# Patient Record
Sex: MALE | Race: BLACK OR AFRICAN AMERICAN | Employment: OTHER
[De-identification: names, ages, dates, MRNs, and addresses within clinical notes are randomized per-mention and may not be internally consistent; named-entity substitution may affect disease eponyms.]

---

## 2017-01-19 ENCOUNTER — OFFICE VISIT (OUTPATIENT)
Dept: ONCOLOGY | Facility: CLINIC | Age: 67
End: 2017-01-19

## 2017-01-19 ENCOUNTER — TELEPHONE (OUTPATIENT)
Dept: ONCOLOGY | Facility: CLINIC | Age: 67
End: 2017-01-19

## 2017-01-19 VITALS
DIASTOLIC BLOOD PRESSURE: 92 MMHG | BODY MASS INDEX: 22.85 KG/M2 | HEART RATE: 86 BPM | TEMPERATURE: 97.9 F | RESPIRATION RATE: 20 BRPM | WEIGHT: 168.5 LBS | SYSTOLIC BLOOD PRESSURE: 147 MMHG

## 2017-01-19 DIAGNOSIS — D61.818 PANCYTOPENIA (HCC): ICD-10-CM

## 2017-01-19 DIAGNOSIS — D47.2 MGUS (MONOCLONAL GAMMOPATHY OF UNKNOWN SIGNIFICANCE): ICD-10-CM

## 2017-01-19 DIAGNOSIS — C61 PROSTATE CANCER (HCC): Primary | ICD-10-CM

## 2017-01-19 PROCEDURE — 99214 OFFICE O/P EST MOD 30 MIN: CPT | Performed by: INTERNAL MEDICINE

## 2017-01-19 RX ORDER — LEVOFLOXACIN 500 MG/1
500 TABLET, FILM COATED ORAL DAILY
Qty: 7 TABLET | Refills: 0 | Status: SHIPPED | OUTPATIENT
Start: 2017-01-19 | End: 2017-01-26

## 2017-01-31 ENCOUNTER — OFFICE VISIT (OUTPATIENT)
Dept: FAMILY MEDICINE CLINIC | Facility: CLINIC | Age: 67
End: 2017-01-31

## 2017-01-31 VITALS
BODY MASS INDEX: 23.94 KG/M2 | OXYGEN SATURATION: 98 % | SYSTOLIC BLOOD PRESSURE: 124 MMHG | WEIGHT: 176.5 LBS | HEART RATE: 87 BPM | DIASTOLIC BLOOD PRESSURE: 90 MMHG

## 2017-01-31 DIAGNOSIS — Z11.59 NEED FOR HEPATITIS C SCREENING TEST: ICD-10-CM

## 2017-01-31 DIAGNOSIS — Z23 NEED FOR PROPHYLACTIC VACCINATION AGAINST DIPHTHERIA-TETANUS-PERTUSSIS (DTP): ICD-10-CM

## 2017-01-31 DIAGNOSIS — D47.2 MGUS (MONOCLONAL GAMMOPATHY OF UNKNOWN SIGNIFICANCE): ICD-10-CM

## 2017-01-31 DIAGNOSIS — D50.0 IRON DEFICIENCY ANEMIA DUE TO CHRONIC BLOOD LOSS: Primary | ICD-10-CM

## 2017-01-31 DIAGNOSIS — E55.9 VITAMIN D DEFICIENCY: ICD-10-CM

## 2017-01-31 PROCEDURE — 90715 TDAP VACCINE 7 YRS/> IM: CPT | Performed by: FAMILY MEDICINE

## 2017-01-31 PROCEDURE — 90471 IMMUNIZATION ADMIN: CPT | Performed by: FAMILY MEDICINE

## 2017-01-31 PROCEDURE — 99213 OFFICE O/P EST LOW 20 MIN: CPT | Performed by: FAMILY MEDICINE

## 2017-01-31 RX ORDER — CYCLOBENZAPRINE HCL 10 MG
TABLET ORAL
Qty: 90 TABLET | Refills: 0 | Status: SHIPPED | OUTPATIENT
Start: 2017-01-31 | End: 2017-12-19 | Stop reason: SDUPTHER

## 2017-01-31 RX ORDER — CALCIUM CARBONATE 500(1250)
500 TABLET ORAL DAILY
COMMUNITY

## 2017-02-06 RX ORDER — POTASSIUM CHLORIDE 20 MEQ/1
TABLET, EXTENDED RELEASE ORAL
Qty: 30 TABLET | Refills: 5 | Status: SHIPPED | OUTPATIENT
Start: 2017-02-06 | End: 2017-12-09 | Stop reason: SDUPTHER

## 2017-02-27 ENCOUNTER — CARE COORDINATION (OUTPATIENT)
Dept: CARE COORDINATION | Facility: CLINIC | Age: 67
End: 2017-02-27

## 2017-03-15 RX ORDER — HYDROCHLOROTHIAZIDE 25 MG/1
TABLET ORAL
Qty: 90 TABLET | Refills: 3 | Status: SHIPPED | OUTPATIENT
Start: 2017-03-15 | End: 2017-10-30 | Stop reason: SDUPTHER

## 2017-03-27 ENCOUNTER — CARE COORDINATION (OUTPATIENT)
Dept: CARE COORDINATION | Age: 67
End: 2017-03-27

## 2017-04-06 ENCOUNTER — EMPLOYEE WELLNESS (OUTPATIENT)
Dept: OTHER | Age: 67
End: 2017-04-06

## 2017-04-06 LAB
CHOLESTEROL/HDL RATIO: 3
CHOLESTEROL: 175 MG/DL
GLUCOSE BLD-MCNC: 91 MG/DL (ref 70–99)
HDLC SERPL-MCNC: 59 MG/DL
LDL CHOLESTEROL: 88 MG/DL (ref 0–130)
PATIENT FASTING?: NORMAL
TRIGL SERPL-MCNC: 142 MG/DL
VLDLC SERPL CALC-MCNC: NORMAL MG/DL (ref 1–30)

## 2017-04-27 ENCOUNTER — CARE COORDINATION (OUTPATIENT)
Dept: CARE COORDINATION | Age: 67
End: 2017-04-27

## 2017-05-10 ENCOUNTER — HOSPITAL ENCOUNTER (OUTPATIENT)
Facility: MEDICAL CENTER | Age: 67
End: 2017-05-10
Payer: COMMERCIAL

## 2017-05-16 ENCOUNTER — HOSPITAL ENCOUNTER (OUTPATIENT)
Facility: MEDICAL CENTER | Age: 67
Discharge: HOME | End: 2017-05-16
Payer: COMMERCIAL

## 2017-05-16 DIAGNOSIS — D47.2 MGUS (MONOCLONAL GAMMOPATHY OF UNKNOWN SIGNIFICANCE): ICD-10-CM

## 2017-05-16 DIAGNOSIS — C61 PROSTATE CANCER (HCC): ICD-10-CM

## 2017-05-16 DIAGNOSIS — D61.818 PANCYTOPENIA (HCC): ICD-10-CM

## 2017-05-16 LAB
ABSOLUTE EOS #: 0.07 K/UL (ref 0–0.4)
ABSOLUTE LYMPH #: 0.4 K/UL (ref 1–4.8)
ABSOLUTE MONO #: 0.33 K/UL (ref 0.2–0.8)
ANION GAP SERPL CALCULATED.3IONS-SCNC: 15 MMOL/L (ref 9–17)
BASOPHILS # BLD: 0 %
BASOPHILS ABSOLUTE: 0 K/UL (ref 0–0.2)
BUN BLDV-MCNC: 11 MG/DL (ref 8–23)
BUN/CREAT BLD: 13 (ref 9–20)
CALCIUM SERPL-MCNC: 8.6 MG/DL (ref 8.6–10.4)
CHLORIDE BLD-SCNC: 102 MMOL/L (ref 98–107)
CO2: 22 MMOL/L (ref 20–31)
CREAT SERPL-MCNC: 0.86 MG/DL (ref 0.7–1.2)
DIFFERENTIAL TYPE: ABNORMAL
EOSINOPHILS RELATIVE PERCENT: 6 %
FERRITIN: 48 UG/L (ref 30–400)
FREE KAPPA/LAMBDA RATIO: 1.49 (ref 0.26–1.65)
GFR AFRICAN AMERICAN: >60 ML/MIN
GFR NON-AFRICAN AMERICAN: >60 ML/MIN
GFR SERPL CREATININE-BSD FRML MDRD: ABNORMAL ML/MIN/{1.73_M2}
GFR SERPL CREATININE-BSD FRML MDRD: ABNORMAL ML/MIN/{1.73_M2}
GLUCOSE BLD-MCNC: 111 MG/DL (ref 70–99)
HCT VFR BLD CALC: 37.9 % (ref 41–53)
HEMOGLOBIN: 12.2 G/DL (ref 13.5–17.5)
IRON SATURATION: 45 % (ref 20–55)
IRON: 153 UG/DL (ref 59–158)
KAPPA FREE LIGHT CHAINS QNT: 3.18 MG/DL (ref 0.37–1.94)
LAMBDA FREE LIGHT CHAINS QNT: 2.14 MG/DL (ref 0.57–2.63)
LYMPHOCYTES # BLD: 37 %
MCH RBC QN AUTO: 24.1 PG (ref 26–34)
MCHC RBC AUTO-ENTMCNC: 32.2 G/DL (ref 31–37)
MCV RBC AUTO: 74.8 FL (ref 80–100)
MONOCYTES # BLD: 30 %
MORPHOLOGY: ABNORMAL
PDW BLD-RTO: 19.8 % (ref 11.5–14.5)
PLATELET # BLD: 87 K/UL (ref 130–400)
PLATELET ESTIMATE: ABNORMAL
PMV BLD AUTO: 9.7 FL (ref 6–12)
POTASSIUM SERPL-SCNC: 4 MMOL/L (ref 3.7–5.3)
RBC # BLD: 5.07 M/UL (ref 4.5–5.9)
RBC # BLD: ABNORMAL 10*6/UL
SEG NEUTROPHILS: 27 %
SEGMENTED NEUTROPHILS ABSOLUTE COUNT: 0.3 K/UL (ref 1.8–7.7)
SODIUM BLD-SCNC: 139 MMOL/L (ref 135–144)
TOTAL IRON BINDING CAPACITY: 343 UG/DL (ref 250–450)
UNSATURATED IRON BINDING CAPACITY: 190 UG/DL (ref 112–347)
WBC # BLD: 1.1 K/UL (ref 3.5–11)
WBC # BLD: ABNORMAL 10*3/UL

## 2017-05-16 PROCEDURE — 36415 COLL VENOUS BLD VENIPUNCTURE: CPT

## 2017-05-16 PROCEDURE — 82784 ASSAY IGA/IGD/IGG/IGM EACH: CPT

## 2017-05-16 PROCEDURE — 85025 COMPLETE CBC W/AUTO DIFF WBC: CPT

## 2017-05-16 PROCEDURE — 83540 ASSAY OF IRON: CPT

## 2017-05-16 PROCEDURE — 83550 IRON BINDING TEST: CPT

## 2017-05-16 PROCEDURE — 82728 ASSAY OF FERRITIN: CPT

## 2017-05-16 PROCEDURE — 80048 BASIC METABOLIC PNL TOTAL CA: CPT

## 2017-05-16 PROCEDURE — 83883 ASSAY NEPHELOMETRY NOT SPEC: CPT

## 2017-05-17 ENCOUNTER — HOSPITAL ENCOUNTER (OUTPATIENT)
Facility: MEDICAL CENTER | Age: 67
End: 2017-05-17
Payer: COMMERCIAL

## 2017-05-17 LAB
IGA: 351 MG/DL (ref 70–400)
IGG: 1912 MG/DL (ref 700–1600)
IGM: 30 MG/DL (ref 40–230)

## 2017-05-18 ENCOUNTER — TELEPHONE (OUTPATIENT)
Dept: ONCOLOGY | Age: 67
End: 2017-05-18

## 2017-05-18 ENCOUNTER — OFFICE VISIT (OUTPATIENT)
Dept: ONCOLOGY | Age: 67
End: 2017-05-18
Payer: COMMERCIAL

## 2017-05-18 VITALS
HEART RATE: 89 BPM | RESPIRATION RATE: 18 BRPM | BODY MASS INDEX: 23.02 KG/M2 | TEMPERATURE: 98.2 F | WEIGHT: 169.7 LBS | SYSTOLIC BLOOD PRESSURE: 132 MMHG | DIASTOLIC BLOOD PRESSURE: 82 MMHG

## 2017-05-18 DIAGNOSIS — D61.818 PANCYTOPENIA (HCC): Primary | ICD-10-CM

## 2017-05-18 DIAGNOSIS — R97.20 PSA ELEVATION: ICD-10-CM

## 2017-05-18 DIAGNOSIS — K90.89 OTHER SPECIFIED INTESTINAL MALABSORPTION: ICD-10-CM

## 2017-05-18 DIAGNOSIS — D47.2 MGUS (MONOCLONAL GAMMOPATHY OF UNKNOWN SIGNIFICANCE): ICD-10-CM

## 2017-05-18 PROCEDURE — 99214 OFFICE O/P EST MOD 30 MIN: CPT | Performed by: INTERNAL MEDICINE

## 2017-05-18 PROCEDURE — 99211 OFF/OP EST MAY X REQ PHY/QHP: CPT

## 2017-07-25 ENCOUNTER — OFFICE VISIT (OUTPATIENT)
Dept: FAMILY MEDICINE CLINIC | Age: 67
End: 2017-07-25
Payer: COMMERCIAL

## 2017-07-25 VITALS
SYSTOLIC BLOOD PRESSURE: 140 MMHG | DIASTOLIC BLOOD PRESSURE: 62 MMHG | HEART RATE: 91 BPM | HEIGHT: 72 IN | OXYGEN SATURATION: 98 % | BODY MASS INDEX: 22.29 KG/M2 | WEIGHT: 164.6 LBS

## 2017-07-25 DIAGNOSIS — M19.90 OSTEOARTHRITIS, UNSPECIFIED OSTEOARTHRITIS TYPE, UNSPECIFIED SITE: ICD-10-CM

## 2017-07-25 DIAGNOSIS — R63.4 WEIGHT LOSS: Primary | ICD-10-CM

## 2017-07-25 PROCEDURE — 99213 OFFICE O/P EST LOW 20 MIN: CPT | Performed by: FAMILY MEDICINE

## 2017-07-25 RX ORDER — CYCLOBENZAPRINE HCL 10 MG
10 TABLET ORAL 3 TIMES DAILY PRN
Qty: 30 TABLET | Refills: 3 | Status: SHIPPED | OUTPATIENT
Start: 2017-07-25 | End: 2017-08-04

## 2017-07-25 ASSESSMENT — PATIENT HEALTH QUESTIONNAIRE - PHQ9
SUM OF ALL RESPONSES TO PHQ9 QUESTIONS 1 & 2: 0
SUM OF ALL RESPONSES TO PHQ QUESTIONS 1-9: 0
1. LITTLE INTEREST OR PLEASURE IN DOING THINGS: 0
2. FEELING DOWN, DEPRESSED OR HOPELESS: 0

## 2017-08-16 ENCOUNTER — HOSPITAL ENCOUNTER (OUTPATIENT)
Facility: MEDICAL CENTER | Age: 67
End: 2017-08-16
Payer: COMMERCIAL

## 2017-08-23 ENCOUNTER — HOSPITAL ENCOUNTER (OUTPATIENT)
Facility: MEDICAL CENTER | Age: 67
Discharge: HOME | End: 2017-08-23
Payer: COMMERCIAL

## 2017-08-23 ENCOUNTER — HOSPITAL ENCOUNTER (OUTPATIENT)
Facility: MEDICAL CENTER | Age: 67
End: 2017-08-23
Payer: COMMERCIAL

## 2017-08-23 DIAGNOSIS — C61 PROSTATE CANCER (HCC): ICD-10-CM

## 2017-08-23 DIAGNOSIS — D61.818 PANCYTOPENIA (HCC): ICD-10-CM

## 2017-08-23 DIAGNOSIS — D47.2 MGUS (MONOCLONAL GAMMOPATHY OF UNKNOWN SIGNIFICANCE): ICD-10-CM

## 2017-08-23 LAB
ABSOLUTE EOS #: 0.05 K/UL (ref 0–0.4)
ABSOLUTE LYMPH #: 0.5 K/UL (ref 1–4.8)
ABSOLUTE MONO #: 0.17 K/UL (ref 0.2–0.8)
ANION GAP SERPL CALCULATED.3IONS-SCNC: 9 MMOL/L (ref 9–17)
BASOPHILS # BLD: 2 %
BASOPHILS ABSOLUTE: 0.02 K/UL (ref 0–0.2)
BUN BLDV-MCNC: 12 MG/DL (ref 8–23)
BUN/CREAT BLD: 12 (ref 9–20)
CALCIUM SERPL-MCNC: 8.8 MG/DL (ref 8.6–10.4)
CHLORIDE BLD-SCNC: 99 MMOL/L (ref 98–107)
CO2: 26 MMOL/L (ref 20–31)
CREAT SERPL-MCNC: 0.99 MG/DL (ref 0.7–1.2)
DIFFERENTIAL TYPE: ABNORMAL
EOSINOPHILS RELATIVE PERCENT: 4 %
FERRITIN: 33 UG/L (ref 30–400)
FREE KAPPA/LAMBDA RATIO: 1.13 (ref 0.26–1.65)
GFR AFRICAN AMERICAN: >60 ML/MIN
GFR NON-AFRICAN AMERICAN: >60 ML/MIN
GFR SERPL CREATININE-BSD FRML MDRD: ABNORMAL ML/MIN/{1.73_M2}
GFR SERPL CREATININE-BSD FRML MDRD: ABNORMAL ML/MIN/{1.73_M2}
GLUCOSE BLD-MCNC: 161 MG/DL (ref 70–99)
HCT VFR BLD CALC: 32.5 % (ref 41–53)
HEMOGLOBIN: 10.1 G/DL (ref 13.5–17.5)
IGA: 374 MG/DL (ref 70–400)
IGG: 1914 MG/DL (ref 700–1600)
IGM: 34 MG/DL (ref 40–230)
IRON SATURATION: 47 % (ref 20–55)
IRON: 158 UG/DL (ref 59–158)
KAPPA FREE LIGHT CHAINS QNT: 2.86 MG/DL (ref 0.37–1.94)
LAMBDA FREE LIGHT CHAINS QNT: 2.52 MG/DL (ref 0.57–2.63)
LYMPHOCYTES # BLD: 42 %
MCH RBC QN AUTO: 22.4 PG (ref 26–34)
MCHC RBC AUTO-ENTMCNC: 31.2 G/DL (ref 31–37)
MCV RBC AUTO: 71.8 FL (ref 80–100)
MONOCYTES # BLD: 14 %
MORPHOLOGY: ABNORMAL
PDW BLD-RTO: 18.9 % (ref 11.5–14.5)
PLATELET # BLD: 142 K/UL (ref 130–400)
PLATELET ESTIMATE: ABNORMAL
PMV BLD AUTO: 9.4 FL (ref 6–12)
POTASSIUM SERPL-SCNC: 4.2 MMOL/L (ref 3.7–5.3)
RBC # BLD: 4.53 M/UL (ref 4.5–5.9)
RBC # BLD: ABNORMAL 10*6/UL
SEG NEUTROPHILS: 38 %
SEGMENTED NEUTROPHILS ABSOLUTE COUNT: 0.46 K/UL (ref 1.8–7.7)
SODIUM BLD-SCNC: 134 MMOL/L (ref 135–144)
TOTAL IRON BINDING CAPACITY: 338 UG/DL (ref 250–450)
UNSATURATED IRON BINDING CAPACITY: 180 UG/DL (ref 112–347)
WBC # BLD: 1.2 K/UL (ref 3.5–11)
WBC # BLD: ABNORMAL 10*3/UL

## 2017-08-23 PROCEDURE — 85025 COMPLETE CBC W/AUTO DIFF WBC: CPT

## 2017-08-23 PROCEDURE — 80048 BASIC METABOLIC PNL TOTAL CA: CPT

## 2017-08-23 PROCEDURE — 36415 COLL VENOUS BLD VENIPUNCTURE: CPT

## 2017-08-23 PROCEDURE — 82728 ASSAY OF FERRITIN: CPT

## 2017-08-23 PROCEDURE — 83540 ASSAY OF IRON: CPT

## 2017-08-23 PROCEDURE — 83550 IRON BINDING TEST: CPT

## 2017-08-23 PROCEDURE — 82784 ASSAY IGA/IGD/IGG/IGM EACH: CPT

## 2017-08-23 PROCEDURE — 83883 ASSAY NEPHELOMETRY NOT SPEC: CPT

## 2017-08-24 ENCOUNTER — TELEPHONE (OUTPATIENT)
Dept: ONCOLOGY | Age: 67
End: 2017-08-24

## 2017-08-24 ENCOUNTER — OFFICE VISIT (OUTPATIENT)
Dept: ONCOLOGY | Age: 67
End: 2017-08-24
Payer: COMMERCIAL

## 2017-08-24 VITALS
DIASTOLIC BLOOD PRESSURE: 85 MMHG | RESPIRATION RATE: 18 BRPM | WEIGHT: 164 LBS | SYSTOLIC BLOOD PRESSURE: 138 MMHG | TEMPERATURE: 98.4 F | HEART RATE: 93 BPM | BODY MASS INDEX: 22.24 KG/M2

## 2017-08-24 DIAGNOSIS — D70.9 NEUTROPENIA, UNSPECIFIED TYPE (HCC): ICD-10-CM

## 2017-08-24 DIAGNOSIS — D47.2 MGUS (MONOCLONAL GAMMOPATHY OF UNKNOWN SIGNIFICANCE): ICD-10-CM

## 2017-08-24 DIAGNOSIS — D61.818 PANCYTOPENIA (HCC): Primary | ICD-10-CM

## 2017-08-24 PROCEDURE — 99211 OFF/OP EST MAY X REQ PHY/QHP: CPT

## 2017-08-24 PROCEDURE — 99214 OFFICE O/P EST MOD 30 MIN: CPT | Performed by: INTERNAL MEDICINE

## 2017-08-24 RX ORDER — LEVOFLOXACIN 500 MG/1
500 TABLET, FILM COATED ORAL DAILY
Qty: 7 TABLET | Refills: 0 | Status: SHIPPED | OUTPATIENT
Start: 2017-08-24 | End: 2017-08-31

## 2017-09-07 ENCOUNTER — OFFICE VISIT (OUTPATIENT)
Dept: GASTROENTEROLOGY | Age: 67
End: 2017-09-07
Payer: COMMERCIAL

## 2017-09-07 VITALS
SYSTOLIC BLOOD PRESSURE: 159 MMHG | WEIGHT: 166.5 LBS | TEMPERATURE: 98.1 F | HEART RATE: 96 BPM | HEIGHT: 72 IN | BODY MASS INDEX: 22.55 KG/M2 | OXYGEN SATURATION: 98 % | DIASTOLIC BLOOD PRESSURE: 90 MMHG | RESPIRATION RATE: 14 BRPM

## 2017-09-07 DIAGNOSIS — K92.2 UPPER GI BLEED: Primary | ICD-10-CM

## 2017-09-07 DIAGNOSIS — D50.0 IRON DEFICIENCY ANEMIA DUE TO CHRONIC BLOOD LOSS: ICD-10-CM

## 2017-09-07 PROCEDURE — 99214 OFFICE O/P EST MOD 30 MIN: CPT | Performed by: INTERNAL MEDICINE

## 2017-09-07 ASSESSMENT — ENCOUNTER SYMPTOMS
ABDOMINAL PAIN: 0
CONSTIPATION: 0
EYES NEGATIVE: 1
ALLERGIC/IMMUNOLOGIC NEGATIVE: 1
VOMITING: 0
BLOOD IN STOOL: 0
RECTAL PAIN: 0
ANAL BLEEDING: 0
GASTROINTESTINAL NEGATIVE: 1
NAUSEA: 0
ABDOMINAL DISTENTION: 0
RESPIRATORY NEGATIVE: 1
DIARRHEA: 0

## 2017-10-23 RX ORDER — PANTOPRAZOLE SODIUM 40 MG/1
40 TABLET, DELAYED RELEASE ORAL
Qty: 90 TABLET | Refills: 0 | Status: SHIPPED | OUTPATIENT
Start: 2017-10-23 | End: 2017-10-30 | Stop reason: SDUPTHER

## 2017-10-23 NOTE — TELEPHONE ENCOUNTER
Next Visit Date:  Future Appointments  Date Time Provider Krystal Deanne   10/30/2017 1:15 PM Ivan Andersen MD [facility]   11/27/2017 10:30 AM SCHEDULE, [facility]   11/30/2017 2:40 PM Don Ruano MD SV [facility]   9/13/2018 1:00 PM Belen Engel MD [facility] [address] Maintenance   Topic Date Due    Hepatitis C screen  1950    Zostavax vaccine  02/13/2010    Flu vaccine (1) 09/01/2017    Colon cancer screen colonoscopy  07/14/2019    Lipid screen  07/20/2021    DTaP/Tdap/Td vaccine (2 - Td) 01/31/2027    Pneumococcal low/med risk  Completed       Hemoglobin A1C (%)   Date Value   01/30/2015 5.0             ( goal A1C is < 7)   No results found for: LABMICR  LDL Cholesterol (mg/dL)   Date Value   04/06/2017 88     LDL Calculated (mg/dL)   Date Value   03/26/2014 167 (A)       (goal LDL is <100)   AST (U/L)   Date Value   10/18/2016 35     ALT (U/L)   Date Value   10/18/2016 28     BUN (mg/dL)   Date Value   08/23/2017 12     BP Readings from Last 3 Encounters:   09/07/17 (!) 159/90   08/24/17 138/85   07/25/17 (!) 140/62          (goal 120/80)    All Future Testing planned in [facility]  Lab Frequency Next Occurrence   Vitamin D 25 Hydroxy Once 01/31/2018   Hepatitis C Antibody Once 01/31/2018   CBC Auto Differential     Basic Metabolic Panel     Iron and TIBC     Ferritin     IgG, IgA, IgM     [place]/Lambda Free Lt Chains, Serum Quant                 Patient Active Problem List:     Leukopenia     Anemia     Weight loss     Back pain     Weakness     Pancytopenia (HCC)     MGUS (monoclonal gammopathy of unknown significance)     PSA elevation     Neutropenia (HCC)     Osteoarthritis     Hyponatremia     S/P hip replacement     Prostate cancer (HCC)     Malabsorption     Hematemesis without nausea     Upper GI bleed

## 2017-10-30 ENCOUNTER — OFFICE VISIT (OUTPATIENT)
Dept: FAMILY MEDICINE CLINIC | Age: 67
End: 2017-10-30
Payer: COMMERCIAL

## 2017-10-30 VITALS
HEART RATE: 93 BPM | OXYGEN SATURATION: 96 % | DIASTOLIC BLOOD PRESSURE: 72 MMHG | HEIGHT: 72 IN | BODY MASS INDEX: 23.73 KG/M2 | WEIGHT: 175.2 LBS | SYSTOLIC BLOOD PRESSURE: 140 MMHG

## 2017-10-30 DIAGNOSIS — D47.2 MGUS (MONOCLONAL GAMMOPATHY OF UNKNOWN SIGNIFICANCE): Primary | ICD-10-CM

## 2017-10-30 DIAGNOSIS — I10 ESSENTIAL HYPERTENSION: ICD-10-CM

## 2017-10-30 DIAGNOSIS — D50.8 IRON DEFICIENCY ANEMIA SECONDARY TO INADEQUATE DIETARY IRON INTAKE: ICD-10-CM

## 2017-10-30 PROCEDURE — 99213 OFFICE O/P EST LOW 20 MIN: CPT | Performed by: FAMILY MEDICINE

## 2017-10-30 PROCEDURE — 90471 IMMUNIZATION ADMIN: CPT | Performed by: FAMILY MEDICINE

## 2017-10-30 PROCEDURE — 90688 IIV4 VACCINE SPLT 0.5 ML IM: CPT | Performed by: FAMILY MEDICINE

## 2017-10-30 RX ORDER — HYDROCHLOROTHIAZIDE 25 MG/1
TABLET ORAL
Qty: 90 TABLET | Refills: 3 | Status: SHIPPED | OUTPATIENT
Start: 2017-10-30 | End: 2018-04-18 | Stop reason: SDUPTHER

## 2017-10-30 RX ORDER — AMOXICILLIN 500 MG/1
CAPSULE ORAL
Qty: 4 CAPSULE | Refills: 3 | Status: SHIPPED | OUTPATIENT
Start: 2017-10-30 | End: 2019-02-26

## 2017-10-30 RX ORDER — PANTOPRAZOLE SODIUM 40 MG/1
40 TABLET, DELAYED RELEASE ORAL
Qty: 90 TABLET | Refills: 3 | Status: SHIPPED | OUTPATIENT
Start: 2017-10-30 | End: 2018-01-20 | Stop reason: SDUPTHER

## 2017-10-30 NOTE — PROGRESS NOTES
Subjective: Guadalupe Gage presents for   Chief Complaint   Patient presents with    Check-Up    Discuss Medications   dong well. bp is fine    Appetite is great and he is gaining weight. Continues to see heme, urology and GI    Is very active physically  Patient Active Problem List   Diagnosis    Leukopenia    Anemia    Weight loss    Back pain    Weakness    Pancytopenia ([address]    MGUS (monoclonal gammopathy of unknown significance)    PSA elevation    Neutropenia (HCC)    Osteoarthritis    Hyponatremia    S/P hip replacement    Pro[facility] ([facility] [address]    Malabsorption    Hematemesis without nausea    Upper GI bleed       Review of Systems:  · General: no significant weight changes. · Respiratory: no cough, pleuritic chest pain, dyspnea, or wheezing  · Cardiovascular: no pain, NICOLE, orthopnea, palpitations, or claudication  · Gastrointestinal: no chronic nausea, vomiting, heartburn, diarrhea, constipation, bloating, or abdominal pain. No bloody or black stools. Objective:  Physical Exam   Vitals: BP (!) 140/72   Pulse 93   Ht 6' (1.829 m)   Wt 175 lb 3.2 oz (79.5 kg)   SpO2 96%   BMI 23.76 kg/m²     Wt Readings from Last 3 Encounters:   09/07/17 166 lb 8 oz (75.5 kg)   08/24/17 164 lb (74.4 kg)   07/25/17 164 lb 9.6 oz (74.7 kg)     Ht Readings from Last 3 Encounters:   09/07/17 6' (1.829 m)   07/25/17 6' (1.829 m)   11/14/16 6' (1.829 m)     There is no height or weight on file to calculate BMI. Constitutional: He is oriented to person, place, and time. He appears well-developed and well-nourished and in no acute distress. Answers all my questions appropriately. Head: Normocephalic and atraumatic. Eyes:conjunctiva appear normal.  Nose: pink, non-edematous mucosa. No polyps. No septal deviation  Throat: no erythema, tonsillar hypertrophy or exudate. No ulcerations noted. Lips/Teeth/Gums all appear normal.  Neck: Normal range of motion. Neck supple. No tracheal deviation present.  No abnormal lymphadenopathy. No JVD noted. Carotids are clear bilaterally. No thyroid masses noted. Heart: RRR without murmur. No S3, S4, or gallop noted. Chest: Clear to auscultation bilaterally. Good breath sounds noted. No rales, wheezes, or rhonchi noted. No respiratory retractions noted. Wall has symmetrical movement with respirations. No pedal edema    Assessment:   Encounter Diagnoses   Name Primary?     MGUS (monoclonal gammopathy of unknown significance) Yes    Iron deficiency anemia secondary to inadequate dietary iron intake     Essential hypertension          Plan:   Orders Placed This Encounter   Procedures    INFLUENZA, QUADV, 3 YRS AND OLDER, IM, MDV, 0.5ML (FLUZONE QUADV)     rv in 6 mopnths    We discussed pre procedure prophylaxis and gave him an rx for amxoils

## 2017-10-30 NOTE — PROGRESS NOTES
Subjective:      Patient ID: Robby Blunt is a 79 y.o. male. Visit Information    Have you changed or started any medications since your last visit including any over-the-counter medicines, vitamins, or herbal medicines? no   Are you having any side effects from any of your medications? -  no  Have you stopped taking any of your medications? Is so, why? -  no    Have you seen any other physician or provider since your last visit? No  Have you had any other diagnostic tests since your last visit? No  Have you been seen in the emergency room and/or had an admission to a hospital since we last saw you? No  Have you had your routine dental cleaning in the past 6 months? yes -     Have you activated your [employer] account? If not, what are your barriers?  Yes     Patient Care Team:  Mary Perla MD as PCP - General (Family Medicine)  Mary Perla MD as PCP - S Attributed Provider  Eva oGff MD as Consulting Physician (Hematology and Oncology)  Bala Nash DO as Consulting Physician (Orthopedic Surgery)  Dima Underwood MD as Consulting Physician (Urology)  Emigdio Belle MD as Consulting Physician (Gastroenterology)    Medical History Review  Past Medical, Family, and Social History reviewed and  contribute to the patient presenting condition    Health Maintenance   Topic Date Due    Hepatitis C screen  1950    Zostavax vaccine  02/13/2010    Flu vaccine (1) 09/01/2017    Colon cancer screen colonoscopy  07/14/2019    Lipid screen  07/20/2021    DTaP/Tdap/Td vaccine (2 - Td) 01/31/2027    Pneumococcal low/med risk  Completed       HPI    Review of Systems    Objective:   Physical Exam    Assessment / Plan:

## 2017-11-22 ENCOUNTER — HOSPITAL ENCOUNTER (OUTPATIENT)
Facility: MEDICAL CENTER | Age: 67
End: 2017-11-22
Payer: COMMERCIAL

## 2017-11-28 ENCOUNTER — HOSPITAL ENCOUNTER (OUTPATIENT)
Facility: MEDICAL CENTER | Age: 67
Discharge: HOME | End: 2017-11-28
Payer: COMMERCIAL

## 2017-11-28 ENCOUNTER — HOSPITAL ENCOUNTER (OUTPATIENT)
Facility: MEDICAL CENTER | Age: 67
End: 2017-11-28
Payer: COMMERCIAL

## 2017-11-28 DIAGNOSIS — D47.2 MGUS (MONOCLONAL GAMMOPATHY OF UNKNOWN SIGNIFICANCE): ICD-10-CM

## 2017-11-28 DIAGNOSIS — C61 PROSTATE CANCER (HCC): ICD-10-CM

## 2017-11-28 DIAGNOSIS — D61.818 PANCYTOPENIA (HCC): ICD-10-CM

## 2017-11-28 LAB
ABSOLUTE EOS #: 0.07 K/UL (ref 0–0.4)
ABSOLUTE IMMATURE GRANULOCYTE: ABNORMAL K/UL (ref 0–0.3)
ABSOLUTE LYMPH #: 0.56 K/UL (ref 1–4.8)
ABSOLUTE MONO #: 0.36 K/UL (ref 0.2–0.8)
ANION GAP SERPL CALCULATED.3IONS-SCNC: 13 MMOL/L (ref 9–17)
BASOPHILS # BLD: 0 %
BASOPHILS ABSOLUTE: 0 K/UL (ref 0–0.2)
BUN BLDV-MCNC: 16 MG/DL (ref 8–23)
BUN/CREAT BLD: 18 (ref 9–20)
CALCIUM SERPL-MCNC: 8.7 MG/DL (ref 8.6–10.4)
CHLORIDE BLD-SCNC: 103 MMOL/L (ref 98–107)
CO2: 23 MMOL/L (ref 20–31)
CREAT SERPL-MCNC: 0.9 MG/DL (ref 0.7–1.2)
DIFFERENTIAL TYPE: ABNORMAL
EOSINOPHILS RELATIVE PERCENT: 4 % (ref 1–4)
FERRITIN: 29 UG/L (ref 30–400)
FREE KAPPA/LAMBDA RATIO: 1.36 (ref 0.26–1.65)
GFR AFRICAN AMERICAN: >60 ML/MIN
GFR NON-AFRICAN AMERICAN: >60 ML/MIN
GFR SERPL CREATININE-BSD FRML MDRD: ABNORMAL ML/MIN/{1.73_M2}
GFR SERPL CREATININE-BSD FRML MDRD: ABNORMAL ML/MIN/{1.73_M2}
GLUCOSE BLD-MCNC: 151 MG/DL (ref 70–99)
HCT VFR BLD CALC: 33.7 % (ref 41–53)
HEMOGLOBIN: 10.1 G/DL (ref 13.5–17.5)
IGA: 349 MG/DL (ref 70–400)
IGG: 1770 MG/DL (ref 700–1600)
IGM: 34 MG/DL (ref 40–230)
IMMATURE GRANULOCYTES: ABNORMAL %
IRON SATURATION: 78 % (ref 20–55)
IRON: 277 UG/DL (ref 59–158)
KAPPA FREE LIGHT CHAINS QNT: 2.69 MG/DL (ref 0.37–1.94)
LAMBDA FREE LIGHT CHAINS QNT: 1.98 MG/DL (ref 0.57–2.63)
LYMPHOCYTES # BLD: 31 % (ref 24–44)
MCH RBC QN AUTO: 22.1 PG (ref 26–34)
MCHC RBC AUTO-ENTMCNC: 30.1 G/DL (ref 31–37)
MCV RBC AUTO: 73.4 FL (ref 80–100)
MONOCYTES # BLD: 20 % (ref 1–7)
MORPHOLOGY: ABNORMAL
PDW BLD-RTO: 20.6 % (ref 11.5–14.5)
PLATELET # BLD: 122 K/UL (ref 130–400)
PLATELET ESTIMATE: ABNORMAL
PMV BLD AUTO: ABNORMAL FL (ref 6–12)
POTASSIUM SERPL-SCNC: 3.9 MMOL/L (ref 3.7–5.3)
RBC # BLD: 4.59 M/UL (ref 4.5–5.9)
RBC # BLD: ABNORMAL 10*6/UL
SEG NEUTROPHILS: 45 % (ref 36–66)
SEGMENTED NEUTROPHILS ABSOLUTE COUNT: 0.81 K/UL (ref 1.8–7.7)
SODIUM BLD-SCNC: 139 MMOL/L (ref 135–144)
TOTAL IRON BINDING CAPACITY: 357 UG/DL (ref 250–450)
UNSATURATED IRON BINDING CAPACITY: 80 UG/DL (ref 112–347)
WBC # BLD: 1.8 K/UL (ref 3.5–11)
WBC # BLD: ABNORMAL 10*3/UL

## 2017-11-28 PROCEDURE — 80048 BASIC METABOLIC PNL TOTAL CA: CPT

## 2017-11-28 PROCEDURE — 82784 ASSAY IGA/IGD/IGG/IGM EACH: CPT

## 2017-11-28 PROCEDURE — 83883 ASSAY NEPHELOMETRY NOT SPEC: CPT

## 2017-11-28 PROCEDURE — 83540 ASSAY OF IRON: CPT

## 2017-11-28 PROCEDURE — 83550 IRON BINDING TEST: CPT

## 2017-11-28 PROCEDURE — 85025 COMPLETE CBC W/AUTO DIFF WBC: CPT

## 2017-11-28 PROCEDURE — 82728 ASSAY OF FERRITIN: CPT

## 2017-11-28 PROCEDURE — 36415 COLL VENOUS BLD VENIPUNCTURE: CPT

## 2017-11-30 ENCOUNTER — TELEPHONE (OUTPATIENT)
Dept: ONCOLOGY | Age: 67
End: 2017-11-30

## 2017-11-30 ENCOUNTER — OFFICE VISIT (OUTPATIENT)
Dept: ONCOLOGY | Age: 67
End: 2017-11-30
Payer: COMMERCIAL

## 2017-11-30 VITALS
HEART RATE: 102 BPM | WEIGHT: 170.1 LBS | TEMPERATURE: 98.4 F | SYSTOLIC BLOOD PRESSURE: 161 MMHG | DIASTOLIC BLOOD PRESSURE: 99 MMHG | BODY MASS INDEX: 23.07 KG/M2 | RESPIRATION RATE: 18 BRPM

## 2017-11-30 DIAGNOSIS — D61.818 PANCYTOPENIA (HCC): Primary | ICD-10-CM

## 2017-11-30 DIAGNOSIS — D47.2 MGUS (MONOCLONAL GAMMOPATHY OF UNKNOWN SIGNIFICANCE): ICD-10-CM

## 2017-11-30 DIAGNOSIS — C61 PROSTATE CANCER (HCC): ICD-10-CM

## 2017-11-30 PROCEDURE — 99211 OFF/OP EST MAY X REQ PHY/QHP: CPT

## 2017-11-30 PROCEDURE — 99214 OFFICE O/P EST MOD 30 MIN: CPT | Performed by: INTERNAL MEDICINE

## 2017-12-01 NOTE — PROGRESS NOTES
_     Chief Complaint   Patient presents with    Follow-up       DIAGNOSIS:    Anemia  Leukopenia  Weight loss. MGUS     Back pain  Questionable liver lesions      CURRENT THERAPY:    S/p hospitalization for further management of above issues. S/p blood transfusion  S/P IV Iron infusion. BRIEF CASE HISTORY:      The patient is a 77 y.o. AA male who is admitted to the hospital for severe anemia and leukopenia. He had no previous labs. He was seen by PCP for routine health exam as a new patient. Labs showed severe anemia and leukopenia. He had chronic back pain for one year. No fever. No night sweats. No lymphadenopathy   He has recent weight loss and anorexia. No GI symptoms. GI work up was negative. He received blood transfusion and IV Iron infusion. He had Rt hip surgery on 24/9/78 with no complications. He had left hip surgery in March 2015. No complications. Prostate surgery December 8919 with no complications. INTERIM HISTORY:   Patient is seen for follow up above problems. He has no fever. He continues to have low back pain and joints problems. He has weakness and fatigue. Overall much better. No active bleeding. No infections. No further weight loss. He gained few pounds. PAST MEDICAL HISTORY: has a past medical history of Anemia; Arthritis; Avascular necrosis ([address] BPH (benign prostatic hyperplasia); History of blood transfusion; Hypertension; Leukopenia; MGUS (monoclonal gammopathy of unknown significance); Osteoarthritis; Patient in clinical research study; and Stomach ulcer. PAST SURGICAL HISTORY: has a past surgical history that includes Endoscopy, colon, diagnostic (07/13/2014); ostate Biopsy (09/12/2014); Hip Arthroplasty (Right, 12/03/14); Inguinal hernia repair (Right, 2009); Hip Arthroplasty (Left, 03/10/15); ostatectomy (12/16/2015);  Colonoscopy (07/14/2014); and Upper gastrointestinal endoscopy (10/19/2016). CURRENT MEDICATIONS:  has a current medication list which includes the following prescription(s): pantoprazole, hydrochlorothiazide, amoxicillin, potassium chloride, cyclobenzaprine, calcium carbonate, iron, docusate sodium, multiple vitamins-minerals, and ergocalciferol. ALLERGIES:  has No Known Allergies. FAMILY HISTORY: Negative for any hematological or oncological conditions. SOCIAL HISTORY:  reports that he has never smoked. He has never used smokeless tobacco. He reports that he does not drink alcohol or use drugs. REVIEW OF SYSTEMS:     General: + weakness + fatigue. + unanticipated weight loss + decreased appetite. No fever or chills. Eyes: No blurred vision, eye pain or double vision. Ears: No hearing problems or drainage. No tinnitus. Throat: No sore throat, problems with swallowing or dysphagia. Respiratory: No cough, sputum or hemoptysis. No shortness of breath. No pleuritic chest pain. Cardiovascular: No chest pain, orthopnea or PND. No lower extremity edema. No palpitation. Gastrointestinal: No problems with swallowing. No abdominal pain or bloating. No nausea or vomiting. No diarrhea or constipation. No GI bleeding. Genitourinary: No dysuria, hematuria, frequency or urgency. Musculoskeletal: No muscle aches or pains. No limitation of movement. + chronic back pain. No gait disturbance, No joint complaints. Dermatologic: No skin rashes or pruritus. No skin lesions or discolorations. Psychiatric: No depression, anxiety, or stress or signs of schizophrenia. No change in mood or affect. Hematologic: No history of bleeding tendency. No bruises or ecchymosis. No history of clotting problems. Infectious disease: No fever, chills or frequent infections. Endocrine: No problems with opacity. No polydipsia or polyuria. No temperature intolerance. Neurologic: No headaches or dizziness. No weakness or numbness of the extremities. 11/28/2017 1410    ALKPHOS 148 (H) 10/18/2016 1802    AST 35 10/18/2016 1802    ALT 28 10/18/2016 1802    BILITOT 0.43 10/18/2016 1802        Bone marrow: Final Diagnosis  PERIPHERAL BLOOD:  - SEVERE MICROCYTIC ANEMIA.  - MODERATE TO SEVERE NEUTROPENIA.  - LYMPHOPENIA (510/ul). BONE MARROW BIOPSY, ASPIRATE SMEAR AND CLOT SECTION:  - MYELOID AND MEGAKARYOCYTIC HYPERPLASIA. - ABSENT IRON STORES. - PLASMA CELLS 5-10%, NEGATIVE FOR PLASMA CELL MONOTYPIA FOR IHC. Diagnosis Comment  THE SEVERE MICROCYTIC ANEMIA IS COMPATIBLE WITH IRON DEFICIENCY  ANEMIA. NEUTROPENIA IS LIKELY DRUG INDUCED / IMMUNE-MEDIATED. THERE  IS NO EVIDENCE OF MYELOID OR OTHER MALIGNANCY. PLASMA CELLS COMPRISE  5-10% OF THE CELLULARITY AND THERE IS NO EVIDENCE OF PLASMA CELL  MONOTYPIA BY IHC (SERUM IgG KAPPA MONOCLONAL PARAPROTEIN OF 0.6 G/dl  IS NOTED, FAVORING MGUS). CT scan: IMPRESSION: 1. Thickening of the sigmoid colon and rectum which maybe infectious or inflammatory etiology but may be neoplastic in etiology. Clinical correlation is recommended. Further evaluation with direct visualization is recommended. 2. Prostatomegaly. 3. Numerous low-attenuation lesions scattered in the liver are technically indeterminate. Further evaluation with nonemergent/outpatient contrast-enhanced MRI of the liver is recommended. 4. Severe degenerative changes of the bilateral hips. 5. Lucency of the proximal for more which may be related to demineralization; however, neoplastic etiologies cannot be excluded. This can be further evaluated with bone scan or MRI as clinically indicated. Note is made that this patient may be at increased risk for pathologic fracture. Bone scan: IMPRESSION: 1. No definitive scintigraphic evidence for metastatic disease to the skeleton. 2. Symmetric increased radiotracer uptake at the bilateral hips, likely secondary to severe degenerative changes of the bilateral hips seen on the CT abdomen pelvis of 7/12/2014. Plan for dental work. I will give him antibiotics to be used around that time. Prostate surgery done in December 2015. Continue to monitor PSA. RV 3 months. Labs today and at RV. We will check immunoglobulins level Every 6 months. Lab tests from last week showed stable numbers. Patient was offered a registry trial in registry with [facility]. Agreed. Patient's questions were answered to the best of his satisfaction and he verbalized full understanding and agreement.                                   [address] [place] [address] [facility]                          Cell: [phone]

## 2017-12-11 NOTE — TELEPHONE ENCOUNTER
Next Visit Date:  Future Appointments  Date Time Provider Krystal Deanne   5/1/2018 1:15 PM Seferino Closs, MD [facility]   5/24/2018 10:00 AM SCHEDULE, [facility] SV CANCER [facility]   5/31/2018 11:00 AM Isidoro Watts MD SV Cancer Ct [facility]   9/13/2018 1:00 PM Rene Gilford, MD [facility] [address] Maintenance   Topic Date Due    Hepatitis C screen  1950    Zostavax vaccine  02/13/2010    Colon cancer screen colonoscopy  07/14/2019    Lipid screen  07/20/2021    DTaP/Tdap/Td vaccine (2 - Td) 01/31/2027    Flu vaccine  Completed    Pneumococcal low/med risk  Completed       Hemoglobin A1C (%)   Date Value   01/30/2015 5.0             ( goal A1C is < 7)   No results found for: LABMICR  LDL Cholesterol (mg/dL)   Date Value   04/06/2017 88     LDL Calculated (mg/dL)   Date Value   03/26/2014 167 (A)       (goal LDL is <100)   AST (U/L)   Date Value   10/18/2016 35     ALT (U/L)   Date Value   10/18/2016 28     BUN (mg/dL)   Date Value   11/28/2017 16     BP Readings from Last 3 Encounters:   11/30/17 (!) 161/99   10/30/17 (!) 140/72   09/07/17 (!) 159/90          (goal 120/80)    All Future Testing planned in [facility]  Lab Frequency Next Occurrence   Vitamin D 25 Hydroxy Once 01/31/2018   Hepatitis C Antibody Once 01/31/2018   CBC Auto Differential     Basic Metabolic Panel     Iron and TIBC     Ferritin     IgG, IgA, IgM     [place]/Lambda Free Lt Chains, Serum Quant                 Patient Active Problem List:     Leukopenia     Anemia     Weight loss     Back pain     Weakness     Pancytopenia (HCC)     MGUS (monoclonal gammopathy of unknown significance)     PSA elevation     Neutropenia (HCC)     Osteoarthritis     Hyponatremia     S/P hip replacement     Prostate cancer (HCC)     Malabsorption     Hematemesis without nausea     Upper GI bleed

## 2017-12-12 RX ORDER — POTASSIUM CHLORIDE 20 MEQ/1
TABLET, EXTENDED RELEASE ORAL
Qty: 30 TABLET | Refills: 5 | Status: SHIPPED | OUTPATIENT
Start: 2017-12-12 | End: 2019-01-28 | Stop reason: SDUPTHER

## 2017-12-19 RX ORDER — CYCLOBENZAPRINE HCL 10 MG
TABLET ORAL
Qty: 30 TABLET | Refills: 1 | Status: SHIPPED | OUTPATIENT
Start: 2017-12-19 | End: 2018-01-19 | Stop reason: SDUPTHER

## 2018-01-19 RX ORDER — CYCLOBENZAPRINE HCL 10 MG
TABLET ORAL
Qty: 30 TABLET | Refills: 1 | Status: SHIPPED | OUTPATIENT
Start: 2018-01-19 | End: 2018-01-23 | Stop reason: SDUPTHER

## 2018-01-19 NOTE — TELEPHONE ENCOUNTER
Next Visit Date:  Future Appointments  Date Time Provider Krystal Alicea   5/1/2018 1:15 PM Shruthi Parker MD [facility]   5/24/2018 10:00 AM SCHEDULE, [facility] SV CANCER [facility]   5/31/2018 11:00 AM Asa Nava MD SV Cancer Ct [facility]   9/13/2018 1:00 PM Red Gabriel MD [facility] [address] Maintenance   Topic Date Due    Hepatitis C screen  1950    Zostavax vaccine  02/13/2010    Potassium monitoring  11/28/2018    Creatinine monitoring  11/28/2018    Colon cancer screen colonoscopy  07/14/2019    Lipid screen  04/06/2022    DTaP/Tdap/Td vaccine (2 - Td) 01/31/2027    Flu vaccine  Completed    Pneumococcal low/med risk  Completed       Hemoglobin A1C (%)   Date Value   01/30/2015 5.0             ( goal A1C is < 7)   No results found for: LABMICR  LDL Cholesterol (mg/dL)   Date Value   04/06/2017 88     LDL Calculated (mg/dL)   Date Value   03/26/2014 167 (A)       (goal LDL is <100)   AST (U/L)   Date Value   10/18/2016 35     ALT (U/L)   Date Value   10/18/2016 28     BUN (mg/dL)   Date Value   11/28/2017 16     BP Readings from Last 3 Encounters:   11/30/17 (!) 161/99   10/30/17 (!) 140/72   09/07/17 (!) 159/90          (goal 120/80)    All Future Testing planned in [facility]  Lab Frequency Next Occurrence   Vitamin D 25 Hydroxy Once 01/31/2018   Hepatitis C Antibody Once 01/31/2018   CBC Auto Differential     Basic Metabolic Panel     Iron and TIBC     Ferritin     IgG, IgA, IgM     [place]/Lambda Free Lt Chains, Serum Quant                 Patient Active Problem List:     Leukopenia     Anemia     Weight loss     Back pain     Weakness     Pancytopenia (HCC)     MGUS (monoclonal gammopathy of unknown significance)     PSA elevation     Neutropenia (HCC)     Osteoarthritis     Hyponatremia     S/P hip replacement     Prostate cancer (HCC)     Malabsorption     Hematemesis without nausea     Upper GI bleed

## 2018-01-21 RX ORDER — PANTOPRAZOLE SODIUM 40 MG/1
40 TABLET, DELAYED RELEASE ORAL
Qty: 90 TABLET | Refills: 0 | Status: SHIPPED | OUTPATIENT
Start: 2018-01-21 | End: 2018-04-18 | Stop reason: SDUPTHER

## 2018-01-23 ENCOUNTER — TELEPHONE (OUTPATIENT)
Dept: FAMILY MEDICINE CLINIC | Age: 68
End: 2018-01-23

## 2018-01-23 RX ORDER — CYCLOBENZAPRINE HCL 10 MG
TABLET ORAL
Qty: 90 TABLET | Refills: 1 | Status: ON HOLD | OUTPATIENT
Start: 2018-01-23 | End: 2018-11-29

## 2018-03-20 VITALS — BODY MASS INDEX: 22.11 KG/M2 | WEIGHT: 163 LBS

## 2018-03-22 ENCOUNTER — EMPLOYEE WELLNESS (OUTPATIENT)
Dept: OTHER | Age: 68
End: 2018-03-22

## 2018-03-22 LAB
CHOLESTEROL/HDL RATIO: 2.3
CHOLESTEROL: 169 MG/DL
GLUCOSE BLD-MCNC: 112 MG/DL (ref 70–99)
HDLC SERPL-MCNC: 72 MG/DL
LDL CHOLESTEROL: 79 MG/DL (ref 0–130)
PATIENT FASTING?: YES
TRIGL SERPL-MCNC: 90 MG/DL
VLDLC SERPL CALC-MCNC: ABNORMAL MG/DL (ref 1–30)

## 2018-04-02 VITALS — WEIGHT: 162 LBS | BODY MASS INDEX: 21.97 KG/M2

## 2018-04-18 RX ORDER — PANTOPRAZOLE SODIUM 40 MG/1
TABLET, DELAYED RELEASE ORAL
Qty: 90 TABLET | Refills: 0 | Status: SHIPPED | OUTPATIENT
Start: 2018-04-18 | End: 2018-07-26 | Stop reason: SDUPTHER

## 2018-04-18 RX ORDER — HYDROCHLOROTHIAZIDE 25 MG/1
TABLET ORAL
Qty: 90 TABLET | Refills: 3 | Status: SHIPPED | OUTPATIENT
Start: 2018-04-18 | End: 2019-04-26 | Stop reason: SDUPTHER

## 2018-07-26 RX ORDER — PANTOPRAZOLE SODIUM 40 MG/1
TABLET, DELAYED RELEASE ORAL
Qty: 90 TABLET | Refills: 0 | Status: SHIPPED | OUTPATIENT
Start: 2018-07-26 | End: 2018-11-03 | Stop reason: SDUPTHER

## 2018-07-26 NOTE — TELEPHONE ENCOUNTER
LV - 10/30/17    Next Visit Date:  Future Appointments  Date Time Provider Krystal Hutsoni   9/13/2018 1:00 PM Wai Gillis MD [facility] [address] Maintenance   Topic Date Due    Hepatitis C screen  1950    Shingles Vaccine (1 of 2 - 2 Dose Series) 02/13/2000    Flu vaccine (1) 09/01/2018    Potassium monitoring  11/28/2018    Creatinine monitoring  11/28/2018    Colon cancer screen colonoscopy  07/14/2019    Lipid screen  03/22/2023    DTaP/Tdap/Td vaccine (2 - Td) 01/31/2027    Pneumococcal low/med risk  Completed       Hemoglobin A1C (%)   Date Value   01/30/2015 5.0             ( goal A1C is < 7)   No results found for: LABMICR  LDL Cholesterol (mg/dL)   Date Value   03/22/2018 79   04/06/2017 88     LDL Calculated (mg/dL)   Date Value   03/26/2014 167 (A)       (goal LDL is <100)   AST (U/L)   Date Value   10/18/2016 35     ALT (U/L)   Date Value   10/18/2016 28     BUN (mg/dL)   Date Value   11/28/2017 16     BP Readings from Last 3 Encounters:   11/30/17 (!) 161/99   10/30/17 (!) 140/72   09/07/17 (!) 159/90          (goal 120/80)    All Future Testing planned in [facility]  Lab Frequency Next Occurrence   CBC Auto Differential     Basic Metabolic Panel     Iron and TIBC     Ferritin     IgG, IgA, IgM     [place]/Lambda Free Lt Chains, Serum Quant                 Patient Active Problem List:     Leukopenia     Anemia     Weight loss     Back pain     Weakness     Pancytopenia (HCC)     MGUS (monoclonal gammopathy of unknown significance)     PSA elevation     Neutropenia (HCC)     Osteoarthritis     Hyponatremia     S/P hip replacement     Prostate cancer (HCC)     Malabsorption     Hematemesis without nausea     Upper GI bleed

## 2018-08-15 ENCOUNTER — TELEPHONE (OUTPATIENT)
Dept: INFUSION THERAPY | Facility: MEDICAL CENTER | Age: 68
End: 2018-08-15

## 2018-08-15 NOTE — TELEPHONE ENCOUNTER
PER JESSICA CORRESPONDENCE, A MESSAGE WAS LEFT FROM MELECIO TO RESCHEDULE HIS MD VISIT WITH DR Hortensia Bence & LABS 1 WEEK PRIOR. I TRIED TO CALL HIM AND HAD TO LEAVE A MESSAGE TO CALL THE OFFICE TO SCHEDULE.

## 2018-08-16 ENCOUNTER — HOSPITAL ENCOUNTER (OUTPATIENT)
Facility: MEDICAL CENTER | Age: 68
Discharge: HOME | End: 2018-08-16
Payer: COMMERCIAL

## 2018-08-16 DIAGNOSIS — D61.818 PANCYTOPENIA (HCC): ICD-10-CM

## 2018-08-16 DIAGNOSIS — D47.2 MGUS (MONOCLONAL GAMMOPATHY OF UNKNOWN SIGNIFICANCE): ICD-10-CM

## 2018-08-16 DIAGNOSIS — C61 PROSTATE CANCER (HCC): ICD-10-CM

## 2018-08-16 LAB
ABSOLUTE EOS #: 0.06 K/UL (ref 0–0.4)
ABSOLUTE IMMATURE GRANULOCYTE: ABNORMAL K/UL (ref 0–0.3)
ABSOLUTE LYMPH #: 0.36 K/UL (ref 1–4.8)
ABSOLUTE MONO #: 0.36 K/UL (ref 0.2–0.8)
ANION GAP SERPL CALCULATED.3IONS-SCNC: 11 MMOL/L (ref 9–17)
BASOPHILS # BLD: 0 %
BASOPHILS ABSOLUTE: 0 K/UL (ref 0–0.2)
BUN BLDV-MCNC: 13 MG/DL (ref 8–23)
BUN/CREAT BLD: 14 (ref 9–20)
CALCIUM SERPL-MCNC: 8.6 MG/DL (ref 8.6–10.4)
CHLORIDE BLD-SCNC: 105 MMOL/L (ref 98–107)
CO2: 24 MMOL/L (ref 20–31)
CREAT SERPL-MCNC: 0.9 MG/DL (ref 0.7–1.2)
DIFFERENTIAL TYPE: ABNORMAL
EOSINOPHILS RELATIVE PERCENT: 5 % (ref 1–4)
FERRITIN: 37 UG/L (ref 30–400)
FREE KAPPA/LAMBDA RATIO: 1.6 (ref 0.26–1.65)
GFR AFRICAN AMERICAN: >60 ML/MIN
GFR NON-AFRICAN AMERICAN: >60 ML/MIN
GFR SERPL CREATININE-BSD FRML MDRD: ABNORMAL ML/MIN/{1.73_M2}
GFR SERPL CREATININE-BSD FRML MDRD: ABNORMAL ML/MIN/{1.73_M2}
GLUCOSE BLD-MCNC: 126 MG/DL (ref 70–99)
HCT VFR BLD CALC: 34.6 % (ref 41–53)
HEMOGLOBIN: 10.7 G/DL (ref 13.5–17.5)
IGA: 385 MG/DL (ref 70–400)
IGG: 1755 MG/DL (ref 700–1600)
IGM: 29 MG/DL (ref 40–230)
IMMATURE GRANULOCYTES: ABNORMAL %
IRON SATURATION: ABNORMAL % (ref 20–55)
IRON: 304 UG/DL (ref 59–158)
KAPPA FREE LIGHT CHAINS QNT: 3.46 MG/DL (ref 0.37–1.94)
LAMBDA FREE LIGHT CHAINS QNT: 2.16 MG/DL (ref 0.57–2.63)
LYMPHOCYTES # BLD: 30 % (ref 24–44)
MCH RBC QN AUTO: 22 PG (ref 26–34)
MCHC RBC AUTO-ENTMCNC: 31 G/DL (ref 31–37)
MCV RBC AUTO: 71 FL (ref 80–100)
MONOCYTES # BLD: 30 % (ref 1–7)
MORPHOLOGY: ABNORMAL
NRBC AUTOMATED: ABNORMAL PER 100 WBC
PDW BLD-RTO: 20.8 % (ref 11.5–14.5)
PLATELET # BLD: 66 K/UL (ref 130–400)
PLATELET ESTIMATE: ABNORMAL
PMV BLD AUTO: 7.6 FL (ref 6–12)
POTASSIUM SERPL-SCNC: 4 MMOL/L (ref 3.7–5.3)
RBC # BLD: 4.87 M/UL (ref 4.5–5.9)
RBC # BLD: ABNORMAL 10*6/UL
SEG NEUTROPHILS: 35 % (ref 36–66)
SEGMENTED NEUTROPHILS ABSOLUTE COUNT: 0.42 K/UL (ref 1.8–7.7)
SODIUM BLD-SCNC: 140 MMOL/L (ref 135–144)
TOTAL IRON BINDING CAPACITY: ABNORMAL UG/DL (ref 250–450)
UNSATURATED IRON BINDING CAPACITY: <17 UG/DL (ref 112–347)
WBC # BLD: 1.2 K/UL (ref 3.5–11)
WBC # BLD: ABNORMAL 10*3/UL

## 2018-08-16 PROCEDURE — 82784 ASSAY IGA/IGD/IGG/IGM EACH: CPT

## 2018-08-16 PROCEDURE — 83550 IRON BINDING TEST: CPT

## 2018-08-16 PROCEDURE — 85025 COMPLETE CBC W/AUTO DIFF WBC: CPT

## 2018-08-16 PROCEDURE — 82728 ASSAY OF FERRITIN: CPT

## 2018-08-16 PROCEDURE — 83540 ASSAY OF IRON: CPT

## 2018-08-16 PROCEDURE — 36415 COLL VENOUS BLD VENIPUNCTURE: CPT

## 2018-08-16 PROCEDURE — 80048 BASIC METABOLIC PNL TOTAL CA: CPT

## 2018-08-16 PROCEDURE — 83883 ASSAY NEPHELOMETRY NOT SPEC: CPT

## 2018-08-22 ENCOUNTER — HOSPITAL ENCOUNTER (OUTPATIENT)
Facility: MEDICAL CENTER | Age: 68
End: 2018-08-22
Payer: COMMERCIAL

## 2018-08-28 ENCOUNTER — OFFICE VISIT (OUTPATIENT)
Dept: ONCOLOGY | Age: 68
End: 2018-08-28
Payer: COMMERCIAL

## 2018-08-28 ENCOUNTER — TELEPHONE (OUTPATIENT)
Dept: ONCOLOGY | Age: 68
End: 2018-08-28

## 2018-08-28 VITALS
BODY MASS INDEX: 22.09 KG/M2 | WEIGHT: 162.9 LBS | SYSTOLIC BLOOD PRESSURE: 138 MMHG | DIASTOLIC BLOOD PRESSURE: 89 MMHG | RESPIRATION RATE: 18 BRPM | HEART RATE: 92 BPM | TEMPERATURE: 98.7 F

## 2018-08-28 DIAGNOSIS — D61.818 PANCYTOPENIA (HCC): Primary | ICD-10-CM

## 2018-08-28 DIAGNOSIS — D47.2 MGUS (MONOCLONAL GAMMOPATHY OF UNKNOWN SIGNIFICANCE): ICD-10-CM

## 2018-08-28 DIAGNOSIS — C61 PROSTATE CANCER (HCC): ICD-10-CM

## 2018-08-28 PROCEDURE — 99211 OFF/OP EST MAY X REQ PHY/QHP: CPT

## 2018-08-28 PROCEDURE — 99214 OFFICE O/P EST MOD 30 MIN: CPT | Performed by: INTERNAL MEDICINE

## 2018-08-28 NOTE — PROGRESS NOTES
_     Chief Complaint   Patient presents with    Follow-up     patient would like to review status of disease       DIAGNOSIS:    Anemia  Leukopenia  Weight loss. MGUS     Back pain  Questionable liver lesions      CURRENT THERAPY:    S/p hospitalization for further management of above issues. S/p blood transfusion  S/P IV Iron infusion. BRIEF CASE HISTORY:      The patient is a 77 y.o. AA male who is admitted to the hospital for severe anemia and leukopenia. He had no previous labs. He was seen by PCP for routine health exam as a new patient. Labs showed severe anemia and leukopenia. He had chronic back pain for one year. No fever. No night sweats. No lymphadenopathy   He has recent weight loss and anorexia. No GI symptoms. GI work up was negative. He received blood transfusion and IV Iron infusion. He had Rt hip surgery on 11/0/35 with no complications. He had left hip surgery in March 2015. No complications. Prostate surgery December 3212 with no complications. INTERIM HISTORY:   Patient is seen for follow up above problems. He has no fever. He continues to have low back pain and joints problems. He has weakness and fatigue. Overall much better. No active bleeding. No infections. No further weight loss. He gained few pounds. PAST MEDICAL HISTORY: has a past medical history of Anemia; Arthritis; Avascular necrosis ([address] BPH (benign prostatic hyperplasia); History of blood transfusion; Hypertension; Leukopenia; MGUS (monoclonal gammopathy of unknown significance); Osteoarthritis; Patient in clinical research study; and Stomach ulcer. PAST SURGICAL HISTORY: has a past surgical history that includes Endoscopy, colon, diagnostic (07/13/2014); Prostate Biopsy (09/12/2014); Hip Arthroplasty (Right, 12/03/14); Inguinal hernia repair (Right, 2009); Hip Arthroplasty (Left, 03/10/15);  Prostatectomy (12/16/2015); Colonoscopy (07/14/2014); and Upper gastrointestinal endoscopy (10/19/2016). CURRENT MEDICATIONS:  has a current medication list which includes the following prescription(s): pantoprazole, hydrochlorothiazide, cyclobenzaprine, potassium chloride, amoxicillin, calcium carbonate, iron, docusate sodium, multiple vitamins-minerals, and ergocalciferol. ALLERGIES:  has No Known Allergies. FAMILY HISTORY: Negative for any hematological or oncological conditions. SOCIAL HISTORY:  reports that he has never smoked. He has never used smokeless tobacco. He reports that he does not drink alcohol or use drugs. REVIEW OF SYSTEMS:     General: + weakness + fatigue. + unanticipated weight loss + decreased appetite. No fever or chills. Eyes: No blurred vision, eye pain or double vision. Ears: No hearing problems or drainage. No tinnitus. Throat: No sore throat, problems with swallowing or dysphagia. Respiratory: No cough, sputum or hemoptysis. No shortness of breath. No pleuritic chest pain. Cardiovascular: No chest pain, orthopnea or PND. No lower extremity edema. No palpitation. Gastrointestinal: No problems with swallowing. No abdominal pain or bloating. No nausea or vomiting. No diarrhea or constipation. No GI bleeding. Genitourinary: No dysuria, hematuria, frequency or urgency. Musculoskeletal: No muscle aches or pains. No limitation of movement. + chronic back pain. No gait disturbance, No joint complaints. Dermatologic: No skin rashes or pruritus. No skin lesions or discolorations. Psychiatric: No depression, anxiety, or stress or signs of schizophrenia. No change in mood or affect. Hematologic: No history of bleeding tendency. No bruises or ecchymosis. No history of clotting problems. Infectious disease: No fever, chills or frequent infections. Endocrine: No problems with opacity. No polydipsia or polyuria. No temperature intolerance.    Neurologic: No headaches or

## 2018-09-10 ENCOUNTER — OFFICE VISIT (OUTPATIENT)
Dept: FAMILY MEDICINE CLINIC | Age: 68
End: 2018-09-10
Payer: COMMERCIAL

## 2018-09-10 VITALS
DIASTOLIC BLOOD PRESSURE: 84 MMHG | WEIGHT: 167 LBS | HEART RATE: 102 BPM | SYSTOLIC BLOOD PRESSURE: 140 MMHG | BODY MASS INDEX: 22.65 KG/M2

## 2018-09-10 DIAGNOSIS — Z00.00 WELL ADULT EXAM: Primary | ICD-10-CM

## 2018-09-10 DIAGNOSIS — I10 ESSENTIAL HYPERTENSION: ICD-10-CM

## 2018-09-10 PROCEDURE — 90471 IMMUNIZATION ADMIN: CPT | Performed by: FAMILY MEDICINE

## 2018-09-10 PROCEDURE — 99397 PER PM REEVAL EST PAT 65+ YR: CPT | Performed by: FAMILY MEDICINE

## 2018-09-10 PROCEDURE — 90688 IIV4 VACCINE SPLT 0.5 ML IM: CPT | Performed by: FAMILY MEDICINE

## 2018-09-10 ASSESSMENT — PATIENT HEALTH QUESTIONNAIRE - PHQ9
SUM OF ALL RESPONSES TO PHQ9 QUESTIONS 1 & 2: 0
1. LITTLE INTEREST OR PLEASURE IN DOING THINGS: 0
SUM OF ALL RESPONSES TO PHQ QUESTIONS 1-9: 0
2. FEELING DOWN, DEPRESSED OR HOPELESS: 0
SUM OF ALL RESPONSES TO PHQ QUESTIONS 1-9: 0

## 2018-09-13 ENCOUNTER — OFFICE VISIT (OUTPATIENT)
Dept: GASTROENTEROLOGY | Age: 68
End: 2018-09-13
Payer: COMMERCIAL

## 2018-09-13 VITALS
SYSTOLIC BLOOD PRESSURE: 130 MMHG | BODY MASS INDEX: 22.28 KG/M2 | DIASTOLIC BLOOD PRESSURE: 72 MMHG | WEIGHT: 164.3 LBS | HEART RATE: 91 BPM

## 2018-09-13 DIAGNOSIS — D50.8 OTHER IRON DEFICIENCY ANEMIA: Primary | ICD-10-CM

## 2018-09-13 PROCEDURE — 99214 OFFICE O/P EST MOD 30 MIN: CPT | Performed by: INTERNAL MEDICINE

## 2018-09-13 ASSESSMENT — ENCOUNTER SYMPTOMS
ANAL BLEEDING: 0
GASTROINTESTINAL NEGATIVE: 1
BLOOD IN STOOL: 0
VOMITING: 0
RESPIRATORY NEGATIVE: 1
EYES NEGATIVE: 1
NAUSEA: 0
DIARRHEA: 0
ALLERGIC/IMMUNOLOGIC NEGATIVE: 1
ABDOMINAL DISTENTION: 0
ABDOMINAL PAIN: 0
RECTAL PAIN: 0
CONSTIPATION: 0

## 2018-09-13 NOTE — PROGRESS NOTES
breath sounds normal.   Abdominal: Soft. Bowel sounds are normal.   NON TENDER, NON DISTENTED  LIVER SPLEEN AND HERNIAS ARE NOT  PALPABLE  BOWEL SOUNDS ARE POSITIVE      Genitourinary: Rectum normal.   Musculoskeletal: Normal range of motion. Neurological: He is alert and oriented to person, place, and time. He has normal reflexes. Skin: Skin is warm. Assessment:      As above      Plan:      Cont to f/u with hematology     GI wise he is stable    Pt was discussed in detail about the possible side effects of proton pump inhibiter therapy. He was explained about the possibility of calcium and magnesium malabsorption and was advised to start taking calcium supplements with Vit D. Some over the counter regimens were explained to patient. Some dietary advices were also given. He has verbalized understanding and agreement to this. Pt was advised in detail about some life style and dietary modifications. He was advised about avoidance of caffeine, nicotine and chocolate. Pt was also told to stay away from any kind of fast foods, soda pops. He was also advised to avoid lots of spices, grease and fried food etc.     Instructions were also given about trying to arrange the timing, quality and quantity of food. Instructions were given about using ample amount of fiber including dietary and supplemental fiber either metamucil, bennafiber or citrucell etc.  Pt was advised about drinking ample amount of water without any colors or chemicals. Stress was given about regular exercise. Pt has verbalized understanding and agreement to these modifications.       F/u HGB

## 2018-11-05 RX ORDER — PANTOPRAZOLE SODIUM 40 MG/1
TABLET, DELAYED RELEASE ORAL
Qty: 90 TABLET | Refills: 0 | Status: SHIPPED | OUTPATIENT
Start: 2018-11-05 | End: 2019-01-28 | Stop reason: SDUPTHER

## 2018-11-29 ENCOUNTER — APPOINTMENT (OUTPATIENT)
Dept: CT IMAGING | Age: 68
DRG: 350 | End: 2018-11-29
Payer: COMMERCIAL

## 2018-11-29 ENCOUNTER — APPOINTMENT (OUTPATIENT)
Dept: GENERAL RADIOLOGY | Age: 68
DRG: 350 | End: 2018-11-29
Payer: COMMERCIAL

## 2018-11-29 ENCOUNTER — ANESTHESIA (OUTPATIENT)
Dept: OPERATING ROOM | Age: 68
DRG: 350 | End: 2018-11-29
Payer: COMMERCIAL

## 2018-11-29 ENCOUNTER — HOSPITAL ENCOUNTER (INPATIENT)
Age: 68
LOS: 6 days | Discharge: HOME | DRG: 350 | End: 2018-12-05
Attending: EMERGENCY MEDICINE | Admitting: SURGERY
Payer: COMMERCIAL

## 2018-11-29 ENCOUNTER — ANESTHESIA EVENT (OUTPATIENT)
Dept: OPERATING ROOM | Age: 68
DRG: 350 | End: 2018-11-29
Payer: COMMERCIAL

## 2018-11-29 VITALS — TEMPERATURE: 97.3 F | OXYGEN SATURATION: 98 % | DIASTOLIC BLOOD PRESSURE: 95 MMHG | SYSTOLIC BLOOD PRESSURE: 130 MMHG

## 2018-11-29 DIAGNOSIS — K40.30 NON-RECURRENT UNILATERAL INGUINAL HERNIA WITH OBSTRUCTION WITHOUT GANGRENE: ICD-10-CM

## 2018-11-29 DIAGNOSIS — R10.84 GENERALIZED ABDOMINAL PAIN: Primary | ICD-10-CM

## 2018-11-29 DIAGNOSIS — K56.699 OTHER SPECIFIED INTESTINAL OBSTRUCTION, UNSPECIFIED WHETHER PARTIAL OR COMPLETE (HCC): ICD-10-CM

## 2018-11-29 DIAGNOSIS — R11.2 NAUSEA AND VOMITING, INTRACTABILITY OF VOMITING NOT SPECIFIED, UNSPECIFIED VOMITING TYPE: ICD-10-CM

## 2018-11-29 PROBLEM — E86.0 DEHYDRATION: Status: ACTIVE | Noted: 2018-11-29

## 2018-11-29 LAB
ABSOLUTE EOS #: 0 K/UL (ref 0–0.4)
ABSOLUTE IMMATURE GRANULOCYTE: ABNORMAL K/UL (ref 0–0.3)
ABSOLUTE LYMPH #: 0.75 K/UL (ref 1–4.8)
ABSOLUTE MONO #: 1.36 K/UL (ref 0.2–0.8)
ALBUMIN SERPL-MCNC: 3.8 G/DL (ref 3.5–5.2)
ALBUMIN/GLOBULIN RATIO: ABNORMAL (ref 1–2.5)
ALP BLD-CCNC: 165 U/L (ref 40–129)
ALT SERPL-CCNC: 25 U/L (ref 5–41)
AMYLASE: 62 U/L (ref 28–100)
ANION GAP SERPL CALCULATED.3IONS-SCNC: 25 MMOL/L (ref 9–17)
AST SERPL-CCNC: 24 U/L
BASOPHILS # BLD: 1 %
BASOPHILS ABSOLUTE: 0.07 K/UL (ref 0–0.2)
BILIRUB SERPL-MCNC: 1.26 MG/DL (ref 0.3–1.2)
BILIRUBIN DIRECT: 0.25 MG/DL
BILIRUBIN, INDIRECT: 1.01 MG/DL (ref 0–1)
BUN BLDV-MCNC: 23 MG/DL (ref 8–23)
BUN/CREAT BLD: 21 (ref 9–20)
CALCIUM SERPL-MCNC: 9.8 MG/DL (ref 8.6–10.4)
CHLORIDE BLD-SCNC: 94 MMOL/L (ref 98–107)
CO2: 18 MMOL/L (ref 20–31)
CREAT SERPL-MCNC: 1.12 MG/DL (ref 0.7–1.2)
DIFFERENTIAL TYPE: ABNORMAL
EKG ATRIAL RATE: 95 BPM
EKG P AXIS: 28 DEGREES
EKG P-R INTERVAL: 136 MS
EKG Q-T INTERVAL: 560 MS
EKG QRS DURATION: 76 MS
EKG QTC CALCULATION (BAZETT): 703 MS
EKG R AXIS: -7 DEGREES
EKG T AXIS: 17 DEGREES
EKG VENTRICULAR RATE: 95 BPM
EOSINOPHILS RELATIVE PERCENT: 0 % (ref 1–4)
GFR AFRICAN AMERICAN: >60 ML/MIN
GFR NON-AFRICAN AMERICAN: >60 ML/MIN
GFR SERPL CREATININE-BSD FRML MDRD: ABNORMAL ML/MIN/{1.73_M2}
GFR SERPL CREATININE-BSD FRML MDRD: ABNORMAL ML/MIN/{1.73_M2}
GLOBULIN: ABNORMAL G/DL (ref 1.5–3.8)
GLUCOSE BLD-MCNC: 223 MG/DL (ref 70–99)
HCT VFR BLD CALC: 31.9 % (ref 41–53)
HEMOGLOBIN: 10 G/DL (ref 13.5–17.5)
IMMATURE GRANULOCYTES: ABNORMAL %
INR BLD: 1.2
LACTIC ACID, SEPSIS WHOLE BLOOD: ABNORMAL MMOL/L (ref 0.5–1.9)
LACTIC ACID, SEPSIS: 5.1 MMOL/L (ref 0.5–1.9)
LACTIC ACID: 8.2 MMOL/L (ref 0.5–2.2)
LIPASE: 15 U/L (ref 13–60)
LYMPHOCYTES # BLD: 11 % (ref 24–44)
MAGNESIUM: 2.4 MG/DL (ref 1.6–2.6)
MCH RBC QN AUTO: 20.1 PG (ref 26–34)
MCHC RBC AUTO-ENTMCNC: 31.3 G/DL (ref 31–37)
MCV RBC AUTO: 64.1 FL (ref 80–100)
MONOCYTES # BLD: 20 % (ref 1–7)
MORPHOLOGY: ABNORMAL
NRBC AUTOMATED: ABNORMAL PER 100 WBC
PDW BLD-RTO: 19.7 % (ref 11.5–14.5)
PLATELET # BLD: 479 K/UL (ref 130–400)
PLATELET ESTIMATE: ABNORMAL
PMV BLD AUTO: ABNORMAL FL (ref 6–12)
POTASSIUM SERPL-SCNC: 3.7 MMOL/L (ref 3.7–5.3)
PROTHROMBIN TIME: 12 SEC (ref 9.7–11.6)
RBC # BLD: 4.97 M/UL (ref 4.5–5.9)
RBC # BLD: ABNORMAL 10*6/UL
SEG NEUTROPHILS: 68 % (ref 36–66)
SEGMENTED NEUTROPHILS ABSOLUTE COUNT: 4.62 K/UL (ref 1.8–7.7)
SODIUM BLD-SCNC: 137 MMOL/L (ref 135–144)
TOTAL PROTEIN: 8.3 G/DL (ref 6.4–8.3)
WBC # BLD: 6.8 K/UL (ref 3.5–11)
WBC # BLD: ABNORMAL 10*3/UL

## 2018-11-29 PROCEDURE — 2500000003 HC RX 250 WO HCPCS: Performed by: SURGERY

## 2018-11-29 PROCEDURE — 1200000000 HC SEMI PRIVATE

## 2018-11-29 PROCEDURE — 3700000000 HC ANESTHESIA ATTENDED CARE: Performed by: SURGERY

## 2018-11-29 PROCEDURE — 85025 COMPLETE CBC W/AUTO DIFF WBC: CPT

## 2018-11-29 PROCEDURE — 3700000001 HC ADD 15 MINUTES (ANESTHESIA): Performed by: SURGERY

## 2018-11-29 PROCEDURE — 71045 X-RAY EXAM CHEST 1 VIEW: CPT

## 2018-11-29 PROCEDURE — 6360000002 HC RX W HCPCS: Performed by: NURSE ANESTHETIST, CERTIFIED REGISTERED

## 2018-11-29 PROCEDURE — 80048 BASIC METABOLIC PNL TOTAL CA: CPT

## 2018-11-29 PROCEDURE — 88300 SURGICAL PATH GROSS: CPT

## 2018-11-29 PROCEDURE — 3600000012 HC SURGERY LEVEL 2 ADDTL 15MIN: Performed by: SURGERY

## 2018-11-29 PROCEDURE — 2580000003 HC RX 258: Performed by: EMERGENCY MEDICINE

## 2018-11-29 PROCEDURE — 87086 URINE CULTURE/COLONY COUNT: CPT

## 2018-11-29 PROCEDURE — 2580000003 HC RX 258: Performed by: SURGERY

## 2018-11-29 PROCEDURE — 85610 PROTHROMBIN TIME: CPT

## 2018-11-29 PROCEDURE — 6360000002 HC RX W HCPCS: Performed by: SURGERY

## 2018-11-29 PROCEDURE — 6360000002 HC RX W HCPCS: Performed by: EMERGENCY MEDICINE

## 2018-11-29 PROCEDURE — C9113 INJ PANTOPRAZOLE SODIUM, VIA: HCPCS | Performed by: SURGERY

## 2018-11-29 PROCEDURE — 81001 URINALYSIS AUTO W/SCOPE: CPT

## 2018-11-29 PROCEDURE — 36415 COLL VENOUS BLD VENIPUNCTURE: CPT

## 2018-11-29 PROCEDURE — 93005 ELECTROCARDIOGRAM TRACING: CPT

## 2018-11-29 PROCEDURE — 96365 THER/PROPH/DIAG IV INF INIT: CPT

## 2018-11-29 PROCEDURE — 74177 CT ABD & PELVIS W/CONTRAST: CPT

## 2018-11-29 PROCEDURE — 2500000003 HC RX 250 WO HCPCS: Performed by: NURSE ANESTHETIST, CERTIFIED REGISTERED

## 2018-11-29 PROCEDURE — 99285 EMERGENCY DEPT VISIT HI MDM: CPT

## 2018-11-29 PROCEDURE — 82150 ASSAY OF AMYLASE: CPT

## 2018-11-29 PROCEDURE — 6360000004 HC RX CONTRAST MEDICATION: Performed by: EMERGENCY MEDICINE

## 2018-11-29 PROCEDURE — 3600000002 HC SURGERY LEVEL 2 BASE: Performed by: SURGERY

## 2018-11-29 PROCEDURE — 86920 COMPATIBILITY TEST SPIN: CPT

## 2018-11-29 PROCEDURE — 83735 ASSAY OF MAGNESIUM: CPT

## 2018-11-29 PROCEDURE — 7100000000 HC PACU RECOVERY - FIRST 15 MIN: Performed by: SURGERY

## 2018-11-29 PROCEDURE — 86901 BLOOD TYPING SEROLOGIC RH(D): CPT

## 2018-11-29 PROCEDURE — 83605 ASSAY OF LACTIC ACID: CPT

## 2018-11-29 PROCEDURE — 2709999900 HC NON-CHARGEABLE SUPPLY: Performed by: SURGERY

## 2018-11-29 PROCEDURE — 83690 ASSAY OF LIPASE: CPT

## 2018-11-29 PROCEDURE — 0YQ60ZZ REPAIR LEFT INGUINAL REGION, OPEN APPROACH: ICD-10-PCS | Performed by: SURGERY

## 2018-11-29 PROCEDURE — 2580000003 HC RX 258: Performed by: NURSE ANESTHETIST, CERTIFIED REGISTERED

## 2018-11-29 PROCEDURE — 86900 BLOOD TYPING SEROLOGIC ABO: CPT

## 2018-11-29 PROCEDURE — C9113 INJ PANTOPRAZOLE SODIUM, VIA: HCPCS | Performed by: EMERGENCY MEDICINE

## 2018-11-29 PROCEDURE — 96375 TX/PRO/DX INJ NEW DRUG ADDON: CPT

## 2018-11-29 PROCEDURE — 86850 RBC ANTIBODY SCREEN: CPT

## 2018-11-29 PROCEDURE — 7100000001 HC PACU RECOVERY - ADDTL 15 MIN: Performed by: SURGERY

## 2018-11-29 PROCEDURE — 80076 HEPATIC FUNCTION PANEL: CPT

## 2018-11-29 PROCEDURE — 96366 THER/PROPH/DIAG IV INF ADDON: CPT

## 2018-11-29 RX ORDER — FENTANYL CITRATE 50 UG/ML
INJECTION, SOLUTION INTRAMUSCULAR; INTRAVENOUS PRN
Status: DISCONTINUED | OUTPATIENT
Start: 2018-11-29 | End: 2018-11-29 | Stop reason: SDUPTHER

## 2018-11-29 RX ORDER — MIDAZOLAM HYDROCHLORIDE 1 MG/ML
INJECTION INTRAMUSCULAR; INTRAVENOUS PRN
Status: DISCONTINUED | OUTPATIENT
Start: 2018-11-29 | End: 2018-11-29 | Stop reason: SDUPTHER

## 2018-11-29 RX ORDER — SODIUM CHLORIDE 0.9 % (FLUSH) 0.9 %
10 SYRINGE (ML) INJECTION EVERY 12 HOURS SCHEDULED
Status: DISCONTINUED | OUTPATIENT
Start: 2018-11-29 | End: 2018-12-05 | Stop reason: HOSPADM

## 2018-11-29 RX ORDER — ONDANSETRON 2 MG/ML
8 INJECTION INTRAMUSCULAR; INTRAVENOUS ONCE
Status: COMPLETED | OUTPATIENT
Start: 2018-11-29 | End: 2018-11-29

## 2018-11-29 RX ORDER — PANTOPRAZOLE SODIUM 40 MG/10ML
40 INJECTION, POWDER, LYOPHILIZED, FOR SOLUTION INTRAVENOUS DAILY
Status: DISCONTINUED | OUTPATIENT
Start: 2018-11-29 | End: 2018-11-30

## 2018-11-29 RX ORDER — HYDROMORPHONE HCL 110MG/55ML
0.5 PATIENT CONTROLLED ANALGESIA SYRINGE INTRAVENOUS EVERY 5 MIN PRN
Status: DISCONTINUED | OUTPATIENT
Start: 2018-11-29 | End: 2018-11-29 | Stop reason: HOSPADM

## 2018-11-29 RX ORDER — 0.9 % SODIUM CHLORIDE 0.9 %
10 VIAL (ML) INJECTION DAILY
Status: DISCONTINUED | OUTPATIENT
Start: 2018-11-29 | End: 2018-11-30

## 2018-11-29 RX ORDER — FENTANYL CITRATE 50 UG/ML
50 INJECTION, SOLUTION INTRAMUSCULAR; INTRAVENOUS EVERY 5 MIN PRN
Status: DISCONTINUED | OUTPATIENT
Start: 2018-11-29 | End: 2018-11-29 | Stop reason: HOSPADM

## 2018-11-29 RX ORDER — PROPOFOL 10 MG/ML
INJECTION, EMULSION INTRAVENOUS PRN
Status: DISCONTINUED | OUTPATIENT
Start: 2018-11-29 | End: 2018-11-29 | Stop reason: SDUPTHER

## 2018-11-29 RX ORDER — 0.9 % SODIUM CHLORIDE 0.9 %
1000 INTRAVENOUS SOLUTION INTRAVENOUS ONCE
Status: COMPLETED | OUTPATIENT
Start: 2018-11-29 | End: 2018-11-29

## 2018-11-29 RX ORDER — ONDANSETRON 4 MG/1
4 TABLET, ORALLY DISINTEGRATING ORAL EVERY 6 HOURS PRN
Status: DISCONTINUED | OUTPATIENT
Start: 2018-11-29 | End: 2018-12-05 | Stop reason: HOSPADM

## 2018-11-29 RX ORDER — SODIUM CHLORIDE, SODIUM LACTATE, POTASSIUM CHLORIDE, CALCIUM CHLORIDE 600; 310; 30; 20 MG/100ML; MG/100ML; MG/100ML; MG/100ML
INJECTION, SOLUTION INTRAVENOUS CONTINUOUS PRN
Status: DISCONTINUED | OUTPATIENT
Start: 2018-11-29 | End: 2018-11-29 | Stop reason: SDUPTHER

## 2018-11-29 RX ORDER — SUCCINYLCHOLINE CHLORIDE 20 MG/ML
INJECTION INTRAMUSCULAR; INTRAVENOUS PRN
Status: DISCONTINUED | OUTPATIENT
Start: 2018-11-29 | End: 2018-11-29 | Stop reason: SDUPTHER

## 2018-11-29 RX ORDER — CEFAZOLIN SODIUM 1 G/3ML
INJECTION, POWDER, FOR SOLUTION INTRAMUSCULAR; INTRAVENOUS PRN
Status: DISCONTINUED | OUTPATIENT
Start: 2018-11-29 | End: 2018-11-29 | Stop reason: SDUPTHER

## 2018-11-29 RX ORDER — PANTOPRAZOLE SODIUM 40 MG/10ML
40 INJECTION, POWDER, LYOPHILIZED, FOR SOLUTION INTRAVENOUS ONCE
Status: COMPLETED | OUTPATIENT
Start: 2018-11-29 | End: 2018-11-29

## 2018-11-29 RX ORDER — HYDROMORPHONE HCL 110MG/55ML
0.25 PATIENT CONTROLLED ANALGESIA SYRINGE INTRAVENOUS EVERY 5 MIN PRN
Status: DISCONTINUED | OUTPATIENT
Start: 2018-11-29 | End: 2018-11-29 | Stop reason: HOSPADM

## 2018-11-29 RX ORDER — SODIUM CHLORIDE 0.9 % (FLUSH) 0.9 %
10 SYRINGE (ML) INJECTION PRN
Status: DISCONTINUED | OUTPATIENT
Start: 2018-11-29 | End: 2018-11-29 | Stop reason: SDUPTHER

## 2018-11-29 RX ORDER — 0.9 % SODIUM CHLORIDE 0.9 %
80 INTRAVENOUS SOLUTION INTRAVENOUS ONCE
Status: COMPLETED | OUTPATIENT
Start: 2018-11-29 | End: 2018-11-29

## 2018-11-29 RX ORDER — ONDANSETRON 2 MG/ML
4 INJECTION INTRAMUSCULAR; INTRAVENOUS EVERY 6 HOURS PRN
Status: DISCONTINUED | OUTPATIENT
Start: 2018-11-29 | End: 2018-11-29

## 2018-11-29 RX ORDER — ONDANSETRON 2 MG/ML
INJECTION INTRAMUSCULAR; INTRAVENOUS PRN
Status: DISCONTINUED | OUTPATIENT
Start: 2018-11-29 | End: 2018-11-29 | Stop reason: SDUPTHER

## 2018-11-29 RX ORDER — ONDANSETRON 2 MG/ML
4 INJECTION INTRAMUSCULAR; INTRAVENOUS
Status: DISCONTINUED | OUTPATIENT
Start: 2018-11-29 | End: 2018-11-29 | Stop reason: HOSPADM

## 2018-11-29 RX ORDER — BUPIVACAINE HYDROCHLORIDE AND EPINEPHRINE 5; 5 MG/ML; UG/ML
INJECTION, SOLUTION EPIDURAL; INTRACAUDAL; PERINEURAL PRN
Status: DISCONTINUED | OUTPATIENT
Start: 2018-11-29 | End: 2018-11-29 | Stop reason: HOSPADM

## 2018-11-29 RX ORDER — ROCURONIUM BROMIDE 10 MG/ML
INJECTION, SOLUTION INTRAVENOUS PRN
Status: DISCONTINUED | OUTPATIENT
Start: 2018-11-29 | End: 2018-11-29 | Stop reason: SDUPTHER

## 2018-11-29 RX ORDER — BUPIVACAINE HYDROCHLORIDE 5 MG/ML
INJECTION, SOLUTION EPIDURAL; INTRACAUDAL PRN
Status: DISCONTINUED | OUTPATIENT
Start: 2018-11-29 | End: 2018-11-29 | Stop reason: HOSPADM

## 2018-11-29 RX ORDER — LIDOCAINE HYDROCHLORIDE 20 MG/ML
INJECTION, SOLUTION INFILTRATION; PERINEURAL PRN
Status: DISCONTINUED | OUTPATIENT
Start: 2018-11-29 | End: 2018-11-29 | Stop reason: SDUPTHER

## 2018-11-29 RX ORDER — MIDAZOLAM HYDROCHLORIDE 1 MG/ML
0.5 INJECTION INTRAMUSCULAR; INTRAVENOUS ONCE
Status: DISCONTINUED | OUTPATIENT
Start: 2018-11-29 | End: 2018-11-29

## 2018-11-29 RX ORDER — SODIUM CHLORIDE, SODIUM LACTATE, POTASSIUM CHLORIDE, AND CALCIUM CHLORIDE .6; .31; .03; .02 G/100ML; G/100ML; G/100ML; G/100ML
1000 INJECTION, SOLUTION INTRAVENOUS ONCE
Status: COMPLETED | OUTPATIENT
Start: 2018-11-29 | End: 2018-11-29

## 2018-11-29 RX ORDER — 0.9 % SODIUM CHLORIDE 0.9 %
10 VIAL (ML) INJECTION ONCE
Status: COMPLETED | OUTPATIENT
Start: 2018-11-29 | End: 2018-11-29

## 2018-11-29 RX ORDER — SODIUM CHLORIDE 0.9 % (FLUSH) 0.9 %
10 SYRINGE (ML) INJECTION PRN
Status: DISCONTINUED | OUTPATIENT
Start: 2018-11-29 | End: 2018-12-05 | Stop reason: HOSPADM

## 2018-11-29 RX ORDER — ONDANSETRON 2 MG/ML
4 INJECTION INTRAMUSCULAR; INTRAVENOUS EVERY 6 HOURS PRN
Status: DISCONTINUED | OUTPATIENT
Start: 2018-11-29 | End: 2018-12-05 | Stop reason: HOSPADM

## 2018-11-29 RX ORDER — ONDANSETRON 2 MG/ML
4 INJECTION INTRAMUSCULAR; INTRAVENOUS ONCE
Status: COMPLETED | OUTPATIENT
Start: 2018-11-29 | End: 2018-11-29

## 2018-11-29 RX ORDER — DEXAMETHASONE SODIUM PHOSPHATE 10 MG/ML
INJECTION INTRAMUSCULAR; INTRAVENOUS PRN
Status: DISCONTINUED | OUTPATIENT
Start: 2018-11-29 | End: 2018-11-29 | Stop reason: SDUPTHER

## 2018-11-29 RX ORDER — FENTANYL CITRATE 50 UG/ML
25 INJECTION, SOLUTION INTRAMUSCULAR; INTRAVENOUS EVERY 5 MIN PRN
Status: DISCONTINUED | OUTPATIENT
Start: 2018-11-29 | End: 2018-11-29 | Stop reason: HOSPADM

## 2018-11-29 RX ORDER — DEXTROSE, SODIUM CHLORIDE, AND POTASSIUM CHLORIDE 5; .45; .15 G/100ML; G/100ML; G/100ML
INJECTION INTRAVENOUS CONTINUOUS
Status: DISCONTINUED | OUTPATIENT
Start: 2018-11-29 | End: 2018-12-03

## 2018-11-29 RX ADMIN — PROPOFOL 50 MG: 10 INJECTION, EMULSION INTRAVENOUS at 13:11

## 2018-11-29 RX ADMIN — ONDANSETRON 4 MG: 2 INJECTION INTRAMUSCULAR; INTRAVENOUS at 09:08

## 2018-11-29 RX ADMIN — HYDROMORPHONE HYDROCHLORIDE 1 MG: 1 INJECTION, SOLUTION INTRAMUSCULAR; INTRAVENOUS; SUBCUTANEOUS at 23:59

## 2018-11-29 RX ADMIN — SODIUM CHLORIDE, POTASSIUM CHLORIDE, SODIUM LACTATE AND CALCIUM CHLORIDE: 600; 310; 30; 20 INJECTION, SOLUTION INTRAVENOUS at 12:29

## 2018-11-29 RX ADMIN — LIDOCAINE HYDROCHLORIDE 100 MG: 20 INJECTION, SOLUTION INFILTRATION; PERINEURAL at 11:39

## 2018-11-29 RX ADMIN — PROPOFOL 60 MG: 10 INJECTION, EMULSION INTRAVENOUS at 12:56

## 2018-11-29 RX ADMIN — MIDAZOLAM 2 MG: 1 INJECTION INTRAMUSCULAR; INTRAVENOUS at 11:35

## 2018-11-29 RX ADMIN — SODIUM CHLORIDE 80 MG: 9 INJECTION, SOLUTION INTRAVENOUS at 12:39

## 2018-11-29 RX ADMIN — SODIUM CHLORIDE 1000 ML: 9 INJECTION, SOLUTION INTRAVENOUS at 06:42

## 2018-11-29 RX ADMIN — SODIUM CHLORIDE, POTASSIUM CHLORIDE, SODIUM LACTATE AND CALCIUM CHLORIDE 1000 ML: 600; 310; 30; 20 INJECTION, SOLUTION INTRAVENOUS at 08:28

## 2018-11-29 RX ADMIN — ONDANSETRON 8 MG: 2 INJECTION INTRAMUSCULAR; INTRAVENOUS at 06:42

## 2018-11-29 RX ADMIN — SUCCINYLCHOLINE CHLORIDE 160 MG: 20 INJECTION, SOLUTION INTRAMUSCULAR; INTRAVENOUS at 11:39

## 2018-11-29 RX ADMIN — SODIUM CHLORIDE, POTASSIUM CHLORIDE, SODIUM LACTATE AND CALCIUM CHLORIDE: 600; 310; 30; 20 INJECTION, SOLUTION INTRAVENOUS at 11:35

## 2018-11-29 RX ADMIN — Medication 10 ML: at 07:22

## 2018-11-29 RX ADMIN — ROCURONIUM BROMIDE 50 MG: 10 INJECTION, SOLUTION INTRAVENOUS at 11:55

## 2018-11-29 RX ADMIN — IOPAMIDOL 75 ML: 755 INJECTION, SOLUTION INTRAVENOUS at 07:22

## 2018-11-29 RX ADMIN — POTASSIUM CHLORIDE, DEXTROSE MONOHYDRATE AND SODIUM CHLORIDE: 150; 5; 450 INJECTION, SOLUTION INTRAVENOUS at 16:20

## 2018-11-29 RX ADMIN — CEFAZOLIN 2000 MG: 1 INJECTION, POWDER, FOR SOLUTION INTRAMUSCULAR; INTRAVENOUS at 11:51

## 2018-11-29 RX ADMIN — FENTANYL CITRATE 100 MCG: 50 INJECTION, SOLUTION INTRAMUSCULAR; INTRAVENOUS at 12:00

## 2018-11-29 RX ADMIN — Medication 10 ML: at 06:44

## 2018-11-29 RX ADMIN — PANTOPRAZOLE SODIUM 40 MG: 40 INJECTION, POWDER, FOR SOLUTION INTRAVENOUS at 16:21

## 2018-11-29 RX ADMIN — CEFAZOLIN SODIUM 2 G: 10 INJECTION, POWDER, FOR SOLUTION INTRAVENOUS at 16:21

## 2018-11-29 RX ADMIN — PHENYLEPHRINE HYDROCHLORIDE 100 MCG: 10 INJECTION INTRAVENOUS at 12:13

## 2018-11-29 RX ADMIN — SODIUM CHLORIDE 80 ML: 9 INJECTION, SOLUTION INTRAVENOUS at 07:22

## 2018-11-29 RX ADMIN — FENTANYL CITRATE 150 MCG: 50 INJECTION, SOLUTION INTRAMUSCULAR; INTRAVENOUS at 11:39

## 2018-11-29 RX ADMIN — PHENYLEPHRINE HYDROCHLORIDE 100 MCG: 10 INJECTION INTRAVENOUS at 12:33

## 2018-11-29 RX ADMIN — PROPOFOL 140 MG: 10 INJECTION, EMULSION INTRAVENOUS at 11:39

## 2018-11-29 RX ADMIN — HYDROMORPHONE HYDROCHLORIDE 1 MG: 1 INJECTION, SOLUTION INTRAMUSCULAR; INTRAVENOUS; SUBCUTANEOUS at 21:13

## 2018-11-29 RX ADMIN — PANTOPRAZOLE SODIUM 40 MG: 40 INJECTION, POWDER, FOR SOLUTION INTRAVENOUS at 06:44

## 2018-11-29 RX ADMIN — DEXAMETHASONE SODIUM PHOSPHATE 10 MG: 10 INJECTION INTRAMUSCULAR; INTRAVENOUS at 12:30

## 2018-11-29 RX ADMIN — SUGAMMADEX 200 MG: 100 INJECTION, SOLUTION INTRAVENOUS at 13:15

## 2018-11-29 RX ADMIN — ONDANSETRON 4 MG: 2 INJECTION, SOLUTION INTRAMUSCULAR; INTRAVENOUS at 13:02

## 2018-11-29 RX ADMIN — AMPICILLIN SODIUM AND SULBACTAM SODIUM 3 G: 2; 1 INJECTION, POWDER, FOR SOLUTION INTRAMUSCULAR; INTRAVENOUS at 08:29

## 2018-11-29 RX ADMIN — HYDROMORPHONE HYDROCHLORIDE 0.5 MG: 1 INJECTION, SOLUTION INTRAMUSCULAR; INTRAVENOUS; SUBCUTANEOUS at 06:46

## 2018-11-29 ASSESSMENT — ENCOUNTER SYMPTOMS
VOMITING: 1
SORE THROAT: 0
STRIDOR: 0
SHORTNESS OF BREATH: 0
NAUSEA: 1
WHEEZING: 0
ABDOMINAL DISTENTION: 1

## 2018-11-29 ASSESSMENT — PULMONARY FUNCTION TESTS
PIF_VALUE: 18
PIF_VALUE: 18
PIF_VALUE: 19
PIF_VALUE: 18
PIF_VALUE: 18
PIF_VALUE: 17
PIF_VALUE: 16
PIF_VALUE: 18
PIF_VALUE: 17
PIF_VALUE: 20
PIF_VALUE: 18
PIF_VALUE: 18
PIF_VALUE: 21
PIF_VALUE: 15
PIF_VALUE: 2
PIF_VALUE: 18
PIF_VALUE: 25
PIF_VALUE: 18
PIF_VALUE: 20
PIF_VALUE: 16
PIF_VALUE: 2
PIF_VALUE: 16
PIF_VALUE: 19
PIF_VALUE: 15
PIF_VALUE: 16
PIF_VALUE: 15
PIF_VALUE: 16
PIF_VALUE: 18
PIF_VALUE: 16
PIF_VALUE: 18
PIF_VALUE: 18
PIF_VALUE: 35
PIF_VALUE: 18
PIF_VALUE: 0
PIF_VALUE: 20
PIF_VALUE: 18
PIF_VALUE: 21
PIF_VALUE: 18
PIF_VALUE: 20
PIF_VALUE: 18
PIF_VALUE: 16
PIF_VALUE: 19
PIF_VALUE: 16
PIF_VALUE: 0
PIF_VALUE: 18
PIF_VALUE: 16
PIF_VALUE: 20
PIF_VALUE: 18
PIF_VALUE: 15
PIF_VALUE: 16
PIF_VALUE: 18
PIF_VALUE: 26
PIF_VALUE: 19
PIF_VALUE: 26
PIF_VALUE: 18
PIF_VALUE: 22
PIF_VALUE: 18
PIF_VALUE: 2
PIF_VALUE: 18
PIF_VALUE: 18
PIF_VALUE: 19
PIF_VALUE: 18
PIF_VALUE: 1
PIF_VALUE: 16
PIF_VALUE: 20
PIF_VALUE: 21
PIF_VALUE: 15
PIF_VALUE: 15
PIF_VALUE: 19
PIF_VALUE: 18
PIF_VALUE: 2
PIF_VALUE: 17
PIF_VALUE: 18
PIF_VALUE: 17
PIF_VALUE: 18
PIF_VALUE: 25
PIF_VALUE: 18
PIF_VALUE: 19
PIF_VALUE: 0
PIF_VALUE: 1
PIF_VALUE: 18
PIF_VALUE: 19
PIF_VALUE: 19
PIF_VALUE: 15
PIF_VALUE: 18
PIF_VALUE: 14
PIF_VALUE: 1
PIF_VALUE: 1
PIF_VALUE: 18
PIF_VALUE: 22
PIF_VALUE: 18
PIF_VALUE: 17

## 2018-11-29 ASSESSMENT — PAIN SCALES - GENERAL
PAINLEVEL_OUTOF10: 0
PAINLEVEL_OUTOF10: 9
PAINLEVEL_OUTOF10: 0
PAINLEVEL_OUTOF10: 7
PAINLEVEL_OUTOF10: 2
PAINLEVEL_OUTOF10: 0
PAINLEVEL_OUTOF10: 6
PAINLEVEL_OUTOF10: 6
PAINLEVEL_OUTOF10: 0

## 2018-11-29 ASSESSMENT — PAIN DESCRIPTION - DESCRIPTORS
DESCRIPTORS: ACHING;DISCOMFORT
DESCRIPTORS: ACHING;DISCOMFORT

## 2018-11-29 ASSESSMENT — PAIN DESCRIPTION - PAIN TYPE
TYPE: SURGICAL PAIN
TYPE: ACUTE PAIN
TYPE: SURGICAL PAIN

## 2018-11-29 ASSESSMENT — PAIN DESCRIPTION - ONSET
ONSET: ON-GOING
ONSET: ON-GOING

## 2018-11-29 ASSESSMENT — PAIN DESCRIPTION - FREQUENCY
FREQUENCY: CONTINUOUS
FREQUENCY: CONTINUOUS

## 2018-11-29 ASSESSMENT — PAIN DESCRIPTION - LOCATION
LOCATION: ABDOMEN;GROIN
LOCATION: ABDOMEN
LOCATION: ABDOMEN;GROIN

## 2018-11-29 NOTE — ED PROVIDER NOTES
Progress Note  7:39 AM  I received signout on this patient with Dr. Zuleyma Urbina. Patient is 76years old, has been vomiting for several days, diffuse abdominal pain. Review of the labs, lactate is 8, patient's persistent tachycardic. On my exam his abdomen is soft and has mild tenderness. He appears in no acute distress, conversing with his wife and physician in the room. CT is pending at this time. Plan for fluid resuscitation, repeat lactate, surfactant treatment. We'll likely admit this patient. Shirin Mcnulty MD  11/29/18 0740    8:19 AM  Spoke with Dr. Hector Cerna  Lactate 8, CT with SBO and inguinal hernia  On exam, incarcerated hernia that is not reducible to considerable force, mildly tender and abd still nontender but is firm. Dr. Hector Cerna reccs not attemting to reduce further, starting unasyn  Pt NPO, preop labs ordered, LR ordered, will await surgery arrival, pt will likely go directly to the Sotero Brambila MD  11/29/18 0224    9:54 AM  Dr. Hector Cerna at bedside, he has evaluated the patient. Versed ordered for NGT placement. Pt prepped for OR.        Shirin Mcnulty MD  11/29/18 [address] MD  11/29/18 9221
HISTORY         Diagnosis Date    Anemia     Arthritis     Avascular necrosis ([facility] [address]     sees Dr Jessica Rodney BPH (benign prostatic hyperplasia)     History of blood transfusion 2014    after total hip replacement    Hypertension     Leukopenia     MGUS (monoclonal gammopathy of unknown significance)     Osteoarthritis     Patient in clinical research study 2017    Stomach ulcer     x 2        SURGICAL HISTORY           Procedure Laterality Date    COLONOSCOPY  2014    ENDOSCOPY, COLON, DIAGNOSTIC  2014    stomach ulcers    HIP ARTHROPLASTY Right 14    HIP ARTHROPLASTY Left 03/10/15    INGUINAL HERNIA REPAIR Right     PROSTATE BIOPSY  2014    PROSTATECTOMY  2015    robotic. lap    UPPER GASTROINTESTINAL ENDOSCOPY  10/19/2016    no lesions seen         FAMILY HISTORY       Family History   Problem Relation Age of Onset    High Blood Pressure Mother     High Blood Pressure Father      Family Status   Relation Status    Mother         @80    Father  at age 80        SOCIAL HISTORY      reports that he has never smoked. He has never used smokeless tobacco. He reports that he does not drink alcohol or use drugs. REVIEW OF SYSTEMS    (2-9 systems for level 4, 10 or more for level 5)     Review of Systems   All other systems reviewed and are negative. Except as noted above the remainder of the review of systems was reviewed and negative. PHYSICAL EXAM    (up to 7 for level 4, 8 or more for level 5)   There were no vitals filed for this visit. Physical exam reflects an uncomfortable male who is actively vomiting. He is afebrile. Vital signs stable to include pulse ox of 98% on room air. He is not hypoxic. He is alert. Integument warm and dry. I cannot perform oropharyngeal exam due to vomiting. Heart regular rate and rhythm. Lungs are clear. Abdomen has diffuse discomfort. Extremities show no cyanosis clubbing or edema.

## 2018-11-29 NOTE — H&P
History and Physical        Name: Yolanda Yanes  MRN: [id number]     [place]: [de-identified]  Room:[place] [address]  Admit Date: 11/29/2018    PCP: Vitaliy Lorenzana MD    CHIEF COMPLAINT:  Nausea and vomiting, groin pain. History Obtained From:  patient, electronic medical record    HISTORY OF PRESENT ILLNESS:    Yolanda Yanes is a 76 y.o.  male who presents with a 3 day history of nausea and vomiting and left groin pain. He states that this started when he was doing some heavy work at home. He felt a \"pop\" in the left groin and what he describes is just mild discomfort. However he then developed progressive abdominal bloating and then started to have nausea and vomiting after about a 12 hour period of time and he has had nausea and vomiting ever since. He came to the emergency department where he was found to have a very distended abdomen, an incarcerated left inguinal hernia and a lactic acid level of 8.2 which came down to 5.1 with either of IV fluid. He has an incarcerated left inguinal hernia by physical exam and by CT scan. He is being admitted for treatment of his incarcerated left inguinal hernia with bowel obstruction that has been present for 3 days. Appears to be associated with severe dehydration. Please see the physical exam below. Clinically he does not have erythema or edema in the left groin but there is also concern of possible compromised bowel given the long-standing history and the severity of the lactic acidosis.     Past Medical History:    Past Medical History:   Diagnosis Date    Anemia     Arthritis     Avascular necrosis ([address]     sees Dr Lurdes [place] BPH (benign prostatic hyperplasia)     History of blood transfusion 12/2014    after total hip replacement    Hypertension     Leukopenia     MGUS (monoclonal gammopathy of unknown significance)     Osteoarthritis     Patient in clinical research study 01/19/2017    Stomach ulcer     x 2         Past Surgical History:    Past Surgical History:   Procedure Laterality Date    COLONOSCOPY  07/14/2014    ENDOSCOPY, COLON, DIAGNOSTIC  07/13/2014    stomach ulcers    HIP ARTHROPLASTY Right 12/03/14    HIP ARTHROPLASTY Left 03/10/15    INGUINAL HERNIA REPAIR Right 2009    PROSTATE BIOPSY  09/12/2014    PROSTATECTOMY  12/16/2015    robotic. lap    UPPER GASTROINTESTINAL ENDOSCOPY  10/19/2016    no lesions seen        Medications Prior to Admission:     Prior to Admission medications    Medication Sig Start Date End Date Taking? Authorizing Provider   pantoprazole (PROTONIX) 40 MG tablet TAKE 1 TABLET BY MOUTH ONE TIME A DAY IN THE MORNING BEFORE BREAKFAST 11/5/18   Hien Harris MD   hydrochlorothiazide (HYDRODIURIL) 25 MG tablet TAKE 1 TABLET BY MOUTH DAILY. 4/18/18   Hien Harris MD   cyclobenzaprine (FLEXERIL) 10 MG tablet TAKE 1 TABLET BY MOUTH 3 TIMES DAILY AS NEEDED FOR MUSCLE SPASMS 1/23/18   Hien Harris MD   potassium chloride (KLOR-CON M) 20 MEQ extended release tablet TAKE 1 TABLET BY MOUTH DAILY. 12/12/17   Hien Harris MD   amoxicillin (AMOXIL) 500 MG capsule 2000 mg one hour prior to procedure 10/30/17   Hien Harris MD   calcium carbonate (OSCAL) 500 MG TABS tablet Take 500 mg by mouth daily    Historical Provider, MD   docusate sodium (COLACE) 100 MG capsule Take 1 capsule by mouth daily as needed for Constipation 12/17/15   Roxi Barclay MD   Multiple Vitamins-Minerals (MULTI COMPLETE PO) Take 1 tablet by mouth daily. Historical Provider, MD   vitamin D (ERGOCALCIFEROL) 97632 UNITS capsule Take 1 capsule by mouth once a week. Patient taking differently: Take 2,000 Units by mouth once a week. 7/16/14   Khang Allen MD        Allergies:  Patient has no known allergies. Social History:   Tobacco:    reports that he has never smoked. He has never used smokeless tobacco.  Alcohol:      reports that he does not drink alcohol. Drug Use:  reports that he does not use drugs.     Family History: point.  The streak artifacts from the hip prosthesis limits evaluation.  No   acute bony abnormality.           Impression   1. Findings consistent with moderate to high-grade mechanical small bowel   obstruction.  This is most probably secondary to an obstructed left inguinal   hernia. 2. Within the collapsed terminal ileum there is focal low-density 1.7 cm   filling defect about 10 cm proximal to the ileocecal junction which could be   stool or other lesion.  Review on follow-up studies. 3. Interval development of a nodular cirrhotic appearing liver particularly   in the right lobe which is also smaller.  Trace amount of intraperitoneal   free fluid attributed to the cirrhosis. 4. Nonoccluding portal vein thrombosis in right portal vein. 5. Scattered hepatic cysts unchanged. Imaging:      ASSESSMENT:  Incarcerated, nonreducible, left inguinal hernia with entrapped small bowel causing small bowel obstruction. There is related to dehydration. He is tachycardic but not hypotensive. Primary Problem  Incarcerated inguinal hernia    Active Hospital Problems    Diagnosis Date Noted    Incarcerated inguinal hernia [K40.30] 11/29/2018    Dehydration [E86.0] 11/29/2018    Essential hypertension [I10] 09/10/2018    Anemia [D64.9] 07/10/2014       PLAN:  1. NG tube was placed in the emergency room by myself with the assistance of the nursing staff. 500 mL of dark brown fluid was obtained immediately. 2. I have recommended proceeding directly to surgery. 3. The patient has been given IV fluid bolus and IV antibiotics. 4. Patient and his family were informed that the goal of the surgery will be repair of the hernia, release of the bowel obstruction. There is a possibility he could require bowel resection. He has a good understanding of indications and risks and agrees. We'll proceed as soon as an operating room is available.   His case was discussed with the anesthesia staff as

## 2018-11-29 NOTE — OP NOTE
table the abdomen was prepped and draped in the usual sterile fashion. The patient received Ancef 2 g IV IV as IV antibiotic wound prophylaxis. EPC cuffs were placed just prior to the induction of anesthesia and utilized throughout for DVT prophylaxis. A transverse skin incision was made over the area of the inguinal canal following the skin tension lines and going over the large bulge of the hernia incarceration. This was carried down through the subcutaneous tissue and Rohit's fascia until the external oblique fascia and the external ring could be visualized. There was a large hernia sac entrapped at the level of the external ring. The external oblique was opened sharply in the line of its fibers down to the external ring. The canal was entered. The cord was densely adherent to the canal but was possible to sharply dissect this free. The ilioinguinal nerve could not be identified. The iliohypogastric nerve was identified medially however and was preserved in situ. Once the distal cord could be elevated at the level of the pubic tubercle care was taken to keep good control on the cord with its dilated incarcerated hernia sac so that there would not be inadvertently reduction of bowel during manipulation. The cremasterics fascia was opened sharply and the anterior wall the hernia sac was opened before reduction to inspect the contents. It was seen entering distended and somewhat dusky small bowel. A small amount of proximal small bowel was able to be brought up into the sac and a Juany lamp was placed on obviously healthy-appearing bowel to keep control of this area. The hernia sac was then dissected free from the vas and vessels and a Penrose drain was passed around them. The hernia sac was then opened laterally slightly more to free the internal ring and some of the transversalis fascia fibers laterally were also opened.   This then gave very good mobility Ernie small bowel up for good inspection. A transition point was seen. The bowel was dusky but rapidly pinked up. All of the bowel was reduced into the abdomen keeping the Juany clamp in place so that the area could be retrieved and inspected after several minutes. With the bowel in a reduced location so that it would be warmed and without tension attention was then turned to further dissection and repair of the hernia. There was quite a bit of ascitic fluid in the abdominal cavity which was present within the wound once the sac was opened and it was felt that conditions were not ideal placement of mesh. A standard Shouldice repair was performed. The cremasteric fascia was  from the cord structures and divided with cautery. The fibers inthe floor the canal were opened sharply and with the cautery. It was found that this was densely adherent by scar to the preperitoneal space due to his prior pelvic surgery for robotic prostatectomy. Therefore the amount of space that could be freed in the preperitoneal plane was rather limited. During this dissection a small hole was made in the peritoneum at this point. That peritoneal hole was closed with a figure-of-eight suture of 2-0 PDS. It was possible however to free the transversalis from the preperitoneal space enough to  proceed with a Shouldice repair in standard fashion. The transversalis and internal oblique and the floor the canal was divided from the pubic tubercle up to the level of the epigastric vessels. At this point then the final inspection of our bowel that had been caught in the hernia was made. This showed a good appearance and looked viable. All the small bowel was then placed back into the abdominal cavity and the hernia sac was partially excised and then closed with running sutures of 2-0 PDS. The fragments of sac that had been removed were sent to pathology as a specimen.     A 2-0 Prolene suture was placed near the pubic tubercle and a running imbricating layer of transversalis fascia medially to the iliopubic tract laterally was performed running suture up to the internal ring until a very tight closure could be performed. The suture was then reversed at this point and run back down to the pubic tubercle suturing the free edge that had been thus created to the shelving portion of the inguinal ligament. A second suture of 2-0 Prolene was started up near the internal ring suturing the internal oblique to the shelving portion of the inguinal ligament, running down to the pubic tubercle and then reversing the suture and coming back up to the internal ring and tying. This gave a good tight closure of the floor the canal.    Prior to closure the hernia defect would just barely admit 2 fingers tightly and was estimated at between 1-1/2 and 2 cm in size. There was no sign of a direct defect and her to closure of the sac palpation in the area of the femoral canal showed no evidence of a femoral hernia defect. This hernia is classified [id number]. Following completion of the repair of the cord structures were placed back into the canal.  The wound was irrigated with saline solution containing gentamicin. The external oblique was closed over the cord with running suture of 2-0 PDS. Rohit's fascia was closed with interrupted sutures of 3-0 Monocryl. Skin was closed with a combination of interrupted subcuticular sutures of 4-0 Monocryl and dermal adhesive. The patient tolerated the procedure well and was transferred to the postoperative care area stable and in good condition with plans to be admitted to an inpatient bed postoperatively.     Complications: None    Specimen: Hernia sac    Estimated Blood Loss: 20 ml    Total IV Fluids:   1,200 ml of LR                        Condition: good

## 2018-11-29 NOTE — PLAN OF CARE
Problem: Skin Integrity:  Goal: Skin integrity will stabilize  Skin integrity will stabilize  Outcome: Ongoing  Checked for incontinence every 2 hours and prn. Pericare as needed. Assisted to reposition off back frequently. On waffle mattress. Heels off bed with pillows.

## 2018-11-30 PROBLEM — Z87.19 HISTORY OF HEMATEMESIS: Chronic | Status: ACTIVE | Noted: 2018-11-30

## 2018-11-30 LAB
-: ABNORMAL
ABSOLUTE EOS #: 0.04 K/UL (ref 0–0.4)
ABSOLUTE IMMATURE GRANULOCYTE: ABNORMAL K/UL (ref 0–0.3)
ABSOLUTE LYMPH #: 0.4 K/UL (ref 1–4.8)
ABSOLUTE MONO #: 1.16 K/UL (ref 0.2–0.8)
AMORPHOUS: ABNORMAL
ANION GAP SERPL CALCULATED.3IONS-SCNC: 12 MMOL/L (ref 9–17)
BACTERIA: ABNORMAL
BASOPHILS # BLD: 0 %
BASOPHILS ABSOLUTE: 0 K/UL (ref 0–0.2)
BILIRUBIN URINE: NEGATIVE
BUN BLDV-MCNC: 23 MG/DL (ref 8–23)
BUN/CREAT BLD: 21 (ref 9–20)
CALCIUM SERPL-MCNC: 8 MG/DL (ref 8.6–10.4)
CASTS UA: ABNORMAL /LPF
CHLORIDE BLD-SCNC: 103 MMOL/L (ref 98–107)
CO2: 24 MMOL/L (ref 20–31)
COLOR: YELLOW
COMMENT UA: ABNORMAL
CREAT SERPL-MCNC: 1.11 MG/DL (ref 0.7–1.2)
CRYSTALS, UA: ABNORMAL /HPF
DIFFERENTIAL TYPE: ABNORMAL
EOSINOPHILS RELATIVE PERCENT: 1 % (ref 1–4)
EPITHELIAL CELLS UA: ABNORMAL /HPF (ref 0–5)
FERRITIN: 16 UG/L (ref 30–400)
FREE KAPPA/LAMBDA RATIO: 1.27 (ref 0.26–1.65)
GFR AFRICAN AMERICAN: >60 ML/MIN
GFR NON-AFRICAN AMERICAN: >60 ML/MIN
GFR SERPL CREATININE-BSD FRML MDRD: ABNORMAL ML/MIN/{1.73_M2}
GFR SERPL CREATININE-BSD FRML MDRD: ABNORMAL ML/MIN/{1.73_M2}
GLUCOSE BLD-MCNC: 178 MG/DL (ref 70–99)
GLUCOSE URINE: NEGATIVE
HCT VFR BLD CALC: 23.8 % (ref 41–53)
HEMOGLOBIN: 7.2 G/DL (ref 13.5–17.5)
IGA: 403 MG/DL (ref 70–400)
IGG: 1832 MG/DL (ref 700–1600)
IGM: 30 MG/DL (ref 40–230)
IMMATURE GRANULOCYTES: ABNORMAL %
IRON SATURATION: 4 % (ref 20–55)
IRON: 13 UG/DL (ref 59–158)
KAPPA FREE LIGHT CHAINS QNT: 2.69 MG/DL (ref 0.37–1.94)
KETONES, URINE: NEGATIVE
LACTIC ACID: 2.1 MMOL/L (ref 0.5–2.2)
LACTIC ACID: 2.9 MMOL/L (ref 0.5–2.2)
LAMBDA FREE LIGHT CHAINS QNT: 2.11 MG/DL (ref 0.57–2.63)
LEUKOCYTE ESTERASE, URINE: ABNORMAL
LYMPHOCYTES # BLD: 10 % (ref 24–44)
MAGNESIUM: 2.5 MG/DL (ref 1.6–2.6)
MCH RBC QN AUTO: 19.1 PG (ref 26–34)
MCHC RBC AUTO-ENTMCNC: 30.1 G/DL (ref 31–37)
MCV RBC AUTO: 63.5 FL (ref 80–100)
MONOCYTES # BLD: 29 % (ref 1–7)
MORPHOLOGY: ABNORMAL
MUCUS: ABNORMAL
NITRITE, URINE: NEGATIVE
NRBC AUTOMATED: ABNORMAL PER 100 WBC
OTHER OBSERVATIONS UA: ABNORMAL
PDW BLD-RTO: 21.2 % (ref 11.5–14.5)
PH UA: 6 (ref 5–8)
PLATELET # BLD: 209 K/UL (ref 130–400)
PLATELET ESTIMATE: ABNORMAL
PMV BLD AUTO: 9.2 FL (ref 6–12)
POTASSIUM SERPL-SCNC: 4.3 MMOL/L (ref 3.7–5.3)
PROTEIN UA: NEGATIVE
RBC # BLD: 3.76 M/UL (ref 4.5–5.9)
RBC # BLD: ABNORMAL 10*6/UL
RBC UA: ABNORMAL /HPF (ref 0–2)
RENAL EPITHELIAL, UA: ABNORMAL /HPF
SEG NEUTROPHILS: 60 % (ref 36–66)
SEGMENTED NEUTROPHILS ABSOLUTE COUNT: 2.4 K/UL (ref 1.8–7.7)
SODIUM BLD-SCNC: 139 MMOL/L (ref 135–144)
SPECIFIC GRAVITY UA: 1.02 (ref 1–1.03)
SURGICAL PATHOLOGY REPORT: NORMAL
TOTAL IRON BINDING CAPACITY: 299 UG/DL (ref 250–450)
TRICHOMONAS: ABNORMAL
TURBIDITY: CLEAR
UNSATURATED IRON BINDING CAPACITY: 286 UG/DL (ref 112–347)
URINE HGB: NEGATIVE
UROBILINOGEN, URINE: NORMAL
WBC # BLD: 4 K/UL (ref 3.5–11)
WBC # BLD: ABNORMAL 10*3/UL
WBC UA: ABNORMAL /HPF (ref 0–5)
YEAST: ABNORMAL

## 2018-11-30 PROCEDURE — 83605 ASSAY OF LACTIC ACID: CPT

## 2018-11-30 PROCEDURE — C9113 INJ PANTOPRAZOLE SODIUM, VIA: HCPCS | Performed by: SURGERY

## 2018-11-30 PROCEDURE — 82784 ASSAY IGA/IGD/IGG/IGM EACH: CPT

## 2018-11-30 PROCEDURE — 36415 COLL VENOUS BLD VENIPUNCTURE: CPT

## 2018-11-30 PROCEDURE — 2500000003 HC RX 250 WO HCPCS: Performed by: SURGERY

## 2018-11-30 PROCEDURE — 99232 SBSQ HOSP IP/OBS MODERATE 35: CPT | Performed by: INTERNAL MEDICINE

## 2018-11-30 PROCEDURE — 2580000003 HC RX 258: Performed by: SURGERY

## 2018-11-30 PROCEDURE — 82728 ASSAY OF FERRITIN: CPT

## 2018-11-30 PROCEDURE — 1200000000 HC SEMI PRIVATE

## 2018-11-30 PROCEDURE — 83735 ASSAY OF MAGNESIUM: CPT

## 2018-11-30 PROCEDURE — 80048 BASIC METABOLIC PNL TOTAL CA: CPT

## 2018-11-30 PROCEDURE — 83540 ASSAY OF IRON: CPT

## 2018-11-30 PROCEDURE — 83883 ASSAY NEPHELOMETRY NOT SPEC: CPT

## 2018-11-30 PROCEDURE — 83550 IRON BINDING TEST: CPT

## 2018-11-30 PROCEDURE — 85025 COMPLETE CBC W/AUTO DIFF WBC: CPT

## 2018-11-30 PROCEDURE — 6360000002 HC RX W HCPCS: Performed by: SURGERY

## 2018-11-30 RX ORDER — PANTOPRAZOLE SODIUM 40 MG/10ML
40 INJECTION, POWDER, LYOPHILIZED, FOR SOLUTION INTRAVENOUS 2 TIMES DAILY
Status: DISCONTINUED | OUTPATIENT
Start: 2018-11-30 | End: 2018-12-05 | Stop reason: HOSPADM

## 2018-11-30 RX ORDER — 0.9 % SODIUM CHLORIDE 0.9 %
10 VIAL (ML) INJECTION 2 TIMES DAILY
Status: DISCONTINUED | OUTPATIENT
Start: 2018-11-30 | End: 2018-12-05 | Stop reason: HOSPADM

## 2018-11-30 RX ADMIN — PANTOPRAZOLE SODIUM 40 MG: 40 INJECTION, POWDER, FOR SOLUTION INTRAVENOUS at 22:13

## 2018-11-30 RX ADMIN — Medication 10 ML: at 22:31

## 2018-11-30 RX ADMIN — POTASSIUM CHLORIDE, DEXTROSE MONOHYDRATE AND SODIUM CHLORIDE: 150; 5; 450 INJECTION, SOLUTION INTRAVENOUS at 02:01

## 2018-11-30 RX ADMIN — CEFAZOLIN SODIUM 2 G: 10 INJECTION, POWDER, FOR SOLUTION INTRAVENOUS at 02:01

## 2018-11-30 RX ADMIN — HYDROMORPHONE HYDROCHLORIDE 1 MG: 1 INJECTION, SOLUTION INTRAMUSCULAR; INTRAVENOUS; SUBCUTANEOUS at 02:01

## 2018-11-30 RX ADMIN — HYDROMORPHONE HYDROCHLORIDE 1 MG: 1 INJECTION, SOLUTION INTRAMUSCULAR; INTRAVENOUS; SUBCUTANEOUS at 16:13

## 2018-11-30 RX ADMIN — HYDROMORPHONE HYDROCHLORIDE 1 MG: 1 INJECTION, SOLUTION INTRAMUSCULAR; INTRAVENOUS; SUBCUTANEOUS at 12:22

## 2018-11-30 RX ADMIN — HYDROMORPHONE HYDROCHLORIDE 1 MG: 1 INJECTION, SOLUTION INTRAMUSCULAR; INTRAVENOUS; SUBCUTANEOUS at 07:57

## 2018-11-30 RX ADMIN — POTASSIUM CHLORIDE, DEXTROSE MONOHYDRATE AND SODIUM CHLORIDE: 150; 5; 450 INJECTION, SOLUTION INTRAVENOUS at 22:30

## 2018-11-30 RX ADMIN — Medication 10 ML: at 12:22

## 2018-11-30 RX ADMIN — POTASSIUM CHLORIDE, DEXTROSE MONOHYDRATE AND SODIUM CHLORIDE: 150; 5; 450 INJECTION, SOLUTION INTRAVENOUS at 12:22

## 2018-11-30 RX ADMIN — HYDROMORPHONE HYDROCHLORIDE 1 MG: 1 INJECTION, SOLUTION INTRAMUSCULAR; INTRAVENOUS; SUBCUTANEOUS at 22:13

## 2018-11-30 ASSESSMENT — PAIN DESCRIPTION - PAIN TYPE
TYPE: SURGICAL PAIN
TYPE: ACUTE PAIN
TYPE: ACUTE PAIN
TYPE: SURGICAL PAIN
TYPE: SURGICAL PAIN

## 2018-11-30 ASSESSMENT — PAIN DESCRIPTION - FREQUENCY
FREQUENCY: CONTINUOUS

## 2018-11-30 ASSESSMENT — PAIN SCALES - GENERAL
PAINLEVEL_OUTOF10: 9
PAINLEVEL_OUTOF10: 4
PAINLEVEL_OUTOF10: 9
PAINLEVEL_OUTOF10: 4
PAINLEVEL_OUTOF10: 9
PAINLEVEL_OUTOF10: 4
PAINLEVEL_OUTOF10: 7

## 2018-11-30 ASSESSMENT — PAIN DESCRIPTION - LOCATION
LOCATION: ABDOMEN
LOCATION: ABDOMEN;GROIN

## 2018-11-30 ASSESSMENT — PAIN DESCRIPTION - DESCRIPTORS
DESCRIPTORS: ACHING;DISCOMFORT
DESCRIPTORS: TENDER
DESCRIPTORS: SHARP;ACHING

## 2018-11-30 ASSESSMENT — PAIN DESCRIPTION - ORIENTATION
ORIENTATION: LEFT

## 2018-11-30 ASSESSMENT — PAIN DESCRIPTION - ONSET
ONSET: ON-GOING
ONSET: ON-GOING

## 2018-11-30 ASSESSMENT — PAIN DESCRIPTION - PROGRESSION: CLINICAL_PROGRESSION: NOT CHANGED

## 2018-11-30 NOTE — CARE COORDINATION
[address] Associate with [address] Alin Montgomery MD     2018    Patient: Magdaleno Shultz Patient : 1950   MRN: [id number]    Reason for Admission: incarcerated left inguinal hernia with surgical repair   RARS: Readmission Risk Score: 13, CMRS 1       Spoke with: pt wife Martha     Met with pt wife. Writer reviewed role of CTC following discharge- v/u. Contact information provided. Agreeable to transition calls   Plan will be to discharge home. Pt wife RN with [employer]    Readmission Risk  Patient Active Problem List   Diagnosis    Leukopenia    Anemia    Back pain    Pancytopenia (HCC)    MGUS (monoclonal gammopathy of unknown significance)    PSA elevation    Neutropenia (HCC)    Osteoarthritis    Hyponatremia    S/P hip replacement    Prostate cancer (HCC)    Malabsorption    Hematemesis without nausea    Upper GI bleed    Essential hypertension    Incarcerated inguinal hernia    Dehydration    Incarcerated left inguinal hernia       Inpatient Assessment  Care Transitions Summary    Care Transitions Inpatient Review  Medication Review  Do you have all of your prescriptions and are they filled?:  Yes   How often do you have difficulty taking your medications?:  I always take them as prescribed. Housing Review  Who do you live with?:  Partner/Spouse/SO, Grandchild  Are you an active caregiver in your home?:  No  Social Support  Durable Medical Equipment  Functional Review  Ability to seek help/take action for Emergent/Urgent situations i.e. fire, crime, inclement weather or health crisis. :  Independent  Ability handle personal hygiene needs (bathing/dressing/grooming): Independent  Ability to manage medications: Independent  Ability to prepare food:  Independent  Ability to maintain home (clean home, laundry):   Independent  Ability to drive and/or has transportation:  Independent  Ability to do shopping:  Independent  Ability to manage finances: Independent  Is patient able to live independently?:  Yes  Hearing and Vision  Care Transitions Interventions         Follow Up  Future Appointments  Date Time Provider Krystal Deanne   2/19/2019 10:45 AM SCHEDULE, [facility]   2/26/2019 3:00 PM Love Cook MD  Cancer Ct [facility]   3/29/2019 11:00 AM Ha Mcgowan MD [address] [place] [address]  There are no preventive care reminders to display for this patient.     Elda Sinclair RN

## 2018-11-30 NOTE — PROGRESS NOTES
11/30/2018  2:52 PM  Surgery Addendum    Doing well with NG out. Has passed some flatus. Denies nausea. Abdomen progressively less distended. Wound satisfactory. Has been up in welch ambulating. Will allow popsicles po but will wait until am to start diet po. Hematology evaluation appreciated.      Yassine Hollis

## 2018-11-30 NOTE — PLAN OF CARE
Problem: Falls - Risk of:  Goal: Will remain free from falls  Will remain free from falls   Outcome: Ongoing  Bed locked in lowest position with siderails up x2. Non-skid footwear on and call light within reach. Bed alarm on. Patient instructed to call out when needing assistance ambulating. Environment is free of hazards. Patient remains free of falls and injury. Problem: Pain:  Goal: Pain level will decrease  Pain level will decrease   Outcome: Ongoing  Pain level assessment complete.    Patient educated on pain scale and control interventions  PRN pain medication given per patient request  Patient instructed to call out with new onset of pain or unrelieved pain

## 2018-12-01 PROBLEM — K40.30 INCARCERATED LEFT INGUINAL HERNIA: Status: RESOLVED | Noted: 2018-11-29 | Resolved: 2018-12-01

## 2018-12-01 PROBLEM — E86.0 DEHYDRATION: Status: RESOLVED | Noted: 2018-11-29 | Resolved: 2018-12-01

## 2018-12-01 PROBLEM — K40.30 INCARCERATED INGUINAL HERNIA: Status: RESOLVED | Noted: 2018-11-29 | Resolved: 2018-12-01

## 2018-12-01 LAB
ABSOLUTE EOS #: 0.06 K/UL (ref 0–0.4)
ABSOLUTE IMMATURE GRANULOCYTE: ABNORMAL K/UL (ref 0–0.3)
ABSOLUTE LYMPH #: 0.49 K/UL (ref 1–4.8)
ABSOLUTE MONO #: 0.5 K/UL (ref 0.2–0.8)
ALBUMIN SERPL-MCNC: 2.5 G/DL (ref 3.5–5.2)
ALBUMIN/GLOBULIN RATIO: ABNORMAL (ref 1–2.5)
ALP BLD-CCNC: 93 U/L (ref 40–129)
ALT SERPL-CCNC: 15 U/L (ref 5–41)
ANION GAP SERPL CALCULATED.3IONS-SCNC: 8 MMOL/L (ref 9–17)
AST SERPL-CCNC: 19 U/L
BASOPHILS # BLD: 1 %
BASOPHILS ABSOLUTE: 0.01 K/UL (ref 0–0.2)
BILIRUB SERPL-MCNC: 0.58 MG/DL (ref 0.3–1.2)
BILIRUBIN DIRECT: 0.16 MG/DL
BILIRUBIN, INDIRECT: 0.42 MG/DL (ref 0–1)
BUN BLDV-MCNC: 18 MG/DL (ref 8–23)
CALCIUM SERPL-MCNC: 7.6 MG/DL (ref 8.6–10.4)
CHLORIDE BLD-SCNC: 103 MMOL/L (ref 98–107)
CO2: 24 MMOL/L (ref 20–31)
CREAT SERPL-MCNC: 0.97 MG/DL (ref 0.7–1.2)
CULTURE: NO GROWTH
DIFFERENTIAL TYPE: ABNORMAL
EOSINOPHILS RELATIVE PERCENT: 4 % (ref 1–4)
GFR AFRICAN AMERICAN: >60 ML/MIN
GFR NON-AFRICAN AMERICAN: >60 ML/MIN
GFR SERPL CREATININE-BSD FRML MDRD: ABNORMAL ML/MIN/{1.73_M2}
GFR SERPL CREATININE-BSD FRML MDRD: ABNORMAL ML/MIN/{1.73_M2}
GLUCOSE BLD-MCNC: 141 MG/DL (ref 70–99)
HCT VFR BLD CALC: 20.1 % (ref 41–53)
HEMOGLOBIN: 6.1 G/DL (ref 13.5–17.5)
IMMATURE GRANULOCYTES: ABNORMAL %
LACTIC ACID: 1.5 MMOL/L (ref 0.5–2.2)
LACTIC ACID: 2.6 MMOL/L (ref 0.5–2.2)
LYMPHOCYTES # BLD: 35 % (ref 24–44)
Lab: NORMAL
MCH RBC QN AUTO: 19.3 PG (ref 26–34)
MCHC RBC AUTO-ENTMCNC: 30.3 G/DL (ref 31–37)
MCV RBC AUTO: 63.6 FL (ref 80–100)
MONOCYTES # BLD: 36 % (ref 1–7)
MORPHOLOGY: ABNORMAL
NRBC AUTOMATED: ABNORMAL PER 100 WBC
PDW BLD-RTO: 19.9 % (ref 11.5–14.5)
PLATELET # BLD: 94 K/UL (ref 130–400)
PLATELET ESTIMATE: ABNORMAL
PMV BLD AUTO: 9 FL (ref 6–12)
POTASSIUM SERPL-SCNC: 4 MMOL/L (ref 3.7–5.3)
PROSTATE SPECIFIC ANTIGEN: 0.03 UG/L
RBC # BLD: 3.16 M/UL (ref 4.5–5.9)
RBC # BLD: ABNORMAL 10*6/UL
SEG NEUTROPHILS: 24 % (ref 36–66)
SEGMENTED NEUTROPHILS ABSOLUTE COUNT: 0.34 K/UL (ref 1.8–7.7)
SODIUM BLD-SCNC: 135 MMOL/L (ref 135–144)
SPECIMEN DESCRIPTION: NORMAL
STATUS: NORMAL
TOTAL PROTEIN: 5.5 G/DL (ref 6.4–8.3)
WBC # BLD: 1.4 K/UL (ref 3.5–11)
WBC # BLD: ABNORMAL 10*3/UL

## 2018-12-01 PROCEDURE — 1200000000 HC SEMI PRIVATE

## 2018-12-01 PROCEDURE — 84155 ASSAY OF PROTEIN SERUM: CPT

## 2018-12-01 PROCEDURE — P9016 RBC LEUKOCYTES REDUCED: HCPCS

## 2018-12-01 PROCEDURE — 99232 SBSQ HOSP IP/OBS MODERATE 35: CPT | Performed by: INTERNAL MEDICINE

## 2018-12-01 PROCEDURE — 36430 TRANSFUSION BLD/BLD COMPNT: CPT

## 2018-12-01 PROCEDURE — 86900 BLOOD TYPING SEROLOGIC ABO: CPT

## 2018-12-01 PROCEDURE — 36415 COLL VENOUS BLD VENIPUNCTURE: CPT

## 2018-12-01 PROCEDURE — 6370000000 HC RX 637 (ALT 250 FOR IP): Performed by: SURGERY

## 2018-12-01 PROCEDURE — G0103 PSA SCREENING: HCPCS

## 2018-12-01 PROCEDURE — 85025 COMPLETE CBC W/AUTO DIFF WBC: CPT

## 2018-12-01 PROCEDURE — 2580000003 HC RX 258: Performed by: SURGERY

## 2018-12-01 PROCEDURE — 2500000003 HC RX 250 WO HCPCS: Performed by: SURGERY

## 2018-12-01 PROCEDURE — C9113 INJ PANTOPRAZOLE SODIUM, VIA: HCPCS | Performed by: SURGERY

## 2018-12-01 PROCEDURE — 83605 ASSAY OF LACTIC ACID: CPT

## 2018-12-01 PROCEDURE — 6360000002 HC RX W HCPCS: Performed by: INTERNAL MEDICINE

## 2018-12-01 PROCEDURE — 84165 PROTEIN E-PHORESIS SERUM: CPT

## 2018-12-01 PROCEDURE — 82248 BILIRUBIN DIRECT: CPT

## 2018-12-01 PROCEDURE — 6360000002 HC RX W HCPCS: Performed by: SURGERY

## 2018-12-01 PROCEDURE — 80053 COMPREHEN METABOLIC PANEL: CPT

## 2018-12-01 PROCEDURE — 86334 IMMUNOFIX E-PHORESIS SERUM: CPT

## 2018-12-01 PROCEDURE — 2580000003 HC RX 258: Performed by: INTERNAL MEDICINE

## 2018-12-01 RX ORDER — OXYCODONE HYDROCHLORIDE AND ACETAMINOPHEN 5; 325 MG/1; MG/1
2 TABLET ORAL EVERY 6 HOURS PRN
Status: DISCONTINUED | OUTPATIENT
Start: 2018-12-01 | End: 2018-12-05 | Stop reason: HOSPADM

## 2018-12-01 RX ORDER — OXYCODONE HYDROCHLORIDE AND ACETAMINOPHEN 5; 325 MG/1; MG/1
1 TABLET ORAL EVERY 6 HOURS PRN
Status: DISCONTINUED | OUTPATIENT
Start: 2018-12-01 | End: 2018-12-05 | Stop reason: HOSPADM

## 2018-12-01 RX ORDER — 0.9 % SODIUM CHLORIDE 0.9 %
250 INTRAVENOUS SOLUTION INTRAVENOUS ONCE
Status: DISCONTINUED | OUTPATIENT
Start: 2018-12-01 | End: 2018-12-01

## 2018-12-01 RX ADMIN — Medication 10 ML: at 20:35

## 2018-12-01 RX ADMIN — OXYCODONE AND ACETAMINOPHEN 2 TABLET: 5; 325 TABLET ORAL at 20:46

## 2018-12-01 RX ADMIN — PANTOPRAZOLE SODIUM 40 MG: 40 INJECTION, POWDER, FOR SOLUTION INTRAVENOUS at 20:35

## 2018-12-01 RX ADMIN — PANTOPRAZOLE SODIUM 40 MG: 40 INJECTION, POWDER, FOR SOLUTION INTRAVENOUS at 08:17

## 2018-12-01 RX ADMIN — POTASSIUM CHLORIDE, DEXTROSE MONOHYDRATE AND SODIUM CHLORIDE: 150; 5; 450 INJECTION, SOLUTION INTRAVENOUS at 08:18

## 2018-12-01 RX ADMIN — IRON SUCROSE 400 MG: 20 INJECTION, SOLUTION INTRAVENOUS at 09:12

## 2018-12-01 RX ADMIN — Medication 10 ML: at 08:18

## 2018-12-01 RX ADMIN — HYDROMORPHONE HYDROCHLORIDE 0.5 MG: 1 INJECTION, SOLUTION INTRAMUSCULAR; INTRAVENOUS; SUBCUTANEOUS at 08:27

## 2018-12-01 RX ADMIN — SODIUM CHLORIDE 250 ML: 0.9 INJECTION, SOLUTION INTRAVENOUS at 13:35

## 2018-12-01 ASSESSMENT — PAIN DESCRIPTION - ORIENTATION
ORIENTATION: LEFT
ORIENTATION: LEFT

## 2018-12-01 ASSESSMENT — PAIN SCALES - GENERAL
PAINLEVEL_OUTOF10: 3
PAINLEVEL_OUTOF10: 8
PAINLEVEL_OUTOF10: 7

## 2018-12-01 ASSESSMENT — PAIN DESCRIPTION - FREQUENCY: FREQUENCY: CONTINUOUS

## 2018-12-01 ASSESSMENT — PAIN DESCRIPTION - LOCATION
LOCATION: ABDOMEN
LOCATION: GROIN

## 2018-12-01 ASSESSMENT — PAIN DESCRIPTION - PAIN TYPE
TYPE: SURGICAL PAIN
TYPE: SURGICAL PAIN

## 2018-12-01 ASSESSMENT — PAIN DESCRIPTION - ONSET: ONSET: ON-GOING

## 2018-12-01 ASSESSMENT — PAIN DESCRIPTION - PROGRESSION: CLINICAL_PROGRESSION: GRADUALLY IMPROVING

## 2018-12-01 ASSESSMENT — PAIN DESCRIPTION - DESCRIPTORS
DESCRIPTORS: ACHING
DESCRIPTORS: ACHING

## 2018-12-01 NOTE — PROGRESS NOTES
[facility] Oncology Progress Note  12/1/2018 7:29 AM  Subjective:   Admit Date: 11/29/2018  PCP: Ramos Duval MD    Chief complaint: Pancytopenia and MGUS    History of presenting illness: Patient followed in the office with monoclonal gammopathy of undetermined significance. He also has anemia thought to be secondary to iron deficiency which is required iron replacement therapy in the past.  He also has autoimmune neutropenia. Patient tolerating liquids. He passed gas. Denies fever chills. Cell counts dropped compared to yesterday. No fever noted. Diet: Diet NPO Effective Now Exceptions are: Ice Chips, Popsicles  Medications:   Scheduled Meds:   pantoprazole  40 mg Intravenous BID    And    sodium chloride (PF)  10 mL Intravenous BID    sodium chloride flush  10 mL Intravenous 2 times per day     Continuous Infusions:   dextrose 5% and 0.45% NaCl with KCl 20 mEq 100 mL/hr at 11/30/18 2230     CBC:   Recent Labs      11/29/18   0640  11/30/18   0608  12/01/18   0540   WBC  6.8  4.0  1.4*   HGB  10.0*  7.2*  6.1*   PLT  479*  209  94*     BMP:    Recent Labs      11/29/18   0640  11/30/18   0608  12/01/18   0540   NA  137  139  135   K  3.7  4.3  4.0   CL  94*  103  103   CO2  18*  24  24   BUN  23  23  18   CREATININE  1.12  1.11  0.97   GLUCOSE  223*  178*  141*     Hepatic:   Recent Labs      11/29/18   0640  12/01/18   0540   AST  24  19   ALT  25  15   BILITOT  1.26*  0.58   ALKPHOS  165*  93     INR:   Recent Labs      11/29/18   0640   INR  1.2     Final Diagnosis   PERIPHERAL BLOOD:   - SEVERE MICROCYTIC ANEMIA.   - MODERATE TO SEVERE NEUTROPENIA.   - LYMPHOPENIA (510/ul). BONE MARROW BIOPSY, ASPIRATE SMEAR AND CLOT SECTION:   - MYELOID AND MEGAKARYOCYTIC HYPERPLASIA. - ABSENT IRON STORES. - PLASMA CELLS 5-10%, NEGATIVE FOR PLASMA CELL MONOTYPIA FOR IHC. Diagnosis Comment   THE SEVERE MICROCYTIC ANEMIA IS COMPATIBLE WITH IRON DEFICIENCY   ANEMIA.   NEUTROPENIA IS LIKELY DRUG 25 (H) 9 - 17 mmol/L    GFR Non-African American >60 >60 mL/min    GFR African American >60 >60 mL/min    GFR Comment          GFR Staging NOT REPORTED    Hepatic Function Panel   Result Value Ref Range    Alb 3.8 3.5 - 5.2 g/dL    Alkaline Phosphatase 165 (H) 40 - 129 U/L    ALT 25 5 - 41 U/L    AST 24 <40 U/L    Total Bilirubin 1.26 (H) 0.3 - 1.2 mg/dL    Bilirubin, Direct 0.25 <0.31 mg/dL    Bilirubin, Indirect 1.01 (H) 0.00 - 1.00 mg/dL    Total Protein 8.3 6.4 - 8.3 g/dL    Globulin NOT REPORTED 1.5 - 3.8 g/dL    Albumin/Globulin Ratio NOT REPORTED 1.0 - 2.5   Lipase   Result Value Ref Range    Lipase 15 13 - 60 U/L   Protime-INR   Result Value Ref Range    Protime 12.0 (H) 9.7 - 11.6 sec    INR 1.2    Urinalysis   Result Value Ref Range    Color, UA YELLOW YEL    Turbidity UA CLEAR CLEAR    Glucose, Ur NEGATIVE NEG    Bilirubin Urine NEGATIVE NEG    Ketones, Urine NEGATIVE NEG    Specific Gravity, UA 1.020 1.005 - 1.030    Urine Hgb NEGATIVE NEG    pH, UA 6.0 5.0 - 8.0    Protein, UA NEGATIVE NEG    Urobilinogen, Urine Normal NORM    Nitrite, Urine NEGATIVE NEG    Leukocyte Esterase, Urine TRACE (A) NEG    Urinalysis Comments NOT REPORTED    Lactic Acid   Result Value Ref Range    Lactic Acid 8.2 (H) 0.5 - 2.2 mmol/L   Magnesium   Result Value Ref Range    Magnesium 2.4 1.6 - 2.6 mg/dL   Lactate, Sepsis   Result Value Ref Range    Lactic Acid, Sepsis 5.1 (H) 0.5 - 1.9 mmol/L    Lactic Acid, Sepsis, Whole Blood NOT REPORTED 0.5 - 1.9 mmol/L   Surgical Pathology   Result Value Ref Range    Surgical Pathology Report       (NOTE)  [id number]  [facility]  [facility]  ANATOMIC [facility]OLOGY  [address] [place] [address],  [place] [address].   [place], [address]  [phone]  Fax: [phone] [facility]    Patient Name: Norma Wyatt: [id number]  Path Number: [id number]  Collected: 11/29/2018  Received: 11/29/2018  Reported: 11/30/2018 15:27    -- Diagnosis --  HERNIA SAC,

## 2018-12-01 NOTE — PROGRESS NOTES
12/1/2018  8:07 AM  Surgery POD #2    T 97.6  P 82  /71  He is cheerful, voices no complaints. Ambulating well. Using ice more for discomfort than pain meds, but did have IV Dilaudid x 2 over night. Abdomen soft, only mildly distended. Normal, active bowel sounds. He is passing flatus abundantly. Wound clean and dry. No hematoma. Urine output  1,025 ml last 24 hours. He is more anemic and very leukopenic this am: WBC 1,400   Hgb 6.1  BUN 18  Cr 0.97  K+ 4.0   Glucose  141  Lactic acid 2.1 last pm.     IMPRESSION: He is not hypovolemic (dehydration resolved)  by clinical criteria (decreasing pulse, BUN, Cr, abundant urine output, Normalization of lactic acid level.)  Certainly decrease in Hgb is multifactorial including some amount of operative and GI blood loss, but likely also with a prominent component from hemodilution and bone marrow dysfunction. He runs a baseline WBC historically < 2,000. GI tract is regaining function, OK to start po intake and meds. PLAN:  Hematology is following and has ordered transfusion and IV iron. Will decrease IV, advance diet. Transition to po pain meds.      Fifi Bowers

## 2018-12-02 PROBLEM — D61.818 PANCYTOPENIA (HCC): Status: ACTIVE | Noted: 2018-12-02

## 2018-12-02 LAB
ABSOLUTE EOS #: 0.08 K/UL (ref 0–0.4)
ABSOLUTE IMMATURE GRANULOCYTE: ABNORMAL K/UL (ref 0–0.3)
ABSOLUTE LYMPH #: 0.36 K/UL (ref 1–4.8)
ABSOLUTE MONO #: 0.37 K/UL (ref 0.2–0.8)
ABSOLUTE RETIC #: 0.12 M/UL (ref 0.02–0.1)
ANION GAP SERPL CALCULATED.3IONS-SCNC: 8 MMOL/L (ref 9–17)
BASOPHILS # BLD: 0 %
BASOPHILS ABSOLUTE: 0 K/UL (ref 0–0.2)
BUN BLDV-MCNC: 9 MG/DL (ref 8–23)
BUN/CREAT BLD: 10 (ref 9–20)
CALCIUM SERPL-MCNC: 7.4 MG/DL (ref 8.6–10.4)
CHLORIDE BLD-SCNC: 103 MMOL/L (ref 98–107)
CO2: 22 MMOL/L (ref 20–31)
CREAT SERPL-MCNC: 0.89 MG/DL (ref 0.7–1.2)
DIFFERENTIAL TYPE: ABNORMAL
EOSINOPHILS RELATIVE PERCENT: 7 % (ref 1–4)
GFR AFRICAN AMERICAN: >60 ML/MIN
GFR NON-AFRICAN AMERICAN: >60 ML/MIN
GFR SERPL CREATININE-BSD FRML MDRD: ABNORMAL ML/MIN/{1.73_M2}
GFR SERPL CREATININE-BSD FRML MDRD: ABNORMAL ML/MIN/{1.73_M2}
GLUCOSE BLD-MCNC: 120 MG/DL (ref 70–99)
HCT VFR BLD CALC: 20.8 % (ref 41–53)
HCT VFR BLD CALC: 28.8 % (ref 41–53)
HEMOGLOBIN: 6.5 G/DL (ref 13.5–17.5)
HEMOGLOBIN: 9 G/DL (ref 13.5–17.5)
IMMATURE GRANULOCYTES: ABNORMAL %
IMMATURE RETIC FRACT: ABNORMAL %
LACTATE DEHYDROGENASE: 155 U/L (ref 135–225)
LACTIC ACID: 2 MMOL/L (ref 0.5–2.2)
LYMPHOCYTES # BLD: 33 % (ref 24–44)
MCH RBC QN AUTO: 20.2 PG (ref 26–34)
MCH RBC QN AUTO: 21.9 PG (ref 26–34)
MCHC RBC AUTO-ENTMCNC: 31.1 G/DL (ref 31–37)
MCHC RBC AUTO-ENTMCNC: 31.2 G/DL (ref 31–37)
MCV RBC AUTO: 65 FL (ref 80–100)
MCV RBC AUTO: 70.3 FL (ref 80–100)
MONOCYTES # BLD: 34 % (ref 1–7)
MORPHOLOGY: ABNORMAL
NRBC AUTOMATED: ABNORMAL PER 100 WBC
NRBC AUTOMATED: ABNORMAL PER 100 WBC
NUCLEATED RED BLOOD CELLS: 4 PER 100 WBC
PDW BLD-RTO: 21.3 % (ref 11.5–14.5)
PDW BLD-RTO: 24.8 % (ref 11.5–14.5)
PLATELET # BLD: 128 K/UL (ref 130–400)
PLATELET # BLD: 80 K/UL (ref 130–400)
PLATELET ESTIMATE: ABNORMAL
PMV BLD AUTO: 9.1 FL (ref 6–12)
PMV BLD AUTO: 9.4 FL (ref 6–12)
POTASSIUM SERPL-SCNC: 3.8 MMOL/L (ref 3.7–5.3)
RBC # BLD: 3.2 M/UL (ref 4.5–5.9)
RBC # BLD: 4.1 M/UL (ref 4.5–5.9)
RBC # BLD: ABNORMAL 10*6/UL
RETIC %: 2.8 % (ref 0.5–2)
RETIC HEMOGLOBIN: ABNORMAL PG (ref 28.2–35.7)
SEG NEUTROPHILS: 26 % (ref 36–66)
SEGMENTED NEUTROPHILS ABSOLUTE COUNT: 0.29 K/UL (ref 1.8–7.7)
SODIUM BLD-SCNC: 133 MMOL/L (ref 135–144)
TSH SERPL DL<=0.05 MIU/L-ACNC: 1.11 MIU/L (ref 0.3–5)
WBC # BLD: 1.1 K/UL (ref 3.5–11)
WBC # BLD: 1.5 K/UL (ref 3.5–11)
WBC # BLD: ABNORMAL 10*3/UL

## 2018-12-02 PROCEDURE — 86901 BLOOD TYPING SEROLOGIC RH(D): CPT

## 2018-12-02 PROCEDURE — 82746 ASSAY OF FOLIC ACID SERUM: CPT

## 2018-12-02 PROCEDURE — 83615 LACTATE (LD) (LDH) ENZYME: CPT

## 2018-12-02 PROCEDURE — 1200000000 HC SEMI PRIVATE

## 2018-12-02 PROCEDURE — 6360000002 HC RX W HCPCS: Performed by: INTERNAL MEDICINE

## 2018-12-02 PROCEDURE — 83605 ASSAY OF LACTIC ACID: CPT

## 2018-12-02 PROCEDURE — 85045 AUTOMATED RETICULOCYTE COUNT: CPT

## 2018-12-02 PROCEDURE — 6360000002 HC RX W HCPCS: Performed by: SURGERY

## 2018-12-02 PROCEDURE — 99232 SBSQ HOSP IP/OBS MODERATE 35: CPT | Performed by: INTERNAL MEDICINE

## 2018-12-02 PROCEDURE — 36415 COLL VENOUS BLD VENIPUNCTURE: CPT

## 2018-12-02 PROCEDURE — C9113 INJ PANTOPRAZOLE SODIUM, VIA: HCPCS | Performed by: SURGERY

## 2018-12-02 PROCEDURE — 82607 VITAMIN B-12: CPT

## 2018-12-02 PROCEDURE — 84443 ASSAY THYROID STIM HORMONE: CPT

## 2018-12-02 PROCEDURE — 86900 BLOOD TYPING SEROLOGIC ABO: CPT

## 2018-12-02 PROCEDURE — P9016 RBC LEUKOCYTES REDUCED: HCPCS

## 2018-12-02 PROCEDURE — 80048 BASIC METABOLIC PNL TOTAL CA: CPT

## 2018-12-02 PROCEDURE — 2580000003 HC RX 258: Performed by: SURGERY

## 2018-12-02 PROCEDURE — 85025 COMPLETE CBC W/AUTO DIFF WBC: CPT

## 2018-12-02 PROCEDURE — 85027 COMPLETE CBC AUTOMATED: CPT

## 2018-12-02 PROCEDURE — 83010 ASSAY OF HAPTOGLOBIN QUANT: CPT

## 2018-12-02 PROCEDURE — 2580000003 HC RX 258: Performed by: INTERNAL MEDICINE

## 2018-12-02 PROCEDURE — 86850 RBC ANTIBODY SCREEN: CPT

## 2018-12-02 PROCEDURE — 36430 TRANSFUSION BLD/BLD COMPNT: CPT

## 2018-12-02 RX ORDER — FUROSEMIDE 10 MG/ML
20 INJECTION INTRAMUSCULAR; INTRAVENOUS
Status: COMPLETED | OUTPATIENT
Start: 2018-12-02 | End: 2018-12-02

## 2018-12-02 RX ORDER — 0.9 % SODIUM CHLORIDE 0.9 %
250 INTRAVENOUS SOLUTION INTRAVENOUS ONCE
Status: COMPLETED | OUTPATIENT
Start: 2018-12-02 | End: 2018-12-02

## 2018-12-02 RX ADMIN — Medication 10 ML: at 21:20

## 2018-12-02 RX ADMIN — SODIUM CHLORIDE 250 ML: 9 INJECTION, SOLUTION INTRAVENOUS at 14:34

## 2018-12-02 RX ADMIN — Medication 10 ML: at 10:50

## 2018-12-02 RX ADMIN — PANTOPRAZOLE SODIUM 40 MG: 40 INJECTION, POWDER, FOR SOLUTION INTRAVENOUS at 21:20

## 2018-12-02 RX ADMIN — PANTOPRAZOLE SODIUM 40 MG: 40 INJECTION, POWDER, FOR SOLUTION INTRAVENOUS at 10:50

## 2018-12-02 RX ADMIN — FUROSEMIDE 20 MG: 10 INJECTION, SOLUTION INTRAMUSCULAR; INTRAVENOUS at 17:59

## 2018-12-02 RX ADMIN — IRON SUCROSE 400 MG: 20 INJECTION, SOLUTION INTRAVENOUS at 10:51

## 2018-12-02 ASSESSMENT — PAIN DESCRIPTION - PAIN TYPE: TYPE: SURGICAL PAIN

## 2018-12-02 ASSESSMENT — PAIN DESCRIPTION - FREQUENCY: FREQUENCY: INTERMITTENT

## 2018-12-02 ASSESSMENT — PAIN DESCRIPTION - ORIENTATION: ORIENTATION: LEFT

## 2018-12-02 ASSESSMENT — PAIN SCALES - GENERAL: PAINLEVEL_OUTOF10: 0

## 2018-12-02 ASSESSMENT — PAIN DESCRIPTION - ONSET: ONSET: ON-GOING

## 2018-12-02 ASSESSMENT — PAIN DESCRIPTION - LOCATION: LOCATION: GROIN

## 2018-12-02 ASSESSMENT — PAIN DESCRIPTION - PROGRESSION: CLINICAL_PROGRESSION: GRADUALLY IMPROVING

## 2018-12-02 ASSESSMENT — PAIN DESCRIPTION - DESCRIPTORS: DESCRIPTORS: DULL;ACHING

## 2018-12-02 NOTE — PROGRESS NOTES
[facility] Oncology Progress Note  12/2/2018 5:43 PM  Subjective:   Admit Date: 11/29/2018  PCP: Karsten Maddox MD    Chief complaint: Pancytopenia and MGUS    History of presenting illness:     Patient seen and examined. Labs in vitals reviewed. Hemoglobin dropped. White cell count continues to be low. Lactic acid elevated. Abdominal pain is controlled. According to nurse no blood noted in stool. Patient tolerating liquid diet. Diet: DIET FULL LIQUID;  Medications:   Scheduled Meds:   sodium chloride  250 mL Intravenous Once    pantoprazole  40 mg Intravenous BID    And    sodium chloride (PF)  10 mL Intravenous BID    sodium chloride flush  10 mL Intravenous 2 times per day     Continuous Infusions:   dextrose 5% and 0.45% NaCl with KCl 20 mEq 50 mL/hr at 12/01/18 0912     CBC:   Recent Labs      11/30/18   0608  12/01/18   0540  12/02/18   0614   WBC  4.0  1.4*  1.1*   HGB  7.2*  6.1*  6.5*   PLT  209  94*  80*     BMP:    Recent Labs      11/30/18   0608  12/01/18   0540  12/02/18   0614   NA  139  135  133*   K  4.3  4.0  3.8   CL  103  103  103   CO2  24  24  22   BUN  23  18  9   CREATININE  1.11  0.97  0.89   GLUCOSE  178*  141*  120*     Hepatic:   Recent Labs      12/01/18   0540   AST  19   ALT  15   BILITOT  0.58   ALKPHOS  93     INR:   No results for input(s): INR in the last 72 hours. Final Diagnosis   PERIPHERAL BLOOD:   - SEVERE MICROCYTIC ANEMIA.   - MODERATE TO SEVERE NEUTROPENIA.   - LYMPHOPENIA (510/ul). BONE MARROW BIOPSY, ASPIRATE SMEAR AND CLOT SECTION:   - MYELOID AND MEGAKARYOCYTIC HYPERPLASIA. - ABSENT IRON STORES. - PLASMA CELLS 5-10%, NEGATIVE FOR PLASMA CELL MONOTYPIA FOR IHC. Diagnosis Comment   THE SEVERE MICROCYTIC ANEMIA IS COMPATIBLE WITH IRON DEFICIENCY   ANEMIA. NEUTROPENIA IS LIKELY DRUG INDUCED / IMMUNE-MEDIATED. THERE   IS NO EVIDENCE OF MYELOID OR OTHER MALIGNANCY.  PLASMA CELLS COMPRISE   5-10% OF THE CELLULARITY AND THERE IS NO EVIDENCE of higher density than the fluid in the proximal dilated small bowel loops. While this could represent more solid stool, lesion is not excluded. Review on follow-up studies. There is sigmoid diverticulosis. Pelvis: Limited evaluation due to large amount of streak artifact from bilateral total hip prosthesis. Peritoneum/Retroperitoneum: Mild atherosclerotic disease. No free intraperitoneal air. Trace amount of free intraperitoneal fluid noted mostly around the liver and also in the pelvis. Bones/Soft Tissues: There is left inguinal hernia probably the cause of the small bowel obstruction. A dilated small bowel loop extends into the inguinal ring with focal narrowing in the ring which may be the transition point. The streak artifacts from the hip prosthesis limits evaluation. No acute bony abnormality. 1. Findings consistent with moderate to high-grade mechanical small bowel obstruction. This is most probably secondary to an obstructed left inguinal hernia. 2. Within the collapsed terminal ileum there is focal low-density 1.7 cm filling defect about 10 cm proximal to the ileocecal junction which could be stool or other lesion. Review on follow-up studies. 3. Interval development of a nodular cirrhotic appearing liver particularly in the right lobe which is also smaller. Trace amount of intraperitoneal free fluid attributed to the cirrhosis. 4. Nonoccluding portal vein thrombosis in right portal vein. 5. Scattered hepatic cysts unchanged. Xr Chest Portable    Result Date: 11/29/2018  EXAMINATION: SINGLE XRAY VIEW OF THE CHEST 11/29/2018 2:12 pm COMPARISON: None.  HISTORY: ORDERING SYSTEM PROVIDED HISTORY: ngt placement TECHNOLOGIST PROVIDED HISTORY: ngt placement Ordering Physician Provided Reason for Exam: chk NG placement--post op hernia repair Acuity: Unknown Type of Exam: Unknown FINDINGS: Lungs: Clear Mediastinum: Unremarkable Pleura: No pleural effusion or pneumothorax Other: Side-hole of the

## 2018-12-02 NOTE — PLAN OF CARE
Problem: Falls - Risk of:  Goal: Will remain free from falls  Will remain free from falls   Outcome: Met This Shift    Goal: Absence of physical injury  Absence of physical injury   Outcome: Met This Shift      Problem: Infection:  Goal: Will remain free from infection  Will remain free from infection   Outcome: Ongoing      Problem: Pain:  Goal: Pain level will decrease  Pain level will decrease   Outcome: Met This Shift    Goal: Control of acute pain  Control of acute pain   Outcome: Met This Shift    Goal: Control of chronic pain  Control of chronic pain   Outcome: Met This Shift      Problem: Skin Integrity:  Goal: Skin integrity will stabilize  Skin integrity will stabilize   Outcome: Ongoing

## 2018-12-03 LAB
ABO/RH: NORMAL
ABO/RH: NORMAL
ABSOLUTE EOS #: 0.09 K/UL (ref 0–0.4)
ABSOLUTE EOS #: 0.14 K/UL (ref 0–0.44)
ABSOLUTE IMMATURE GRANULOCYTE: 0.02 K/UL (ref 0–0.3)
ABSOLUTE IMMATURE GRANULOCYTE: ABNORMAL K/UL (ref 0–0.3)
ABSOLUTE LYMPH #: 0.26 K/UL (ref 1.1–3.7)
ABSOLUTE LYMPH #: 0.35 K/UL (ref 1–4.8)
ABSOLUTE MONO #: 0.48 K/UL (ref 0.2–0.8)
ABSOLUTE MONO #: 0.55 K/UL (ref 0.1–1.2)
ALBUMIN (CALCULATED): 3 G/DL (ref 3.2–5.2)
ALBUMIN PERCENT: 51 % (ref 45–65)
ALPHA 1 PERCENT: 3 % (ref 3–6)
ALPHA 2 PERCENT: 10 % (ref 6–13)
ALPHA-1-GLOBULIN: 0.2 G/DL (ref 0.1–0.4)
ALPHA-2-GLOBULIN: 0.6 G/DL (ref 0.5–0.9)
ANION GAP SERPL CALCULATED.3IONS-SCNC: 11 MMOL/L (ref 9–17)
ANTIBODY SCREEN: NEGATIVE
ANTIBODY SCREEN: NEGATIVE
ARM BAND NUMBER: NORMAL
ARM BAND NUMBER: NORMAL
BASOPHILS # BLD: 0 %
BASOPHILS # BLD: 1 % (ref 0–2)
BASOPHILS ABSOLUTE: 0 K/UL (ref 0–0.2)
BASOPHILS ABSOLUTE: 0.02 K/UL (ref 0–0.2)
BETA GLOBULIN: 0.7 G/DL (ref 0.5–1.1)
BETA PERCENT: 12 % (ref 11–19)
BLD PROD TYP BPU: NORMAL
BUN BLDV-MCNC: 6 MG/DL (ref 8–23)
BUN/CREAT BLD: 7 (ref 9–20)
CALCIUM SERPL-MCNC: 7.7 MG/DL (ref 8.6–10.4)
CHLORIDE BLD-SCNC: 101 MMOL/L (ref 98–107)
CO2: 22 MMOL/L (ref 20–31)
CREAT SERPL-MCNC: 0.87 MG/DL (ref 0.7–1.2)
CROSSMATCH RESULT: NORMAL
DATE, STOOL #1: ABNORMAL
DATE, STOOL #2: ABNORMAL
DATE, STOOL #3: ABNORMAL
DIFFERENTIAL TYPE: ABNORMAL
DIFFERENTIAL TYPE: ABNORMAL
DISPENSE STATUS BLOOD BANK: NORMAL
EOSINOPHILS RELATIVE PERCENT: 6 % (ref 1–4)
EOSINOPHILS RELATIVE PERCENT: 9 % (ref 1–4)
EXPIRATION DATE: NORMAL
EXPIRATION DATE: NORMAL
FOLATE: 12.7 NG/ML
GAMMA GLOBULIN %: 24 % (ref 9–20)
GAMMA GLOBULIN: 1.4 G/DL (ref 0.5–1.5)
GFR AFRICAN AMERICAN: >60 ML/MIN
GFR NON-AFRICAN AMERICAN: >60 ML/MIN
GFR SERPL CREATININE-BSD FRML MDRD: ABNORMAL ML/MIN/{1.73_M2}
GFR SERPL CREATININE-BSD FRML MDRD: ABNORMAL ML/MIN/{1.73_M2}
GLUCOSE BLD-MCNC: 172 MG/DL (ref 70–99)
HAPTOGLOBIN: 108 MG/DL (ref 30–200)
HCT VFR BLD CALC: 26.7 % (ref 41–53)
HEMOCCULT SP1 STL QL: POSITIVE
HEMOCCULT SP2 STL QL: ABNORMAL
HEMOCCULT SP3 STL QL: ABNORMAL
HEMOGLOBIN: 8.5 G/DL (ref 13.5–17.5)
IMMATURE GRANULOCYTES: 1 %
IMMATURE GRANULOCYTES: ABNORMAL %
LACTIC ACID: 2.2 MMOL/L (ref 0.5–2.2)
LACTIC ACID: 2.2 MMOL/L (ref 0.5–2.2)
LYMPHOCYTES # BLD: 17 % (ref 24–43)
LYMPHOCYTES # BLD: 23 % (ref 24–44)
MCH RBC QN AUTO: 21.8 PG (ref 26–34)
MCHC RBC AUTO-ENTMCNC: 31.6 G/DL (ref 31–37)
MCV RBC AUTO: 68.9 FL (ref 80–100)
MONOCYTES # BLD: 32 % (ref 1–7)
MONOCYTES # BLD: 38 % (ref 3–12)
MORPHOLOGY: ABNORMAL
MORPHOLOGY: ABNORMAL
NRBC AUTOMATED: ABNORMAL PER 100 WBC
PATHOLOGIST: ABNORMAL
PATHOLOGIST: NORMAL
PDW BLD-RTO: 25.1 % (ref 11.5–14.5)
PLATELET # BLD: 90 K/UL (ref 130–400)
PLATELET ESTIMATE: ABNORMAL
PLATELET ESTIMATE: ABNORMAL
PMV BLD AUTO: 9.4 FL (ref 6–12)
POTASSIUM SERPL-SCNC: 3.8 MMOL/L (ref 3.7–5.3)
PROTEIN ELECTROPHORESIS, SERUM: ABNORMAL
RBC # BLD: 3.88 M/UL (ref 4.5–5.9)
RBC # BLD: ABNORMAL 10*6/UL
RBC # BLD: ABNORMAL 10*6/UL
SEG NEUTROPHILS: 34 % (ref 36–65)
SEG NEUTROPHILS: 39 % (ref 36–66)
SEGMENTED NEUTROPHILS ABSOLUTE COUNT: 0.51 K/UL (ref 1.5–8.1)
SEGMENTED NEUTROPHILS ABSOLUTE COUNT: 0.58 K/UL (ref 1.8–7.7)
SERUM IFX INTERP: NORMAL
SODIUM BLD-SCNC: 134 MMOL/L (ref 135–144)
SURGICAL PATHOLOGY REPORT: NORMAL
TIME, STOOL #1: 1230
TIME, STOOL #2: ABNORMAL
TIME, STOOL #3: ABNORMAL
TOTAL PROT. SUM,%: 100 % (ref 98–102)
TOTAL PROT. SUM: 5.9 G/DL (ref 6.3–8.2)
TOTAL PROTEIN: 5.9 G/DL (ref 6.4–8.3)
TRANSFUSION STATUS: NORMAL
UNIT DIVISION: 0
UNIT NUMBER: NORMAL
VITAMIN B-12: 1865 PG/ML (ref 232–1245)
WBC # BLD: 1.5 K/UL (ref 3.5–11)
WBC # BLD: ABNORMAL 10*3/UL
WBC # BLD: ABNORMAL 10*3/UL

## 2018-12-03 PROCEDURE — 36415 COLL VENOUS BLD VENIPUNCTURE: CPT

## 2018-12-03 PROCEDURE — 1200000000 HC SEMI PRIVATE

## 2018-12-03 PROCEDURE — C9113 INJ PANTOPRAZOLE SODIUM, VIA: HCPCS | Performed by: SURGERY

## 2018-12-03 PROCEDURE — 2500000003 HC RX 250 WO HCPCS: Performed by: SURGERY

## 2018-12-03 PROCEDURE — 82272 OCCULT BLD FECES 1-3 TESTS: CPT

## 2018-12-03 PROCEDURE — 6360000002 HC RX W HCPCS: Performed by: INTERNAL MEDICINE

## 2018-12-03 PROCEDURE — 6360000002 HC RX W HCPCS: Performed by: SURGERY

## 2018-12-03 PROCEDURE — 99232 SBSQ HOSP IP/OBS MODERATE 35: CPT | Performed by: INTERNAL MEDICINE

## 2018-12-03 PROCEDURE — 2580000003 HC RX 258: Performed by: SURGERY

## 2018-12-03 PROCEDURE — 80048 BASIC METABOLIC PNL TOTAL CA: CPT

## 2018-12-03 PROCEDURE — 83605 ASSAY OF LACTIC ACID: CPT

## 2018-12-03 PROCEDURE — 85025 COMPLETE CBC W/AUTO DIFF WBC: CPT

## 2018-12-03 RX ADMIN — POTASSIUM CHLORIDE, DEXTROSE MONOHYDRATE AND SODIUM CHLORIDE: 150; 5; 450 INJECTION, SOLUTION INTRAVENOUS at 13:42

## 2018-12-03 RX ADMIN — PANTOPRAZOLE SODIUM 40 MG: 40 INJECTION, POWDER, FOR SOLUTION INTRAVENOUS at 20:37

## 2018-12-03 RX ADMIN — PANTOPRAZOLE SODIUM 40 MG: 40 INJECTION, POWDER, FOR SOLUTION INTRAVENOUS at 09:30

## 2018-12-03 RX ADMIN — Medication 10 ML: at 20:37

## 2018-12-03 RX ADMIN — TBO-FILGRASTIM 300 MCG: 300 INJECTION, SOLUTION SUBCUTANEOUS at 13:30

## 2018-12-03 RX ADMIN — Medication 10 ML: at 09:31

## 2018-12-03 ASSESSMENT — PAIN SCALES - GENERAL
PAINLEVEL_OUTOF10: 0
PAINLEVEL_OUTOF10: 0

## 2018-12-03 NOTE — PROGRESS NOTES
Jo Ann here to see the pt  And aware he is refusing to take the Granix, reviewed labs and is ok with this, feels pt is doing ok without it , to pass this on to the surgeon

## 2018-12-03 NOTE — PROGRESS NOTES
American >60 >60 mL/min    GFR Comment          GFR Staging NOT REPORTED    Microscopic Urinalysis   Result Value Ref Range    -          WBC, UA 0 TO 2 0 - 5 /HPF    RBC, UA None 0 - 2 /HPF    Casts UA NOT REPORTED /LPF    Crystals UA NOT REPORTED NONE /HPF    Epithelial Cells UA 0 TO 2 0 - 5 /HPF    Renal Epithelial, Urine NOT REPORTED 0 /HPF    Bacteria, UA RARE (A) NONE    Mucus, UA NOT REPORTED NONE    Trichomonas, UA NOT REPORTED NONE    Amorphous, UA NOT REPORTED NONE    Other Observations UA NOT REPORTED NREQ    Yeast, UA NOT REPORTED NONE   Lactic Acid   Result Value Ref Range    Lactic Acid 2.9 (H) 0.5 - 2.2 mmol/L   Lactic Acid   Result Value Ref Range    Lactic Acid 2.1 0.5 - 2.2 mmol/L   FERRITIN   Result Value Ref Range    Ferritin 16 (L) 30 - 400 ug/L   IRON AND TIBC   Result Value Ref Range    Iron 13 (L) 59 - 158 ug/dL    TIBC 299 250 - 450 ug/dL    Iron Saturation 4 (L) 20 - 55 %    UIBC 286 112 - 347 ug/dL   IMMUNOGLOBULINS PANEL   Result Value Ref Range    IgG 1832 (H) 700 - 1600 mg/dL    IgA 403 (H) 70 - 400 mg/dL    IgM 30 (L) 40 - 230 mg/dL   KAPPA/LAMBDA QUANT FREE LIGHT CHAINS SERUM   Result Value Ref Range    Kappa Free Light Chains QNT 2.69 (H) 0.37 - 1.94 mg/dL    Lambda Free Light Chains QNT 2.11 0.57 - 2.63 mg/dL    Free Kappa/Lambda Ratio 1.27 0.26 - 5.71   Comp Metabolic w Bili Profile   Result Value Ref Range    Alb 2.5 (L) 3.5 - 5.2 g/dL    Albumin/Globulin Ratio NOT REPORTED 1.0 - 2.5    Alkaline Phosphatase 93 40 - 129 U/L    ALT 15 5 - 41 U/L    AST 19 <40 U/L    Total Bilirubin 0.58 0.3 - 1.2 mg/dL    Bilirubin, Direct 0.16 <0.31 mg/dL    Bilirubin, Indirect 0.42 0.00 - 1.00 mg/dL    BUN 18 8 - 23 mg/dL    Calcium 7.6 (L) 8.6 - 10.4 mg/dL    CREATININE 0.97 0.70 - 1.20 mg/dL    Glucose 141 (H) 70 - 99 mg/dL    Total Protein 5.5 (L) 6.4 - 8.3 g/dL    Sodium 135 135 - 144 mmol/L    Potassium 4.0 3.7 - 5.3 mmol/L    Chloride 103 98 - 107 mmol/L    CO2 24 20 - 31 mmol/L    Anion Gap (L) 4.5 - 5.9 m/uL    Hemoglobin 9.0 (L) 13.5 - 17.5 g/dL    Hematocrit 28.8 (L) 41 - 53 %    MCV 70.3 (L) 80 - 100 fL    MCH 21.9 (L) 26 - 34 pg    MCHC 31.2 31 - 37 g/dL    RDW 24.8 (H) 11.5 - 14.5 %    Platelets 996 (L) 888 - 400 k/uL    MPV 9.4 6.0 - 12.0 fL    NRBC Automated NOT REPORTED per 100 WBC   Differential   Result Value Ref Range    Differential Type Pending     WBC Morphology Pending     RBC Morphology Pending     Platelet Estimate Pending     Immature Granulocytes 1 (H) 0 %    Seg Neutrophils 34 (L) 36 - 65 %    Lymphocytes 17 (L) 24 - 43 %    Monocytes 38 (H) 3 - 12 %    Eosinophils % 9 (H) 1 - 4 %    Basophils 1 0 - 2 %    Absolute Immature Granulocyte 0.02 0.00 - 0.30 k/uL    Segs Absolute 0.51 (L) 1.50 - 8.10 k/uL    Absolute Lymph # 0.26 (L) 1.10 - 3.70 k/uL    Absolute Mono # 0.55 0.10 - 1.20 k/uL    Absolute Eos # 0.14 0.00 - 0.44 k/uL    Basophils # 0.02 0.00 - 0.20 k/uL    Morphology ANISOCYTOSIS PRESENT    EKG 12 Lead   Result Value Ref Range    Ventricular Rate 95 BPM    Atrial Rate 95 BPM    P-R Interval 136 ms    QRS Duration 76 ms    Q-T Interval 560 ms    QTc Calculation (Bazett) 703 ms    P Axis 28 degrees    R Axis -7 degrees    T Axis 17 degrees   TYPE AND SCREEN   Result Value Ref Range    Expiration Date 12/02/2018     Arm Band Number [id number]     ABO/Rh O POSITIVE     Antibody Screen NEGATIVE     Unit Number [id number]     Product Code Leukocyte Reduced Red Cell     Unit Divison 0     Dispense Status TRANSFUSED     Transfusion Status OK TO TRANSFUSE     Crossmatch Result COMPATIBLE     Unit Number [id number]     Product Code Leukocyte Reduced Red Cell     Unit Divison 0     Dispense Status TRANSFUSED     Transfusion Status OK TO TRANSFUSE     Crossmatch Result COMPATIBLE     Unit Number [id number]     Product Code Leukocyte Reduced Red Cell     Unit Divison 0     Dispense Status TRANSFUSED     Transfusion Status OK TO TRANSFUSE     Crossmatch Result COMPATIBLE    TYPE

## 2018-12-04 LAB
ABSOLUTE EOS #: 0.56 K/UL (ref 0–0.4)
ABSOLUTE IMMATURE GRANULOCYTE: ABNORMAL K/UL (ref 0–0.3)
ABSOLUTE LYMPH #: 0.42 K/UL (ref 1–4.8)
ABSOLUTE MONO #: 1.26 K/UL (ref 0.2–0.8)
ANION GAP SERPL CALCULATED.3IONS-SCNC: 12 MMOL/L (ref 9–17)
BASOPHILS # BLD: 0 %
BASOPHILS ABSOLUTE: 0 K/UL (ref 0–0.2)
BUN BLDV-MCNC: 7 MG/DL (ref 8–23)
BUN/CREAT BLD: 8 (ref 9–20)
CALCIUM SERPL-MCNC: 8.2 MG/DL (ref 8.6–10.4)
CHLORIDE BLD-SCNC: 102 MMOL/L (ref 98–107)
CO2: 21 MMOL/L (ref 20–31)
CREAT SERPL-MCNC: 0.88 MG/DL (ref 0.7–1.2)
DIFFERENTIAL TYPE: ABNORMAL
EOSINOPHILS RELATIVE PERCENT: 4 % (ref 1–4)
GFR AFRICAN AMERICAN: >60 ML/MIN
GFR NON-AFRICAN AMERICAN: >60 ML/MIN
GFR SERPL CREATININE-BSD FRML MDRD: ABNORMAL ML/MIN/{1.73_M2}
GFR SERPL CREATININE-BSD FRML MDRD: ABNORMAL ML/MIN/{1.73_M2}
GLUCOSE BLD-MCNC: 141 MG/DL (ref 70–99)
HCT VFR BLD CALC: 28.6 % (ref 41–53)
HEMOGLOBIN: 8.8 G/DL (ref 13.5–17.5)
IMMATURE GRANULOCYTES: ABNORMAL %
LACTIC ACID: 1.9 MMOL/L (ref 0.5–2.2)
LYMPHOCYTES # BLD: 3 % (ref 24–44)
MAGNESIUM: 1.7 MG/DL (ref 1.6–2.6)
MCH RBC QN AUTO: 22.1 PG (ref 26–34)
MCHC RBC AUTO-ENTMCNC: 30.9 G/DL (ref 31–37)
MCV RBC AUTO: 71.6 FL (ref 80–100)
MONOCYTES # BLD: 9 % (ref 1–7)
MORPHOLOGY: ABNORMAL
NRBC AUTOMATED: ABNORMAL PER 100 WBC
PATHOLOGIST REVIEW: NORMAL
PDW BLD-RTO: 26.4 % (ref 11.5–14.5)
PLATELET # BLD: 139 K/UL (ref 130–400)
PLATELET ESTIMATE: ABNORMAL
PMV BLD AUTO: 9.5 FL (ref 6–12)
POTASSIUM SERPL-SCNC: 4.3 MMOL/L (ref 3.7–5.3)
RBC # BLD: 3.99 M/UL (ref 4.5–5.9)
RBC # BLD: ABNORMAL 10*6/UL
SEG NEUTROPHILS: 84 % (ref 36–66)
SEGMENTED NEUTROPHILS ABSOLUTE COUNT: 11.76 K/UL (ref 1.8–7.7)
SODIUM BLD-SCNC: 135 MMOL/L (ref 135–144)
WBC # BLD: 14 K/UL (ref 3.5–11)
WBC # BLD: ABNORMAL 10*3/UL

## 2018-12-04 PROCEDURE — 6360000002 HC RX W HCPCS: Performed by: SURGERY

## 2018-12-04 PROCEDURE — 2580000003 HC RX 258: Performed by: SURGERY

## 2018-12-04 PROCEDURE — 36415 COLL VENOUS BLD VENIPUNCTURE: CPT

## 2018-12-04 PROCEDURE — 83735 ASSAY OF MAGNESIUM: CPT

## 2018-12-04 PROCEDURE — 85025 COMPLETE CBC W/AUTO DIFF WBC: CPT

## 2018-12-04 PROCEDURE — 1200000000 HC SEMI PRIVATE

## 2018-12-04 PROCEDURE — C9113 INJ PANTOPRAZOLE SODIUM, VIA: HCPCS | Performed by: SURGERY

## 2018-12-04 PROCEDURE — 99232 SBSQ HOSP IP/OBS MODERATE 35: CPT | Performed by: INTERNAL MEDICINE

## 2018-12-04 PROCEDURE — 80048 BASIC METABOLIC PNL TOTAL CA: CPT

## 2018-12-04 PROCEDURE — 83605 ASSAY OF LACTIC ACID: CPT

## 2018-12-04 RX ORDER — MAGNESIUM SULFATE 1 G/100ML
1 INJECTION INTRAVENOUS
Status: COMPLETED | OUTPATIENT
Start: 2018-12-04 | End: 2018-12-04

## 2018-12-04 RX ADMIN — Medication 10 ML: at 10:17

## 2018-12-04 RX ADMIN — Medication 10 ML: at 20:22

## 2018-12-04 RX ADMIN — PANTOPRAZOLE SODIUM 40 MG: 40 INJECTION, POWDER, FOR SOLUTION INTRAVENOUS at 10:16

## 2018-12-04 RX ADMIN — MAGNESIUM SULFATE HEPTAHYDRATE 1 G: 1 INJECTION, SOLUTION INTRAVENOUS at 11:23

## 2018-12-04 RX ADMIN — MAGNESIUM SULFATE HEPTAHYDRATE 1 G: 1 INJECTION, SOLUTION INTRAVENOUS at 10:17

## 2018-12-04 RX ADMIN — PANTOPRAZOLE SODIUM 40 MG: 40 INJECTION, POWDER, FOR SOLUTION INTRAVENOUS at 20:22

## 2018-12-04 ASSESSMENT — PAIN SCALES - GENERAL: PAINLEVEL_OUTOF10: 0

## 2018-12-04 NOTE — PLAN OF CARE
Problem: Falls - Risk of:  Goal: Will remain free from falls  Will remain free from falls   Outcome: Met This Shift  Siderails up x 2  Hourly rounding  Call light in reach  Instructed to call for assist before attempting out of bed. Remains free from falls and accidental injury at this time   Floor free from obstacles  Bed is locked and in lowest position  Adequate lighting provided  Bed alarm on, Red Falling star and Stay with Me signs posted      Goal: Absence of physical injury  Absence of physical injury   Outcome: Met This Shift  Siderails up x 2  Hourly rounding  Call light in reach  Instructed to call for assist before attempting out of bed. Remains free from falls and accidental injury at this time   Floor free from obstacles  Bed is locked and in lowest position  Adequate lighting provided  Bed alarm on, Red Falling star and Stay with Me signs posted        Problem: Infection:  Goal: Will remain free from infection  Will remain free from infection   Outcome: Ongoing      Problem: Pain:  Goal: Pain level will decrease  Pain level will decrease   Outcome: Met This Shift  Patient denies any pain. Goal: Control of acute pain  Control of acute pain   Outcome: Met This Shift  Patient denies any pain. Goal: Control of chronic pain  Control of chronic pain   Outcome: Met This Shift  patient denies any pain.      Problem: Skin Integrity:  Goal: Skin integrity will stabilize  Skin integrity will stabilize   Outcome: Ongoing

## 2018-12-04 NOTE — PROGRESS NOTES
[facility] (SERUM IgG KAPPA MONOCLONAL PARAPROTEIN OF 0.6 G/dl   IS NOTED, FAVORING MGUS). Results for orders placed or performed during the hospital encounter of 11/29/18   Urine Culture   Result Value Ref Range    Specimen Description . CLEAN CATCH URINE     Special Requests NOT REPORTED     Culture NO GROWTH     Status FINAL 12/01/2018    Amylase   Result Value Ref Range    Amylase 62 28 - 100 U/L   CBC Auto Differential   Result Value Ref Range    WBC 6.8 3.5 - 11.0 k/uL    RBC 4.97 4.5 - 5.9 m/uL    Hemoglobin 10.0 (L) 13.5 - 17.5 g/dL    Hematocrit 31.9 (L) 41 - 53 %    MCV 64.1 (L) 80 - 100 fL    MCH 20.1 (L) 26 - 34 pg    MCHC 31.3 31 - 37 g/dL    RDW 19.7 (H) 11.5 - 14.5 %    Platelets 852 (H) 108 - 400 k/uL    MPV NOT REPORTED 6.0 - 12.0 fL    NRBC Automated NOT REPORTED per 100 WBC    Differential Type NOT REPORTED     Immature Granulocytes NOT REPORTED 0 %    Absolute Immature Granulocyte NOT REPORTED 0.00 - 0.30 k/uL    WBC Morphology NOT REPORTED     RBC Morphology NOT REPORTED     Platelet Estimate NOT REPORTED     Seg Neutrophils 68 (H) 36 - 66 %    Lymphocytes 11 (L) 24 - 44 %    Monocytes 20 (H) 1 - 7 %    Eosinophils % 0 (L) 1 - 4 %    Basophils 1 %    Segs Absolute 4.62 1.8 - 7.7 k/uL    Absolute Lymph # 0.75 (L) 1.0 - 4.8 k/uL    Absolute Mono # 1.36 (H) 0.2 - 0.8 k/uL    Absolute Eos # 0.00 0.0 - 0.4 k/uL    Basophils # 0.07 0.0 - 0.2 k/uL    Morphology ANISOCYTOSIS PRESENT    Basic Metabolic Panel   Result Value Ref Range    Glucose 223 (H) 70 - 99 mg/dL    BUN 23 8 - 23 mg/dL    CREATININE 1.12 0.70 - 1.20 mg/dL    Bun/Cre Ratio 21 (H) 9 - 20    Calcium 9.8 8.6 - 10.4 mg/dL    Sodium 137 135 - 144 mmol/L    Potassium 3.7 3.7 - 5.3 mmol/L    Chloride 94 (L) 98 - 107 mmol/L    CO2 18 (L) 20 - 31 mmol/L    Anion Gap 25 (H) 9 - 17 mmol/L    GFR Non-African American >60 >60 mL/min    GFR African American >60 >60 mL/min    GFR Comment          GFR Staging NOT REPORTED    Hepatic Function Panel   Result 1. 11 0.30 - 5.00 mIU/L   Vitamin B12 & Folate   Result Value Ref Range    Vitamin B-12 1865 (H) 232 - 1245 pg/mL    Folate 12.7 >4.8 ng/mL   Path Review, Smear   Result Value Ref Range    Pathologist Review SEE REPORT    Haptoglobin   Result Value Ref Range    Haptoglobin 108 30 - 200 mg/dL   Lactate Dehydrogenase   Result Value Ref Range     135 - 225 U/L   RETICULOCYTES   Result Value Ref Range    Retic % 2.8 (H) 0.5 - 2.0 %    Absolute Retic # 0.115 (H) 0.0245 - 0.098 M/uL    Immature Retic Fract NOT REPORTED %    Retic Hemoglobin NOT REPORTED 28.2 - 35.7 pg   BLOOD OCCULT STOOL #1   Result Value Ref Range    Occult Blood, Stool #1 POSITIVE (A) NEG    Date, Stool #1 [phone]     Time, Stool #1 1,230     Occult Blood, Stool #2 NOT REPORTED NEG    Date, Stool #2 NOT REPORTED     Time, Stool #2 NOT REPORTED     Occult Blood, Stool #3 NOT REPORTED NEG    Date, Stool #3 NOT REPORTED     Time, Stool #3 NOT REPORTED    CBC Auto Differential   Result Value Ref Range    WBC 1.5 (L) 3.5 - 11.0 k/uL    RBC 3.88 (L) 4.5 - 5.9 m/uL    Hemoglobin 8.5 (L) 13.5 - 17.5 g/dL    Hematocrit 26.7 (L) 41 - 53 %    MCV 68.9 (L) 80 - 100 fL    MCH 21.8 (L) 26 - 34 pg    MCHC 31.6 31 - 37 g/dL    RDW 25.1 (H) 11.5 - 14.5 %    Platelets 90 (L) 884 - 400 k/uL    MPV 9.4 6.0 - 12.0 fL    NRBC Automated NOT REPORTED per 100 WBC    Differential Type NOT REPORTED     Immature Granulocytes NOT REPORTED 0 %    Absolute Immature Granulocyte NOT REPORTED 0.00 - 0.30 k/uL    WBC Morphology NOT REPORTED     RBC Morphology NOT REPORTED     Platelet Estimate NOT REPORTED     Seg Neutrophils 39 36 - 66 %    Lymphocytes 23 (L) 24 - 44 %    Monocytes 32 (H) 1 - 7 %    Eosinophils % 6 (H) 1 - 4 %    Basophils 0 %    Segs Absolute 0.58 (L) 1.8 - 7.7 k/uL    Absolute Lymph # 0.35 (L) 1.0 - 4.8 k/uL    Absolute Mono # 0.48 0.2 - 0.8 k/uL    Absolute Eos # 0.09 0.0 - 0.4 k/uL    Basophils # 0.00 0.0 - 0.2 k/uL    Morphology ANISOCYTOSIS PRESENT intraperitoneal free fluid attributed to the cirrhosis. 4. Nonoccluding portal vein thrombosis in right portal vein. 5. Scattered hepatic cysts unchanged. Xr Chest Portable    Result Date: 11/29/2018  EXAMINATION: SINGLE XRAY VIEW OF THE CHEST 11/29/2018 2:12 pm COMPARISON: None. HISTORY: ORDERING SYSTEM PROVIDED HISTORY: ngt placement TECHNOLOGIST PROVIDED HISTORY: ngt placement Ordering Physician Provided Reason for Exam: chk NG placement--post op hernia repair Acuity: Unknown Type of Exam: Unknown FINDINGS: Lungs: Clear Mediastinum: Unremarkable Pleura: No pleural effusion or pneumothorax Other: Side-hole of the nasogastric tube is identified just past the gastroesophageal junction. Tip is well positioned in the body of the fundus. Interval advancement of the nasogastric tube. Remaining findings unchanged. Xr Chest Portable    Result Date: 11/29/2018  EXAMINATION: SINGLE XRAY VIEW OF THE CHEST 11/29/2018 2:18 pm COMPARISON: Same date at 822 hours HISTORY: ORDERING SYSTEM PROVIDED HISTORY: check NG placement TECHNOLOGIST PROVIDED HISTORY: check NG placement Ordering Physician Provided Reason for Exam: check NG tube placement Acuity: Acute Type of Exam: Initial FINDINGS: The nasogastric tube terminates above the diaphragm is. This should be advanced at least 10 cm for optimal positioning. Low lung volumes are present with bibasilar subsegmental atelectasis. No convincing lung consolidation or infiltrate. No pleural effusion or pneumothorax. Stable cardiomediastinal silhouette. No pulmonary edema. No intraperitoneal free air. Multiple loops of dilated small bowel unchanged. High-riding nasogastric tube needs to be advanced at least 10 cm for optimal positioning.      Xr Chest Portable    Result Date: 11/29/2018  EXAMINATION: SINGLE XRAY VIEW OF THE CHEST 11/29/2018 8:22 am COMPARISON: Same date CT abdomen and pelvis exam HISTORY: ORDERING SYSTEM PROVIDED HISTORY: preop TECHNOLOGIST PROVIDED

## 2018-12-04 NOTE — PLAN OF CARE
Problem: Pain:  Goal: Control of acute pain  Control of acute pain   Outcome: Ongoing  The pt denies the need for pain meds at this time

## 2018-12-05 VITALS
HEIGHT: 72 IN | SYSTOLIC BLOOD PRESSURE: 130 MMHG | TEMPERATURE: 98.2 F | BODY MASS INDEX: 21.67 KG/M2 | DIASTOLIC BLOOD PRESSURE: 74 MMHG | WEIGHT: 160 LBS | RESPIRATION RATE: 16 BRPM | OXYGEN SATURATION: 99 % | HEART RATE: 74 BPM

## 2018-12-05 LAB
ABSOLUTE EOS #: 0.12 K/UL (ref 0–0.4)
ABSOLUTE IMMATURE GRANULOCYTE: ABNORMAL K/UL (ref 0–0.3)
ABSOLUTE LYMPH #: 0.12 K/UL (ref 1–4.8)
ABSOLUTE MONO #: 0.2 K/UL (ref 0.2–0.8)
BASOPHILS # BLD: 1 %
BASOPHILS ABSOLUTE: 0.04 K/UL (ref 0–0.2)
DIFFERENTIAL TYPE: ABNORMAL
EOSINOPHILS RELATIVE PERCENT: 3 % (ref 1–4)
HCT VFR BLD CALC: 29.1 % (ref 41–53)
HEMOGLOBIN: 8.9 G/DL (ref 13.5–17.5)
IMMATURE GRANULOCYTES: ABNORMAL %
LYMPHOCYTES # BLD: 3 % (ref 24–44)
MAGNESIUM: 2 MG/DL (ref 1.6–2.6)
MCH RBC QN AUTO: 22.1 PG (ref 26–34)
MCHC RBC AUTO-ENTMCNC: 30.7 G/DL (ref 31–37)
MCV RBC AUTO: 71.9 FL (ref 80–100)
MONOCYTES # BLD: 5 % (ref 1–7)
MORPHOLOGY: ABNORMAL
NRBC AUTOMATED: ABNORMAL PER 100 WBC
PDW BLD-RTO: 26.4 % (ref 11.5–14.5)
PLATELET # BLD: 146 K/UL (ref 130–400)
PLATELET ESTIMATE: ABNORMAL
PMV BLD AUTO: 9.6 FL (ref 6–12)
RBC # BLD: 4.05 M/UL (ref 4.5–5.9)
RBC # BLD: ABNORMAL 10*6/UL
SEG NEUTROPHILS: 88 % (ref 36–66)
SEGMENTED NEUTROPHILS ABSOLUTE COUNT: 3.52 K/UL (ref 1.8–7.7)
WBC # BLD: 4 K/UL (ref 3.5–11)
WBC # BLD: ABNORMAL 10*3/UL

## 2018-12-05 PROCEDURE — 99232 SBSQ HOSP IP/OBS MODERATE 35: CPT | Performed by: INTERNAL MEDICINE

## 2018-12-05 PROCEDURE — 83735 ASSAY OF MAGNESIUM: CPT

## 2018-12-05 PROCEDURE — 85025 COMPLETE CBC W/AUTO DIFF WBC: CPT

## 2018-12-05 PROCEDURE — 36415 COLL VENOUS BLD VENIPUNCTURE: CPT

## 2018-12-05 NOTE — DISCHARGE SUMMARY
outpatient follow-up in the hematology clinic within a week to 10 days of discharge. As concerned that the patient may be developing a a more generalized myelodysplasia. His last bone marrow biopsy was in 2014. The patient was discharged home in good condition. He was given no prescriptions for prescription strength pain medication as he was not using any medication for incisional pain at the time of discharge. He was instructed in wound and activity care and in follow-up as described above. Consults:  Hematology    Significant Diagnostic Studies: as above, and as follows: See operative report and hematology consultation. Treatments: as above    Disposition: home    Discharged Condition: good    Follow Up:  Hien Harris MD in two weeks,   Follow up with Dr. Bob Bui one week  Follow up with Hematology (Dr. Ale Carrillo)  1-2 weeks. Discharge Medications:    [facility] Medication Instructions SHERRY:[id number]    Printed on:12/05/18 9125   Medication Information                      amoxicillin (AMOXIL) 500 MG capsule  2000 mg one hour prior to procedure             calcium carbonate (OSCAL) 500 MG TABS tablet  Take 500 mg by mouth daily             docusate sodium (COLACE) 100 MG capsule  Take 1 capsule by mouth daily as needed for Constipation             hydrochlorothiazide (HYDRODIURIL) 25 MG tablet  TAKE 1 TABLET BY MOUTH DAILY. Multiple Vitamins-Minerals (MULTI COMPLETE PO)  Take 1 tablet by mouth daily. pantoprazole (PROTONIX) 40 MG tablet  TAKE 1 TABLET BY MOUTH ONE TIME A DAY IN THE MORNING BEFORE BREAKFAST             potassium chloride (KLOR-CON M) 20 MEQ extended release tablet  TAKE 1 TABLET BY MOUTH DAILY. vitamin D (ERGOCALCIFEROL) 47176 UNITS capsule  Take 1 capsule by mouth once a week.                   Activity: no heavy lifting for 4 weeks    Diet: regular diet    Time Spent on discharge is more than 20 minutes in the examination,

## 2018-12-05 NOTE — PROGRESS NOTES
Surgery Progress Note            PATIENT NAME: Charleen Wyatt     TODAY'S DATE: 12/5/2018, 7:01 AM    SUBJECTIVE:    Pt seen and examined. No acute events overnight. Patient feeling well this am. He denies any abdominal pain, nausea, or vomiting. He denies any bulges at his groin. Patient had a softer stool yesterday followed by diarrhea. He is ambulatory. OBJECTIVE:   VITALS:  BP (!) 143/71   Pulse 82   Temp 97.3 °F (36.3 °C) (Oral)   Resp 16   Ht 6' (1.829 m)   Wt 160 lb (72.6 kg)   SpO2 100%   BMI 21.70 kg/m²      INTAKE/OUTPUT:    No intake or output data in the 24 hours ending 12/05/18 0701    PHYSICAL EXAM  General Appearance: A&O x3. NAD  Skin:  Warm/dry  Head/face:  NCAT   Lungs:  resp even and unlabored   Abdomen:  Soft, very mild distension, non-tender, groin without bulge. Incision is c/d/i. No erythema or drainage. Extremities: no edema or cyanosis. SCD's in place    Data:  CBC with Differential:    Lab Results   Component Value Date    WBC 14.0 12/04/2018    RBC 3.99 12/04/2018    HGB 8.8 12/04/2018    HCT 28.6 12/04/2018     12/04/2018    MCV 71.6 12/04/2018    MCH 22.1 12/04/2018    MCHC 30.9 12/04/2018    RDW 26.4 12/04/2018    NRBC 4 12/02/2018    LYMPHOPCT 3 12/04/2018    MONOPCT 9 12/04/2018    BASOPCT 0 12/04/2018    MONOSABS 1.26 12/04/2018    LYMPHSABS 0.42 12/04/2018    EOSABS 0.56 12/04/2018    BASOSABS 0.00 12/04/2018    DIFFTYPE NOT REPORTED 12/04/2018     BMP:    Lab Results   Component Value Date     12/04/2018    K 4.3 12/04/2018     12/04/2018    CO2 21 12/04/2018    BUN 7 12/04/2018    LABALBU 2.5 12/01/2018    CREATININE 0.88 12/04/2018    CALCIUM 8.2 12/04/2018    GFRAA >60 12/04/2018    LABGLOM >60 12/04/2018    GLUCOSE 141 12/04/2018       Radiology Review:    No new images    ASSESSMENT   77 y/o male with incarcerated L inguinal hernia  - s/p open L inguinal hernia repair, POD #6. Recovering well      Plan  1. Diet as tolerated   2.  Encourage

## 2018-12-05 NOTE — PROGRESS NOTES
[facility] Oncology Progress Note  12/5/2018 8:05 AM  Subjective:   Admit Date: 11/29/2018  PCP: Michael Saavedra MD    Chief complaint: Pancytopenia and MGUS    History of presenting illness:     Patient seen and examined. Labs in vitals reviewed. WBC count improving. Patient denies nausea vomiting. Abdominal pain control. Hemoglobin and platelet count stable. Doing well overall. Diet: Dietary Nutrition Supplements: Standard High Calorie Oral Supplement  DIET GENERAL;  Medications:   Scheduled Meds:   tbo-filgrastim  300 mcg Subcutaneous Daily    pantoprazole  40 mg Intravenous BID    And    sodium chloride (PF)  10 mL Intravenous BID    sodium chloride flush  10 mL Intravenous 2 times per day     Continuous Infusions:    CBC:   Recent Labs      12/02/18   2204  12/03/18   0753  12/04/18   0723   WBC  1.5*  1.5*  14.0*   HGB  9.0*  8.5*  8.8*   PLT  128*  90*  139     BMP:    Recent Labs      12/03/18   0753  12/04/18   0723   NA  134*  135   K  3.8  4.3   CL  101  102   CO2  22  21   BUN  6*  7*   CREATININE  0.87  0.88   GLUCOSE  172*  141*     Hepatic:   No results for input(s): AST, ALT, ALB, BILITOT, ALKPHOS in the last 72 hours. INR:   No results for input(s): INR in the last 72 hours. Final Diagnosis   PERIPHERAL BLOOD:   - SEVERE MICROCYTIC ANEMIA.   - MODERATE TO SEVERE NEUTROPENIA.   - LYMPHOPENIA (510/ul). BONE MARROW BIOPSY, ASPIRATE SMEAR AND CLOT SECTION:   - MYELOID AND MEGAKARYOCYTIC HYPERPLASIA. - ABSENT IRON STORES. - PLASMA CELLS 5-10%, NEGATIVE FOR PLASMA CELL MONOTYPIA FOR IHC. Diagnosis Comment   THE SEVERE MICROCYTIC ANEMIA IS COMPATIBLE WITH IRON DEFICIENCY   ANEMIA. NEUTROPENIA IS LIKELY DRUG INDUCED / IMMUNE-MEDIATED. THERE   IS NO EVIDENCE OF MYELOID OR OTHER MALIGNANCY.  PLASMA CELLS COMPRISE   5-10% OF THE CELLULARITY AND THERE IS NO EVIDENCE OF PLASMA CELL   MONOTYPIA BY IHC (SERUM IgG KAPPA MONOCLONAL PARAPROTEIN OF 0.6 G/dl   IS NOTED, FAVORING RESECTION WITH INCARCERATED INGUINAL  HERNIA        Source of Specimen  1: FRAGMENTS OF HERNIA [address]    Gross Description  \"MELECIO HERNNADEZ, FRAGMENTS OF HERNIA SAC\" Fragments of pink-tan  fibromembranous tissue, 6.0 x 3.2 x 2.3 cm in aggregate. There are no  masses. Gross only.      Magnesium   Result Value Ref Range    Magnesium 2.5 1.6 - 2.6 mg/dL   CBC auto differential   Result Value Ref Range    WBC 4.0 3.5 - 11.0 k/uL    RBC 3.76 (L) 4.5 - 5.9 m/uL    Hemoglobin 7.2 (L) 13.5 - 17.5 g/dL    Hematocrit 23.8 (L) 41 - 53 %    MCV 63.5 (L) 80 - 100 fL    MCH 19.1 (L) 26 - 34 pg    MCHC 30.1 (L) 31 - 37 g/dL    RDW 21.2 (H) 11.5 - 14.5 %    Platelets 381 594 - 182 k/uL    MPV 9.2 6.0 - 12.0 fL    NRBC Automated NOT REPORTED per 100 WBC    Differential Type NOT REPORTED     Immature Granulocytes NOT REPORTED 0 %    Absolute Immature Granulocyte NOT REPORTED 0.00 - 0.30 k/uL    WBC Morphology NOT REPORTED     RBC Morphology NOT REPORTED     Platelet Estimate NOT REPORTED     Seg Neutrophils 60 36 - 66 %    Lymphocytes 10 (L) 24 - 44 %    Monocytes 29 (H) 1 - 7 %    Eosinophils % 1 1 - 4 %    Basophils 0 %    Segs Absolute 2.40 1.8 - 7.7 k/uL    Absolute Lymph # 0.40 (L) 1.0 - 4.8 k/uL    Absolute Mono # 1.16 (H) 0.2 - 0.8 k/uL    Absolute Eos # 0.04 0.0 - 0.4 k/uL    Basophils # 0.00 0.0 - 0.2 k/uL    Morphology ANISOCYTOSIS PRESENT    Basic Metabolic Panel   Result Value Ref Range    Glucose 178 (H) 70 - 99 mg/dL    BUN 23 8 - 23 mg/dL    CREATININE 1.11 0.70 - 1.20 mg/dL    Bun/Cre Ratio 21 (H) 9 - 20    Calcium 8.0 (L) 8.6 - 10.4 mg/dL    Sodium 139 135 - 144 mmol/L    Potassium 4.3 3.7 - 5.3 mmol/L    Chloride 103 98 - 107 mmol/L    CO2 24 20 - 31 mmol/L    Anion Gap 12 9 - 17 mmol/L    GFR Non-African American >60 >60 mL/min    GFR African American >60 >60 mL/min    GFR Comment          GFR Staging NOT REPORTED    Microscopic Urinalysis   Result Value Ref Range    -          WBC, UA 0 TO 2 0 - 5 /HPF    RBC, UA None 0 Result Value Ref Range    WBC 1.4 (LL) 3.5 - 11.0 k/uL    RBC 3.16 (L) 4.5 - 5.9 m/uL    Hemoglobin 6.1 (LL) 13.5 - 17.5 g/dL    Hematocrit 20.1 (L) 41 - 53 %    MCV 63.6 (L) 80 - 100 fL    MCH 19.3 (L) 26 - 34 pg    MCHC 30.3 (L) 31 - 37 g/dL    RDW 19.9 (H) 11.5 - 14.5 %    Platelets 94 (L) 637 - 400 k/uL    MPV 9.0 6.0 - 12.0 fL    NRBC Automated NOT REPORTED per 100 WBC    Differential Type NOT REPORTED     Immature Granulocytes NOT REPORTED 0 %    Absolute Immature Granulocyte NOT REPORTED 0.00 - 0.30 k/uL    WBC Morphology NOT REPORTED     RBC Morphology NOT REPORTED     Platelet Estimate NOT REPORTED     Seg Neutrophils 24 (L) 36 - 66 %    Lymphocytes 35 24 - 44 %    Monocytes 36 (H) 1 - 7 %    Eosinophils % 4 1 - 4 %    Basophils 1 %    Segs Absolute 0.34 (L) 1.8 - 7.7 k/uL    Absolute Lymph # 0.49 (L) 1.0 - 4.8 k/uL    Absolute Mono # 0.50 0.2 - 0.8 k/uL    Absolute Eos # 0.06 0.0 - 0.4 k/uL    Basophils # 0.01 0.0 - 0.2 k/uL    Morphology ANISOCYTOSIS PRESENT    Lactic Acid   Result Value Ref Range    Lactic Acid 1.5 0.5 - 2.2 mmol/L   PSA SCREENING   Result Value Ref Range    PSA 0.03 <4.1 ug/L   IMMUNOFIXATION SERUM PROFILE   Result Value Ref Range    Serum IFX Interp       A ZONE OF RESTRICTION IS PRESENT IN THE GAMMAGLOBULIN REGION. CONFIRMED BY    Pathologist ELECTRONICALLY SIGNED. Matthias Morin M.D.     Lactic Acid   Result Value Ref Range    Lactic Acid 2.6 (H) 0.5 - 2.2 mmol/L   Basic Metabolic Panel   Result Value Ref Range    Glucose 120 (H) 70 - 99 mg/dL    BUN 9 8 - 23 mg/dL    CREATININE 0.89 0.70 - 1.20 mg/dL    Bun/Cre Ratio 10 9 - 20    Calcium 7.4 (L) 8.6 - 10.4 mg/dL    Sodium 133 (L) 135 - 144 mmol/L    Potassium 3.8 3.7 - 5.3 mmol/L    Chloride 103 98 - 107 mmol/L    CO2 22 20 - 31 mmol/L    Anion Gap 8 (L) 9 - 17 mmol/L    GFR Non-African American >60 >60 mL/min    GFR African American >60 >60 mL/min    GFR Comment          GFR Staging NOT REPORTED    CBC Auto Differential #[id number]    Procedures/Addenda  PERIPHERAL BLOOD REPORT     Date Ordered:     12/3/2018     Status:  Signed Out      Date Complete:     12/3/2018     By: James Banda M.D. Date Reported:     12/3/2018       INTERPRETATION  PERIPHERAL BLOOD:  MICROCYTIC ANEMIA WITH IRON STUDIES SUGGESTING IRON DEFICIENCY ANEMIA. MODERATE TO SEVERE NEUTROPENIA (510/UL). MILD THROMBOCYTOPENIA. LYMPHOPENIA (260/UL). RESULTS-COMMENTS                          PERIPHERAL BLOOD STUDY    HEMOGRAM                              DIFFERENTIAL %     ABSOLUTE  (K/UL)    WBC (K/uL)     1.5               IMM GRANS          1          0.02       RBC (K/uL)     4.10               SEGS               34          0.51  HGB (G/dL)     9.0                LYMPHS          17          0.26  HCT (%)     28.8                                     MCV (FL.)     70.3               MONOS          38          0.55  MCH (PG.)     21.9               EOS               9          0.14  MCHC (g/dL)     31.2               BASO               1          0.02  RDW (%)     24.8                                          PLT (k/uL)     128                                          RETIC (%)     2.8                                     Absolute RetCt     0.115                                                                                                                       PERIPHERAL EXAMINATION BY PATHOLOGIST    PLATELETS: Normal           LEUKOCYTES: Minimal left shift      ERYTHROCYTES: Anisocytosis, hypochromia, microcytosis, elliptocytes,  rare schistocytes    James Banda M.D.                  Source:  1: PERIPHERAL BLOOD FOR REVIEW BY PATHOLOGIST     CBC Auto Differential   Result Value Ref Range    WBC 14.0 (H) 3.5 - 11.0 k/uL    RBC 3.99 (L) 4.5 - 5.9 m/uL    Hemoglobin 8.8 (L) 13.5 - 17.5 g/dL    Hematocrit 28.6 (L) 41 - 53 %    MCV 71.6 (L) 80 - 100 fL    MCH 22.1 (L) 26 - 34 pg    MCHC 30.9 (L) 31 - 37 g/dL    RDW 26.4 (H) 11.5 - 14.5 % Exam: Unknown FINDINGS: The heart is not enlarged. There is mildly tortuous thoracic aorta which is probably ectatic. Bibasilar subsegmental atelectasis. Otherwise, the lungs are clear. No sizable pleural effusion. No pneumothorax. Dilated small bowel in the right upper quadrant redemonstrated. No free air under the diaphragm. Bibasilar subsegmental atelectasis. Otherwise, no significant intrathoracic abnormality. Objective:   Vitals: /74   Pulse 74   Temp 98.2 °F (36.8 °C) (Oral)   Resp 16   Ht 6' (1.829 m)   Wt 160 lb (72.6 kg)   SpO2 99%   BMI 21.70 kg/m²   General appearance: alert and cooperative with exam    Neck: no adenopathy  Lungs: clear to auscultation bilaterally  Heart: regular rate and rhythm, S1, S2 normal, no murmur, click, rub or gallop  Abdomen: Fresh abdominal incision healing appropriately. Abdomen minimally distended or megaly. Extremities: extremities normal, atraumatic, no cyanosis or edema  Neurologic: Mental status: Alert, oriented, thought content appropriate  No peripheral palpable lymphadenopathy. Assessment and Plan: Active Problems:    Anemia    MGUS (monoclonal gammopathy of unknown significance)    Essential hypertension    History of hematemesis    Pancytopenia (HCC)  Resolved Problems:    Incarcerated inguinal hernia    Dehydration    Incarcerated left inguinal hernia  Impression:  1. Incarcerated inguinal hernia post surgical correction  2. Hematemesis was likely secondary to nausea vomiting to the above. 3.  Autoimmune neutropenia  4. Low hemoglobin 7.2 with microcytic indices consistent with iron deficiency. 5.  CA prostate    Recommendations:  Neupogen discontinued. We will continue to follow W BC count  H&H stable.   M protein stable  Okay to DC from medical oncology standpoint with outpatient follow-up with Dr. Patricia Abdul MD    This note is created with the assistance of a speech recognition program.  While intending to

## 2018-12-06 ENCOUNTER — CARE COORDINATION (OUTPATIENT)
Dept: CASE MANAGEMENT | Age: 68
End: 2018-12-06

## 2018-12-06 DIAGNOSIS — D61.818 PANCYTOPENIA (HCC): ICD-10-CM

## 2018-12-06 DIAGNOSIS — Z87.19 HISTORY OF HEMATEMESIS: Primary | Chronic | ICD-10-CM

## 2018-12-06 PROCEDURE — 1111F DSCHRG MED/CURRENT MED MERGE: CPT | Performed by: FAMILY MEDICINE

## 2018-12-07 NOTE — CARE COORDINATION
[facility] Transitions Initial Follow Up Call- [address] Associate     Call within 2 business days of discharge: Yes    Patient: Bryant Bhagat Patient : 1950   MRN: [id number]    Reason for Admission: incarcerated inguinal hernia, dehydration  Discharge Date: 18   RARS: Readmission Risk Score: 16, CMRS 1     Spoke with: Le Reason     Call to pt who states he is doing great. Writer reviewed role of CTC following discharge- v/u. Agreeable to transition call  Denies pain. States his appetite is back and bowels are moving. States incision looks good, sutures intact. Confirms appt with Dr Sofia Razo- 18 at 18 and now Dr Vance Points 18 at 1500  States dc med, Flexeril is something he only took a few times/ year. States meds in AVS correct. Medications reviewed and reconciled. 1111F complete   Denies needs  Encouraged call providers if questions or concerns- v/u     Facility: [address]     Non-face-to-face services provided:  Scheduled appointment with PCP-pt wife messaging with pcp about need for appt via My Chart  Scheduled appointment with [facility] 18 at 18;  Rony Lennon 18 at 1500  Obtained and reviewed discharge summary and/or continuity of care documents  Assessment and support for treatment adherence and medication management-1111F    Care Transitions 24 Hour Call    Do you have any ongoing symptoms?:  No  Do you have a copy of your discharge instructions?:  Yes  Do you have all of your prescriptions and are they filled?:  Yes  Have you been contacted by a [address]?:  No  Have you scheduled your follow up appointment?:  Yes  How are you going to get to your appointment?:  Car - family or friend to transport  Were you discharged with any Home Care or Post Acute Services:  No  Do you have support at home?:  Partner/Spouse/SO, Grandchild  Do you feel like you have everything you need to keep you well at home?:  Yes  Are you an active caregiver in your home?:  No  Care Transitions

## 2018-12-10 ENCOUNTER — HOSPITAL ENCOUNTER (OUTPATIENT)
Facility: MEDICAL CENTER | Age: 68
End: 2018-12-10
Payer: COMMERCIAL

## 2018-12-13 ENCOUNTER — TELEPHONE (OUTPATIENT)
Dept: ONCOLOGY | Age: 68
End: 2018-12-13

## 2018-12-13 ENCOUNTER — OFFICE VISIT (OUTPATIENT)
Dept: ONCOLOGY | Age: 68
End: 2018-12-13
Payer: COMMERCIAL

## 2018-12-13 VITALS
BODY MASS INDEX: 21.69 KG/M2 | HEART RATE: 86 BPM | RESPIRATION RATE: 18 BRPM | WEIGHT: 159.9 LBS | DIASTOLIC BLOOD PRESSURE: 75 MMHG | SYSTOLIC BLOOD PRESSURE: 146 MMHG | TEMPERATURE: 97.8 F

## 2018-12-13 DIAGNOSIS — C61 PROSTATE CANCER (HCC): Primary | ICD-10-CM

## 2018-12-13 DIAGNOSIS — D61.818 PANCYTOPENIA (HCC): ICD-10-CM

## 2018-12-13 PROCEDURE — 99214 OFFICE O/P EST MOD 30 MIN: CPT | Performed by: INTERNAL MEDICINE

## 2018-12-13 PROCEDURE — 99211 OFF/OP EST MAY X REQ PHY/QHP: CPT

## 2018-12-15 NOTE — PROGRESS NOTES
Endocrine: No problems with opacity. No polydipsia or polyuria. No temperature intolerance. Neurologic: No headaches or dizziness. No weakness or numbness of the extremities. No changes in balance, coordination, memory, mentation, behavior. Allergic/Immunologic: No nasal congestion or hives. No repeated infections. PHYSICAL EXAM:  The patient is not in acute distress. Vital signs: Blood pressure (!) 146/75, pulse 86, temperature 97.8 °F (36.6 °C), temperature source Oral, resp. rate 18, weight 159 lb 14.4 oz (72.5 kg).      General appearance - well appearing, not in pain or distress   Mental status - good mood, alert and oriented   Eyes - pupils equal and reactive, extraocular eye movements intact   Ears - bilateral TM's and external ear canals normal   Nose - normal and patent, no erythema, discharge or polyps   Mouth - mucous membranes moist, pharynx normal without lesions   Neck - supple, no significant adenopathy   Lymphatics - no palpable lymphadenopathy, no hepatosplenomegaly   Chest - clear to auscultation, no wheezes, rales or rhonchi, symmetric air entry   Heart - normal rate, regular rhythm, normal S1, S2, no murmurs, rubs, clicks or gallops   Abdomen - soft, nontender, nondistended, no masses or organomegaly   Neurological - alert, oriented, normal speech, no focal findings or movement disorder noted   Musculoskeletal -mild bilateral LE weakness   Extremities - peripheral pulses normal, no pedal edema, no clubbing or cyanosis   Skin - normal coloration and turgor, no rashes, no suspicious skin lesions noted       Lab Results   Component Value Date    WBC 4.0 12/05/2018    HGB 8.9 (L) 12/05/2018    HCT 29.1 (L) 12/05/2018    MCV 71.9 (L) 12/05/2018     12/05/2018     Lab Results   Component Value Date    IRON 13 (L) 11/30/2018    TIBC 299 11/30/2018    FERRITIN 16 (L) 11/30/2018                 Chemistry        Component Value Date/Time     12/04/2018 0723    K 4.3 12/04/2018 0723     12/04/2018 0723    CO2 21 12/04/2018 0723    BUN 7 (L) 12/04/2018 0723    CREATININE 0.88 12/04/2018 0723        Component Value Date/Time    CALCIUM 8.2 (L) 12/04/2018 0723    ALKPHOS 93 12/01/2018 0540    AST 19 12/01/2018 0540    ALT 15 12/01/2018 0540    BILITOT 0.58 12/01/2018 0540        Bone marrow: Final Diagnosis  PERIPHERAL BLOOD:  - SEVERE MICROCYTIC ANEMIA.  - MODERATE TO SEVERE NEUTROPENIA.  - LYMPHOPENIA (510/ul). BONE MARROW BIOPSY, ASPIRATE SMEAR AND CLOT SECTION:  - MYELOID AND MEGAKARYOCYTIC HYPERPLASIA. - ABSENT IRON STORES. - PLASMA CELLS 5-10%, NEGATIVE FOR PLASMA CELL MONOTYPIA FOR IHC. Diagnosis Comment  THE SEVERE MICROCYTIC ANEMIA IS COMPATIBLE WITH IRON DEFICIENCY  ANEMIA. NEUTROPENIA IS LIKELY DRUG INDUCED / IMMUNE-MEDIATED. THERE  IS NO EVIDENCE OF MYELOID OR OTHER MALIGNANCY. PLASMA CELLS COMPRISE  5-10% OF THE CELLULARITY AND THERE IS NO EVIDENCE OF PLASMA CELL  MONOTYPIA BY IHC (SERUM IgG KAPPA MONOCLONAL PARAPROTEIN OF 0.6 G/dl  IS NOTED, FAVORING MGUS). CT scan: IMPRESSION: 1. Thickening of the sigmoid colon and rectum which maybe infectious or inflammatory etiology but may be neoplastic in etiology. Clinical correlation is recommended. Further evaluation with direct visualization is recommended. 2. Prostatomegaly. 3. Numerous low-attenuation lesions scattered in the liver are technically indeterminate. Further evaluation with nonemergent/outpatient contrast-enhanced MRI of the liver is recommended. 4. Severe degenerative changes of the bilateral hips. 5. Lucency of the proximal for more which may be related to demineralization; however, neoplastic etiologies cannot be excluded. This can be further evaluated with bone scan or MRI as clinically indicated. Note is made that this patient may be at increased risk for pathologic fracture. Bone scan: IMPRESSION: 1. No definitive scintigraphic evidence for metastatic disease to the skeleton.  2. discussed with the patient. Clinically, he did better after IV Iron infusion. Hb is stable. No active bleeding. He has elevated serum iron. We will discontinue oral iron. Wbcs are still very low. He received Granix before surgery. WBCs are likely immune mediated. No infections. Prostate surgery done in December 2015. Continue to monitor PSA. RV 6 months. Labs today and at RV. We will check immunoglobulins level Every 6 months. Lab tests from last week showed stable numbers. Patient's questions were answered to the best of his satisfaction and he verbalized full understanding and agreement.                                   [address] [place] [address] [facility]                          Cell: [phone]

## 2019-01-28 RX ORDER — PANTOPRAZOLE SODIUM 40 MG/1
TABLET, DELAYED RELEASE ORAL
Qty: 90 TABLET | Refills: 0 | Status: SHIPPED | OUTPATIENT
Start: 2019-01-28 | End: 2019-04-26 | Stop reason: SDUPTHER

## 2019-01-28 RX ORDER — POTASSIUM CHLORIDE 20 MEQ/1
TABLET, EXTENDED RELEASE ORAL
Qty: 90 TABLET | Refills: 3 | Status: SHIPPED | OUTPATIENT
Start: 2019-01-28 | End: 2020-06-01

## 2019-02-13 ENCOUNTER — HOSPITAL ENCOUNTER (OUTPATIENT)
Facility: MEDICAL CENTER | Age: 69
End: 2019-02-13
Payer: COMMERCIAL

## 2019-02-15 DIAGNOSIS — D47.2 MGUS (MONOCLONAL GAMMOPATHY OF UNKNOWN SIGNIFICANCE): ICD-10-CM

## 2019-02-15 DIAGNOSIS — R97.20 PSA ELEVATION: ICD-10-CM

## 2019-02-15 DIAGNOSIS — C61 PROSTATE CANCER (HCC): Primary | ICD-10-CM

## 2019-02-19 ENCOUNTER — TELEPHONE (OUTPATIENT)
Dept: ONCOLOGY | Age: 69
End: 2019-02-19

## 2019-02-20 ENCOUNTER — HOSPITAL ENCOUNTER (OUTPATIENT)
Facility: MEDICAL CENTER | Age: 69
End: 2019-02-20
Payer: COMMERCIAL

## 2019-02-22 ENCOUNTER — HOSPITAL ENCOUNTER (OUTPATIENT)
Facility: MEDICAL CENTER | Age: 69
Discharge: HOME | End: 2019-02-22
Payer: COMMERCIAL

## 2019-02-22 DIAGNOSIS — D47.2 MGUS (MONOCLONAL GAMMOPATHY OF UNKNOWN SIGNIFICANCE): ICD-10-CM

## 2019-02-22 DIAGNOSIS — C61 PROSTATE CANCER (HCC): ICD-10-CM

## 2019-02-22 DIAGNOSIS — R97.20 PSA ELEVATION: ICD-10-CM

## 2019-02-22 LAB
ABSOLUTE EOS #: 0.03 K/UL (ref 0–0.4)
ABSOLUTE IMMATURE GRANULOCYTE: ABNORMAL K/UL (ref 0–0.3)
ABSOLUTE LYMPH #: 0.32 K/UL (ref 1–4.8)
ABSOLUTE MONO #: 0.34 K/UL (ref 0.2–0.8)
ANION GAP SERPL CALCULATED.3IONS-SCNC: 10 MMOL/L (ref 9–17)
BASOPHILS # BLD: 0 %
BASOPHILS ABSOLUTE: 0 K/UL (ref 0–0.2)
BUN BLDV-MCNC: 12 MG/DL (ref 8–23)
BUN/CREAT BLD: 15 (ref 9–20)
CALCIUM SERPL-MCNC: 8.6 MG/DL (ref 8.6–10.4)
CHLORIDE BLD-SCNC: 103 MMOL/L (ref 98–107)
CO2: 25 MMOL/L (ref 20–31)
CREAT SERPL-MCNC: 0.8 MG/DL (ref 0.7–1.2)
DIFFERENTIAL TYPE: ABNORMAL
EOSINOPHILS RELATIVE PERCENT: 3 % (ref 1–4)
FERRITIN: 38 UG/L (ref 30–400)
FREE KAPPA/LAMBDA RATIO: 1.32 (ref 0.26–1.65)
GFR AFRICAN AMERICAN: >60 ML/MIN
GFR NON-AFRICAN AMERICAN: >60 ML/MIN
GFR SERPL CREATININE-BSD FRML MDRD: ABNORMAL ML/MIN/{1.73_M2}
GFR SERPL CREATININE-BSD FRML MDRD: ABNORMAL ML/MIN/{1.73_M2}
GLUCOSE BLD-MCNC: 186 MG/DL (ref 70–99)
HCT VFR BLD CALC: 36.1 % (ref 41–53)
HEMOGLOBIN: 11.4 G/DL (ref 13.5–17.5)
IGA: 444 MG/DL (ref 70–400)
IGG: 1906 MG/DL (ref 700–1600)
IGM: 30 MG/DL (ref 40–230)
IMMATURE GRANULOCYTES: ABNORMAL %
IRON SATURATION: 86 % (ref 20–55)
IRON: 251 UG/DL (ref 59–158)
KAPPA FREE LIGHT CHAINS QNT: 3.43 MG/DL (ref 0.37–1.94)
LAMBDA FREE LIGHT CHAINS QNT: 2.59 MG/DL (ref 0.57–2.63)
LYMPHOCYTES # BLD: 36 % (ref 24–44)
MCH RBC QN AUTO: 24 PG (ref 26–34)
MCHC RBC AUTO-ENTMCNC: 31.7 G/DL (ref 31–37)
MCV RBC AUTO: 75.7 FL (ref 80–100)
MONOCYTES # BLD: 38 % (ref 1–7)
MORPHOLOGY: ABNORMAL
MORPHOLOGY: ABNORMAL
NRBC AUTOMATED: ABNORMAL PER 100 WBC
PDW BLD-RTO: 16.2 % (ref 11.5–14.5)
PLATELET # BLD: 101 K/UL (ref 130–400)
PLATELET ESTIMATE: ABNORMAL
PMV BLD AUTO: 9.3 FL (ref 6–12)
POTASSIUM SERPL-SCNC: 3.7 MMOL/L (ref 3.7–5.3)
RBC # BLD: 4.77 M/UL (ref 4.5–5.9)
RBC # BLD: ABNORMAL 10*6/UL
SEG NEUTROPHILS: 23 % (ref 36–66)
SEGMENTED NEUTROPHILS ABSOLUTE COUNT: 0.21 K/UL (ref 1.8–7.7)
SODIUM BLD-SCNC: 138 MMOL/L (ref 135–144)
TOTAL IRON BINDING CAPACITY: 291 UG/DL (ref 250–450)
UNSATURATED IRON BINDING CAPACITY: 40 UG/DL (ref 112–347)
WBC # BLD: 0.9 K/UL (ref 3.5–11)
WBC # BLD: ABNORMAL 10*3/UL

## 2019-02-22 PROCEDURE — 80048 BASIC METABOLIC PNL TOTAL CA: CPT

## 2019-02-22 PROCEDURE — 36415 COLL VENOUS BLD VENIPUNCTURE: CPT

## 2019-02-22 PROCEDURE — 85025 COMPLETE CBC W/AUTO DIFF WBC: CPT

## 2019-02-22 PROCEDURE — 83540 ASSAY OF IRON: CPT

## 2019-02-22 PROCEDURE — 83550 IRON BINDING TEST: CPT

## 2019-02-22 PROCEDURE — 82784 ASSAY IGA/IGD/IGG/IGM EACH: CPT

## 2019-02-22 PROCEDURE — 82728 ASSAY OF FERRITIN: CPT

## 2019-02-22 PROCEDURE — 83883 ASSAY NEPHELOMETRY NOT SPEC: CPT

## 2019-02-26 ENCOUNTER — TELEPHONE (OUTPATIENT)
Dept: ONCOLOGY | Age: 69
End: 2019-02-26

## 2019-02-26 ENCOUNTER — OFFICE VISIT (OUTPATIENT)
Dept: ONCOLOGY | Age: 69
End: 2019-02-26
Payer: COMMERCIAL

## 2019-02-26 VITALS
HEART RATE: 84 BPM | BODY MASS INDEX: 22.15 KG/M2 | TEMPERATURE: 98 F | RESPIRATION RATE: 18 BRPM | DIASTOLIC BLOOD PRESSURE: 84 MMHG | WEIGHT: 163.3 LBS | SYSTOLIC BLOOD PRESSURE: 156 MMHG

## 2019-02-26 DIAGNOSIS — D61.818 PANCYTOPENIA (HCC): Primary | ICD-10-CM

## 2019-02-26 DIAGNOSIS — C61 PROSTATE CANCER (HCC): ICD-10-CM

## 2019-02-26 DIAGNOSIS — D47.2 MGUS (MONOCLONAL GAMMOPATHY OF UNKNOWN SIGNIFICANCE): ICD-10-CM

## 2019-02-26 PROCEDURE — 99214 OFFICE O/P EST MOD 30 MIN: CPT | Performed by: INTERNAL MEDICINE

## 2019-02-26 PROCEDURE — 99211 OFF/OP EST MAY X REQ PHY/QHP: CPT

## 2019-03-29 ENCOUNTER — OFFICE VISIT (OUTPATIENT)
Dept: FAMILY MEDICINE CLINIC | Age: 69
End: 2019-03-29
Payer: COMMERCIAL

## 2019-03-29 VITALS
DIASTOLIC BLOOD PRESSURE: 80 MMHG | HEART RATE: 100 BPM | OXYGEN SATURATION: 98 % | WEIGHT: 161 LBS | SYSTOLIC BLOOD PRESSURE: 144 MMHG | BODY MASS INDEX: 21.84 KG/M2

## 2019-03-29 DIAGNOSIS — I10 ESSENTIAL HYPERTENSION: Primary | ICD-10-CM

## 2019-03-29 DIAGNOSIS — Z72.89 OTHER PROBLEMS RELATED TO LIFESTYLE: ICD-10-CM

## 2019-03-29 PROCEDURE — 99213 OFFICE O/P EST LOW 20 MIN: CPT | Performed by: FAMILY MEDICINE

## 2019-03-29 RX ORDER — CYCLOBENZAPRINE HCL 10 MG
TABLET ORAL
Qty: 90 TABLET | Refills: 1 | Status: SHIPPED | OUTPATIENT
Start: 2019-03-29 | End: 2019-10-02 | Stop reason: SDUPTHER

## 2019-03-29 ASSESSMENT — PATIENT HEALTH QUESTIONNAIRE - PHQ9
SUM OF ALL RESPONSES TO PHQ9 QUESTIONS 1 & 2: 0
1. LITTLE INTEREST OR PLEASURE IN DOING THINGS: 0
2. FEELING DOWN, DEPRESSED OR HOPELESS: 0
SUM OF ALL RESPONSES TO PHQ QUESTIONS 1-9: 0
SUM OF ALL RESPONSES TO PHQ QUESTIONS 1-9: 0

## 2019-04-11 ENCOUNTER — EMPLOYEE WELLNESS (OUTPATIENT)
Dept: OTHER | Age: 69
End: 2019-04-11

## 2019-04-11 LAB
CHOLESTEROL/HDL RATIO: 2.5
CHOLESTEROL: 156 MG/DL
ESTIMATED AVERAGE GLUCOSE: 111 MG/DL
GLUCOSE BLD-MCNC: 128 MG/DL (ref 70–99)
HBA1C MFR BLD: 5.5 % (ref 4–6)
HDLC SERPL-MCNC: 62 MG/DL
LDL CHOLESTEROL: 78 MG/DL (ref 0–130)
PATIENT FASTING?: YES
TRIGL SERPL-MCNC: 80 MG/DL
VLDLC SERPL CALC-MCNC: ABNORMAL MG/DL (ref 1–30)

## 2019-05-06 VITALS — WEIGHT: 155 LBS | BODY MASS INDEX: 21.02 KG/M2

## 2019-06-03 ENCOUNTER — OFFICE VISIT (OUTPATIENT)
Dept: FAMILY MEDICINE CLINIC | Age: 69
End: 2019-06-03
Payer: COMMERCIAL

## 2019-06-03 VITALS
OXYGEN SATURATION: 100 % | DIASTOLIC BLOOD PRESSURE: 84 MMHG | SYSTOLIC BLOOD PRESSURE: 132 MMHG | WEIGHT: 162.4 LBS | BODY MASS INDEX: 22.03 KG/M2 | HEART RATE: 90 BPM

## 2019-06-03 DIAGNOSIS — D50.8 OTHER IRON DEFICIENCY ANEMIA: Primary | ICD-10-CM

## 2019-06-03 DIAGNOSIS — E83.31: ICD-10-CM

## 2019-06-03 DIAGNOSIS — R79.89 LOW VITAMIN D LEVEL: ICD-10-CM

## 2019-06-03 DIAGNOSIS — M90.80: ICD-10-CM

## 2019-06-03 DIAGNOSIS — I10 ESSENTIAL HYPERTENSION: Chronic | ICD-10-CM

## 2019-06-03 DIAGNOSIS — R60.0 PEDAL EDEMA: ICD-10-CM

## 2019-06-03 DIAGNOSIS — R14.0 ABDOMINAL BLOATING: ICD-10-CM

## 2019-06-03 PROCEDURE — 99213 OFFICE O/P EST LOW 20 MIN: CPT | Performed by: FAMILY MEDICINE

## 2019-06-03 RX ORDER — FERROUS SULFATE 325(65) MG
325 TABLET ORAL
COMMUNITY
End: 2019-06-11

## 2019-06-03 NOTE — PROGRESS NOTES
Subjective: Minal Cardona presents for   Chief Complaint   Patient presents with    Edema     Bilateral leg, on and off     Went to [place] 2-3  week. Was using hctz intermittent. The heat affected him  Bell y gots distened, had some edema. After eating 3 bowls of ice cream and a hamburger. Is fine now. Still has a abd wall hernia , but no pain. Patient Active Problem List   Diagnosis    Leukopenia    Anemia    Back pain    Pancytopenia (HCC)    MGUS (monoclonal gammopathy of unknown significance)    PSA elevation    Neutropenia (HCC)    Osteoarthritis    Hyponatremia    S/P hip replacement    Prostate cancer (HCC)    Malabsorption    Hematemesis without nausea    Upper GI bleed    Essential hypertension    History of hematemesis    Pancytopenia (HCC)     ·     Objective:  Physical Exam   Vitals:   Vitals:    06/03/19 1327   BP: 132/84   Site: Left Upper Arm   Position: Sitting   Cuff Size: Medium Adult   Pulse: 90   SpO2: 100%   Weight: 162 lb 6.4 oz (73.7 kg)     Wt Readings from Last 3 Encounters:   06/03/19 162 lb 6.4 oz (73.7 kg)   04/11/19 155 lb (70.3 kg)   03/29/19 161 lb (73 kg)     Ht Readings from Last 3 Encounters:   11/29/18 6' (1.829 m)   10/30/17 6' (1.829 m)   09/07/17 6' (1.829 m)     Body mass index is 22.03 kg/m². Constitutional: He is oriented to person, place, and time. He appears well-developed and well-nourished and in no acute distress. Answers all my questions appropriately. Head: Normocephalic and atraumatic. Eyes:conjunctiva appear normal.  Nose: pink, non-edematous mucosa. No polyps. No septal deviation  Throat: no erythema, tonsillar hypertrophy or exudate. No ulcerations noted. Lips/Teeth/Gums all appear normal.  Neck: Normal range of motion. Neck supple. No tracheal deviation present. No abnormal lymphadenopathy. No JVD noted. Carotids are clear bilaterally. No thyroid masses noted. Heart: RRR without murmur. No S3, S4, or gallop noted.   Chest: Clear to auscultation bilaterally. Good breath sounds noted. No rales, wheezes, or rhonchi noted. No respiratory retractions noted. Wall has symmetrical movement with respirations. Abdomen: No distension noted.  + bowel sounds in all quadrants which are normoactive. No bruits noted. No masses could be palpated. No unusual pulsatile masses noted. To deep palpation, patient denied any significant pain. No rebound, guarding or rigidity noted to my exam.      Small reducible inscicional hernia is present, non tneder. Has trac e-1+ edema    Assessment:   Encounter Diagnoses   Name Primary? 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 THE ABOVE NOTED DISCONTINUED MEDS MAY ONLY BE FROM 'CLEANING UP' THE MED LIST AND WERE NOT ACTUALLY CANCELED;  SEE CHART FOR DETAILS! No orders of the defined types were placed in this encounter. Orders Placed This Encounter   Procedures    XR ABDOMEN (2 VIEWS)     Flat plate and upright series     Standing Status:   Future     Standing Expiration Date:   6/2/2020     Order Specific Question:   Reason for exam:     Answer:   See ICDM-10 code attached    Comprehensive Metabolic Panel     Standing Status:   Future     Standing Expiration Date:   6/3/2020    Magnesium     Standing Status:   Future     Standing Expiration Date:   6/3/2020    CBC Auto Differential     Standing Status:   Future     Standing Expiration Date:   6/3/2020    Vitamin D 25 Hydroxy     Standing Status:   Future     Standing Expiration Date:   6/3/2020    Iron and TIBC     Standing Status:   Future     Standing Expiration Date:   6/3/2020     Order Specific Question:   Is Patient Fasting? Answer:   0     Comments:   0     Order Specific Question:   No of Hours?      Answer:   0     No follow-ups on file. There are no Patient Instructions on file for this visit. Data Unavailable      Eat lighter. Elevate legs.     FU with kailyn

## 2019-06-03 NOTE — PROGRESS NOTES
HYPERTENSION visit     BP Readings from Last 3 Encounters:   03/29/19 (!) 144/80   02/26/19 (!) 156/84   12/13/18 (!) 146/75       LDL Calculated (mg/dL)   Date Value   03/26/2014 167 (A)     LDL Cholesterol (mg/dL)   Date Value   04/11/2019 78     HDL (mg/dL)   Date Value   04/11/2019 62     BUN (mg/dL)   Date Value   02/22/2019 12     CREATININE (mg/dL)   Date Value   02/22/2019 0.80     Glucose (mg/dL)   Date Value   04/11/2019 128 (H)              Have you changed or started any medications since your last visit including any over-the-counter medicines, vitamins, or herbal medicines? no   Have you stopped taking any of your medications? Is so, why? -  no  Are you having any side effects from any of your medications? - no  How often do you miss doses of your medication? rare      Have you seen any other physician or provider since your last visit?  no   Have you had any other diagnostic tests since your last visit?  no   Have you been seen in the emergency room and/or had an admission in a hospital since we last saw you?  no   Have you had your routine dental cleaning in the past 6 months? Do you have an active MyChart account? If no, what is the barrier?   Yes    Patient Care Team:  Jarred aHssan MD as PCP - General (Family Medicine)  Jarred Hassan MD as PCP - [facility]  Steffi Shore MD as Consulting Physician (Hematology and Oncology)  Diane Roland DO as Consulting Physician (Orthopedic Surgery)  Guilherme Woo MD as Consulting Physician (Urology)  Freddy Rosario MD as Consulting Physician (Gastroenterology)    Medical History Review  Past Medical, Family, and Social History reviewed and  contribute to the patient presenting condition    Health Maintenance   Topic Date Due    Hepatitis C screen  1950    Shingles Vaccine (1 of 2) 02/13/2000    Colon cancer screen colonoscopy  07/14/2019    Potassium monitoring  02/22/2020    Creatinine monitoring  02/22/2020    Lipid screen  04/11/2024    DTaP/Tdap/Td vaccine (2 - Td) 01/31/2027    Flu vaccine  Completed    Pneumococcal 65+ years Vaccine  Completed

## 2019-06-04 ENCOUNTER — TELEPHONE (OUTPATIENT)
Dept: FAMILY MEDICINE CLINIC | Age: 69
End: 2019-06-04

## 2019-06-04 ENCOUNTER — APPOINTMENT (OUTPATIENT)
Dept: GENERAL RADIOLOGY | Age: 69
DRG: 812 | End: 2019-06-04
Payer: COMMERCIAL

## 2019-06-04 ENCOUNTER — HOSPITAL ENCOUNTER (INPATIENT)
Age: 69
LOS: 1 days | Discharge: HOME | DRG: 812 | End: 2019-06-05
Attending: EMERGENCY MEDICINE | Admitting: INTERNAL MEDICINE
Payer: COMMERCIAL

## 2019-06-04 DIAGNOSIS — D50.9 MICROCYTIC HYPOCHROMIC ANEMIA: Primary | ICD-10-CM

## 2019-06-04 DIAGNOSIS — D50.9 IRON DEFICIENCY ANEMIA, UNSPECIFIED IRON DEFICIENCY ANEMIA TYPE: ICD-10-CM

## 2019-06-04 DIAGNOSIS — D70.9 NEUTROPENIA, UNSPECIFIED TYPE (HCC): ICD-10-CM

## 2019-06-04 DIAGNOSIS — D47.2 MGUS (MONOCLONAL GAMMOPATHY OF UNKNOWN SIGNIFICANCE): ICD-10-CM

## 2019-06-04 DIAGNOSIS — D64.9 ANEMIA, UNSPECIFIED TYPE: ICD-10-CM

## 2019-06-04 LAB
ABSOLUTE EOS #: 0.03 K/UL (ref 0–0.4)
ABSOLUTE IMMATURE GRANULOCYTE: 0 K/UL (ref 0–0.3)
ABSOLUTE LYMPH #: 0.39 K/UL (ref 1–4.8)
ABSOLUTE MONO #: 0.23 K/UL (ref 0.2–0.8)
ABSOLUTE RETIC #: 0.13 M/UL (ref 0.03–0.08)
ALBUMIN SERPL-MCNC: 2.7 G/DL
ALP BLD-CCNC: 190 U/L
ALT SERPL-CCNC: 37 U/L
ANION GAP SERPL CALCULATED.3IONS-SCNC: 9 MMOL/L (ref 9–17)
ANION GAP SERPL CALCULATED.3IONS-SCNC: NORMAL MMOL/L
AST SERPL-CCNC: 42 U/L
BASOPHILS # BLD: 1 %
BASOPHILS ABSOLUTE: 0 /ΜL
BASOPHILS ABSOLUTE: 0.01 K/UL (ref 0–0.2)
BASOPHILS RELATIVE PERCENT: 0 %
BILIRUB SERPL-MCNC: 0.6 MG/DL (ref 0.1–1.4)
BUN BLDV-MCNC: 11 MG/DL (ref 8–23)
BUN BLDV-MCNC: 12 MG/DL
BUN/CREAT BLD: 12 (ref 9–20)
CALCIUM SERPL-MCNC: 8.1 MG/DL (ref 8.6–10.4)
CALCIUM SERPL-MCNC: 8.2 MG/DL
CHLORIDE BLD-SCNC: 104 MMOL/L
CHLORIDE BLD-SCNC: 105 MMOL/L (ref 98–107)
CO2: 22 MMOL/L
CO2: 23 MMOL/L (ref 20–31)
CREAT SERPL-MCNC: 0.87 MG/DL
CREAT SERPL-MCNC: 0.91 MG/DL (ref 0.7–1.2)
DIFFERENTIAL TYPE: ABNORMAL
EOSINOPHILS ABSOLUTE: 0.04 /ΜL
EOSINOPHILS RELATIVE PERCENT: 4 %
EOSINOPHILS RELATIVE PERCENT: 4 % (ref 1–4)
FERRITIN: 19 UG/L (ref 30–400)
GFR AFRICAN AMERICAN: >60 ML/MIN
GFR CALCULATED: 105.3
GFR NON-AFRICAN AMERICAN: >60 ML/MIN
GFR SERPL CREATININE-BSD FRML MDRD: ABNORMAL ML/MIN/{1.73_M2}
GFR SERPL CREATININE-BSD FRML MDRD: ABNORMAL ML/MIN/{1.73_M2}
GLUCOSE BLD-MCNC: 129 MG/DL (ref 70–99)
GLUCOSE BLD-MCNC: 216 MG/DL
HCT VFR BLD CALC: 24.8 % (ref 41–53)
HCT VFR BLD CALC: 26.4 % (ref 40.7–50.3)
HEMOGLOBIN: 6.6 G/DL (ref 13.5–17.5)
HEMOGLOBIN: 7.1 G/DL (ref 13–17)
IMMATURE GRANULOCYTES: 0 %
IMMATURE RETIC FRACT: 17 % (ref 2.7–18.3)
IRON SATURATION: 3 % (ref 20–55)
IRON: 10 UG/DL (ref 59–158)
IRON: 70
LYMPHOCYTES # BLD: 49 % (ref 24–44)
LYMPHOCYTES ABSOLUTE: 0.65 /ΜL
LYMPHOCYTES RELATIVE PERCENT: 61 %
MAGNESIUM: 1.8 MG/DL (ref 1.6–2.6)
MCH RBC QN AUTO: 21.2 PG (ref 25.2–33.5)
MCH RBC QN AUTO: 27.1 PG
MCHC RBC AUTO-ENTMCNC: 21.1 G/DL
MCHC RBC AUTO-ENTMCNC: 26.9 G/DL (ref 28.4–34.8)
MCV RBC AUTO: 78.8 FL (ref 82.6–102.9)
MCV RBC AUTO: 79.2 FL
MONOCYTES # BLD: 29 % (ref 1–7)
MONOCYTES ABSOLUTE: 0.11 /ΜL
MONOCYTES RELATIVE PERCENT: 10 %
MORPHOLOGY: ABNORMAL
NEUTROPHILS ABSOLUTE: 0.27 /ΜL
NEUTROPHILS RELATIVE PERCENT: ABNORMAL %
NRBC AUTOMATED: 0 PER 100 WBC
PDW BLD-RTO: 17.1 % (ref 11.8–14.4)
PDW BLD-RTO: 46.9 %
PLATELET # BLD: 174 K/UL (ref 138–453)
PLATELET # BLD: 94 K/ΜL
PLATELET ESTIMATE: ABNORMAL
PMV BLD AUTO: ABNORMAL FL
PMV BLD AUTO: ABNORMAL FL (ref 8.1–13.5)
POTASSIUM SERPL-SCNC: 3.7 MMOL/L
POTASSIUM SERPL-SCNC: 3.8 MMOL/L (ref 3.7–5.3)
RBC # BLD: 3.13 10^6/ΜL
RBC # BLD: 3.35 M/UL (ref 4.21–5.77)
RBC # BLD: ABNORMAL 10*6/UL
RETIC %: 3.9 % (ref 0.5–1.9)
RETIC HEMOGLOBIN: 18.6 PG (ref 28.2–35.7)
SEG NEUTROPHILS: 17 % (ref 36–66)
SEGMENTED NEUTROPHILS ABSOLUTE COUNT: 0.14 K/UL (ref 1.8–7.7)
SODIUM BLD-SCNC: 134 MMOL/L
SODIUM BLD-SCNC: 137 MMOL/L (ref 135–144)
TOTAL IRON BINDING CAPACITY: 301 UG/DL (ref 250–450)
TOTAL IRON BINDING CAPACITY: 358
TOTAL PROTEIN: 6.3
UNSATURATED IRON BINDING CAPACITY: 291 UG/DL (ref 112–347)
VITAMIN D 25-HYDROXY: 53.8
VITAMIN D2, 25 HYDROXY: NORMAL
VITAMIN D3,25 HYDROXY: NORMAL
WBC # BLD: 0.8 K/UL (ref 3.5–11.3)
WBC # BLD: 1.06 10^3/ML
WBC # BLD: ABNORMAL 10*3/UL

## 2019-06-04 PROCEDURE — 83550 IRON BINDING TEST: CPT

## 2019-06-04 PROCEDURE — P9016 RBC LEUKOCYTES REDUCED: HCPCS

## 2019-06-04 PROCEDURE — 86900 BLOOD TYPING SEROLOGIC ABO: CPT

## 2019-06-04 PROCEDURE — 87040 BLOOD CULTURE FOR BACTERIA: CPT

## 2019-06-04 PROCEDURE — 2580000003 HC RX 258: Performed by: EMERGENCY MEDICINE

## 2019-06-04 PROCEDURE — 83540 ASSAY OF IRON: CPT

## 2019-06-04 PROCEDURE — 82728 ASSAY OF FERRITIN: CPT

## 2019-06-04 PROCEDURE — 99284 EMERGENCY DEPT VISIT MOD MDM: CPT

## 2019-06-04 PROCEDURE — 1200000000 HC SEMI PRIVATE

## 2019-06-04 PROCEDURE — 99223 1ST HOSP IP/OBS HIGH 75: CPT | Performed by: NURSE PRACTITIONER

## 2019-06-04 PROCEDURE — 87086 URINE CULTURE/COLONY COUNT: CPT

## 2019-06-04 PROCEDURE — 85045 AUTOMATED RETICULOCYTE COUNT: CPT

## 2019-06-04 PROCEDURE — 36415 COLL VENOUS BLD VENIPUNCTURE: CPT

## 2019-06-04 PROCEDURE — C9113 INJ PANTOPRAZOLE SODIUM, VIA: HCPCS | Performed by: EMERGENCY MEDICINE

## 2019-06-04 PROCEDURE — 83735 ASSAY OF MAGNESIUM: CPT

## 2019-06-04 PROCEDURE — 86920 COMPATIBILITY TEST SPIN: CPT

## 2019-06-04 PROCEDURE — 86850 RBC ANTIBODY SCREEN: CPT

## 2019-06-04 PROCEDURE — 6360000002 HC RX W HCPCS: Performed by: EMERGENCY MEDICINE

## 2019-06-04 PROCEDURE — 36430 TRANSFUSION BLD/BLD COMPNT: CPT

## 2019-06-04 PROCEDURE — G0378 HOSPITAL OBSERVATION PER HR: HCPCS

## 2019-06-04 PROCEDURE — 96361 HYDRATE IV INFUSION ADD-ON: CPT

## 2019-06-04 PROCEDURE — 86901 BLOOD TYPING SEROLOGIC RH(D): CPT

## 2019-06-04 PROCEDURE — 71045 X-RAY EXAM CHEST 1 VIEW: CPT

## 2019-06-04 PROCEDURE — 85025 COMPLETE CBC W/AUTO DIFF WBC: CPT

## 2019-06-04 PROCEDURE — 96375 TX/PRO/DX INJ NEW DRUG ADDON: CPT

## 2019-06-04 PROCEDURE — 80048 BASIC METABOLIC PNL TOTAL CA: CPT

## 2019-06-04 PROCEDURE — 96374 THER/PROPH/DIAG INJ IV PUSH: CPT

## 2019-06-04 RX ORDER — SODIUM CHLORIDE 9 MG/ML
INJECTION, SOLUTION INTRAVENOUS CONTINUOUS
Status: DISCONTINUED | OUTPATIENT
Start: 2019-06-04 | End: 2019-06-04 | Stop reason: SDUPTHER

## 2019-06-04 RX ORDER — SODIUM CHLORIDE 9 MG/ML
INJECTION, SOLUTION INTRAVENOUS CONTINUOUS
Status: DISCONTINUED | OUTPATIENT
Start: 2019-06-04 | End: 2019-06-05

## 2019-06-04 RX ORDER — SODIUM CHLORIDE 0.9 % (FLUSH) 0.9 %
10 SYRINGE (ML) INJECTION PRN
Status: DISCONTINUED | OUTPATIENT
Start: 2019-06-04 | End: 2019-06-04 | Stop reason: SDUPTHER

## 2019-06-04 RX ORDER — CALCIUM CARBONATE 500(1250)
500 TABLET ORAL DAILY
Status: DISCONTINUED | OUTPATIENT
Start: 2019-06-05 | End: 2019-06-05 | Stop reason: CLARIF

## 2019-06-04 RX ORDER — SODIUM CHLORIDE 0.9 % (FLUSH) 0.9 %
10 SYRINGE (ML) INJECTION EVERY 12 HOURS
Status: DISCONTINUED | OUTPATIENT
Start: 2019-06-04 | End: 2019-06-04 | Stop reason: SDUPTHER

## 2019-06-04 RX ORDER — PANTOPRAZOLE SODIUM 40 MG/10ML
40 INJECTION, POWDER, LYOPHILIZED, FOR SOLUTION INTRAVENOUS ONCE
Status: COMPLETED | OUTPATIENT
Start: 2019-06-04 | End: 2019-06-04

## 2019-06-04 RX ORDER — ACETAMINOPHEN 325 MG/1
650 TABLET ORAL EVERY 4 HOURS PRN
Status: DISCONTINUED | OUTPATIENT
Start: 2019-06-04 | End: 2019-06-05 | Stop reason: HOSPADM

## 2019-06-04 RX ORDER — ONDANSETRON 2 MG/ML
4 INJECTION INTRAMUSCULAR; INTRAVENOUS EVERY 6 HOURS PRN
Status: DISCONTINUED | OUTPATIENT
Start: 2019-06-04 | End: 2019-06-05 | Stop reason: SDUPTHER

## 2019-06-04 RX ORDER — LANOLIN ALCOHOL/MO/W.PET/CERES
325 CREAM (GRAM) TOPICAL
Status: DISCONTINUED | OUTPATIENT
Start: 2019-06-05 | End: 2019-06-05 | Stop reason: HOSPADM

## 2019-06-04 RX ORDER — PANTOPRAZOLE SODIUM 40 MG/1
40 TABLET, DELAYED RELEASE ORAL
Status: DISCONTINUED | OUTPATIENT
Start: 2019-06-05 | End: 2019-06-05 | Stop reason: HOSPADM

## 2019-06-04 RX ORDER — POTASSIUM CHLORIDE 7.45 MG/ML
10 INJECTION INTRAVENOUS PRN
Status: DISCONTINUED | OUTPATIENT
Start: 2019-06-04 | End: 2019-06-05 | Stop reason: HOSPADM

## 2019-06-04 RX ORDER — HYDROCHLOROTHIAZIDE 25 MG/1
25 TABLET ORAL DAILY
Status: DISCONTINUED | OUTPATIENT
Start: 2019-06-05 | End: 2019-06-05 | Stop reason: HOSPADM

## 2019-06-04 RX ORDER — 0.9 % SODIUM CHLORIDE 0.9 %
250 INTRAVENOUS SOLUTION INTRAVENOUS ONCE
Status: COMPLETED | OUTPATIENT
Start: 2019-06-04 | End: 2019-06-05

## 2019-06-04 RX ORDER — POTASSIUM CHLORIDE 20 MEQ/1
40 TABLET, EXTENDED RELEASE ORAL PRN
Status: DISCONTINUED | OUTPATIENT
Start: 2019-06-04 | End: 2019-06-05 | Stop reason: HOSPADM

## 2019-06-04 RX ORDER — ONDANSETRON 2 MG/ML
4 INJECTION INTRAMUSCULAR; INTRAVENOUS ONCE
Status: COMPLETED | OUTPATIENT
Start: 2019-06-04 | End: 2019-06-04

## 2019-06-04 RX ORDER — MAGNESIUM SULFATE 1 G/100ML
1 INJECTION INTRAVENOUS PRN
Status: DISCONTINUED | OUTPATIENT
Start: 2019-06-04 | End: 2019-06-05 | Stop reason: HOSPADM

## 2019-06-04 RX ORDER — NICOTINE 21 MG/24HR
1 PATCH, TRANSDERMAL 24 HOURS TRANSDERMAL DAILY PRN
Status: DISCONTINUED | OUTPATIENT
Start: 2019-06-04 | End: 2019-06-05 | Stop reason: HOSPADM

## 2019-06-04 RX ORDER — SODIUM CHLORIDE 0.9 % (FLUSH) 0.9 %
10 SYRINGE (ML) INJECTION PRN
Status: DISCONTINUED | OUTPATIENT
Start: 2019-06-04 | End: 2019-06-05 | Stop reason: HOSPADM

## 2019-06-04 RX ORDER — POTASSIUM CHLORIDE 20 MEQ/1
20 TABLET, EXTENDED RELEASE ORAL
Status: DISCONTINUED | OUTPATIENT
Start: 2019-06-05 | End: 2019-06-05 | Stop reason: HOSPADM

## 2019-06-04 RX ORDER — ERGOCALCIFEROL (VITAMIN D2) 1250 MCG
50000 CAPSULE ORAL WEEKLY
Status: DISCONTINUED | OUTPATIENT
Start: 2019-06-05 | End: 2019-06-05 | Stop reason: HOSPADM

## 2019-06-04 RX ORDER — 0.9 % SODIUM CHLORIDE 0.9 %
10 VIAL (ML) INJECTION ONCE
Status: COMPLETED | OUTPATIENT
Start: 2019-06-04 | End: 2019-06-04

## 2019-06-04 RX ORDER — SODIUM CHLORIDE 0.9 % (FLUSH) 0.9 %
10 SYRINGE (ML) INJECTION EVERY 12 HOURS SCHEDULED
Status: DISCONTINUED | OUTPATIENT
Start: 2019-06-04 | End: 2019-06-05 | Stop reason: HOSPADM

## 2019-06-04 RX ADMIN — PANTOPRAZOLE SODIUM 40 MG: 40 INJECTION, POWDER, FOR SOLUTION INTRAVENOUS at 19:47

## 2019-06-04 RX ADMIN — ONDANSETRON 4 MG: 2 INJECTION INTRAMUSCULAR; INTRAVENOUS at 19:48

## 2019-06-04 RX ADMIN — Medication 10 ML: at 19:48

## 2019-06-04 RX ADMIN — SODIUM CHLORIDE 250 ML: 9 INJECTION, SOLUTION INTRAVENOUS at 19:48

## 2019-06-04 ASSESSMENT — PAIN SCALES - GENERAL: PAINLEVEL_OUTOF10: 0

## 2019-06-04 NOTE — TELEPHONE ENCOUNTER
He has known bone marrow disease. Plus he may be losing blood somerwhere. His hgb is critically low.   He needs to go to ER NOW

## 2019-06-04 NOTE — ED PROVIDER NOTES
[address] [place] [address] ED  eMERGENCY dEPARTMENT eNCOUnter      Pt Name: Coby Davis  MRN: [id number]  [place] 1950  Date of evaluation: 6/4/2019  Provider: Evie Murcia MD    CHIEF COMPLAINT       Chief Complaint   Patient presents with    Other     low hemoglobin         HISTORY OF PRESENT ILLNESS  (Location/Symptom, Timing/Onset, Context/Setting, Quality, Duration, Modifying Factors, Severity.)   Coby Davis is a 71 y.o. male who presents to the emergency department at the request of his physician due to his recurrent anemia. Patient has a myelodysplasia. He suffers from recurrent anemia and pancytopenia. He has had multiple transfusions in the past.  He states he has not been feeling well lately had several episodes of persistent nausea. He is planning on traveling to the [place] this summer so he went to his doctor \"just for a checkup\". Routine labs were drawn. He was found to have a hemoglobin in the range of 6. This lab was at an outside facility. He was subsequently contacted and advised to come to the hospital with regard to transfusion issues. Nursing Notes were reviewed. ALLERGIES     Patient has no known allergies. CURRENT MEDICATIONS       Previous Medications    CALCIUM CARBONATE (OSCAL) 500 MG TABS TABLET    Take 500 mg by mouth daily    CYCLOBENZAPRINE (FLEXERIL) 10 MG TABLET    TAKE 1 TABLET BY MOUTH 3 TIMES DAILY AS NEEDED FOR MUSCLE SPASMS    DOCUSATE SODIUM (COLACE) 100 MG CAPSULE    Take 1 capsule by mouth daily as needed for Constipation    FERROUS SULFATE 325 (65 FE) MG TABLET    Take 325 mg by mouth daily (with breakfast)    HYDROCHLOROTHIAZIDE (HYDRODIURIL) 25 MG TABLET    TAKE ONE TABLET BY MOUTH ONE TIME A DAY    MAGNESIUM 400 MG CAPS    Take 400 mg by mouth every other day    MULTIPLE VITAMINS-MINERALS (MULTI COMPLETE PO)    Take 1 tablet by mouth daily.     PANTOPRAZOLE (PROTONIX) 40 MG TABLET    TAKE ONE TABLET BY MOUTH EVERY MORNING BEFORE BREAKFAST    POTASSIUM Latest Ref Range: 9 - 20  12    Anion Gap Latest Ref Range: 9 - 17 mmol/L 9    GFR Non- Latest Ref Range: >60 mL/min >60    GFR African American Latest Ref Range: >60 mL/min >60    Magnesium Latest Ref Range: 1.6 - 2.6 mg/dL 1.8    Glucose Latest Ref Range: 70 - 99 mg/dL 129 (H)    Calcium Latest Ref Range: 8.6 - 10.4 mg/dL 8.1 (L)    GFR Comment Unknown Pend    GFR Staging Unknown NOT REPORTED    Results for Jesús Ramos (MRN [id number]) as of 6/4/2019 20:30   Ref. Range 6/4/2019 19:45   WBC Latest Ref Range: 3.5 - 11.3 k/uL 0.8 (LL)   RBC Latest Ref Range: 4.21 - 5.77 m/uL 3.35 (L)   Hemoglobin Quant Latest Ref Range: 13.0 - 17.0 g/dL 7.1 (L)   Hematocrit Latest Ref Range: 40.7 - 50.3 % 26.4 (L)   MCV Latest Ref Range: 82.6 - 102.9 fL 78.8 (L)   MCH Latest Ref Range: 25.2 - 33.5 pg 21.2 (L)   MCHC Latest Ref Range: 28.4 - 34.8 g/dL 26.9 (L)   MPV Latest Ref Range: 8.1 - 13.5 fL Abnormal   RDW Latest Ref Range: 11.8 - 14.4 % 17.1 (H)   Platelet Count Latest Ref Range: 138 - 453 k/uL 174   Results for Jesús Ramos (MRN [id number]) as of 6/4/2019 20:30   Ref. Range 6/4/2019 19:45   Iron Latest Ref Range: 59 - 158 ug/dL 10 (L)   Iron Saturation Latest Ref Range: 20 - 55 % 3 (L)   UIBC Latest Ref Range: 112 - 347 ug/dL 291   TIBC Latest Ref Range: 250 - 450 ug/dL 301   Immature Retic Fract Latest Ref Range: 2.7 - 18.3 % 17.000   Retic % Latest Ref Range: 0.5 - 1.9 % 3.9 (H)   Absolute Retic # Latest Ref Range: 0.030 - 0.080 M/uL 0.129 (H)   Retic Hemoglobin Latest Ref Range: 28.2 - 35.7 pg 18.6 (L)       All other labs were within normal range or not returned as of this dictation. EMERGENCY DEPARTMENT COURSE and DIFFERENTIAL DIAGNOSIS/MDM:   Vitals:    Vitals:    06/04/19 1839 06/04/19 1842   BP: (!) 150/72    Pulse: 94    Resp: 16    Temp: 99.1 °F (37.3 °C)    SpO2: 99%    Weight:  161 lb (73 kg)   Height:  6' (1.829 m)     Patient is evaluated.   He's been sent to the ER with regard to admission for transfusion with regard to his recurrent anemia. Patient is also found to have significant neutropenia. He had a low-grade temp noted on arrival.  Blood cultures are also obtained. Urine will be sent and chest x-ray reviewed. Care is discussed with his oncologist.  Oncology requests admit to internist.  Patient is typed and crossed. Baseline labs were drawn to include anemia indices. Patient will then be admitted for further care    CONSULTS:  [address] [address]:  None    FINAL IMPRESSION      1. MGUS (monoclonal gammopathy of unknown significance)    2. Anemia, unspecified type    3. Neutropenia, unspecified type [facility])          DISPOSITION/PLAN   DISPOSITION Decision To Admit 06/04/2019 07:39:16 PM      PATIENT REFERRED TO:   No follow-up provider specified.     DISCHARGE MEDICATIONS:     New Prescriptions    No medications on file         (Please note that portions of this note were completed with a voice recognition program.  Efforts were made to edit the dictations but occasionally words are mis-transcribed.)    Bev Lucero MD  Attending Emergency Physician          Bev Lucero MD  06/04/19 [id number]

## 2019-06-05 VITALS
HEART RATE: 76 BPM | RESPIRATION RATE: 17 BRPM | SYSTOLIC BLOOD PRESSURE: 117 MMHG | OXYGEN SATURATION: 100 % | BODY MASS INDEX: 21.88 KG/M2 | WEIGHT: 161.5 LBS | DIASTOLIC BLOOD PRESSURE: 69 MMHG | HEIGHT: 72 IN | TEMPERATURE: 98.6 F

## 2019-06-05 LAB
ANION GAP SERPL CALCULATED.3IONS-SCNC: 9 MMOL/L (ref 9–17)
BILIRUBIN URINE: NEGATIVE
BUN BLDV-MCNC: 10 MG/DL (ref 8–23)
BUN/CREAT BLD: 11 (ref 9–20)
CALCIUM SERPL-MCNC: 8 MG/DL (ref 8.6–10.4)
CHLORIDE BLD-SCNC: 104 MMOL/L (ref 98–107)
CO2: 22 MMOL/L (ref 20–31)
COLOR: YELLOW
COMMENT UA: NORMAL
CREAT SERPL-MCNC: 0.95 MG/DL (ref 0.7–1.2)
GFR AFRICAN AMERICAN: >60 ML/MIN
GFR NON-AFRICAN AMERICAN: >60 ML/MIN
GFR SERPL CREATININE-BSD FRML MDRD: ABNORMAL ML/MIN/{1.73_M2}
GFR SERPL CREATININE-BSD FRML MDRD: ABNORMAL ML/MIN/{1.73_M2}
GLUCOSE BLD-MCNC: 203 MG/DL (ref 70–99)
GLUCOSE URINE: NEGATIVE
HCT VFR BLD CALC: 24.7 % (ref 40.7–50.3)
HCT VFR BLD CALC: 26 % (ref 40.7–50.3)
HEMOGLOBIN: 7 G/DL (ref 13–17)
HEMOGLOBIN: 7.1 G/DL (ref 13–17)
INR BLD: 1.1
KETONES, URINE: NEGATIVE
LEUKOCYTE ESTERASE, URINE: NEGATIVE
MAGNESIUM: 1.8 MG/DL (ref 1.6–2.6)
MCH RBC QN AUTO: 22.4 PG (ref 25.2–33.5)
MCHC RBC AUTO-ENTMCNC: 28.3 G/DL (ref 28–38)
MCV RBC AUTO: 79.2 FL (ref 82.6–102.9)
NITRITE, URINE: NEGATIVE
NRBC AUTOMATED: ABNORMAL PER 100 WBC
PDW BLD-RTO: 16.2 % (ref 11.8–14.4)
PH UA: 7 (ref 5–8)
PLATELET # BLD: 126 K/UL (ref 138–453)
PMV BLD AUTO: ABNORMAL FL (ref 8.1–13.5)
POTASSIUM SERPL-SCNC: 3.5 MMOL/L (ref 3.7–5.3)
PROTEIN UA: NEGATIVE
PROTHROMBIN TIME: 11.5 SEC (ref 9.7–11.6)
RBC # BLD: 3.12 M/UL (ref 4.21–5.77)
SODIUM BLD-SCNC: 135 MMOL/L (ref 135–144)
SPECIFIC GRAVITY UA: 1.01 (ref 1–1.03)
TURBIDITY: CLEAR
URINE HGB: NEGATIVE
UROBILINOGEN, URINE: NORMAL
WBC # BLD: 0.7 K/UL (ref 3.5–11.3)

## 2019-06-05 PROCEDURE — 36415 COLL VENOUS BLD VENIPUNCTURE: CPT

## 2019-06-05 PROCEDURE — 99255 IP/OBS CONSLTJ NEW/EST HI 80: CPT | Performed by: INTERNAL MEDICINE

## 2019-06-05 PROCEDURE — 83735 ASSAY OF MAGNESIUM: CPT

## 2019-06-05 PROCEDURE — 85610 PROTHROMBIN TIME: CPT

## 2019-06-05 PROCEDURE — G0378 HOSPITAL OBSERVATION PER HR: HCPCS

## 2019-06-05 PROCEDURE — 2580000003 HC RX 258: Performed by: NURSE PRACTITIONER

## 2019-06-05 PROCEDURE — 97116 GAIT TRAINING THERAPY: CPT

## 2019-06-05 PROCEDURE — 97161 PT EVAL LOW COMPLEX 20 MIN: CPT

## 2019-06-05 PROCEDURE — 2580000003 HC RX 258: Performed by: INTERNAL MEDICINE

## 2019-06-05 PROCEDURE — 6360000002 HC RX W HCPCS: Performed by: INTERNAL MEDICINE

## 2019-06-05 PROCEDURE — 85018 HEMOGLOBIN: CPT

## 2019-06-05 PROCEDURE — 97535 SELF CARE MNGMENT TRAINING: CPT

## 2019-06-05 PROCEDURE — 99232 SBSQ HOSP IP/OBS MODERATE 35: CPT | Performed by: INTERNAL MEDICINE

## 2019-06-05 PROCEDURE — 80048 BASIC METABOLIC PNL TOTAL CA: CPT

## 2019-06-05 PROCEDURE — 85014 HEMATOCRIT: CPT

## 2019-06-05 PROCEDURE — 81003 URINALYSIS AUTO W/O SCOPE: CPT

## 2019-06-05 PROCEDURE — 96361 HYDRATE IV INFUSION ADD-ON: CPT

## 2019-06-05 PROCEDURE — 6370000000 HC RX 637 (ALT 250 FOR IP): Performed by: INTERNAL MEDICINE

## 2019-06-05 PROCEDURE — 97166 OT EVAL MOD COMPLEX 45 MIN: CPT

## 2019-06-05 PROCEDURE — 85027 COMPLETE CBC AUTOMATED: CPT

## 2019-06-05 PROCEDURE — 6370000000 HC RX 637 (ALT 250 FOR IP): Performed by: NURSE PRACTITIONER

## 2019-06-05 RX ORDER — ONDANSETRON 4 MG/1
4 TABLET, ORALLY DISINTEGRATING ORAL EVERY 6 HOURS PRN
Status: DISCONTINUED | OUTPATIENT
Start: 2019-06-05 | End: 2019-06-05 | Stop reason: HOSPADM

## 2019-06-05 RX ORDER — M-VIT,TX,IRON,MINS/CALC/FOLIC 27MG-0.4MG
1 TABLET ORAL DAILY
Status: DISCONTINUED | OUTPATIENT
Start: 2019-06-05 | End: 2019-06-05 | Stop reason: HOSPADM

## 2019-06-05 RX ORDER — ONDANSETRON 2 MG/ML
4 INJECTION INTRAMUSCULAR; INTRAVENOUS EVERY 6 HOURS PRN
Status: DISCONTINUED | OUTPATIENT
Start: 2019-06-05 | End: 2019-06-05 | Stop reason: HOSPADM

## 2019-06-05 RX ORDER — CALCIUM CARBONATE 200(500)MG
500 TABLET,CHEWABLE ORAL DAILY
Status: DISCONTINUED | OUTPATIENT
Start: 2019-06-05 | End: 2019-06-05 | Stop reason: HOSPADM

## 2019-06-05 RX ADMIN — HYDROCHLOROTHIAZIDE 25 MG: 25 TABLET ORAL at 08:29

## 2019-06-05 RX ADMIN — SODIUM CHLORIDE: 9 INJECTION, SOLUTION INTRAVENOUS at 01:43

## 2019-06-05 RX ADMIN — POTASSIUM CHLORIDE 20 MEQ: 20 TABLET, EXTENDED RELEASE ORAL at 08:29

## 2019-06-05 RX ADMIN — PANTOPRAZOLE SODIUM 40 MG: 40 TABLET, DELAYED RELEASE ORAL at 06:43

## 2019-06-05 RX ADMIN — Medication 10 ML: at 12:27

## 2019-06-05 RX ADMIN — SODIUM CHLORIDE: 9 INJECTION, SOLUTION INTRAVENOUS at 08:30

## 2019-06-05 RX ADMIN — FERROUS SULFATE TAB EC 325 MG (65 MG FE EQUIVALENT) 325 MG: 325 (65 FE) TABLET DELAYED RESPONSE at 08:29

## 2019-06-05 RX ADMIN — IRON SUCROSE 400 MG: 20 INJECTION, SOLUTION INTRAVENOUS at 12:27

## 2019-06-05 RX ADMIN — MAGNESIUM OXIDE TAB 400 MG (241.3 MG ELEMENTAL MG) 400 MG: 400 (241.3 MG) TAB at 12:27

## 2019-06-05 ASSESSMENT — ENCOUNTER SYMPTOMS
COUGH: 0
BLOOD IN STOOL: 0
VOMITING: 0
CHEST TIGHTNESS: 0
COLOR CHANGE: 0
SHORTNESS OF BREATH: 0
NAUSEA: 0
DIARRHEA: 0
ABDOMINAL PAIN: 0
WHEEZING: 0
CONSTIPATION: 0

## 2019-06-05 NOTE — ED NOTES
Pt presents to the ed via private auto with family c/o low HBG. Pt was scene at the [facility] today for blood work and was sent here to the ed. Pt HBG count 7.1 pt denies chest pain or SOB skin is warm to touch pt denies any pain vital signs are stable at this time.      Kathryn Curry RN  06/04/19 [id number]

## 2019-06-05 NOTE — PROGRESS NOTES
[facility]    Progress Note    6/5/2019    1:40 PM    Name:   Erlinda Renteria  MRN:     [id number]     Acct:      [de-identified]   Room:   2008/2008-02  IP Day:  1  Admit Date:  6/4/2019  7:23 PM    PCP:   Herbie Escalera MD  Code Status:  Full Code    Subjective:     C/C:   Chief Complaint   Patient presents with    Other     low hemoglobin     Interval History Status:   Patient got 1 unit of PRBC transfused  Is completely asymptomatic   Repeat hemoglobin is still 7.1  No obvious source of bleeding  Stool occult blood is not done yet  Leukopenia is chronic since 2014  Brief History:   The patient is a 71 y.o.  [place] male who presents to the hospital by recommendation of his primary care provider for evaluation of low hemoglobin level. The patient was visiting his PCP, who ordered lab work. He was found to be anemic with a hbg of ~6. The patient has chronic iron deficiency anemia as well as monoclonal gammopathy of unknown origin. He has chronic pancytopenia. The patient has had previous blood transfusions. The patient denies melena, hematochezia, malaise, lightheadedness, shortness of breath or chest pain. He will be admitted for further evaluation and treatment.      WBC 0.8, hbg 7.1, hct 26.4. Subsequently 1 unit PRBC was ordered for transfusion in the ER.      He sees Dr. Tremaine Dunaway outpatient with hematology/oncology.              Review of Systems:     Constitutional:  negative for chills, fevers, sweats  Respiratory:  negative for cough, dyspnea on exertion, shortness of breath, wheezing  Cardiovascular:  negative for chest pain, chest pressure/discomfort, lower extremity edema, palpitations  Gastrointestinal:  negative for abdominal pain, constipation, diarrhea, nausea, vomiting  Neurological:  negative for dizziness, headache    Medications:      Allergies:  No Known Allergies    Current Meds:   Scheduled Meds:    calcium carbonate  500 mg Oral --    INR  --  1.1  --      Chemistry:  Recent Labs     06/04/19 1945 06/05/19  0246    135   K 3.8 3.5*    104   CO2 23 22   GLUCOSE 129* 203*   BUN 11 10   CREATININE 0.91 0.95   MG 1.8 1.8   ANIONGAP 9 9   LABGLOM >60 >60   GFRAA >60 >60   CALCIUM 8.1* 8.0*     No results for input(s): PROT, LABALBU, LABA1C, C8BKSCB, R5XJJUY, FT4, TSH, AST, ALT, LDH, GGT, ALKPHOS, BILITOT, BILIDIR, AMMONIA, AMYLASE, LIPASE, LACTATE, CHOL, HDL, LDLCHOLESTEROL, CHOLHDLRATIO, TRIG, VLDL, PHENYTOIN, PHENYF in the last 72 hours. Lab Results   Component Value Date/Time    SPECIAL R BICEPT 12ML 06/04/2019 08:57 PM     Lab Results   Component Value Date/Time    CULTURE NO GROWTH 5 HOURS 06/04/2019 08:57 PM       No results found for: POCPH, PHART, PH, POCPCO2, QXV5LJS, PCO2, POCPO2, PO2ART, PO2, POCHCO3, XBP7RDG, HCO3, NBEA, PBEA, BEART, BE, THGBART, THB, RSX7FKM, TZVG2ROP, Y9AIPUNP, O2SAT, FIO2    Radiology:    Xr Chest Portable    Result Date: 6/4/2019  No acute findings in the chest.       Physical Examination:        General appearance:  alert, cooperative and no distress  Mental Status:  oriented to person, place and time and normal affect  Lungs:  clear to auscultation bilaterally, normal effort  Heart:  regular rate and rhythm, no murmur  Abdomen:  soft, nontender, nondistended, normal bowel sounds, no masses, hepatomegaly, splenomegaly,+ small ventral hernia  Extremities:  no edema, redness, tenderness in the calves  Skin:  no gross lesions, rashes, induration    Assessment:        Primary Problem  Microcytic hypochromic anemia-status post 1 unit PRBC transfusion  History of gastric ulcer  Active Hospital Problems    Diagnosis Date Noted    Essential hypertension [I10]     MGUS (monoclonal gammopathy of unknown significance) [D47.2]     Microcytic hypochromic anemia [D50.9]     Leukopenia [D72.819]        Plan:        1. Check stool for occult blood  2. Continue oral PPIs  3.  Discharge home if cleared by oncology, who called blood can be done as outpatient if patient does not have bowel movement in hospital    Rayna Burkett MD  6/5/2019  1:40 PM

## 2019-06-05 NOTE — PROGRESS NOTES
Pt discharged to home per wheelchair in stable condition with belongings  Discharge instructions given  Discharge checklist reviewed and completed with pt and family. Pt denies having any further questions at this time  Patient/family state they have everything they were admitted with.

## 2019-06-05 NOTE — H&P
[address]     HISTORY AND PHYSICAL EXAMINATION            Date:   6/4/2019  Patient name:  Avery Lopez  Date of admission:  6/4/2019  7:23 PM  MRN:   [id number]  Account:  [de-identified]  YOB: 1950  PCP:    Rufina Abad MD  Room:   2008/2008-02  Code Status:    Full Code    Chief Complaint:     Chief Complaint   Patient presents with    Other     low hemoglobin     History Obtained From:     Patient and electronic medical record. History of Present Illness: The patient is a 71 y.o.  [place] male who presents to the hospital by recommendation of his primary care provider for evaluation of low hemoglobin level. The patient was visiting his PCP, who ordered lab work. He was found to be anemic with a hbg of ~6. The patient has chronic iron deficiency anemia as well as monoclonal gammopathy of unknown origin. He has chronic pancytopenia. The patient has had previous blood transfusions. The patient denies melena, hematochezia, malaise, lightheadedness, shortness of breath or chest pain. He will be admitted for further evaluation and treatment. WBC 0.8, hbg 7.1, hct 26.4. Subsequently 1 unit PRBC was ordered for transfusion in the ER. He sees Dr. Sophia Vinson outpatient with hematology/oncology.      Past Medical History:     Past Medical History:   Diagnosis Date    Anemia     Arthritis     Avascular necrosis ([address]     sees Dr Elizabeth Martin BPH (benign prostatic hyperplasia)     History of blood transfusion 12/2014    after total hip replacement    Hypertension     Leukopenia     MGUS (monoclonal gammopathy of unknown significance)     Osteoarthritis     Patient in clinical research study 01/19/2017    Stomach ulcer     x 2         Past Surgical History:     Past Surgical History:   Procedure Laterality Date    COLONOSCOPY  07/14/2014    ENDOSCOPY, COLON, DIAGNOSTIC  07/13/2014    stomach ulcers    HIP ARTHROPLASTY Right 12/03/14    HIP ARTHROPLASTY Left 03/10/15    INGUINAL HERNIA REPAIR Right 2009    INGUINAL HERNIA REPAIR Left 11/29/2018    incarcerated. By Dr. Bozena Trujillo OFFICE/OUTPT VISIT,PROCEDURE ONLY Left 11/29/2018    INCARCERATED INGUINAL HERNIA performed by Ze Green MD at [address]  09/12/2014    PROSTATECTOMY  12/16/2015    robotic. lap    UPPER GASTROINTESTINAL ENDOSCOPY  10/19/2016    no lesions seen        Medications Prior to Admission:     Prior to Admission medications    Medication Sig Start Date End Date Taking? Authorizing Provider   ferrous sulfate 325 (65 Fe) MG tablet Take 325 mg by mouth daily (with breakfast)    Historical Provider, MD   Magnesium 400 MG CAPS Take 400 mg by mouth every other day    Historical Provider, MD   pantoprazole (PROTONIX) 40 MG tablet TAKE ONE TABLET BY MOUTH EVERY MORNING BEFORE BREAKFAST 4/26/19   SCARLET Isaacs CNP   hydrochlorothiazide (HYDRODIURIL) 25 MG tablet TAKE ONE TABLET BY MOUTH ONE TIME A DAY 4/26/19   SCARLET Isaacs CNP   cyclobenzaprine (FLEXERIL) 10 MG tablet TAKE 1 TABLET BY MOUTH 3 TIMES DAILY AS NEEDED FOR MUSCLE SPASMS 3/29/19   Nury Syed MD   potassium chloride (KLOR-CON M) 20 MEQ extended release tablet TAKE 1 TABLET DAILY 1/28/19   Nury Syed MD   calcium carbonate (OSCAL) 500 MG TABS tablet Take 500 mg by mouth daily    Historical Provider, MD   docusate sodium (COLACE) 100 MG capsule Take 1 capsule by mouth daily as needed for Constipation 12/17/15   Ansley Cheung MD   Multiple Vitamins-Minerals (MULTI COMPLETE PO) Take 1 tablet by mouth daily. Historical Provider, MD   vitamin D (ERGOCALCIFEROL) 98422 UNITS capsule Take 1 capsule by mouth once a week. Patient taking differently: Take 2,000 Units by mouth once a week. 7/16/14   Eleonora Amador MD        Allergies:     Patient has no known allergies. Social History:     Tobacco:    reports that he has never smoked.  He has never used smokeless tobacco.  Alcohol:      reports that he does not drink alcohol. Drug Use:  reports that he does not use drugs. Family History:     Family History   Problem Relation Age of Onset    High Blood Pressure Mother     High Blood Pressure Father        Review of Systems:     Positive and Negative as described in HPI. Review of Systems   Constitutional: Negative for chills, diaphoresis and fever. HENT: Negative for congestion. Eyes: Negative for visual disturbance. Respiratory: Negative for cough, chest tightness, shortness of breath and wheezing. Cardiovascular: Negative for chest pain, palpitations and leg swelling. Gastrointestinal: Negative for abdominal pain, blood in stool, constipation, diarrhea, nausea and vomiting. Endocrine: Negative for cold intolerance and heat intolerance. Genitourinary: Negative for difficulty urinating, dysuria and urgency. Musculoskeletal: Negative for arthralgias and myalgias. Skin: Negative for color change and rash. Neurological: Negative for dizziness, weakness, light-headedness, numbness and headaches. Hematological: Does not bruise/bleed easily. Psychiatric/Behavioral: The patient is not nervous/anxious. All other systems reviewed and are negative. Physical Exam:   BP (!) 150/72   Pulse 94   Temp 99.1 °F (37.3 °C)   Resp 16   Ht 6' (1.829 m)   Wt 161 lb (73 kg)   SpO2 99%   BMI 21.84 kg/m²   Temp (24hrs), Av.1 °F (37.3 °C), Min:99.1 °F (37.3 °C), Max:99.1 °F (37.3 °C)    No results for input(s): POCGLU in the last 72 hours. No intake or output data in the 24 hours ending 19 [id number]    Physical Exam   Constitutional: He is oriented to person, place, and time. Vital signs are normal. He appears well-developed. He is cooperative. He does not have a sickly appearance. No distress. HENT:   Head: Normocephalic and atraumatic.    Right Ear: Hearing normal.   Left Ear: Hearing normal.   Nose: Nose normal. No rhinorrhea. Mouth/Throat: Oropharynx is clear and moist and mucous membranes are normal.   Eyes: Pupils are equal, round, and reactive to light. Conjunctivae, EOM and lids are normal. Right conjunctiva is not injected. Left conjunctiva is not injected. Right eye exhibits normal extraocular motion. Left eye exhibits normal extraocular motion. Right pupil is reactive. Left pupil is reactive. Pupils are equal.   Neck: Trachea normal and phonation normal. Neck supple. Carotid bruit is not present. No tracheal deviation present. No thyromegaly present. Cardiovascular: Normal rate, regular rhythm, intact distal pulses and normal pulses. Murmur heard. +1 pitting edema bilateral lower extremities. Pulmonary/Chest: Effort normal and breath sounds normal. No stridor. No respiratory distress. He has no decreased breath sounds. Abdominal: Soft. Bowel sounds are normal. He exhibits no distension and no mass. There is no hepatosplenomegaly. There is no tenderness. There is no guarding. A hernia is present. Reducible hernia. Musculoskeletal: He exhibits no edema or tenderness. Neurological: He is alert and oriented to person, place, and time. He is not disoriented. No cranial nerve deficit. Skin: Skin is warm, dry and intact. Capillary refill takes less than 2 seconds. No lesion and no rash noted. He is not diaphoretic. No erythema. Psychiatric: He has a normal mood and affect. His speech is normal and behavior is normal. He is not actively hallucinating. Cognition and memory are normal.   Nursing note and vitals reviewed.       Investigations:      Laboratory Testing:  Recent Results (from the past 24 hour(s))   TYPE AND SCREEN    Collection Time: 06/04/19  7:45 PM   Result Value Ref Range    Expiration Date 06/07/2019     Arm Band Number [id number]     ABO/Rh O POSITIVE     Antibody Screen NEGATIVE     Unit Number [id number]     Product Code Leukocyte Reduced Red Cell     Unit Divison 00     Dispense 105 98 - 107 mmol/L    CO2 23 20 - 31 mmol/L    Anion Gap 9 9 - 17 mmol/L    GFR Non-African American >60 >60 mL/min    GFR African American >60 >60 mL/min    GFR Comment          GFR Staging NOT REPORTED    Magnesium    Collection Time: 06/04/19  7:45 PM   Result Value Ref Range    Magnesium 1.8 1.6 - 2.6 mg/dL   FERRITIN    Collection Time: 06/04/19  7:45 PM   Result Value Ref Range    Ferritin 19 (L) 30 - 400 ug/L   Iron Binding Cap. Collection Time: 06/04/19  7:45 PM   Result Value Ref Range    Iron 10 (L) 59 - 158 ug/dL    TIBC 301 250 - 450 ug/dL    Iron Saturation 3 (L) 20 - 55 %    UIBC 291 112 - 347 ug/dL   Retic Count    Collection Time: 06/04/19  7:45 PM   Result Value Ref Range    Retic % 3.9 (H) 0.5 - 1.9 %    Absolute Retic # 0.129 (H) 0.030 - 0.080 M/uL    Immature Retic Fract 17.000 2.7 - 18.3 %    Retic Hemoglobin 18.6 (L) 28.2 - 35.7 pg       Imaging/Diagnostics:    Xr Chest Portable    Result Date: 6/4/2019  No acute findings in the chest.       Assessment :      Primary Problem  Microcytic hypochromic anemia    Active Hospital Problems    Diagnosis Date Noted    Essential hypertension [I10]     MGUS (monoclonal gammopathy of unknown significance) [D47.2]     Microcytic hypochromic anemia [D50.9]     Leukopenia [D72.819]        Plan:     Patient status Admit as inpatient to the medical surgical unit. 1. Consult hematology/oncology- Dr. Silvio Meza  2. Monitor H&H, evaluate for additional PRBC transfusion need. 3. Resume selected home medications. 4. Supplemental oxygen as needed. 5. Neutropenic precautions. 6. IV hydration. 7. Electrolyte replacement per sliding scale. 8. Telemetry. 9. EPC cuffs. 10. General diet. 11. Activity as tolerated with assist.     Plan of care discussed with patient and primary RN.      Consultations:   IP CONSULT TO ONCOLOGY  IP CONSULT TO INTERNAL MEDICINE  IP CONSULT TO ONCOLOGY    Patient is admitted as inpatient status because of co-morbidities listed above, severity of signs and symptoms as outlined, requirement for current medical therapies and most importantly because of direct risk to patient if care not provided in a hospital setting.     SCARLET Da Silva CNP  6/4/2019  10:35 PM    Copy sent to Dr. Oneil Reyes MD

## 2019-06-05 NOTE — DISCHARGE SUMMARY
[facility]    Discharge Summary     Patient ID: Viral Rod  :  1950   MRN: [id number]     ACCOUNT:  [de-identified]   Patient's PCP: Amanda Elias MD  Admit Date: 2019   Discharge Date: 2019    Length of Stay: 1  Code Status:  Full Code  Admitting Physician: Fatemeh Johnson MD  Discharge Physician: Fatemeh Johnson MD     Active Discharge Diagnoses:     Hospital Problem Lists:  Principal Problem:    Microcytic hypochromic anemia  Active Problems:    Leukopenia    MGUS (monoclonal gammopathy of unknown significance)    Essential hypertension    Anemia  Resolved Problems:    * No resolved hospital problems. *      Admission Condition:  fair     Discharged Condition: good    Hospital Stay:   Admitting history:  The patient is a 79 y.o.  male who presents to the hospital by recommendation of his primary care provider for evaluation of low hemoglobin level. The patient was visiting his PCP, who ordered lab work. He was found to be anemic with a hbg of ~6. The patient has chronic iron deficiency anemia as well as monoclonal gammopathy of unknown origin. He has chronic pancytopenia. The patient has had previous blood transfusions. The patient denies melena, hematochezia, malaise, lightheadedness, shortness of breath or chest pain. He will be admitted for further evaluation and treatment.      WBC 0.8, hbg 7.1, hct 26.4.  Subsequently 1 unit PRBC was ordered for transfusion in the ER.      He sees Dr. Delaney Hodgson outpatient with hematology/oncology.       Hospital Course:    Patient got 1 unit of PRBC transfused  Is completely asymptomatic   Repeat hemoglobin is still 7.1  No obvious source of bleeding  Stool occult blood will be done as outpatient since he could not give a stool sample here  Leukopenia is chronic since   Hematology/oncology is okay with his discharge and he will follow-up with them    Primary Problem  Microcytic hypochromic anemia-status post 1 unit PRBC transfusion  History of gastric ulcer       Active Hospital Problems     Diagnosis Date Noted    Essential hypertension [I10]      MGUS (monoclonal gammopathy of unknown significance) [D47.2]      Microcytic hypochromic anemia [D50.9]      Leukopenia [D72.819]           Plan:         1. Check stool for occult blood as outpatient since he could not give his stool sample in hospital  2. Continue oral PPIs  3.  Discharge home , cleared by oncology,             Significant therapeutic interventions: PRBC transfusion    Significant Diagnostic Studies:   Labs / Micro:  CBC:   Lab Results   Component Value Date    WBC 0.7 06/05/2019    RBC 3.12 06/05/2019    HGB 7.1 06/05/2019    HCT 26.0 06/05/2019    MCV 79.2 06/05/2019    MCH 22.4 06/05/2019    MCHC 28.3 06/05/2019    RDW 16.2 06/05/2019     06/05/2019     BMP:    Lab Results   Component Value Date    GLUCOSE 203 06/05/2019     06/05/2019    K 3.5 06/05/2019     06/05/2019    CO2 22 06/05/2019    ANIONGAP 9 06/05/2019    BUN 10 06/05/2019    CREATININE 0.95 06/05/2019    BUNCRER 11 06/05/2019    CALCIUM 8.0 06/05/2019    LABGLOM >60 06/05/2019    GFRAA >60 06/05/2019    GFR      06/05/2019    GFR NOT REPORTED 06/05/2019     HFP:    Lab Results   Component Value Date    PROT 5.9 12/01/2018     CMP:    Lab Results   Component Value Date    GLUCOSE 203 06/05/2019     06/05/2019    K 3.5 06/05/2019     06/05/2019    CO2 22 06/05/2019    BUN 10 06/05/2019    CREATININE 0.95 06/05/2019    ANIONGAP 9 06/05/2019    ALKPHOS 93 12/01/2018    ALT 15 12/01/2018    AST 19 12/01/2018    BILITOT 0.58 12/01/2018    LABALBU 2.5 12/01/2018    ALBUMIN NOT REPORTED 12/01/2018    LABGLOM >60 06/05/2019    GFRAA >60 06/05/2019    GFR      06/05/2019    GFR NOT REPORTED 06/05/2019    PROT 5.9 12/01/2018    CALCIUM 8.0 06/05/2019     PT/INR:  No results found for: PTINR  PTT:   Lab Results   Component Value Date APTT 24.7 02/24/2015     FLP:    Lab Results   Component Value Date    CHOL 156 04/11/2019    CHOL 235 03/26/2014    TRIG 80 04/11/2019    HDL 62 04/11/2019     U/A:    Lab Results   Component Value Date    COLORU YELLOW 06/05/2019    TURBIDITY CLEAR 06/05/2019    SPECGRAV 1.010 06/05/2019    HGBUR NEGATIVE 06/05/2019    PHUR 7.0 06/05/2019    PROTEINU NEGATIVE 06/05/2019    GLUCOSEU NEGATIVE 06/05/2019    KETUA NEGATIVE 06/05/2019    BILIRUBINUR NEGATIVE 06/05/2019    UROBILINOGEN Normal 06/05/2019    NITRU NEGATIVE 06/05/2019    LEUKOCYTESUR NEGATIVE 06/05/2019     TSH:    Lab Results   Component Value Date    TSH 1.11 12/02/2018        Radiology:  Xr Chest Portable    Result Date: 6/4/2019  No acute findings in the chest.       Consultations:    Consults:     Final Specialist Recommendations/Findings:   IP CONSULT TO ONCOLOGY  IP CONSULT TO INTERNAL MEDICINE  IP CONSULT TO ONCOLOGY      The patient was seen and examined on day of discharge and this discharge summary is in conjunction with any daily progress note from day of discharge. Discharge plan:     Disposition: Home    Physician Follow Up:     Steve Cota MD  [address] [place] [address]    Schedule an appointment as soon as possible for a visit in 1 week         Requiring Further Evaluation/Follow Up POST HOSPITALIZATION/Incidental Findings: Get stool occult blood rechecked and follow with oncology    Diet: cardiac diet    Activity: As tolerated    Instructions to Patient: Avoid NSAIDs and continue oral PPI    Discharge Medications:      Medication List      CHANGE how you take these medications    ergocalciferol 10194 units capsule  Commonly known as:  ERGOCALCIFEROL  Take 1 capsule by mouth once a week.   What changed:  how much to take        CONTINUE taking these medications    calcium carbonate 500 MG Tabs tablet  Commonly known as:  OSCAL     cyclobenzaprine 10 MG tablet  Commonly known as:  FLEXERIL  TAKE 1 TABLET BY MOUTH 3 TIMES DAILY AS NEEDED FOR MUSCLE SPASMS     docusate sodium 100 MG capsule  Commonly known as:  COLACE  Take 1 capsule by mouth daily as needed for Constipation     ferrous sulfate 325 (65 Fe) MG tablet     hydrochlorothiazide 25 MG tablet  Commonly known as:  HYDRODIURIL  TAKE ONE TABLET BY MOUTH ONE TIME A DAY     Magnesium 400 MG Caps     MULTI COMPLETE PO     pantoprazole 40 MG tablet  Commonly known as:  PROTONIX  TAKE ONE TABLET BY MOUTH EVERY MORNING BEFORE BREAKFAST     potassium chloride 20 MEQ extended release tablet  Commonly known as:  KLOR-CON M  TAKE 1 TABLET DAILY            Time Spent on discharge is  20 mins in patient examination, evaluation, counseling as well as medication reconciliation, prescriptions for required medications, discharge plan and follow up. Electronically signed by   Rere Darling MD  6/5/2019  3:54 PM      Thank you Dr. Keely Pineda MD for the opportunity to be involved in this patient's care.

## 2019-06-05 NOTE — CONSULTS
_                         Today's Date: 6/5/2019  Patient Name: Veronika Souza  Date of admission: 6/4/2019  7:23 PM  Patient's age: 71 y.o., 1950  Admission Dx: Anemia [D64.9]      Requesting Physician: Tiffanie Aguillon MD    CHIEF COMPLAINT:  Severe anemia. History Obtained From:  patient, electronic medical record    HISTORY OF PRESENT ILLNESS:      The patient is a 71 y.o.  male who is admitted to the hospital for further imaged with severe anemia. Patient had multiple comorbidities as listed. He [place] planning [place] outside the country. He was evasive by his PCP and had blood work which shows severe anemia. Patient has minimal symptoms related to that problem. Mild weakness and fatigue. No dizziness. No palpitation. He denies any active bleeding. No hematemesis or melena. No hematochezia. No fever or infections. No recent weight loss or decreased appetite. Patient denies smoking or alcohol drinking. Past Medical History:   has a past medical history of Anemia, Arthritis, Avascular necrosis ([address] BPH (benign prostatic hyperplasia), History of blood transfusion, Hypertension, Leukopenia, MGUS (monoclonal gammopathy of unknown significance), Osteoarthritis, Patient in clinical research study, and Stomach ulcer. Past Surgical History:   has a past surgical history that includes Endoscopy, colon, diagnostic (07/13/2014); Prostate Biopsy (09/12/2014); Hip Arthroplasty (Right, 12/03/14); Hip Arthroplasty (Left, 03/10/15); Prostatectomy (12/16/2015); Colonoscopy (07/14/2014); Upper gastrointestinal endoscopy (10/19/2016); pr office/outpt visit,procedure only (Left, 11/29/2018); Inguinal hernia repair (Right, 2009); and Inguinal hernia repair (Left, 11/29/2018). Family History: family history includes High Blood Pressure in his father and mother. Social History:   reports that he has never smoked.  He has never used smokeless tobacco. He reports that he does not drink alcohol or use drugs. Medications:    Prior to Admission medications    Medication Sig Start Date End Date Taking? Authorizing Provider   ferrous sulfate 325 (65 Fe) MG tablet Take 325 mg by mouth daily (with breakfast)   Yes Historical Provider, MD   Magnesium 400 MG CAPS Take 400 mg by mouth every other day   Yes Historical Provider, MD   pantoprazole (PROTONIX) 40 MG tablet TAKE ONE TABLET BY MOUTH EVERY MORNING BEFORE BREAKFAST 4/26/19  Yes Laneta Handler, APRN - CNP   hydrochlorothiazide (HYDRODIURIL) 25 MG tablet TAKE ONE TABLET BY MOUTH ONE TIME A DAY 4/26/19  Yes Ernestina Quince Esters, APRN - CNP   cyclobenzaprine (FLEXERIL) 10 MG tablet TAKE 1 TABLET BY MOUTH 3 TIMES DAILY AS NEEDED FOR MUSCLE SPASMS 3/29/19  Yes Desean Rollins MD   potassium chloride (KLOR-CON M) 20 MEQ extended release tablet TAKE 1 TABLET DAILY 1/28/19  Yes Desean Rollins MD   calcium carbonate (OSCAL) 500 MG TABS tablet Take 500 mg by mouth daily   Yes Historical Provider, MD   docusate sodium (COLACE) 100 MG capsule Take 1 capsule by mouth daily as needed for Constipation 12/17/15  Yes Randy Watts MD   Multiple Vitamins-Minerals (MULTI COMPLETE PO) Take 1 tablet by mouth daily. Yes Historical Provider, MD   vitamin D (ERGOCALCIFEROL) 87458 UNITS capsule Take 1 capsule by mouth once a week. Patient taking differently: Take 2,000 Units by mouth once a week.  7/16/14  Yes Salomon Contreras MD     Current Facility-Administered Medications   Medication Dose Route Frequency Provider Last Rate Last Dose    calcium carbonate (TUMS) chewable tablet 500 mg  500 mg Oral Daily Gilbert Lagunas MD        therapeutic multivitamin-minerals 1 tablet  1 tablet Oral Daily Gilbert Lagunas MD        magnesium oxide (MAG-OX) tablet 400 mg  400 mg Oral Every Other Day Gilbert Lagunas MD   400 mg at 06/05/19 1227    ondansetron (ZOFRAN-ODT) disintegrating tablet 4 mg  4 mg Oral Q6H PRN Gilbert Lagunas MD        Or   Meghana Larose ondansetron (ZOFRAN) injection 4 mg  4 mg Intravenous Q6H PRN Sofiya Leiva MD        iron sucrose (VENOFER) 400 mg in sodium chloride 0.9 % 250 mL IVPB  400 mg Intravenous Once Dhaval MD Zamzam 108 mL/hr at 06/05/19 1227 400 mg at 06/05/19 1227    ferrous sulfate EC tablet 325 mg  325 mg Oral Daily with breakfast SCARLET Parosns CNP   325 mg at 06/05/19 0829    hydrochlorothiazide (HYDRODIURIL) tablet 25 mg  25 mg Oral Daily SCARLET Parsons CNP   25 mg at 06/05/19 0829    pantoprazole (PROTONIX) tablet 40 mg  40 mg Oral QAM AC SCARLET Parsons CNP   40 mg at 06/05/19 6607    potassium chloride (KLOR-CON M) extended release tablet 20 mEq  20 mEq Oral Daily with breakfast SCARLET Parsons CNP   20 mEq at 06/05/19 0808    ergocalciferol capsule 50,000 Units  50,000 Units Oral Weekly SCARLET Parsons CNP        sodium chloride flush 0.9 % injection 10 mL  10 mL Intravenous 2 times per day SCARLET Parsons CNP        sodium chloride flush 0.9 % injection 10 mL  10 mL Intravenous PRN SCARLET Parsons CNP   10 mL at 06/05/19 1227    potassium chloride (KLOR-CON M) extended release tablet 40 mEq  40 mEq Oral PRN SCARLET Parsons CNP        Or    potassium bicarb-citric acid (EFFER-K) effervescent tablet 40 mEq  40 mEq Oral PRN SCARLET Parsons CNP        Or    potassium chloride 10 mEq/100 mL IVPB (Peripheral Line)  10 mEq Intravenous PRN SCARLET Parsons CNP        magnesium sulfate 1 g in dextrose 5% 100 mL IVPB  1 g Intravenous PRN SCARLET Parsons CNP        magnesium hydroxide (MILK OF MAGNESIA) 400 MG/5ML suspension 30 mL  30 mL Oral Daily PRN SCARLET Parsons CNP        nicotine (NICODERM CQ) 21 MG/24HR 1 patch  1 patch Transdermal Daily PRN SCARLET Parsons CNP        acetaminophen (TYLENOL) tablet 650 mg  650 mg Oral Q4H PRN SCARLET Parsons - CNP           Allergies:  Patient has no known allergies. REVIEW OF SYSTEMS:      · General: Positive for weakness and fatigue. . No unanticipated weight loss or decreased appetite. No fever or chills. · Eyes: No blurred vision, eye pain or double vision. · Ears: No hearing problems or drainage. No tinnitus. · Throat: No sore throat, problems with swallowing or dysphagia. · Respiratory: No cough, sputum or hemoptysis. No shortness of breath. No pleuritic chest pain. · Cardiovascular: No chest pain, orthopnea or PND. No lower extremity edema. No palpitation. · Gastrointestinal: No problems with swallowing. No abdominal pain or bloating. No nausea or vomiting. No diarrhea or constipation. No GI bleeding. · Genitourinary: No dysuria, hematuria, frequency or urgency. · Musculoskeletal: No muscle aches or pains. No limitation of movement. No back pain. No gait disturbance, No joint complaints. · Dermatologic: No skin rashes or pruritus. No skin lesions or discolorations. · Psychiatric: No depression, anxiety, or stress or signs of schizophrenia. No change in mood or affect. · Hematologic: No history of bleeding tendency. No bruises or ecchymosis. No history of clotting problems. · Infectious disease: No fever, chills or frequent infections. · Endocrine: No problems with obesity. No polydipsia or polyuria. No temperature intolerance. · Neurologic: No headaches or dizziness. No weakness or numbness of the extremities. No changes in balance, coordination,  memory, mentation, behavior. · Allergic/Immunologic: No nasal congestion or hives. No repeated infections.        PHYSICAL EXAM:      /69   Pulse 76   Temp 98.6 °F (37 °C) (Oral)   Resp 17   Ht 6' (1.829 m)   Wt 161 lb 8 oz (73.3 kg)   SpO2 100%   BMI 21.90 kg/m²    Temp (24hrs), Av.9 °F (37.2 °C), Min:98.5 °F (36.9 °C), Max:99.6 °F (37.6 °C)      General appearance - well appearing, not in pain or distress  Mental status - good mood, alert and oriented  Eyes - pupils equal and reactive, extraocular eye movements intact  Ears - bilateral TM's and external ear canals normal  Nose - normal and patent, no erythema, discharge or polyps  Mouth - mucous membranes moist, pharynx normal without lesions  Neck - supple, no significant adenopathy  Lymphatics - no palpable lymphadenopathy, no hepatosplenomegaly  Chest - clear to auscultation, no wheezes, rales or rhonchi, symmetric air entry  Heart - normal rate, regular rhythm, normal S1, S2, no murmurs, rubs, clicks or gallops  Abdomen - soft, nontender, nondistended, no masses or organomegaly  Neurological - alert, oriented, normal speech, no focal findings or movement disorder noted  Musculoskeletal - no joint tenderness, deformity or swelling  Extremities - peripheral pulses normal, no pedal edema, no clubbing or cyanosis  Skin - normal coloration and turgor, no rashes, no suspicious skin lesions noted           DATA:      Labs:       CBC:   Recent Labs     06/04/19 1945 06/05/19  0246 06/05/19  1056   WBC 0.8* 0.7*  --    HGB 7.1* 7.0* 7.1*   HCT 26.4* 24.7* 26.0*    126*  --      BMP:   Recent Labs     06/04/19 1945 06/05/19  0246    135   K 3.8 3.5*   CO2 23 22   BUN 11 10   CREATININE 0.91 0.95   LABGLOM >60 >60   GLUCOSE 129* 203*     PT/INR:   Recent Labs     06/05/19  0246   PROTIME 11.5   INR 1.1     APTT:No results for input(s): APTT in the last 72 hours. LIVER PROFILE:No results for input(s): AST, ALT, LABALBU in the last 72 hours. IMPRESSION:    Primary Problem  Microcytic hypochromic anemia    Active Hospital Problems    Diagnosis Date Noted    Essential hypertension [I10]     MGUS (monoclonal gammopathy of unknown significance) [D47.2]     Microcytic hypochromic anemia [D50.9]     Leukopenia [D72.819]        RECOMMENDATIONS:  1. Patient is having severe iron deficiency anemia. Status post blood transfusion. Abdomen is now stable.   He has no active bleeding. He had previous GI workup which was negative. He had previous bone marrow test which showed MGUS. He had persistent leukopenia. No repeated infections. 2. I will give the patient IV iron infusion. 3. Patient can be discharged after the iron infusion and we will evaluate him in 2-3 weeks with repeated labs. 4. No need for blood transfusion for hemoglobin above 7.  5. Patient's questions were answered to the best of his satisfaction and he verbalized full understanding and agreement. 6. Thank you for allowing us to participate in the care of this pleasant patient. Discussed with patient and Nurse. MD Corine Zambrano MD  Cell: [phone]    This note is created with the assistance of a speech recognition program.  While intending to generate a document that actually reflects the content of the visit, the document can still have some errors including those of syntax and sound a like substitutions which may escape proof reading. It such instances, actual meaning can be extrapolated by contextual diversion.

## 2019-06-05 NOTE — CARE COORDINATION
Case Management Initial Discharge Plan  Uriel Fajardo,             Met with:patient to discuss discharge plans. Information verified: address, contacts, phone number, , insurance Yes  PCP: Anam Junior MD  Date of last visit: 19    Insurance Provider: Medicare, [facility] [place]    Discharge Planning    Living Arrangements:  Spouse/Significant Other, grandson   Support Systems:  Spouse/Significant Other, Family Members    Home has 1 stories  1 stairs to climb to get into front door, 0 stairs to climb to reach second floor  Location of bedroom/bathroom in home  - main floor    Patient able to perform ADL's:Independent    Current Services (outpatient & in home) none  DME equipment: none  DME provider: N/A    Pharmacy: [facility]   Potential Assistance Purchasing Medications:  No  Does patient want to participate in local refill/ meds to beds program?  No    Potential Assistance Needed:  N/A    Patient agreeable to home care: No  [place] of choice provided:  n/a    Prior SNF/Rehab Placement and Facility: No  Agreeable to SNF/Rehab: No  [place] of choice provided: n/a   Evaluation: n/a    Expected Discharge date:  19  Patient expects to be discharged to:  home  Follow Up Appointment: Best Day/ Time: Tuesday AM    Transportation provider: wife  Transportation arrangements needed for discharge: No    Readmission Risk              Risk of Unplanned Readmission:        17             Does patient have a readmission risk score greater than 14?: Yes  If yes, follow-up appointment must be made within 7 days of discharge. Discharge Plan: Met with patient at bedside. Lives with wife and grandson, independent and drives. Wife is RN at [employer]. V's. Had labs drawn yesterday and contaced by Dr. Jamaica Burciaga office  to be admitted for low hemoglobin. Had 1 unit of blood last night and possible discharge today. Follows with Dr. Willie Musa every 6 months for anemia and pancytopenia.   Patients wife will make hospital F/U appointment with Dr. Shannan Valverde.           Electronically signed by Mattie Salgado RN on 6/5/19 at 10:39 AM

## 2019-06-05 NOTE — CARE COORDINATION
[address] Transitions Interview     2019    Patient: Steffi Kaufman Patient : 1950   MRN: [id number]    Reason for Admission: MGUS, anemia, neutropenia  RARS: Readmission Risk Score: 17  CMRS 1       Spoke with: Candelario    Met with pt in his room. Writer reviewed role of CTC following discharge- v/u. Contact information provided. Agreeable to transition calls   Writer familiar with pt from previous transition. Wife ICU/ Life [employer] nurse for [employer]   Discharge pending Dr Sharla Fall      Readmission Risk  Patient Active Problem List   Diagnosis    Leukopenia    Microcytic hypochromic anemia    Pancytopenia (HCC)    MGUS (monoclonal gammopathy of unknown significance)    PSA elevation    Neutropenia (HCC)    Osteoarthritis    Hyponatremia    S/P hip replacement    Prostate cancer (HCC)    Malabsorption    Hematemesis without nausea    Upper GI bleed    Essential hypertension    History of hematemesis    Pancytopenia [facility])       Inpatient Assessment  Care Transitions Summary    Care Transitions Inpatient Review  Medication Review  Do you have all of your prescriptions and are they filled?:  Yes   How often do you have difficulty taking your medications?:  I always take them as prescribed. Housing Review  Who do you live with?:  Partner/Spouse/SO, Grandchild  Are you an active caregiver in your home?:  No  Social Support  Durable Medical Equipment  Functional Review  Ability to seek help/take action for Emergent/Urgent situations i.e. fire, crime, inclement weather or health crisis. :  Independent  Ability handle personal hygiene needs (bathing/dressing/grooming): Independent  Ability to manage medications: Independent  Ability to prepare food:  Independent  Ability to maintain home (clean home, laundry): Independent  Ability to drive and/or has transportation:  Independent  Ability to do shopping:  Independent  Ability to manage finances:   Independent  Is patient able to live independently?:  Yes  Hearing and Vision  Care Transitions Interventions         Follow Up  Future Appointments   Date Time Provider Krystal Deanne   8/15/2019 11:30 AM SCHEDULE, [facility]  [facility]   8/22/2019  2:00 PM Adilia Ortega MD [facility]   10/2/2019  1:00 PM Laurie Conway MD [address] [place] [address]  There are no preventive care reminders to display for this patient.     Sahil Sanchez, RN

## 2019-06-05 NOTE — PLAN OF CARE
Problem: HEMODYNAMIC STATUS  Goal: Patient has stable vital signs and fluid balance  6/5/2019 1421 by Delilah Stein RN  Outcome: Ongoing  6/5/2019 0512 by Simeon Blankenship RN  Outcome: Ongoing  Note:   Patient came in due to low hemoglobin.  Received 1 unit of packed RBC, doctor will reevaluate in morning, will continue to monitor     Problem: IP BALANCE  Goal: LTG - Patient will maintain balance to allow for safe/functional mobility  Outcome: Ongoing

## 2019-06-05 NOTE — PROGRESS NOTES
Patient admitted to room 2008-02 from ED. Orders and database reviewed with patient. Call light and bedside table in reach. Bed mechanics in place.  Patient has no further questions at this time

## 2019-06-05 NOTE — PROGRESS NOTES
and Inguinal hernia repair (Left, 11/29/2018). Restrictions  Restrictions/Precautions  Restrictions/Precautions: General Precautions  Vision/Hearing  Vision: Within Functional Limits  Hearing: Within functional limits     Subjective  General  Chart Reviewed: Yes  Patient assessed for rehabilitation services?: Yes  Family / Caregiver Present: No  Follows Commands: Within Functional Limits  Pain Screening  Patient Currently in Pain: Denies          Orientation  Orientation  Overall Orientation Status: Within Normal Limits  Social/Functional History  Social/Functional History  Lives With: Spouse  Type of Home: House  ADL Assistance: Independent  Homemaking Assistance: Independent  Ambulation Assistance: Independent  Transfer Assistance: Independent  Active : Yes  Mode of Transportation: Car  Leisure & Hobbies: very active  Cognition        Objective     Observation/Palpation  Posture: Good  Observation: pt cooperative, reports he feels fine. AROM RLE (degrees)  RLE AROM: WNL  AROM LLE (degrees)  LLE AROM : WNL  AROM RUE (degrees)  RUE AROM : WNL  AROM LUE (degrees)  LUE AROM : WNL  Strength RLE  Strength RLE: WNL  Strength LLE  Strength LLE: WNL  Strength RUE  Strength RUE: WNL  Strength LUE  Strength LUE: WNL  Tone RLE  RLE Tone: Normotonic  Tone LLE  LLE Tone: Normotonic  Sensation  Overall Sensation Status: WNL  Bed mobility  Bridging: Independent  Rolling to Left: Independent  Rolling to Right: Independent  Supine to Sit: Independent  Sit to Supine: Independent  Scooting: Independent  Transfers  Sit to Stand: Independent  Stand to sit: Independent  Bed to Chair: Independent  Ambulation  Ambulation?: Yes  Ambulation 1  Surface: level tile  Device: No Device  Assistance: Independent  Gait Deviations: None  Distance: 50ft. Comments: Able to pick object up off floor indep.       Balance  Posture: Good  Sitting - Static: Good  Sitting - Dynamic: Good  Standing - Static: Good  Standing - Dynamic: Good Plan   Plan  Times per week: d/c  Safety Devices  Type of devices: Call light within reach, Left in bed, Nurse notified    Goals  Patient Goals   Patient goals : D/C PT as pt. is indep. Therapy Time   Individual Concurrent Group Co-treatment   Time In 0817         Time Out 0855         Minutes 38             treatment min.  [facility], Victoriano Albright, PT

## 2019-06-05 NOTE — PROGRESS NOTES
Occupational Therapy   Occupational Therapy Initial Assessment  Date: 2019   Patient Name: Yvette Bagley  MRN: [id number]     : 1950    Date of Service: 2019    Discharge Recommendations:  Home independently     RN reports patient is medically stable for therapy treatment this date. Chart reviewed prior to treatment and patient is agreeable for therapy. All lines intact and patient positioned comfortably at end of treatment. All patient needs addressed prior to ending therapy session. Assessment   Prognosis: Good  Decision Making: Medium Complexity  Patient Education: OT eval day only- no further OT tx warranted at this time. Education on pacing, EC/WS techs, and general safety for home (pt verb and demo's understanding)  No Skilled OT: Safe to return home  REQUIRES OT FOLLOW UP: No  Activity Tolerance  Activity Tolerance: Patient Tolerated treatment well  Safety Devices  Safety Devices in place: Yes  Type of devices: Call light within reach;Nurse notified;Gait belt;Left in bed           Patient Diagnosis(es): The primary encounter diagnosis was MGUS (monoclonal gammopathy of unknown significance). Diagnoses of Anemia, unspecified type and Neutropenia, unspecified type [facility]) were also pertinent to this visit. has a past medical history of Anemia, Arthritis, Avascular necrosis ([address] BPH (benign prostatic hyperplasia), History of blood transfusion, Hypertension, Leukopenia, MGUS (monoclonal gammopathy of unknown significance), Osteoarthritis, Patient in clinical research study, and Stomach ulcer. has a past surgical history that includes Endoscopy, colon, diagnostic (2014); Prostate Biopsy (2014); Hip Arthroplasty (Right, 14); Hip Arthroplasty (Left, 03/10/15); Prostatectomy (2015); Colonoscopy (2014); Upper gastrointestinal endoscopy (10/19/2016); pr office/outpt visit,procedure only (Left, 2018);  Inguinal hernia repair (Right, ); and Inguinal hernia repair (Left, 11/29/2018). Restrictions  Restrictions/Precautions  Restrictions/Precautions: General Precautions    Subjective   General  Chart Reviewed: Yes  Patient assessed for rehabilitation services?: Yes  Family / Caregiver Present: No     Social/Functional History  Social/Functional History  Lives With: Spouse  Type of Home: House  ADL Assistance: Independent  Homemaking Assistance: Independent  Ambulation Assistance: Independent  Transfer Assistance: Independent  Active : Yes  Mode of Transportation: [employer] [address]: very active       Objective   Vision: Within Functional Limits  Hearing: Within functional limits    Orientation  Overall Orientation Status: Within Normal Limits  Observation/Palpation  Posture: Good  Observation: pt cooperative, reports he feels fine. Balance  Sitting Balance: Independent  Standing Balance: Independent  Functional Mobility  Functional - Mobility Device: No device  Assist Level: Independent  Functional Mobility Comments: pt completed functional mob in [facility] and good safety. Toilet Transfers  Equipment Used: Standard toilet  Toilet Transfer: Independent  ADL  Feeding: Independent  Grooming: Independent  UE Bathing: Independent  LE Bathing: Independent  UE Dressing: Independent  LE Dressing: Independent  Toileting: Independent  Tone RUE  RUE Tone: Normotonic  Tone LUE  LUE Tone: Normotonic  Coordination  Movements Are Fluid And Coordinated: Yes     Bed mobility  Bridging: Independent  Rolling to Left: Independent  Rolling to Right: Independent  Supine to Sit: Independent  Sit to Supine: Independent  Scooting: Independent  Transfers  Sit to stand: Independent  Stand to sit:  Independent     Cognition  Overall Cognitive Status: WNL  Perception  Overall Perceptual Status: WFL     Sensation  Overall Sensation Status: WNL        LUE AROM (degrees)  LUE AROM : WNL  RUE AROM (degrees)  RUE AROM : WNL  LUE Strength  Gross LUE Strength: WFL  LUE Strength Comment: B UE's 5/5  RUE Strength  Gross RUE Strength: WFL                   Plan   Plan  Times per week: 1 visit with joel  Current Treatment Recommendations: Patient/Caregiver Education & Training    G-Code     OutComes Score                                                  AM-PAC Score             Goals  Short term goals  Time Frame for Short term goals: 1 visit with joel  Short term goal 1: pt will demo and verb good understanding of general safety and pacing/EC/WS techs for home and in community. (GOAL MET)  Patient Goals   Patient goals : to go home ASAP       Therapy Time   Individual Concurrent Group Co-treatment   Time In 8831         Time Out 0905         Minutes 28             .     Darci Gu OT

## 2019-06-05 NOTE — PLAN OF CARE
Problem: HEMODYNAMIC STATUS  Goal: Patient has stable vital signs and fluid balance  Outcome: Ongoing  Note:   Patient came in due to low hemoglobin.  Received 1 unit of packed RBC, doctor will reevaluate in morning, will continue to monitor

## 2019-06-06 ENCOUNTER — HOSPITAL ENCOUNTER (OUTPATIENT)
Age: 69
Setting detail: SPECIMEN
Discharge: HOME | End: 2019-06-06
Payer: COMMERCIAL

## 2019-06-06 ENCOUNTER — CARE COORDINATION (OUTPATIENT)
Dept: CASE MANAGEMENT | Age: 69
End: 2019-06-06

## 2019-06-06 DIAGNOSIS — D50.9 IRON DEFICIENCY ANEMIA, UNSPECIFIED IRON DEFICIENCY ANEMIA TYPE: ICD-10-CM

## 2019-06-06 DIAGNOSIS — D50.9 IRON DEFICIENCY ANEMIA, UNSPECIFIED IRON DEFICIENCY ANEMIA TYPE: Primary | ICD-10-CM

## 2019-06-06 DIAGNOSIS — D50.9 MICROCYTIC HYPOCHROMIC ANEMIA: ICD-10-CM

## 2019-06-06 LAB
CULTURE: NO GROWTH
Lab: NORMAL
SPECIMEN DESCRIPTION: NORMAL

## 2019-06-06 PROCEDURE — 82272 OCCULT BLD FECES 1-3 TESTS: CPT

## 2019-06-07 LAB
DATE, STOOL #1: ABNORMAL
DATE, STOOL #2: ABNORMAL
DATE, STOOL #3: ABNORMAL
HEMOCCULT SP1 STL QL: POSITIVE
HEMOCCULT SP2 STL QL: ABNORMAL
HEMOCCULT SP3 STL QL: ABNORMAL
TIME, STOOL #1: 1500
TIME, STOOL #2: ABNORMAL
TIME, STOOL #3: ABNORMAL

## 2019-06-08 LAB
ABO/RH: NORMAL
ANTIBODY SCREEN: NEGATIVE
ARM BAND NUMBER: NORMAL
BLD PROD TYP BPU: NORMAL
CROSSMATCH RESULT: NORMAL
DISPENSE STATUS BLOOD BANK: NORMAL
EXPIRATION DATE: NORMAL
TRANSFUSION STATUS: NORMAL
UNIT DIVISION: 0
UNIT NUMBER: NORMAL

## 2019-06-11 ENCOUNTER — TELEPHONE (OUTPATIENT)
Dept: FAMILY MEDICINE CLINIC | Age: 69
End: 2019-06-11

## 2019-06-11 ENCOUNTER — HOSPITAL ENCOUNTER (OUTPATIENT)
Age: 69
Setting detail: SPECIMEN
Discharge: HOME | End: 2019-06-11
Payer: MEDICARE

## 2019-06-11 ENCOUNTER — OFFICE VISIT (OUTPATIENT)
Dept: FAMILY MEDICINE CLINIC | Age: 69
End: 2019-06-11
Payer: COMMERCIAL

## 2019-06-11 VITALS
OXYGEN SATURATION: 99 % | HEART RATE: 81 BPM | WEIGHT: 161.5 LBS | BODY MASS INDEX: 21.9 KG/M2 | DIASTOLIC BLOOD PRESSURE: 70 MMHG | SYSTOLIC BLOOD PRESSURE: 138 MMHG

## 2019-06-11 DIAGNOSIS — D50.9 IRON DEFICIENCY ANEMIA, UNSPECIFIED IRON DEFICIENCY ANEMIA TYPE: ICD-10-CM

## 2019-06-11 DIAGNOSIS — D47.2 MGUS (MONOCLONAL GAMMOPATHY OF UNKNOWN SIGNIFICANCE): ICD-10-CM

## 2019-06-11 DIAGNOSIS — R19.5 POSITIVE FIT (FECAL IMMUNOCHEMICAL TEST): ICD-10-CM

## 2019-06-11 DIAGNOSIS — D50.9 IRON DEFICIENCY ANEMIA, UNSPECIFIED IRON DEFICIENCY ANEMIA TYPE: Primary | ICD-10-CM

## 2019-06-11 LAB
ABSOLUTE EOS #: 0.02 K/UL (ref 0–0.4)
ABSOLUTE IMMATURE GRANULOCYTE: 0 K/UL (ref 0–0.3)
ABSOLUTE LYMPH #: 0.46 K/UL (ref 1–4.8)
ABSOLUTE MONO #: 0.2 K/UL (ref 0.1–0.8)
BASOPHILS # BLD: 0 % (ref 0–2)
BASOPHILS ABSOLUTE: 0 K/UL (ref 0–0.2)
CULTURE: NORMAL
CULTURE: NORMAL
DIFFERENTIAL TYPE: ABNORMAL
EOSINOPHILS RELATIVE PERCENT: 2 % (ref 1–4)
HCT VFR BLD CALC: 36 % (ref 40.7–50.3)
HEMOGLOBIN: 9.5 G/DL (ref 13–17)
IMMATURE GRANULOCYTES: 0 %
LYMPHOCYTES # BLD: 42 % (ref 24–44)
Lab: NORMAL
Lab: NORMAL
MCH RBC QN AUTO: 22.2 PG (ref 25.2–33.5)
MCHC RBC AUTO-ENTMCNC: 26.4 G/DL (ref 28.4–34.8)
MCV RBC AUTO: 84.3 FL (ref 82.6–102.9)
MONOCYTES # BLD: 18 % (ref 1–7)
MORPHOLOGY: ABNORMAL
NRBC AUTOMATED: 0 PER 100 WBC
PDW BLD-RTO: 21.4 % (ref 11.8–14.4)
PLATELET # BLD: ABNORMAL K/UL (ref 138–453)
PLATELET ESTIMATE: ABNORMAL
PLATELET, FLUORESCENCE: 80 K/UL (ref 138–453)
PLATELET, IMMATURE FRACTION: 6 % (ref 1.1–10.3)
PMV BLD AUTO: ABNORMAL FL (ref 8.1–13.5)
RBC # BLD: 4.27 M/UL (ref 4.21–5.77)
RBC # BLD: ABNORMAL 10*6/UL
SEG NEUTROPHILS: 38 % (ref 36–66)
SEGMENTED NEUTROPHILS ABSOLUTE COUNT: 0.42 K/UL (ref 1.8–7.7)
SPECIMEN DESCRIPTION: NORMAL
SPECIMEN DESCRIPTION: NORMAL
VITAMIN B-12: 1522 PG/ML (ref 232–1245)
WBC # BLD: 1.1 K/UL (ref 3.5–11.3)
WBC # BLD: ABNORMAL 10*3/UL

## 2019-06-11 PROCEDURE — 99214 OFFICE O/P EST MOD 30 MIN: CPT | Performed by: FAMILY MEDICINE

## 2019-06-11 RX ORDER — DOCUSATE SODIUM 100 MG/1
100 CAPSULE, LIQUID FILLED ORAL 2 TIMES DAILY
Qty: 28 CAPSULE | Refills: 0 | Status: SHIPPED | OUTPATIENT
Start: 2019-06-11 | End: 2021-09-28

## 2019-06-11 RX ORDER — FERROUS SULFATE 325(65) MG
325 TABLET ORAL
Qty: 60 TABLET | Refills: 2 | Status: SHIPPED | OUTPATIENT
Start: 2019-06-11 | End: 2019-10-02

## 2019-06-11 NOTE — PROGRESS NOTES
normal.  Neck: Normal range of motion. Neck supple. No tracheal deviation present. No abnormal lymphadenopathy. No JVD noted. Carotids are clear bilaterally. No thyroid masses noted. Heart: RRR   No S3, S4, or gallop noted. Chest: Clear to auscultation bilaterally. Good breath sounds noted. No rales, wheezes, or rhonchi noted. No respiratory retractions noted. Wall has symmetrical movement with respirations. Assessment:   Encounter Diagnoses   Name Primary?  Iron deficiency anemia, unspecified iron deficiency anemia type Yes    Positive FIT (fecal immunochemical test)          Plan:   Medications Discontinued During This Encounter   Medication Reason    docusate sodium (COLACE) 100 MG capsule     ferrous sulfate 325 (65 Fe) MG tablet      THE ABOVE NOTED DISCONTINUED MEDS MAY ONLY BE FROM 'CLEANING UP' THE MED LIST AND WERE NOT ACTUALLY CANCELED;  SEE CHART FOR DETAILS! Orders Placed This Encounter   Medications    docusate sodium (COLACE) 100 MG capsule     Sig: Take 1 capsule by mouth 2 times daily     Dispense:  28 capsule     Refill:  0    ferrous sulfate 325 (65 Fe) MG tablet     Sig: Take 1 tablet by mouth daily (with breakfast)     Dispense:  60 tablet     Refill:  2     Orders Placed This Encounter   Procedures    CBC Auto Differential     Standing Status:   Future     Standing Expiration Date:   6/11/2020    Vitamin B12     Standing Status:   Future     Standing Expiration Date:   6/11/2020   Daniel Mejia MD, Gastroenterology, [address]     Referral Priority:   Urgent     Referral Type:   Eval and Treat     Referral Reason:   Specialty Services Required     Referred to Provider:   Chang Clark MD     Requested Specialty:   Gastroenterology     Number of Visits Requested:   1     No follow-ups on file. There are no Patient Instructions on file for this visit.   Data Unavailable      Get the labs done today    Should not exert himslef and stay out of the heat

## 2019-06-11 NOTE — PROGRESS NOTES
Visit Information    Have you changed or started any medications since your last visit including any over-the-counter medicines, vitamins, or herbal medicines? no   Have you stopped taking any of your medications? Is so, why? -  no  Are you having any side effects from any of your medications? - no    Have you seen any other physician or provider since your last visit? yes -    Have you had any other diagnostic tests since your last visit?  no   Have you been seen in the emergency room and/or had an admission in a hospital since we last saw you?  yes -    Have you had your routine dental cleaning in the past 6 months? Do you have an active MyChart account? If no, what is the barrier?   Yes    Patient Care Team:  Oneil Reyes MD as PCP - General (Family Medicine)  Oneil Reyes MD as PCP - [facility]  Amara Oropeza MD as Consulting Physician (Hematology and Oncology)  Tobias Arriaza DO as Consulting Physician (Orthopedic Surgery)  Alea Calzada MD as Consulting Physician (Urology)  Hammad Barbosa MD as Consulting Physician (Gastroenterology)  Giselle Landaverde RN as Care Transition    Medical History Review  Past Medical, Family, and Social History reviewed and  contribute to the patient presenting condition    Health Maintenance   Topic Date Due    Hepatitis C screen  1950    Shingles Vaccine (1 of 2) 02/13/2000    Colon cancer screen colonoscopy  07/14/2019    Potassium monitoring  06/05/2020    Creatinine monitoring  06/05/2020    Lipid screen  04/11/2024    DTaP/Tdap/Td vaccine (2 - Td) 01/31/2027    Flu vaccine  Completed    Pneumococcal 65+ years Vaccine  Completed

## 2019-06-12 ENCOUNTER — CARE COORDINATION (OUTPATIENT)
Dept: CASE MANAGEMENT | Age: 69
End: 2019-06-12

## 2019-06-12 NOTE — TELEPHONE ENCOUNTER
Lab called with critical test result, low WBC count but based on review of his labs for the past several months this appears to be a known finding since the beginning of the year and it appears that he is set up to see hematology already and was not symptomatic at follow up in the office today.

## 2019-06-14 DIAGNOSIS — R79.89 LOW VITAMIN D LEVEL: ICD-10-CM

## 2019-06-14 DIAGNOSIS — D50.8 OTHER IRON DEFICIENCY ANEMIA: ICD-10-CM

## 2019-06-14 DIAGNOSIS — I10 ESSENTIAL HYPERTENSION: Chronic | ICD-10-CM

## 2019-06-14 DIAGNOSIS — R14.0 ABDOMINAL BLOATING: ICD-10-CM

## 2019-06-20 ENCOUNTER — CARE COORDINATION (OUTPATIENT)
Dept: CASE MANAGEMENT | Age: 69
End: 2019-06-20

## 2019-06-20 NOTE — CARE COORDINATION
Trisha [facility] Transitions Follow Up Call- final call     2019    Patient: Gamaliel Rowley  Patient : 1950   MRN: [id number]    Reason for Admission: MGUS, anemia, neutropenia  Discharge Date: 19   RARS: Readmission Risk Score: 17  CMRS 5       Spoke with: Pt wife Pritesh Garg    Call to pt wife who states pt doing well. States recent check of hgb was good at 9.5 but stool occult blood was positive so pt will now go see Dr Alpa Webber (GI) but appt not available until 19  States he has appt next week with Dr Vaibhav Shearer 19 at 1640 and will follow on heme chronic conditions  States he will get repeat blood work prior to their vacation coming up  Denies needs  Writer informs this is final ([facility]) phone call- v/u. Encouraged call writer/ CTC or providers if questions or concerns- v/u. Episode closed      Care Transitions Subsequent and Final Call    Subsequent and Final Calls  Do you have any ongoing symptoms?:  No  Have your medications changed?:  No  Do you have any questions related to your medications?:  No  Do you currently have any active services?:  No  Do you have any needs or concerns that I can assist you with?:  No  Identified Barriers:  Lack of Education  Care Transitions Interventions  Other Interventions:             Follow Up  Future Appointments   Date Time Provider Department Center   2019  4:40 PM Mckenzie Crowell MD [facility]   2019  1:00 PM Stephen Zepeda MD [facility]   8/15/2019 11:30 AM SCHEDULE, [facility]   2019  2:00 PM Mckenzie Crowell MD [facility]   10/2/2019  1:00 PM MD Kiera Bourne, MARGIE

## 2019-06-25 ENCOUNTER — HOSPITAL ENCOUNTER (OUTPATIENT)
Facility: MEDICAL CENTER | Age: 69
End: 2019-06-25
Payer: COMMERCIAL

## 2019-06-26 ENCOUNTER — HOSPITAL ENCOUNTER (OUTPATIENT)
Facility: MEDICAL CENTER | Age: 69
Discharge: HOME | End: 2019-06-26
Payer: COMMERCIAL

## 2019-06-26 DIAGNOSIS — C61 PROSTATE CANCER (HCC): ICD-10-CM

## 2019-06-26 DIAGNOSIS — R97.20 PSA ELEVATION: ICD-10-CM

## 2019-06-26 DIAGNOSIS — D47.2 MGUS (MONOCLONAL GAMMOPATHY OF UNKNOWN SIGNIFICANCE): ICD-10-CM

## 2019-06-26 LAB
ABSOLUTE EOS #: 0.03 K/UL (ref 0–0.44)
ABSOLUTE IMMATURE GRANULOCYTE: 0 K/UL (ref 0–0.3)
ABSOLUTE LYMPH #: 0.25 K/UL (ref 1.1–3.7)
ABSOLUTE MONO #: 0.22 K/UL (ref 0.1–1.2)
ANION GAP SERPL CALCULATED.3IONS-SCNC: 10 MMOL/L (ref 9–17)
BASOPHILS # BLD: 3 % (ref 0–2)
BASOPHILS ABSOLUTE: 0.02 K/UL (ref 0–0.2)
BUN BLDV-MCNC: 13 MG/DL (ref 8–23)
BUN/CREAT BLD: 15 (ref 9–20)
CALCIUM SERPL-MCNC: 8.5 MG/DL (ref 8.6–10.4)
CHLORIDE BLD-SCNC: 106 MMOL/L (ref 98–107)
CO2: 22 MMOL/L (ref 20–31)
CREAT SERPL-MCNC: 0.84 MG/DL (ref 0.7–1.2)
DIFFERENTIAL TYPE: ABNORMAL
EOSINOPHILS RELATIVE PERCENT: 4 % (ref 1–4)
FERRITIN: 61 UG/L (ref 30–400)
FREE KAPPA/LAMBDA RATIO: 1.12 (ref 0.26–1.65)
GFR AFRICAN AMERICAN: >60 ML/MIN
GFR NON-AFRICAN AMERICAN: >60 ML/MIN
GFR SERPL CREATININE-BSD FRML MDRD: ABNORMAL ML/MIN/{1.73_M2}
GFR SERPL CREATININE-BSD FRML MDRD: ABNORMAL ML/MIN/{1.73_M2}
GLUCOSE BLD-MCNC: 130 MG/DL (ref 70–99)
HCT VFR BLD CALC: 38.7 % (ref 40.7–50.3)
HEMOGLOBIN: 11.1 G/DL (ref 13–17)
IGA: 467 MG/DL (ref 70–400)
IGG: 1925 MG/DL (ref 700–1600)
IGM: 32 MG/DL (ref 40–230)
IMMATURE GRANULOCYTES: 0 %
IRON SATURATION: 19 % (ref 20–55)
IRON: 57 UG/DL (ref 59–158)
KAPPA FREE LIGHT CHAINS QNT: 2.85 MG/DL (ref 0.37–1.94)
LAMBDA FREE LIGHT CHAINS QNT: 2.54 MG/DL (ref 0.57–2.63)
LYMPHOCYTES # BLD: 35 % (ref 24–43)
MCH RBC QN AUTO: 23.4 PG (ref 25.2–33.5)
MCHC RBC AUTO-ENTMCNC: 28.7 G/DL (ref 28.4–34.8)
MCV RBC AUTO: 81.6 FL (ref 82.6–102.9)
MONOCYTES # BLD: 32 % (ref 3–12)
MORPHOLOGY: ABNORMAL
NRBC AUTOMATED: 0 PER 100 WBC
PDW BLD-RTO: 21.2 % (ref 11.8–14.4)
PLATELET # BLD: 57 K/UL (ref 138–453)
PLATELET ESTIMATE: ABNORMAL
PMV BLD AUTO: ABNORMAL FL (ref 8.1–13.5)
POTASSIUM SERPL-SCNC: 3.9 MMOL/L (ref 3.7–5.3)
RBC # BLD: 4.74 M/UL (ref 4.21–5.77)
RBC # BLD: ABNORMAL 10*6/UL
SEG NEUTROPHILS: 26 % (ref 36–65)
SEGMENTED NEUTROPHILS ABSOLUTE COUNT: 0.18 K/UL (ref 1.5–8.1)
SODIUM BLD-SCNC: 138 MMOL/L (ref 135–144)
TOTAL IRON BINDING CAPACITY: 293 UG/DL (ref 250–450)
UNSATURATED IRON BINDING CAPACITY: 236 UG/DL (ref 112–347)
WBC # BLD: 0.7 K/UL (ref 3.5–11.3)
WBC # BLD: ABNORMAL 10*3/UL

## 2019-06-26 PROCEDURE — 82784 ASSAY IGA/IGD/IGG/IGM EACH: CPT

## 2019-06-26 PROCEDURE — 83540 ASSAY OF IRON: CPT

## 2019-06-26 PROCEDURE — 85025 COMPLETE CBC W/AUTO DIFF WBC: CPT

## 2019-06-26 PROCEDURE — 36415 COLL VENOUS BLD VENIPUNCTURE: CPT

## 2019-06-26 PROCEDURE — 83883 ASSAY NEPHELOMETRY NOT SPEC: CPT

## 2019-06-26 PROCEDURE — 82728 ASSAY OF FERRITIN: CPT

## 2019-06-26 PROCEDURE — 80048 BASIC METABOLIC PNL TOTAL CA: CPT

## 2019-06-26 PROCEDURE — 83550 IRON BINDING TEST: CPT

## 2019-06-27 ENCOUNTER — OFFICE VISIT (OUTPATIENT)
Dept: ONCOLOGY | Age: 69
End: 2019-06-27
Payer: COMMERCIAL

## 2019-06-27 ENCOUNTER — TELEPHONE (OUTPATIENT)
Dept: ONCOLOGY | Age: 69
End: 2019-06-27

## 2019-06-27 VITALS
HEART RATE: 80 BPM | TEMPERATURE: 98 F | BODY MASS INDEX: 21.84 KG/M2 | RESPIRATION RATE: 18 BRPM | DIASTOLIC BLOOD PRESSURE: 89 MMHG | WEIGHT: 161 LBS | SYSTOLIC BLOOD PRESSURE: 144 MMHG

## 2019-06-27 DIAGNOSIS — C61 PROSTATE CANCER (HCC): ICD-10-CM

## 2019-06-27 DIAGNOSIS — D47.2 MGUS (MONOCLONAL GAMMOPATHY OF UNKNOWN SIGNIFICANCE): ICD-10-CM

## 2019-06-27 DIAGNOSIS — D50.9 IRON DEFICIENCY ANEMIA, UNSPECIFIED IRON DEFICIENCY ANEMIA TYPE: ICD-10-CM

## 2019-06-27 DIAGNOSIS — D61.818 PANCYTOPENIA (HCC): Primary | ICD-10-CM

## 2019-06-27 PROCEDURE — G8427 DOCREV CUR MEDS BY ELIG CLIN: HCPCS | Performed by: INTERNAL MEDICINE

## 2019-06-27 PROCEDURE — 1036F TOBACCO NON-USER: CPT | Performed by: INTERNAL MEDICINE

## 2019-06-27 PROCEDURE — 1111F DSCHRG MED/CURRENT MED MERGE: CPT | Performed by: INTERNAL MEDICINE

## 2019-06-27 PROCEDURE — 99214 OFFICE O/P EST MOD 30 MIN: CPT | Performed by: INTERNAL MEDICINE

## 2019-06-27 PROCEDURE — G8420 CALC BMI NORM PARAMETERS: HCPCS | Performed by: INTERNAL MEDICINE

## 2019-06-27 PROCEDURE — 4040F PNEUMOC VAC/ADMIN/RCVD: CPT | Performed by: INTERNAL MEDICINE

## 2019-06-27 PROCEDURE — 3017F COLORECTAL CA SCREEN DOC REV: CPT | Performed by: INTERNAL MEDICINE

## 2019-06-27 PROCEDURE — 99211 OFF/OP EST MAY X REQ PHY/QHP: CPT | Performed by: INTERNAL MEDICINE

## 2019-06-27 PROCEDURE — 1123F ACP DISCUSS/DSCN MKR DOCD: CPT | Performed by: INTERNAL MEDICINE

## 2019-06-27 RX ORDER — AZITHROMYCIN 500 MG/1
500 TABLET, FILM COATED ORAL DAILY
Qty: 1 PACKET | Refills: 0 | Status: SHIPPED | OUTPATIENT
Start: 2019-06-27 | End: 2019-06-30

## 2019-06-27 NOTE — TELEPHONE ENCOUNTER
MELECIO ARRIVES AMBULATORY FOR MD VISIT  DR Cristhian Vasquez IN TO SEE PATIENT  ORDERS RECEIVED  SCRIPT SENT TO PATIENT'S PHARMACY  RV 3 MONTHS W/ LABS  LABS CDP BMP FERRITIN FE TIBC KAPPA/LAMBDA FREE LIGHT CHAINS IGG IGA IGM 9/20/19  MD VISIT 9/27/19 @ 12:20PM  AVS PRINTED AND GIVEN TO PATIENT W/ INSTRUCTIONS  PATIENT DISCHARGED AMBULATORY

## 2019-06-27 NOTE — PROGRESS NOTES
_     Chief Complaint   Patient presents with    Follow-up     review status of disease       DIAGNOSIS:    Anemia  Leukopenia  Weight loss. MGUS     Back pain  Questionable liver lesions      CURRENT THERAPY:    S/p hospitalization for further management of above issues. S/p blood transfusion  S/P IV Iron infusion. BRIEF CASE HISTORY:      The patient is a 77 y.o. AA male who is admitted to the hospital for severe anemia and leukopenia. He had no previous labs. He was seen by PCP for routine health exam as a new patient. Labs showed severe anemia and leukopenia. He had chronic back pain for one year. No fever. No night sweats. No lymphadenopathy   He has recent weight loss and anorexia. No GI symptoms. GI work up was negative. He received blood transfusion and IV Iron infusion. He had Rt hip surgery on 65/0/96 with no complications. He had left hip surgery in March 2015. No complications. Prostate surgery December 2357 with no complications. INTERIM HISTORY:   Patient is seen for follow up above problems. He was recently hospitalized because of severe anemia. He had blood transfusion and iron infusion. No active bleeding. He has no fever. He continues to have low back pain and joints problems. Overall he feels much better. He is not in pain at the present time. He has weakness and fatigue. Overall much better. No further weight loss. He gained few pounds. PAST MEDICAL HISTORY: has a past medical history of Anemia, Arthritis, Avascular necrosis ([address] BPH (benign prostatic hyperplasia), History of blood transfusion, Hypertension, Leukopenia, MGUS (monoclonal gammopathy of unknown significance), Osteoarthritis, Patient in clinical research study, and Stomach ulcer. PAST SURGICAL HISTORY: has a past surgical history that includes Endoscopy, colon, diagnostic (07/13/2014);  Prostate Biopsy tendency. No bruises or ecchymosis. No history of clotting problems. Infectious disease: No fever, chills or frequent infections. Endocrine: No problems with opacity. No polydipsia or polyuria. No temperature intolerance. Neurologic: No headaches or dizziness. No weakness or numbness of the extremities. No changes in balance, coordination, memory, mentation, behavior. Allergic/Immunologic: No nasal congestion or hives. No repeated infections. PHYSICAL EXAM:  The patient is not in acute distress. Vital signs: Blood pressure (!) 144/89, pulse 80, temperature 98 °F (36.7 °C), temperature source Oral, resp. rate 18, weight 161 lb (73 kg).      General appearance - well appearing, not in pain or distress   Mental status - good mood, alert and oriented   Eyes - pupils equal and reactive, extraocular eye movements intact   Ears - bilateral TM's and external ear canals normal   Nose - normal and patent, no erythema, discharge or polyps   Mouth - mucous membranes moist, pharynx normal without lesions   Neck - supple, no significant adenopathy   Lymphatics - no palpable lymphadenopathy, no hepatosplenomegaly   Chest - clear to auscultation, no wheezes, rales or rhonchi, symmetric air entry   Heart - normal rate, regular rhythm, normal S1, S2, no murmurs, rubs, clicks or gallops   Abdomen - soft, nontender, nondistended, no masses or organomegaly   Neurological - alert, oriented, normal speech, no focal findings or movement disorder noted   Musculoskeletal -mild bilateral LE weakness   Extremities - peripheral pulses normal, no pedal edema, no clubbing or cyanosis   Skin - normal coloration and turgor, no rashes, no suspicious skin lesions noted       Lab Results   Component Value Date    WBC 0.7 (LL) 06/26/2019    HGB 11.1 (L) 06/26/2019    HCT 38.7 (L) 06/26/2019    MCV 81.6 (L) 06/26/2019    PLT 57 (L) 06/26/2019     Lab Results   Component Value Date    IRON 57 (L) 06/26/2019    TIBC 293 06/26/2019 IMPRESSION: 1. No definitive scintigraphic evidence for metastatic disease to the skeleton. 2. Symmetric increased radiotracer uptake at the bilateral hips, likely secondary to severe degenerative changes of the bilateral hips seen on the CT abdomen pelvis of 7/12/2014. Underlying avascular necrosis is not excluded. Further evaluation with MR of the bilateral hips should be considered. 3. Symmetric increased radiotracer uptake involving the bilateral shoulders, elbows, wrists, hands, knees as well as the right foot, most likely degenerative. Abdomen MRI:   IMPRESSION:    1. Multiple T2 hyperintense lesion is noted within the liver largest    measuring approximately 1.6 CM x1.9 CM without significant associated    enhancement likely represent hepatic cyst however the noncontrasted    images are somewhat limited due to patient motion. Followup assessment is    advised in 4 months. 2. Splenomegaly   3. Redemonstration of sclerotic lesions within the left sacrum may    represent a bone island. Lab Results   Component Value Date    WBC 0.7 (LL) 06/26/2019    HGB 11.1 (L) 06/26/2019    HCT 38.7 (L) 06/26/2019    MCV 81.6 (L) 06/26/2019    PLT 57 (L) 06/26/2019    LYMPHOPCT 35 06/26/2019    RBC 4.74 06/26/2019    MCH 23.4 (L) 06/26/2019    MCHC 28.7 06/26/2019    RDW 21.2 (H) 06/26/2019    MONOPCT 32 (H) 06/26/2019    EOSPCT 4 06/04/2019    BASOPCT 3 (H) 06/26/2019    NEUTROABS 0.18 (LL) 06/26/2019    LYMPHSABS 0.25 (L) 06/26/2019    MONOSABS 0.22 06/26/2019    EOSABS 0.03 06/26/2019    BASOSABS 0.02 06/26/2019                 Lab Results   Component Value Date    IRON 57 (L) 06/26/2019    TIBC 293 06/26/2019    FERRITIN 61 06/26/2019                 IMPRESSION:   Pancytopenia  Anemia, multifactorial.   Leukopenia/ neutropenia. Likely immune mediated. Weight loss. Recovering. MGUS     Back pain  Questionable liver lesions. Negative MRI. Hips osteonecrosis. S/p surgery. Prostate cancer.   ECOG performance score 0  Recent left inguinal hernia surgery. PLAN:    I explained to the patient all of the above. Labs reviewed and discussed with the patient. Clinically, he did better after IV Iron infusion. Hb is stable. No active bleeding. We will monitor iron level and will infuse iron as needed. Wbcs are still very low. He received Granix before surgery. WBCs are likely immune mediated. No infections. Continue to monitor PSA. RV 6 months. Labs at RV. We will check immunoglobulins level Every 6 months. Lab tests from last week showed stable numbers. Patient's questions were answered to the best of his satisfaction and he verbalized full understanding and agreement.                                   [address] [place] [address] [facility]                          Cell: [phone]

## 2019-07-29 ENCOUNTER — TELEPHONE (OUTPATIENT)
Dept: GASTROENTEROLOGY | Age: 69
End: 2019-07-29

## 2019-07-30 ENCOUNTER — OFFICE VISIT (OUTPATIENT)
Dept: GASTROENTEROLOGY | Age: 69
End: 2019-07-30
Payer: COMMERCIAL

## 2019-07-30 VITALS
BODY MASS INDEX: 21.59 KG/M2 | DIASTOLIC BLOOD PRESSURE: 95 MMHG | HEART RATE: 90 BPM | WEIGHT: 159.2 LBS | SYSTOLIC BLOOD PRESSURE: 151 MMHG

## 2019-07-30 DIAGNOSIS — R19.5 POSITIVE FIT (FECAL IMMUNOCHEMICAL TEST): Primary | ICD-10-CM

## 2019-07-30 DIAGNOSIS — D50.9 IRON DEFICIENCY ANEMIA, UNSPECIFIED IRON DEFICIENCY ANEMIA TYPE: ICD-10-CM

## 2019-07-30 PROCEDURE — 99214 OFFICE O/P EST MOD 30 MIN: CPT | Performed by: INTERNAL MEDICINE

## 2019-07-30 RX ORDER — SODIUM, POTASSIUM,MAG SULFATES 17.5-3.13G
SOLUTION, RECONSTITUTED, ORAL ORAL
Qty: 1 BOTTLE | Refills: 0 | Status: SHIPPED | OUTPATIENT
Start: 2019-07-30 | End: 2019-09-27 | Stop reason: ALTCHOICE

## 2019-07-30 ASSESSMENT — ENCOUNTER SYMPTOMS
RECTAL PAIN: 0
EYES NEGATIVE: 1
ALLERGIC/IMMUNOLOGIC NEGATIVE: 1
ABDOMINAL DISTENTION: 0
ABDOMINAL PAIN: 0
BLOOD IN STOOL: 0
CONSTIPATION: 0
DIARRHEA: 0
GASTROINTESTINAL NEGATIVE: 1
RESPIRATORY NEGATIVE: 1
VOMITING: 0
NAUSEA: 0
ANAL BLEEDING: 0

## 2019-08-12 RX ORDER — HYDROCHLOROTHIAZIDE 25 MG/1
TABLET ORAL
Qty: 90 TABLET | Refills: 0 | Status: SHIPPED | OUTPATIENT
Start: 2019-08-12 | End: 2019-11-01 | Stop reason: SDUPTHER

## 2019-08-12 RX ORDER — PANTOPRAZOLE SODIUM 40 MG/1
TABLET, DELAYED RELEASE ORAL
Qty: 90 TABLET | Refills: 0 | Status: SHIPPED | OUTPATIENT
Start: 2019-08-12 | End: 2019-11-01 | Stop reason: SDUPTHER

## 2019-08-13 DIAGNOSIS — K92.2 UPPER GI BLEED: Primary | ICD-10-CM

## 2019-08-13 DIAGNOSIS — I10 ESSENTIAL HYPERTENSION: ICD-10-CM

## 2019-09-17 ENCOUNTER — HOSPITAL ENCOUNTER (OUTPATIENT)
Facility: MEDICAL CENTER | Age: 69
End: 2019-09-17
Payer: COMMERCIAL

## 2019-09-18 ENCOUNTER — TELEPHONE (OUTPATIENT)
Dept: GASTROENTEROLOGY | Age: 69
End: 2019-09-18

## 2019-09-20 ENCOUNTER — TELEPHONE (OUTPATIENT)
Dept: INFUSION THERAPY | Facility: MEDICAL CENTER | Age: 69
End: 2019-09-20

## 2019-09-23 ENCOUNTER — HOSPITAL ENCOUNTER (OUTPATIENT)
Facility: MEDICAL CENTER | Age: 69
Discharge: HOME | End: 2019-09-23
Payer: COMMERCIAL

## 2019-09-23 ENCOUNTER — HOSPITAL ENCOUNTER (OUTPATIENT)
Facility: MEDICAL CENTER | Age: 69
End: 2019-09-23
Payer: COMMERCIAL

## 2019-09-23 DIAGNOSIS — R97.20 PSA ELEVATION: ICD-10-CM

## 2019-09-23 DIAGNOSIS — D47.2 MGUS (MONOCLONAL GAMMOPATHY OF UNKNOWN SIGNIFICANCE): ICD-10-CM

## 2019-09-23 DIAGNOSIS — C61 PROSTATE CANCER (HCC): ICD-10-CM

## 2019-09-23 LAB
ABSOLUTE EOS #: 0.08 K/UL (ref 0–0.4)
ABSOLUTE IMMATURE GRANULOCYTE: 0 K/UL (ref 0–0.3)
ABSOLUTE LYMPH #: 0.31 K/UL (ref 1–4.8)
ABSOLUTE MONO #: 0.45 K/UL (ref 0.2–0.8)
ANION GAP SERPL CALCULATED.3IONS-SCNC: 10 MMOL/L (ref 9–17)
BASOPHILS # BLD: 0 %
BASOPHILS ABSOLUTE: 0 K/UL (ref 0–0.2)
BUN BLDV-MCNC: 14 MG/DL (ref 8–23)
BUN/CREAT BLD: ABNORMAL (ref 9–20)
CALCIUM SERPL-MCNC: 8.5 MG/DL (ref 8.6–10.4)
CHLORIDE BLD-SCNC: 105 MMOL/L (ref 98–107)
CO2: 25 MMOL/L (ref 20–31)
CREAT SERPL-MCNC: 0.7 MG/DL (ref 0.7–1.2)
DIFFERENTIAL TYPE: ABNORMAL
EOSINOPHILS RELATIVE PERCENT: 7 % (ref 1–4)
FERRITIN: 46 UG/L (ref 30–400)
FREE KAPPA/LAMBDA RATIO: 1.11 (ref 0.26–1.65)
GFR AFRICAN AMERICAN: >60 ML/MIN
GFR NON-AFRICAN AMERICAN: >60 ML/MIN
GFR SERPL CREATININE-BSD FRML MDRD: ABNORMAL ML/MIN/{1.73_M2}
GFR SERPL CREATININE-BSD FRML MDRD: ABNORMAL ML/MIN/{1.73_M2}
GLUCOSE BLD-MCNC: 121 MG/DL (ref 70–99)
HCT VFR BLD CALC: 39.1 % (ref 40.7–50.3)
HEMOGLOBIN: 11.9 G/DL (ref 13–17)
IGA: 499 MG/DL (ref 70–400)
IGG: 1954 MG/DL (ref 700–1600)
IGM: 39 MG/DL (ref 40–230)
IMMATURE GRANULOCYTES: 0 %
IRON SATURATION: 66 % (ref 20–55)
IRON: 180 UG/DL (ref 59–158)
KAPPA FREE LIGHT CHAINS QNT: 3.39 MG/DL (ref 0.37–1.94)
LAMBDA FREE LIGHT CHAINS QNT: 3.06 MG/DL (ref 0.57–2.63)
LYMPHOCYTES # BLD: 26 % (ref 24–44)
MCH RBC QN AUTO: 26.2 PG (ref 25.2–33.5)
MCHC RBC AUTO-ENTMCNC: 30.4 G/DL (ref 28.4–34.8)
MCV RBC AUTO: 85.9 FL (ref 82.6–102.9)
MONOCYTES # BLD: 37 % (ref 1–7)
NRBC AUTOMATED: 0 PER 100 WBC
PDW BLD-RTO: 17.5 % (ref 11.8–14.4)
PLATELET # BLD: 74 K/UL (ref 138–453)
PLATELET ESTIMATE: ABNORMAL
PMV BLD AUTO: ABNORMAL FL (ref 8.1–13.5)
POTASSIUM SERPL-SCNC: 4.1 MMOL/L (ref 3.7–5.3)
RBC # BLD: 4.55 M/UL (ref 4.21–5.77)
RBC # BLD: ABNORMAL 10*6/UL
SEG NEUTROPHILS: 30 % (ref 36–66)
SEGMENTED NEUTROPHILS ABSOLUTE COUNT: 0.36 K/UL (ref 1.8–7.7)
SODIUM BLD-SCNC: 140 MMOL/L (ref 135–144)
TOTAL IRON BINDING CAPACITY: 273 UG/DL (ref 250–450)
UNSATURATED IRON BINDING CAPACITY: 93 UG/DL (ref 112–347)
WBC # BLD: 1.2 K/UL (ref 3.5–11.3)
WBC # BLD: ABNORMAL 10*3/UL

## 2019-09-23 PROCEDURE — 83883 ASSAY NEPHELOMETRY NOT SPEC: CPT

## 2019-09-23 PROCEDURE — 83550 IRON BINDING TEST: CPT

## 2019-09-23 PROCEDURE — 82728 ASSAY OF FERRITIN: CPT

## 2019-09-23 PROCEDURE — 82784 ASSAY IGA/IGD/IGG/IGM EACH: CPT

## 2019-09-23 PROCEDURE — 83540 ASSAY OF IRON: CPT

## 2019-09-23 PROCEDURE — 36415 COLL VENOUS BLD VENIPUNCTURE: CPT

## 2019-09-23 PROCEDURE — 80048 BASIC METABOLIC PNL TOTAL CA: CPT

## 2019-09-23 PROCEDURE — 85025 COMPLETE CBC W/AUTO DIFF WBC: CPT

## 2019-09-24 ENCOUNTER — ANESTHESIA EVENT (OUTPATIENT)
Dept: OPERATING ROOM | Age: 69
End: 2019-09-24
Payer: COMMERCIAL

## 2019-09-25 ENCOUNTER — HOSPITAL ENCOUNTER (OUTPATIENT)
Age: 69
Setting detail: OUTPATIENT SURGERY
Discharge: HOME | End: 2019-09-25
Attending: INTERNAL MEDICINE | Admitting: INTERNAL MEDICINE
Payer: COMMERCIAL

## 2019-09-25 ENCOUNTER — ANESTHESIA (OUTPATIENT)
Dept: OPERATING ROOM | Age: 69
End: 2019-09-25
Payer: COMMERCIAL

## 2019-09-25 ENCOUNTER — HOSPITAL ENCOUNTER (OUTPATIENT)
Age: 69
Discharge: HOME | End: 2019-09-25
Payer: COMMERCIAL

## 2019-09-25 VITALS
HEIGHT: 72 IN | BODY MASS INDEX: 22.28 KG/M2 | OXYGEN SATURATION: 100 % | TEMPERATURE: 98.1 F | RESPIRATION RATE: 13 BRPM | DIASTOLIC BLOOD PRESSURE: 78 MMHG | HEART RATE: 70 BPM | WEIGHT: 164.5 LBS | SYSTOLIC BLOOD PRESSURE: 132 MMHG

## 2019-09-25 VITALS
RESPIRATION RATE: 13 BRPM | DIASTOLIC BLOOD PRESSURE: 70 MMHG | OXYGEN SATURATION: 99 % | SYSTOLIC BLOOD PRESSURE: 103 MMHG

## 2019-09-25 DIAGNOSIS — K74.69 OTHER CIRRHOSIS OF LIVER (HCC): Primary | ICD-10-CM

## 2019-09-25 LAB
AFP: 8.3 UG/L
ALBUMIN SERPL-MCNC: 3 G/DL (ref 3.5–5.2)
ALBUMIN/GLOBULIN RATIO: 0.8 (ref 1–2.5)
ALP BLD-CCNC: 236 U/L (ref 40–129)
ALT SERPL-CCNC: 50 U/L (ref 5–41)
AMMONIA: 36 UMOL/L (ref 16–60)
AST SERPL-CCNC: 60 U/L
BILIRUB SERPL-MCNC: 0.76 MG/DL (ref 0.3–1.2)
BILIRUBIN DIRECT: 0.24 MG/DL
BILIRUBIN, INDIRECT: 0.52 MG/DL (ref 0–1)
CERULOPLASMIN: 30 MG/DL (ref 15–30)
FERRITIN: 52 UG/L (ref 30–400)
GLOBULIN: ABNORMAL G/DL (ref 1.5–3.8)
HAV IGM SER IA-ACNC: NONREACTIVE
HEPATITIS B CORE IGM ANTIBODY: NONREACTIVE
HEPATITIS B SURFACE ANTIGEN: NONREACTIVE
HEPATITIS C ANTIBODY: NONREACTIVE
TOTAL PROTEIN: 6.8 G/DL (ref 6.4–8.3)
TRANSFERRIN: 257 MG/DL (ref 200–360)

## 2019-09-25 PROCEDURE — 7100000010 HC PHASE II RECOVERY - FIRST 15 MIN: Performed by: INTERNAL MEDICINE

## 2019-09-25 PROCEDURE — 80074 ACUTE HEPATITIS PANEL: CPT

## 2019-09-25 PROCEDURE — 43239 EGD BIOPSY SINGLE/MULTIPLE: CPT | Performed by: INTERNAL MEDICINE

## 2019-09-25 PROCEDURE — 45385 COLONOSCOPY W/LESION REMOVAL: CPT | Performed by: INTERNAL MEDICINE

## 2019-09-25 PROCEDURE — 83516 IMMUNOASSAY NONANTIBODY: CPT

## 2019-09-25 PROCEDURE — 3700000000 HC ANESTHESIA ATTENDED CARE: Performed by: INTERNAL MEDICINE

## 2019-09-25 PROCEDURE — 3700000001 HC ADD 15 MINUTES (ANESTHESIA): Performed by: INTERNAL MEDICINE

## 2019-09-25 PROCEDURE — 2580000003 HC RX 258: Performed by: ANESTHESIOLOGY

## 2019-09-25 PROCEDURE — 6360000002 HC RX W HCPCS: Performed by: NURSE ANESTHETIST, CERTIFIED REGISTERED

## 2019-09-25 PROCEDURE — 88342 IMHCHEM/IMCYTCHM 1ST ANTB: CPT

## 2019-09-25 PROCEDURE — 2709999900 HC NON-CHARGEABLE SUPPLY: Performed by: INTERNAL MEDICINE

## 2019-09-25 PROCEDURE — 7100000011 HC PHASE II RECOVERY - ADDTL 15 MIN: Performed by: INTERNAL MEDICINE

## 2019-09-25 PROCEDURE — 82728 ASSAY OF FERRITIN: CPT

## 2019-09-25 PROCEDURE — 82105 ALPHA-FETOPROTEIN SERUM: CPT

## 2019-09-25 PROCEDURE — 36415 COLL VENOUS BLD VENIPUNCTURE: CPT

## 2019-09-25 PROCEDURE — 88305 TISSUE EXAM BY PATHOLOGIST: CPT

## 2019-09-25 PROCEDURE — 82103 ALPHA-1-ANTITRYPSIN TOTAL: CPT

## 2019-09-25 PROCEDURE — 82140 ASSAY OF AMMONIA: CPT

## 2019-09-25 PROCEDURE — 82390 ASSAY OF CERULOPLASMIN: CPT

## 2019-09-25 PROCEDURE — 86256 FLUORESCENT ANTIBODY TITER: CPT

## 2019-09-25 PROCEDURE — 3609010600 HC COLONOSCOPY POLYPECTOMY SNARE/COLD BIOPSY: Performed by: INTERNAL MEDICINE

## 2019-09-25 PROCEDURE — 86255 FLUORESCENT ANTIBODY SCREEN: CPT

## 2019-09-25 PROCEDURE — 3609012400 HC EGD TRANSORAL BIOPSY SINGLE/MULTIPLE: Performed by: INTERNAL MEDICINE

## 2019-09-25 PROCEDURE — 84466 ASSAY OF TRANSFERRIN: CPT

## 2019-09-25 PROCEDURE — 86038 ANTINUCLEAR ANTIBODIES: CPT

## 2019-09-25 PROCEDURE — 82104 ALPHA-1-ANTITRYPSIN PHENO: CPT

## 2019-09-25 PROCEDURE — 80076 HEPATIC FUNCTION PANEL: CPT

## 2019-09-25 RX ORDER — SODIUM CHLORIDE, SODIUM LACTATE, POTASSIUM CHLORIDE, CALCIUM CHLORIDE 600; 310; 30; 20 MG/100ML; MG/100ML; MG/100ML; MG/100ML
INJECTION, SOLUTION INTRAVENOUS CONTINUOUS
Status: DISCONTINUED | OUTPATIENT
Start: 2019-09-25 | End: 2019-09-25 | Stop reason: HOSPADM

## 2019-09-25 RX ORDER — PROPOFOL 10 MG/ML
INJECTION, EMULSION INTRAVENOUS PRN
Status: DISCONTINUED | OUTPATIENT
Start: 2019-09-25 | End: 2019-09-25 | Stop reason: SDUPTHER

## 2019-09-25 RX ORDER — LIDOCAINE HYDROCHLORIDE 10 MG/ML
1 INJECTION, SOLUTION EPIDURAL; INFILTRATION; INTRACAUDAL; PERINEURAL
Status: DISCONTINUED | OUTPATIENT
Start: 2019-09-25 | End: 2019-09-25 | Stop reason: HOSPADM

## 2019-09-25 RX ORDER — SODIUM CHLORIDE 0.9 % (FLUSH) 0.9 %
10 SYRINGE (ML) INJECTION PRN
Status: DISCONTINUED | OUTPATIENT
Start: 2019-09-25 | End: 2019-09-25 | Stop reason: HOSPADM

## 2019-09-25 RX ORDER — SODIUM CHLORIDE 9 MG/ML
INJECTION, SOLUTION INTRAVENOUS CONTINUOUS
Status: DISCONTINUED | OUTPATIENT
Start: 2019-09-26 | End: 2019-09-25

## 2019-09-25 RX ORDER — SODIUM CHLORIDE 0.9 % (FLUSH) 0.9 %
10 SYRINGE (ML) INJECTION EVERY 12 HOURS SCHEDULED
Status: DISCONTINUED | OUTPATIENT
Start: 2019-09-25 | End: 2019-09-25 | Stop reason: HOSPADM

## 2019-09-25 RX ADMIN — PROPOFOL 20 MG: 10 INJECTION, EMULSION INTRAVENOUS at 11:24

## 2019-09-25 RX ADMIN — PROPOFOL 20 MG: 10 INJECTION, EMULSION INTRAVENOUS at 11:26

## 2019-09-25 RX ADMIN — PROPOFOL 50 MG: 10 INJECTION, EMULSION INTRAVENOUS at 11:05

## 2019-09-25 RX ADMIN — PROPOFOL 20 MG: 10 INJECTION, EMULSION INTRAVENOUS at 11:16

## 2019-09-25 RX ADMIN — PROPOFOL 20 MG: 10 INJECTION, EMULSION INTRAVENOUS at 11:06

## 2019-09-25 RX ADMIN — SODIUM CHLORIDE, POTASSIUM CHLORIDE, SODIUM LACTATE AND CALCIUM CHLORIDE: 600; 310; 30; 20 INJECTION, SOLUTION INTRAVENOUS at 11:01

## 2019-09-25 RX ADMIN — PROPOFOL 20 MG: 10 INJECTION, EMULSION INTRAVENOUS at 11:20

## 2019-09-25 RX ADMIN — PROPOFOL 20 MG: 10 INJECTION, EMULSION INTRAVENOUS at 11:11

## 2019-09-25 RX ADMIN — SODIUM CHLORIDE, POTASSIUM CHLORIDE, SODIUM LACTATE AND CALCIUM CHLORIDE: 600; 310; 30; 20 INJECTION, SOLUTION INTRAVENOUS at 10:31

## 2019-09-25 RX ADMIN — PROPOFOL 20 MG: 10 INJECTION, EMULSION INTRAVENOUS at 11:09

## 2019-09-25 RX ADMIN — PROPOFOL 20 MG: 10 INJECTION, EMULSION INTRAVENOUS at 11:12

## 2019-09-25 RX ADMIN — PROPOFOL 20 MG: 10 INJECTION, EMULSION INTRAVENOUS at 11:28

## 2019-09-25 RX ADMIN — PROPOFOL 20 MG: 10 INJECTION, EMULSION INTRAVENOUS at 11:31

## 2019-09-25 RX ADMIN — PROPOFOL 20 MG: 10 INJECTION, EMULSION INTRAVENOUS at 11:14

## 2019-09-25 RX ADMIN — PROPOFOL 20 MG: 10 INJECTION, EMULSION INTRAVENOUS at 11:34

## 2019-09-25 RX ADMIN — PROPOFOL 20 MG: 10 INJECTION, EMULSION INTRAVENOUS at 11:22

## 2019-09-25 RX ADMIN — PROPOFOL 20 MG: 10 INJECTION, EMULSION INTRAVENOUS at 11:08

## 2019-09-25 ASSESSMENT — PULMONARY FUNCTION TESTS
PIF_VALUE: 1

## 2019-09-25 ASSESSMENT — PAIN SCALES - GENERAL: PAINLEVEL_OUTOF10: 0

## 2019-09-25 ASSESSMENT — PAIN - FUNCTIONAL ASSESSMENT: PAIN_FUNCTIONAL_ASSESSMENT: 0-10

## 2019-09-25 NOTE — ANESTHESIA PRE PROCEDURE
11/29/2018    incarcerated. By Dr. Bahena Earthly OFFICE/OUTPT VISIT,PROCEDURE ONLY Left 11/29/2018    INCARCERATED INGUINAL HERNIA performed by Katty Castro MD at [address]  09/12/2014    PROSTATECTOMY  12/16/2015    robotic. lap    UPPER GASTROINTESTINAL ENDOSCOPY  10/19/2016    no lesions seen       Social History:    Social History     Tobacco Use    Smoking status: Never Smoker    Smokeless tobacco: Never Used   Substance Use Topics    Alcohol use: No                                Counseling given: Not Answered      Vital Signs (Current):   Vitals:    09/25/19 1010 09/25/19 1012   BP:  (!) 148/95   Pulse:  87   Resp:  16   Temp:  98.2 °F (36.8 °C)   SpO2:  98%   Weight: 164 lb 8 oz (74.6 kg)    Height: 6' (1.829 m)                                               BP Readings from Last 3 Encounters:   09/25/19 (!) 148/95   07/30/19 (!) 151/95   06/27/19 (!) 144/89       NPO Status: Time of last liquid consumption: 2330                        Time of last solid consumption: 2355                        Date of last liquid consumption: 09/24/19                        Date of last solid food consumption: 09/23/19    BMI:   Wt Readings from Last 3 Encounters:   09/25/19 164 lb 8 oz (74.6 kg)   07/30/19 159 lb 3.2 oz (72.2 kg)   06/27/19 161 lb (73 kg)     Body mass index is 22.31 kg/m².     CBC:   Lab Results   Component Value Date    WBC 1.2 09/23/2019    RBC 4.55 09/23/2019    HGB 11.9 09/23/2019    HCT 39.1 09/23/2019    MCV 85.9 09/23/2019    RDW 17.5 09/23/2019    PLT 74 09/23/2019       CMP:   Lab Results   Component Value Date     09/23/2019    K 4.1 09/23/2019     09/23/2019    CO2 25 09/23/2019    BUN 14 09/23/2019    CREATININE 0.70 09/23/2019    GFRAA >60 09/23/2019    LABGLOM >60 09/23/2019    GLUCOSE 121 09/23/2019    PROT 5.9 12/01/2018    CALCIUM 8.5 09/23/2019    BILITOT 0.6 06/04/2019    ALKPHOS 190 06/04/2019    AST 42 06/04/2019    ALT 37 06/04/2019       POC

## 2019-09-26 LAB — ANTI-NUCLEAR ANTIBODY (ANA): NEGATIVE

## 2019-09-27 ENCOUNTER — OFFICE VISIT (OUTPATIENT)
Dept: ONCOLOGY | Age: 69
End: 2019-09-27
Payer: COMMERCIAL

## 2019-09-27 ENCOUNTER — HOSPITAL ENCOUNTER (OUTPATIENT)
Dept: ULTRASOUND IMAGING | Age: 69
Discharge: HOME | End: 2019-09-29
Payer: COMMERCIAL

## 2019-09-27 ENCOUNTER — TELEPHONE (OUTPATIENT)
Dept: ONCOLOGY | Age: 69
End: 2019-09-27

## 2019-09-27 VITALS
WEIGHT: 166.9 LBS | SYSTOLIC BLOOD PRESSURE: 131 MMHG | TEMPERATURE: 97.5 F | DIASTOLIC BLOOD PRESSURE: 91 MMHG | BODY MASS INDEX: 22.64 KG/M2 | HEART RATE: 85 BPM

## 2019-09-27 DIAGNOSIS — D47.2 MGUS (MONOCLONAL GAMMOPATHY OF UNKNOWN SIGNIFICANCE): ICD-10-CM

## 2019-09-27 DIAGNOSIS — D50.9 IRON DEFICIENCY ANEMIA, UNSPECIFIED IRON DEFICIENCY ANEMIA TYPE: Primary | ICD-10-CM

## 2019-09-27 DIAGNOSIS — D61.818 PANCYTOPENIA (HCC): ICD-10-CM

## 2019-09-27 DIAGNOSIS — K74.69 OTHER CIRRHOSIS OF LIVER (HCC): ICD-10-CM

## 2019-09-27 DIAGNOSIS — C61 PROSTATE CANCER (HCC): ICD-10-CM

## 2019-09-27 LAB
MITOCHONDRIAL ANTIBODY: 11.4 UNITS (ref 0–20)
SMOOTH MUSCLE ANTIBODY: NORMAL
SURGICAL PATHOLOGY REPORT: NORMAL

## 2019-09-27 PROCEDURE — 76705 ECHO EXAM OF ABDOMEN: CPT

## 2019-09-27 PROCEDURE — 99214 OFFICE O/P EST MOD 30 MIN: CPT | Performed by: INTERNAL MEDICINE

## 2019-09-27 PROCEDURE — 99211 OFF/OP EST MAY X REQ PHY/QHP: CPT | Performed by: INTERNAL MEDICINE

## 2019-09-27 RX ORDER — ACETAMINOPHEN 160 MG
TABLET,DISINTEGRATING ORAL
COMMUNITY
End: 2019-09-27 | Stop reason: ALTCHOICE

## 2019-09-27 RX ORDER — MULTIVIT-MIN/IRON/FOLIC ACID/K 18-600-40
CAPSULE ORAL
COMMUNITY

## 2019-09-27 NOTE — PROGRESS NOTES
_     Chief Complaint   Patient presents with    Follow-up     Review status of disease    Discuss Labs       DIAGNOSIS:    Anemia  Leukopenia  Liver cirrhosis  MGUS     Back pain        CURRENT THERAPY:    S/p hospitalization for further management of above issues. S/p blood transfusion  S/P IV Iron infusion. BRIEF CASE HISTORY:      The patient is a 77 y.o. AA male who is admitted to the hospital for severe anemia and leukopenia. He had no previous labs. He was seen by PCP for routine health exam as a new patient. Labs showed severe anemia and leukopenia. He had chronic back pain for one year. No fever. No night sweats. No lymphadenopathy   He has recent weight loss and anorexia. No GI symptoms. GI work up was negative. He received blood transfusion and IV Iron infusion. He had Rt hip surgery on 32/0/09 with no complications. He had left hip surgery in March 2015. No complications. Prostate surgery December 1298 with no complications. INTERIM HISTORY:   Patient is seen for follow up above problems. Patient had stool fit test which was positive. He had evaluation by gastroenterology. He had EGD and colonoscopy. Small polyps were resected. He was noted to have varices. He had liver ultrasound. Ultrasound shows possible cirrhosis. He had multiple other test done by gastroenterology. He will have follow-up with Dr. Vipul Archibald in few weeks. Clinically patient is doing fine. No active bleeding. No weakness or fatigue. No fever or chills. No chest pain. No nausea or vomiting. No other complaints.         PAST MEDICAL HISTORY: has a past medical history of Anemia, Arthritis, Avascular necrosis ([address] BPH (benign prostatic hyperplasia), History of blood transfusion, Hypertension, Iron deficiency anemia, Leukopenia, MGUS (monoclonal gammopathy of unknown significance), Osteoarthritis, Patient in

## 2019-09-27 NOTE — TELEPHONE ENCOUNTER
MELECIO ARRIVES AMBULATORY FOR MD VISIT  DR Lin Hence IN TO SEE PATIENT  ORDERS RECEIVED  HOLD ORAL IRON  RV 3 MONTHS W/ LABS PRIOR  LAB: CDP BMP FE TIBC FERRITIN KAPPA/LAMBDA FREE LIGHT CHAINS IGG IGA IGM 12/13/19  MD FOLLOW UP: 9/20/19 @ 11:40AM  AVS PRINTED AND GIVEN TO PATIENT W/ INSTRUCTIONS  PATIENT DISCHARGED AMBULATORY

## 2019-09-29 LAB
ALPHA-1 ANTITRYPSIN PHENOTYPE: NORMAL
ALPHA-1 ANTITRYPSIN: 142 MG/DL (ref 90–200)

## 2019-10-02 ENCOUNTER — OFFICE VISIT (OUTPATIENT)
Dept: FAMILY MEDICINE CLINIC | Age: 69
End: 2019-10-02
Payer: COMMERCIAL

## 2019-10-02 VITALS
WEIGHT: 164 LBS | SYSTOLIC BLOOD PRESSURE: 162 MMHG | DIASTOLIC BLOOD PRESSURE: 100 MMHG | BODY MASS INDEX: 22.24 KG/M2 | OXYGEN SATURATION: 98 % | HEART RATE: 92 BPM

## 2019-10-02 DIAGNOSIS — D47.2 MGUS (MONOCLONAL GAMMOPATHY OF UNKNOWN SIGNIFICANCE): ICD-10-CM

## 2019-10-02 DIAGNOSIS — I10 ESSENTIAL HYPERTENSION: Primary | ICD-10-CM

## 2019-10-02 PROCEDURE — 90471 IMMUNIZATION ADMIN: CPT | Performed by: FAMILY MEDICINE

## 2019-10-02 PROCEDURE — 90653 IIV ADJUVANT VACCINE IM: CPT | Performed by: FAMILY MEDICINE

## 2019-10-02 PROCEDURE — 99214 OFFICE O/P EST MOD 30 MIN: CPT | Performed by: FAMILY MEDICINE

## 2019-10-02 RX ORDER — CYCLOBENZAPRINE HCL 10 MG
TABLET ORAL
Qty: 90 TABLET | Refills: 1 | Status: SHIPPED | OUTPATIENT
Start: 2019-10-02 | End: 2022-03-02

## 2019-10-08 ENCOUNTER — PATIENT MESSAGE (OUTPATIENT)
Dept: FAMILY MEDICINE CLINIC | Age: 69
End: 2019-10-08

## 2019-10-23 ENCOUNTER — OFFICE VISIT (OUTPATIENT)
Dept: GASTROENTEROLOGY | Age: 69
End: 2019-10-23
Payer: COMMERCIAL

## 2019-10-23 ENCOUNTER — TELEPHONE (OUTPATIENT)
Dept: FAMILY MEDICINE CLINIC | Age: 69
End: 2019-10-23

## 2019-10-23 VITALS
BODY MASS INDEX: 21.44 KG/M2 | WEIGHT: 158.1 LBS | SYSTOLIC BLOOD PRESSURE: 149 MMHG | HEART RATE: 91 BPM | DIASTOLIC BLOOD PRESSURE: 86 MMHG

## 2019-10-23 DIAGNOSIS — K74.69 OTHER CIRRHOSIS OF LIVER (HCC): ICD-10-CM

## 2019-10-23 DIAGNOSIS — K92.0 HEMATEMESIS WITHOUT NAUSEA: Primary | ICD-10-CM

## 2019-10-23 DIAGNOSIS — K76.6 PORTAL HYPERTENSION (HCC): ICD-10-CM

## 2019-10-23 DIAGNOSIS — I85.00 IDIOPATHIC ESOPHAGEAL VARICES WITHOUT BLEEDING (HCC): ICD-10-CM

## 2019-10-23 DIAGNOSIS — R16.1 SPLENOMEGALY: ICD-10-CM

## 2019-10-23 PROCEDURE — 99214 OFFICE O/P EST MOD 30 MIN: CPT | Performed by: INTERNAL MEDICINE

## 2019-10-23 RX ORDER — PROPRANOLOL HCL 60 MG
10 CAPSULE, EXTENDED RELEASE 24HR ORAL 2 TIMES DAILY
Qty: 90 CAPSULE | Refills: 3 | Status: SHIPPED | OUTPATIENT
Start: 2019-10-23 | End: 2019-12-02 | Stop reason: SDUPTHER

## 2019-10-23 ASSESSMENT — ENCOUNTER SYMPTOMS
VOMITING: 0
CONSTIPATION: 0
GASTROINTESTINAL NEGATIVE: 1
ANAL BLEEDING: 0
NAUSEA: 0
ALLERGIC/IMMUNOLOGIC NEGATIVE: 1
EYES NEGATIVE: 1
BLOOD IN STOOL: 0
ABDOMINAL PAIN: 0
RESPIRATORY NEGATIVE: 1
RECTAL PAIN: 0
ABDOMINAL DISTENTION: 0
DIARRHEA: 0

## 2019-11-02 ENCOUNTER — HOSPITAL ENCOUNTER (OUTPATIENT)
Dept: MRI IMAGING | Age: 69
Discharge: HOME | End: 2019-11-04
Payer: COMMERCIAL

## 2019-11-02 DIAGNOSIS — K92.0 HEMATEMESIS WITHOUT NAUSEA: ICD-10-CM

## 2019-11-02 PROCEDURE — A9579 GAD-BASE MR CONTRAST NOS,1ML: HCPCS | Performed by: INTERNAL MEDICINE

## 2019-11-02 PROCEDURE — 74183 MRI ABD W/O CNTR FLWD CNTR: CPT

## 2019-11-02 PROCEDURE — 6360000004 HC RX CONTRAST MEDICATION: Performed by: INTERNAL MEDICINE

## 2019-11-02 RX ORDER — SODIUM CHLORIDE 0.9 % (FLUSH) 0.9 %
20 SYRINGE (ML) INJECTION
Status: DISCONTINUED | OUTPATIENT
Start: 2019-11-02 | End: 2019-11-05 | Stop reason: HOSPADM

## 2019-11-02 RX ORDER — SODIUM CHLORIDE 0.9 % (FLUSH) 0.9 %
10 SYRINGE (ML) INJECTION
Status: DISCONTINUED | OUTPATIENT
Start: 2019-11-02 | End: 2019-11-05 | Stop reason: HOSPADM

## 2019-11-02 RX ADMIN — GADOTERIDOL 15 ML: 279.3 INJECTION, SOLUTION INTRAVENOUS at 08:52

## 2019-11-15 ENCOUNTER — HOSPITAL ENCOUNTER (OUTPATIENT)
Age: 69
Discharge: HOME | End: 2019-11-17
Payer: COMMERCIAL

## 2019-11-15 ENCOUNTER — HOSPITAL ENCOUNTER (OUTPATIENT)
Dept: GENERAL RADIOLOGY | Age: 69
Discharge: HOME | End: 2019-11-17
Payer: COMMERCIAL

## 2019-11-15 ENCOUNTER — OFFICE VISIT (OUTPATIENT)
Dept: FAMILY MEDICINE CLINIC | Age: 69
End: 2019-11-15
Payer: COMMERCIAL

## 2019-11-15 VITALS
OXYGEN SATURATION: 99 % | WEIGHT: 158.38 LBS | HEART RATE: 63 BPM | BODY MASS INDEX: 21.48 KG/M2 | SYSTOLIC BLOOD PRESSURE: 140 MMHG | DIASTOLIC BLOOD PRESSURE: 84 MMHG

## 2019-11-15 DIAGNOSIS — J90 PLEURAL EFFUSION: Primary | ICD-10-CM

## 2019-11-15 DIAGNOSIS — R16.0 LIVER MASS: ICD-10-CM

## 2019-11-15 DIAGNOSIS — J90 PLEURAL EFFUSION: ICD-10-CM

## 2019-11-15 PROCEDURE — 71046 X-RAY EXAM CHEST 2 VIEWS: CPT

## 2019-11-15 PROCEDURE — 99213 OFFICE O/P EST LOW 20 MIN: CPT | Performed by: FAMILY MEDICINE

## 2019-11-19 ENCOUNTER — TELEPHONE (OUTPATIENT)
Dept: GASTROENTEROLOGY | Age: 69
End: 2019-11-19

## 2019-11-20 ENCOUNTER — OFFICE VISIT (OUTPATIENT)
Dept: GASTROENTEROLOGY | Age: 69
End: 2019-11-20
Payer: COMMERCIAL

## 2019-11-20 VITALS
WEIGHT: 162.8 LBS | BODY MASS INDEX: 22.08 KG/M2 | SYSTOLIC BLOOD PRESSURE: 129 MMHG | HEART RATE: 67 BPM | DIASTOLIC BLOOD PRESSURE: 72 MMHG

## 2019-11-20 DIAGNOSIS — D50.8 OTHER IRON DEFICIENCY ANEMIA: ICD-10-CM

## 2019-11-20 DIAGNOSIS — K76.6 PORTAL HYPERTENSION (HCC): ICD-10-CM

## 2019-11-20 DIAGNOSIS — I85.00 IDIOPATHIC ESOPHAGEAL VARICES WITHOUT BLEEDING (HCC): ICD-10-CM

## 2019-11-20 DIAGNOSIS — K74.69 OTHER CIRRHOSIS OF LIVER (HCC): ICD-10-CM

## 2019-11-20 DIAGNOSIS — K76.9 LIVER LESION: Primary | ICD-10-CM

## 2019-11-20 PROCEDURE — 99214 OFFICE O/P EST MOD 30 MIN: CPT | Performed by: INTERNAL MEDICINE

## 2019-11-20 ASSESSMENT — ENCOUNTER SYMPTOMS
BLOOD IN STOOL: 0
CONSTIPATION: 0
RESPIRATORY NEGATIVE: 1
ANAL BLEEDING: 0
RECTAL PAIN: 0
ABDOMINAL DISTENTION: 0
DIARRHEA: 0
ALLERGIC/IMMUNOLOGIC NEGATIVE: 1
GASTROINTESTINAL NEGATIVE: 1
ABDOMINAL PAIN: 0
VOMITING: 0
EYES NEGATIVE: 1
NAUSEA: 0

## 2019-11-21 ENCOUNTER — HOSPITAL ENCOUNTER (OUTPATIENT)
Facility: MEDICAL CENTER | Age: 69
End: 2019-11-21
Payer: COMMERCIAL

## 2019-11-21 ENCOUNTER — TELEPHONE (OUTPATIENT)
Dept: GASTROENTEROLOGY | Age: 69
End: 2019-11-21

## 2019-11-25 ENCOUNTER — TELEPHONE (OUTPATIENT)
Dept: INTERVENTIONAL RADIOLOGY/VASCULAR | Age: 69
End: 2019-11-25

## 2019-11-25 DIAGNOSIS — I85.00 IDIOPATHIC ESOPHAGEAL VARICES WITHOUT BLEEDING (HCC): ICD-10-CM

## 2019-11-25 DIAGNOSIS — K74.69 OTHER CIRRHOSIS OF LIVER (HCC): ICD-10-CM

## 2019-11-25 DIAGNOSIS — K76.9 LIVER LESION: Primary | ICD-10-CM

## 2019-11-26 ENCOUNTER — OFFICE VISIT (OUTPATIENT)
Dept: ONCOLOGY | Age: 69
End: 2019-11-26
Payer: COMMERCIAL

## 2019-11-26 ENCOUNTER — TELEPHONE (OUTPATIENT)
Dept: ONCOLOGY | Age: 69
End: 2019-11-26

## 2019-11-26 VITALS
TEMPERATURE: 98 F | DIASTOLIC BLOOD PRESSURE: 77 MMHG | WEIGHT: 157.4 LBS | RESPIRATION RATE: 14 BRPM | SYSTOLIC BLOOD PRESSURE: 132 MMHG | BODY MASS INDEX: 21.35 KG/M2 | HEART RATE: 56 BPM

## 2019-11-26 DIAGNOSIS — D61.818 PANCYTOPENIA (HCC): ICD-10-CM

## 2019-11-26 DIAGNOSIS — D50.9 IRON DEFICIENCY ANEMIA, UNSPECIFIED IRON DEFICIENCY ANEMIA TYPE: ICD-10-CM

## 2019-11-26 DIAGNOSIS — D47.2 MGUS (MONOCLONAL GAMMOPATHY OF UNKNOWN SIGNIFICANCE): ICD-10-CM

## 2019-11-26 DIAGNOSIS — R19.5 POSITIVE FIT (FECAL IMMUNOCHEMICAL TEST): ICD-10-CM

## 2019-11-26 DIAGNOSIS — C61 PROSTATE CANCER (HCC): Primary | ICD-10-CM

## 2019-11-26 PROCEDURE — 99214 OFFICE O/P EST MOD 30 MIN: CPT | Performed by: INTERNAL MEDICINE

## 2019-11-26 PROCEDURE — 99211 OFF/OP EST MAY X REQ PHY/QHP: CPT

## 2019-12-02 RX ORDER — PROPRANOLOL HCL 60 MG
60 CAPSULE, EXTENDED RELEASE 24HR ORAL 2 TIMES DAILY
Qty: 180 CAPSULE | Refills: 0 | Status: SHIPPED | OUTPATIENT
Start: 2019-12-02 | End: 2020-02-21

## 2019-12-12 RX ORDER — SODIUM CHLORIDE 0.9 % (FLUSH) 0.9 %
10 SYRINGE (ML) INJECTION PRN
Status: CANCELLED | OUTPATIENT
Start: 2019-12-13

## 2019-12-13 ENCOUNTER — HOSPITAL ENCOUNTER (OUTPATIENT)
Age: 69
Discharge: HOME | End: 2019-12-13
Payer: COMMERCIAL

## 2019-12-13 ENCOUNTER — HOSPITAL ENCOUNTER (OUTPATIENT)
Facility: MEDICAL CENTER | Age: 69
Discharge: HOME | End: 2019-12-13
Payer: COMMERCIAL

## 2019-12-13 ENCOUNTER — HOSPITAL ENCOUNTER (OUTPATIENT)
Dept: CT IMAGING | Age: 69
Discharge: HOME | End: 2019-12-15
Payer: COMMERCIAL

## 2019-12-13 ENCOUNTER — HOSPITAL ENCOUNTER (OUTPATIENT)
Dept: ULTRASOUND IMAGING | Age: 69
Discharge: HOME | End: 2019-12-15
Payer: COMMERCIAL

## 2019-12-13 VITALS
WEIGHT: 156.13 LBS | HEART RATE: 56 BPM | DIASTOLIC BLOOD PRESSURE: 72 MMHG | SYSTOLIC BLOOD PRESSURE: 111 MMHG | OXYGEN SATURATION: 100 % | BODY MASS INDEX: 21.15 KG/M2 | TEMPERATURE: 97.7 F | RESPIRATION RATE: 14 BRPM | HEIGHT: 72 IN

## 2019-12-13 VITALS
RESPIRATION RATE: 18 BRPM | SYSTOLIC BLOOD PRESSURE: 132 MMHG | HEART RATE: 61 BPM | OXYGEN SATURATION: 100 % | DIASTOLIC BLOOD PRESSURE: 80 MMHG | TEMPERATURE: 97.5 F

## 2019-12-13 DIAGNOSIS — D61.818 PANCYTOPENIA (HCC): ICD-10-CM

## 2019-12-13 DIAGNOSIS — K74.69 OTHER CIRRHOSIS OF LIVER (HCC): ICD-10-CM

## 2019-12-13 DIAGNOSIS — I85.00 IDIOPATHIC ESOPHAGEAL VARICES WITHOUT BLEEDING (HCC): ICD-10-CM

## 2019-12-13 DIAGNOSIS — K76.9 LIVER LESION: ICD-10-CM

## 2019-12-13 DIAGNOSIS — C61 PROSTATE CANCER (HCC): ICD-10-CM

## 2019-12-13 DIAGNOSIS — R97.20 PSA ELEVATION: ICD-10-CM

## 2019-12-13 DIAGNOSIS — D47.2 MGUS (MONOCLONAL GAMMOPATHY OF UNKNOWN SIGNIFICANCE): ICD-10-CM

## 2019-12-13 LAB
ABSOLUTE EOS #: 0.06 K/UL (ref 0–0.44)
ABSOLUTE IMMATURE GRANULOCYTE: 0 K/UL (ref 0–0.3)
ABSOLUTE LYMPH #: 0.29 K/UL (ref 1.1–3.7)
ABSOLUTE MONO #: 0.31 K/UL (ref 0.1–1.2)
BASOPHILS # BLD: 1 % (ref 0–2)
BASOPHILS ABSOLUTE: 0.01 K/UL (ref 0–0.2)
CA 19-9: 7 U/ML (ref 0–35)
DIFFERENTIAL TYPE: ABNORMAL
EOSINOPHILS RELATIVE PERCENT: 6 % (ref 1–4)
FERRITIN: 17 UG/L (ref 30–400)
FREE KAPPA/LAMBDA RATIO: 1.02 (ref 0.26–1.65)
HCT VFR BLD CALC: 35.4 % (ref 40.7–50.3)
HEMOGLOBIN: 10 G/DL (ref 13–17)
IMMATURE GRANULOCYTES: 0 %
INR BLD: 1.1
IRON SATURATION: 7 % (ref 20–55)
IRON: 23 UG/DL (ref 59–158)
KAPPA FREE LIGHT CHAINS QNT: 3.14 MG/DL (ref 0.37–1.94)
LAMBDA FREE LIGHT CHAINS QNT: 3.09 MG/DL (ref 0.57–2.63)
LYMPHOCYTES # BLD: 29 % (ref 24–43)
MCH RBC QN AUTO: 21.7 PG (ref 25.2–33.5)
MCHC RBC AUTO-ENTMCNC: 28.2 G/DL (ref 28.4–34.8)
MCV RBC AUTO: 77 FL (ref 82.6–102.9)
MONOCYTES # BLD: 31 % (ref 3–12)
MORPHOLOGY: ABNORMAL
NRBC AUTOMATED: 0 PER 100 WBC
PARTIAL THROMBOPLASTIN TIME: 27.1 SEC (ref 23–31)
PDW BLD-RTO: 16 % (ref 11.8–14.4)
PLATELET # BLD: 53 K/UL (ref 138–453)
PLATELET # BLD: ABNORMAL K/UL (ref 138–453)
PLATELET ESTIMATE: ABNORMAL
PMV BLD AUTO: ABNORMAL FL (ref 8.1–13.5)
PROTHROMBIN TIME: 11.3 SEC (ref 9.7–11.6)
RBC # BLD: 4.6 M/UL (ref 4.21–5.77)
RBC # BLD: ABNORMAL 10*6/UL
SEG NEUTROPHILS: 33 % (ref 36–65)
SEGMENTED NEUTROPHILS ABSOLUTE COUNT: 0.33 K/UL (ref 1.5–8.1)
TOTAL IRON BINDING CAPACITY: 329 UG/DL (ref 250–450)
UNSATURATED IRON BINDING CAPACITY: 306 UG/DL (ref 112–347)
WBC # BLD: 1 K/UL (ref 3.5–11.3)
WBC # BLD: ABNORMAL 10*3/UL

## 2019-12-13 PROCEDURE — 85055 RETICULATED PLATELET ASSAY: CPT

## 2019-12-13 PROCEDURE — 83540 ASSAY OF IRON: CPT

## 2019-12-13 PROCEDURE — 2580000003 HC RX 258: Performed by: RADIOLOGY

## 2019-12-13 PROCEDURE — 7100000010 HC PHASE II RECOVERY - FIRST 15 MIN

## 2019-12-13 PROCEDURE — 88307 TISSUE EXAM BY PATHOLOGIST: CPT

## 2019-12-13 PROCEDURE — 6360000004 HC RX CONTRAST MEDICATION: Performed by: RADIOLOGY

## 2019-12-13 PROCEDURE — 88313 SPECIAL STAINS GROUP 2: CPT

## 2019-12-13 PROCEDURE — 85025 COMPLETE CBC W/AUTO DIFF WBC: CPT

## 2019-12-13 PROCEDURE — 7100000011 HC PHASE II RECOVERY - ADDTL 15 MIN

## 2019-12-13 PROCEDURE — 85610 PROTHROMBIN TIME: CPT

## 2019-12-13 PROCEDURE — 83883 ASSAY NEPHELOMETRY NOT SPEC: CPT

## 2019-12-13 PROCEDURE — 2709999900 CT NEEDLE BIOPSY LIVER PERCUTANEOUS

## 2019-12-13 PROCEDURE — 6360000002 HC RX W HCPCS: Performed by: RADIOLOGY

## 2019-12-13 PROCEDURE — 76705 ECHO EXAM OF ABDOMEN: CPT

## 2019-12-13 PROCEDURE — 86301 IMMUNOASSAY TUMOR CA 19-9: CPT

## 2019-12-13 PROCEDURE — 77012 CT SCAN FOR NEEDLE BIOPSY: CPT

## 2019-12-13 PROCEDURE — 82728 ASSAY OF FERRITIN: CPT

## 2019-12-13 PROCEDURE — 83550 IRON BINDING TEST: CPT

## 2019-12-13 PROCEDURE — 85730 THROMBOPLASTIN TIME PARTIAL: CPT

## 2019-12-13 RX ORDER — SODIUM CHLORIDE 0.9 % (FLUSH) 0.9 %
10 SYRINGE (ML) INJECTION PRN
Status: DISCONTINUED | OUTPATIENT
Start: 2019-12-13 | End: 2019-12-16 | Stop reason: HOSPADM

## 2019-12-13 RX ORDER — MIDAZOLAM HYDROCHLORIDE 1 MG/ML
INJECTION INTRAMUSCULAR; INTRAVENOUS
Status: COMPLETED | OUTPATIENT
Start: 2019-12-13 | End: 2019-12-13

## 2019-12-13 RX ORDER — FENTANYL CITRATE 50 UG/ML
INJECTION, SOLUTION INTRAMUSCULAR; INTRAVENOUS
Status: COMPLETED | OUTPATIENT
Start: 2019-12-13 | End: 2019-12-13

## 2019-12-13 RX ORDER — SODIUM CHLORIDE 9 MG/ML
INJECTION, SOLUTION INTRAVENOUS CONTINUOUS
Status: DISCONTINUED | OUTPATIENT
Start: 2019-12-13 | End: 2019-12-16 | Stop reason: HOSPADM

## 2019-12-13 RX ORDER — ACETAMINOPHEN 325 MG/1
650 TABLET ORAL EVERY 4 HOURS PRN
Status: DISCONTINUED | OUTPATIENT
Start: 2019-12-13 | End: 2019-12-16 | Stop reason: HOSPADM

## 2019-12-13 RX ADMIN — FENTANYL CITRATE 50 MCG: 50 INJECTION INTRAMUSCULAR; INTRAVENOUS at 14:11

## 2019-12-13 RX ADMIN — SODIUM CHLORIDE: 9 INJECTION, SOLUTION INTRAVENOUS at 12:57

## 2019-12-13 RX ADMIN — IOPAMIDOL 75 ML: 755 INJECTION, SOLUTION INTRAVENOUS at 14:30

## 2019-12-13 RX ADMIN — MIDAZOLAM 1 MG: 1 INJECTION INTRAMUSCULAR; INTRAVENOUS at 14:11

## 2019-12-13 ASSESSMENT — PAIN SCALES - GENERAL: PAINLEVEL_OUTOF10: 0

## 2019-12-13 ASSESSMENT — PAIN - FUNCTIONAL ASSESSMENT: PAIN_FUNCTIONAL_ASSESSMENT: 0-10

## 2019-12-14 LAB
PLATELET, FLUORESCENCE: 53 K/UL (ref 138–453)
PLATELET, IMMATURE FRACTION: 12 % (ref 1.1–10.3)

## 2019-12-18 LAB — SURGICAL PATHOLOGY REPORT: NORMAL

## 2019-12-23 ENCOUNTER — OFFICE VISIT (OUTPATIENT)
Dept: GASTROENTEROLOGY | Age: 69
End: 2019-12-23
Payer: COMMERCIAL

## 2019-12-23 VITALS
WEIGHT: 160 LBS | DIASTOLIC BLOOD PRESSURE: 79 MMHG | BODY MASS INDEX: 21.7 KG/M2 | SYSTOLIC BLOOD PRESSURE: 134 MMHG | HEART RATE: 73 BPM

## 2019-12-23 DIAGNOSIS — K74.69 OTHER CIRRHOSIS OF LIVER (HCC): ICD-10-CM

## 2019-12-23 DIAGNOSIS — K21.9 GASTROESOPHAGEAL REFLUX DISEASE, ESOPHAGITIS PRESENCE NOT SPECIFIED: Primary | ICD-10-CM

## 2019-12-23 DIAGNOSIS — D18.03 LIVER HEMANGIOMA: ICD-10-CM

## 2019-12-23 PROCEDURE — 99214 OFFICE O/P EST MOD 30 MIN: CPT | Performed by: INTERNAL MEDICINE

## 2019-12-23 ASSESSMENT — ENCOUNTER SYMPTOMS
GASTROINTESTINAL NEGATIVE: 1
NAUSEA: 0
RESPIRATORY NEGATIVE: 1
BLOOD IN STOOL: 0
ABDOMINAL DISTENTION: 0
EYES NEGATIVE: 1
ANAL BLEEDING: 0
CONSTIPATION: 0
ABDOMINAL PAIN: 0
RECTAL PAIN: 0
VOMITING: 0
ALLERGIC/IMMUNOLOGIC NEGATIVE: 1
DIARRHEA: 0

## 2020-02-17 ENCOUNTER — HOSPITAL ENCOUNTER (OUTPATIENT)
Facility: MEDICAL CENTER | Age: 70
End: 2020-02-17
Payer: COMMERCIAL

## 2020-02-20 ENCOUNTER — TELEPHONE (OUTPATIENT)
Dept: ONCOLOGY | Age: 70
End: 2020-02-20

## 2020-02-20 ENCOUNTER — HOSPITAL ENCOUNTER (OUTPATIENT)
Facility: MEDICAL CENTER | Age: 70
Discharge: HOME | End: 2020-02-20
Payer: COMMERCIAL

## 2020-02-20 LAB
ABSOLUTE EOS #: 0.07 K/UL (ref 0–0.4)
ABSOLUTE IMMATURE GRANULOCYTE: 0 K/UL (ref 0–0.3)
ABSOLUTE LYMPH #: 0.35 K/UL (ref 1–4.8)
ABSOLUTE MONO #: 0.26 K/UL (ref 0.2–0.8)
ANION GAP SERPL CALCULATED.3IONS-SCNC: 9 MMOL/L (ref 9–17)
BASOPHILS # BLD: 1 %
BASOPHILS ABSOLUTE: 0.01 K/UL (ref 0–0.2)
BUN BLDV-MCNC: 15 MG/DL (ref 8–23)
BUN/CREAT BLD: 17 (ref 9–20)
CALCIUM SERPL-MCNC: 8.8 MG/DL (ref 8.6–10.4)
CHLORIDE BLD-SCNC: 104 MMOL/L (ref 98–107)
CO2: 26 MMOL/L (ref 20–31)
CREAT SERPL-MCNC: 0.88 MG/DL (ref 0.7–1.2)
DIFFERENTIAL TYPE: ABNORMAL
EOSINOPHILS RELATIVE PERCENT: 7 % (ref 1–4)
FERRITIN: 16 UG/L (ref 30–400)
FREE KAPPA/LAMBDA RATIO: 1.17 (ref 0.26–1.65)
GFR AFRICAN AMERICAN: >60 ML/MIN
GFR NON-AFRICAN AMERICAN: >60 ML/MIN
GFR SERPL CREATININE-BSD FRML MDRD: ABNORMAL ML/MIN/{1.73_M2}
GFR SERPL CREATININE-BSD FRML MDRD: ABNORMAL ML/MIN/{1.73_M2}
GLUCOSE BLD-MCNC: 140 MG/DL (ref 70–99)
HCT VFR BLD CALC: 38.8 % (ref 40.7–50.3)
HEMOGLOBIN: 10.6 G/DL (ref 13–17)
IGA: 570 MG/DL (ref 70–400)
IGG: 2149 MG/DL (ref 700–1600)
IGM: 33 MG/DL (ref 40–230)
IMMATURE GRANULOCYTES: 0 %
IRON SATURATION: 86 % (ref 20–55)
IRON: 284 UG/DL (ref 59–158)
KAPPA FREE LIGHT CHAINS QNT: 3.53 MG/DL (ref 0.37–1.94)
LAMBDA FREE LIGHT CHAINS QNT: 3.02 MG/DL (ref 0.57–2.63)
LYMPHOCYTES # BLD: 35 % (ref 24–44)
MCH RBC QN AUTO: 20.4 PG (ref 25.2–33.5)
MCHC RBC AUTO-ENTMCNC: 27.3 G/DL (ref 28.4–34.8)
MCV RBC AUTO: 74.8 FL (ref 82.6–102.9)
MONOCYTES # BLD: 26 % (ref 1–7)
MORPHOLOGY: ABNORMAL
MORPHOLOGY: ABNORMAL
NRBC AUTOMATED: 0 PER 100 WBC
PDW BLD-RTO: 18.6 % (ref 11.8–14.4)
PLATELET # BLD: 77 K/UL (ref 138–453)
PLATELET ESTIMATE: ABNORMAL
PMV BLD AUTO: ABNORMAL FL (ref 8.1–13.5)
POTASSIUM SERPL-SCNC: 4.6 MMOL/L (ref 3.7–5.3)
RBC # BLD: 5.19 M/UL (ref 4.21–5.77)
RBC # BLD: ABNORMAL 10*6/UL
SEG NEUTROPHILS: 31 % (ref 36–66)
SEGMENTED NEUTROPHILS ABSOLUTE COUNT: 0.31 K/UL (ref 1.8–7.7)
SODIUM BLD-SCNC: 139 MMOL/L (ref 135–144)
TOTAL IRON BINDING CAPACITY: 332 UG/DL (ref 250–450)
UNSATURATED IRON BINDING CAPACITY: 48 UG/DL (ref 112–347)
WBC # BLD: 1 K/UL (ref 3.5–11.3)
WBC # BLD: ABNORMAL 10*3/UL

## 2020-02-20 PROCEDURE — 83540 ASSAY OF IRON: CPT

## 2020-02-20 PROCEDURE — 83883 ASSAY NEPHELOMETRY NOT SPEC: CPT

## 2020-02-20 PROCEDURE — 83550 IRON BINDING TEST: CPT

## 2020-02-20 PROCEDURE — 82784 ASSAY IGA/IGD/IGG/IGM EACH: CPT

## 2020-02-20 PROCEDURE — 36415 COLL VENOUS BLD VENIPUNCTURE: CPT

## 2020-02-20 PROCEDURE — 82728 ASSAY OF FERRITIN: CPT

## 2020-02-20 PROCEDURE — 80048 BASIC METABOLIC PNL TOTAL CA: CPT

## 2020-02-20 PROCEDURE — 85025 COMPLETE CBC W/AUTO DIFF WBC: CPT

## 2020-02-21 RX ORDER — PROPRANOLOL HCL 60 MG
CAPSULE, EXTENDED RELEASE 24HR ORAL
Qty: 180 CAPSULE | Refills: 0 | Status: SHIPPED | OUTPATIENT
Start: 2020-02-21 | End: 2020-04-27

## 2020-02-24 ENCOUNTER — HOSPITAL ENCOUNTER (OUTPATIENT)
Facility: MEDICAL CENTER | Age: 70
End: 2020-02-24
Payer: COMMERCIAL

## 2020-02-25 ENCOUNTER — TELEPHONE (OUTPATIENT)
Dept: ONCOLOGY | Age: 70
End: 2020-02-25

## 2020-02-25 ENCOUNTER — OFFICE VISIT (OUTPATIENT)
Dept: ONCOLOGY | Age: 70
End: 2020-02-25
Payer: COMMERCIAL

## 2020-02-25 VITALS
WEIGHT: 163.9 LBS | TEMPERATURE: 97.3 F | BODY MASS INDEX: 22.23 KG/M2 | OXYGEN SATURATION: 98 % | RESPIRATION RATE: 18 BRPM | HEART RATE: 62 BPM | SYSTOLIC BLOOD PRESSURE: 131 MMHG | DIASTOLIC BLOOD PRESSURE: 84 MMHG

## 2020-02-25 PROCEDURE — 99211 OFF/OP EST MAY X REQ PHY/QHP: CPT | Performed by: INTERNAL MEDICINE

## 2020-02-25 PROCEDURE — 99214 OFFICE O/P EST MOD 30 MIN: CPT | Performed by: INTERNAL MEDICINE

## 2020-02-25 NOTE — TELEPHONE ENCOUNTER
MELEC[facility] ARRIVES AMBULATORY FOR MD VISIT  DR Mary Grace Garcia IN TO SEE PATIENT  ORDERS RECEIVED  RV 6 MONTHS W/ LABS PRIOR  LAB: CDP BMP FE TIBC FERRITIN KAPPA/LAMBDA FREE LIGHT CHAINS IGG IGA IGM 8/11/20  MD VISIT 8/18/20 @ 2:15 PM  AVS PRINTED AND GIVEN TO PATIENT W/ INSTRUCTIONS  PATIENT DISCHARGED AMBULTORY

## 2020-02-26 NOTE — PROGRESS NOTES
_     Chief Complaint   Patient presents with    Follow-up     review status of disease       DIAGNOSIS:    Anemia  Leukopenia  Liver cirrhosis  MGUS     Back pain        CURRENT THERAPY:    S/p hospitalization for further management of above issues. S/p blood transfusion  S/P IV Iron infusion. BRIEF CASE HISTORY:      The patient is a 77 y.o. AA male who is admitted to the hospital for severe anemia and leukopenia. He had no previous labs. He was seen by PCP for routine health exam as a new patient. Labs showed severe anemia and leukopenia. He had chronic back pain for one year. No fever. No night sweats. No lymphadenopathy   He has recent weight loss and anorexia. No GI symptoms. GI work up was negative. He received blood transfusion and IV Iron infusion. He had Rt hip surgery on 28/8/42 with no complications. He had left hip surgery in March 2015. No complications. Prostate surgery December 6876 with no complications. INTERIM HISTORY:   Patient is seen for follow up above problems. Patient had stool fit test which was positive. He had evaluation by gastroenterology. He had EGD and colonoscopy. Small polyps were resected. He was noted to have varices. He had liver ultrasound. Ultrasound shows possible cirrhosis. He had multiple other test done by gastroenterology. MRI of the liver showed questionable liver lesions concerning for malignancy. MRI also showed questionable problem with hemochromatosis. Her biopsy was negative. Patient's ferritin level has been normal for so many years. He had previous problem with iron deficiency. Clinically patient is doing fine. No active bleeding. No weakness or fatigue. No fever or chills. No chest pain. No nausea or vomiting. No other complaints.         PAST MEDICAL HISTORY: has a past medical history of Anemia, Arthritis, [facility] BPH (benign prostatic hyperplasia), History of blood transfusion, Hypertension, Iron deficiency anemia, Leukopenia, MGUS (monoclonal gammopathy of unknown significance), Osteoarthritis, Patient in clinical research study, Positive FIT (fecal immunochemical test), and Stomach ulcer. PAST SURGICAL HISTORY: has a past surgical history that includes Endoscopy, colon, diagnostic (07/13/2014); Prostate Biopsy (09/12/2014); Hip Arthroplasty (Right, 12/03/14); Hip Arthroplasty (Left, 03/10/15); Prostatectomy (12/16/2015); Colonoscopy (07/14/2014); Upper gastrointestinal endoscopy (10/19/2016); pr office/outpt visit,procedure only (Left, 11/29/2018); Inguinal hernia repair (Right, 2009); Inguinal hernia repair (Left, 11/29/2018); Upper gastrointestinal endoscopy (09/25/2019); Colonoscopy (09/25/2019); Colonoscopy (N/A, 9/25/2019); and Upper gastrointestinal endoscopy (N/A, 9/25/2019). CURRENT MEDICATIONS:  has a current medication list which includes the following prescription(s): propranolol, pantoprazole, hydrochlorothiazide, flaxseed oil, cyclobenzaprine, vitamin d, docusate sodium, magnesium, potassium chloride, calcium carbonate, and multiple vitamins-minerals. ALLERGIES:  has No Known Allergies. FAMILY HISTORY: Negative for any hematological or oncological conditions. SOCIAL HISTORY:  reports that he has never smoked. He has never used smokeless tobacco. He reports that he does not drink alcohol or use drugs. REVIEW OF SYSTEMS:     General: + weakness + fatigue. + unanticipated weight loss + decreased appetite. No fever or chills. Eyes: No blurred vision, eye pain or double vision. Ears: No hearing problems or drainage. No tinnitus. Throat: No sore throat, problems with swallowing or dysphagia. Respiratory: No cough, sputum or hemoptysis. No shortness of breath. No pleuritic chest pain. Cardiovascular: No chest pain, orthopnea or PND. No lower extremity edema. No palpitation.

## 2020-03-17 ENCOUNTER — TELEPHONE (OUTPATIENT)
Dept: GASTROENTEROLOGY | Age: 70
End: 2020-03-17

## 2020-04-20 ENCOUNTER — TELEPHONE (OUTPATIENT)
Dept: GASTROENTEROLOGY | Age: 70
End: 2020-04-20

## 2020-04-27 RX ORDER — PROPRANOLOL HCL 60 MG
CAPSULE, EXTENDED RELEASE 24HR ORAL
Qty: 180 CAPSULE | Refills: 0 | Status: SHIPPED | OUTPATIENT
Start: 2020-04-27 | End: 2020-07-22

## 2020-06-01 RX ORDER — POTASSIUM CHLORIDE 20 MEQ/1
TABLET, EXTENDED RELEASE ORAL
Qty: 90 TABLET | Refills: 1 | Status: SHIPPED | OUTPATIENT
Start: 2020-06-01 | End: 2020-06-06

## 2020-06-06 RX ORDER — POTASSIUM CHLORIDE 20 MEQ/1
TABLET, EXTENDED RELEASE ORAL
Qty: 90 TABLET | Refills: 1 | Status: SHIPPED | OUTPATIENT
Start: 2020-06-06 | End: 2021-03-26

## 2020-07-22 RX ORDER — PROPRANOLOL HCL 60 MG
CAPSULE, EXTENDED RELEASE 24HR ORAL
Qty: 180 CAPSULE | Refills: 0 | Status: SHIPPED | OUTPATIENT
Start: 2020-07-22 | End: 2020-11-16

## 2020-08-11 ENCOUNTER — HOSPITAL ENCOUNTER (OUTPATIENT)
Facility: MEDICAL CENTER | Age: 70
Discharge: HOME | End: 2020-08-11
Payer: COMMERCIAL

## 2020-08-11 ENCOUNTER — HOSPITAL ENCOUNTER (OUTPATIENT)
Age: 70
Setting detail: SPECIMEN
Discharge: HOME | End: 2020-08-11
Payer: COMMERCIAL

## 2020-08-11 DIAGNOSIS — D50.9 IRON DEFICIENCY ANEMIA, UNSPECIFIED IRON DEFICIENCY ANEMIA TYPE: ICD-10-CM

## 2020-08-11 LAB
ABSOLUTE EOS #: 0.09 K/UL (ref 0–0.44)
ABSOLUTE EOS #: 0.09 K/UL (ref 0–0.44)
ABSOLUTE IMMATURE GRANULOCYTE: 0 K/UL (ref 0–0.3)
ABSOLUTE IMMATURE GRANULOCYTE: 0 K/UL (ref 0–0.3)
ABSOLUTE LYMPH #: 0.35 K/UL (ref 1.1–3.7)
ABSOLUTE LYMPH #: 0.36 K/UL (ref 1.1–3.7)
ABSOLUTE MONO #: 0.29 K/UL (ref 0.1–1.2)
ABSOLUTE MONO #: 0.3 K/UL (ref 0.1–1.2)
AFP: 8.5 UG/L
ALBUMIN SERPL-MCNC: 3.1 G/DL (ref 3.5–5.2)
ALBUMIN/GLOBULIN RATIO: ABNORMAL (ref 1–2.5)
ALP BLD-CCNC: 183 U/L (ref 40–129)
ALT SERPL-CCNC: 40 U/L (ref 5–41)
ANION GAP SERPL CALCULATED.3IONS-SCNC: 9 MMOL/L (ref 9–17)
ANION GAP SERPL CALCULATED.3IONS-SCNC: 9 MMOL/L (ref 9–17)
AST SERPL-CCNC: 45 U/L
BASOPHILS # BLD: 1 % (ref 0–2)
BASOPHILS # BLD: 1 % (ref 0–2)
BASOPHILS ABSOLUTE: 0.01 K/UL (ref 0–0.2)
BASOPHILS ABSOLUTE: <0.03 K/UL (ref 0–0.2)
BILIRUB SERPL-MCNC: 0.65 MG/DL (ref 0.3–1.2)
BILIRUBIN DIRECT: 0.2 MG/DL
BILIRUBIN, INDIRECT: 0.45 MG/DL (ref 0–1)
BUN BLDV-MCNC: 15 MG/DL (ref 8–23)
BUN BLDV-MCNC: 15 MG/DL (ref 8–23)
BUN/CREAT BLD: 15 (ref 9–20)
CALCIUM SERPL-MCNC: 8.5 MG/DL (ref 8.6–10.4)
CHLORIDE BLD-SCNC: 104 MMOL/L (ref 98–107)
CHLORIDE BLD-SCNC: 106 MMOL/L (ref 98–107)
CO2: 24 MMOL/L (ref 20–31)
CO2: 24 MMOL/L (ref 20–31)
CREAT SERPL-MCNC: 0.98 MG/DL (ref 0.7–1.2)
CREAT SERPL-MCNC: 0.98 MG/DL (ref 0.7–1.2)
DIFFERENTIAL TYPE: ABNORMAL
DIFFERENTIAL TYPE: ABNORMAL
EOSINOPHILS RELATIVE PERCENT: 9 % (ref 1–4)
EOSINOPHILS RELATIVE PERCENT: 9 % (ref 1–4)
FERRITIN: 19 UG/L (ref 30–400)
FREE KAPPA/LAMBDA RATIO: 1.07 (ref 0.26–1.65)
GFR AFRICAN AMERICAN: >60 ML/MIN
GFR AFRICAN AMERICAN: >60 ML/MIN
GFR NON-AFRICAN AMERICAN: >60 ML/MIN
GFR NON-AFRICAN AMERICAN: >60 ML/MIN
GFR SERPL CREATININE-BSD FRML MDRD: ABNORMAL ML/MIN/{1.73_M2}
GFR SERPL CREATININE-BSD FRML MDRD: ABNORMAL ML/MIN/{1.73_M2}
GFR SERPL CREATININE-BSD FRML MDRD: NORMAL ML/MIN/{1.73_M2}
GFR SERPL CREATININE-BSD FRML MDRD: NORMAL ML/MIN/{1.73_M2}
GLOBULIN: ABNORMAL G/DL (ref 1.5–3.8)
GLUCOSE BLD-MCNC: 143 MG/DL (ref 70–99)
HCT VFR BLD CALC: 37.6 % (ref 40.7–50.3)
HCT VFR BLD CALC: 37.6 % (ref 40.7–50.3)
HEMOGLOBIN: 10.6 G/DL (ref 13–17)
HEMOGLOBIN: 10.6 G/DL (ref 13–17)
IGA: 537 MG/DL (ref 70–400)
IGG: 2021 MG/DL (ref 700–1600)
IGM: 30 MG/DL (ref 40–230)
IMMATURE GRANULOCYTES: 0 %
IMMATURE GRANULOCYTES: 0 %
IRON SATURATION: 8 % (ref 20–55)
IRON: 24 UG/DL (ref 59–158)
KAPPA FREE LIGHT CHAINS QNT: 3.25 MG/DL (ref 0.37–1.94)
LAMBDA FREE LIGHT CHAINS QNT: 3.05 MG/DL (ref 0.57–2.63)
LYMPHOCYTES # BLD: 35 % (ref 24–43)
LYMPHOCYTES # BLD: 35 % (ref 24–43)
MCH RBC QN AUTO: 21.4 PG (ref 25.2–33.5)
MCH RBC QN AUTO: 21.4 PG (ref 25.2–33.5)
MCHC RBC AUTO-ENTMCNC: 28.2 G/DL (ref 28.4–34.8)
MCHC RBC AUTO-ENTMCNC: 28.2 G/DL (ref 28.4–34.8)
MCV RBC AUTO: 76 FL (ref 82.6–102.9)
MCV RBC AUTO: 76 FL (ref 82.6–102.9)
MONOCYTES # BLD: 29 % (ref 3–12)
MONOCYTES # BLD: 29 % (ref 3–12)
MORPHOLOGY: ABNORMAL
NRBC AUTOMATED: 0 PER 100 WBC
NRBC AUTOMATED: 0 PER 100 WBC
PDW BLD-RTO: 18.7 % (ref 11.8–14.4)
PDW BLD-RTO: 18.7 % (ref 11.8–14.4)
PLATELET # BLD: 58 K/UL (ref 138–453)
PLATELET # BLD: 58 K/UL (ref 138–453)
PLATELET ESTIMATE: ABNORMAL
PLATELET ESTIMATE: ABNORMAL
PMV BLD AUTO: ABNORMAL FL (ref 8.1–13.5)
PMV BLD AUTO: ABNORMAL FL (ref 8.1–13.5)
POTASSIUM SERPL-SCNC: 3.7 MMOL/L (ref 3.7–5.3)
POTASSIUM SERPL-SCNC: 3.8 MMOL/L (ref 3.7–5.3)
RBC # BLD: 4.95 M/UL (ref 4.21–5.77)
RBC # BLD: 4.95 M/UL (ref 4.21–5.77)
RBC # BLD: ABNORMAL 10*6/UL
RBC # BLD: ABNORMAL 10*6/UL
SEG NEUTROPHILS: 26 % (ref 36–65)
SEG NEUTROPHILS: 26 % (ref 36–65)
SEGMENTED NEUTROPHILS ABSOLUTE COUNT: 0.26 K/UL (ref 1.5–8.1)
SEGMENTED NEUTROPHILS ABSOLUTE COUNT: 0.26 K/UL (ref 1.5–8.1)
SODIUM BLD-SCNC: 137 MMOL/L (ref 135–144)
SODIUM BLD-SCNC: 139 MMOL/L (ref 135–144)
TOTAL IRON BINDING CAPACITY: 303 UG/DL (ref 250–450)
TOTAL PROTEIN: 6.8 G/DL (ref 6.4–8.3)
UNSATURATED IRON BINDING CAPACITY: 279 UG/DL (ref 112–347)
WBC # BLD: 1 K/UL (ref 3.5–11.3)
WBC # BLD: 1 K/UL (ref 3.5–11.3)
WBC # BLD: ABNORMAL 10*3/UL
WBC # BLD: ABNORMAL 10*3/UL

## 2020-08-11 PROCEDURE — 36415 COLL VENOUS BLD VENIPUNCTURE: CPT

## 2020-08-11 PROCEDURE — 82105 ALPHA-FETOPROTEIN SERUM: CPT

## 2020-08-11 PROCEDURE — 82784 ASSAY IGA/IGD/IGG/IGM EACH: CPT

## 2020-08-11 PROCEDURE — 83550 IRON BINDING TEST: CPT

## 2020-08-11 PROCEDURE — 80048 BASIC METABOLIC PNL TOTAL CA: CPT

## 2020-08-11 PROCEDURE — 85025 COMPLETE CBC W/AUTO DIFF WBC: CPT

## 2020-08-11 PROCEDURE — 84520 ASSAY OF UREA NITROGEN: CPT

## 2020-08-11 PROCEDURE — 82728 ASSAY OF FERRITIN: CPT

## 2020-08-11 PROCEDURE — 82565 ASSAY OF CREATININE: CPT

## 2020-08-11 PROCEDURE — 83540 ASSAY OF IRON: CPT

## 2020-08-11 PROCEDURE — 83883 ASSAY NEPHELOMETRY NOT SPEC: CPT

## 2020-08-11 PROCEDURE — 80076 HEPATIC FUNCTION PANEL: CPT

## 2020-08-11 PROCEDURE — 80051 ELECTROLYTE PANEL: CPT

## 2020-08-13 ENCOUNTER — HOSPITAL ENCOUNTER (OUTPATIENT)
Facility: MEDICAL CENTER | Age: 70
End: 2020-08-13
Payer: COMMERCIAL

## 2020-08-18 ENCOUNTER — TELEPHONE (OUTPATIENT)
Dept: ONCOLOGY | Age: 70
End: 2020-08-18

## 2020-08-18 ENCOUNTER — OFFICE VISIT (OUTPATIENT)
Dept: ONCOLOGY | Age: 70
End: 2020-08-18
Payer: COMMERCIAL

## 2020-08-18 VITALS
WEIGHT: 157 LBS | HEART RATE: 56 BPM | TEMPERATURE: 97.7 F | BODY MASS INDEX: 21.29 KG/M2 | DIASTOLIC BLOOD PRESSURE: 88 MMHG | SYSTOLIC BLOOD PRESSURE: 145 MMHG | RESPIRATION RATE: 18 BRPM

## 2020-08-18 PROCEDURE — 99211 OFF/OP EST MAY X REQ PHY/QHP: CPT | Performed by: INTERNAL MEDICINE

## 2020-08-18 PROCEDURE — 99214 OFFICE O/P EST MOD 30 MIN: CPT | Performed by: INTERNAL MEDICINE

## 2020-08-18 NOTE — TELEPHONE ENCOUNTER
[facility] ARRIVES AMBULATORY FOR MD VISIT  DR Irvin Crews IN TO SEE PATIENT  ORDERS RECEIVED   RV 6 MONTHS W/ LABS PRIOR  LAB: CDP BMP FE TIBC FERRITIN KAPPA/LAMBDA FREE LIGHT CHAINS IGG IGA IGM 2/18/21  MD VISIT 2/25/21 @ 3PM  AVS PRINTED AND GIVEN TO PATIENT W/ INSTRUCTIONS  PATIENT DISCHARGED AMBULATORY

## 2020-08-23 NOTE — PROGRESS NOTES
_     Chief Complaint   Patient presents with    Follow-up     review status of disease       DIAGNOSIS:    Anemia  Leukopenia  Liver cirrhosis  MGUS     Back pain        CURRENT THERAPY:    S/p hospitalization for further management of above issues. S/p blood transfusion  S/P IV Iron infusion. BRIEF CASE HISTORY:      The patient is a 77 y.o. AA male who is admitted to the hospital for severe anemia and leukopenia. He had no previous labs. He was seen by PCP for routine health exam as a new patient. Labs showed severe anemia and leukopenia. He had chronic back pain for one year. No fever. No night sweats. No lymphadenopathy   He has recent weight loss and anorexia. No GI symptoms. GI work up was negative. He received blood transfusion and IV Iron infusion. He had Rt hip surgery on 15/7/45 with no complications. He had left hip surgery in March 2015. No complications. Prostate surgery December 7444 with no complications. INTERIM HISTORY:   Patient is seen for follow up above problems. Patient had stool fit test which was positive. He had evaluation by gastroenterology. He had EGD and colonoscopy. Small polyps were resected. He was noted to have varices. He had liver ultrasound. Ultrasound shows possible cirrhosis. He had multiple other test done by gastroenterology. MRI of the liver showed questionable liver lesions concerning for malignancy. MRI also showed questionable problem with hemochromatosis. Her biopsy was negative. Patient's ferritin level has been normal for so many years. He had previous problem with iron deficiency. Clinically patient is doing fine. No active bleeding. No weakness or fatigue. No fever or chills. No chest pain. No nausea or vomiting. No other complaints.         PAST MEDICAL HISTORY: has a past medical history of Anemia, Arthritis, [facility] BPH (benign prostatic hyperplasia), History of blood transfusion, Hypertension, Iron deficiency anemia, Leukopenia, MGUS (monoclonal gammopathy of unknown significance), Osteoarthritis, Patient in clinical research study, Positive FIT (fecal immunochemical test), and Stomach ulcer. PAST SURGICAL HISTORY: has a past surgical history that includes Endoscopy, colon, diagnostic (07/13/2014); Prostate Biopsy (09/12/2014); Hip Arthroplasty (Right, 12/03/14); Hip Arthroplasty (Left, 03/10/15); Prostatectomy (12/16/2015); Colonoscopy (07/14/2014); Upper gastrointestinal endoscopy (10/19/2016); pr office/outpt visit,procedure only (Left, 11/29/2018); Inguinal hernia repair (Right, 2009); Inguinal hernia repair (Left, 11/29/2018); Upper gastrointestinal endoscopy (09/25/2019); Colonoscopy (09/25/2019); Colonoscopy (N/A, 9/25/2019); and Upper gastrointestinal endoscopy (N/A, 9/25/2019). CURRENT MEDICATIONS:  has a current medication list which includes the following prescription(s): propranolol, potassium chloride, pantoprazole, hydrochlorothiazide, flaxseed oil, cyclobenzaprine, vitamin d, docusate sodium, magnesium, calcium carbonate, and multiple vitamins-minerals. ALLERGIES:  has No Known Allergies. FAMILY HISTORY: Negative for any hematological or oncological conditions. SOCIAL HISTORY:  reports that he has never smoked. He has never used smokeless tobacco. He reports that he does not drink alcohol or use drugs. REVIEW OF SYSTEMS:     General: + weakness + fatigue. + unanticipated weight loss + decreased appetite. No fever or chills. Eyes: No blurred vision, eye pain or double vision. Ears: No hearing problems or drainage. No tinnitus. Throat: No sore throat, problems with swallowing or dysphagia. Respiratory: No cough, sputum or hemoptysis. No shortness of breath. No pleuritic chest pain. Cardiovascular: No chest pain, orthopnea or PND. No lower extremity edema. No palpitation. Gastrointestinal: No problems with swallowing. No abdominal pain or bloating. No nausea or vomiting. No diarrhea or constipation. No GI bleeding. Genitourinary: No dysuria, hematuria, frequency or urgency. Musculoskeletal: No muscle aches or pains. No limitation of movement. + chronic back pain. No gait disturbance, No joint complaints. Dermatologic: No skin rashes or pruritus. No skin lesions or discolorations. Psychiatric: No depression, anxiety, or stress or signs of schizophrenia. No change in mood or affect. Hematologic: No history of bleeding tendency. No bruises or ecchymosis. No history of clotting problems. Infectious disease: No fever, chills or frequent infections. Endocrine: No problems with opacity. No polydipsia or polyuria. No temperature intolerance. Neurologic: No headaches or dizziness. No weakness or numbness of the extremities. No changes in balance, coordination, memory, mentation, behavior. Allergic/Immunologic: No nasal congestion or hives. No repeated infections. PHYSICAL EXAM:  The patient is not in acute distress. Vital signs: Blood pressure (!) 145/88, pulse 56, temperature 97.7 °F (36.5 °C), temperature source Oral, resp. rate 18, weight 157 lb (71.2 kg).      General appearance - well appearing, not in pain or distress   Mental status - good mood, alert and oriented   Eyes - pupils equal and reactive, extraocular eye movements intact   Ears - bilateral TM's and external ear canals normal   Nose - normal and patent, no erythema, discharge or polyps   Mouth - mucous membranes moist, pharynx normal without lesions   Neck - supple, no significant adenopathy   Lymphatics - no palpable lymphadenopathy, no hepatosplenomegaly   Chest - clear to auscultation, no wheezes, rales or rhonchi, symmetric air entry   Heart - normal rate, regular rhythm, normal S1, S2, no murmurs, rubs, clicks or gallops   Abdomen - soft, nontender, nondistended, no masses or organomegaly   Neurological - alert, oriented, normal speech, no focal findings or movement disorder noted   Musculoskeletal -mild bilateral LE weakness   Extremities - peripheral pulses normal, no pedal edema, no clubbing or cyanosis   Skin - normal coloration and turgor, no rashes, no suspicious skin lesions noted       Lab Results   Component Value Date    WBC 1.0 (LL) 08/11/2020    HGB 10.6 (L) 08/11/2020    HCT 37.6 (L) 08/11/2020    MCV 76.0 (L) 08/11/2020    PLT 58 (L) 08/11/2020     Lab Results   Component Value Date    IRON 24 (L) 08/11/2020    TIBC 303 08/11/2020    FERRITIN 19 (L) 08/11/2020                 Chemistry        Component Value Date/Time     08/11/2020 1135    K 3.7 08/11/2020 1135     08/11/2020 1135    CO2 24 08/11/2020 1135    BUN 15 08/11/2020 1135    CREATININE 0.98 08/11/2020 1135        Component Value Date/Time    CALCIUM 8.5 (L) 08/11/2020 1135    ALKPHOS 183 (H) 08/11/2020 1102    AST 45 (H) 08/11/2020 1102    ALT 40 08/11/2020 1102    BILITOT 0.65 08/11/2020 1102        Bone marrow: Final Diagnosis  PERIPHERAL BLOOD:  - SEVERE MICROCYTIC ANEMIA.  - MODERATE TO SEVERE NEUTROPENIA.  - LYMPHOPENIA (510/ul). BONE MARROW BIOPSY, ASPIRATE SMEAR AND CLOT SECTION:  - MYELOID AND MEGAKARYOCYTIC HYPERPLASIA. - ABSENT IRON STORES. - PLASMA CELLS 5-10%, NEGATIVE FOR PLASMA CELL MONOTYPIA FOR IHC. Diagnosis Comment  THE SEVERE MICROCYTIC ANEMIA IS COMPATIBLE WITH IRON DEFICIENCY  ANEMIA. NEUTROPENIA IS LIKELY DRUG INDUCED / IMMUNE-MEDIATED. THERE  IS NO EVIDENCE OF MYELOID OR OTHER MALIGNANCY. PLASMA CELLS COMPRISE  5-10% OF THE CELLULARITY AND THERE IS NO EVIDENCE OF PLASMA CELL  MONOTYPIA BY IHC (SERUM IgG KAPPA MONOCLONAL PARAPROTEIN OF 0.6 G/dl  IS NOTED, FAVORING MGUS). CT scan: IMPRESSION: 1. Thickening of the sigmoid colon and rectum which maybe infectious or inflammatory etiology but may be neoplastic in etiology. Clinical correlation is recommended.  Further evaluation with direct visualization is recommended. 2. Prostatomegaly. 3. Numerous low-attenuation lesions scattered in the liver are technically indeterminate. Further evaluation with nonemergent/outpatient contrast-enhanced MRI of the liver is recommended. 4. Severe degenerative changes of the bilateral hips. 5. Lucency of the proximal for more which may be related to demineralization; however, neoplastic etiologies cannot be excluded. This can be further evaluated with bone scan or MRI as clinically indicated. Note is made that this patient may be at increased risk for pathologic fracture. Bone scan: IMPRESSION: 1. No definitive scintigraphic evidence for metastatic disease to the skeleton. 2. Symmetric increased radiotracer uptake at the bilateral hips, likely secondary to severe degenerative changes of the bilateral hips seen on the CT abdomen pelvis of 7/12/2014. Underlying avascular necrosis is not excluded. Further evaluation with MR of the bilateral hips should be considered. 3. Symmetric increased radiotracer uptake involving the bilateral shoulders, elbows, wrists, hands, knees as well as the right foot, most likely degenerative. Abdomen MRI:   IMPRESSION:    1. Multiple T2 hyperintense lesion is noted within the liver largest    measuring approximately 1.6 CM x1.9 CM without significant associated    enhancement likely represent hepatic cyst however the noncontrasted    images are somewhat limited due to patient motion. Followup assessment is    advised in 4 months. 2. Splenomegaly   3. Redemonstration of sclerotic lesions within the left sacrum may    represent a bone island.      Lab Results   Component Value Date    WBC 1.0 (LL) 08/11/2020    HGB 10.6 (L) 08/11/2020    HCT 37.6 (L) 08/11/2020    MCV 76.0 (L) 08/11/2020    PLT 58 (L) 08/11/2020    LYMPHOPCT 35 08/11/2020    RBC 4.95 08/11/2020    MCH 21.4 (L) 08/11/2020    MCHC 28.2 (L) 08/11/2020    RDW 18.7 (H) 08/11/2020    MONOPCT 29 (H) 08/11/2020    EOSPCT 4 06/04/2019    BASOPCT 1 08/11/2020    NEUTROABS 0.26 (LL) 08/11/2020    LYMPHSABS 0.35 (L) 08/11/2020    MONOSABS 0.29 08/11/2020    EOSABS 0.09 08/11/2020    BASOSABS 0.01 08/11/2020                 Lab Results   Component Value Date    IRON 24 (L) 08/11/2020    TIBC 303 08/11/2020    FERRITIN 19 (L) 08/11/2020           IMPRESSION:   Pancytopenia. Likely related to liver cirrhosis and possibly immune mediated. Anemia, multifactorial.   Liver cirrhosis  Weight loss. Recovering. MGUS     Back pain  Questionable liver lesions. Previous MRI was consistent with benign lesions. Repeated MRI showed no changes. Hips osteonecrosis. S/p surgery. Prostate cancer. ECOG performance score 0  Recent left inguinal hernia surgery. PLAN:    I explained to the patient all of the above. I reviewed the results of the EGD and colonoscopy and explained to the patient and his wife. Labs reviewed and discussed with the patient. Clinically, he did better after IV Iron infusion. Hb is stable. No active bleeding. Will discontinue oral iron. Iron studies are normal.  Liver MRI is suggestive of hemochromatosis. I believe these findings are related to recent iron infusion as the patient never had any evidence of high ferritin or elevated iron. We stopped iron supplements. Results improving. MRI also showed suspicious liver lesion with recommendation for biopsy. However the liver lesion is exact same size as it was seen on the previous MRI from 2014. Likely benign. However with these questionable readings on the MRI I liver biopsy was negative. Wbcs are still very low. He received Granix before surgery. He has no repeated infections. No need for daily treatment. These abnormalities are at least in part related to liver cirrhosis as well. WBCs are likely immune mediated in addition to liver cirrhosis. Thrombocytopenia secondary to liver cirrhosis. Clinically stable.   Continue to monitor

## 2020-09-08 ENCOUNTER — TELEPHONE (OUTPATIENT)
Dept: GASTROENTEROLOGY | Age: 70
End: 2020-09-08

## 2020-09-08 NOTE — TELEPHONE ENCOUNTER
Patient missed follow up appointment for his liver issues with Dr. Fatoumata Doran on 7/1/20. He sent a my chart message today rescheduling that appointment to 11/4. He said that he isn't having any active problems and is doing okay, but he asked in the message if Dr. Fatoumata Doran would want any labs before he came, he believes he missed those too. Please advise.

## 2020-09-16 ENCOUNTER — OFFICE VISIT (OUTPATIENT)
Dept: FAMILY MEDICINE CLINIC | Age: 70
End: 2020-09-16
Payer: COMMERCIAL

## 2020-09-16 VITALS
SYSTOLIC BLOOD PRESSURE: 118 MMHG | BODY MASS INDEX: 21.84 KG/M2 | TEMPERATURE: 97.2 F | OXYGEN SATURATION: 98 % | WEIGHT: 161 LBS | DIASTOLIC BLOOD PRESSURE: 80 MMHG | HEART RATE: 61 BPM

## 2020-09-16 PROCEDURE — 90694 VACC AIIV4 NO PRSRV 0.5ML IM: CPT | Performed by: FAMILY MEDICINE

## 2020-09-16 PROCEDURE — 90471 IMMUNIZATION ADMIN: CPT | Performed by: FAMILY MEDICINE

## 2020-09-16 PROCEDURE — 99214 OFFICE O/P EST MOD 30 MIN: CPT | Performed by: FAMILY MEDICINE

## 2020-09-16 RX ORDER — HYDROCHLOROTHIAZIDE 25 MG/1
TABLET ORAL
Qty: 90 TABLET | Refills: 3 | Status: SHIPPED | OUTPATIENT
Start: 2020-09-16 | End: 2021-09-13

## 2020-09-16 RX ORDER — PANTOPRAZOLE SODIUM 40 MG/1
40 TABLET, DELAYED RELEASE ORAL DAILY
Qty: 90 TABLET | Refills: 3 | Status: SHIPPED | OUTPATIENT
Start: 2020-09-16 | End: 2021-09-13

## 2020-09-16 ASSESSMENT — PATIENT HEALTH QUESTIONNAIRE - PHQ9
1. LITTLE INTEREST OR PLEASURE IN DOING THINGS: 0
SUM OF ALL RESPONSES TO PHQ QUESTIONS 1-9: 0
SUM OF ALL RESPONSES TO PHQ QUESTIONS 1-9: 0
2. FEELING DOWN, DEPRESSED OR HOPELESS: 0
SUM OF ALL RESPONSES TO PHQ9 QUESTIONS 1 & 2: 0

## 2020-09-16 NOTE — PROGRESS NOTES
Subjective: Faith Canales presents for   Chief Complaint   Patient presents with    Hypertension   BP AT [address] [place] [address] the meds        bp is fien at home    Is seeing GI and heme/onc routienly  - he is utd with labs. Appetite is fine. Activity level is good  Patient Active Problem List   Diagnosis    Leukopenia    Microcytic hypochromic anemia    Pancytopenia (HCC)    MGUS (monoclonal gammopathy of unknown significance)    PSA elevation    Neutropenia (HCC)    Osteoarthritis    Hyponatremia    S/P hip replacement    Prostate cancer (HCC)    Malabsorption    Hematemesis without nausea    Upper GI bleed    Essential hypertension    History of hematemesis    Pancytopenia (HCC)    Anemia    Iron deficiency anemia    Positive FIT (fecal immunochemical test)         Review of Systems:  · General: no significant weight changes. · Respiratory: no cough, pleuritic chest pain, dyspnea, or wheezing  · Cardiovascular: no pain, NICOLE, orthopnea, palpitations or claudication  · Gastrointestinal: no chronic nausea, vomiting, heartburn, diarrhea, constipation, bloating, or abdominal pain. No bloody or black stools. Objective:  Physical Exam   Vitals:   Vitals:    09/16/20 1305   BP: 118/80   Pulse: 61   Temp: 97.2 °F (36.2 °C)   TempSrc: Temporal   SpO2: 98%   Weight: 161 lb (73 kg)     Wt Readings from Last 3 Encounters:   09/16/20 161 lb (73 kg)   08/18/20 157 lb (71.2 kg)   02/25/20 163 lb 14.4 oz (74.3 kg)     Ht Readings from Last 3 Encounters:   12/13/19 6' (1.829 m)   09/25/19 6' (1.829 m)   06/04/19 6' (1.829 m)     Body mass index is 21.84 kg/m². Constitutional: He is oriented to person, place, and time. He appears well-developed and well-nourished and in no acute distress. Answers all my questions appropriately. Head: Normocephalic and atraumatic. Eyes:conjunctiva appear normal.  Nose: pink, non-edematous mucosa. No polyps.   No septal deviation  Throat: no erythema, tonsillar hypertrophy or exudate. No ulcerations noted. Lips/Teeth/Gums all appear normal.  Neck: Normal range of motion. Neck supple. No tracheal deviation present. No abnormal lymphadenopathy. No JVD noted. Carotids are clear bilaterally. No thyroid masses noted. Heart: RRR without murmur. No S3, S4, or gallop noted. Chest: Clear to auscultation bilaterally. Good breath sounds noted. No rales, wheezes, or rhonchi noted. No respiratory retractions noted. Wall has symmetrical movement with respirations. No pedal edema    Assessment:   Encounter Diagnoses   Name Primary?  Essential hypertension Yes    Iron deficiency anemia, unspecified iron deficiency anemia type     [facility]          Plan:   Medications Discontinued During This Encounter   Medication Reason    hydrochlorothiazide (HYDRODIURIL) 25 MG tablet REORDER    pantoprazole (PROTONIX) 40 MG tablet REORDER     THE ABOVE NOTED DISCONTINUED MEDS MAY ONLY BE FROM 'CLEANING UP' THE MED LIST AND WERE NOT ACTUALLY CANCELED;  SEE CHART FOR DETAILS! Orders Placed This Encounter   Medications    hydroCHLOROthiazide (HYDRODIURIL) 25 MG tablet     Sig: TAKE 1 TABLET BY MOUTH ONE TIME A DAY     Dispense:  90 tablet     Refill:  3    pantoprazole (PROTONIX) 40 MG tablet     Sig: Take 1 tablet by mouth daily     Dispense:  90 tablet     Refill:  3     Orders Placed This Encounter   Procedures    INFLUENZA, QUADV, ADJUVANTED, 65 YRS =, IM, PF, PREFILL SYR, 0.5ML (FLUAD)     Return in about 6 months (around 3/16/2021). There are no Patient Instructions on file for this visit. Data Unavailable      Discussed that he protect shimself due to his immunocompromised state.

## 2020-09-16 NOTE — LETTER
[facility] [address] [facility] [address]  Phone: [phone]  Fax: [phone]    Sameera Fuller MD        September 16, 2020     Patient: Steffi Avila   YOB: 1950   Date of Visit: 9/16/2020       To Whom It May Concern: It is my medical opinion that Andrew Quiroz requires a disability parking placard for the following reasons:  He cannot walk 200 feet without stopping to rest.  Duration of need: permanent    If you have any questions or concerns, please don't hesitate to call.     Sincerely,        Sameera Fuller MD

## 2020-09-30 ENCOUNTER — TELEPHONE (OUTPATIENT)
Dept: GASTROENTEROLOGY | Age: 70
End: 2020-09-30

## 2020-11-03 ENCOUNTER — TELEPHONE (OUTPATIENT)
Dept: GASTROENTEROLOGY | Age: 70
End: 2020-11-03

## 2020-11-04 ENCOUNTER — OFFICE VISIT (OUTPATIENT)
Dept: GASTROENTEROLOGY | Age: 70
End: 2020-11-04
Payer: COMMERCIAL

## 2020-11-04 VITALS — WEIGHT: 158.4 LBS | TEMPERATURE: 97 F | BODY MASS INDEX: 21.48 KG/M2

## 2020-11-04 PROBLEM — I85.00 IDIOPATHIC ESOPHAGEAL VARICES WITHOUT BLEEDING (HCC): Status: ACTIVE | Noted: 2020-11-04

## 2020-11-04 PROCEDURE — 99214 OFFICE O/P EST MOD 30 MIN: CPT | Performed by: INTERNAL MEDICINE

## 2020-11-04 ASSESSMENT — ENCOUNTER SYMPTOMS
CONSTIPATION: 0
EYES NEGATIVE: 1
BLOOD IN STOOL: 0
ANAL BLEEDING: 0
RECTAL PAIN: 0
NAUSEA: 0
DIARRHEA: 0
ABDOMINAL DISTENTION: 0
ABDOMINAL PAIN: 0
VOMITING: 0
ALLERGIC/IMMUNOLOGIC NEGATIVE: 1
RESPIRATORY NEGATIVE: 1
GASTROINTESTINAL NEGATIVE: 1

## 2020-11-04 NOTE — PROGRESS NOTES
GI OFFICE FOLLOW UP    Luis Almanzar is a 79 y.o. male evaluated via on 11/4/2020. Consent:  He and/or health care decision maker is aware that that he may receive a bill for this telephone service, depending on his insurance coverage, and has provided verbal consent to proceed: YES      INTERVAL HISTORY:   No referring provider defined for this encounter. Chief Complaint   Patient presents with    GI Problem     f/u, had labs, is due for repeat MRCP       1. Pancytopenia ([address]    2. Microcytic hypochromic anemia    3. Other iron deficiency anemia    4. Idiopathic esophageal varices without bleeding (HCC)    5. MGUS (monoclonal gammopathy of unknown significance)      Seen my office as a follow-up  Did not had the MRCP done and wants to wait at this time  He says that he is feeling well does not have any significant abdominal pain bloating cramping diarrhea constipation rectal bleeding melanotic stools    Previous EGD has revealed some esophageal varices and signs of portal hypertension    Has been followed by hematology oncology for monoclonal gammopathy as well as pancytopenia    Overall clinically stable and stable GI wise    Taking PPI for GERD    Off-and-on constipation taken Flexeril mild as needed  Has mild elevation of the LFTs  Alpha-fetoprotein is only mildly elevated has been stable      HISTORY OF PRESENT ILLNESS: Mr.Andy Laura Barahona is a 79 y.o. male with a past history remarkable for , referred for evaluation of   Chief Complaint   Patient presents with    GI Problem     f/u, had labs, is due for repeat MRCP   . Past Medical,Family, and Social History reviewed and does contribute to the patient presenting condition. Patient's PMH/PSH,SH,PSYCH Hx, MEDs, ALLERGIES, and ROS were all reviewed and updated in the appropriate sections.     PAST MEDICAL HISTORY:  Past Medical History:   Diagnosis Date Relationship status: Not on file    Intimate partner violence     Fear of current or ex partner: Not on file     Emotionally abused: Not on file     Physically abused: Not on file     Forced sexual activity: Not on file   Other Topics Concern    Not on file   Social History Narrative    Diet - relatively healthy    Caffeine intake per day - no    Exercise pattern - wlks    House with wife               REVIEW OF SYSTEMS:         Review of Systems   Constitutional: Negative. Negative for fatigue. HENT: Negative. Eyes: Negative. Respiratory: Negative. Cardiovascular: Negative. Gastrointestinal: Negative. Negative for abdominal distention, abdominal pain, anal bleeding, blood in stool, constipation, diarrhea, nausea, rectal pain and vomiting. Denies   Endocrine: Negative. Genitourinary: Negative. Musculoskeletal: Negative. Skin: Negative. Allergic/Immunologic: Negative. Neurological: Negative. Negative for dizziness, weakness, light-headedness, numbness and headaches. Hematological: Bruises/bleeds easily. Psychiatric/Behavioral: Negative. Negative for sleep disturbance. The patient is not nervous/anxious. PHYSICAL EXAMINATION: Vital signs reviewed per the nursing documentation. Temp 97 °F (36.1 °C)   Wt 158 lb 6.4 oz (71.8 kg)   BMI 21.48 kg/m²   Body mass index is 21.48 kg/m². Physical Exam  Nursing note reviewed. Constitutional:       Appearance: He is well-developed. Comments: Anxious    HENT:      Head: Normocephalic and atraumatic. Eyes:      Conjunctiva/sclera: Conjunctivae normal.      Pupils: Pupils are equal, round, and reactive to light. Neck:      Musculoskeletal: Normal range of motion and neck supple. Cardiovascular:      Rate and Rhythm: Normal rate and regular rhythm. Pulmonary:      Effort: Pulmonary effort is normal.      Breath sounds: Normal breath sounds.    Abdominal:      General: Bowel sounds are normal.      Palpations: Abdomen is soft. Comments: NON TENDER, NON DISTENTED  LIVER SPLEEN AND HERNIAS ARE NOT  PALPABLE  BOWEL SOUNDS ARE POSITIVE      Genitourinary:     Rectum: Normal.   Musculoskeletal: Normal range of motion. Skin:     General: Skin is warm. Neurological:      Mental Status: He is alert and oriented to person, place, and time. Deep Tendon Reflexes: Reflexes are normal and symmetric. LABORATORY DATA: Reviewed  Lab Results   Component Value Date    WBC 1.0 (LL) 08/11/2020    HGB 10.6 (L) 08/11/2020    HCT 37.6 (L) 08/11/2020    MCV 76.0 (L) 08/11/2020    PLT 58 (L) 08/11/2020     08/11/2020    K 3.7 08/11/2020     08/11/2020    CO2 24 08/11/2020    BUN 15 08/11/2020    CREATININE 0.98 08/11/2020    LABALBU 3.1 (L) 08/11/2020    BILITOT 0.65 08/11/2020    ALKPHOS 183 (H) 08/11/2020    AST 45 (H) 08/11/2020    ALT 40 08/11/2020    INR 1.1 12/13/2019         Lab Results   Component Value Date    RBC 4.95 08/11/2020    HGB 10.6 (L) 08/11/2020    MCV 76.0 (L) 08/11/2020    MCH 21.4 (L) 08/11/2020    MCHC 28.2 (L) 08/11/2020    RDW 18.7 (H) 08/11/2020    MPV Abnormal 08/11/2020    BASOPCT 1 08/11/2020    LYMPHSABS 0.35 (L) 08/11/2020    MONOSABS 0.29 08/11/2020    NEUTROABS 0.26 (LL) 08/11/2020    EOSABS 0.09 08/11/2020    BASOSABS 0.01 08/11/2020         DIAGNOSTIC TESTING:     No results found. Assessment  1. Pancytopenia ([address]    2. Microcytic hypochromic anemia    3. Other iron deficiency anemia    4. Idiopathic esophageal varices without bleeding (HCC)    5. MGUS (monoclonal gammopathy of unknown significance)        Plan  At this point we will continue to watch    Continue follow-up with hematology oncology    He does not want to get an MRCP at this time    However he is agreeable to get an ultrasound which I will order for 6 months    Blood test including alpha-fetoprotein have been reordered    Pt was advised in detail about some life style and dietary modifications.  He was lifestyle and dietary modifications  Patient's  questions were answered in this regard as well  The patient has verbalized understanding and agreement     He was asked to call me if there is any problem or issues    I communicated with the patient and/or health care decision maker about   Details of this discussion including any medical advice provided:YES      I affirm this is a Patient Initiated Episode with an Established Patient who has not had a related appointment within my department in the past 7 days or scheduled within the next 24 hours. Total Time: minutes: 21-30 minutes    Note: not billable if this call serves to triage the patient into an appointment for the relevant concern      Thank you for allowing me to participate in the care of Mr. Uriel Soliman. For any further questions please do not hesitate to contact me. I have reviewed and agree with the ROS entered by the MA/PUNEETN.          Brady Weinstein MD, [facility]  Board Certified in Gastroenterology and [address] [place] [address] Gastroenterology  Office #: [phone]

## 2020-11-16 RX ORDER — PROPRANOLOL HCL 60 MG
CAPSULE, EXTENDED RELEASE 24HR ORAL
Qty: 180 CAPSULE | Refills: 0 | Status: SHIPPED | OUTPATIENT
Start: 2020-11-16 | End: 2021-02-25

## 2021-02-12 ENCOUNTER — HOSPITAL ENCOUNTER (OUTPATIENT)
Facility: MEDICAL CENTER | Age: 71
End: 2021-02-12
Payer: COMMERCIAL

## 2021-02-22 ENCOUNTER — HOSPITAL ENCOUNTER (OUTPATIENT)
Facility: MEDICAL CENTER | Age: 71
End: 2021-02-22
Payer: COMMERCIAL

## 2021-02-23 ENCOUNTER — TELEPHONE (OUTPATIENT)
Dept: ONCOLOGY | Age: 71
End: 2021-02-23

## 2021-02-23 DIAGNOSIS — D47.2 MGUS (MONOCLONAL GAMMOPATHY OF UNKNOWN SIGNIFICANCE): Primary | ICD-10-CM

## 2021-02-23 NOTE — TELEPHONE ENCOUNTER
LVM reminding patient of appt. On 2/25/21 with Dr. Pineda Robles to go over lab work,  patient has not completed lab work, asked patient to please call office / have lab work completed / reschedule.

## 2021-03-16 ENCOUNTER — HOSPITAL ENCOUNTER (OUTPATIENT)
Dept: ULTRASOUND IMAGING | Age: 71
Discharge: HOME | End: 2021-03-18
Payer: COMMERCIAL

## 2021-03-16 ENCOUNTER — HOSPITAL ENCOUNTER (OUTPATIENT)
Facility: MEDICAL CENTER | Age: 71
Discharge: HOME | End: 2021-03-16
Payer: COMMERCIAL

## 2021-03-16 DIAGNOSIS — D50.8 OTHER IRON DEFICIENCY ANEMIA: ICD-10-CM

## 2021-03-16 DIAGNOSIS — D50.9 MICROCYTIC HYPOCHROMIC ANEMIA: ICD-10-CM

## 2021-03-16 DIAGNOSIS — D61.818 PANCYTOPENIA (HCC): ICD-10-CM

## 2021-03-16 DIAGNOSIS — D47.2 MGUS (MONOCLONAL GAMMOPATHY OF UNKNOWN SIGNIFICANCE): ICD-10-CM

## 2021-03-16 LAB
ABSOLUTE EOS #: 0.09 K/UL (ref 0–0.4)
ABSOLUTE IMMATURE GRANULOCYTE: 0 K/UL (ref 0–0.3)
ABSOLUTE LYMPH #: 0.32 K/UL (ref 1–4.8)
ABSOLUTE MONO #: 0.26 K/UL (ref 0.2–0.8)
ANION GAP SERPL CALCULATED.3IONS-SCNC: 10 MMOL/L (ref 9–17)
BASOPHILS # BLD: 1 %
BASOPHILS ABSOLUTE: 0.01 K/UL (ref 0–0.2)
BUN BLDV-MCNC: 14 MG/DL (ref 8–23)
BUN/CREAT BLD: 16 (ref 9–20)
CALCIUM SERPL-MCNC: 8.6 MG/DL (ref 8.6–10.4)
CHLORIDE BLD-SCNC: 103 MMOL/L (ref 98–107)
CO2: 24 MMOL/L (ref 20–31)
CREAT SERPL-MCNC: 0.85 MG/DL (ref 0.7–1.2)
DIFFERENTIAL TYPE: ABNORMAL
EOSINOPHILS RELATIVE PERCENT: 11 % (ref 1–4)
FERRITIN: 13 UG/L (ref 30–400)
FREE KAPPA/LAMBDA RATIO: 1.17 (ref 0.26–1.65)
GFR AFRICAN AMERICAN: >60 ML/MIN
GFR NON-AFRICAN AMERICAN: >60 ML/MIN
GFR SERPL CREATININE-BSD FRML MDRD: ABNORMAL ML/MIN/{1.73_M2}
GFR SERPL CREATININE-BSD FRML MDRD: ABNORMAL ML/MIN/{1.73_M2}
GLUCOSE BLD-MCNC: 185 MG/DL (ref 70–99)
HCT VFR BLD CALC: 34.8 % (ref 40.7–50.3)
HEMOGLOBIN: 9.2 G/DL (ref 13–17)
IGA: 588 MG/DL (ref 70–400)
IGG: 2033 MG/DL (ref 700–1600)
IGM: 26 MG/DL (ref 40–230)
IMMATURE GRANULOCYTES: 0 %
IRON SATURATION: 10 % (ref 20–55)
IRON: 33 UG/DL (ref 59–158)
KAPPA FREE LIGHT CHAINS QNT: 4.72 MG/DL (ref 0.37–1.94)
LAMBDA FREE LIGHT CHAINS QNT: 4.03 MG/DL (ref 0.57–2.63)
LYMPHOCYTES # BLD: 41 % (ref 24–44)
MCH RBC QN AUTO: 19.4 PG (ref 25.2–33.5)
MCHC RBC AUTO-ENTMCNC: 26.4 G/DL (ref 28.4–34.8)
MCV RBC AUTO: 73.4 FL (ref 82.6–102.9)
MONOCYTES # BLD: 32 % (ref 1–7)
MORPHOLOGY: ABNORMAL
NRBC AUTOMATED: 0 PER 100 WBC
PDW BLD-RTO: 18.8 % (ref 11.8–14.4)
PLATELET # BLD: 68 K/UL (ref 138–453)
PLATELET ESTIMATE: ABNORMAL
PMV BLD AUTO: ABNORMAL FL (ref 8.1–13.5)
POTASSIUM SERPL-SCNC: 3.6 MMOL/L (ref 3.7–5.3)
RBC # BLD: 4.74 M/UL (ref 4.21–5.77)
RBC # BLD: ABNORMAL 10*6/UL
SEG NEUTROPHILS: 15 % (ref 36–66)
SEGMENTED NEUTROPHILS ABSOLUTE COUNT: 0.12 K/UL (ref 1.8–7.7)
SODIUM BLD-SCNC: 137 MMOL/L (ref 135–144)
TOTAL IRON BINDING CAPACITY: 343 UG/DL (ref 250–450)
UNSATURATED IRON BINDING CAPACITY: 310 UG/DL (ref 112–347)
WBC # BLD: 0.8 K/UL (ref 3.5–11.3)
WBC # BLD: ABNORMAL 10*3/UL

## 2021-03-16 PROCEDURE — 83883 ASSAY NEPHELOMETRY NOT SPEC: CPT

## 2021-03-16 PROCEDURE — 36415 COLL VENOUS BLD VENIPUNCTURE: CPT

## 2021-03-16 PROCEDURE — 83540 ASSAY OF IRON: CPT

## 2021-03-16 PROCEDURE — 82728 ASSAY OF FERRITIN: CPT

## 2021-03-16 PROCEDURE — 85025 COMPLETE CBC W/AUTO DIFF WBC: CPT

## 2021-03-16 PROCEDURE — 80048 BASIC METABOLIC PNL TOTAL CA: CPT

## 2021-03-16 PROCEDURE — 83550 IRON BINDING TEST: CPT

## 2021-03-16 PROCEDURE — 76705 ECHO EXAM OF ABDOMEN: CPT

## 2021-03-16 PROCEDURE — 82784 ASSAY IGA/IGD/IGG/IGM EACH: CPT

## 2021-03-17 ENCOUNTER — HOSPITAL ENCOUNTER (OUTPATIENT)
Facility: MEDICAL CENTER | Age: 71
End: 2021-03-17
Payer: COMMERCIAL

## 2021-03-23 ENCOUNTER — TELEPHONE (OUTPATIENT)
Dept: ONCOLOGY | Age: 71
End: 2021-03-23

## 2021-03-23 ENCOUNTER — OFFICE VISIT (OUTPATIENT)
Dept: ONCOLOGY | Age: 71
End: 2021-03-23
Payer: COMMERCIAL

## 2021-03-23 VITALS
HEART RATE: 60 BPM | BODY MASS INDEX: 21.5 KG/M2 | WEIGHT: 158.5 LBS | TEMPERATURE: 98.8 F | RESPIRATION RATE: 16 BRPM | DIASTOLIC BLOOD PRESSURE: 80 MMHG | SYSTOLIC BLOOD PRESSURE: 133 MMHG

## 2021-03-23 DIAGNOSIS — D61.818 PANCYTOPENIA (HCC): ICD-10-CM

## 2021-03-23 DIAGNOSIS — D89.2 PARAPROTEINEMIA: Primary | ICD-10-CM

## 2021-03-23 PROCEDURE — 99211 OFF/OP EST MAY X REQ PHY/QHP: CPT | Performed by: INTERNAL MEDICINE

## 2021-03-23 PROCEDURE — 99214 OFFICE O/P EST MOD 30 MIN: CPT | Performed by: INTERNAL MEDICINE

## 2021-03-23 NOTE — PROGRESS NOTES
_     Chief Complaint   Patient presents with    Follow-up    Discuss Labs       DIAGNOSIS:    Anemia  Leukopenia  Liver cirrhosis  MGUS     Back pain        CURRENT THERAPY:    S/p hospitalization for further management of above issues. S/p blood transfusion  S/P IV Iron infusion. BRIEF CASE HISTORY:      The patient is a 77 y.o. AA male who is admitted to the hospital for severe anemia and leukopenia. He had no previous labs. He was seen by PCP for routine health exam as a new patient. Labs showed severe anemia and leukopenia. He had chronic back pain for one year. No fever. No night sweats. No lymphadenopathy   He has recent weight loss and anorexia. No GI symptoms. GI work up was negative. He received blood transfusion and IV Iron infusion. He had Rt hip surgery on 86/9/72 with no complications. He had left hip surgery in March 2015. No complications. Prostate surgery December 6754 with no complications. INTERIM HISTORY:   Patient is seen for follow up above problems. Patient had stool fit test which was positive. He had evaluation by gastroenterology. He had EGD and colonoscopy. Small polyps were resected. He was noted to have varices. He had liver ultrasound. Ultrasound shows possible cirrhosis. He had multiple other test done by gastroenterology. MRI of the liver showed questionable liver lesions concerning for malignancy. MRI also showed questionable problem with hemochromatosis. Liver biopsy was negative. Patient's ferritin level has been normal for so many years. He had previous problem with iron deficiency. Clinically patient is doing fine. No active bleeding. No weakness or fatigue. No fever or chills. No chest pain. No nausea or vomiting. No other complaints.         PAST MEDICAL HISTORY: has a past medical history of Anemia, Arthritis, Avascular necrosis ([address] BPH Gastrointestinal: No problems with swallowing. No abdominal pain or bloating. No nausea or vomiting. No diarrhea or constipation. No GI bleeding. Genitourinary: No dysuria, hematuria, frequency or urgency. Musculoskeletal: No muscle aches or pains. No limitation of movement. + chronic back pain. No gait disturbance, No joint complaints. Dermatologic: No skin rashes or pruritus. No skin lesions or discolorations. Psychiatric: No depression, anxiety, or stress or signs of schizophrenia. No change in mood or affect. Hematologic: No history of bleeding tendency. No bruises or ecchymosis. No history of clotting problems. Infectious disease: No fever, chills or frequent infections. Endocrine: No problems with opacity. No polydipsia or polyuria. No temperature intolerance. Neurologic: No headaches or dizziness. No weakness or numbness of the extremities. No changes in balance, coordination, memory, mentation, behavior. Allergic/Immunologic: No nasal congestion or hives. No repeated infections. PHYSICAL EXAM:  The patient is not in acute distress. Vital signs: Blood pressure 133/80, pulse 60, temperature 98.8 °F (37.1 °C), temperature source Temporal, resp. rate 16, weight 158 lb 8 oz (71.9 kg).      General appearance - well appearing, not in pain or distress   Mental status - good mood, alert and oriented   Eyes - pupils equal and reactive, extraocular eye movements intact   Ears - bilateral TM's and external ear canals normal   Nose - normal and patent, no erythema, discharge or polyps   Mouth - mucous membranes moist, pharynx normal without lesions   Neck - supple, no significant adenopathy   Lymphatics - no palpable lymphadenopathy, no hepatosplenomegaly   Chest - clear to auscultation, no wheezes, rales or rhonchi, symmetric air entry   Heart - normal rate, regular rhythm, normal S1, S2, no murmurs, rubs, clicks or gallops   Abdomen - soft, nontender, nondistended, no masses or organomegaly   Neurological - alert, oriented, normal speech, no focal findings or movement disorder noted   Musculoskeletal -mild bilateral LE weakness   Extremities - peripheral pulses normal, no pedal edema, no clubbing or cyanosis   Skin - normal coloration and turgor, no rashes, no suspicious skin lesions noted       Lab Results   Component Value Date    WBC 0.8 (LL) 03/16/2021    HGB 9.2 (L) 03/16/2021    HCT 34.8 (L) 03/16/2021    MCV 73.4 (L) 03/16/2021    PLT 68 (L) 03/16/2021     Lab Results   Component Value Date    IRON 33 (L) 03/16/2021    TIBC 343 03/16/2021    FERRITIN 13 (L) 03/16/2021                 Chemistry        Component Value Date/Time     03/16/2021 0703    K 3.6 (L) 03/16/2021 0703     03/16/2021 0703    CO2 24 03/16/2021 0703    BUN 14 03/16/2021 0703    CREATININE 0.85 03/16/2021 0703        Component Value Date/Time    CALCIUM 8.6 03/16/2021 0703    ALKPHOS 183 (H) 08/11/2020 1102    AST 45 (H) 08/11/2020 1102    ALT 40 08/11/2020 1102    BILITOT 0.65 08/11/2020 1102        Bone marrow: Final Diagnosis  PERIPHERAL BLOOD:  - SEVERE MICROCYTIC ANEMIA.  - MODERATE TO SEVERE NEUTROPENIA.  - LYMPHOPENIA (510/ul). BONE MARROW BIOPSY, ASPIRATE SMEAR AND CLOT SECTION:  - MYELOID AND MEGAKARYOCYTIC HYPERPLASIA. - ABSENT IRON STORES. - PLASMA CELLS 5-10%, NEGATIVE FOR PLASMA CELL MONOTYPIA FOR IHC. Diagnosis Comment  THE SEVERE MICROCYTIC ANEMIA IS COMPATIBLE WITH IRON DEFICIENCY  ANEMIA. NEUTROPENIA IS LIKELY DRUG INDUCED / IMMUNE-MEDIATED. THERE  IS NO EVIDENCE OF MYELOID OR OTHER MALIGNANCY. PLASMA CELLS COMPRISE  5-10% OF THE CELLULARITY AND THERE IS NO EVIDENCE OF PLASMA CELL  MONOTYPIA BY IHC (SERUM IgG KAPPA MONOCLONAL PARAPROTEIN OF 0.6 G/dl  IS NOTED, FAVORING MGUS). CT scan: IMPRESSION: 1. Thickening of the sigmoid colon and rectum which maybe infectious or inflammatory etiology but may be neoplastic in etiology. Clinical correlation is recommended.  Further evaluation with direct visualization is recommended. 2. Prostatomegaly. 3. Numerous low-attenuation lesions scattered in the liver are technically indeterminate. Further evaluation with nonemergent/outpatient contrast-enhanced MRI of the liver is recommended. 4. Severe degenerative changes of the bilateral hips. 5. Lucency of the proximal for more which may be related to demineralization; however, neoplastic etiologies cannot be excluded. This can be further evaluated with bone scan or MRI as clinically indicated. Note is made that this patient may be at increased risk for pathologic fracture. Bone scan: IMPRESSION: 1. No definitive scintigraphic evidence for metastatic disease to the skeleton. 2. Symmetric increased radiotracer uptake at the bilateral hips, likely secondary to severe degenerative changes of the bilateral hips seen on the CT abdomen pelvis of 7/12/2014. Underlying avascular necrosis is not excluded. Further evaluation with MR of the bilateral hips should be considered. 3. Symmetric increased radiotracer uptake involving the bilateral shoulders, elbows, wrists, hands, knees as well as the right foot, most likely degenerative. Abdomen MRI:   IMPRESSION:    1. Multiple T2 hyperintense lesion is noted within the liver largest    measuring approximately 1.6 CM x1.9 CM without significant associated    enhancement likely represent hepatic cyst however the noncontrasted    images are somewhat limited due to patient motion. Followup assessment is    advised in 4 months. 2. Splenomegaly   3. Redemonstration of sclerotic lesions within the left sacrum may    represent a bone island.      Lab Results   Component Value Date    WBC 0.8 (LL) 03/16/2021    HGB 9.2 (L) 03/16/2021    HCT 34.8 (L) 03/16/2021    MCV 73.4 (L) 03/16/2021    PLT 68 (L) 03/16/2021    LYMPHOPCT 41 03/16/2021    RBC 4.74 03/16/2021    MCH 19.4 (L) 03/16/2021    MCHC 26.4 (L) 03/16/2021    RDW 18.8 (H) 03/16/2021    MONOPCT 32 (H) 03/16/2021    EOSPCT 4 06/04/2019    BASOPCT 1 03/16/2021    NEUTROABS 0.12 (L) 03/16/2021    LYMPHSABS 0.32 (L) 03/16/2021    MONOSABS 0.26 03/16/2021    EOSABS 0.09 03/16/2021    BASOSABS 0.01 03/16/2021                 Lab Results   Component Value Date    IRON 33 (L) 03/16/2021    TIBC 343 03/16/2021    FERRITIN 13 (L) 03/16/2021           IMPRESSION:   Pancytopenia. Likely related to liver cirrhosis and possibly immune mediated. Anemia, multifactorial.   Liver cirrhosis  Weight loss. Recovering. MGUS     Back pain  Questionable liver lesions. Previous MRI was consistent with benign lesions. Repeated MRI showed no changes. Hips osteonecrosis. S/p surgery. Prostate cancer. ECOG performance score 0  Recent left inguinal hernia surgery. PLAN:    I explained to the patient all of the above. I reviewed the results of the EGD and colonoscopy and explained to the patient and his wife. Labs reviewed and discussed with the patient. Clinically, he did better after IV Iron infusion. Hb is stable. No active bleeding. Will discontinue oral iron. Iron studies are normal.  Liver MRI is suggestive of hemochromatosis. I believe these findings are related to recent iron infusion as the patient never had any evidence of high ferritin or elevated iron. We stopped iron supplements. Results improving. MRI also showed suspicious liver lesion with recommendation for biopsy. However the liver lesion is exact same size as it was seen on the previous MRI from 2014. Likely benign. However with these questionable readings on the MRI I liver biopsy was negative. Wbcs are still very low. He received Granix before surgery. He has no repeated infections. No need for daily treatment. These abnormalities are at least in part related to liver cirrhosis as well. WBCs are likely immune mediated in addition to liver cirrhosis. Thrombocytopenia secondary to liver cirrhosis. Clinically stable.   Continue to monitor PSA.   RV 6 months. Labs at RV. Will be sooner if biopsy is positive. We will check immunoglobulins level Every 6 months. Patient's questions were answered to the best of his satisfaction and he verbalized full understanding and agreement.                                   [facility]                          Cell: [phone]

## 2021-03-23 NOTE — TELEPHONE ENCOUNTER
MELECIO ARRIVES AMBULATORY FOR MD VISIT  DR Anne Monzon IN TO SEE PATIENT  ORDERS RECEIVED  RV 6 MONTHS W/LABS BEFORE RV  LABS CDP CMP FE TIBC FERRITIN VITAMIN B12 & FOLATE IGG IGA IGM KAPPA LAMBDA FREE LIGHT CHAINS AFP TUMOR MARKER TO BE DONE AROUND 9/21/21, ORDERS GIVEN TO PATIENT  MD VISIT 9/28/21 @3PM  SCRIPTS SENT TO PATIENTS PHARMACY  AVS PRINTED AND GIVEN TO PATIENT WITH INSTRUCTIONS  PATIENT DISCHARGED AMBULATORY

## 2021-04-12 ENCOUNTER — OFFICE VISIT (OUTPATIENT)
Dept: FAMILY MEDICINE CLINIC | Age: 71
End: 2021-04-12
Payer: COMMERCIAL

## 2021-04-12 VITALS
DIASTOLIC BLOOD PRESSURE: 80 MMHG | HEART RATE: 67 BPM | OXYGEN SATURATION: 98 % | WEIGHT: 161 LBS | SYSTOLIC BLOOD PRESSURE: 130 MMHG | BODY MASS INDEX: 21.84 KG/M2

## 2021-04-12 DIAGNOSIS — D61.818 PANCYTOPENIA (HCC): ICD-10-CM

## 2021-04-12 DIAGNOSIS — I10 ESSENTIAL HYPERTENSION: Primary | ICD-10-CM

## 2021-04-12 DIAGNOSIS — N52.03 COMBINED ARTERIAL INSUFFICIENCY AND CORPORO-VENOUS OCCLUSIVE ERECTILE DYSFUNCTION: ICD-10-CM

## 2021-04-12 DIAGNOSIS — I85.00 IDIOPATHIC ESOPHAGEAL VARICES WITHOUT BLEEDING (HCC): ICD-10-CM

## 2021-04-12 DIAGNOSIS — D70.9 NEUTROPENIA, UNSPECIFIED TYPE (HCC): ICD-10-CM

## 2021-04-12 DIAGNOSIS — C61 PROSTATE CANCER (HCC): ICD-10-CM

## 2021-04-12 DIAGNOSIS — K42.9 UMBILICAL HERNIA WITHOUT OBSTRUCTION AND WITHOUT GANGRENE: ICD-10-CM

## 2021-04-12 PROCEDURE — 99214 OFFICE O/P EST MOD 30 MIN: CPT | Performed by: FAMILY MEDICINE

## 2021-04-12 RX ORDER — SILDENAFIL 100 MG/1
100 TABLET, FILM COATED ORAL DAILY PRN
Qty: 10 TABLET | Refills: 5 | Status: SHIPPED | OUTPATIENT
Start: 2021-04-12 | End: 2021-09-28

## 2021-04-12 ASSESSMENT — PATIENT HEALTH QUESTIONNAIRE - PHQ9
2. FEELING DOWN, DEPRESSED OR HOPELESS: 0
1. LITTLE INTEREST OR PLEASURE IN DOING THINGS: 0
SUM OF ALL RESPONSES TO PHQ QUESTIONS 1-9: 0

## 2021-04-12 NOTE — PROGRESS NOTES
Subjective: Stacey Hansen presents for   Chief Complaint   Patient presents with    Hypertension     Is very busy     Feels good    appetite is good    Still seeing urology and hem/onc    He is wearing a compression belt around his abd to help minimize the umbiclal hernia. Has no pain with it  Patient Active Problem List   Diagnosis    Leukopenia    Microcytic hypochromic anemia    Pancytopenia (HCC)    MGUS (monoclonal gammopathy of unknown significance)    PSA elevation    Neutropenia (HCC)    Osteoarthritis    Hyponatremia    S/P hip replacement    Prostate cancer (HCC)    Malabsorption    Hematemesis without nausea    Upper GI bleed    Essential hypertension    History of hematemesis    Pancytopenia (HCC)    Anemia    Iron deficiency anemia    Positive FIT (fecal immunochemical test)    Idiopathic esophageal varices without bleeding ([facility] [address]         Review of Systems:  · General: no significant weight changes. · Respiratory: no cough, pleuritic chest pain, dyspnea, or wheezing  · Cardiovascular: no pain, NICOLE, orthopnea, palpitations or claudication  · Gastrointestinal: no chronic nausea, vomiting, heartburn, diarrhea, constipation, bloating, or abdominal pain. No bloody or black stools. Objective:  Physical Exam   Vitals:   Vitals:    04/12/21 1426   BP: 130/80   Pulse: 67   SpO2: 98%   Weight: 161 lb (73 kg)     Wt Readings from Last 3 Encounters:   04/12/21 161 lb (73 kg)   03/23/21 158 lb 8 oz (71.9 kg)   11/04/20 158 lb 6.4 oz (71.8 kg)     Ht Readings from Last 3 Encounters:   12/13/19 6' (1.829 m)   09/25/19 6' (1.829 m)   06/04/19 6' (1.829 m)     Body mass index is 21.84 kg/m². Constitutional: He is oriented to person, place, and time. He appears well-developed and well-nourished and in no acute distress. Answers all my questions appropriately. Head: Normocephalic and atraumatic. Eyes:conjunctiva appear normal.  Nose: pink, non-edematous mucosa. No polyps.   No septal deviation  Throat: no erythema, tonsillar hypertrophy or exudate. No ulcerations noted. Lips/Teeth/Gums all appear normal.  Neck: Normal range of motion. Neck supple. No tracheal deviation present. No abnormal lymphadenopathy. No JVD noted. Carotids are clear bilaterally. No thyroid masses noted. Heart: RRR without murmur. No S3, S4, or gallop noted. Chest: Clear to auscultation bilaterally. Good breath sounds noted. No rales, wheezes, or rhonchi noted. No respiratory retractions noted. Wall has symmetrical movement with respirations. Abdomen: No distension noted.  + bowel sounds in all quadrants which are normoactive. No bruits noted. No masses could be palpated. No unusual pulsatile masses noted. To deep palpation, patient denied any significant pain. No rebound, guarding or rigidity noted to my exam.      Patient has a minimal umbilcial hernia - VERY small <1cm  Assessment:   Encounter Diagnoses   Name Primary?  Essential hypertension Yes    Idiopathic esophageal varices without bleeding (HCC)     Prostate cancer (HCC)     Pancytopenia (HCC)     Neutropenia, unspecified type (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 Advised that I don't think he even needs to wear that belt. There are no discontinued medications. THE ABOVE NOTED DISCONTINUED MEDS MAY ONLY BE FROM 'CLEANING UP' THE MED LIST AND WERE NOT ACTUALLY CANCELED;  SEE CHART FOR DETAILS! Orders Placed This Encounter   Medications    sildenafil (VIAGRA) 100 MG tablet     Sig: Take 1 tablet by mouth daily as needed for Erectile Dysfunction     Dispense:  10 tablet     Refill:  5     No orders of the defined types were placed in this encounter. Return in about 6 months (around 10/12/2021). There are no Patient Instructions on file for this visit.   Data Unavailable

## 2021-07-27 LAB
CHOLESTEROL/HDL RATIO: 3.3
CHOLESTEROL: 163 MG/DL
GLUCOSE BLD-MCNC: 166 MG/DL (ref 70–99)
HDLC SERPL-MCNC: 50 MG/DL
LDL CHOLESTEROL: 95 MG/DL (ref 0–130)
PATIENT FASTING?: YES
TRIGL SERPL-MCNC: 90 MG/DL
VLDLC SERPL CALC-MCNC: ABNORMAL MG/DL (ref 1–30)

## 2021-07-28 LAB
ESTIMATED AVERAGE GLUCOSE: 174 MG/DL
HBA1C MFR BLD: 7.7 % (ref 4–6)

## 2021-09-13 RX ORDER — HYDROCHLOROTHIAZIDE 25 MG/1
TABLET ORAL
Qty: 90 TABLET | Refills: 0 | Status: SHIPPED | OUTPATIENT
Start: 2021-09-13 | End: 2021-11-29

## 2021-09-13 RX ORDER — PANTOPRAZOLE SODIUM 40 MG/1
TABLET, DELAYED RELEASE ORAL
Qty: 90 TABLET | Refills: 0 | Status: SHIPPED | OUTPATIENT
Start: 2021-09-13 | End: 2021-11-29

## 2021-09-13 NOTE — TELEPHONE ENCOUNTER
Karen Morelos is calling to request a refill on the following medication(s):    Medication Request:  Requested Prescriptions     Pending Prescriptions Disp Refills    pantoprazole (PROTONIX) 40 MG tablet [Pharmacy Med Name: PANTOPRAZOLE SODIUM 40MG TBEC] 90 tablet 3     Sig: TAKE 1 TABLET BY MOUTH ONE TIME A DAY    hydroCHLOROthiazide (HYDRODIURIL) 25 MG tablet [Pharmacy Med Name: HYDROCHLOROTHIAZIDE 25MG TABS] 90 tablet 3     Sig: TAKE 1 TABLET BY MOUTH ONE TIME A DAY       Last Visit Date (If Applicable):  7/87/8873    Next Visit Date:    10/20/2021

## 2021-09-14 ENCOUNTER — TELEPHONE (OUTPATIENT)
Dept: ONCOLOGY | Age: 71
End: 2021-09-14

## 2021-09-14 ENCOUNTER — HOSPITAL ENCOUNTER (OUTPATIENT)
Age: 71
Setting detail: SPECIMEN
Discharge: HOME | End: 2021-09-14
Payer: COMMERCIAL

## 2021-09-14 DIAGNOSIS — D61.818 PANCYTOPENIA (HCC): ICD-10-CM

## 2021-09-14 DIAGNOSIS — D89.2 PARAPROTEINEMIA: ICD-10-CM

## 2021-09-14 LAB
ABSOLUTE EOS #: 0.06 K/UL (ref 0–0.4)
ABSOLUTE IMMATURE GRANULOCYTE: 0 K/UL (ref 0–0.3)
ABSOLUTE LYMPH #: 0.26 K/UL (ref 1–4.8)
ABSOLUTE MONO #: 0.17 K/UL (ref 0.1–0.8)
AFP: 8.6 UG/L
ALBUMIN SERPL-MCNC: 2.9 G/DL (ref 3.5–5.2)
ALBUMIN/GLOBULIN RATIO: 0.7 (ref 1–2.5)
ALP BLD-CCNC: 201 U/L (ref 40–129)
ALT SERPL-CCNC: 32 U/L (ref 5–41)
ANION GAP SERPL CALCULATED.3IONS-SCNC: 10 MMOL/L (ref 9–17)
AST SERPL-CCNC: 42 U/L
BASOPHILS # BLD: 2 % (ref 0–2)
BASOPHILS ABSOLUTE: 0.01 K/UL (ref 0–0.2)
BILIRUB SERPL-MCNC: 0.77 MG/DL (ref 0.3–1.2)
BUN BLDV-MCNC: 17 MG/DL (ref 8–23)
BUN/CREAT BLD: ABNORMAL (ref 9–20)
CALCIUM SERPL-MCNC: 8.5 MG/DL (ref 8.6–10.4)
CELLS COUNTED: 50
CHLORIDE BLD-SCNC: 105 MMOL/L (ref 98–107)
CO2: 21 MMOL/L (ref 20–31)
CREAT SERPL-MCNC: 0.9 MG/DL (ref 0.7–1.2)
DIFFERENTIAL TYPE: ABNORMAL
EOSINOPHILS RELATIVE PERCENT: 8 % (ref 1–4)
FERRITIN: 21 UG/L (ref 30–400)
FOLATE: >20 NG/ML
FREE KAPPA/LAMBDA RATIO: 1 (ref 0.26–1.65)
GFR AFRICAN AMERICAN: >60 ML/MIN
GFR NON-AFRICAN AMERICAN: >60 ML/MIN
GFR SERPL CREATININE-BSD FRML MDRD: ABNORMAL ML/MIN/{1.73_M2}
GFR SERPL CREATININE-BSD FRML MDRD: ABNORMAL ML/MIN/{1.73_M2}
GLUCOSE BLD-MCNC: 116 MG/DL (ref 70–99)
HCT VFR BLD CALC: 37.4 % (ref 40.7–50.3)
HEMOGLOBIN: 10.3 G/DL (ref 13–17)
IGA: 628 MG/DL (ref 70–400)
IGG: 2266 MG/DL (ref 700–1600)
IGM: 31 MG/DL (ref 40–230)
IMMATURE GRANULOCYTES: 0 %
IRON SATURATION: 9 % (ref 20–55)
IRON: 25 UG/DL (ref 59–158)
KAPPA FREE LIGHT CHAINS QNT: 3.75 MG/DL (ref 0.37–1.94)
LAMBDA FREE LIGHT CHAINS QNT: 3.76 MG/DL (ref 0.57–2.63)
LYMPHOCYTES # BLD: 38 % (ref 24–44)
MCH RBC QN AUTO: 20.2 PG (ref 25.2–33.5)
MCHC RBC AUTO-ENTMCNC: 27.5 G/DL (ref 28.4–34.8)
MCV RBC AUTO: 73.5 FL (ref 82.6–102.9)
MONOCYTES # BLD: 24 % (ref 1–7)
MORPHOLOGY: ABNORMAL
NRBC AUTOMATED: 0 PER 100 WBC
PDW BLD-RTO: 19.9 % (ref 11.8–14.4)
PLATELET # BLD: ABNORMAL K/UL (ref 138–453)
PLATELET ESTIMATE: ABNORMAL
PLATELET, FLUORESCENCE: 53 K/UL (ref 138–453)
PLATELET, IMMATURE FRACTION: 7.3 % (ref 1.1–10.3)
PMV BLD AUTO: ABNORMAL FL (ref 8.1–13.5)
POTASSIUM SERPL-SCNC: 3.7 MMOL/L (ref 3.7–5.3)
RBC # BLD: 5.09 M/UL (ref 4.21–5.77)
RBC # BLD: ABNORMAL 10*6/UL
SEG NEUTROPHILS: 28 % (ref 36–66)
SEGMENTED NEUTROPHILS ABSOLUTE COUNT: 0.2 K/UL (ref 1.8–7.7)
SODIUM BLD-SCNC: 136 MMOL/L (ref 135–144)
TOTAL IRON BINDING CAPACITY: 282 UG/DL (ref 250–450)
TOTAL PROTEIN: 6.9 G/DL (ref 6.4–8.3)
UNSATURATED IRON BINDING CAPACITY: 257 UG/DL (ref 112–347)
VITAMIN B-12: 1377 PG/ML (ref 232–1245)
WBC # BLD: 0.7 K/UL (ref 3.5–11.3)
WBC # BLD: ABNORMAL 10*3/UL

## 2021-09-14 PROCEDURE — 83540 ASSAY OF IRON: CPT

## 2021-09-14 PROCEDURE — 82784 ASSAY IGA/IGD/IGG/IGM EACH: CPT

## 2021-09-14 PROCEDURE — 83550 IRON BINDING TEST: CPT

## 2021-09-14 PROCEDURE — 82746 ASSAY OF FOLIC ACID SERUM: CPT

## 2021-09-14 PROCEDURE — 83883 ASSAY NEPHELOMETRY NOT SPEC: CPT

## 2021-09-14 PROCEDURE — 80053 COMPREHEN METABOLIC PANEL: CPT

## 2021-09-14 PROCEDURE — 36415 COLL VENOUS BLD VENIPUNCTURE: CPT

## 2021-09-14 PROCEDURE — 85055 RETICULATED PLATELET ASSAY: CPT

## 2021-09-14 PROCEDURE — 82728 ASSAY OF FERRITIN: CPT

## 2021-09-14 PROCEDURE — 82607 VITAMIN B-12: CPT

## 2021-09-14 PROCEDURE — 85025 COMPLETE CBC W/AUTO DIFF WBC: CPT

## 2021-09-14 PROCEDURE — 82105 ALPHA-FETOPROTEIN SERUM: CPT

## 2021-09-14 NOTE — TELEPHONE ENCOUNTER
Notified by lab of Candelario's WBC of 0.7 and ANC of 0.2. Dr. Suzi Joyce shown labs. Instructed me to call patient and notify him and to have him call the office if temperature 101 degrees F or above , chills or other symptoms of infection. Patient provided with lab results and instructions. Voices understanding.

## 2021-09-22 ENCOUNTER — HOSPITAL ENCOUNTER (OUTPATIENT)
Facility: MEDICAL CENTER | Age: 71
End: 2021-09-22
Payer: COMMERCIAL

## 2021-09-28 ENCOUNTER — TELEPHONE (OUTPATIENT)
Dept: ONCOLOGY | Age: 71
End: 2021-09-28

## 2021-09-28 ENCOUNTER — OFFICE VISIT (OUTPATIENT)
Dept: ONCOLOGY | Age: 71
End: 2021-09-28
Payer: COMMERCIAL

## 2021-09-28 VITALS
HEART RATE: 59 BPM | BODY MASS INDEX: 20.28 KG/M2 | SYSTOLIC BLOOD PRESSURE: 138 MMHG | TEMPERATURE: 99.4 F | DIASTOLIC BLOOD PRESSURE: 77 MMHG | RESPIRATION RATE: 16 BRPM | WEIGHT: 149.5 LBS

## 2021-09-28 DIAGNOSIS — D61.818 PANCYTOPENIA (HCC): ICD-10-CM

## 2021-09-28 DIAGNOSIS — D50.8 OTHER IRON DEFICIENCY ANEMIA: ICD-10-CM

## 2021-09-28 DIAGNOSIS — D89.2 PARAPROTEINEMIA: Primary | ICD-10-CM

## 2021-09-28 DIAGNOSIS — D47.2 MGUS (MONOCLONAL GAMMOPATHY OF UNKNOWN SIGNIFICANCE): ICD-10-CM

## 2021-09-28 PROCEDURE — 99211 OFF/OP EST MAY X REQ PHY/QHP: CPT | Performed by: INTERNAL MEDICINE

## 2021-09-28 PROCEDURE — 99214 OFFICE O/P EST MOD 30 MIN: CPT | Performed by: INTERNAL MEDICINE

## 2021-09-28 NOTE — TELEPHONE ENCOUNTER
MELECIO ARRIVES AMBULATORY FOR MD VISIT  DR Blu Castellano IN TO SEE PATIENT  ORDERS RECEIVED  IRON PO  RV 6 MONTHS WITH LABS BEFORE  LAB: CDP CMP  FE TIBC FERRITIN VITAMIN B12 & FOLATE KAPPA/LAMBDA FREE LIGHT CHAINS IGG IGA IGM AFP TUMOR MARKER 3/24/22  MD VISIT 3/31/22 @1PM  AVS PRINTED AND GIVEN TO PATIENT WITH INSTRUCTIONS  PATIENT DISCHARGED AMBULATORY

## 2021-09-28 NOTE — PROGRESS NOTES
_     Chief Complaint   Patient presents with    Follow-up    Discuss Labs       DIAGNOSIS:    Anemia  Leukopenia  Liver cirrhosis  MGUS     Back pain        CURRENT THERAPY:    S/p hospitalization for further management of above issues. S/p blood transfusion  S/P IV Iron infusion. BRIEF CASE HISTORY:      The patient is a 77 y.o. AA male who is admitted to the hospital for severe anemia and leukopenia. He had no previous labs. He was seen by PCP for routine health exam as a new patient. Labs showed severe anemia and leukopenia. He had chronic back pain for one year. No fever. No night sweats. No lymphadenopathy   He has recent weight loss and anorexia. No GI symptoms. GI work up was negative. He received blood transfusion and IV Iron infusion. He had Rt hip surgery on 00/7/27 with no complications. He had left hip surgery in March 2015. No complications. Prostate surgery December 1936 with no complications. INTERIM HISTORY:   Patient is seen for follow up above problems. Patient is clinically stable. No active bleeding. No melena or hematochezia. No hematemesis. Patient had stool fit test which was positive. He had evaluation by gastroenterology. He had EGD and colonoscopy. Small polyps were resected. He was noted to have varices. He had liver ultrasound. Ultrasound shows possible cirrhosis. He had multiple other test done by gastroenterology. MRI of the liver showed questionable liver lesions concerning for malignancy. MRI also showed questionable problem with hemochromatosis. Liver biopsy was negative. Patient's ferritin level has been normal for so many years. He had previous problem with iron deficiency. Repeated labs continue to have iron deficiency. Oral iron will be resumed. Patient has cytopenia. No repeated infections. Clinically patient is doing fine. No weakness or fatigue. No fever or chills. No chest pain. No nausea or vomiting. No other complaints. PAST MEDICAL HISTORY: has a past medical history of Anemia, Arthritis, Avascular necrosis ([address] BPH (benign prostatic hyperplasia), History of blood transfusion, Hypertension, Iron deficiency anemia, Leukopenia, MGUS (monoclonal gammopathy of unknown significance), Osteoarthritis, Patient in clinical research study, Positive FIT (fecal immunochemical test), and Stomach ulcer. PAST SURGICAL HISTORY: has a past surgical history that includes Endoscopy, colon, diagnostic (07/13/2014); Prostate Biopsy (09/12/2014); Hip Arthroplasty (Right, 12/03/14); Hip Arthroplasty (Left, 03/10/15); Prostatectomy (12/16/2015); Colonoscopy (07/14/2014); Upper gastrointestinal endoscopy (10/19/2016); pr office/outpt visit,procedure only (Left, 11/29/2018); Inguinal hernia repair (Right, 2009); Inguinal hernia repair (Left, 11/29/2018); Upper gastrointestinal endoscopy (09/25/2019); Colonoscopy (09/25/2019); Colonoscopy (N/A, 9/25/2019); and Upper gastrointestinal endoscopy (N/A, 9/25/2019). CURRENT MEDICATIONS:  has a current medication list which includes the following prescription(s): pantoprazole, hydrochlorothiazide, potassium chloride, propranolol, flaxseed oil, cyclobenzaprine, vitamin d, magnesium, calcium carbonate, and multiple vitamins-minerals. ALLERGIES:  has No Known Allergies. FAMILY HISTORY: Negative for any hematological or oncological conditions. SOCIAL HISTORY:  reports that he has never smoked. He has never used smokeless tobacco. He reports that he does not drink alcohol and does not use drugs. REVIEW OF SYSTEMS:     General: + weakness + fatigue. + unanticipated weight loss + decreased appetite. No fever or chills. Eyes: No blurred vision, eye pain or double vision. Ears: No hearing problems or drainage. No tinnitus. Throat: No sore throat, problems with swallowing or dysphagia. rales or rhonchi, symmetric air entry   Heart - normal rate, regular rhythm, normal S1, S2, no murmurs, rubs, clicks or gallops   Abdomen - soft, nontender, nondistended, no masses or organomegaly   Neurological - alert, oriented, normal speech, no focal findings or movement disorder noted   Musculoskeletal -mild bilateral LE weakness   Extremities - peripheral pulses normal, no pedal edema, no clubbing or cyanosis   Skin - normal coloration and turgor, no rashes, no suspicious skin lesions noted       Lab Results   Component Value Date    WBC 0.7 (LL) 09/14/2021    HGB 10.3 (L) 09/14/2021    HCT 37.4 (L) 09/14/2021    MCV 73.5 (L) 09/14/2021    PLT See Reflexed IPF Result 09/14/2021     Lab Results   Component Value Date    IRON 25 (L) 09/14/2021    TIBC 282 09/14/2021    FERRITIN 21 (L) 09/14/2021                 Chemistry        Component Value Date/Time     09/14/2021 1020    K 3.7 09/14/2021 1020     09/14/2021 1020    CO2 21 09/14/2021 1020    BUN 17 09/14/2021 1020    CREATININE 0.90 09/14/2021 1020        Component Value Date/Time    CALCIUM 8.5 (L) 09/14/2021 1020    ALKPHOS 201 (H) 09/14/2021 1020    AST 42 (H) 09/14/2021 1020    ALT 32 09/14/2021 1020    BILITOT 0.77 09/14/2021 1020        Bone marrow: Final Diagnosis  PERIPHERAL BLOOD:  - SEVERE MICROCYTIC ANEMIA.  - MODERATE TO SEVERE NEUTROPENIA.  - LYMPHOPENIA (510/ul). BONE MARROW BIOPSY, ASPIRATE SMEAR AND CLOT SECTION:  - MYELOID AND MEGAKARYOCYTIC HYPERPLASIA. - ABSENT IRON STORES. - PLASMA CELLS 5-10%, NEGATIVE FOR PLASMA CELL MONOTYPIA FOR IHC. Diagnosis Comment  THE SEVERE MICROCYTIC ANEMIA IS COMPATIBLE WITH IRON DEFICIENCY  ANEMIA. NEUTROPENIA IS LIKELY DRUG INDUCED / IMMUNE-MEDIATED. THERE  IS NO EVIDENCE OF MYELOID OR OTHER MALIGNANCY. PLASMA CELLS COMPRISE  5-10% OF THE CELLULARITY AND THERE IS NO EVIDENCE OF PLASMA CELL  MONOTYPIA BY IHC (SERUM IgG KAPPA MONOCLONAL PARAPROTEIN OF 0.6 G/dl  IS NOTED, FAVORING MGUS). CT scan: IMPRESSION: 1. Thickening of the sigmoid colon and rectum which maybe infectious or inflammatory etiology but may be neoplastic in etiology. Clinical correlation is recommended. Further evaluation with direct visualization is recommended. 2. Prostatomegaly. 3. Numerous low-attenuation lesions scattered in the liver are technically indeterminate. Further evaluation with nonemergent/outpatient contrast-enhanced MRI of the liver is recommended. 4. Severe degenerative changes of the bilateral hips. 5. Lucency of the proximal for more which may be related to demineralization; however, neoplastic etiologies cannot be excluded. This can be further evaluated with bone scan or MRI as clinically indicated. Note is made that this patient may be at increased risk for pathologic fracture. Bone scan: IMPRESSION: 1. No definitive scintigraphic evidence for metastatic disease to the skeleton. 2. Symmetric increased radiotracer uptake at the bilateral hips, likely secondary to severe degenerative changes of the bilateral hips seen on the CT abdomen pelvis of 7/12/2014. Underlying avascular necrosis is not excluded. Further evaluation with MR of the bilateral hips should be considered. 3. Symmetric increased radiotracer uptake involving the bilateral shoulders, elbows, wrists, hands, knees as well as the right foot, most likely degenerative. Abdomen MRI:   IMPRESSION:    1. Multiple T2 hyperintense lesion is noted within the liver largest    measuring approximately 1.6 CM x1.9 CM without significant associated    enhancement likely represent hepatic cyst however the noncontrasted    images are somewhat limited due to patient motion. Followup assessment is    advised in 4 months. 2. Splenomegaly   3. Redemonstration of sclerotic lesions within the left sacrum may    represent a bone island.      Lab Results   Component Value Date    WBC 0.7 (LL) 09/14/2021    HGB 10.3 (L) 09/14/2021    HCT 37.4 (L) 09/14/2021 MCV 73.5 (L) 09/14/2021    PLT See Reflexed IPF Result 09/14/2021    LYMPHOPCT 38 09/14/2021    RBC 5.09 09/14/2021    MCH 20.2 (L) 09/14/2021    MCHC 27.5 (L) 09/14/2021    RDW 19.9 (H) 09/14/2021    MONOPCT 24 (H) 09/14/2021    EOSPCT 4 06/04/2019    BASOPCT 2 09/14/2021    NEUTROABS 0.20 (LL) 09/14/2021    LYMPHSABS 0.26 (L) 09/14/2021    MONOSABS 0.17 09/14/2021    EOSABS 0.06 09/14/2021    BASOSABS 0.01 09/14/2021                 Lab Results   Component Value Date    IRON 25 (L) 09/14/2021    TIBC 282 09/14/2021    FERRITIN 21 (L) 09/14/2021           IMPRESSION:   Pancytopenia. Likely related to liver cirrhosis and possibly immune mediated. Anemia, multifactorial.   Liver cirrhosis  Weight loss. Recovering. MGUS     Back pain  Questionable liver lesions. Previous MRI was consistent with benign lesions. Repeated MRI showed no changes. Hips osteonecrosis. S/p surgery. Prostate cancer. ECOG performance score 0  Recent left inguinal hernia surgery. PLAN:    I explained to the patient all of the above. I reviewed the results of the EGD and colonoscopy and explained to the patient and his wife. Labs reviewed and discussed with the patient. Clinically, he did better after IV Iron infusion. Hb is stable. No active bleeding. We will resume oral iron. Liver MRI is suggestive of hemochromatosis. MRI also showed suspicious liver lesion with recommendation for biopsy. However the liver lesion is exact same size as it was seen on the previous MRI from 2014. Likely benign. However with these questionable readings on the MRI I liver biopsy was negative. Wbcs are still very low. He received Granix before surgery. He has no repeated infections. No need for daily treatment. These abnormalities are at least in part related to liver cirrhosis as well. WBCs are likely immune mediated in addition to liver cirrhosis. Repeated infections. We will continue to monitor.   We will consider treatment if he starts having repeated infections. Thrombocytopenia secondary to liver cirrhosis. Clinically stable. Continue to monitor PSA. RV 6 months. Labs at RV. Will be sooner if biopsy is positive. We will check immunoglobulins level Every 6 months. Patient's questions were answered to the best of his satisfaction and he verbalized full understanding and agreement. 6 [facility]                            This note is created with the assistance of a speech recognition program.  While intending to generate a document that actually reflects the content of the visit, the document can still have some errors including those of syntax and sound a like substitutions which may escape proof reading. It such instances, actual meaning can be extrapolated by contextual diversion.

## 2021-10-06 ENCOUNTER — TELEPHONE (OUTPATIENT)
Dept: GASTROENTEROLOGY | Age: 71
End: 2021-10-06

## 2021-10-06 ENCOUNTER — OFFICE VISIT (OUTPATIENT)
Dept: GASTROENTEROLOGY | Age: 71
End: 2021-10-06
Payer: COMMERCIAL

## 2021-10-06 ENCOUNTER — IMMUNIZATION (OUTPATIENT)
Dept: FAMILY MEDICINE CLINIC | Age: 71
End: 2021-10-06
Payer: COMMERCIAL

## 2021-10-06 VITALS
DIASTOLIC BLOOD PRESSURE: 85 MMHG | SYSTOLIC BLOOD PRESSURE: 147 MMHG | TEMPERATURE: 97.3 F | OXYGEN SATURATION: 99 % | HEIGHT: 72 IN | WEIGHT: 155.4 LBS | HEART RATE: 62 BPM | BODY MASS INDEX: 21.05 KG/M2

## 2021-10-06 DIAGNOSIS — Z87.19 HISTORY OF HEMATEMESIS: Primary | Chronic | ICD-10-CM

## 2021-10-06 DIAGNOSIS — D61.818 PANCYTOPENIA (HCC): ICD-10-CM

## 2021-10-06 DIAGNOSIS — D50.9 MICROCYTIC HYPOCHROMIC ANEMIA: ICD-10-CM

## 2021-10-06 DIAGNOSIS — D72.819 LEUKOPENIA, UNSPECIFIED TYPE: ICD-10-CM

## 2021-10-06 DIAGNOSIS — D47.2 MGUS (MONOCLONAL GAMMOPATHY OF UNKNOWN SIGNIFICANCE): ICD-10-CM

## 2021-10-06 DIAGNOSIS — C61 PROSTATE CANCER (HCC): ICD-10-CM

## 2021-10-06 PROCEDURE — 91300 COVID-19, PFIZER VACCINE 30MCG/0.3ML DOSE: CPT | Performed by: INTERNAL MEDICINE

## 2021-10-06 PROCEDURE — 99214 OFFICE O/P EST MOD 30 MIN: CPT | Performed by: INTERNAL MEDICINE

## 2021-10-06 PROCEDURE — 0003A COVID-19, PFIZER VACCINE 30MCG/0.3ML DOSE: CPT | Performed by: INTERNAL MEDICINE

## 2021-10-06 ASSESSMENT — ENCOUNTER SYMPTOMS
COUGH: 0
SORE THROAT: 0
ANAL BLEEDING: 0
NAUSEA: 0
GASTROINTESTINAL NEGATIVE: 1
RESPIRATORY NEGATIVE: 1
TROUBLE SWALLOWING: 0
BLOOD IN STOOL: 0
ABDOMINAL PAIN: 0
RECTAL PAIN: 0
CHOKING: 0
BACK PAIN: 0
SHORTNESS OF BREATH: 0
CONSTIPATION: 0
EYES NEGATIVE: 1
ABDOMINAL DISTENTION: 0
VOICE CHANGE: 0
DIARRHEA: 0
VOMITING: 0
ALLERGIC/IMMUNOLOGIC NEGATIVE: 1

## 2021-10-06 NOTE — PROGRESS NOTES
GI OFFICE FOLLOW UP    Ronnell Iverson is a 70 y.o. male evaluated via on 10/6/2021. Consent:  He and/or health care decision maker is aware that that he may receive a bill for this telephone service, depending on his insurance coverage, and has provided verbal consent to proceed: YES      INTERVAL HISTORY:   No referring provider defined for this encounter. Chief Complaint   Patient presents with    Follow-up     Patient is here today to f/u on pancytopenia and GERD       1. History of hematemesis    2. Leukopenia, unspecified type    3. Microcytic hypochromic anemia    4. MGUS (monoclonal gammopathy of unknown significance)    5.  Prostate cancer ([facility]    6. Pancytopenia ([facility] [address] [facility]      Yina Moran is seen my office as a follow-up  He has history significant for above issues  Previously has been diagnosed with cirrhosis of the liver after finding of the esophageal varices upper endoscopy performed 2 years ago  Further work-up revealed evidence of cirrhosis on MRI a small lesion was noted previously repeat MRI did not reveal any worsening of that lesion probably suspected to be benign    He also has history significant for monoclonal gammopathy of unknown significance has been followed by hematology oncology he has history for pancytopenia neutropenia    Has history for anemia iron transfusion as needed have been given by hematology his current hemoglobin level is 10    Patient however denies any significant GI symptoms at this time denies significant GERD dysphagia nausea vomiting hematemesis he is taking pantoprazole    He denies any significant rectal bleeding melanotic stools a colonoscopy in the past revealed a small polyp also noted was fragility of the mucosa    He denies any significant weight loss loss of appetite     He denies any current smoking alcohol abuse illicit drug usage    HISTORY OF PRESENT ILLNESS: Mr.Andy David Valenzuela is a 70 y.o. male with a past history remarkable for , referred for evaluation of   Chief Complaint   Patient presents with    Follow-up     Patient is here today to f/u on pancytopenia and GERD   . Past Medical,Family, and Social History reviewed and does contribute to the patient presenting condition. Patient's PMH/PSH,SH,PSYCH Hx, MEDs, ALLERGIES, and ROS were all reviewed and updated in the appropriate sections. PAST MEDICAL HISTORY:  Past Medical History:   Diagnosis Date    Anemia     Arthritis     Avascular necrosis ([address]     sees Dr Chantelle Holly BPH (benign prostatic hyperplasia)     History of blood transfusion 12/2014    after total hip replacement    Hypertension     Iron deficiency anemia     Leukopenia     MGUS (monoclonal gammopathy of unknown significance)     Osteoarthritis     Patient in clinical research study 01/19/2017    Positive FIT (fecal immunochemical test)     Stomach ulcer     x 2        Past Surgical History:   Procedure Laterality Date    COLONOSCOPY  07/14/2014    COLONOSCOPY  09/25/2019    COLONOSCOPY N/A 9/25/2019    COLONOSCOPY POLYPECTOMY SNARE/HOT BIOPSY performed by Daniel Hernandez MD at [address], COLON, DIAGNOSTIC  07/13/2014    stomach ulcers    HIP ARTHROPLASTY Right 12/03/14    HIP ARTHROPLASTY Left 03/10/15    INGUINAL HERNIA REPAIR Right 2009    INGUINAL HERNIA REPAIR Left 11/29/2018    incarcerated. By Dr. Odilon Medrano OFFICE/OUTPT VISIT,PROCEDURE ONLY Left 11/29/2018    INCARCERATED INGUINAL HERNIA performed by Keeley Bentley MD at [address]  09/12/2014    PROSTATECTOMY  12/16/2015    robotic.  lap    UPPER GASTROINTESTINAL ENDOSCOPY  10/19/2016    no lesions seen    UPPER GASTROINTESTINAL ENDOSCOPY  09/25/2019    UPPER GASTROINTESTINAL ENDOSCOPY N/A 9/25/2019    EGD BIOPSY performed by Daniel Hernandez MD at [address]:    Current Outpatient Medications:    pantoprazole (PROTONIX) 40 MG tablet, TAKE 1 TABLET BY MOUTH ONE TIME A DAY, Disp: 90 tablet, Rfl: 0    hydroCHLOROthiazide (HYDRODIURIL) 25 MG tablet, TAKE 1 TABLET BY MOUTH ONE TIME A DAY, Disp: 90 tablet, Rfl: 0    potassium chloride (KLOR-CON M) 20 MEQ extended release tablet, TAKE 1 TABLET BY MOUTH ONE TIME A DAY, Disp: 90 tablet, Rfl: 3    propranolol (INDERAL LA) 60 MG extended release capsule, TAKE 1 CAPSULE BY MOUTH 2 TIMES A DAY, Disp: 180 capsule, Rfl: 6    Flaxseed Oil OIL, by Does not apply route every other day, Disp: , Rfl:     cyclobenzaprine (FLEXERIL) 10 MG tablet, TAKE 1 TABLET BY MOUTH 3 TIMES DAILY AS NEEDED FOR MUSCLE SPASMS, Disp: 90 tablet, Rfl: 1    Cholecalciferol (VITAMIN D) 2000 units CAPS capsule, Take by mouth Indications: three times weekly, Disp: , Rfl:     Magnesium 400 MG CAPS, Take 400 mg by mouth every other day, Disp: , Rfl:     calcium carbonate (OSCAL) 500 MG TABS tablet, Take 500 mg by mouth daily, Disp: , Rfl:     Multiple Vitamins-Minerals (MULTI COMPLETE PO), Take 1 tablet by mouth daily. , Disp: , Rfl:     ALLERGIES:   No Known Allergies    FAMILY HISTORY:       Problem Relation Age of Onset    High Blood Pressure Mother     High Blood Pressure Father          SOCIAL HISTORY:   Social History     Socioeconomic History    Marital status:      Spouse name: Not on file    Number of children: 3    Years of education: 13    Highest education level: Not on file   Occupational History    Occupation: retired     Employer: [employer]   Tobacco Use    Smoking status: Never Smoker    Smokeless tobacco: Never Used   Vaping Use    Vaping Use: Never used   Substance and Sexual Activity    Alcohol use: No    Drug use: No    Sexual activity: Never   Other Topics Concern    Not on file   Social History Narrative    Diet - relatively healthy    Caffeine intake per day - no    Exercise pattern - wlks    House with wife           Social Determinants of Health     Financial Resource Strain:     Difficulty of Paying Living Expenses:    Food Insecurity:     Worried About Running Out of Food in the Last Year:     [address] in the Last Year:    Transportation Needs:     Lack of Transportation (Medical):  Lack of Transportation (Non-Medical):    Physical Activity:     Days of Exercise per Week:     Minutes of Exercise per Session:    Stress:     Feeling of Stress :    Social Connections:     Frequency of Communication with Friends and Family:     Frequency of Social Gatherings with Friends and Family:     Attends [employer] Services:     Active Member of Clubs or Organizations:     Attends Club or Organization Meetings:     Marital Status:    Intimate Partner Violence:     Fear of Current or Ex-Partner:     Emotionally Abused:     Physically Abused:     Sexually Abused:          REVIEW OF SYSTEMS:         Review of Systems   Constitutional: Negative. Negative for appetite change, fatigue and unexpected weight change. HENT: Negative. Negative for sore throat, trouble swallowing and voice change. Eyes: Negative. Negative for visual disturbance (glasses). Respiratory: Negative. Negative for cough, choking and shortness of breath. Cardiovascular: Negative. Negative for chest pain and leg swelling. Gastrointestinal: Negative. Negative for abdominal distention, abdominal pain, anal bleeding, blood in stool, constipation, diarrhea, nausea, rectal pain and vomiting. Denies   Endocrine: Negative. Genitourinary: Negative. Negative for difficulty urinating. Musculoskeletal: Negative. Negative for back pain, joint swelling and myalgias. Skin: Negative. Allergic/Immunologic: Negative. Neurological: Negative. Negative for dizziness, tremors, weakness, light-headedness, numbness and headaches. Hematological: Does not bruise/bleed easily. Psychiatric/Behavioral: Negative. Negative for sleep disturbance.  The patient is not nervous/anxious. PHYSICAL EXAMINATION: Vital signs reviewed per the nursing documentation. BP (!) 147/85 (Site: Left Upper Arm, Position: Sitting, Cuff Size: Medium Adult)   Pulse 62   Temp 97.3 °F (36.3 °C)   Ht 6' (1.829 m)   Wt 155 lb 6.4 oz (70.5 kg)   SpO2 99%   BMI 21.08 kg/m²   Body mass index is 21.08 kg/m². Physical Exam  Nursing note reviewed. Constitutional:       Appearance: He is well-developed. Comments: Anxious    HENT:      Head: Normocephalic and atraumatic. Eyes:      Conjunctiva/sclera: Conjunctivae normal.      Pupils: Pupils are equal, round, and reactive to light. Cardiovascular:      Rate and Rhythm: Normal rate and regular rhythm. Pulmonary:      Effort: Pulmonary effort is normal.      Breath sounds: Normal breath sounds. Abdominal:      General: Bowel sounds are normal.      Palpations: Abdomen is soft. Comments: Mild tender  Boated  BS +   Genitourinary:     Rectum: Normal.   Musculoskeletal:         General: Normal range of motion. Cervical back: Normal range of motion and neck supple. Skin:     General: Skin is warm. Neurological:      Mental Status: He is alert and oriented to person, place, and time. Deep Tendon Reflexes: Reflexes are normal and symmetric.            LABORATORY DATA: Reviewed  Lab Results   Component Value Date    WBC 0.7 (LL) 09/14/2021    HGB 10.3 (L) 09/14/2021    HCT 37.4 (L) 09/14/2021    MCV 73.5 (L) 09/14/2021    PLT See Reflexed IPF Result 09/14/2021     09/14/2021    K 3.7 09/14/2021     09/14/2021    CO2 21 09/14/2021    BUN 17 09/14/2021    CREATININE 0.90 09/14/2021    LABALBU 2.9 (L) 09/14/2021    BILITOT 0.77 09/14/2021    ALKPHOS 201 (H) 09/14/2021    AST 42 (H) 09/14/2021    ALT 32 09/14/2021    INR 1.1 12/13/2019         Lab Results   Component Value Date    RBC 5.09 09/14/2021    HGB 10.3 (L) 09/14/2021    MCV 73.5 (L) 09/14/2021    MCH 20.2 (L) 09/14/2021    MCHC 27.5 (L) 09/14/2021    RDW 19.9 (H) 09/14/2021    MPV NOT REPORTED 09/14/2021    BASOPCT 2 09/14/2021    LYMPHSABS 0.26 (L) 09/14/2021    MONOSABS 0.17 09/14/2021    NEUTROABS 0.20 (LL) 09/14/2021    EOSABS 0.06 09/14/2021    BASOSABS 0.01 09/14/2021         DIAGNOSTIC TESTING:     No results found. Assessment  1. History of hematemesis    2. Leukopenia, unspecified type    3. Microcytic hypochromic anemia    4. MGUS (monoclonal gammopathy of unknown significance)    5. Prostate cancer ([facility] [address]    6. Pancytopenia ([facility] [address]        Plan    I would like to repeat upper endoscopy he likes to wait at this time he says that he is going out to [facility] for 6 months    He will follow me after that      Check LFT    Check Alpha fetoprotein    Cont Iron Rx per Hematology     Cont Hematology f/u     Low Na diet    Avoid red meat    Pt was advised in detail about some life style and dietary modifications. He was advised about avoidance of caffeine, nicotine and chocolate. Pt was also told to stay away from any kind of fast foods, soda pops. He was also advised to avoid lots of spices, grease and fried food etc.     Instructions were also given about trying to arrange the timing, quality and quantity of food. Instructions were given about using ample amount of fiber including dietary and supplemental fiber either metamucil, bennafiber or citrucell etc.  Pt was advised about drinking ample amount of water without any colors or chemicals. Stress was given about regular exercise. Pt has verbalized understanding and agreement to these modifications. Pt was discussed in detail about the possible side effects of proton pump inhibiter therapy. He was explained about the possibility of calcium and magnesium malabsorption and was advised to start taking calcium supplements with Vit D. Some over the counter regimens were explained to patient. Some dietary advices were also given.     He has verbalized understanding and agreement to

## 2021-10-06 NOTE — TELEPHONE ENCOUNTER
Called pt. And left a message to see if pt would like to move his appt up to an earlier time for 10/06/2021. On message I asked for pt to call us back, and let us know.

## 2021-10-20 ENCOUNTER — OFFICE VISIT (OUTPATIENT)
Dept: FAMILY MEDICINE CLINIC | Age: 71
End: 2021-10-20
Payer: COMMERCIAL

## 2021-10-20 VITALS
WEIGHT: 152 LBS | SYSTOLIC BLOOD PRESSURE: 138 MMHG | BODY MASS INDEX: 20.61 KG/M2 | DIASTOLIC BLOOD PRESSURE: 84 MMHG | OXYGEN SATURATION: 100 % | TEMPERATURE: 97 F | HEART RATE: 58 BPM

## 2021-10-20 DIAGNOSIS — I10 ESSENTIAL HYPERTENSION: Primary | ICD-10-CM

## 2021-10-20 DIAGNOSIS — Z23 NEED FOR IMMUNIZATION AGAINST INFLUENZA: ICD-10-CM

## 2021-10-20 PROCEDURE — 90694 VACC AIIV4 NO PRSRV 0.5ML IM: CPT | Performed by: FAMILY MEDICINE

## 2021-10-20 PROCEDURE — 99214 OFFICE O/P EST MOD 30 MIN: CPT | Performed by: FAMILY MEDICINE

## 2021-10-20 PROCEDURE — 90471 IMMUNIZATION ADMIN: CPT | Performed by: FAMILY MEDICINE

## 2021-10-20 RX ORDER — SILDENAFIL 100 MG/1
100 TABLET, FILM COATED ORAL DAILY PRN
Qty: 10 TABLET | Refills: 5 | Status: SHIPPED | OUTPATIENT
Start: 2021-10-20 | End: 2022-05-03 | Stop reason: SDUPTHER

## 2021-10-20 RX ORDER — SILDENAFIL 100 MG/1
100 TABLET, FILM COATED ORAL DAILY PRN
Qty: 10 TABLET | Refills: 5 | Status: SHIPPED | OUTPATIENT
Start: 2021-10-20 | End: 2021-10-20 | Stop reason: SDUPTHER

## 2021-10-20 SDOH — ECONOMIC STABILITY: FOOD INSECURITY: WITHIN THE PAST 12 MONTHS, THE FOOD YOU BOUGHT JUST DIDN'T LAST AND YOU DIDN'T HAVE MONEY TO GET MORE.: NEVER TRUE

## 2021-10-20 SDOH — ECONOMIC STABILITY: FOOD INSECURITY: WITHIN THE PAST 12 MONTHS, YOU WORRIED THAT YOUR FOOD WOULD RUN OUT BEFORE YOU GOT MONEY TO BUY MORE.: NEVER TRUE

## 2021-10-20 ASSESSMENT — SOCIAL DETERMINANTS OF HEALTH (SDOH): HOW HARD IS IT FOR YOU TO PAY FOR THE VERY BASICS LIKE FOOD, HOUSING, MEDICAL CARE, AND HEATING?: NOT HARD AT ALL

## 2021-10-20 ASSESSMENT — PATIENT HEALTH QUESTIONNAIRE - PHQ9
SUM OF ALL RESPONSES TO PHQ QUESTIONS 1-9: 0
2. FEELING DOWN, DEPRESSED OR HOPELESS: 0
SUM OF ALL RESPONSES TO PHQ9 QUESTIONS 1 & 2: 0
SUM OF ALL RESPONSES TO PHQ QUESTIONS 1-9: 0
1. LITTLE INTEREST OR PLEASURE IN DOING THINGS: 0
SUM OF ALL RESPONSES TO PHQ QUESTIONS 1-9: 0

## 2021-10-20 NOTE — PROGRESS NOTES
Subjective: De Gess presents for   Chief Complaint   Patient presents with    Hypertension     tolerating the meds. Has seen GI and oncology recently - they have done labs    No bleeding    Feels great    Patient Active Problem List   Diagnosis    Leukopenia    Microcytic hypochromic anemia    Pancytopenia (HCC)    MGUS (monoclonal gammopathy of unknown significance)    PSA elevation    Neutropenia (HCC)    Osteoarthritis    Hyponatremia    S/P hip replacement    Prostate cancer (HCC)    Malabsorption    Hematemesis without nausea    Upper GI bleed    Essential hypertension    History of hematemesis    Pancytopenia (HCC)    Anemia    Iron deficiency anemia    Positive FIT (fecal immunochemical test)    Idiopathic esophageal varices without bleeding ([facility] [address]         Review of Systems:  · General: no significant weight changes. · Respiratory: no cough, pleuritic chest pain, dyspnea, or wheezing  · Cardiovascular: no pain, NICOLE, orthopnea, palpitations or claudication  · Gastrointestinal: no chronic nausea, vomiting, heartburn, diarrhea, constipation, bloating, or abdominal pain. No bloody or black stools. Objective:  Physical Exam   Vitals:   Vitals:    10/20/21 1306   BP: 138/84   Pulse: 58   Temp: 97 °F (36.1 °C)   TempSrc: Temporal   SpO2: 100%   Weight: 152 lb (68.9 kg)     Wt Readings from Last 3 Encounters:   10/20/21 152 lb (68.9 kg)   10/06/21 155 lb 6.4 oz (70.5 kg)   09/28/21 149 lb 8 oz (67.8 kg)     Ht Readings from Last 3 Encounters:   10/06/21 6' (1.829 m)   12/13/19 6' (1.829 m)   09/25/19 6' (1.829 m)     Body mass index is 20.61 kg/m². Constitutional: He is oriented to person, place, and time. He appears well-developed and well-nourished and in no acute distress. Answers all my questions appropriately. Head: Normocephalic and atraumatic. Eyes:conjunctiva appear normal.        Nose: pink, non-edematous mucosa. No polyps.   No septal deviation    Throat: no erythema, tonsillar hypertrophy or exudate. No ulcerations noted. Lips/Teeth/Gums all appear normal.    Neck: Normal range of motion. Neck supple. No tracheal deviation present. No abnormal lymphadenopathy. No JVD noted. Carotids are clear bilaterally. No thyroid masses noted. Heart: RRR without murmur. No S3, S4, or gallop noted. Chest:   Good breath sounds noted. Clear to auscultation bilaterally. No rales, wheezes, or rhonchi noted. No respiratory retractions noted. Wall has symmetrical movement with respirations. Assessment:   Encounter Diagnoses   Name Primary?  Need for immunization against influenza     Essential hypertension Yes         Plan:   There are no discontinued medications. THE ABOVE NOTED DISCONTINUED MEDS MAY ONLY BE FROM 'CLEANING UP' THE MED LIST AND WERE NOT ACTUALLY CANCELED;  SEE CHART FOR DETAILS! Orders Placed This Encounter   Medications    sildenafil (VIAGRA) 100 MG tablet     Sig: Take 1 tablet by mouth daily as needed for Erectile Dysfunction     Dispense:  10 tablet     Refill:  5     Orders Placed This Encounter   Procedures    INFLUENZA, QUADV, ADJUVANTED, 65 YRS =, IM, PF, PREFILL SYR, 0.5ML (FLUAD)     Return in about 6 months (around 4/20/2022). There are no Patient Instructions on file for this visit.   Data Unavailable

## 2022-02-11 ENCOUNTER — PATIENT MESSAGE (OUTPATIENT)
Dept: FAMILY MEDICINE CLINIC | Age: 72
End: 2022-02-11

## 2022-02-11 NOTE — TELEPHONE ENCOUNTER
Spoke to patient advised him that Dr Vitaliy Huffman retired and he needed to see a provoder before that medication could be filled.  He has a appt on 5/3/22 and said that he will have enough to last until then

## 2022-02-11 NOTE — TELEPHONE ENCOUNTER
From: Priscilla Nino  To: Dr. Arley Khan: 2/11/2022 12:09 PM EST  Subject: Lizzeth Greenfield    My prescription for Flexeril is almost gone. I just use it periodically for my hip and leg cramps after alot of walking. My last prescription lasted me 2 years and has no refills. Can I get a new prescription sent into my mail order pharmacy that my other meds go to. [facility] [phone]  Thank you

## 2022-03-02 DIAGNOSIS — K21.9 GASTROESOPHAGEAL REFLUX DISEASE: ICD-10-CM

## 2022-03-02 DIAGNOSIS — K74.69 OTHER CIRRHOSIS OF LIVER (HCC): ICD-10-CM

## 2022-03-02 RX ORDER — CYCLOBENZAPRINE HCL 10 MG
TABLET ORAL
Qty: 90 TABLET | Refills: 0 | Status: SHIPPED | OUTPATIENT
Start: 2022-03-02 | End: 2022-09-09 | Stop reason: SDUPTHER

## 2022-03-02 NOTE — TELEPHONE ENCOUNTER
Syed Lara is calling to request a refill on the following medication(s):    Medication Request:  Requested Prescriptions     Pending Prescriptions Disp Refills    cyclobenzaprine (FLEXERIL) 10 MG tablet [Pharmacy Med Name: CYCLOBENZAPRINE HCL 10MG TABS] 90 tablet 0     Sig: TAKE 1 TABLET BY MOUTH 3 TIMES A DAY AS NEEDED FOR MUSCLE SPASMS       Last Visit Date (If Applicable):  27/83/1004    Next Visit Date:    05/03/22

## 2022-03-04 RX ORDER — PROPRANOLOL HCL 60 MG
CAPSULE, EXTENDED RELEASE 24HR ORAL
Qty: 180 CAPSULE | Refills: 2 | Status: SHIPPED | OUTPATIENT
Start: 2022-03-04

## 2022-03-23 ENCOUNTER — HOSPITAL ENCOUNTER (OUTPATIENT)
Facility: MEDICAL CENTER | Age: 72
Discharge: HOME | End: 2022-03-23
Payer: COMMERCIAL

## 2022-03-24 ENCOUNTER — HOSPITAL ENCOUNTER (OUTPATIENT)
Age: 72
Discharge: HOME | End: 2022-03-24
Payer: COMMERCIAL

## 2022-03-24 DIAGNOSIS — D89.2 PARAPROTEINEMIA: ICD-10-CM

## 2022-03-24 DIAGNOSIS — D61.818 PANCYTOPENIA (HCC): ICD-10-CM

## 2022-03-24 LAB
ABSOLUTE EOS #: 0.06 K/UL (ref 0–0.4)
ABSOLUTE IMMATURE GRANULOCYTE: 0 K/UL (ref 0–0.3)
ABSOLUTE LYMPH #: 0.36 K/UL (ref 1–4.8)
ABSOLUTE MONO #: 0.18 K/UL (ref 0.1–0.8)
AFP: 6.9 UG/L
ALBUMIN SERPL-MCNC: 2.8 G/DL (ref 3.5–5.2)
ALBUMIN/GLOBULIN RATIO: 0.7 (ref 1–2.5)
ALP BLD-CCNC: 278 U/L (ref 40–129)
ALT SERPL-CCNC: 41 U/L (ref 5–41)
ANION GAP SERPL CALCULATED.3IONS-SCNC: 13 MMOL/L (ref 9–17)
AST SERPL-CCNC: 52 U/L
BASOPHILS # BLD: 0 % (ref 0–2)
BASOPHILS ABSOLUTE: 0 K/UL (ref 0–0.2)
BILIRUB SERPL-MCNC: 0.86 MG/DL (ref 0.3–1.2)
BUN BLDV-MCNC: 13 MG/DL (ref 8–23)
CALCIUM SERPL-MCNC: 8.6 MG/DL (ref 8.6–10.4)
CELLS COUNTED: 50
CHLORIDE BLD-SCNC: 99 MMOL/L (ref 98–107)
CO2: 22 MMOL/L (ref 20–31)
CREAT SERPL-MCNC: 0.81 MG/DL (ref 0.7–1.2)
EOSINOPHILS RELATIVE PERCENT: 4 % (ref 1–4)
FERRITIN: 44 UG/L (ref 30–400)
FOLATE: 15.6 NG/ML
FREE KAPPA/LAMBDA RATIO: 1.05 (ref 0.26–1.65)
GFR AFRICAN AMERICAN: >60 ML/MIN
GFR NON-AFRICAN AMERICAN: >60 ML/MIN
GFR SERPL CREATININE-BSD FRML MDRD: ABNORMAL ML/MIN/{1.73_M2}
GLUCOSE BLD-MCNC: 143 MG/DL (ref 70–99)
HCT VFR BLD CALC: 39.2 % (ref 40.7–50.3)
HEMOGLOBIN: 11.6 G/DL (ref 13–17)
IGA: 766 MG/DL (ref 70–400)
IGG: 2499 MG/DL (ref 700–1600)
IGM: 30 MG/DL (ref 40–230)
IMMATURE GRANULOCYTES: 0 %
IRON SATURATION: ABNORMAL % (ref 20–55)
IRON: 279 UG/DL (ref 59–158)
KAPPA FREE LIGHT CHAINS QNT: 4.43 MG/DL (ref 0.37–1.94)
LAMBDA FREE LIGHT CHAINS QNT: 4.21 MG/DL (ref 0.57–2.63)
LYMPHOCYTES # BLD: 24 % (ref 24–44)
MCH RBC QN AUTO: 23.8 PG (ref 25.2–33.5)
MCHC RBC AUTO-ENTMCNC: 29.6 G/DL (ref 28.4–34.8)
MCV RBC AUTO: 80.3 FL (ref 82.6–102.9)
MONOCYTES # BLD: 12 % (ref 1–7)
MORPHOLOGY: ABNORMAL
MORPHOLOGY: ABNORMAL
NRBC AUTOMATED: 0 PER 100 WBC
PDW BLD-RTO: 17.7 % (ref 11.8–14.4)
PLATELET # BLD: ABNORMAL K/UL (ref 138–453)
PLATELET, FLUORESCENCE: 61 K/UL (ref 138–453)
PLATELET, IMMATURE FRACTION: 7.6 % (ref 1.1–10.3)
POTASSIUM SERPL-SCNC: 3.8 MMOL/L (ref 3.7–5.3)
RBC # BLD: 4.88 M/UL (ref 4.21–5.77)
SEG NEUTROPHILS: 60 % (ref 36–66)
SEGMENTED NEUTROPHILS ABSOLUTE COUNT: 0.9 K/UL (ref 1.8–7.7)
SODIUM BLD-SCNC: 134 MMOL/L (ref 135–144)
TOTAL IRON BINDING CAPACITY: ABNORMAL UG/DL (ref 250–450)
TOTAL PROTEIN: 7.1 G/DL (ref 6.4–8.3)
UNSATURATED IRON BINDING CAPACITY: <17 UG/DL (ref 112–347)
VITAMIN B-12: 1885 PG/ML (ref 232–1245)
WBC # BLD: 1.5 K/UL (ref 3.5–11.3)

## 2022-03-24 PROCEDURE — 83540 ASSAY OF IRON: CPT

## 2022-03-24 PROCEDURE — 82746 ASSAY OF FOLIC ACID SERUM: CPT

## 2022-03-24 PROCEDURE — 85055 RETICULATED PLATELET ASSAY: CPT

## 2022-03-24 PROCEDURE — 82607 VITAMIN B-12: CPT

## 2022-03-24 PROCEDURE — 80053 COMPREHEN METABOLIC PANEL: CPT

## 2022-03-24 PROCEDURE — 85025 COMPLETE CBC W/AUTO DIFF WBC: CPT

## 2022-03-24 PROCEDURE — 83550 IRON BINDING TEST: CPT

## 2022-03-24 PROCEDURE — 82728 ASSAY OF FERRITIN: CPT

## 2022-03-24 PROCEDURE — 83883 ASSAY NEPHELOMETRY NOT SPEC: CPT

## 2022-03-24 PROCEDURE — 82105 ALPHA-FETOPROTEIN SERUM: CPT

## 2022-03-24 PROCEDURE — 36415 COLL VENOUS BLD VENIPUNCTURE: CPT

## 2022-03-24 PROCEDURE — 82784 ASSAY IGA/IGD/IGG/IGM EACH: CPT

## 2022-03-31 ENCOUNTER — OFFICE VISIT (OUTPATIENT)
Dept: ONCOLOGY | Age: 72
End: 2022-03-31
Payer: COMMERCIAL

## 2022-03-31 ENCOUNTER — TELEPHONE (OUTPATIENT)
Dept: ONCOLOGY | Age: 72
End: 2022-03-31

## 2022-03-31 VITALS
BODY MASS INDEX: 21.51 KG/M2 | HEART RATE: 67 BPM | DIASTOLIC BLOOD PRESSURE: 85 MMHG | SYSTOLIC BLOOD PRESSURE: 147 MMHG | WEIGHT: 158.6 LBS | TEMPERATURE: 97.9 F | RESPIRATION RATE: 16 BRPM

## 2022-03-31 DIAGNOSIS — D89.2 PARAPROTEINEMIA: ICD-10-CM

## 2022-03-31 DIAGNOSIS — D47.2 MGUS (MONOCLONAL GAMMOPATHY OF UNKNOWN SIGNIFICANCE): Primary | ICD-10-CM

## 2022-03-31 DIAGNOSIS — D50.8 OTHER IRON DEFICIENCY ANEMIA: ICD-10-CM

## 2022-03-31 PROCEDURE — 99214 OFFICE O/P EST MOD 30 MIN: CPT | Performed by: INTERNAL MEDICINE

## 2022-03-31 PROCEDURE — 99211 OFF/OP EST MAY X REQ PHY/QHP: CPT

## 2022-03-31 NOTE — PROGRESS NOTES
_     Chief Complaint   Patient presents with    Follow-up    Discuss Labs       DIAGNOSIS:    Anemia  Leukopenia  Liver cirrhosis  MGUS     Back pain        CURRENT THERAPY:    S/p hospitalization for further management of above issues. S/p blood transfusion  S/P IV Iron infusion. BRIEF CASE HISTORY:      The patient is a 77 y.o. AA male who is admitted to the hospital for severe anemia and leukopenia. He had no previous labs. He was seen by PCP for routine health exam as a new patient. Labs showed severe anemia and leukopenia. He had chronic back pain for one year. No fever. No night sweats. No lymphadenopathy   He has recent weight loss and anorexia. No GI symptoms. GI work up was negative. He received blood transfusion and IV Iron infusion. He had Rt hip surgery on 89/0/21 with no complications. He had left hip surgery in March 2015. No complications. Prostate surgery December 0267 with no complications. INTERIM HISTORY:   Patient is seen for follow up above problems. Patient is clinically stable. No active bleeding. No melena or hematochezia. No hematemesis. Patient had stool fit test which was positive. He had evaluation by gastroenterology. He had EGD and colonoscopy. Small polyps were resected. He was noted to have varices. He had liver ultrasound. Ultrasound shows possible cirrhosis. He had multiple other test done by gastroenterology. MRI of the liver showed questionable liver lesions concerning for malignancy. MRI also showed questionable problem with hemochromatosis. Liver biopsy was negative. Patient's ferritin level has been normal for so many years. He had previous problem with iron deficiency. Repeated labs continue to have iron deficiency. Oral iron will be resumed. Patient has cytopenia. No repeated infections. Clinically patient is doing fine. No weakness or fatigue. No fever or chills. No chest pain. No nausea or vomiting. No other complaints. PAST MEDICAL HISTORY: has a past medical history of Anemia, Arthritis, Avascular necrosis ([address] BPH (benign prostatic hyperplasia), History of blood transfusion, Hypertension, Iron deficiency anemia, Leukopenia, MGUS (monoclonal gammopathy of unknown significance), Osteoarthritis, Patient in clinical research study, Positive FIT (fecal immunochemical test), and Stomach ulcer. PAST SURGICAL HISTORY: has a past surgical history that includes Endoscopy, colon, diagnostic (07/13/2014); Prostate Biopsy (09/12/2014); Hip Arthroplasty (Right, 12/03/14); Hip Arthroplasty (Left, 03/10/15); Prostatectomy (12/16/2015); Colonoscopy (07/14/2014); Upper gastrointestinal endoscopy (10/19/2016); pr office/outpt visit,procedure only (Left, 11/29/2018); Inguinal hernia repair (Right, 2009); Inguinal hernia repair (Left, 11/29/2018); Upper gastrointestinal endoscopy (09/25/2019); Colonoscopy (09/25/2019); Colonoscopy (N/A, 9/25/2019); and Upper gastrointestinal endoscopy (N/A, 9/25/2019). CURRENT MEDICATIONS:  has a current medication list which includes the following prescription(s): propranolol, cyclobenzaprine, hydrochlorothiazide, pantoprazole, sildenafil, potassium chloride, flaxseed oil, vitamin d, magnesium, calcium carbonate, and multiple vitamins-minerals. ALLERGIES:  has No Known Allergies. FAMILY HISTORY: Negative for any hematological or oncological conditions. SOCIAL HISTORY:  reports that he has never smoked. He has never used smokeless tobacco. He reports that he does not drink alcohol and does not use drugs. REVIEW OF SYSTEMS:     General: + weakness + fatigue. + unanticipated weight loss + decreased appetite. No fever or chills. Eyes: No blurred vision, eye pain or double vision. Ears: No hearing problems or drainage. No tinnitus. Throat: No sore throat, problems with swallowing or dysphagia. Respiratory: No cough, sputum or hemoptysis. No shortness of breath. No pleuritic chest pain. Cardiovascular: No chest pain, orthopnea or PND. No lower extremity edema. No palpitation. Gastrointestinal: No problems with swallowing. No abdominal pain or bloating. No nausea or vomiting. No diarrhea or constipation. No GI bleeding. Genitourinary: No dysuria, hematuria, frequency or urgency. Musculoskeletal: No muscle aches or pains. No limitation of movement. + chronic back pain. No gait disturbance, No joint complaints. Dermatologic: No skin rashes or pruritus. No skin lesions or discolorations. Psychiatric: No depression, anxiety, or stress or signs of schizophrenia. No change in mood or affect. Hematologic: No history of bleeding tendency. No bruises or ecchymosis. No history of clotting problems. Infectious disease: No fever, chills or frequent infections. Endocrine: No problems with opacity. No polydipsia or polyuria. No temperature intolerance. Neurologic: No headaches or dizziness. No weakness or numbness of the extremities. No changes in balance, coordination, memory, mentation, behavior. Allergic/Immunologic: No nasal congestion or hives. No repeated infections. PHYSICAL EXAM:  The patient is not in acute distress. Vital signs: Blood pressure (!) 147/85, pulse 67, temperature 97.9 °F (36.6 °C), resp. rate 16, weight 158 lb 9.6 oz (71.9 kg).      General appearance - well appearing, not in pain or distress   Mental status - good mood, alert and oriented   Eyes - pupils equal and reactive, extraocular eye movements intact   Ears - bilateral TM's and external ear canals normal   Nose - normal and patent, no erythema, discharge or polyps   Mouth - mucous membranes moist, pharynx normal without lesions   Neck - supple, no significant adenopathy   Lymphatics - no palpable lymphadenopathy, no hepatosplenomegaly   Chest - clear to auscultation, no wheezes, rales or rhonchi, symmetric air entry   Heart - normal rate, regular rhythm, normal S1, S2, no murmurs, rubs, clicks or gallops   Abdomen - soft, nontender, nondistended, no masses or organomegaly   Neurological - alert, oriented, normal speech, no focal findings or movement disorder noted   Musculoskeletal -mild bilateral LE weakness   Extremities - peripheral pulses normal, no pedal edema, no clubbing or cyanosis   Skin - normal coloration and turgor, no rashes, no suspicious skin lesions noted       Lab Results   Component Value Date    WBC 1.5 (L) 03/24/2022    HGB 11.6 (L) 03/24/2022    HCT 39.2 (L) 03/24/2022    MCV 80.3 (L) 03/24/2022    PLT See Reflexed IPF Result 03/24/2022     Lab Results   Component Value Date    IRON 279 (H) 03/24/2022    TIBC  03/24/2022     Unable to calculate due to low iron binding result. FERRITIN 44 03/24/2022                 Chemistry        Component Value Date/Time     (L) 03/24/2022 1110    K 3.8 03/24/2022 1110    CL 99 03/24/2022 1110    CO2 22 03/24/2022 1110    BUN 13 03/24/2022 1110    CREATININE 0.81 03/24/2022 1110        Component Value Date/Time    CALCIUM 8.6 03/24/2022 1110    ALKPHOS 278 (H) 03/24/2022 1110    AST 52 (H) 03/24/2022 1110    ALT 41 03/24/2022 1110    BILITOT 0.86 03/24/2022 1110        Bone marrow: Final Diagnosis  PERIPHERAL BLOOD:  - SEVERE MICROCYTIC ANEMIA.  - MODERATE TO SEVERE NEUTROPENIA.  - LYMPHOPENIA (510/ul). BONE MARROW BIOPSY, ASPIRATE SMEAR AND CLOT SECTION:  - MYELOID AND MEGAKARYOCYTIC HYPERPLASIA. - ABSENT IRON STORES. - PLASMA CELLS 5-10%, NEGATIVE FOR PLASMA CELL MONOTYPIA FOR IHC. Diagnosis Comment  THE SEVERE MICROCYTIC ANEMIA IS COMPATIBLE WITH IRON DEFICIENCY  ANEMIA. NEUTROPENIA IS LIKELY DRUG INDUCED / IMMUNE-MEDIATED. THERE  IS NO EVIDENCE OF MYELOID OR OTHER MALIGNANCY.  PLASMA CELLS COMPRISE  5-10% OF THE CELLULARITY AND THERE IS NO EVIDENCE OF PLASMA CELL  MONOTYPIA BY IHC (SERUM IgG KAPPA MONOCLONAL PARAPROTEIN OF 0.6 G/dl  IS NOTED, FAVORING MGUS). CT scan: IMPRESSION: 1. Thickening of the sigmoid colon and rectum which maybe infectious or inflammatory etiology but may be neoplastic in etiology. Clinical correlation is recommended. Further evaluation with direct visualization is recommended. 2. Prostatomegaly. 3. Numerous low-attenuation lesions scattered in the liver are technically indeterminate. Further evaluation with nonemergent/outpatient contrast-enhanced MRI of the liver is recommended. 4. Severe degenerative changes of the bilateral hips. 5. Lucency of the proximal for more which may be related to demineralization; however, neoplastic etiologies cannot be excluded. This can be further evaluated with bone scan or MRI as clinically indicated. Note is made that this patient may be at increased risk for pathologic fracture. Bone scan: IMPRESSION: 1. No definitive scintigraphic evidence for metastatic disease to the skeleton. 2. Symmetric increased radiotracer uptake at the bilateral hips, likely secondary to severe degenerative changes of the bilateral hips seen on the CT abdomen pelvis of 7/12/2014. Underlying avascular necrosis is not excluded. Further evaluation with MR of the bilateral hips should be considered. 3. Symmetric increased radiotracer uptake involving the bilateral shoulders, elbows, wrists, hands, knees as well as the right foot, most likely degenerative. Abdomen MRI:   IMPRESSION:    1. Multiple T2 hyperintense lesion is noted within the liver largest    measuring approximately 1.6 CM x1.9 CM without significant associated    enhancement likely represent hepatic cyst however the noncontrasted    images are somewhat limited due to patient motion. Followup assessment is    advised in 4 months. 2. Splenomegaly   3. Redemonstration of sclerotic lesions within the left sacrum may    represent a bone island.      Lab Results   Component Value Date    WBC 1.5 (L) 03/24/2022    HGB 11.6 (L) 03/24/2022 HCT 39.2 (L) 03/24/2022    MCV 80.3 (L) 03/24/2022    PLT See Reflexed IPF Result 03/24/2022    LYMPHOPCT 24 03/24/2022    RBC 4.88 03/24/2022    MCH 23.8 (L) 03/24/2022    MCHC 29.6 03/24/2022    RDW 17.7 (H) 03/24/2022    MONOPCT 12 (H) 03/24/2022    EOSPCT 4 06/04/2019    BASOPCT 0 03/24/2022    NEUTROABS 0.90 (L) 03/24/2022    LYMPHSABS 0.36 (L) 03/24/2022    MONOSABS 0.18 03/24/2022    EOSABS 0.06 03/24/2022    BASOSABS 0.00 03/24/2022                 Lab Results   Component Value Date    IRON 279 (H) 03/24/2022    TIBC  03/24/2022     Unable to calculate due to low iron binding result. FERRITIN 44 03/24/2022           IMPRESSION:   Pancytopenia. Likely related to liver cirrhosis and possibly immune mediated. Anemia, multifactorial.   Liver cirrhosis  Weight loss. Recovering. MGUS     Back pain  Questionable liver lesions. Previous MRI was consistent with benign lesions. Repeated MRI showed no changes. Hips osteonecrosis. S/p surgery. Prostate cancer. ECOG performance score 0  Recent left inguinal hernia surgery. PLAN:    I explained to the patient all of the above. I reviewed the results of the EGD and colonoscopy and explained to the patient and his wife. Labs reviewed and discussed with the patient. Clinically, he did better after IV Iron infusion. Hb is stable. No active bleeding. We will resume oral iron. Liver MRI is suggestive of hemochromatosis. MRI also showed suspicious liver lesion with recommendation for biopsy. However the liver lesion is exact same size as it was seen on the previous MRI from 2014. Likely benign. However with these questionable readings on the MRI I liver biopsy was negative. Wbcs are still very low. He received Granix before surgery. He has no repeated infections. No need for daily treatment. These abnormalities are at least in part related to liver cirrhosis as well. WBCs are likely immune mediated in addition to liver cirrhosis.   We will continue to monitor. White blood cells are improving. We will consider treatment if he starts having repeated infections. Thrombocytopenia secondary to liver cirrhosis. Clinically stable. Continue to monitor PSA. RV 6 months. Labs at RV. We will check immunoglobulins level Every 6 months. Patient's questions were answered to the best of his satisfaction and he verbalized full understanding and agreement. [id number] [facility]                            This note is created with the assistance of a speech recognition program.  While intending to generate a document that actually reflects the content of the visit, the document can still have some errors including those of syntax and sound a like substitutions which may escape proof reading. It such instances, actual meaning can be extrapolated by contextual diversion.

## 2022-03-31 NOTE — TELEPHONE ENCOUNTER
MELECIO ARRIVES AMBULATORY FOR MD VISIT  DR Mishel Spears IN TO SEE PATIENT  ORDERS RECEIVED  RV 6 MONTHS WITH LABS BEFORE  LAB: CDP CMP  FE TIBC FERRITIN VITAMIN B12 & FOLATE KAPPA/LAMBDA FREE LIGHT CHAINS IGG IGA IGM AFP TUMOR MARKER 09/22/22, ORDERS GIVEN TO PT  MD VISIT 09/29/22 @2PM  AVS PRINTED AND GIVEN TO PATIENT WITH INSTRUCTIONS  PATIENT DISCHARGED AMBULATORY

## 2022-04-19 RX ORDER — HYDROCHLOROTHIAZIDE 25 MG/1
TABLET ORAL
Qty: 90 TABLET | Refills: 0 | Status: SHIPPED | OUTPATIENT
Start: 2022-04-19 | End: 2022-09-21 | Stop reason: SDUPTHER

## 2022-04-19 NOTE — TELEPHONE ENCOUNTER
J Luis Acosta is calling to request a refill on the following medication(s):    Medication Request:  Requested Prescriptions     Pending Prescriptions Disp Refills    hydroCHLOROthiazide (HYDRODIURIL) 25 MG tablet [Pharmacy Med Name: HYDROCHLOROTHIAZIDE 25MG TABS] 90 tablet 1     Sig: TAKE 1 TABLET BY MOUTH ONE TIME A DAY       Last Visit Date (If Applicable):  96/04/5149    Next Visit Date:    Visit date not found

## 2022-05-03 ENCOUNTER — OFFICE VISIT (OUTPATIENT)
Dept: FAMILY MEDICINE CLINIC | Age: 72
End: 2022-05-03
Payer: COMMERCIAL

## 2022-05-03 VITALS
HEIGHT: 72 IN | TEMPERATURE: 96.8 F | RESPIRATION RATE: 15 BRPM | SYSTOLIC BLOOD PRESSURE: 130 MMHG | BODY MASS INDEX: 21.94 KG/M2 | OXYGEN SATURATION: 98 % | HEART RATE: 67 BPM | DIASTOLIC BLOOD PRESSURE: 86 MMHG | WEIGHT: 162 LBS

## 2022-05-03 DIAGNOSIS — I10 ESSENTIAL HYPERTENSION: ICD-10-CM

## 2022-05-03 DIAGNOSIS — N52.03 COMBINED ARTERIAL INSUFFICIENCY AND CORPORO-VENOUS OCCLUSIVE ERECTILE DYSFUNCTION: Primary | ICD-10-CM

## 2022-05-03 DIAGNOSIS — R22.40 LOCALIZED SWELLING OF LOWER LEG: ICD-10-CM

## 2022-05-03 PROCEDURE — 99214 OFFICE O/P EST MOD 30 MIN: CPT | Performed by: STUDENT IN AN ORGANIZED HEALTH CARE EDUCATION/TRAINING PROGRAM

## 2022-05-03 RX ORDER — SILDENAFIL 100 MG/1
100 TABLET, FILM COATED ORAL DAILY PRN
Qty: 30 TABLET | Refills: 2 | Status: SHIPPED | OUTPATIENT
Start: 2022-05-03 | End: 2022-09-29

## 2022-05-03 ASSESSMENT — PATIENT HEALTH QUESTIONNAIRE - PHQ9
SUM OF ALL RESPONSES TO PHQ QUESTIONS 1-9: 0
SUM OF ALL RESPONSES TO PHQ QUESTIONS 1-9: 0
1. LITTLE INTEREST OR PLEASURE IN DOING THINGS: 0
2. FEELING DOWN, DEPRESSED OR HOPELESS: 0
SUM OF ALL RESPONSES TO PHQ QUESTIONS 1-9: 0
SUM OF ALL RESPONSES TO PHQ QUESTIONS 1-9: 0
SUM OF ALL RESPONSES TO PHQ9 QUESTIONS 1 & 2: 0

## 2022-05-03 NOTE — PROGRESS NOTES
Subjective: Elmo Ramirez presents for   Chief Complaint   Patient presents with   [facility] Established New Doctor    Hypertension       HPI   Here to establish care. Has history of hypertension, microcytic hypochromic anemia and pancytopenia. Follows with hematology and oncology. States he has some erectile dysfunction and needs medication for this. Patient Active Problem List   Diagnosis    Leukopenia    Microcytic hypochromic anemia    Pancytopenia (HCC)    MGUS (monoclonal gammopathy of unknown significance)    PSA elevation    Neutropenia (HCC)    Osteoarthritis    Hyponatremia    S/P hip replacement    Prostate cancer (HCC)    Malabsorption    Hematemesis without nausea    Upper GI bleed    Essential hypertension    History of hematemesis    Pancytopenia (HCC)    Anemia    Iron deficiency anemia    Positive FIT (fecal immunochemical test)    Idiopathic esophageal varices without bleeding ([facility] [address]         Review of Systems   All other systems reviewed and are negative. Objective:  Physical Exam   Vitals:   Vitals:    05/03/22 1306   BP: 130/86   Site: Left Upper Arm   Position: Sitting   Cuff Size: Medium Adult   Pulse: 67   Resp: 15   Temp: 96.8 °F (36 °C)   TempSrc: Temporal   SpO2: 98%   Weight: 162 lb (73.5 kg)   Height: 6' (1.829 m)     Wt Readings from Last 3 Encounters:   05/03/22 162 lb (73.5 kg)   03/31/22 158 lb 9.6 oz (71.9 kg)   10/20/21 152 lb (68.9 kg)     Ht Readings from Last 3 Encounters:   05/03/22 6' (1.829 m)   10/06/21 6' (1.829 m)   12/13/19 6' (1.829 m)     Body mass index is 21.97 kg/m². Physical Exam  Vitals reviewed. Constitutional:       General: He is not in acute distress. Appearance: Normal appearance. HENT:      Mouth/Throat:      Mouth: Mucous membranes are moist.   Eyes:      Conjunctiva/sclera: Conjunctivae normal.   Musculoskeletal:         General: Swelling present. No tenderness. Right lower leg: Edema present. Left lower leg: Edema present. Skin:     General: Skin is warm and dry. Neurological:      Mental Status: He is alert and oriented to person, place, and time. Psychiatric:         Mood and Affect: Mood normal.            Assessment/Plan:    Diagnosis Orders   1. Combined arterial insufficiency and corporo-venous occlusive erectile dysfunction  sildenafil (VIAGRA) 100 MG tablet   2. Essential hypertension     3. Localized swelling of lower leg          Return in about 6 months (around 11/3/2022). Hypertension-currently controlled with hydrochlorothiazide 25 mg. Swelling of lower legs most likely due to venous insufficiency. Advised to use over-the-counter compression stockings. We will send in sildenafil for his erectile dysfunction.     Time spent in previsit planning, interview, exam, counseling/education and coordination of care: >30 mins

## 2022-05-25 ENCOUNTER — OFFICE VISIT (OUTPATIENT)
Dept: GASTROENTEROLOGY | Age: 72
End: 2022-05-25
Payer: COMMERCIAL

## 2022-05-25 VITALS
SYSTOLIC BLOOD PRESSURE: 158 MMHG | DIASTOLIC BLOOD PRESSURE: 81 MMHG | WEIGHT: 164 LBS | TEMPERATURE: 97 F | BODY MASS INDEX: 22.24 KG/M2

## 2022-05-25 DIAGNOSIS — I85.00 IDIOPATHIC ESOPHAGEAL VARICES WITHOUT BLEEDING (HCC): Primary | ICD-10-CM

## 2022-05-25 DIAGNOSIS — K21.9 GASTROESOPHAGEAL REFLUX DISEASE, UNSPECIFIED WHETHER ESOPHAGITIS PRESENT: ICD-10-CM

## 2022-05-25 DIAGNOSIS — D61.818 PANCYTOPENIA (HCC): ICD-10-CM

## 2022-05-25 DIAGNOSIS — D50.8 OTHER IRON DEFICIENCY ANEMIA: ICD-10-CM

## 2022-05-25 DIAGNOSIS — K76.6 PORTAL HYPERTENSION (HCC): ICD-10-CM

## 2022-05-25 PROCEDURE — 1123F ACP DISCUSS/DSCN MKR DOCD: CPT | Performed by: INTERNAL MEDICINE

## 2022-05-25 PROCEDURE — 99214 OFFICE O/P EST MOD 30 MIN: CPT | Performed by: INTERNAL MEDICINE

## 2022-05-25 ASSESSMENT — ENCOUNTER SYMPTOMS
ABDOMINAL PAIN: 0
COUGH: 0
TROUBLE SWALLOWING: 0
SHORTNESS OF BREATH: 0
RECTAL PAIN: 0
CHOKING: 0
VOMITING: 0
COLOR CHANGE: 0
NAUSEA: 0
WHEEZING: 0
SORE THROAT: 0
DIARRHEA: 0
BLOOD IN STOOL: 0
ABDOMINAL DISTENTION: 0
ANAL BLEEDING: 0
CONSTIPATION: 0

## 2022-05-25 NOTE — PROGRESS NOTES
GI CLINIC FOLLOW UP    NTERVAL HISTORY:   No referring provider defined for this encounter. Chief Complaint   Patient presents with    Results     Pt is here today for a f/u on labs, pt last seen on 10/06/21 for Hx of hematomesis & MGUS. 1. Idiopathic esophageal varices without bleeding (HCC)    2. Other iron deficiency anemia    3. Pancytopenia ([address]    4. Gastroesophageal reflux disease, unspecified whether esophagitis present    5. Portal hypertension (HCC)      Patient is seen in my office as a follow-up  Has history for above issues  Previously has been diagnosed with cirrhosis of the liver underwent an upper endoscopy done a while ago was found to have grade 1 through 2 esophageal varices signs of portal hypertension    At 1 time his liver imaging has revealed a questionable lesion however an ultrasound was performed and a liver biopsy was ordered at that time no lesion was noted? He has been seen and followed by hematology oncology for his monoclonal gammopathy     Patient has some anemia    Denies any overt GI bleeding melanotic stools    He says that he feels better and does not want to have any testings done in terms of invasive EGD or colonoscopy at this time    Previously had a colonoscopy done with removal of the small polyp    Denies any current smoking alcohol abuse illicit drug usage    Previous records reviewed with him    HISTORY OF PRESENT ILLNESS: Mr.Andy Florian Dahl is a 67 y.o. male with a past history remarkable for , referred for evaluation of   Chief Complaint   Patient presents with    Results     Pt is here today for a f/u on labs, pt last seen on 10/06/21 for Hx of hematomesis & MGUS. [place] Past Medical,Family, and Social History reviewed and does contribute to the patient presenting condition. Patient's PMH/PSH,SH,PSYCH Hx, MEDs, ALLERGIES, and ROS were all reviewed and updated in the appropriate sections.     PAST MEDICAL HISTORY:  Past Medical History: Diagnosis Date    Anemia     Arthritis     Avascular necrosis [facility])     sees Dr Christopher Frausto BPH (benign prostatic hyperplasia)     Gastroesophageal reflux disease 5/25/2022    History of blood transfusion 12/2014    after total hip replacement    Hypertension     Iron deficiency anemia     Leukopenia     MGUS (monoclonal gammopathy of unknown significance)     Osteoarthritis     Patient in clinical research study 01/19/2017    Positive FIT (fecal immunochemical test)     Stomach ulcer     x 2        Past Surgical History:   Procedure Laterality Date    COLONOSCOPY  07/14/2014    COLONOSCOPY  09/25/2019    COLONOSCOPY N/A 9/25/2019    COLONOSCOPY POLYPECTOMY SNARE/HOT BIOPSY performed by Kasandra Moreland MD at [address], COLON, DIAGNOSTIC  07/13/2014    stomach ulcers    HIP ARTHROPLASTY Right 12/03/14    HIP ARTHROPLASTY Left 03/10/15    INGUINAL HERNIA REPAIR Right 2009    INGUINAL HERNIA REPAIR Left 11/29/2018    incarcerated. By Dr. Alberto Avitia OFFICE/OUTPT VISIT,PROCEDURE ONLY Left 11/29/2018    INCARCERATED INGUINAL HERNIA performed by David Perez MD at [facility]  09/12/2014    PROSTATECTOMY  12/16/2015    robotic.  lap    UPPER GASTROINTESTINAL ENDOSCOPY  10/19/2016    no lesions seen    UPPER GASTROINTESTINAL ENDOSCOPY  09/25/2019    UPPER GASTROINTESTINAL ENDOSCOPY N/A 9/25/2019    EGD BIOPSY performed by Kasandra Moreland MD at [place] [id number]:    Current Outpatient Medications:     sildenafil (VIAGRA) 100 MG tablet, Take 1 tablet by mouth daily as needed for Erectile Dysfunction, Disp: 30 tablet, Rfl: 2    hydroCHLOROthiazide (HYDRODIURIL) 25 MG tablet, TAKE 1 TABLET BY MOUTH ONE TIME A DAY, Disp: 90 tablet, Rfl: 0    propranolol (INDERAL LA) 60 MG extended release capsule, TAKE 1 CAPSULE BY MOUTH 2 TIMES A DAY, Disp: 180 capsule, Rfl: 2    cyclobenzaprine (FLEXERIL) 10 MG tablet, TAKE 1 TABLET BY MOUTH 3 TIMES A DAY AS NEEDED FOR MUSCLE SPASMS, Disp: 90 tablet, Rfl: 0    pantoprazole (PROTONIX) 40 MG tablet, TAKE 1 TABLET BY MOUTH ONE TIME A DAY, Disp: 90 tablet, Rfl: 1    potassium chloride (KLOR-CON M) 20 MEQ extended release tablet, TAKE 1 TABLET BY MOUTH ONE TIME A DAY, Disp: 90 tablet, Rfl: 3    Flaxseed Oil OIL, by Does not apply route every other day, Disp: , Rfl:     Cholecalciferol (VITAMIN D) 2000 units CAPS capsule, Take by mouth Indications: three times weekly, Disp: , Rfl:     Magnesium 400 MG CAPS, Take 400 mg by mouth every other day, Disp: , Rfl:     calcium carbonate (OSCAL) 500 MG TABS tablet, Take 500 mg by mouth daily, Disp: , Rfl:     Multiple Vitamins-Minerals (MULTI COMPLETE PO), Take 1 tablet by mouth daily. , Disp: , Rfl:     ALLERGIES:   No Known Allergies    FAMILY HISTORY:       Problem Relation Age of Onset    High Blood Pressure Mother     High Blood Pressure Father          SOCIAL HISTORY:   Social History     Socioeconomic History    Marital status:      Spouse name: Not on file    Number of children: 3    Years of education: 13    Highest education level: Not on file   Occupational History    Occupation: retired     Employer: [employer]   Tobacco Use    Smoking status: Never Smoker    Smokeless tobacco: Never Used   Vaping Use    Vaping Use: Never used   Substance and Sexual Activity    Alcohol use: No    Drug use: No    Sexual activity: Never   Other Topics Concern    Not on file   Social History Narrative    Diet - relatively healthy    Caffeine intake per day - no    Exercise pattern - wlks    House with wife           Social Determinants of Health     Financial Resource Strain: Low Risk     Difficulty of Paying Living Expenses: Not hard at all   Food Insecurity: No Food Insecurity    Worried About [address] in the Last Year: Never true    [facility] in the Last Year: Never true   Transportation Needs:     Lack of Transportation (Medical): Not on file    Lack of Transportation (Non-Medical): Not on file   Physical Activity:     Days of Exercise per Week: Not on file    Minutes of Exercise per Session: Not on file   Stress:     Feeling of Stress : Not on file   Social Connections:     Frequency of Communication with Friends and Family: Not on file    Frequency of Social Gatherings with Friends and Family: Not on file    Attends [employer] Services: Not on file    Active Member of [address] [place] [address] or Organizations: Not on file    Attends Club or Organization Meetings: Not on file    Marital Status: Not on file   Intimate Partner Violence:     Fear of Current or Ex-Partner: Not on file    Emotionally Abused: Not on file    Physically Abused: Not on file    Sexually Abused: Not on file   Housing Stability:     Unable to Pay for Housing in the Last Year: Not on file    Number of [place] in the Last Year: Not on file    Unstable Housing in the Last Year: Not on file         REVIEW OF SYSTEMS:         Review of Systems   Constitutional: Negative for appetite change, fatigue and unexpected weight change. HENT: Negative for sore throat and trouble swallowing. Eyes: Negative for visual disturbance. Respiratory: Negative for cough, choking, shortness of breath and wheezing. Cardiovascular: Negative for chest pain, palpitations and leg swelling. Gastrointestinal: Negative for abdominal distention, abdominal pain, anal bleeding, blood in stool, constipation, diarrhea, nausea, rectal pain and vomiting. Skin: Negative for color change. Allergic/Immunologic: Negative for environmental allergies and food allergies. Neurological: Negative for dizziness, weakness, light-headedness, numbness and headaches. Hematological: Does not bruise/bleed easily. Psychiatric/Behavioral: Negative for confusion and sleep disturbance. The patient is not nervous/anxious. PHYSICAL EXAMINATION: Vital signs reviewed per the nursing documentation. BP (!) 158/81   Temp 97 °F (36.1 °C)   Wt 164 lb (74.4 kg)   BMI 22.24 kg/m²   Body mass index is 22.24 kg/m². Physical Exam  Nursing note reviewed. Constitutional:       Appearance: He is well-developed. Comments: Anxious    HENT:      Head: Normocephalic and atraumatic. Eyes:      Conjunctiva/sclera: Conjunctivae normal.      Pupils: Pupils are equal, round, and reactive to light. Cardiovascular:      Rate and Rhythm: Normal rate and regular rhythm. Pulmonary:      Effort: Pulmonary effort is normal.      Breath sounds: Normal breath sounds. Abdominal:      General: Bowel sounds are normal.      Palpations: Abdomen is soft. Comments: Has abdominal binder  Has hernia        Genitourinary:     Rectum: Normal.   Musculoskeletal:         General: Normal range of motion. Cervical back: Normal range of motion and neck supple. Skin:     General: Skin is warm. Neurological:      Mental Status: He is alert and oriented to person, place, and time. Deep Tendon Reflexes: Reflexes are normal and symmetric.            LABORATORY DATA: Reviewed  Lab Results   Component Value Date    WBC 1.5 (L) 03/24/2022    HGB 11.6 (L) 03/24/2022    HCT 39.2 (L) 03/24/2022    MCV 80.3 (L) 03/24/2022    PLT See Reflexed IPF Result 03/24/2022     (L) 03/24/2022    K 3.8 03/24/2022    CL 99 03/24/2022    CO2 22 03/24/2022    BUN 13 03/24/2022    CREATININE 0.81 03/24/2022    LABALBU 2.8 (L) 03/24/2022    BILITOT 0.86 03/24/2022    ALKPHOS 278 (H) 03/24/2022    AST 52 (H) 03/24/2022    ALT 41 03/24/2022    INR 1.1 12/13/2019         Lab Results   Component Value Date    RBC 4.88 03/24/2022    HGB 11.6 (L) 03/24/2022    MCV 80.3 (L) 03/24/2022    MCH 23.8 (L) 03/24/2022    MCHC 29.6 03/24/2022    RDW 17.7 (H) 03/24/2022    MPV NOT REPORTED 09/14/2021    BASOPCT 0 03/24/2022    LYMPHSABS 0.36 (L) 03/24/2022    MONOSABS 0.18 03/24/2022    NEUTROABS 0.90 (L) 03/24/2022    EOSABS 0.06 03/24/2022 Not applicable FERNANDO 0.00 03/24/2022         DIAGNOSTIC TESTING:     No results found. Assessment  1. Idiopathic esophageal varices without bleeding (HCC)    2. Other iron deficiency anemia    3. Pancytopenia ([facility]    4. Gastroesophageal reflux disease, unspecified whether esophagitis present    5. Portal hypertension ([facility]        Plan    Cont to F/U with hematology oncology for his monoclonal gammopathy    Will f/u LFTs and HGB    Will get Liver US      He is advised to have an EGD done to re evaluate for his varices and portal hypertension he wants to wait    Low-sodium diet  Avoid red meat    Continue rest of the medications  I will order liver ultrasound liver enzymes electrolyte panel CBC and kidney functions and see him back in few months again    He was asked to call me if there is any problem issues        Thank you for allowing me to participate in the care of Mr. Ariella Robb. For any further questions please do not hesitate to contact me. I have reviewed and agree with the ROS entered by the MA/Nurse.          De Stovall MD, [facility]  Board Certified in Gastroenterology and [address] [place] [address] Gastroenterology  Office #: [phone]

## 2022-06-07 RX ORDER — PANTOPRAZOLE SODIUM 40 MG/1
TABLET, DELAYED RELEASE ORAL
Qty: 90 TABLET | Refills: 1 | Status: SHIPPED | OUTPATIENT
Start: 2022-06-07

## 2022-06-07 RX ORDER — POTASSIUM CHLORIDE 20 MEQ/1
TABLET, EXTENDED RELEASE ORAL
Qty: 90 TABLET | Refills: 3 | Status: SHIPPED | OUTPATIENT
Start: 2022-06-07

## 2022-08-12 LAB
CHOLESTEROL/HDL RATIO: 2.5
CHOLESTEROL: 119 MG/DL
GLUCOSE BLD-MCNC: 141 MG/DL (ref 70–99)
HDLC SERPL-MCNC: 47 MG/DL
LDL CHOLESTEROL: 64 MG/DL (ref 0–130)
PATIENT FASTING?: YES
TRIGL SERPL-MCNC: 39 MG/DL

## 2022-09-09 RX ORDER — CYCLOBENZAPRINE HCL 10 MG
TABLET ORAL
Qty: 90 TABLET | Refills: 0 | Status: SHIPPED | OUTPATIENT
Start: 2022-09-09

## 2022-09-09 NOTE — TELEPHONE ENCOUNTER
Last visit: 5/3/22  Last Med refill: 3/2022  Does patient have enough medication for 72 hours:   Next Visit Date:  Future Appointments   Date Time Provider Krystal Alicea   9/29/2022  2:00 PM Seymour Flaherty MD [facility]   11/21/2022  1:00 PM DO GENTRY Ralph [facility]   12/7/2022  1:30 PM Taniya Pérez MD [address] Maintenance   Topic Date Due    Hepatitis A vaccine (1 of 2 - Risk 2-dose series) Never done    Hepatitis B vaccine (1 of 3 - Risk 3-dose series) Never done    Shingles vaccine (1 of 2) Never done    Prostate Specific Antigen (PSA) Screening or Monitoring  12/01/2019    A1C test (Diabetic or Prediabetic)  10/27/2021    COVID-19 Vaccine (4 - Booster for Pfizer series) 02/06/2022    Flu vaccine (1) 09/01/2022    Depression Screen  05/03/2023    Colorectal Cancer Screen  09/25/2024    DTaP/Tdap/Td vaccine (2 - Td or Tdap) 01/31/2027    Lipids  08/12/2027    Pneumococcal 65+ years Vaccine  Completed    Hepatitis C screen  Completed    Hib vaccine  Aged Out    Meningococcal (ACWY) vaccine  Aged Out       Hemoglobin A1C (%)   Date Value   07/27/2021 7.7 (H)   04/11/2019 5.5   01/30/2015 5.0             ( goal A1C is < 7)   No results found for: LABMICR  LDL Cholesterol (mg/dL)   Date Value   08/12/2022 64   07/27/2021 95     LDL Calculated (mg/dL)   Date Value   03/26/2014 167 (A)       (goal LDL is <100)   AST (U/L)   Date Value   03/24/2022 52 (H)     ALT (U/L)   Date Value   03/24/2022 41     BUN (mg/dL)   Date Value   03/24/2022 13     BP Readings from Last 3 Encounters:   05/25/22 (!) 158/81   05/03/22 130/86   03/31/22 (!) 147/85          (goal 120/80)    All Future Testing planned in [facility]  Lab Frequency Next Occurrence   Hepatic Function Panel Once 01/06/2022   AFP Tumor Marker Once 01/06/2022   BUN & Creatinine Once 08/25/2022   Electrolyte Panel Once 08/25/2022   Hepatic Function Panel Once 08/25/2022   US LIVER Once 08/26/2022   CBC with Auto Differential Once 08/25/2022   CBC Auto Differential q 6 months    Ferritin q 6 months    Iron and TIBC q 6 months    Vitamin B12 & Folate q 6 months    Immunoglobulin Panel (IgG, IgA, IgM) q 6 months    [place]/Lambda Free Lt Chains, Serum Quant q 6 months    Comprehensive Metabolic Panel q 6 months    AFP Tumor Marker q 6 months                Patient Active Problem List:     Leukopenia     Microcytic hypochromic anemia     Pancytopenia (HCC)     MGUS (monoclonal gammopathy of unknown significance)     PSA elevation     Neutropenia (HCC)     Osteoarthritis     Hyponatremia     S/P hip replacement     Prostate cancer (HCC)     Malabsorption     Hematemesis without nausea     Upper GI bleed     Essential hypertension     History of hematemesis     Pancytopenia (HCC)     Anemia     Iron deficiency anemia     Positive FIT (fecal immunochemical test)     Idiopathic esophageal varices without bleeding ([address]     Gastroesophageal reflux disease     Portal hypertension ([address]

## 2022-09-21 RX ORDER — HYDROCHLOROTHIAZIDE 25 MG/1
25 TABLET ORAL DAILY
Qty: 90 TABLET | Refills: 0 | Status: SHIPPED | OUTPATIENT
Start: 2022-09-21

## 2022-09-21 NOTE — TELEPHONE ENCOUNTER
Last visit: 5/3/22 (Dr. Vinh Sadler)  Last Med refill: 4/19/22  Does patient have enough medication for 72 hours:    Next Visit Date:11/21/22 (Dr. Staci Robledo)  Future Appointments   Date Time Provider Krystal Alicea   9/29/2022  2:00 PM Franklin Ding MD [facility]   11/21/2022  1:00 PM Christianna Babinski, DO [facility]   12/7/2022  1:30 PM Gage Cummings MD [address] Maintenance   Topic Date Due    Hepatitis A vaccine (1 of 2 - Risk 2-dose series) Never done    Hepatitis B vaccine (1 of 3 - Risk 3-dose series) Never done    Shingles vaccine (1 of 2) Never done    Prostate Specific Antigen (PSA) Screening or Monitoring  12/01/2019    A1C test (Diabetic or Prediabetic)  10/27/2021    COVID-19 Vaccine (4 - Booster for Pfizer series) 02/06/2022    Flu vaccine (1) 09/01/2022    Depression Screen  05/03/2023    Colorectal Cancer Screen  09/25/2024    DTaP/Tdap/Td vaccine (2 - Td or Tdap) 01/31/2027    Lipids  08/12/2027    Pneumococcal 65+ years Vaccine  Completed    Hepatitis C screen  Completed    Hib vaccine  Aged Out    Meningococcal (ACWY) vaccine  Aged Out       Hemoglobin A1C (%)   Date Value   07/27/2021 7.7 (H)   04/11/2019 5.5   01/30/2015 5.0             ( goal A1C is < 7)   No results found for: LABMICR  LDL Cholesterol (mg/dL)   Date Value   08/12/2022 64   07/27/2021 95     LDL Calculated (mg/dL)   Date Value   03/26/2014 167 (A)       (goal LDL is <100)   AST (U/L)   Date Value   03/24/2022 52 (H)     ALT (U/L)   Date Value   03/24/2022 41     BUN (mg/dL)   Date Value   03/24/2022 13     BP Readings from Last 3 Encounters:   05/25/22 (!) 158/81   05/03/22 130/86   03/31/22 (!) 147/85          (goal 120/80)    All Future Testing planned in [facility]  Lab Frequency Next Occurrence   Hepatic Function Panel Once 01/06/2022   AFP Tumor Marker Once 01/06/2022   BUN & Creatinine Once 08/25/2022   Electrolyte Panel Once 08/25/2022   Hepatic Function Panel Once 08/25/2022   US LIVER Once 08/26/2022   CBC with Auto Differential Once 08/25/2022   CBC Auto Differential q 6 months    Ferritin q 6 months    Iron and TIBC q 6 months    Vitamin B12 & Folate q 6 months    Immunoglobulin Panel (IgG, IgA, IgM) q 6 months    [place]/Lambda Free Lt Chains, Serum Quant q 6 months    Comprehensive Metabolic Panel q 6 months    AFP Tumor Marker q 6 months                Patient Active Problem List:     Leukopenia     Microcytic hypochromic anemia     Pancytopenia (HCC)     MGUS (monoclonal gammopathy of unknown significance)     PSA elevation     Neutropenia (HCC)     Osteoarthritis     Hyponatremia     S/P hip replacement     Prostate cancer (HCC)     Malabsorption     Hematemesis without nausea     Upper GI bleed     Essential hypertension     History of hematemesis     Pancytopenia (HCC)     Anemia     Iron deficiency anemia     Positive FIT (fecal immunochemical test)     Idiopathic esophageal varices without bleeding ([address]     Gastroesophageal reflux disease     Portal hypertension ([address]

## 2022-09-28 ENCOUNTER — HOSPITAL ENCOUNTER (OUTPATIENT)
Age: 72
Discharge: HOME | End: 2022-09-28
Payer: COMMERCIAL

## 2022-09-28 DIAGNOSIS — D47.2 MGUS (MONOCLONAL GAMMOPATHY OF UNKNOWN SIGNIFICANCE): ICD-10-CM

## 2022-09-28 DIAGNOSIS — D47.2 MGUS (MONOCLONAL GAMMOPATHY OF UNKNOWN SIGNIFICANCE): Primary | ICD-10-CM

## 2022-09-28 LAB
ABSOLUTE EOS #: 0.04 K/UL (ref 0–0.4)
ABSOLUTE IMMATURE GRANULOCYTE: 0.02 K/UL (ref 0–0.3)
ABSOLUTE LYMPH #: 0.2 K/UL (ref 1–4.8)
ABSOLUTE MONO #: 0.35 K/UL (ref 0.1–0.8)
AFP: 7.3 UG/L
ALBUMIN SERPL-MCNC: 2.2 G/DL (ref 3.5–5.2)
ALBUMIN/GLOBULIN RATIO: 0.4 (ref 1–2.5)
ALP BLD-CCNC: 272 U/L (ref 40–129)
ALT SERPL-CCNC: 42 U/L (ref 5–41)
ANION GAP SERPL CALCULATED.3IONS-SCNC: 9 MMOL/L (ref 9–17)
AST SERPL-CCNC: 55 U/L
BASOPHILS # BLD: 0 % (ref 0–2)
BASOPHILS ABSOLUTE: 0 K/UL (ref 0–0.2)
BILIRUB SERPL-MCNC: 0.8 MG/DL (ref 0.3–1.2)
BUN BLDV-MCNC: 15 MG/DL (ref 8–23)
CALCIUM SERPL-MCNC: 8.1 MG/DL (ref 8.6–10.4)
CHLORIDE BLD-SCNC: 106 MMOL/L (ref 98–107)
CO2: 22 MMOL/L (ref 20–31)
CREAT SERPL-MCNC: 0.72 MG/DL (ref 0.7–1.2)
EOSINOPHILS RELATIVE PERCENT: 4 % (ref 1–4)
FERRITIN: 36 NG/ML (ref 30–400)
FOLATE: 18.6 NG/ML
FREE KAPPA/LAMBDA RATIO: 1.77 (ref 0.26–1.65)
GFR AFRICAN AMERICAN: >60 ML/MIN
GFR NON-AFRICAN AMERICAN: >60 ML/MIN
GFR SERPL CREATININE-BSD FRML MDRD: ABNORMAL ML/MIN/{1.73_M2}
GLUCOSE BLD-MCNC: 294 MG/DL (ref 70–99)
HCT VFR BLD CALC: 36 % (ref 40.7–50.3)
HEMOGLOBIN: 10.9 G/DL (ref 13–17)
IGA: 1064 MG/DL (ref 70–400)
IGG: 2955 MG/DL (ref 700–1600)
IGM: 38 MG/DL (ref 40–230)
IMMATURE GRANULOCYTES: 2 %
IRON SATURATION: 16 % (ref 20–55)
IRON: 41 UG/DL (ref 59–158)
KAPPA FREE LIGHT CHAINS QNT: 10.94 MG/DL (ref 0.37–1.94)
LAMBDA FREE LIGHT CHAINS QNT: 6.18 MG/DL (ref 0.57–2.63)
LYMPHOCYTES # BLD: 20 % (ref 24–44)
MCH RBC QN AUTO: 24.5 PG (ref 25.2–33.5)
MCHC RBC AUTO-ENTMCNC: 30.3 G/DL (ref 28.4–34.8)
MCV RBC AUTO: 80.9 FL (ref 82.6–102.9)
MONOCYTES # BLD: 35 % (ref 1–7)
MORPHOLOGY: ABNORMAL
MORPHOLOGY: ABNORMAL
NRBC AUTOMATED: 0 PER 100 WBC
PDW BLD-RTO: 19.2 % (ref 11.8–14.4)
PLATELET # BLD: ABNORMAL K/UL (ref 138–453)
PLATELET, FLUORESCENCE: 52 K/UL (ref 138–453)
PLATELET, IMMATURE FRACTION: 6.7 % (ref 1.1–10.3)
POTASSIUM SERPL-SCNC: 4.2 MMOL/L (ref 3.7–5.3)
RBC # BLD: 4.45 M/UL (ref 4.21–5.77)
SEG NEUTROPHILS: 39 % (ref 36–66)
SEGMENTED NEUTROPHILS ABSOLUTE COUNT: 0.39 K/UL (ref 1.8–7.7)
SODIUM BLD-SCNC: 137 MMOL/L (ref 135–144)
TOTAL IRON BINDING CAPACITY: 258 UG/DL (ref 250–450)
TOTAL PROTEIN: 7.4 G/DL (ref 6.4–8.3)
UNSATURATED IRON BINDING CAPACITY: 217 UG/DL (ref 112–347)
VITAMIN B-12: >2000 PG/ML (ref 232–1245)
WBC # BLD: 1 K/UL (ref 3.5–11.3)

## 2022-09-28 PROCEDURE — 36415 COLL VENOUS BLD VENIPUNCTURE: CPT

## 2022-09-28 PROCEDURE — 85055 RETICULATED PLATELET ASSAY: CPT

## 2022-09-28 PROCEDURE — 82784 ASSAY IGA/IGD/IGG/IGM EACH: CPT

## 2022-09-28 PROCEDURE — 83540 ASSAY OF IRON: CPT

## 2022-09-28 PROCEDURE — 82105 ALPHA-FETOPROTEIN SERUM: CPT

## 2022-09-28 PROCEDURE — 83550 IRON BINDING TEST: CPT

## 2022-09-28 PROCEDURE — 80053 COMPREHEN METABOLIC PANEL: CPT

## 2022-09-28 PROCEDURE — 82728 ASSAY OF FERRITIN: CPT

## 2022-09-28 PROCEDURE — 82607 VITAMIN B-12: CPT

## 2022-09-28 PROCEDURE — 82746 ASSAY OF FOLIC ACID SERUM: CPT

## 2022-09-28 PROCEDURE — 85025 COMPLETE CBC W/AUTO DIFF WBC: CPT

## 2022-09-28 PROCEDURE — 83521 IG LIGHT CHAINS FREE EACH: CPT

## 2022-09-29 ENCOUNTER — TELEPHONE (OUTPATIENT)
Dept: ONCOLOGY | Age: 72
End: 2022-09-29

## 2022-09-29 ENCOUNTER — OFFICE VISIT (OUTPATIENT)
Dept: ONCOLOGY | Age: 72
End: 2022-09-29
Payer: COMMERCIAL

## 2022-09-29 VITALS
WEIGHT: 161.1 LBS | RESPIRATION RATE: 16 BRPM | DIASTOLIC BLOOD PRESSURE: 94 MMHG | TEMPERATURE: 97.7 F | HEART RATE: 69 BPM | BODY MASS INDEX: 21.85 KG/M2 | SYSTOLIC BLOOD PRESSURE: 139 MMHG

## 2022-09-29 DIAGNOSIS — D47.2 MGUS (MONOCLONAL GAMMOPATHY OF UNKNOWN SIGNIFICANCE): Primary | ICD-10-CM

## 2022-09-29 DIAGNOSIS — D61.818 PANCYTOPENIA (HCC): ICD-10-CM

## 2022-09-29 DIAGNOSIS — D50.9 MICROCYTIC HYPOCHROMIC ANEMIA: ICD-10-CM

## 2022-09-29 PROCEDURE — 99211 OFF/OP EST MAY X REQ PHY/QHP: CPT | Performed by: INTERNAL MEDICINE

## 2022-09-29 PROCEDURE — 99214 OFFICE O/P EST MOD 30 MIN: CPT | Performed by: INTERNAL MEDICINE

## 2022-09-29 PROCEDURE — 1123F ACP DISCUSS/DSCN MKR DOCD: CPT | Performed by: INTERNAL MEDICINE

## 2022-09-29 NOTE — TELEPHONE ENCOUNTER
Ladi Gunn MD VISIT  RV 6 months with labs before RV  LAB: CDP CMP  FE TIBC FERRITIN VITAMIN B12 & FOLATE KAPPA/LAMBDA FREE LIGHT CHAINS IGG IGA IGM AFP TUMOR MARKER  3/16/23 ORDERS GIVEN TO PT ON EXIT  MD VISIT 3/23/23 @ 1:45PM  AVS PRINTED W/ INSTRUCTIONS AND GIVEN TO PT ON EXIT

## 2022-09-30 NOTE — PROGRESS NOTES
_     Chief Complaint   Patient presents with    Follow-up    Discuss Labs       DIAGNOSIS:    Anemia  Leukopenia  Liver cirrhosis  MGUS     Back pain        CURRENT THERAPY:    S/p hospitalization for further management of above issues. S/p blood transfusion  S/P IV Iron infusion. BRIEF CASE HISTORY:      The patient is a 77 y.o. AA male who is admitted to the hospital for severe anemia and leukopenia. He had no previous labs. He was seen by PCP for routine health exam as a new patient. Labs showed severe anemia and leukopenia. He had chronic back pain for one year. No fever. No night sweats. No lymphadenopathy   He has recent weight loss and anorexia. No GI symptoms. GI work up was negative. He received blood transfusion and IV Iron infusion. He had Rt hip surgery on 16/7/19 with no complications. He had left hip surgery in March 2015. No complications. Prostate surgery December 9250 with no complications. INTERIM HISTORY:   Patient is seen for follow up above problems. Patient is clinically stable. No active bleeding. No melena or hematochezia. No hematemesis. Patient had stool fit test which was positive. He had evaluation by gastroenterology. He had EGD and colonoscopy. Small polyps were resected. He was noted to have varices. He had liver ultrasound. Ultrasound shows possible cirrhosis. He had multiple other test done by gastroenterology. MRI of the liver showed questionable liver lesions concerning for malignancy. MRI also showed questionable problem with hemochromatosis. Liver biopsy was negative. Patient's ferritin level has been normal for so many years. He had previous problem with iron deficiency. Repeated labs continue to have iron deficiency. Oral iron will be resumed. Patient has cytopenia. No repeated infections. Clinically patient is doing fine. No weakness or fatigue. No fever or chills. No chest pain. No nausea or vomiting. No other complaints. PAST MEDICAL HISTORY: has a past medical history of Anemia, Arthritis, Avascular necrosis ([address] BPH (benign prostatic hyperplasia), Gastroesophageal reflux disease, History of blood transfusion, Hypertension, Iron deficiency anemia, Leukopenia, MGUS (monoclonal gammopathy of unknown significance), Osteoarthritis, Patient in clinical research study, Positive FIT (fecal immunochemical test), and Stomach ulcer. PAST SURGICAL HISTORY: has a past surgical history that includes Endoscopy, colon, diagnostic (07/13/2014); Prostate Biopsy (09/12/2014); Hip Arthroplasty (Right, 12/03/14); Hip Arthroplasty (Left, 03/10/15); Prostatectomy (12/16/2015); Colonoscopy (07/14/2014); Upper gastrointestinal endoscopy (10/19/2016); pr office/outpt visit,procedure only (Left, 11/29/2018); Inguinal hernia repair (Right, 2009); Inguinal hernia repair (Left, 11/29/2018); Upper gastrointestinal endoscopy (09/25/2019); Colonoscopy (09/25/2019); Colonoscopy (N/A, 9/25/2019); and Upper gastrointestinal endoscopy (N/A, 9/25/2019). CURRENT MEDICATIONS:  has a current medication list which includes the following prescription(s): hydrochlorothiazide, cyclobenzaprine, potassium chloride, pantoprazole, sildenafil, propranolol, flaxseed oil, vitamin d, magnesium, calcium carbonate, and multiple vitamins-minerals. ALLERGIES:  has No Known Allergies. FAMILY HISTORY: Negative for any hematological or oncological conditions. SOCIAL HISTORY:  reports that he has never smoked. He has never used smokeless tobacco. He reports that he does not drink alcohol and does not use drugs. REVIEW OF SYSTEMS:     General: + weakness + fatigue. + unanticipated weight loss + decreased appetite. No fever or chills. Eyes: No blurred vision, eye pain or double vision. Ears: No hearing problems or drainage. No tinnitus.    Throat: No sore throat, problems with swallowing or dysphagia. Respiratory: No cough, sputum or hemoptysis. No shortness of breath. No pleuritic chest pain. Cardiovascular: No chest pain, orthopnea or PND. No lower extremity edema. No palpitation. Gastrointestinal: No problems with swallowing. No abdominal pain or bloating. No nausea or vomiting. No diarrhea or constipation. No GI bleeding. Genitourinary: No dysuria, hematuria, frequency or urgency. Musculoskeletal: No muscle aches or pains. No limitation of movement. + chronic back pain. No gait disturbance, No joint complaints. Dermatologic: No skin rashes or pruritus. No skin lesions or discolorations. Psychiatric: No depression, anxiety, or stress or signs of schizophrenia. No change in mood or affect. Hematologic: No history of bleeding tendency. No bruises or ecchymosis. No history of clotting problems. Infectious disease: No fever, chills or frequent infections. Endocrine: No problems with opacity. No polydipsia or polyuria. No temperature intolerance. Neurologic: No headaches or dizziness. No weakness or numbness of the extremities. No changes in balance, coordination, memory, mentation, behavior. Allergic/Immunologic: No nasal congestion or hives. No repeated infections. PHYSICAL EXAM:  The patient is not in acute distress. Vital signs: Blood pressure (!) 139/94, pulse 69, temperature 97.7 °F (36.5 °C), temperature source Temporal, resp. rate 16, weight 161 lb 1.6 oz (73.1 kg).      General appearance - well appearing, not in pain or distress   Mental status - good mood, alert and oriented   Eyes - pupils equal and reactive, extraocular eye movements intact   Ears - bilateral TM's and external ear canals normal   Nose - normal and patent, no erythema, discharge or polyps   Mouth - mucous membranes moist, pharynx normal without lesions   Neck - supple, no significant adenopathy   Lymphatics - no palpable lymphadenopathy, no hepatosplenomegaly Chest - clear to auscultation, no wheezes, rales or rhonchi, symmetric air entry   Heart - normal rate, regular rhythm, normal S1, S2, no murmurs, rubs, clicks or gallops   Abdomen - soft, nontender, nondistended, no masses or organomegaly   Neurological - alert, oriented, normal speech, no focal findings or movement disorder noted   Musculoskeletal -mild bilateral LE weakness   Extremities - peripheral pulses normal, no pedal edema, no clubbing or cyanosis   Skin - normal coloration and turgor, no rashes, no suspicious skin lesions noted       Lab Results   Component Value Date    WBC 1.0 (LL) 09/28/2022    HGB 10.9 (L) 09/28/2022    HCT 36.0 (L) 09/28/2022    MCV 80.9 (L) 09/28/2022    PLT See Reflexed IPF Result 09/28/2022     Lab Results   Component Value Date    IRON 41 (L) 09/28/2022    TIBC 258 09/28/2022    FERRITIN 36 09/28/2022                 Chemistry        Component Value Date/Time     09/28/2022 1346    K 4.2 09/28/2022 1346     09/28/2022 1346    CO2 22 09/28/2022 1346    BUN 15 09/28/2022 1346    CREATININE 0.72 09/28/2022 1346        Component Value Date/Time    CALCIUM 8.1 (L) 09/28/2022 1346    ALKPHOS 272 (H) 09/28/2022 1346    AST 55 (H) 09/28/2022 1346    ALT 42 (H) 09/28/2022 1346    BILITOT 0.8 09/28/2022 1346        Bone marrow: Final Diagnosis  PERIPHERAL BLOOD:  - SEVERE MICROCYTIC ANEMIA.  - MODERATE TO SEVERE NEUTROPENIA.  - LYMPHOPENIA (510/ul). BONE MARROW BIOPSY, ASPIRATE SMEAR AND CLOT SECTION:  - MYELOID AND MEGAKARYOCYTIC HYPERPLASIA. - ABSENT IRON STORES. - PLASMA CELLS 5-10%, NEGATIVE FOR PLASMA CELL MONOTYPIA FOR IHC. Diagnosis Comment  THE SEVERE MICROCYTIC ANEMIA IS COMPATIBLE WITH IRON DEFICIENCY  ANEMIA. NEUTROPENIA IS LIKELY DRUG INDUCED / IMMUNE-MEDIATED. THERE  IS NO EVIDENCE OF MYELOID OR OTHER MALIGNANCY.  PLASMA CELLS COMPRISE  5-10% OF THE CELLULARITY AND THERE IS NO EVIDENCE OF PLASMA CELL  MONOTYPIA BY IHC (SERUM IgG KAPPA MONOCLONAL PARAPROTEIN OF 0.6 G/dl  IS NOTED, FAVORING MGUS). CT scan: IMPRESSION: 1. Thickening of the sigmoid colon and rectum which maybe infectious or inflammatory etiology but may be neoplastic in etiology. Clinical correlation is recommended. Further evaluation with direct visualization is recommended. 2. Prostatomegaly. 3. Numerous low-attenuation lesions scattered in the liver are technically indeterminate. Further evaluation with nonemergent/outpatient contrast-enhanced MRI of the liver is recommended. 4. Severe degenerative changes of the bilateral hips. 5. Lucency of the proximal for more which may be related to demineralization; however, neoplastic etiologies cannot be excluded. This can be further evaluated with bone scan or MRI as clinically indicated. Note is made that this patient may be at increased risk for pathologic fracture. Bone scan: IMPRESSION: 1. No definitive scintigraphic evidence for metastatic disease to the skeleton. 2. Symmetric increased radiotracer uptake at the bilateral hips, likely secondary to severe degenerative changes of the bilateral hips seen on the CT abdomen pelvis of 7/12/2014. Underlying avascular necrosis is not excluded. Further evaluation with MR of the bilateral hips should be considered. 3. Symmetric increased radiotracer uptake involving the bilateral shoulders, elbows, wrists, hands, knees as well as the right foot, most likely degenerative. Abdomen MRI:   IMPRESSION:    1. Multiple T2 hyperintense lesion is noted within the liver largest    measuring approximately 1.6 CM x1.9 CM without significant associated    enhancement likely represent hepatic cyst however the noncontrasted    images are somewhat limited due to patient motion. Followup assessment is    advised in 4 months. 2. Splenomegaly   3. Redemonstration of sclerotic lesions within the left sacrum may    represent a bone island.      Lab Results   Component Value Date    WBC 1.0 (LL) 09/28/2022    HGB 10.9 (L) 09/28/2022    HCT 36.0 (L) 09/28/2022    MCV 80.9 (L) 09/28/2022    PLT See Reflexed IPF Result 09/28/2022    LYMPHOPCT 20 (L) 09/28/2022    RBC 4.45 09/28/2022    MCH 24.5 (L) 09/28/2022    MCHC 30.3 09/28/2022    RDW 19.2 (H) 09/28/2022    MONOPCT 35 (H) 09/28/2022    EOSPCT 4 06/04/2019    BASOPCT 0 09/28/2022    NEUTROABS 0.39 (LL) 09/28/2022    LYMPHSABS 0.20 (L) 09/28/2022    MONOSABS 0.35 09/28/2022    EOSABS 0.04 09/28/2022    BASOSABS 0.00 09/28/2022                 Lab Results   Component Value Date    IRON 41 (L) 09/28/2022    TIBC 258 09/28/2022    FERRITIN 36 09/28/2022           IMPRESSION:   Pancytopenia. Likely related to liver cirrhosis and possibly immune mediated. Anemia, multifactorial.   Liver cirrhosis  Weight loss. Recovering. MGUS     Back pain  Questionable liver lesions. Previous MRI was consistent with benign lesions. Repeated MRI showed no changes. Hips osteonecrosis. S/p surgery. Prostate cancer. ECOG performance score 0  Recent left inguinal hernia surgery. PLAN:    I explained to the patient all of the above. I reviewed the results of the EGD and colonoscopy and explained to the patient and his wife. Labs reviewed and discussed with the patient. Clinically, he did better after IV Iron infusion. Hb is stable. No active bleeding. We will resume oral iron. Liver MRI is suggestive of hemochromatosis. MRI also showed suspicious liver lesion with recommendation for biopsy. However the liver lesion is exact same size as it was seen on the previous MRI from 2014. Likely benign. However with these questionable readings on the MRI I liver biopsy was negative. Wbcs are still very low. He received Granix before surgery. He has no repeated infections. No need for daily treatment. These abnormalities are at least in part related to liver cirrhosis as well. WBCs are likely immune mediated in addition to liver cirrhosis. We will continue to monitor.   White blood cells are improving. We will consider treatment if he starts having repeated infections. Thrombocytopenia secondary to liver cirrhosis. Clinically stable. Continue to monitor PSA. RV 6 months. Labs at RV. We will check immunoglobulins level Every 6 months. Patient's questions were answered to the best of his satisfaction and he verbalized full understanding and agreement. [facility]                            This note is created with the assistance of a speech recognition program.  While intending to generate a document that actually reflects the content of the visit, the document can still have some errors including those of syntax and sound a like substitutions which may escape proof reading. It such instances, actual meaning can be extrapolated by contextual diversion.

## 2022-10-11 ENCOUNTER — PATIENT MESSAGE (OUTPATIENT)
Dept: FAMILY MEDICINE CLINIC | Age: 72
End: 2022-10-11

## 2022-10-13 ENCOUNTER — HOSPITAL ENCOUNTER (OUTPATIENT)
Dept: ULTRASOUND IMAGING | Age: 72
Discharge: HOME | End: 2022-10-15
Payer: COMMERCIAL

## 2022-10-13 DIAGNOSIS — K21.9 GASTROESOPHAGEAL REFLUX DISEASE, UNSPECIFIED WHETHER ESOPHAGITIS PRESENT: ICD-10-CM

## 2022-10-13 DIAGNOSIS — K76.6 PORTAL HYPERTENSION (HCC): ICD-10-CM

## 2022-10-13 DIAGNOSIS — I85.00 IDIOPATHIC ESOPHAGEAL VARICES WITHOUT BLEEDING (HCC): ICD-10-CM

## 2022-10-13 DIAGNOSIS — D61.818 PANCYTOPENIA (HCC): ICD-10-CM

## 2022-10-13 DIAGNOSIS — D50.8 OTHER IRON DEFICIENCY ANEMIA: ICD-10-CM

## 2022-10-13 PROCEDURE — 76705 ECHO EXAM OF ABDOMEN: CPT

## 2022-10-17 NOTE — TELEPHONE ENCOUNTER
From: Miri Trejo  To: Dr. Berta Damon: 10/11/2022 5:10 AM EDT  Subject: Covid and Flu vaccine    Please record that Nasreen Huynh received a Covid 23 [employer] updated booster 12+ and a [employer] PF 2022-23 vaccine on 10/9 @ [facility].   Thank you  Larisa (wife)

## 2022-12-07 ENCOUNTER — OFFICE VISIT (OUTPATIENT)
Dept: GASTROENTEROLOGY | Age: 72
End: 2022-12-07
Payer: COMMERCIAL

## 2022-12-07 VITALS
BODY MASS INDEX: 19.8 KG/M2 | DIASTOLIC BLOOD PRESSURE: 84 MMHG | SYSTOLIC BLOOD PRESSURE: 141 MMHG | WEIGHT: 146 LBS | TEMPERATURE: 97 F

## 2022-12-07 DIAGNOSIS — K21.9 GASTROESOPHAGEAL REFLUX DISEASE, UNSPECIFIED WHETHER ESOPHAGITIS PRESENT: ICD-10-CM

## 2022-12-07 DIAGNOSIS — K76.6 PORTAL HYPERTENSION (HCC): ICD-10-CM

## 2022-12-07 DIAGNOSIS — D47.2 MGUS (MONOCLONAL GAMMOPATHY OF UNKNOWN SIGNIFICANCE): ICD-10-CM

## 2022-12-07 DIAGNOSIS — D61.818 PANCYTOPENIA (HCC): ICD-10-CM

## 2022-12-07 DIAGNOSIS — Z87.19 HISTORY OF HEMATEMESIS: Chronic | ICD-10-CM

## 2022-12-07 DIAGNOSIS — R18.8 OTHER ASCITES: ICD-10-CM

## 2022-12-07 DIAGNOSIS — D50.8 OTHER IRON DEFICIENCY ANEMIA: ICD-10-CM

## 2022-12-07 DIAGNOSIS — I85.00 IDIOPATHIC ESOPHAGEAL VARICES WITHOUT BLEEDING (HCC): Primary | ICD-10-CM

## 2022-12-07 PROCEDURE — 3074F SYST BP LT 130 MM HG: CPT | Performed by: INTERNAL MEDICINE

## 2022-12-07 PROCEDURE — 99214 OFFICE O/P EST MOD 30 MIN: CPT | Performed by: INTERNAL MEDICINE

## 2022-12-07 PROCEDURE — 1123F ACP DISCUSS/DSCN MKR DOCD: CPT | Performed by: INTERNAL MEDICINE

## 2022-12-07 PROCEDURE — 3078F DIAST BP <80 MM HG: CPT | Performed by: INTERNAL MEDICINE

## 2022-12-07 RX ORDER — FUROSEMIDE 40 MG/1
40 TABLET ORAL DAILY
Qty: 60 TABLET | Refills: 3 | Status: SHIPPED | OUTPATIENT
Start: 2022-12-07

## 2022-12-07 RX ORDER — SPIRONOLACTONE 100 MG/1
100 TABLET, FILM COATED ORAL DAILY
Qty: 30 TABLET | Refills: 3 | Status: SHIPPED | OUTPATIENT
Start: 2022-12-07

## 2022-12-07 ASSESSMENT — ENCOUNTER SYMPTOMS
SORE THROAT: 0
TROUBLE SWALLOWING: 0
VOMITING: 0
CONSTIPATION: 0
BLOOD IN STOOL: 0
DIARRHEA: 0
COUGH: 0
ABDOMINAL PAIN: 0
SHORTNESS OF BREATH: 0
CHOKING: 1
ABDOMINAL DISTENTION: 0
NAUSEA: 0
COLOR CHANGE: 0
ANAL BLEEDING: 0
WHEEZING: 0
RECTAL PAIN: 0

## 2022-12-07 NOTE — PROGRESS NOTES
GI CLINIC FOLLOW UP    [facility] HISTORY:   Yancey Osler, MD  [address], [address]  [place],  [address] [place] [address]    Chief Complaint   Patient presents with    Results     Pt is here today for a f/u on US/lab results, pt last seen for iron def anemia, idiopathic esophageal varices & GERD. 1. Idiopathic esophageal varices without bleeding ([address]    2. History of hematemesis    3. Gastroesophageal reflux disease, unspecified whether esophagitis present    4. Portal hypertension ([address]    5. Pancytopenia ([address]    6. MGUS (monoclonal gammopathy of unknown significance)    7. Other iron deficiency anemia    8. Other ascites      This patient evaluated my office as a follow-up  He was accompanied by his wife during the interview with an RN  He has history significant for multiple chronic medical issues including medical uncooperative uncertain etiology patient has been seen and followed by hematology oncology also history for anemia cirrhosis of the liver esophageal varices weakness fatigue    He had a recent ultrasound which has done large volume of ascites in addition to cirrhosis of the liver without any liver lesions    Has some acid reflux indigestion symptoms    In the past was found to have grade 1-2 esophageal varices    In the recent past he has refused any invasive GI work-up but with his wife today he is agreeable to have an endoscopy done      Blood work-up labs were all reviewed with him            HISTORY OF PRESENT ILLNESS: Mr.Andy Stephanie Christiansen is a 67 y.o. male with a past history remarkable for , referred for evaluation of   Chief Complaint   Patient presents with    Results     Pt is here today for a f/u on US/lab results, pt last seen for iron def anemia, idiopathic esophageal varices & GERD. Denia Case Past Medical,Family, and Social History reviewed and does contribute to the patient presenting condition.     Patient's PMH/PSH,SH,PSYCH Hx, MEDs, ALLERGIES, and ROS were all reviewed and updated in the appropriate sections. PAST MEDICAL HISTORY:  Past Medical History:   Diagnosis Date    Anemia     Arthritis     Avascular necrosis [facility])     sees Dr Carlos Medina    BPH (benign prostatic hyperplasia)     Gastroesophageal reflux disease 5/25/2022    History of blood transfusion 12/2014    after total hip replacement    Hypertension     Iron deficiency anemia     Leukopenia     MGUS (monoclonal gammopathy of unknown significance)     Osteoarthritis     Patient in clinical research study 01/19/2017    Positive FIT (fecal immunochemical test)     Stomach ulcer     x 2        Past Surgical History:   Procedure Laterality Date    COLONOSCOPY  07/14/2014    COLONOSCOPY  09/25/2019    COLONOSCOPY N/A 9/25/2019    COLONOSCOPY POLYPECTOMY SNARE/HOT BIOPSY performed by Bindu Trimble MD at [facility], COLON, DIAGNOSTIC  07/13/2014    stomach ulcers    HIP ARTHROPLASTY Right 12/03/14    HIP ARTHROPLASTY Left 03/10/15    INGUINAL HERNIA REPAIR Right 2009    INGUINAL HERNIA REPAIR Left 11/29/2018    incarcerated. By Dr. Pretty Hoffman OFFICE/OUTPT VISIT,PROCEDURE ONLY Left 11/29/2018    INCARCERATED INGUINAL HERNIA performed by Dennis Kay MD at [address]  09/12/2014    PROSTATECTOMY  12/16/2015    robotic.  lap    UPPER GASTROINTESTINAL ENDOSCOPY  10/19/2016    no lesions seen    UPPER GASTROINTESTINAL ENDOSCOPY  09/25/2019    UPPER GASTROINTESTINAL ENDOSCOPY N/A 9/25/2019    EGD BIOPSY performed by Bindu Trimble MD at [address]:    Current Outpatient Medications:     furosemide (LASIX) 40 MG tablet, Take 1 tablet by mouth daily, Disp: 60 tablet, Rfl: 3    spironolactone (ALDACTONE) 100 MG tablet, Take 1 tablet by mouth daily, Disp: 30 tablet, Rfl: 3    hydroCHLOROthiazide (HYDRODIURIL) 25 MG tablet, Take 1 tablet by mouth daily, Disp: 90 tablet, Rfl: 0    cyclobenzaprine (FLEXERIL) 10 MG tablet, TAKE 1 TABLET BY MOUTH 3 TIMES A DAY AS NEEDED FOR MUSCLE SPASMS, Disp: 90 tablet, Rfl: 0    potassium chloride (KLOR-CON M) 20 MEQ extended release tablet, TAKE 1 TABLET BY MOUTH ONE TIME A DAY, Disp: 90 tablet, Rfl: 3    pantoprazole (PROTONIX) 40 MG tablet, TAKE 1 TABLET BY MOUTH ONE TIME A DAY, Disp: 90 tablet, Rfl: 1    sildenafil (VIAGRA) 100 MG tablet, Take 1 tablet by mouth daily as needed for Erectile Dysfunction, Disp: 30 tablet, Rfl: 2    propranolol (INDERAL LA) 60 MG extended release capsule, TAKE 1 CAPSULE BY MOUTH 2 TIMES A DAY, Disp: 180 capsule, Rfl: 2    Flaxseed Oil OIL, by Does not apply route every other day, Disp: , Rfl:     Cholecalciferol (VITAMIN D) 2000 units CAPS capsule, Take by mouth Indications: three times weekly, Disp: , Rfl:     Magnesium 400 MG CAPS, Take 400 mg by mouth every other day, Disp: , Rfl:     calcium carbonate (OSCAL) 500 MG TABS tablet, Take 500 mg by mouth daily, Disp: , Rfl:     Multiple Vitamins-Minerals (MULTI COMPLETE PO), Take 1 tablet by mouth daily. , Disp: , Rfl:     ALLERGIES:   No Known Allergies    FAMILY HISTORY:       Problem Relation Age of Onset    High Blood Pressure Mother     High Blood Pressure Father          SOCIAL HISTORY:   Social History     Socioeconomic History    Marital status:      Spouse name: Not on file    Number of children: 3    Years of education: 15    Highest education level: Not on file   Occupational History    Occupation: retired     Employer: [employer]   Tobacco Use    Smoking status: Never    Smokeless tobacco: Never   Vaping Use    Vaping Use: Never used   Substance and Sexual Activity    Alcohol use: No    Drug use: No    Sexual activity: Never   Other Topics Concern    Not on file   Social History Narrative    Diet - relatively healthy    Caffeine intake per day - no    Exercise pattern - wlks    House with wife           Social Determinants of Health     Financial Resource Strain: Not on file   Food Insecurity: Not on file   Transportation Needs: Not on file   Physical Activity: Not on file   Stress: Not on file   Social Connections: Not on file   Intimate Partner Violence: Not on file   Housing Stability: Not on file         REVIEW OF SYSTEMS:         Review of Systems   Constitutional:  Negative for appetite change and fatigue. HENT:  Negative for sore throat and trouble swallowing. Eyes:  Negative for visual disturbance. Respiratory:  Positive for choking. Negative for cough, shortness of breath and wheezing. Cardiovascular:  Negative for chest pain, palpitations and leg swelling. Gastrointestinal:  Negative for abdominal distention, abdominal pain, anal bleeding, blood in stool, constipation, diarrhea, nausea, rectal pain and vomiting. Skin:  Negative for color change. Allergic/Immunologic: Negative for environmental allergies and food allergies. Neurological:  Negative for dizziness, weakness, light-headedness, numbness and headaches. Hematological:  Does not bruise/bleed easily. Psychiatric/Behavioral:  Negative for confusion and sleep disturbance. The patient is not nervous/anxious. PHYSICAL EXAMINATION: Vital signs reviewed per the nursing documentation. BP (!) 141/84   Temp 97 °F (36.1 °C)   Wt 146 lb (66.2 kg)   BMI 19.80 kg/m²   Body mass index is 19.8 kg/m². Physical Exam  Nursing note reviewed. Constitutional:       Appearance: He is well-developed. Comments: Anxious  Moderate malnutrition   HENT:      Head: Normocephalic and atraumatic. Eyes:      Conjunctiva/sclera: Conjunctivae normal.      Pupils: Pupils are equal, round, and reactive to light. Cardiovascular:      Rate and Rhythm: Normal rate and regular rhythm. Pulmonary:      Effort: Pulmonary effort is normal.      Breath sounds: Rales present. Abdominal:      General: Bowel sounds are normal. There is distension. Palpations: Abdomen is soft.       Comments: Mild tenderness bowel sounds positive distended   Genitourinary:     Rectum: Normal.   Musculoskeletal: General: Normal range of motion. Cervical back: Normal range of motion and neck supple. Comments: Mild edema   Skin:     General: Skin is warm. Neurological:      Mental Status: He is alert and oriented to person, place, and time. Deep Tendon Reflexes: Reflexes are normal and symmetric. LABORATORY DATA: Reviewed  Lab Results   Component Value Date    WBC 1.0 (LL) 09/28/2022    HGB 10.9 (L) 09/28/2022    HCT 36.0 (L) 09/28/2022    MCV 80.9 (L) 09/28/2022    PLT See Reflexed IPF Result 09/28/2022     09/28/2022    K 4.2 09/28/2022     09/28/2022    CO2 22 09/28/2022    BUN 15 09/28/2022    CREATININE 0.72 09/28/2022    LABALBU 2.2 (L) 09/28/2022    BILITOT 0.8 09/28/2022    ALKPHOS 272 (H) 09/28/2022    AST 55 (H) 09/28/2022    ALT 42 (H) 09/28/2022    INR 1.1 12/13/2019         Lab Results   Component Value Date    RBC 4.45 09/28/2022    HGB 10.9 (L) 09/28/2022    MCV 80.9 (L) 09/28/2022    MCH 24.5 (L) 09/28/2022    MCHC 30.3 09/28/2022    RDW 19.2 (H) 09/28/2022    MPV NOT REPORTED 09/14/2021    BASOPCT 0 09/28/2022    LYMPHSABS 0.20 (L) 09/28/2022    MONOSABS 0.35 09/28/2022    NEUTROABS 0.39 (LL) 09/28/2022    EOSABS 0.04 09/28/2022    BASOSABS 0.00 09/28/2022         DIAGNOSTIC TESTING:     No results found. Assessment  1. Idiopathic esophageal varices without bleeding (HCC)    2. History of hematemesis    3. Gastroesophageal reflux disease, unspecified whether esophagitis present    4. Portal hypertension ([address]    5. Pancytopenia ([address]    6. MGUS (monoclonal gammopathy of unknown significance)    7. Other iron deficiency anemia    8.  Other ascites        Plan    I will start him on diuretic therapy spironolactone 100 mg and Lasix 20 mg every morning    Possible risk-benefit and alternatives of the treatment were explained to the patient and his wife  Prescription will be sent to the pharmacy instructions given how to use it    We will check electrolyte panel kidney functions in 3 weeks see him back after that    Low-sodium diet no red meat      Paracentesis and analysis was ordered    He will require upper endoscopy in near future    Possible colonoscopy as    Their questions were answered    See him back in few weeks    Thank you for allowing me to participate in the care of Mr. Lina Duenas. For any further questions please do not hesitate to contact me. I have reviewed and agree with the ROS entered by the MA/Nurse.          Jade Brown MD, Damaris Dallas  Board Certified in Gastroenterology and [address] [place] [address] Gastroenterology  Office #: [phone]

## 2022-12-08 ENCOUNTER — OFFICE VISIT (OUTPATIENT)
Dept: FAMILY MEDICINE CLINIC | Age: 72
End: 2022-12-08
Payer: COMMERCIAL

## 2022-12-08 VITALS
HEIGHT: 72 IN | BODY MASS INDEX: 19.5 KG/M2 | SYSTOLIC BLOOD PRESSURE: 124 MMHG | HEART RATE: 61 BPM | WEIGHT: 144 LBS | DIASTOLIC BLOOD PRESSURE: 88 MMHG | OXYGEN SATURATION: 93 %

## 2022-12-08 DIAGNOSIS — R06.09 DYSPNEA ON EXERTION: ICD-10-CM

## 2022-12-08 DIAGNOSIS — M62.838 MUSCLE SPASMS OF BOTH LOWER EXTREMITIES: ICD-10-CM

## 2022-12-08 DIAGNOSIS — Z76.89 ENCOUNTER TO ESTABLISH CARE: Primary | ICD-10-CM

## 2022-12-08 DIAGNOSIS — R73.01 IMPAIRED FASTING GLUCOSE: ICD-10-CM

## 2022-12-08 DIAGNOSIS — D61.818 PANCYTOPENIA (HCC): ICD-10-CM

## 2022-12-08 DIAGNOSIS — D70.9 NEUTROPENIA, UNSPECIFIED TYPE (HCC): ICD-10-CM

## 2022-12-08 DIAGNOSIS — I10 ESSENTIAL HYPERTENSION: ICD-10-CM

## 2022-12-08 DIAGNOSIS — R18.8 OTHER ASCITES: ICD-10-CM

## 2022-12-08 PROCEDURE — 3078F DIAST BP <80 MM HG: CPT

## 2022-12-08 PROCEDURE — 99203 OFFICE O/P NEW LOW 30 MIN: CPT

## 2022-12-08 PROCEDURE — 3074F SYST BP LT 130 MM HG: CPT

## 2022-12-08 PROCEDURE — 1123F ACP DISCUSS/DSCN MKR DOCD: CPT

## 2022-12-08 RX ORDER — CYCLOBENZAPRINE HCL 10 MG
TABLET ORAL
Qty: 90 TABLET | Refills: 0 | Status: SHIPPED | OUTPATIENT
Start: 2022-12-08

## 2022-12-08 SDOH — ECONOMIC STABILITY: FOOD INSECURITY: WITHIN THE PAST 12 MONTHS, YOU WORRIED THAT YOUR FOOD WOULD RUN OUT BEFORE YOU GOT MONEY TO BUY MORE.: NEVER TRUE

## 2022-12-08 SDOH — ECONOMIC STABILITY: FOOD INSECURITY: WITHIN THE PAST 12 MONTHS, THE FOOD YOU BOUGHT JUST DIDN'T LAST AND YOU DIDN'T HAVE MONEY TO GET MORE.: NEVER TRUE

## 2022-12-08 ASSESSMENT — ENCOUNTER SYMPTOMS
CHEST TIGHTNESS: 0
CONSTIPATION: 1
ANAL BLEEDING: 0
EYE DISCHARGE: 0
EYE PAIN: 0
COLOR CHANGE: 0
WHEEZING: 0
VOMITING: 0
ABDOMINAL PAIN: 0
SHORTNESS OF BREATH: 1
COUGH: 0
NAUSEA: 0
BACK PAIN: 0
SORE THROAT: 0
DIARRHEA: 0

## 2022-12-08 ASSESSMENT — SOCIAL DETERMINANTS OF HEALTH (SDOH): HOW HARD IS IT FOR YOU TO PAY FOR THE VERY BASICS LIKE FOOD, HOUSING, MEDICAL CARE, AND HEATING?: NOT HARD AT ALL

## 2022-12-08 NOTE — PROGRESS NOTES
Mariana Manzano (:  1950) is a 67 y.o. male,Established patient, here for evaluation of the following chief complaint(s):  Establish Care and Discuss Medications (hydro)          Subjective   SUBJECTIVE/OBJECTIVE:  Pt is here today to establish care, accompanied by his wife  VS and weight stable, pt has continued to lose since September but pt and wife state he is now supplementing w/ twice daily protein shakes    Pt has a complex medical hx of idiopathic hepatic cirrhosis w/ ascites, pancytopenia, neutropenia. Pt follows closely w/ GI and heme/onc for management of these conditions. Pt is set to have his first paracentesis tomorrow for management of the ascites. He was started on daily lasix and spironolactone for continued management of fluid accumulation, GI questioned if HCTZ was necessary. I do not find an acute reason to continue HCTZ in addition to the new diuretics from GI, we will d/c and monitor BP. Pancytopenia and neutropenia are both suspected to be d/t liver dysfunction. Pt does not suffer from repeated infections and is being monitored by heme/onc at this point along w/ daily iron supplement. D/t ongoing chronic conditions pt and his wife are diligent and monitoring his vitals and health status. He has been increasingly SOB w/ exertion, has had past hx of GI bleed. Wife feels he is pale lately as well. Pt had a few episodes of straining w/ BM where he had dripping bright red blood per rectum after passing a hard stool. He states this occurred weeks ago and has not persisted. Will place order for CBC to be checked along w/ coag studies tomorrow for IR procedure. This could also be related to the ascites generally pushing up on his diaphragm and suspected recurrence of right sided pleural effusion. No recent assessment of A1C on screening labs, persistently elevated fasting glucose. Will also assess updated A1C.        Review of Systems   Constitutional:  Positive for unexpected weight change. Negative for activity change, appetite change, chills, fatigue and fever. HENT:  Negative for congestion, ear pain and sore throat. Eyes:  Negative for pain, discharge and visual disturbance. Respiratory:  Positive for shortness of breath (w/ exertion). Negative for cough, chest tightness and wheezing. Cardiovascular:  Negative for chest pain, palpitations and leg swelling. Gastrointestinal:  Positive for constipation (intermittent d/t PO iron). Negative for abdominal pain, anal bleeding, diarrhea, nausea and vomiting. Genitourinary:  Negative for difficulty urinating and dysuria. Musculoskeletal:  Positive for myalgias (intermittent leg spasms). Negative for back pain, gait problem and neck pain. Skin:  Positive for pallor. Negative for color change, rash and wound. Neurological:  Negative for dizziness, seizures, syncope, weakness, light-headedness, numbness and headaches. Psychiatric/Behavioral:  Negative for confusion and sleep disturbance. Objective   Physical Exam  Vitals and nursing note reviewed. Constitutional:       General: He is not in acute distress. Appearance: Normal appearance. He is well-developed. He is not ill-appearing, toxic-appearing or diaphoretic. HENT:      Head: Normocephalic and atraumatic. Right Ear: External ear normal.      Left Ear: External ear normal.      Nose: Nose normal.   Eyes:      General: No scleral icterus. Right eye: No discharge. Left eye: No discharge. Conjunctiva/sclera: Conjunctivae normal.      Pupils: Pupils are equal, round, and reactive to light. Neck:      Vascular: No carotid bruit. Trachea: No tracheal deviation. Cardiovascular:      Rate and Rhythm: Normal rate and regular rhythm. Heart sounds: Normal heart sounds. No murmur heard. No friction rub. No gallop. Pulmonary:      Effort: Pulmonary effort is normal. No tachypnea, accessory muscle usage or respiratory distress. Breath sounds: No stridor. Examination of the right-lower field reveals decreased breath sounds. Decreased breath sounds present. No wheezing, rhonchi or rales. Abdominal:      General: Bowel sounds are normal. There is distension. There is no abdominal bruit. Palpations: There is no fluid wave. Tenderness: There is no abdominal tenderness. Hernia: A hernia is present. Hernia is present in the umbilical area. Musculoskeletal:         General: No tenderness or deformity. Normal range of motion. Cervical back: Normal range of motion and neck supple. Right lower leg: No edema. Left lower leg: No edema. Skin:     General: Skin is warm and dry. Coloration: Skin is pale. Findings: No erythema or rash. Neurological:      Mental Status: He is alert and oriented to person, place, and time. GCS: GCS eye subscore is 4. GCS verbal subscore is 5. GCS motor subscore is 6. Gait: Gait normal.   Psychiatric:         Speech: Speech normal.         Behavior: Behavior normal.         Thought Content: Thought content normal.         Judgment: Judgment normal.               ASSESSMENT/PLAN:  1. Encounter to establish care    2. Dyspnea on exertion  -waxes/wanes, likely related to ascites/right pleural effusion  -will eval CBC to r/o acute anemia d/t reports of bright red blood intermittently per rectum    -     CBC with Auto Differential; Future    3. Pancytopenia ([address]  Assessment & Plan:   Monitored by specialist- no acute findings meriting change in the plan    Orders:  -     CBC with Auto Differential; Future    4. Neutropenia, unspecified type ([address]  Assessment & Plan:   Monitored by specialist- no acute findings meriting change in the plan    Orders:  -     CBC with Auto Differential; Future    5. Essential hypertension  Assessment & Plan:   Well-controlled, changes made today: stop HCTZ, continue lasix/spironolactone per GI    6.  Other ascites  Assessment & Plan: Monitored by specialist- no acute findings meriting change in the plan  -scheduled for IR paracentesis tomorrow    7. Impaired fasting glucose  -     Hemoglobin A1C; Future    8. Muscle spasms of both lower extremities  -chronic, well controlled w/ PRN flexeril    -     cyclobenzaprine (FLEXERIL) 10 MG tablet; TAKE 1 TABLET BY MOUTH 3 TIMES A DAY AS NEEDED FOR MUSCLE SPASMS, Disp-90 tablet, R-0Normal    Return in about 3 months (around 3/8/2023), or if symptoms worsen or fail to improve, for f/u. An electronic signature was used to authenticate this note.     --[place], SCARLET - CNP

## 2022-12-09 ENCOUNTER — HOSPITAL ENCOUNTER (OUTPATIENT)
Age: 72
Discharge: HOME | End: 2022-12-09

## 2022-12-09 ENCOUNTER — HOSPITAL ENCOUNTER (OUTPATIENT)
Dept: INTERVENTIONAL RADIOLOGY/VASCULAR | Age: 72
End: 2022-12-09
Payer: COMMERCIAL

## 2022-12-09 VITALS
DIASTOLIC BLOOD PRESSURE: 79 MMHG | OXYGEN SATURATION: 98 % | TEMPERATURE: 97.5 F | RESPIRATION RATE: 18 BRPM | HEART RATE: 66 BPM | HEIGHT: 72 IN | WEIGHT: 148 LBS | SYSTOLIC BLOOD PRESSURE: 146 MMHG | BODY MASS INDEX: 20.05 KG/M2

## 2022-12-09 DIAGNOSIS — D61.818 PANCYTOPENIA (HCC): ICD-10-CM

## 2022-12-09 DIAGNOSIS — R73.01 IMPAIRED FASTING GLUCOSE: ICD-10-CM

## 2022-12-09 DIAGNOSIS — D70.9 NEUTROPENIA, UNSPECIFIED TYPE (HCC): ICD-10-CM

## 2022-12-09 LAB
ABSOLUTE EOS #: 0.07 K/UL (ref 0–0.4)
ABSOLUTE IMMATURE GRANULOCYTE: 0.01 K/UL (ref 0–0.3)
ABSOLUTE LYMPH #: 0.29 K/UL (ref 1–4.8)
ABSOLUTE MONO #: 0.34 K/UL (ref 0.1–0.8)
ALBUMIN FLUID: <0.2 G/DL
BASOPHILS # BLD: 1 % (ref 0–2)
BASOPHILS ABSOLUTE: 0.01 K/UL (ref 0–0.2)
CASE NUMBER:: NORMAL
EOSINOPHILS RELATIVE PERCENT: 5 % (ref 1–4)
ESTIMATED AVERAGE GLUCOSE: 163 MG/DL
GLUCOSE, FLUID: 269 MG/DL
HBA1C MFR BLD: 7.3 % (ref 4–6)
HCT VFR BLD CALC: 39.8 % (ref 40.7–50.3)
HEMOGLOBIN: 12 G/DL (ref 13–17)
IMMATURE GRANULOCYTES: 1 %
INR BLD: 1.4
LACTATE DEHYDROGENASE, FLUID: 18 U/L
LYMPHOCYTES # BLD: 22 % (ref 24–44)
LYMPHOCYTES, BODY FLUID: 26 %
MCH RBC QN AUTO: 24.6 PG (ref 25.2–33.5)
MCHC RBC AUTO-ENTMCNC: 30.2 G/DL (ref 28.4–34.8)
MCV RBC AUTO: 81.6 FL (ref 82.6–102.9)
MONOCYTES # BLD: 26 % (ref 1–7)
MORPHOLOGY: ABNORMAL
NEUTROPHIL, FLUID: 2 %
NRBC AUTOMATED: 0 PER 100 WBC
OTHER CELLS FLUID: NORMAL %
PDW BLD-RTO: 20.5 % (ref 11.8–14.4)
PLATELET # BLD: 51 K/UL (ref 138–453)
PLATELET # BLD: ABNORMAL K/UL (ref 138–453)
PLATELET, FLUORESCENCE: 47 K/UL (ref 138–453)
PLATELET, IMMATURE FRACTION: 8.6 % (ref 1.1–10.3)
PROTHROMBIN TIME: 16.7 SEC (ref 11.5–14.2)
RBC # BLD: 4.88 M/UL (ref 4.21–5.77)
RBC FLUID: <3000 /MM3
SEG NEUTROPHILS: 45 % (ref 36–66)
SEGMENTED NEUTROPHILS ABSOLUTE COUNT: 0.58 K/UL (ref 1.8–7.7)
SPECIMEN DESCRIPTION: NORMAL
SPECIMEN TYPE: NORMAL
TOTAL PROTEIN, BODY FLUID: <1 G/DL
WBC # BLD: 1.3 K/UL (ref 3.5–11.3)
WBC FLUID: 100 /MM3

## 2022-12-09 PROCEDURE — 49083 ABD PARACENTESIS W/IMAGING: CPT

## 2022-12-09 PROCEDURE — 88112 CYTOPATH CELL ENHANCE TECH: CPT

## 2022-12-09 PROCEDURE — 83615 LACTATE (LD) (LDH) ENZYME: CPT

## 2022-12-09 PROCEDURE — 84157 ASSAY OF PROTEIN OTHER: CPT

## 2022-12-09 PROCEDURE — 85025 COMPLETE CBC W/AUTO DIFF WBC: CPT

## 2022-12-09 PROCEDURE — 82945 GLUCOSE OTHER FLUID: CPT

## 2022-12-09 PROCEDURE — 85610 PROTHROMBIN TIME: CPT

## 2022-12-09 PROCEDURE — 88305 TISSUE EXAM BY PATHOLOGIST: CPT

## 2022-12-09 PROCEDURE — 83036 HEMOGLOBIN GLYCOSYLATED A1C: CPT

## 2022-12-09 PROCEDURE — 2709999900 IR US GUIDED PARACENTESIS

## 2022-12-09 PROCEDURE — 2580000003 HC RX 258: Performed by: RADIOLOGY

## 2022-12-09 PROCEDURE — 82042 OTHER SOURCE ALBUMIN QUAN EA: CPT

## 2022-12-09 PROCEDURE — 85049 AUTOMATED PLATELET COUNT: CPT

## 2022-12-09 PROCEDURE — 85055 RETICULATED PLATELET ASSAY: CPT

## 2022-12-09 PROCEDURE — 89051 BODY FLUID CELL COUNT: CPT

## 2022-12-09 RX ORDER — SODIUM CHLORIDE 0.9 % (FLUSH) 0.9 %
5-40 SYRINGE (ML) INJECTION EVERY 12 HOURS SCHEDULED
Status: DISCONTINUED | OUTPATIENT
Start: 2022-12-09 | End: 2022-12-12 | Stop reason: HOSPADM

## 2022-12-09 RX ORDER — SODIUM CHLORIDE 9 MG/ML
INJECTION, SOLUTION INTRAVENOUS PRN
Status: DISCONTINUED | OUTPATIENT
Start: 2022-12-09 | End: 2022-12-12 | Stop reason: HOSPADM

## 2022-12-09 RX ORDER — SODIUM CHLORIDE 0.9 % (FLUSH) 0.9 %
5-40 SYRINGE (ML) INJECTION PRN
Status: DISCONTINUED | OUTPATIENT
Start: 2022-12-09 | End: 2022-12-12 | Stop reason: HOSPADM

## 2022-12-09 RX ORDER — SODIUM CHLORIDE 9 MG/ML
INJECTION, SOLUTION INTRAVENOUS CONTINUOUS
Status: DISCONTINUED | OUTPATIENT
Start: 2022-12-09 | End: 2022-12-12 | Stop reason: HOSPADM

## 2022-12-09 RX ADMIN — SODIUM CHLORIDE: 9 INJECTION, SOLUTION INTRAVENOUS at 10:40

## 2022-12-09 ASSESSMENT — PAIN - FUNCTIONAL ASSESSMENT: PAIN_FUNCTIONAL_ASSESSMENT: 0-10

## 2022-12-09 NOTE — FLOWSHEET NOTE
Dr. Donna Cabello performs paracentesis; 600 ml  cloudy yellow ascitic fluid drained & sent to lab per MD ordered testing. Patient tolerated well. Procedure site left side of mid abdomen clean dry & intact; dressed with 2x2 gauze & transparent bandage. Discharge instructions reviewed, copy to patient & discharged stable with wife.

## 2022-12-09 NOTE — BRIEF OP NOTE
Brief Postoperative Note    Chriselenchas Plaster  YOB: 1950  [id number]    Pre-operative Diagnosis: Recurrent ascites; abdominal discomfort    Post-operative Diagnosis: Same    Procedure: US guided paracentesis     Anesthesia: Local    Surgeons/Assistants: Adonay    Estimated Blood Loss: less than 50     Complications: None    Specimens: Was Obtained: non turbid straw colored fluid     Electronically signed by Gilda Cedillo MD on 12/9/2022 at 12:09 PM

## 2022-12-09 NOTE — H&P
Interval H&P Note    Pt Name: Denae Chirinos  MRN: [id number]  YOB: 1950  Date of evaluation: 12/9/2022      [x] I have reviewed in epic the gastroenterology progress note by Dr. Letty Perla dated 12/7/2022 attached below for an Interval History and Physical note. [x] I have examined  Denae Chirinos  There are no changes to the patient who is scheduled for IR paracentesis by Dr. Gerard Jimenez for ascites, GERD,  portal hypertension, pancytopenia, monoclonal gammopathy, iron deficiency anemia, esophageal varices. The patient denies new health changes, fever, chills, wheezing, cough, increased SOB, chest pain, open sores or wounds. Denies hx diabetes. Last Multivitamin 12/9/2022. Vital signs: /77   Pulse 66   Temp 97.5 °F (36.4 °C) (Temporal)   Ht 6' (1.829 m)   Wt 148 lb (67.1 kg)   SpO2 98%   BMI 20.07 kg/m²     Allergies:  Patient has no known allergies. Medications:    Prior to Admission medications    Medication Sig Start Date End Date Taking?  Authorizing Provider   cyclobenzaprine (FLEXERIL) 10 MG tablet TAKE 1 TABLET BY MOUTH 3 TIMES A DAY AS NEEDED FOR MUSCLE SPASMS 12/8/22   SCARLET Scott - CNP   furosemide (LASIX) 40 MG tablet Take 1 tablet by mouth daily 12/7/22   Elvis Mehta MD   spironolactone (ALDACTONE) 100 MG tablet Take 1 tablet by mouth daily 12/7/22   Elvis Mehta MD   potassium chloride (KLOR-CON M) 20 MEQ extended release tablet TAKE 1 TABLET BY MOUTH ONE TIME A DAY 6/7/22   Armin Hernandez MD   pantoprazole (PROTONIX) 40 MG tablet TAKE 1 TABLET BY MOUTH ONE TIME A DAY 6/7/22   Marbella Aggarwal MD   sildenafil (VIAGRA) 100 MG tablet Take 1 tablet by mouth daily as needed for Erectile Dysfunction  Patient not taking: Reported on 12/8/2022 5/3/22 9/29/22  Marbella Aggarwal MD   propranolol (INDERAL LA) 60 MG extended release capsule TAKE 1 CAPSULE BY MOUTH 2 TIMES A DAY 3/4/22   Elvis Mehta MD   Cholecalciferol (VITAMIN D) 2000 units CAPS capsule Take by mouth Indications: three times weekly    Historical Provider, MD   Magnesium 400 MG CAPS Take 400 mg by mouth every other day    Historical Provider, MD   calcium carbonate (OSCAL) 500 MG TABS tablet Take 500 mg by mouth daily    Historical Provider, MD   Multiple Vitamins-Minerals (MULTI COMPLETE PO) Take 1 tablet by mouth daily. Historical Provider, MD         This is a 67 y.o. male who is pleasant, cooperative, thin appearing, alert and oriented x3, in no acute distress. Heart: Heart sounds are normal.  HR 66 regular rate and rhythm without murmur, gallop or rub. Lungs: Normal respiratory effort with equal expansion, good air exchange, unlabored and clear to auscultation without wheezes or rales bilaterally   Abdomen: soft, nontender, distended with + fluid with bowel sounds active. Umbilical hernia. Labs:  No results for input(s): HGB, HCT, WBC, MCV, PLT, NA, K, CL, CO2, BUN, CREATININE, GLUCOSE, INR, PROTIME, APTT, AST, ALT, LABALBU, HCG in the last 720 hours. No results for input(s): COVID19 in the last 720 hours. Lady [place], SCARLET - CNP  Electronically signed 12/9/2022 at 10:30 AM       Koko Jaimes MD   Physician   Specialty:  Gastroenterology   Progress Notes      Signed   Encounter Date:  12/7/2022          Related encounter: Office Visit from 12/7/2022 in [address] [facility]             GI CLINIC FOLLOW UP     [address]:   MD [address], [address]  [place],  [address] [place] [address]          Chief Complaint   Patient presents with    Results       Pt is here today for a f/u on US/lab results, pt last seen for iron def anemia, idiopathic esophageal varices & GERD. 1. Idiopathic esophageal varices without bleeding ([address]    2. History of hematemesis    3. Gastroesophageal reflux disease, unspecified whether esophagitis present    4. Portal hypertension ([address]    5. Pancytopenia ([address]    6.  MGUS (monoclonal gammopathy of unknown significance)    7. Other iron deficiency anemia    8. Other ascites       This patient evaluated my office as a follow-up  He was accompanied by his wife during the interview with an RN  He has history significant for multiple chronic medical issues including medical uncooperative uncertain etiology patient has been seen and followed by hematology oncology also history for anemia cirrhosis of the liver esophageal varices weakness fatigue     He had a recent ultrasound which has done large volume of ascites in addition to cirrhosis of the liver without any liver lesions     Has some acid reflux indigestion symptoms     In the past was found to have grade 1-2 esophageal varices     In the recent past he has refused any invasive GI work-up but with his wife today he is agreeable to have an endoscopy done        Blood work-up labs were all reviewed with him                 HISTORY OF PRESENT ILLNESS: Mr.Andy Sherrie Alford is a 67 y.o. male with a past history remarkable for , referred for evaluation of        Chief Complaint   Patient presents with    Results       Pt is here today for a f/u on US/lab results, pt last seen for iron def anemia, idiopathic esophageal varices & GERD. Sterling Delgado Past Medical,Family, and Social History reviewed and does contribute to the patient presenting condition. Patient's PMH/PSH,SH,PSYCH Hx, MEDs, ALLERGIES, and ROS were all reviewed and updated in the appropriate sections.      PAST MEDICAL HISTORY:  Past Medical History        Past Medical History:   Diagnosis Date    Anemia      Arthritis      Avascular necrosis [facility])       sees Dr Chani Lemus    BPH (benign prostatic hyperplasia)      Gastroesophageal reflux disease 5/25/2022    History of blood transfusion 12/2014     after total hip replacement    Hypertension      Iron deficiency anemia      Leukopenia      MGUS (monoclonal gammopathy of unknown significance)      Osteoarthritis      Patient in clinical research study 01/19/2017 Positive FIT (fecal immunochemical test)      Stomach ulcer       x 2             Past Surgical History         Past Surgical History:   Procedure Laterality Date    COLONOSCOPY   07/14/2014    COLONOSCOPY   09/25/2019    COLONOSCOPY N/A 9/25/2019     COLONOSCOPY POLYPECTOMY SNARE/HOT BIOPSY performed by Scarlett Finch MD at [address], COLON, DIAGNOSTIC   07/13/2014     stomach ulcers    HIP ARTHROPLASTY Right 12/03/14    HIP ARTHROPLASTY Left 03/10/15    INGUINAL HERNIA REPAIR Right 2009    INGUINAL HERNIA REPAIR Left 11/29/2018     incarcerated. By Dr. Martine Loja OFFICE/OUTPT VISIT,PROCEDURE ONLY Left 11/29/2018     INCARCERATED INGUINAL HERNIA performed by Phoebe Limon MD [facility] [address] [facility] [address]   09/12/2014    PROSTATECTOMY   12/16/2015     robotic.  lap    UPPER GASTROINTESTINAL ENDOSCOPY   10/19/2016     no lesions seen    UPPER GASTROINTESTINAL ENDOSCOPY   09/25/2019    UPPER GASTROINTESTINAL ENDOSCOPY N/A 9/25/2019     EGD BIOPSY performed by Scarlett Finch MD at [address] [facility]:    Current Medication      Current Outpatient Medications:     furosemide (LASIX) 40 MG tablet, Take 1 tablet by mouth daily, Disp: 60 tablet, Rfl: 3    spironolactone (ALDACTONE) 100 MG tablet, Take 1 tablet by mouth daily, Disp: 30 tablet, Rfl: 3    hydroCHLOROthiazide (HYDRODIURIL) 25 MG tablet, Take 1 tablet by mouth daily, Disp: 90 tablet, Rfl: 0    cyclobenzaprine (FLEXERIL) 10 MG tablet, TAKE 1 TABLET BY MOUTH 3 TIMES A DAY AS NEEDED FOR MUSCLE SPASMS, Disp: 90 tablet, Rfl: 0    potassium chloride (KLOR-CON M) 20 MEQ extended release tablet, TAKE 1 TABLET BY MOUTH ONE TIME A DAY, Disp: 90 tablet, Rfl: 3    pantoprazole (PROTONIX) 40 MG tablet, TAKE 1 TABLET BY MOUTH ONE TIME A DAY, Disp: 90 tablet, Rfl: 1    sildenafil (VIAGRA) 100 MG tablet, Take 1 tablet by mouth daily as needed for Erectile Dysfunction, Disp: 30 tablet, Rfl: 2    propranolol (INDERAL LA) 60 MG extended release capsule, TAKE 1 CAPSULE BY MOUTH 2 TIMES A DAY, Disp: 180 capsule, Rfl: 2    Flaxseed Oil OIL, by Does not apply route every other day, Disp: , Rfl:     Cholecalciferol (VITAMIN D) 2000 units CAPS capsule, Take by mouth Indications: three times weekly, Disp: , Rfl:     Magnesium 400 MG CAPS, Take 400 mg by mouth every other day, Disp: , Rfl:     calcium carbonate (OSCAL) 500 MG TABS tablet, Take 500 mg by mouth daily, Disp: , Rfl:     Multiple Vitamins-Minerals (MULTI COMPLETE PO), Take 1 tablet by mouth daily. , Disp: , Rfl:         ALLERGIES:   No Known Allergies     FAMILY HISTORY:   Family History             Problem Relation Age of Onset    High Blood Pressure Mother      High Blood Pressure Father                 SOCIAL HISTORY:   Social History               Socioeconomic History    Marital status:        Spouse name: Not on file    Number of children: 3    Years of education: 15    Highest education level: Not on file   Occupational History    Occupation: retired       Employer: [employer]   Tobacco Use    Smoking status: Never    Smokeless tobacco: Never   Vaping Use    Vaping Use: Never used   Substance and Sexual Activity    Alcohol use: No    Drug use: No    Sexual activity: Never   Other Topics Concern    Not on file   Social History Narrative     Diet - relatively healthy     Caffeine intake per day - no     Exercise pattern - wlks     House with wife              Social Determinants of Health      Financial Resource Strain: Not on file   Food Insecurity: Not on file   Transportation Needs: Not on file   Physical Activity: Not on file   Stress: Not on file   Social Connections: Not on file   Intimate Partner Violence: Not on file   Housing Stability: Not on file               REVIEW OF SYSTEMS:           Review of Systems   Constitutional:  Negative for appetite change and fatigue. HENT:  Negative for sore throat and trouble swallowing.     Eyes:  Negative for visual disturbance. Respiratory:  Positive for choking. Negative for cough, shortness of breath and wheezing. Cardiovascular:  Negative for chest pain, palpitations and leg swelling. Gastrointestinal:  Negative for abdominal distention, abdominal pain, anal bleeding, blood in stool, constipation, diarrhea, nausea, rectal pain and vomiting. Skin:  Negative for color change. Allergic/Immunologic: Negative for environmental allergies and food allergies. Neurological:  Negative for dizziness, weakness, light-headedness, numbness and headaches. Hematological:  Does not bruise/bleed easily. Psychiatric/Behavioral:  Negative for confusion and sleep disturbance. The patient is not nervous/anxious. PHYSICAL EXAMINATION: Vital signs reviewed per the nursing documentation. BP (!) 141/84   Temp 97 °F (36.1 °C)   Wt 146 lb (66.2 kg)   BMI 19.80 kg/m²   Body mass index is 19.8 kg/m². Physical Exam  Nursing note reviewed. Constitutional:       Appearance: He is well-developed. Comments: Anxious  Moderate malnutrition   HENT:      Head: Normocephalic and atraumatic. Eyes:      Conjunctiva/sclera: Conjunctivae normal.      Pupils: Pupils are equal, round, and reactive to light. Cardiovascular:      Rate and Rhythm: Normal rate and regular rhythm. Pulmonary:      Effort: Pulmonary effort is normal.      Breath sounds: Rales present. Abdominal:      General: Bowel sounds are normal. There is distension. Palpations: Abdomen is soft. Comments: Mild tenderness bowel sounds positive distended   Genitourinary:     Rectum: Normal.   Musculoskeletal:         General: Normal range of motion. Cervical back: Normal range of motion and neck supple. Comments: Mild edema   Skin:     General: Skin is warm. Neurological:      Mental Status: He is alert and oriented to person, place, and time. Deep Tendon Reflexes: Reflexes are normal and symmetric.             LABORATORY DATA: Reviewed        Lab Results   Component Value Date     WBC 1.0 (LL) 09/28/2022     HGB 10.9 (L) 09/28/2022     HCT 36.0 (L) 09/28/2022     MCV 80.9 (L) 09/28/2022     PLT See Reflexed IPF Result 09/28/2022      09/28/2022     K 4.2 09/28/2022      09/28/2022     CO2 22 09/28/2022     BUN 15 09/28/2022     CREATININE 0.72 09/28/2022     LABALBU 2.2 (L) 09/28/2022     BILITOT 0.8 09/28/2022     ALKPHOS 272 (H) 09/28/2022     AST 55 (H) 09/28/2022     ALT 42 (H) 09/28/2022     INR 1.1 12/13/2019                  Lab Results   Component Value Date     RBC 4.45 09/28/2022     HGB 10.9 (L) 09/28/2022     MCV 80.9 (L) 09/28/2022     MCH 24.5 (L) 09/28/2022     MCHC 30.3 09/28/2022     RDW 19.2 (H) 09/28/2022     MPV NOT REPORTED 09/14/2021     BASOPCT 0 09/28/2022     LYMPHSABS 0.20 (L) 09/28/2022     MONOSABS 0.35 09/28/2022     NEUTROABS 0.39 (LL) 09/28/2022     EOSABS 0.04 09/28/2022     BASOSABS 0.00 09/28/2022            DIAGNOSTIC TESTING:      No results found. Assessment  1. Idiopathic esophageal varices without bleeding (HCC)    2. History of hematemesis    3. Gastroesophageal reflux disease, unspecified whether esophagitis present    4. Portal hypertension ([address]    5. Pancytopenia ([address]    6. MGUS (monoclonal gammopathy of unknown significance)    7. Other iron deficiency anemia    8.  Other ascites          Plan     I will start him on diuretic therapy spironolactone 100 mg and Lasix 20 mg every morning     Possible risk-benefit and alternatives of the treatment were explained to the patient and his wife  Prescription will be sent to the pharmacy instructions given how to use it     We will check electrolyte panel kidney functions in 3 weeks see him back after that     Low-sodium diet no red meat        Paracentesis and analysis was ordered     He will require upper endoscopy in near future     Possible colonoscopy as     Their questions were answered     See him back in few weeks     Thank you for allowing me to participate in the care of Mr. Lina Duenas. For any further questions please do not hesitate to contact me. I have reviewed and agree with the ROS entered by the MA/Nurse.             Jade Brown MD, [facility]  Board Certified in Gastroenterology and [address] [place] [address] Gastroenterology  Office #: [phone]                 Revision History

## 2022-12-12 LAB
LYMPHOCYTES, BODY FLUID: 26 %
NEUTROPHIL, FLUID: 2 %
OTHER CELLS FLUID: NORMAL %
RBC FLUID: <3000 /MM3
SPECIMEN TYPE: NORMAL
SURGICAL PATHOLOGY REPORT: NORMAL
WBC FLUID: 100 /MM3

## 2022-12-13 DIAGNOSIS — K74.69 OTHER CIRRHOSIS OF LIVER (HCC): ICD-10-CM

## 2022-12-13 DIAGNOSIS — K21.9 GASTROESOPHAGEAL REFLUX DISEASE: ICD-10-CM

## 2022-12-16 RX ORDER — PROPRANOLOL HCL 60 MG
CAPSULE, EXTENDED RELEASE 24HR ORAL
Qty: 180 CAPSULE | Refills: 2 | Status: SHIPPED | OUTPATIENT
Start: 2022-12-16

## 2022-12-19 RX ORDER — PANTOPRAZOLE SODIUM 40 MG/1
TABLET, DELAYED RELEASE ORAL
Qty: 90 TABLET | Refills: 1 | Status: SHIPPED | OUTPATIENT
Start: 2022-12-19

## 2022-12-27 ENCOUNTER — PATIENT MESSAGE (OUTPATIENT)
Dept: GASTROENTEROLOGY | Age: 72
End: 2022-12-27

## 2022-12-27 NOTE — TELEPHONE ENCOUNTER
From: Lily Stokes  To: Dr. Diane Juares: 12/27/2022 11:00 AM EST  Subject: Lasix and spironolactone    Marni Bernard is doing much better on these 2 new meds but doesn't see Dr Terry Raymundo again until March. Unfortunately he only ordered 30 tabs and 60 tabs of them. I know Dr Terry Raymundo us out of town but could you see if another provider would approve a 90 day supply of each for us at [facility]? Thank you very much!

## 2022-12-29 ENCOUNTER — HOSPITAL ENCOUNTER (OUTPATIENT)
Age: 72
Setting detail: SPECIMEN
Discharge: HOME | End: 2022-12-29

## 2022-12-29 DIAGNOSIS — K76.6 PORTAL HYPERTENSION (HCC): ICD-10-CM

## 2022-12-29 DIAGNOSIS — D47.2 MGUS (MONOCLONAL GAMMOPATHY OF UNKNOWN SIGNIFICANCE): ICD-10-CM

## 2022-12-29 DIAGNOSIS — R18.8 OTHER ASCITES: ICD-10-CM

## 2022-12-29 DIAGNOSIS — D50.8 OTHER IRON DEFICIENCY ANEMIA: ICD-10-CM

## 2022-12-29 DIAGNOSIS — K21.9 GASTROESOPHAGEAL REFLUX DISEASE, UNSPECIFIED WHETHER ESOPHAGITIS PRESENT: ICD-10-CM

## 2022-12-29 DIAGNOSIS — Z87.19 HISTORY OF HEMATEMESIS: Chronic | ICD-10-CM

## 2022-12-29 DIAGNOSIS — D61.818 PANCYTOPENIA (HCC): ICD-10-CM

## 2022-12-29 DIAGNOSIS — I85.00 IDIOPATHIC ESOPHAGEAL VARICES WITHOUT BLEEDING (HCC): ICD-10-CM

## 2022-12-29 LAB
ANION GAP SERPL CALCULATED.3IONS-SCNC: 5 MMOL/L (ref 9–17)
BUN BLDV-MCNC: 22 MG/DL (ref 8–23)
CHLORIDE BLD-SCNC: 100 MMOL/L (ref 98–107)
CO2: 27 MMOL/L (ref 20–31)
CREAT SERPL-MCNC: 0.91 MG/DL (ref 0.7–1.2)
GFR SERPL CREATININE-BSD FRML MDRD: >60 ML/MIN/1.73M2
POTASSIUM SERPL-SCNC: 4.5 MMOL/L (ref 3.7–5.3)
SODIUM BLD-SCNC: 132 MMOL/L (ref 135–144)

## 2022-12-30 RX ORDER — SPIRONOLACTONE 100 MG/1
100 TABLET, FILM COATED ORAL DAILY
Qty: 90 TABLET | Refills: 0 | Status: SHIPPED | OUTPATIENT
Start: 2022-12-30

## 2022-12-30 RX ORDER — FUROSEMIDE 40 MG/1
40 TABLET ORAL DAILY
Qty: 90 TABLET | Refills: 0 | Status: SHIPPED | OUTPATIENT
Start: 2022-12-30

## 2023-02-28 ENCOUNTER — TELEPHONE (OUTPATIENT)
Dept: INFUSION THERAPY | Facility: MEDICAL CENTER | Age: 73
End: 2023-02-28

## 2023-02-28 ENCOUNTER — HOSPITAL ENCOUNTER (OUTPATIENT)
Age: 73
Setting detail: SPECIMEN
Discharge: HOME | End: 2023-02-28
Payer: COMMERCIAL

## 2023-02-28 DIAGNOSIS — D47.2 MGUS (MONOCLONAL GAMMOPATHY OF UNKNOWN SIGNIFICANCE): ICD-10-CM

## 2023-02-28 DIAGNOSIS — D47.2 MGUS (MONOCLONAL GAMMOPATHY OF UNKNOWN SIGNIFICANCE): Primary | ICD-10-CM

## 2023-02-28 LAB
ABSOLUTE EOS #: 0.04 K/UL (ref 0–0.44)
ABSOLUTE IMMATURE GRANULOCYTE: 0 K/UL (ref 0–0.3)
ABSOLUTE LYMPH #: 0.29 K/UL (ref 1.1–3.7)
ABSOLUTE MONO #: 0.41 K/UL (ref 0.1–1.2)
ALBUMIN SERPL-MCNC: 2.5 G/DL (ref 3.5–5.2)
ALP SERPL-CCNC: 228 U/L (ref 40–129)
ALT SERPL-CCNC: 34 U/L (ref 5–41)
ALT SERPL-CCNC: 7.6 UG/L
ANION GAP SERPL CALCULATED.3IONS-SCNC: 5 MMOL/L (ref 9–17)
AST SERPL-CCNC: 37 U/L
BASOPHILS # BLD: 1 % (ref 0–2)
BASOPHILS ABSOLUTE: 0.01 K/UL (ref 0–0.2)
BILIRUB SERPL-MCNC: 0.9 MG/DL (ref 0.3–1.2)
BUN SERPL-MCNC: 13 MG/DL (ref 8–23)
BUN/CREAT BLD: 16 (ref 9–20)
CALCIUM SERPL-MCNC: 8.6 MG/DL (ref 8.6–10.4)
CHLORIDE SERPL-SCNC: 98 MMOL/L (ref 98–107)
CO2 SERPL-SCNC: 27 MMOL/L (ref 20–31)
CREAT SERPL-MCNC: 0.8 MG/DL (ref 0.7–1.2)
EOSINOPHILS RELATIVE PERCENT: 4 % (ref 1–4)
FOLATE SERPL-MCNC: >20 NG/ML
FREE KAPPA/LAMBDA RATIO: 1.33 (ref 0.26–1.65)
GFR SERPL CREATININE-BSD FRML MDRD: >60 ML/MIN/1.73M2
GLUCOSE SERPL-MCNC: 415 MG/DL (ref 70–99)
HCT VFR BLD AUTO: 39.4 % (ref 40.7–50.3)
HGB BLD-MCNC: 11.9 G/DL (ref 13–17)
IGA SERPL-MCNC: 1015 MG/DL (ref 70–400)
IGG: 2572 MG/DL (ref 700–1600)
IGM: 43 MG/DL (ref 40–230)
IMMATURE GRANULOCYTES: 0 %
IRON SATURATION: 21 % (ref 20–55)
IRON SERPL-MCNC: 55 UG/DL (ref 59–158)
KAPPA LC FREE SER-MCNC: 7.8 MG/DL (ref 0.37–1.94)
LAMBDA LC FREE SERPL-MCNC: 5.85 MG/DL (ref 0.57–2.63)
LYMPHOCYTES # BLD: 26 % (ref 24–43)
MCH RBC QN AUTO: 25.3 PG (ref 25.2–33.5)
MCHC RBC AUTO-ENTMCNC: 30.2 G/DL (ref 28.4–34.8)
MCV RBC AUTO: 83.8 FL (ref 82.6–102.9)
MONOCYTES # BLD: 37 % (ref 3–12)
MORPHOLOGY: ABNORMAL
NRBC AUTOMATED: 0 PER 100 WBC
PDW BLD-RTO: 18.6 % (ref 11.8–14.4)
PLATELET # BLD AUTO: 65 K/UL (ref 138–453)
PMV BLD AUTO: ABNORMAL FL (ref 8.1–13.5)
POTASSIUM SERPL-SCNC: 4.9 MMOL/L (ref 3.7–5.3)
PROT SERPL-MCNC: 7.1 G/DL (ref 6.4–8.3)
RBC # BLD: 4.7 M/UL (ref 4.21–5.77)
SEG NEUTROPHILS: 32 % (ref 36–65)
SEGMENTED NEUTROPHILS ABSOLUTE COUNT: 0.35 K/UL (ref 1.5–8.1)
SODIUM SERPL-SCNC: 130 MMOL/L (ref 135–144)
TIBC SERPL-MCNC: 258 UG/DL (ref 250–450)
UNSATURATED IRON BINDING CAPACITY: 203 UG/DL (ref 112–347)
VIT B12 SERPL-MCNC: >2000 PG/ML (ref 232–1245)
WBC # BLD AUTO: 1.1 K/UL (ref 3.5–11.3)

## 2023-02-28 PROCEDURE — 83540 ASSAY OF IRON: CPT

## 2023-02-28 PROCEDURE — 82784 ASSAY IGA/IGD/IGG/IGM EACH: CPT

## 2023-02-28 PROCEDURE — 82607 VITAMIN B-12: CPT

## 2023-02-28 PROCEDURE — 85025 COMPLETE CBC W/AUTO DIFF WBC: CPT

## 2023-02-28 PROCEDURE — 82746 ASSAY OF FOLIC ACID SERUM: CPT

## 2023-02-28 PROCEDURE — 80053 COMPREHEN METABOLIC PANEL: CPT

## 2023-02-28 PROCEDURE — 82105 ALPHA-FETOPROTEIN SERUM: CPT

## 2023-02-28 PROCEDURE — 36415 COLL VENOUS BLD VENIPUNCTURE: CPT

## 2023-02-28 PROCEDURE — 83521 IG LIGHT CHAINS FREE EACH: CPT

## 2023-02-28 PROCEDURE — 83550 IRON BINDING TEST: CPT

## 2023-02-28 NOTE — TELEPHONE ENCOUNTER
Received call from lab with critical results. Informed Dr. Lorena Last; he is not concerned with the chronically low wbc and anc. He does state call patient and let him know the blood sugar and him follow up with his PCP. Spoke with Antonio Issa, he states had eaten cereal just before blood draw. I expressed it is \"critically\" high and recommend he call his PCP. He questions if he should have repeat testing. I explained this is a possibility and he should call and discuss it with his PCP.   He states he will call office of Leigh Ann Flor NP.

## 2023-03-01 ENCOUNTER — OFFICE VISIT (OUTPATIENT)
Dept: GASTROENTEROLOGY | Age: 73
End: 2023-03-01

## 2023-03-01 VITALS
DIASTOLIC BLOOD PRESSURE: 81 MMHG | WEIGHT: 130 LBS | TEMPERATURE: 97.2 F | SYSTOLIC BLOOD PRESSURE: 148 MMHG | BODY MASS INDEX: 17.63 KG/M2

## 2023-03-01 DIAGNOSIS — R18.8 OTHER ASCITES: ICD-10-CM

## 2023-03-01 DIAGNOSIS — D70.8 OTHER NEUTROPENIA (HCC): ICD-10-CM

## 2023-03-01 DIAGNOSIS — K21.9 GASTROESOPHAGEAL REFLUX DISEASE, UNSPECIFIED WHETHER ESOPHAGITIS PRESENT: ICD-10-CM

## 2023-03-01 DIAGNOSIS — K74.69 OTHER CIRRHOSIS OF LIVER (HCC): ICD-10-CM

## 2023-03-01 DIAGNOSIS — D61.818 PANCYTOPENIA (HCC): Primary | ICD-10-CM

## 2023-03-01 RX ORDER — FUROSEMIDE 40 MG/1
40 TABLET ORAL DAILY
Qty: 90 TABLET | Refills: 0 | Status: SHIPPED | OUTPATIENT
Start: 2023-03-01

## 2023-03-01 RX ORDER — PANTOPRAZOLE SODIUM 40 MG/1
TABLET, DELAYED RELEASE ORAL
Qty: 90 TABLET | Refills: 1 | Status: SHIPPED | OUTPATIENT
Start: 2023-03-01

## 2023-03-01 ASSESSMENT — ENCOUNTER SYMPTOMS
ABDOMINAL PAIN: 0
SHORTNESS OF BREATH: 0
ANAL BLEEDING: 0
SORE THROAT: 0
TROUBLE SWALLOWING: 0
COLOR CHANGE: 0
ABDOMINAL DISTENTION: 0
WHEEZING: 0
RECTAL PAIN: 0
VOMITING: 0
CHOKING: 0
NAUSEA: 0
BLOOD IN STOOL: 0
DIARRHEA: 0
COUGH: 0
CONSTIPATION: 0

## 2023-03-01 NOTE — PROGRESS NOTES
GI CLINIC FOLLOW UP    NTERVAL HISTORY:   No referring provider defined for this encounter. Chief Complaint   Patient presents with    Results     Pt is here today for a f/u on labs, pt last seen on 12/7/22 for idiopathic esophageal varices, GERD, & pancytopenia. 1. Pancytopenia ([address]    2. Other neutropenia (HCC)    3. Other ascites    4. Gastroesophageal reflux disease, unspecified whether esophagitis present    5. Other cirrhosis of liver ([address]      Patient seen my office as a follow-up accompanied by his wife she is a retired nurse  Patient has history significant for above issues  Last time was started on diuretic therapy also had paracentesis done which mainly depicts transudative ascites  Overall he seems to be doing well  He denies any overt bleeding melanotic stools  Few years ago he had an endoscopy done which revealed some small varices  Is due for his upper endoscopy to evaluate for that  Denies any alcohol abuse illicit drug usage  Denies any nausea vomiting hematemesis  Has no significant dysphagia  Has acid reflux has been taking PPIs  Records charts were all reviewed with him    HISTORY OF PRESENT ILLNESS: Mr.Andy Jocelyne Urbina is a 68 y.o. male with a past history remarkable for , referred for evaluation of   Chief Complaint   Patient presents with    Results     Pt is here today for a f/u on labs, pt last seen on 12/7/22 for idiopathic esophageal varices, GERD, & pancytopenia. .    Past Medical,Family, and Social History reviewed and does contribute to the patient presenting condition. Patient's PMH/PSH,SH,PSYCH Hx, MEDs, ALLERGIES, and ROS were all reviewed and updated in the appropriate sections.     PAST MEDICAL HISTORY:  Past Medical History:   Diagnosis Date    Anemia     Arthritis     Avascular necrosis [facility])     sees Dr Gerardo Bates    BPH (benign prostatic hyperplasia)     Gastroesophageal reflux disease 05/25/2022    History of blood transfusion 12/2014    after total hip replacement    Hypertension     Iron deficiency anemia     Leukopenia     MGUS (monoclonal gammopathy of unknown significance)     Osteoarthritis     Other cirrhosis of liver ([facility] [address] [facility] 3/2/2023    Patient in clinical research study 01/19/2017    Positive FIT (fecal immunochemical test)     Stomach ulcer     x 2        Past Surgical History:   Procedure Laterality Date    COLONOSCOPY  07/14/2014    COLONOSCOPY  09/25/2019    COLONOSCOPY N/A 09/25/2019    COLONOSCOPY POLYPECTOMY SNARE/HOT BIOPSY performed by Favian Hubbard MD at [place] [address], COLON, DIAGNOSTIC  07/13/2014    stomach ulcers    HIP ARTHROPLASTY Right 12/03/2014    HIP ARTHROPLASTY Left 03/10/2015    INGUINAL HERNIA REPAIR Right 2009    INGUINAL HERNIA REPAIR Left 11/29/2018    incarcerated. By Dr. Gilberto Tobin OFFICE/OUTPT VISIT,PROCEDURE ONLY Left 11/29/2018    INCARCERATED INGUINAL HERNIA performed by Annmarie Chapman MD at [address]  09/12/2014    PROSTATECTOMY  12/16/2015    robotic.  lap    UPPER GASTROINTESTINAL ENDOSCOPY  10/19/2016    no lesions seen    UPPER GASTROINTESTINAL ENDOSCOPY  09/25/2019    UPPER GASTROINTESTINAL ENDOSCOPY N/A 09/25/2019    EGD BIOPSY performed by Favian Hubbard MD at [facility]:    Current Outpatient Medications:     furosemide (LASIX) 40 MG tablet, Take 1 tablet by mouth daily, Disp: 90 tablet, Rfl: 0    pantoprazole (PROTONIX) 40 MG tablet, TAKE 1 TABLET BY MOUTH ONE TIME A DAY, Disp: 90 tablet, Rfl: 1    spironolactone (ALDACTONE) 100 MG tablet, Take 1 tablet by mouth daily, Disp: 90 tablet, Rfl: 0    propranolol (INDERAL LA) 60 MG extended release capsule, TAKE 1 CAPSULE BY MOUTH 2 TIMES A DAY, Disp: 180 capsule, Rfl: 2    cyclobenzaprine (FLEXERIL) 10 MG tablet, TAKE 1 TABLET BY MOUTH 3 TIMES A DAY AS NEEDED FOR MUSCLE SPASMS, Disp: 90 tablet, Rfl: 0    potassium chloride (KLOR-CON M) 20 MEQ extended release tablet, TAKE 1 TABLET BY MOUTH ONE TIME A DAY, Disp: 90 tablet, Rfl: 3    Cholecalciferol (VITAMIN D) 2000 units CAPS capsule, Take by mouth Indications: three times weekly, Disp: , Rfl:     Magnesium 400 MG CAPS, Take 400 mg by mouth every other day, Disp: , Rfl:     calcium carbonate (OSCAL) 500 MG TABS tablet, Take 500 mg by mouth daily, Disp: , Rfl:     Multiple Vitamins-Minerals (MULTI COMPLETE PO), Take 1 tablet by mouth daily. , Disp: , Rfl:     sildenafil (VIAGRA) 100 MG tablet, Take 1 tablet by mouth daily as needed for Erectile Dysfunction (Patient not taking: Reported on 12/8/2022), Disp: 30 tablet, Rfl: 2    ALLERGIES:   No Known Allergies    FAMILY HISTORY:       Problem Relation Age of Onset    High Blood Pressure Mother     High Blood Pressure Father          SOCIAL HISTORY:   Social History     Socioeconomic History    Marital status:      Spouse name: Not on file    Number of children: 3    Years of education: 15    Highest education level: Not on file   Occupational History    Occupation: retired     Employer: [employer]   Tobacco Use    Smoking status: Never    Smokeless tobacco: Never   Vaping Use    Vaping Use: Never used   Substance and Sexual Activity    Alcohol use: No    Drug use: No    Sexual activity: Never   Other Topics Concern    Not on file   Social History Narrative    Diet - relatively healthy    Caffeine intake per day - no    Exercise pattern - wlks    House with wife           Social Determinants of Health     Financial Resource Strain: Low Risk     Difficulty of Paying Living Expenses: Not hard at all   Food Insecurity: No Food Insecurity    Worried About Running Out of Food in the Last Year: Never true    Ran Out of Food in the Last Year: Never true   Transportation Needs: Not on file   Physical Activity: Not on file   Stress: Not on file   Social Connections: Not on file   Intimate Partner Violence: Not on file   Housing Stability: Not on file         REVIEW OF SYSTEMS:         Review of Systems   Constitutional: Negative for appetite change and fatigue. HENT:  Negative for sore throat and trouble swallowing. Eyes:  Negative for visual disturbance. Respiratory:  Negative for cough, choking, shortness of breath and wheezing. Cardiovascular:  Negative for chest pain, palpitations and leg swelling. Gastrointestinal:  Negative for abdominal distention, abdominal pain, anal bleeding, blood in stool, constipation, diarrhea, nausea, rectal pain and vomiting. Skin:  Negative for color change. Allergic/Immunologic: Negative for environmental allergies and food allergies. Neurological:  Negative for dizziness, weakness, numbness and headaches. Hematological:  Does not bruise/bleed easily. Psychiatric/Behavioral:  Negative for confusion and sleep disturbance. The patient is not nervous/anxious. PHYSICAL EXAMINATION: Vital signs reviewed per the nursing documentation. BP (!) 148/81   Temp 97.2 °F (36.2 °C)   Wt 130 lb (59 kg)   BMI 17.63 kg/m²   Body mass index is 17.63 kg/m². Physical Exam  Nursing note reviewed. Constitutional:       Appearance: He is well-developed. HENT:      Head: Normocephalic and atraumatic. Eyes:      Conjunctiva/sclera: Conjunctivae normal.      Pupils: Pupils are equal, round, and reactive to light. Cardiovascular:      Rate and Rhythm: Normal rate and regular rhythm. Pulmonary:      Effort: Pulmonary effort is normal.      Breath sounds: Normal breath sounds. Abdominal:      General: Bowel sounds are normal.      Palpations: Abdomen is soft. Genitourinary:     Rectum: Normal.   Musculoskeletal:         General: Normal range of motion. Cervical back: Normal range of motion and neck supple. Skin:     General: Skin is warm. Neurological:      Mental Status: He is alert and oriented to person, place, and time. Deep Tendon Reflexes: Reflexes are normal and symmetric.          LABORATORY DATA: Reviewed  Lab Results   Component Value Date    WBC 1.1 (LL) 02/28/2023    HGB 11.9 (L) 02/28/2023    HCT 39.4 (L) 02/28/2023    MCV 83.8 02/28/2023    PLT 65 (L) 02/28/2023     (L) 02/28/2023    K 4.9 02/28/2023    CL 98 02/28/2023    CO2 27 02/28/2023    BUN 13 02/28/2023    CREATININE 0.80 02/28/2023    LABALBU 2.5 (L) 02/28/2023    BILITOT 0.9 02/28/2023    ALKPHOS 228 (H) 02/28/2023    AST 37 02/28/2023    ALT 34 02/28/2023    INR 1.4 12/09/2022         Lab Results   Component Value Date    RBC 4.70 02/28/2023    HGB 11.9 (L) 02/28/2023    MCV 83.8 02/28/2023    MCH 25.3 02/28/2023    MCHC 30.2 02/28/2023    RDW 18.6 (H) 02/28/2023    MPV Abnormal 02/28/2023    BASOPCT 1 02/28/2023    LYMPHSABS 0.29 (L) 02/28/2023    MONOSABS 0.41 02/28/2023    NEUTROABS 0.35 (LL) 02/28/2023    EOSABS 0.04 02/28/2023    BASOSABS 0.01 02/28/2023         DIAGNOSTIC TESTING:     No results found. Assessment  1. Pancytopenia ([address]    2. Other neutropenia (HCC)    3. Other ascites    4. Gastroesophageal reflux disease, unspecified whether esophagitis present    5. Other cirrhosis of liver (HCC)        Plan    Plan upper endoscopy to evaluate for esophageal varices evaluate anemia    The Endoscopic procedure was explained to the patient in detail  The prep and NPO were explained  All the Risks, Benefits, and Alternatives were explained  Risk of Bleeding, Perforation and Cardio Respiratory risks were explained  his questions were answered  The procedure has been scheduled with the  in the office  Patient was asked to give us a call for any questions  The patient has verbalized understanding and agreement to this plan. Low-sodium diet    No red meat    Continue diuretic therapy    Follow-up renal function    Pt seems to have signs and symptoms consistent with GERD, acid indigestion and heartburns. He was discussed  in detail about some possible life style and dietary modifications.  He was stressed about the maintenance  of appropriate weight and effect of obesity contributing to reflux symptoms. Routine exercise was streesed. Avoidance of Caffeine, nicotine and chocolate were explained. Pt was asked to avoid spices grease and fried food. Advices were also given about avoidance of any kind of fast foods, soda pops and high energy drinks. Pt was advised to place two small block under the head end of the bed which may help with night time reflux. Was advised not to eat any thin at least 2-3 hrs before going to bed and walk especially after dinner    Pt has verbalized understanding and agreement to this plan. Pt was discussed in detail about the possible side effects of proton pump inhibiter therapy. He was explained about the possibility of calcium and magnesium malabsorption and was advised to start taking calcium supplements with Vit D. Some over the counter regimens were explained to patient. Some dietary advices were also given. He has verbalized understanding and agreement to this. Pt was advised in detail about some life style and dietary modifications. He was advised about avoidance of caffeine, nicotine and chocolate. Pt was also told to stay away from any kind of fast foods, soda pops. He was also advised to avoid lots of spices, grease and fried food etc.     Instructions were also given about trying to arrange the timing, quality and quantity of food. Instructions were given about using ample amount of fiber including dietary and supplemental fiber either metamucil, bennafiber or citrucell etc.  Pt was advised about drinking ample amount of water without any colors or chemicals. Stress was given about regular exercise. Pt has verbalized understanding and agreement to these modifications. Thank you for allowing me to participate in the care of Mr. Jose Love. For any further questions please do not hesitate to contact me. I have reviewed and agree with the ROS entered by the MA/Nurse.      This note is created with the assistance of the speech recognition program.  While intending to generate a document that actually reflects the content of the visit, document can still have some errors including those of syntax and sound like substitutions which may escape proof reading. Actual meaning can be extrapolated by contextual diversion.      Adiel Macdonald MD, [facility]  Board Certified in Gastroenterology and [address] [place] [address] Gastroenterology  Office #: [phone]

## 2023-03-02 PROBLEM — K74.69 OTHER CIRRHOSIS OF LIVER (HCC): Status: ACTIVE | Noted: 2023-03-02

## 2023-03-08 ENCOUNTER — OFFICE VISIT (OUTPATIENT)
Dept: FAMILY MEDICINE CLINIC | Age: 73
End: 2023-03-08
Payer: COMMERCIAL

## 2023-03-08 VITALS
TEMPERATURE: 97.6 F | DIASTOLIC BLOOD PRESSURE: 64 MMHG | SYSTOLIC BLOOD PRESSURE: 122 MMHG | BODY MASS INDEX: 17.36 KG/M2 | WEIGHT: 128 LBS

## 2023-03-08 DIAGNOSIS — E11.65 TYPE 2 DIABETES MELLITUS WITH HYPERGLYCEMIA, WITHOUT LONG-TERM CURRENT USE OF INSULIN (HCC): Primary | ICD-10-CM

## 2023-03-08 DIAGNOSIS — E44.0 MODERATE PROTEIN-CALORIE MALNUTRITION (HCC): ICD-10-CM

## 2023-03-08 DIAGNOSIS — C61 PROSTATE CANCER (HCC): ICD-10-CM

## 2023-03-08 DIAGNOSIS — K76.6 PORTAL HYPERTENSION (HCC): ICD-10-CM

## 2023-03-08 DIAGNOSIS — K74.69 OTHER CIRRHOSIS OF LIVER (HCC): ICD-10-CM

## 2023-03-08 PROBLEM — E46 PROTEIN CALORIE MALNUTRITION (HCC): Status: ACTIVE | Noted: 2023-03-08

## 2023-03-08 PROCEDURE — 3074F SYST BP LT 130 MM HG: CPT

## 2023-03-08 PROCEDURE — 3078F DIAST BP <80 MM HG: CPT

## 2023-03-08 PROCEDURE — 1123F ACP DISCUSS/DSCN MKR DOCD: CPT

## 2023-03-08 PROCEDURE — 83036 HEMOGLOBIN GLYCOSYLATED A1C: CPT

## 2023-03-08 PROCEDURE — 99214 OFFICE O/P EST MOD 30 MIN: CPT

## 2023-03-08 SDOH — ECONOMIC STABILITY: FOOD INSECURITY: WITHIN THE PAST 12 MONTHS, THE FOOD YOU BOUGHT JUST DIDN'T LAST AND YOU DIDN'T HAVE MONEY TO GET MORE.: NEVER TRUE

## 2023-03-08 SDOH — ECONOMIC STABILITY: HOUSING INSECURITY
IN THE LAST 12 MONTHS, WAS THERE A TIME WHEN YOU DID NOT HAVE A STEADY PLACE TO SLEEP OR SLEPT IN A SHELTER (INCLUDING NOW)?: NO

## 2023-03-08 SDOH — ECONOMIC STABILITY: INCOME INSECURITY: HOW HARD IS IT FOR YOU TO PAY FOR THE VERY BASICS LIKE FOOD, HOUSING, MEDICAL CARE, AND HEATING?: NOT HARD AT ALL

## 2023-03-08 SDOH — ECONOMIC STABILITY: FOOD INSECURITY: WITHIN THE PAST 12 MONTHS, YOU WORRIED THAT YOUR FOOD WOULD RUN OUT BEFORE YOU GOT MONEY TO BUY MORE.: NEVER TRUE

## 2023-03-08 ASSESSMENT — ENCOUNTER SYMPTOMS
VOMITING: 0
SHORTNESS OF BREATH: 0
BACK PAIN: 0
CHEST TIGHTNESS: 0
COUGH: 0
EYE DISCHARGE: 0
EYE PAIN: 0
DIARRHEA: 0
COLOR CHANGE: 0
CONSTIPATION: 0
NAUSEA: 0
WHEEZING: 0
ABDOMINAL PAIN: 0
SORE THROAT: 0

## 2023-03-08 ASSESSMENT — PATIENT HEALTH QUESTIONNAIRE - PHQ9
SUM OF ALL RESPONSES TO PHQ QUESTIONS 1-9: 0
2. FEELING DOWN, DEPRESSED OR HOPELESS: 0
SUM OF ALL RESPONSES TO PHQ QUESTIONS 1-9: 0
SUM OF ALL RESPONSES TO PHQ QUESTIONS 1-9: 0
1. LITTLE INTEREST OR PLEASURE IN DOING THINGS: 0
SUM OF ALL RESPONSES TO PHQ QUESTIONS 1-9: 0
SUM OF ALL RESPONSES TO PHQ9 QUESTIONS 1 & 2: 0

## 2023-03-08 NOTE — PROGRESS NOTES
Drew Cerna (:  1950) is a 68 y.o. male,Established patient, here for evaluation of the following chief complaint(s):  3 Month Follow-Up          Subjective   SUBJECTIVE/OBJECTIVE:  Pt here today for eval of elevated glucose and routine f/u, accompanied by wife  VSS    Overall pt is much improved from previous OV. His abdominal ascites is greatly improved and he is significantly more comfortable in his mobility and breathing. He follows routinely w/ GI Carrie Carter) and Heme/Onc ([address] [place] [address]) for management of his chronic liver dysfunction and pancytopenia. Pt has an upcoming EGD for eval of esophageal varices but has no current changes to active tx plans. On outpt labs that were completed for Heme/Onc his glucose was noted to be 415. These were not fasting labs, however pt was encouraged to f/u w/ PCP for eval and management, he was asymptomatic at this time. Previous screening labs completed in December show a slowly increasing A1C of 7.3, it was elevated at 8.1 today. This is likely d/t progressive liver dysfunction and insulin resistance, pt is agreeable to initiate tx. Plan to initiate SGLT2 for renal protection and avoidance of decreased appetite as pt has hx of protein malnutrition and struggles to maintain his weight. Review of Systems   Constitutional:  Negative for activity change, appetite change, chills, fatigue, fever and unexpected weight change. HENT:  Negative for congestion, ear pain and sore throat. Eyes:  Negative for pain, discharge and visual disturbance. Respiratory:  Negative for cough, chest tightness, shortness of breath and wheezing. Cardiovascular:  Negative for chest pain, palpitations and leg swelling. Gastrointestinal:  Negative for abdominal pain, constipation, diarrhea, nausea and vomiting. Genitourinary:  Negative for difficulty urinating and dysuria. Musculoskeletal:  Negative for back pain, gait problem and neck pain.    Skin:  Negative for color change, rash and wound. Neurological:  Negative for dizziness, seizures, syncope, weakness, light-headedness, numbness and headaches. Psychiatric/Behavioral:  Negative for confusion and sleep disturbance. Objective   Physical Exam  Vitals and nursing note reviewed. Constitutional:       General: He is not in acute distress. Appearance: Normal appearance. He is well-developed. He is not ill-appearing, toxic-appearing or diaphoretic. HENT:      Head: Normocephalic and atraumatic. Right Ear: External ear normal.      Left Ear: External ear normal.      Nose: Nose normal.   Eyes:      General: No scleral icterus. Right eye: No discharge. Left eye: No discharge. Conjunctiva/sclera: Conjunctivae normal.      Pupils: Pupils are equal, round, and reactive to light. Neck:      Vascular: No carotid bruit. Trachea: No tracheal deviation. Cardiovascular:      Rate and Rhythm: Regular rhythm. Bradycardia present. Heart sounds: Normal heart sounds. No murmur heard. No friction rub. No gallop. Pulmonary:      Effort: Pulmonary effort is normal. No tachypnea, accessory muscle usage or respiratory distress. Breath sounds: Normal breath sounds. No stridor. No decreased breath sounds, wheezing, rhonchi or rales. Abdominal:      Palpations: Abdomen is soft. Musculoskeletal:         General: No tenderness or deformity. Normal range of motion. Cervical back: Normal range of motion and neck supple. Right lower leg: No edema. Left lower leg: No edema. Skin:     General: Skin is warm and dry. Coloration: Skin is not pale. Findings: No erythema or rash. Neurological:      Mental Status: He is alert and oriented to person, place, and time. GCS: GCS eye subscore is 4. GCS verbal subscore is 5. GCS motor subscore is 6.       Gait: Gait normal.   Psychiatric:         Speech: Speech normal.         Behavior: Behavior normal.         Thought Content: Thought content normal.         Judgment: Judgment normal.               ASSESSMENT/PLAN:  1. Type 2 diabetes mellitus with hyperglycemia, without long-term current use of insulin (HCC)  -uncontrolled start SGLT2  -pt and wife given info for savings card access online    -     POCT glycosylated hemoglobin (Hb A1C)  -     dapagliflozin (FARXIGA) 10 MG tablet; Take 1 tablet by mouth every morning, Disp-90 tablet, R-1Normal    2. Moderate protein-calorie malnutrition ([address]  -they have switched pt's protein shakes to Premier Protein which is reduced carb  -stable weight    3. Other cirrhosis of liver ([facility] [address]  4. Portal hypertension (HCC)  -following closely w/ GI, upcoming EGD    5. Prostate cancer ([facility] [address]  -stable, follows w/ urology Raymond Alvarez)      Return in about 3 months (around 6/8/2023), or if symptoms worsen or fail to improve, for DM. An electronic signature was used to authenticate this note.     --SCARLET Dodge - CNP

## 2023-03-09 ENCOUNTER — PATIENT MESSAGE (OUTPATIENT)
Dept: GASTROENTEROLOGY | Age: 73
End: 2023-03-09

## 2023-03-09 DIAGNOSIS — E87.1 HYPONATREMIA: Primary | ICD-10-CM

## 2023-03-16 ENCOUNTER — TELEPHONE (OUTPATIENT)
Dept: ONCOLOGY | Age: 73
End: 2023-03-16

## 2023-03-16 ENCOUNTER — OFFICE VISIT (OUTPATIENT)
Dept: ONCOLOGY | Age: 73
End: 2023-03-16
Payer: COMMERCIAL

## 2023-03-16 VITALS
DIASTOLIC BLOOD PRESSURE: 73 MMHG | RESPIRATION RATE: 16 BRPM | BODY MASS INDEX: 18.05 KG/M2 | SYSTOLIC BLOOD PRESSURE: 115 MMHG | TEMPERATURE: 97.1 F | WEIGHT: 133.1 LBS | HEART RATE: 59 BPM

## 2023-03-16 DIAGNOSIS — D61.818 PANCYTOPENIA (HCC): ICD-10-CM

## 2023-03-16 DIAGNOSIS — D47.2 MGUS (MONOCLONAL GAMMOPATHY OF UNKNOWN SIGNIFICANCE): Primary | ICD-10-CM

## 2023-03-16 PROCEDURE — 1123F ACP DISCUSS/DSCN MKR DOCD: CPT | Performed by: INTERNAL MEDICINE

## 2023-03-16 PROCEDURE — 99211 OFF/OP EST MAY X REQ PHY/QHP: CPT | Performed by: INTERNAL MEDICINE

## 2023-03-16 PROCEDURE — 3078F DIAST BP <80 MM HG: CPT | Performed by: INTERNAL MEDICINE

## 2023-03-16 PROCEDURE — 3074F SYST BP LT 130 MM HG: CPT | Performed by: INTERNAL MEDICINE

## 2023-03-16 PROCEDURE — 99214 OFFICE O/P EST MOD 30 MIN: CPT | Performed by: INTERNAL MEDICINE

## 2023-03-16 NOTE — PROGRESS NOTES
_     Chief Complaint   Patient presents with    Follow-up    Discuss Labs       DIAGNOSIS:    Anemia  Leukopenia  Liver cirrhosis  MGUS     Back pain        CURRENT THERAPY:    S/p hospitalization for further management of above issues. S/p blood transfusion  S/P IV Iron infusion. BRIEF CASE HISTORY:      The patient is a 77 y.o. AA male who is admitted to the hospital for severe anemia and leukopenia. He had no previous labs. He was seen by PCP for routine health exam as a new patient. Labs showed severe anemia and leukopenia. He had chronic back pain for one year. No fever. No night sweats. No lymphadenopathy   He has recent weight loss and anorexia. No GI symptoms. GI work up was negative. He received blood transfusion and IV Iron infusion. He had Rt hip surgery on 17/1/38 with no complications. He had left hip surgery in March 2015. No complications. Prostate surgery December 0058 with no complications. INTERIM HISTORY:   Patient is seen for follow up above problems. Patient is clinically stable. No active bleeding. No melena or hematochezia. No hematemesis. Patient had stool fit test which was positive. He had evaluation by gastroenterology. He had EGD and colonoscopy. Small polyps were resected. He was noted to have varices. He had liver ultrasound. Ultrasound shows possible cirrhosis. He had multiple other test done by gastroenterology. MRI of the liver showed questionable liver lesions concerning for malignancy. MRI also showed questionable problem with hemochromatosis. Liver biopsy was negative. Patient's ferritin level has been normal for so many years. He had previous problem with iron deficiency. Repeated labs continue to have iron deficiency. Oral iron will be resumed. Patient has cytopenia. No repeated infections. Clinically patient is doing fine. No weakness or fatigue. No fever or chills. No chest pain. No nausea or vomiting. He had recent problem with ascites and abdominal distention. He was evaluated by gastroenterology. He had 600 mL of acetic fluid. He is maintained on Lasix and Aldactone. He is doing much better now. PAST MEDICAL HISTORY: has a past medical history of Anemia, Arthritis, Avascular necrosis ([facility] [address] BPH (benign prostatic hyperplasia), Gastroesophageal reflux disease, History of blood transfusion, Hypertension, Iron deficiency anemia, Leukopenia, MGUS (monoclonal gammopathy of unknown significance), Osteoarthritis, Other cirrhosis of liver ([facility] [address] Patient in clinical research study, Positive FIT (fecal immunochemical test), and Stomach ulcer. PAST SURGICAL HISTORY: has a past surgical history that includes Endoscopy, colon, diagnostic (07/13/2014); Prostate Biopsy (09/12/2014); Hip Arthroplasty (Right, 12/03/2014); Hip Arthroplasty (Left, 03/10/2015); Prostatectomy (12/16/2015); Colonoscopy (07/14/2014); Upper gastrointestinal endoscopy (10/19/2016); pr office/outpt visit,procedure only (Left, 11/29/2018); Inguinal hernia repair (Right, 2009); Inguinal hernia repair (Left, 11/29/2018); Upper gastrointestinal endoscopy (09/25/2019); Colonoscopy (09/25/2019); Colonoscopy (N/A, 09/25/2019); and Upper gastrointestinal endoscopy (N/A, 09/25/2019). CURRENT MEDICATIONS:  has a current medication list which includes the following prescription(s): dapagliflozin, furosemide, pantoprazole, propranolol, cyclobenzaprine, potassium chloride, vitamin d, magnesium, calcium carbonate, multiple vitamins-minerals, and spironolactone. ALLERGIES:  has No Known Allergies. FAMILY HISTORY: Negative for any hematological or oncological conditions. SOCIAL HISTORY:  reports that he has never smoked. He has never used smokeless tobacco. He reports that he does not drink alcohol and does not use drugs.     REVIEW OF SYSTEMS:     General: + weakness + fatigue. + unanticipated weight loss + decreased appetite. No fever or chills. Eyes: No blurred vision, eye pain or double vision. Ears: No hearing problems or drainage. No tinnitus. Throat: No sore throat, problems with swallowing or dysphagia. Respiratory: No cough, sputum or hemoptysis. No shortness of breath. No pleuritic chest pain. Cardiovascular: No chest pain, orthopnea or PND. No lower extremity edema. No palpitation. Gastrointestinal: No problems with swallowing. No abdominal pain or bloating. No nausea or vomiting. No diarrhea or constipation. No GI bleeding. Genitourinary: No dysuria, hematuria, frequency or urgency. Musculoskeletal: No muscle aches or pains. No limitation of movement. + chronic back pain. No gait disturbance, No joint complaints. Dermatologic: No skin rashes or pruritus. No skin lesions or discolorations. Psychiatric: No depression, anxiety, or stress or signs of schizophrenia. No change in mood or affect. Hematologic: No history of bleeding tendency. No bruises or ecchymosis. No history of clotting problems. Infectious disease: No fever, chills or frequent infections. Endocrine: No problems with opacity. No polydipsia or polyuria. No temperature intolerance. Neurologic: No headaches or dizziness. No weakness or numbness of the extremities. No changes in balance, coordination, memory, mentation, behavior. Allergic/Immunologic: No nasal congestion or hives. No repeated infections. PHYSICAL EXAM:  The patient is not in acute distress. Vital signs: Blood pressure 115/73, pulse 59, temperature 97.1 °F (36.2 °C), temperature source Temporal, resp. rate 16, weight 133 lb 1.6 oz (60.4 kg).      General appearance - well appearing, not in pain or distress   Mental status - good mood, alert and oriented   Eyes - pupils equal and reactive, extraocular eye movements intact   Ears - bilateral TM's and external ear canals normal   Nose - normal and patent, no erythema, discharge or polyps   Mouth - mucous membranes moist, pharynx normal without lesions   Neck - supple, no significant adenopathy   Lymphatics - no palpable lymphadenopathy, no hepatosplenomegaly   Chest - clear to auscultation, no wheezes, rales or rhonchi, symmetric air entry   Heart - normal rate, regular rhythm, normal S1, S2, no murmurs, rubs, clicks or gallops   Abdomen - soft, nontender, nondistended, no masses or organomegaly   Neurological - alert, oriented, normal speech, no focal findings or movement disorder noted   Musculoskeletal -mild bilateral LE weakness   Extremities - peripheral pulses normal, no pedal edema, no clubbing or cyanosis   Skin - normal coloration and turgor, no rashes, no suspicious skin lesions noted       Lab Results   Component Value Date    WBC 1.1 (LL) 02/28/2023    HGB 11.9 (L) 02/28/2023    HCT 39.4 (L) 02/28/2023    MCV 83.8 02/28/2023    PLT 65 (L) 02/28/2023     Lab Results   Component Value Date    IRON 55 (L) 02/28/2023    TIBC 258 02/28/2023    FERRITIN 36 09/28/2022                 Chemistry        Component Value Date/Time     (L) 02/28/2023 1430    K 4.9 02/28/2023 1430    CL 98 02/28/2023 1430    CO2 27 02/28/2023 1430    BUN 13 02/28/2023 1430    CREATININE 0.80 02/28/2023 1430        Component Value Date/Time    CALCIUM 8.6 02/28/2023 1430    ALKPHOS 228 (H) 02/28/2023 1430    AST 37 02/28/2023 1430    ALT 34 02/28/2023 1430    BILITOT 0.9 02/28/2023 1430        Bone marrow: Final Diagnosis  PERIPHERAL BLOOD:  - SEVERE MICROCYTIC ANEMIA.  - MODERATE TO SEVERE NEUTROPENIA.  - LYMPHOPENIA (510/ul). BONE MARROW BIOPSY, ASPIRATE SMEAR AND CLOT SECTION:  - MYELOID AND MEGAKARYOCYTIC HYPERPLASIA. - ABSENT IRON STORES. - PLASMA CELLS 5-10%, NEGATIVE FOR PLASMA CELL MONOTYPIA FOR IHC. Diagnosis Comment  THE SEVERE MICROCYTIC ANEMIA IS COMPATIBLE WITH IRON DEFICIENCY  ANEMIA. NEUTROPENIA IS LIKELY DRUG INDUCED / IMMUNE-MEDIATED.  THERE  IS NO EVIDENCE OF MYELOID OR OTHER MALIGNANCY. PLASMA CELLS COMPRISE  5-10% OF THE CELLULARITY AND THERE IS NO EVIDENCE OF PLASMA CELL  MONOTYPIA BY IHC (SERUM IgG KAPPA MONOCLONAL PARAPROTEIN OF 0.6 G/dl  IS NOTED, FAVORING MGUS). CT scan: IMPRESSION: 1. Thickening of the sigmoid colon and rectum which maybe infectious or inflammatory etiology but may be neoplastic in etiology. Clinical correlation is recommended. Further evaluation with direct visualization is recommended. 2. Prostatomegaly. 3. Numerous low-attenuation lesions scattered in the liver are technically indeterminate. Further evaluation with nonemergent/outpatient contrast-enhanced MRI of the liver is recommended. 4. Severe degenerative changes of the bilateral hips. 5. Lucency of the proximal for more which may be related to demineralization; however, neoplastic etiologies cannot be excluded. This can be further evaluated with bone scan or MRI as clinically indicated. Note is made that this patient may be at increased risk for pathologic fracture. Bone scan: IMPRESSION: 1. No definitive scintigraphic evidence for metastatic disease to the skeleton. 2. Symmetric increased radiotracer uptake at the bilateral hips, likely secondary to severe degenerative changes of the bilateral hips seen on the CT abdomen pelvis of 7/12/2014. Underlying avascular necrosis is not excluded. Further evaluation with MR of the bilateral hips should be considered. 3. Symmetric increased radiotracer uptake involving the bilateral shoulders, elbows, wrists, hands, knees as well as the right foot, most likely degenerative. Abdomen MRI:   IMPRESSION:    1. Multiple T2 hyperintense lesion is noted within the liver largest    measuring approximately 1.6 CM x1.9 CM without significant associated    enhancement likely represent hepatic cyst however the noncontrasted    images are somewhat limited due to patient motion. Followup assessment is    advised in 4 months. 2. Splenomegaly   3. Redemonstration of sclerotic lesions within the left sacrum may    represent a bone island. Lab Results   Component Value Date    WBC 1.1 (LL) 02/28/2023    HGB 11.9 (L) 02/28/2023    HCT 39.4 (L) 02/28/2023    MCV 83.8 02/28/2023    PLT 65 (L) 02/28/2023    LYMPHOPCT 26 02/28/2023    RBC 4.70 02/28/2023    MCH 25.3 02/28/2023    MCHC 30.2 02/28/2023    RDW 18.6 (H) 02/28/2023    MONOPCT 37 (H) 02/28/2023    EOSPCT 4 06/04/2019    BASOPCT 1 02/28/2023    NEUTROABS 0.35 (LL) 02/28/2023    LYMPHSABS 0.29 (L) 02/28/2023    MONOSABS 0.41 02/28/2023    EOSABS 0.04 02/28/2023    BASOSABS 0.01 02/28/2023                 Lab Results   Component Value Date    IRON 55 (L) 02/28/2023    TIBC 258 02/28/2023    FERRITIN 36 09/28/2022           IMPRESSION:   Pancytopenia. Likely related to liver cirrhosis and possibly immune mediated. Anemia, multifactorial.   Liver cirrhosis  Weight loss. Recovering. MGUS     Back pain  Questionable liver lesions. Previous MRI was consistent with benign lesions. Repeated MRI showed no changes. Hips osteonecrosis. S/p surgery. Prostate cancer. ECOG performance score 0  S/p left inguinal hernia surgery. PLAN:    I explained to the patient all of the above. I reviewed the results of the EGD and colonoscopy and explained to the patient and his wife. Labs reviewed and discussed with the patient. Clinically, he did better after IV Iron infusion. Hb is stable. No active bleeding. We will resume oral iron. Liver MRI is suggestive of hemochromatosis. MRI also showed suspicious liver lesion with recommendation for biopsy. However the liver lesion is exact same size as it was seen on the previous MRI from 2014. Likely benign. However with these questionable readings on the MRI I liver biopsy was negative. Wbcs are still very low. He received Granix before surgery. He has no repeated infections. No need for daily treatment.   These abnormalities are at least in part related to liver cirrhosis as well. WBCs are likely immune mediated in addition to liver cirrhosis. We will continue to monitor. White blood cells are improving. We will consider treatment if he starts having repeated infections. Thrombocytopenia secondary to liver cirrhosis. Clinically stable. Continue to monitor PSA. RV 6 months. Labs at RV. We will check immunoglobulins level Every 6 months. Patient's questions were answered to the best of his satisfaction and he verbalized full understanding and agreement. 6 [facility], [facility]                            This note is created with the assistance of a speech recognition program.  While intending to generate a document that actually reflects the content of the visit, the document can still have some errors including those of syntax and sound a like substitutions which may escape proof reading. It such instances, actual meaning can be extrapolated by contextual diversion.

## 2023-03-16 NOTE — TELEPHONE ENCOUNTER
MELECIO ARRIVES AMBULATORY FOR MD VISIT  DR [employer] IN TO SEE PATIENT  ORDERS RECEIVED  RV 6 MONTHS WITH LABS BEFORE  LABS CDP CMP FE TIBC FERRITIN VITAMIN B12 & FOLATE AFP IGG IGA IGM PLATELET COUNT KAPPA LAMBDA FREE LIGHT CHAINS 09/05/23, ORDERS MAILED TO PT  MD VISIT 09/12/23 @3:45PM  AVS PRINTED AND GIVEN TO PATIENT WITH INSTRUCTIONS  PATIENT DISCHARGED AMBULATORY

## 2023-03-31 NOTE — TELEPHONE ENCOUNTER
Edilberto Andersen is calling to request a refill on the following medication(s):    Medication Request:  Requested Prescriptions     Pending Prescriptions Disp Refills    potassium chloride (KLOR-CON M) 20 MEQ extended release tablet [Pharmacy Med Name: POTASSIUM CHLORIDE JOHN ER 20 TBCR] 90 tablet 3     Sig: TAKE 1 TABLET BY MOUTH ONE TIME A DAY    pantoprazole (PROTONIX) 40 MG tablet [Pharmacy Med Name: PANTOPRAZOLE SODIUM 40MG TBEC] 90 tablet 1     Sig: TAKE 1 TABLET BY MOUTH ONE TIME A DAY       Last Visit Date (If Applicable):  81/31/9843    Next Visit Date:    Visit date not found Patient does not have a current phone number on file.     Letter sent to patient via mail and live well with directions from Luz Bush NP

## 2023-04-06 ENCOUNTER — HOSPITAL ENCOUNTER (OUTPATIENT)
Age: 73
Setting detail: SPECIMEN
Discharge: HOME | End: 2023-04-06

## 2023-04-06 DIAGNOSIS — E87.1 HYPONATREMIA: ICD-10-CM

## 2023-04-06 LAB
ALBUMIN SERPL-MCNC: 2.4 G/DL (ref 3.5–5.2)
ALBUMIN/GLOBULIN RATIO: 0.5 (ref 1–2.5)
ALP SERPL-CCNC: 233 U/L (ref 40–129)
ALT SERPL-CCNC: 52 U/L (ref 5–41)
ANION GAP SERPL CALCULATED.3IONS-SCNC: 10 MMOL/L (ref 9–17)
AST SERPL-CCNC: 55 U/L
BILIRUB SERPL-MCNC: 1 MG/DL (ref 0.3–1.2)
BUN SERPL-MCNC: 16 MG/DL (ref 8–23)
CALCIUM SERPL-MCNC: 8.8 MG/DL (ref 8.6–10.4)
CHLORIDE SERPL-SCNC: 102 MMOL/L (ref 98–107)
CO2 SERPL-SCNC: 23 MMOL/L (ref 20–31)
CREAT SERPL-MCNC: 0.66 MG/DL (ref 0.7–1.2)
GFR SERPL CREATININE-BSD FRML MDRD: >60 ML/MIN/1.73M2
GLUCOSE SERPL-MCNC: 144 MG/DL (ref 70–99)
POTASSIUM SERPL-SCNC: 4.8 MMOL/L (ref 3.7–5.3)
PROT SERPL-MCNC: 7.3 G/DL (ref 6.4–8.3)
SODIUM SERPL-SCNC: 135 MMOL/L (ref 135–144)

## 2023-04-07 DIAGNOSIS — K74.69 OTHER CIRRHOSIS OF LIVER (HCC): ICD-10-CM

## 2023-04-07 DIAGNOSIS — R79.89 ELEVATED LFTS: ICD-10-CM

## 2023-04-07 DIAGNOSIS — E11.65 TYPE 2 DIABETES MELLITUS WITH HYPERGLYCEMIA, WITHOUT LONG-TERM CURRENT USE OF INSULIN (HCC): Primary | ICD-10-CM

## 2023-05-01 ENCOUNTER — HOSPITAL ENCOUNTER (OUTPATIENT)
Age: 73
Setting detail: SPECIMEN
Discharge: HOME | End: 2023-05-01

## 2023-05-01 DIAGNOSIS — R79.89 ELEVATED LFTS: ICD-10-CM

## 2023-05-01 DIAGNOSIS — E11.65 TYPE 2 DIABETES MELLITUS WITH HYPERGLYCEMIA, WITHOUT LONG-TERM CURRENT USE OF INSULIN (HCC): ICD-10-CM

## 2023-05-01 DIAGNOSIS — K74.69 OTHER CIRRHOSIS OF LIVER (HCC): ICD-10-CM

## 2023-05-01 LAB
ALBUMIN SERPL-MCNC: 2.3 G/DL (ref 3.5–5.2)
ALBUMIN/GLOBULIN RATIO: 0.4 (ref 1–2.5)
ALP SERPL-CCNC: 238 U/L (ref 40–129)
ALT SERPL-CCNC: 50 U/L (ref 5–41)
ANION GAP SERPL CALCULATED.3IONS-SCNC: 11 MMOL/L (ref 9–17)
AST SERPL-CCNC: 51 U/L
BILIRUB SERPL-MCNC: 1.1 MG/DL (ref 0.3–1.2)
BUN SERPL-MCNC: 15 MG/DL (ref 8–23)
CALCIUM SERPL-MCNC: 8.8 MG/DL (ref 8.6–10.4)
CHLORIDE SERPL-SCNC: 106 MMOL/L (ref 98–107)
CO2 SERPL-SCNC: 23 MMOL/L (ref 20–31)
CREAT SERPL-MCNC: 0.85 MG/DL (ref 0.7–1.2)
GFR SERPL CREATININE-BSD FRML MDRD: >60 ML/MIN/1.73M2
GLUCOSE SERPL-MCNC: 167 MG/DL (ref 70–99)
POTASSIUM SERPL-SCNC: 4.5 MMOL/L (ref 3.7–5.3)
PROT SERPL-MCNC: 7.6 G/DL (ref 6.4–8.3)
SODIUM SERPL-SCNC: 140 MMOL/L (ref 135–144)

## 2023-05-08 ENCOUNTER — APPOINTMENT (OUTPATIENT)
Dept: GENERAL RADIOLOGY | Age: 73
DRG: 872 | End: 2023-05-08
Payer: COMMERCIAL

## 2023-05-08 ENCOUNTER — HOSPITAL ENCOUNTER (INPATIENT)
Age: 73
LOS: 8 days | Discharge: NURSING, REHAB OR LONG-TERM CARE | DRG: 872 | End: 2023-05-16
Attending: EMERGENCY MEDICINE | Admitting: FAMILY MEDICINE
Payer: COMMERCIAL

## 2023-05-08 DIAGNOSIS — E87.5 HYPERKALEMIA: ICD-10-CM

## 2023-05-08 DIAGNOSIS — E87.0 HYPERNATREMIA: Primary | ICD-10-CM

## 2023-05-08 DIAGNOSIS — L03.115 CELLULITIS OF RIGHT LOWER EXTREMITY: ICD-10-CM

## 2023-05-08 PROBLEM — L03.90 CELLULITIS: Status: ACTIVE | Noted: 2023-05-08

## 2023-05-08 LAB
ABSOLUTE EOS #: 0 K/UL (ref 0–0.4)
ABSOLUTE IMMATURE GRANULOCYTE: 0.12 K/UL (ref 0–0.3)
ABSOLUTE LYMPH #: 0.35 K/UL (ref 1–4.8)
ABSOLUTE MONO #: 0.69 K/UL (ref 0.2–0.8)
ALBUMIN SERPL-MCNC: 2.1 G/DL (ref 3.5–5.2)
ALP SERPL-CCNC: 165 U/L (ref 40–129)
ALT SERPL-CCNC: 34 U/L (ref 5–41)
ANION GAP SERPL CALCULATED.3IONS-SCNC: 11 MMOL/L (ref 9–17)
AST SERPL-CCNC: 31 U/L
BASOPHILS # BLD: 1 %
BASOPHILS ABSOLUTE: 0.12 K/UL (ref 0–0.2)
BILIRUB DIRECT SERPL-MCNC: 0.5 MG/DL
BILIRUB INDIRECT SERPL-MCNC: 0.9 MG/DL (ref 0–1)
BILIRUB SERPL-MCNC: 1.4 MG/DL (ref 0.3–1.2)
BUN SERPL-MCNC: 19 MG/DL (ref 8–23)
BUN/CREAT BLD: 21 (ref 9–20)
CALCIUM SERPL-MCNC: 8.3 MG/DL (ref 8.6–10.4)
CHLORIDE SERPL-SCNC: 96 MMOL/L (ref 98–107)
CO2 SERPL-SCNC: 19 MMOL/L (ref 20–31)
CREAT SERPL-MCNC: 0.92 MG/DL (ref 0.7–1.2)
EOSINOPHILS RELATIVE PERCENT: 0 % (ref 1–4)
GFR SERPL CREATININE-BSD FRML MDRD: >60 ML/MIN/1.73M2
GLUCOSE SERPL-MCNC: 237 MG/DL (ref 70–99)
HCT VFR BLD AUTO: 41.1 % (ref 40.7–50.3)
HGB BLD-MCNC: 12.5 G/DL (ref 13–17)
IMMATURE GRANULOCYTES: 1 %
LACTIC ACID, SEPSIS: 4 MMOL/L (ref 0.5–1.9)
LACTIC ACID, SEPSIS: 4.3 MMOL/L (ref 0.5–1.9)
LYMPHOCYTES # BLD: 3 % (ref 24–44)
MCH RBC QN AUTO: 25 PG (ref 25.2–33.5)
MCHC RBC AUTO-ENTMCNC: 30.4 G/DL (ref 28.4–34.8)
MCV RBC AUTO: 82 FL (ref 82.6–102.9)
MONOCYTES # BLD: 6 % (ref 1–7)
MORPHOLOGY: ABNORMAL
NRBC AUTOMATED: 0 PER 100 WBC
PDW BLD-RTO: 19.7 % (ref 11.8–14.4)
PLATELET # BLD AUTO: 62 K/UL (ref 138–453)
PMV BLD AUTO: ABNORMAL FL (ref 8.1–13.5)
POTASSIUM SERPL-SCNC: 4.7 MMOL/L (ref 3.7–5.3)
PROT SERPL-MCNC: 6.6 G/DL (ref 6.4–8.3)
RBC # BLD: 5.01 M/UL (ref 4.21–5.77)
SEG NEUTROPHILS: 89 % (ref 36–66)
SEGMENTED NEUTROPHILS ABSOLUTE COUNT: 10.22 K/UL (ref 1.8–7.7)
SODIUM SERPL-SCNC: 126 MMOL/L (ref 135–144)
WBC # BLD AUTO: 11.5 K/UL (ref 3.5–11.3)

## 2023-05-08 PROCEDURE — 87040 BLOOD CULTURE FOR BACTERIA: CPT

## 2023-05-08 PROCEDURE — 73590 X-RAY EXAM OF LOWER LEG: CPT

## 2023-05-08 PROCEDURE — 73502 X-RAY EXAM HIP UNI 2-3 VIEWS: CPT

## 2023-05-08 PROCEDURE — 6360000002 HC RX W HCPCS: Performed by: PHYSICIAN ASSISTANT

## 2023-05-08 PROCEDURE — 2580000003 HC RX 258: Performed by: PHYSICIAN ASSISTANT

## 2023-05-08 PROCEDURE — 85025 COMPLETE CBC W/AUTO DIFF WBC: CPT

## 2023-05-08 PROCEDURE — 82947 ASSAY GLUCOSE BLOOD QUANT: CPT

## 2023-05-08 PROCEDURE — 83036 HEMOGLOBIN GLYCOSYLATED A1C: CPT

## 2023-05-08 PROCEDURE — 83930 ASSAY OF BLOOD OSMOLALITY: CPT

## 2023-05-08 PROCEDURE — 2580000003 HC RX 258: Performed by: NURSE PRACTITIONER

## 2023-05-08 PROCEDURE — 87186 SC STD MICRODIL/AGAR DIL: CPT

## 2023-05-08 PROCEDURE — 84145 PROCALCITONIN (PCT): CPT

## 2023-05-08 PROCEDURE — 80048 BASIC METABOLIC PNL TOTAL CA: CPT

## 2023-05-08 PROCEDURE — 83605 ASSAY OF LACTIC ACID: CPT

## 2023-05-08 PROCEDURE — 87154 CUL TYP ID BLD PTHGN 6+ TRGT: CPT

## 2023-05-08 PROCEDURE — 73630 X-RAY EXAM OF FOOT: CPT

## 2023-05-08 PROCEDURE — 2060000000 HC ICU INTERMEDIATE R&B

## 2023-05-08 PROCEDURE — 36415 COLL VENOUS BLD VENIPUNCTURE: CPT

## 2023-05-08 PROCEDURE — 80076 HEPATIC FUNCTION PANEL: CPT

## 2023-05-08 PROCEDURE — 99285 EMERGENCY DEPT VISIT HI MDM: CPT

## 2023-05-08 PROCEDURE — 6370000000 HC RX 637 (ALT 250 FOR IP): Performed by: NURSE PRACTITIONER

## 2023-05-08 PROCEDURE — 87205 SMEAR GRAM STAIN: CPT

## 2023-05-08 PROCEDURE — 73552 X-RAY EXAM OF FEMUR 2/>: CPT

## 2023-05-08 PROCEDURE — 93971 EXTREMITY STUDY: CPT

## 2023-05-08 PROCEDURE — 99222 1ST HOSP IP/OBS MODERATE 55: CPT | Performed by: INTERNAL MEDICINE

## 2023-05-08 RX ORDER — POTASSIUM CHLORIDE 7.45 MG/ML
10 INJECTION INTRAVENOUS PRN
Status: DISCONTINUED | OUTPATIENT
Start: 2023-05-08 | End: 2023-05-16 | Stop reason: HOSPADM

## 2023-05-08 RX ORDER — SODIUM CHLORIDE 0.9 % (FLUSH) 0.9 %
5-40 SYRINGE (ML) INJECTION EVERY 12 HOURS SCHEDULED
Status: DISCONTINUED | OUTPATIENT
Start: 2023-05-08 | End: 2023-05-16 | Stop reason: HOSPADM

## 2023-05-08 RX ORDER — INSULIN LISPRO 100 [IU]/ML
0-4 INJECTION, SOLUTION INTRAVENOUS; SUBCUTANEOUS NIGHTLY
Status: DISCONTINUED | OUTPATIENT
Start: 2023-05-08 | End: 2023-05-09

## 2023-05-08 RX ORDER — ONDANSETRON 4 MG/1
4 TABLET, ORALLY DISINTEGRATING ORAL EVERY 8 HOURS PRN
Status: DISCONTINUED | OUTPATIENT
Start: 2023-05-08 | End: 2023-05-16 | Stop reason: HOSPADM

## 2023-05-08 RX ORDER — ENOXAPARIN SODIUM 100 MG/ML
40 INJECTION SUBCUTANEOUS DAILY
Status: CANCELLED | OUTPATIENT
Start: 2023-05-08

## 2023-05-08 RX ORDER — SODIUM CHLORIDE 0.9 % (FLUSH) 0.9 %
5-40 SYRINGE (ML) INJECTION PRN
Status: DISCONTINUED | OUTPATIENT
Start: 2023-05-08 | End: 2023-05-16 | Stop reason: HOSPADM

## 2023-05-08 RX ORDER — PANTOPRAZOLE SODIUM 40 MG/1
40 TABLET, DELAYED RELEASE ORAL
Status: DISCONTINUED | OUTPATIENT
Start: 2023-05-09 | End: 2023-05-16 | Stop reason: HOSPADM

## 2023-05-08 RX ORDER — ACETAMINOPHEN 650 MG/1
650 SUPPOSITORY RECTAL EVERY 6 HOURS PRN
Status: DISCONTINUED | OUTPATIENT
Start: 2023-05-08 | End: 2023-05-16 | Stop reason: HOSPADM

## 2023-05-08 RX ORDER — SODIUM CHLORIDE 9 MG/ML
INJECTION, SOLUTION INTRAVENOUS CONTINUOUS
Status: DISCONTINUED | OUTPATIENT
Start: 2023-05-08 | End: 2023-05-10

## 2023-05-08 RX ORDER — POLYETHYLENE GLYCOL 3350 17 G/17G
17 POWDER, FOR SOLUTION ORAL DAILY PRN
Status: DISCONTINUED | OUTPATIENT
Start: 2023-05-08 | End: 2023-05-16 | Stop reason: HOSPADM

## 2023-05-08 RX ORDER — SODIUM CHLORIDE 9 MG/ML
INJECTION, SOLUTION INTRAVENOUS PRN
Status: DISCONTINUED | OUTPATIENT
Start: 2023-05-08 | End: 2023-05-14 | Stop reason: SDUPTHER

## 2023-05-08 RX ORDER — INSULIN LISPRO 100 [IU]/ML
0-4 INJECTION, SOLUTION INTRAVENOUS; SUBCUTANEOUS
Status: DISCONTINUED | OUTPATIENT
Start: 2023-05-09 | End: 2023-05-09

## 2023-05-08 RX ORDER — POTASSIUM CHLORIDE 20 MEQ/1
40 TABLET, EXTENDED RELEASE ORAL PRN
Status: DISCONTINUED | OUTPATIENT
Start: 2023-05-08 | End: 2023-05-16 | Stop reason: HOSPADM

## 2023-05-08 RX ORDER — SODIUM CHLORIDE 9 MG/ML
INJECTION, SOLUTION INTRAVENOUS PRN
Status: DISCONTINUED | OUTPATIENT
Start: 2023-05-08 | End: 2023-05-16 | Stop reason: HOSPADM

## 2023-05-08 RX ORDER — 0.9 % SODIUM CHLORIDE 0.9 %
30 INTRAVENOUS SOLUTION INTRAVENOUS ONCE
Status: COMPLETED | OUTPATIENT
Start: 2023-05-08 | End: 2023-05-08

## 2023-05-08 RX ORDER — ACETAMINOPHEN 325 MG/1
650 TABLET ORAL EVERY 6 HOURS PRN
Status: DISCONTINUED | OUTPATIENT
Start: 2023-05-08 | End: 2023-05-16 | Stop reason: HOSPADM

## 2023-05-08 RX ORDER — SODIUM CHLORIDE 0.9 % (FLUSH) 0.9 %
5-40 SYRINGE (ML) INJECTION EVERY 12 HOURS SCHEDULED
Status: DISCONTINUED | OUTPATIENT
Start: 2023-05-08 | End: 2023-05-14 | Stop reason: SDUPTHER

## 2023-05-08 RX ORDER — PROPRANOLOL HCL 60 MG
60 CAPSULE, EXTENDED RELEASE 24HR ORAL DAILY
Status: DISCONTINUED | OUTPATIENT
Start: 2023-05-09 | End: 2023-05-09

## 2023-05-08 RX ORDER — MAGNESIUM SULFATE 1 G/100ML
1000 INJECTION INTRAVENOUS PRN
Status: DISCONTINUED | OUTPATIENT
Start: 2023-05-08 | End: 2023-05-16 | Stop reason: HOSPADM

## 2023-05-08 RX ORDER — DEXTROSE MONOHYDRATE 100 MG/ML
INJECTION, SOLUTION INTRAVENOUS CONTINUOUS PRN
Status: DISCONTINUED | OUTPATIENT
Start: 2023-05-08 | End: 2023-05-16 | Stop reason: HOSPADM

## 2023-05-08 RX ORDER — ONDANSETRON 2 MG/ML
4 INJECTION INTRAMUSCULAR; INTRAVENOUS EVERY 6 HOURS PRN
Status: DISCONTINUED | OUTPATIENT
Start: 2023-05-08 | End: 2023-05-16 | Stop reason: HOSPADM

## 2023-05-08 RX ORDER — SODIUM CHLORIDE 0.9 % (FLUSH) 0.9 %
10 SYRINGE (ML) INJECTION PRN
Status: DISCONTINUED | OUTPATIENT
Start: 2023-05-08 | End: 2023-05-14 | Stop reason: SDUPTHER

## 2023-05-08 RX ADMIN — VANCOMYCIN HYDROCHLORIDE 1500 MG: 5 INJECTION, POWDER, LYOPHILIZED, FOR SOLUTION INTRAVENOUS at 20:41

## 2023-05-08 RX ADMIN — SODIUM CHLORIDE: 9 INJECTION, SOLUTION INTRAVENOUS at 22:12

## 2023-05-08 RX ADMIN — CEFEPIME 2000 MG: 2 INJECTION, POWDER, FOR SOLUTION INTRAVENOUS at 19:46

## 2023-05-08 RX ADMIN — SODIUM CHLORIDE 1000 ML: 9 INJECTION, SOLUTION INTRAVENOUS at 19:46

## 2023-05-08 RX ADMIN — INSULIN LISPRO 4 UNITS: 100 INJECTION, SOLUTION INTRAVENOUS; SUBCUTANEOUS at 22:59

## 2023-05-08 ASSESSMENT — PAIN SCALES - GENERAL: PAINLEVEL_OUTOF10: 8

## 2023-05-08 ASSESSMENT — PAIN - FUNCTIONAL ASSESSMENT: PAIN_FUNCTIONAL_ASSESSMENT: 0-10

## 2023-05-09 PROBLEM — R79.89 ELEVATED LACTIC ACID LEVEL: Status: ACTIVE | Noted: 2023-05-09

## 2023-05-09 PROBLEM — A41.9 SEPSIS (HCC): Status: ACTIVE | Noted: 2023-05-09

## 2023-05-09 LAB
ABSOLUTE EOS #: 0 K/UL (ref 0–0.4)
ABSOLUTE IMMATURE GRANULOCYTE: 0 K/UL (ref 0–0.3)
ABSOLUTE LYMPH #: 0.46 K/UL (ref 1–4.8)
ABSOLUTE MONO #: 0.46 K/UL (ref 0.2–0.8)
ALBUMIN SERPL-MCNC: 1.7 G/DL (ref 3.5–5.2)
ALP SERPL-CCNC: 136 U/L (ref 40–129)
ALT SERPL-CCNC: 29 U/L (ref 5–41)
ANION GAP SERPL CALCULATED.3IONS-SCNC: 7 MMOL/L (ref 9–17)
AST SERPL-CCNC: 31 U/L
BASOPHILS # BLD: 0 %
BASOPHILS ABSOLUTE: 0 K/UL (ref 0–0.2)
BILIRUB SERPL-MCNC: 1.2 MG/DL (ref 0.3–1.2)
BUN SERPL-MCNC: 22 MG/DL (ref 8–23)
BUN/CREAT BLD: 29 (ref 9–20)
CALCIUM SERPL-MCNC: 7.9 MG/DL (ref 8.6–10.4)
CHLORIDE SERPL-SCNC: 100 MMOL/L (ref 98–107)
CO2 SERPL-SCNC: 20 MMOL/L (ref 20–31)
CREAT SERPL-MCNC: 0.75 MG/DL (ref 0.7–1.2)
CRP SERPL HS-MCNC: 186.4 MG/L (ref 0–5)
EOSINOPHILS RELATIVE PERCENT: 0 % (ref 1–4)
EST. AVERAGE GLUCOSE BLD GHB EST-MCNC: 166 MG/DL
FERRITIN SERPL-MCNC: 118 NG/ML (ref 30–400)
FOLATE SERPL-MCNC: 11.1 NG/ML
GFR SERPL CREATININE-BSD FRML MDRD: >60 ML/MIN/1.73M2
GLUCOSE BLD-MCNC: 225 MG/DL (ref 75–110)
GLUCOSE BLD-MCNC: 246 MG/DL (ref 75–110)
GLUCOSE BLD-MCNC: 259 MG/DL (ref 75–110)
GLUCOSE BLD-MCNC: 279 MG/DL (ref 75–110)
GLUCOSE BLD-MCNC: 308 MG/DL (ref 75–110)
GLUCOSE SERPL-MCNC: 242 MG/DL (ref 70–99)
HBA1C MFR BLD: 7.4 % (ref 4–6)
HCT VFR BLD AUTO: 33.6 % (ref 40.7–50.3)
HGB BLD-MCNC: 11 G/DL (ref 13–17)
IMMATURE GRANULOCYTES: 0 %
IRON SATURATION: 10 % (ref 20–55)
IRON SERPL-MCNC: 16 UG/DL (ref 59–158)
LYMPHOCYTES # BLD: 5 % (ref 24–44)
MCH RBC QN AUTO: 25.3 PG (ref 25.2–33.5)
MCHC RBC AUTO-ENTMCNC: 32.7 G/DL (ref 28.4–34.8)
MCV RBC AUTO: 77.4 FL (ref 82.6–102.9)
MONOCYTES # BLD: 5 % (ref 1–7)
MORPHOLOGY: ABNORMAL
NRBC AUTOMATED: 0 PER 100 WBC
PDW BLD-RTO: 18.7 % (ref 11.8–14.4)
PLATELET # BLD AUTO: 54 K/UL (ref 138–453)
PMV BLD AUTO: ABNORMAL FL (ref 8.1–13.5)
POTASSIUM SERPL-SCNC: 4.5 MMOL/L (ref 3.7–5.3)
PROCALCITONIN SERPL-MCNC: 12.46 NG/ML
PROSTATE SPECIFIC ANTIGEN: <0.02 NG/ML
PROT SERPL-MCNC: 6 G/DL (ref 6.4–8.3)
RBC # BLD: 4.34 M/UL (ref 4.21–5.77)
SEG NEUTROPHILS: 90 % (ref 36–66)
SEGMENTED NEUTROPHILS ABSOLUTE COUNT: 8.28 K/UL (ref 1.8–7.7)
SERUM OSMOLALITY: 283 MOSM/KG (ref 275–295)
SODIUM SERPL-SCNC: 126 MMOL/L (ref 135–144)
SODIUM SERPL-SCNC: 127 MMOL/L (ref 135–144)
TIBC SERPL-MCNC: 164 UG/DL (ref 250–450)
UNSATURATED IRON BINDING CAPACITY: 148 UG/DL (ref 112–347)
VIT B12 SERPL-MCNC: 1982 PG/ML (ref 232–1245)
WBC # BLD AUTO: 9.2 K/UL (ref 3.5–11.3)

## 2023-05-09 PROCEDURE — 84155 ASSAY OF PROTEIN SERUM: CPT

## 2023-05-09 PROCEDURE — 2580000003 HC RX 258: Performed by: PHYSICIAN ASSISTANT

## 2023-05-09 PROCEDURE — 84165 PROTEIN E-PHORESIS SERUM: CPT

## 2023-05-09 PROCEDURE — 82746 ASSAY OF FOLIC ACID SERUM: CPT

## 2023-05-09 PROCEDURE — 97110 THERAPEUTIC EXERCISES: CPT

## 2023-05-09 PROCEDURE — 6370000000 HC RX 637 (ALT 250 FOR IP): Performed by: NURSE PRACTITIONER

## 2023-05-09 PROCEDURE — 97530 THERAPEUTIC ACTIVITIES: CPT

## 2023-05-09 PROCEDURE — 6360000002 HC RX W HCPCS: Performed by: NURSE PRACTITIONER

## 2023-05-09 PROCEDURE — 83550 IRON BINDING TEST: CPT

## 2023-05-09 PROCEDURE — 2060000000 HC ICU INTERMEDIATE R&B

## 2023-05-09 PROCEDURE — 97112 NEUROMUSCULAR REEDUCATION: CPT

## 2023-05-09 PROCEDURE — 82728 ASSAY OF FERRITIN: CPT

## 2023-05-09 PROCEDURE — 86334 IMMUNOFIX E-PHORESIS SERUM: CPT

## 2023-05-09 PROCEDURE — 83521 IG LIGHT CHAINS FREE EACH: CPT

## 2023-05-09 PROCEDURE — 83540 ASSAY OF IRON: CPT

## 2023-05-09 PROCEDURE — 36415 COLL VENOUS BLD VENIPUNCTURE: CPT

## 2023-05-09 PROCEDURE — 2580000003 HC RX 258: Performed by: NURSE PRACTITIONER

## 2023-05-09 PROCEDURE — 99232 SBSQ HOSP IP/OBS MODERATE 35: CPT | Performed by: FAMILY MEDICINE

## 2023-05-09 PROCEDURE — 84153 ASSAY OF PSA TOTAL: CPT

## 2023-05-09 PROCEDURE — 85025 COMPLETE CBC W/AUTO DIFF WBC: CPT

## 2023-05-09 PROCEDURE — 80053 COMPREHEN METABOLIC PANEL: CPT

## 2023-05-09 PROCEDURE — 99223 1ST HOSP IP/OBS HIGH 75: CPT | Performed by: INTERNAL MEDICINE

## 2023-05-09 PROCEDURE — 2580000003 HC RX 258: Performed by: INTERNAL MEDICINE

## 2023-05-09 PROCEDURE — 6370000000 HC RX 637 (ALT 250 FOR IP): Performed by: INTERNAL MEDICINE

## 2023-05-09 PROCEDURE — 6370000000 HC RX 637 (ALT 250 FOR IP): Performed by: FAMILY MEDICINE

## 2023-05-09 PROCEDURE — 86140 C-REACTIVE PROTEIN: CPT

## 2023-05-09 PROCEDURE — 97167 OT EVAL HIGH COMPLEX 60 MIN: CPT

## 2023-05-09 PROCEDURE — 84295 ASSAY OF SERUM SODIUM: CPT

## 2023-05-09 PROCEDURE — 97535 SELF CARE MNGMENT TRAINING: CPT

## 2023-05-09 PROCEDURE — 82607 VITAMIN B-12: CPT

## 2023-05-09 PROCEDURE — 97162 PT EVAL MOD COMPLEX 30 MIN: CPT

## 2023-05-09 RX ORDER — CALCIUM CARBONATE 500 MG/1
500 TABLET, CHEWABLE ORAL DAILY
Status: DISCONTINUED | OUTPATIENT
Start: 2023-05-09 | End: 2023-05-16 | Stop reason: HOSPADM

## 2023-05-09 RX ORDER — INSULIN LISPRO 100 [IU]/ML
0-8 INJECTION, SOLUTION INTRAVENOUS; SUBCUTANEOUS
Status: DISCONTINUED | OUTPATIENT
Start: 2023-05-09 | End: 2023-05-16 | Stop reason: HOSPADM

## 2023-05-09 RX ORDER — INSULIN GLARGINE 100 [IU]/ML
10 INJECTION, SOLUTION SUBCUTANEOUS 2 TIMES DAILY
Status: DISCONTINUED | OUTPATIENT
Start: 2023-05-09 | End: 2023-05-16 | Stop reason: HOSPADM

## 2023-05-09 RX ORDER — TRAMADOL HYDROCHLORIDE 50 MG/1
50 TABLET ORAL EVERY 6 HOURS PRN
Status: DISCONTINUED | OUTPATIENT
Start: 2023-05-09 | End: 2023-05-16 | Stop reason: HOSPADM

## 2023-05-09 RX ORDER — LANOLIN ALCOHOL/MO/W.PET/CERES
400 CREAM (GRAM) TOPICAL EVERY OTHER DAY
Status: DISCONTINUED | OUTPATIENT
Start: 2023-05-09 | End: 2023-05-16 | Stop reason: HOSPADM

## 2023-05-09 RX ORDER — CYCLOBENZAPRINE HCL 10 MG
10 TABLET ORAL 3 TIMES DAILY PRN
Status: DISCONTINUED | OUTPATIENT
Start: 2023-05-09 | End: 2023-05-16 | Stop reason: HOSPADM

## 2023-05-09 RX ORDER — PROPRANOLOL HCL 60 MG
60 CAPSULE, EXTENDED RELEASE 24HR ORAL 2 TIMES DAILY
Status: DISCONTINUED | OUTPATIENT
Start: 2023-05-09 | End: 2023-05-13 | Stop reason: DRUGHIGH

## 2023-05-09 RX ORDER — INSULIN LISPRO 100 [IU]/ML
0-4 INJECTION, SOLUTION INTRAVENOUS; SUBCUTANEOUS NIGHTLY
Status: DISCONTINUED | OUTPATIENT
Start: 2023-05-09 | End: 2023-05-16 | Stop reason: HOSPADM

## 2023-05-09 RX ORDER — MIDODRINE HYDROCHLORIDE 10 MG/1
10 TABLET ORAL
Status: DISCONTINUED | OUTPATIENT
Start: 2023-05-09 | End: 2023-05-12

## 2023-05-09 RX ADMIN — SODIUM CHLORIDE: 9 INJECTION, SOLUTION INTRAVENOUS at 20:48

## 2023-05-09 RX ADMIN — PANTOPRAZOLE SODIUM 40 MG: 40 TABLET, DELAYED RELEASE ORAL at 06:32

## 2023-05-09 RX ADMIN — INSULIN LISPRO 2 UNITS: 100 INJECTION, SOLUTION INTRAVENOUS; SUBCUTANEOUS at 08:43

## 2023-05-09 RX ADMIN — INSULIN GLARGINE 10 UNITS: 100 INJECTION, SOLUTION SUBCUTANEOUS at 11:21

## 2023-05-09 RX ADMIN — Medication 400 MG: at 08:43

## 2023-05-09 RX ADMIN — Medication 500 MG: at 08:43

## 2023-05-09 RX ADMIN — CEFEPIME 2000 MG: 2 INJECTION, POWDER, FOR SOLUTION INTRAVENOUS at 08:55

## 2023-05-09 RX ADMIN — MIDODRINE HYDROCHLORIDE 10 MG: 10 TABLET ORAL at 13:03

## 2023-05-09 RX ADMIN — INSULIN GLARGINE 10 UNITS: 100 INJECTION, SOLUTION SUBCUTANEOUS at 20:45

## 2023-05-09 RX ADMIN — SODIUM CHLORIDE: 9 INJECTION, SOLUTION INTRAVENOUS at 17:26

## 2023-05-09 RX ADMIN — VANCOMYCIN HYDROCHLORIDE 1250 MG: 5 INJECTION, POWDER, LYOPHILIZED, FOR SOLUTION INTRAVENOUS at 20:49

## 2023-05-09 RX ADMIN — SODIUM CHLORIDE: 9 INJECTION, SOLUTION INTRAVENOUS at 20:47

## 2023-05-09 RX ADMIN — INSULIN LISPRO 2 UNITS: 100 INJECTION, SOLUTION INTRAVENOUS; SUBCUTANEOUS at 12:48

## 2023-05-09 RX ADMIN — INSULIN LISPRO 4 UNITS: 100 INJECTION, SOLUTION INTRAVENOUS; SUBCUTANEOUS at 17:22

## 2023-05-09 RX ADMIN — MIDODRINE HYDROCHLORIDE 10 MG: 10 TABLET ORAL at 17:22

## 2023-05-09 RX ADMIN — CEFEPIME 2000 MG: 2 INJECTION, POWDER, FOR SOLUTION INTRAVENOUS at 20:48

## 2023-05-09 RX ADMIN — PROPRANOLOL HYDROCHLORIDE 60 MG: 60 CAPSULE, EXTENDED RELEASE ORAL at 20:46

## 2023-05-10 ENCOUNTER — APPOINTMENT (OUTPATIENT)
Dept: GENERAL RADIOLOGY | Age: 73
DRG: 872 | End: 2023-05-10
Payer: COMMERCIAL

## 2023-05-10 ENCOUNTER — APPOINTMENT (OUTPATIENT)
Dept: CT IMAGING | Age: 73
DRG: 872 | End: 2023-05-10
Payer: COMMERCIAL

## 2023-05-10 PROBLEM — D69.6 THROMBOCYTOPENIA (HCC): Status: ACTIVE | Noted: 2023-05-10

## 2023-05-10 PROBLEM — K74.60 CIRRHOSIS OF LIVER WITH ASCITES (HCC): Status: ACTIVE | Noted: 2023-03-02

## 2023-05-10 PROBLEM — E87.0 HYPERNATREMIA: Status: ACTIVE | Noted: 2023-05-10

## 2023-05-10 PROBLEM — R18.8 CIRRHOSIS OF LIVER WITH ASCITES (HCC): Status: ACTIVE | Noted: 2023-03-02

## 2023-05-10 LAB
ALBUMIN (CALCULATED): 2 G/DL (ref 3.2–5.2)
ALBUMIN PERCENT: 34 % (ref 45–65)
ALBUMIN SERPL-MCNC: 1.6 G/DL (ref 3.5–5.2)
ALP SERPL-CCNC: 171 U/L (ref 40–129)
ALPHA 1 PERCENT: 3 % (ref 3–6)
ALPHA 2 PERCENT: 9 % (ref 6–13)
ALPHA1 GLOB SERPL ELPH-MCNC: 0.2 G/DL (ref 0.1–0.4)
ALPHA2 GLOB SERPL ELPH-MCNC: 0.5 G/DL (ref 0.5–0.9)
ALT SERPL-CCNC: 39 U/L (ref 5–41)
ANION GAP SERPL CALCULATED.3IONS-SCNC: 7 MMOL/L (ref 9–17)
AST SERPL-CCNC: 45 U/L
B-GLOBULIN SERPL ELPH-MCNC: 0.9 G/DL (ref 0.5–1.1)
BETA PERCENT: 15 % (ref 11–19)
BILIRUB SERPL-MCNC: 1.3 MG/DL (ref 0.3–1.2)
BUN SERPL-MCNC: 22 MG/DL (ref 8–23)
BUN/CREAT BLD: 37 (ref 9–20)
CALCIUM SERPL-MCNC: 7.8 MG/DL (ref 8.6–10.4)
CHLORIDE SERPL-SCNC: 102 MMOL/L (ref 98–107)
CO2 SERPL-SCNC: 21 MMOL/L (ref 20–31)
CREAT SERPL-MCNC: 0.6 MG/DL (ref 0.7–1.2)
CREAT SERPL-MCNC: 0.6 MG/DL (ref 0.7–1.2)
FREE KAPPA/LAMBDA RATIO: 0.97 (ref 0.26–1.65)
GAMMA GLOB SERPL ELPH-MCNC: 2.2 G/DL (ref 0.5–1.5)
GAMMA GLOBULIN %: 38 % (ref 9–20)
GFR SERPL CREATININE-BSD FRML MDRD: >60 ML/MIN/1.73M2
GFR SERPL CREATININE-BSD FRML MDRD: >60 ML/MIN/1.73M2
GLUCOSE BLD-MCNC: 175 MG/DL (ref 75–110)
GLUCOSE BLD-MCNC: 207 MG/DL (ref 75–110)
GLUCOSE BLD-MCNC: 220 MG/DL (ref 75–110)
GLUCOSE BLD-MCNC: 251 MG/DL (ref 75–110)
GLUCOSE SERPL-MCNC: 181 MG/DL (ref 70–99)
INR PPP: 1.4
KAPPA LC FREE SER-MCNC: 5.39 MG/DL (ref 0.37–1.94)
LACTIC ACID, SEPSIS: 2 MMOL/L (ref 0.5–1.9)
LAMBDA LC FREE SERPL-MCNC: 5.57 MG/DL (ref 0.57–2.63)
MICROORGANISM SPEC CULT: ABNORMAL
OSMOLALITY URINE: 701 MOSM/KG (ref 80–1300)
PATH REV BLD -IMP: NORMAL
PATHOLOGIST: ABNORMAL
PATHOLOGIST: ABNORMAL
POTASSIUM SERPL-SCNC: 4.3 MMOL/L (ref 3.7–5.3)
PROT SERPL-MCNC: 5.7 G/DL (ref 6.4–8.3)
PROT SERPL-MCNC: 5.8 G/DL (ref 6.4–8.3)
PROTEIN ELECTROPHORESIS, SERUM: ABNORMAL
PROTHROMBIN TIME: 16.9 SEC (ref 11.5–14.2)
SERUM IFX INTERP: ABNORMAL
SERVICE CMNT-IMP: ABNORMAL
SODIUM SERPL-SCNC: 127 MMOL/L (ref 135–144)
SODIUM SERPL-SCNC: 130 MMOL/L (ref 135–144)
SODIUM,UR: 20 MMOL/L
SPECIMEN DESCRIPTION: ABNORMAL
SURGICAL PATHOLOGY REPORT: NORMAL
TOTAL PROT. SUM,%: 99 % (ref 98–102)
TOTAL PROT. SUM: 5.8 G/DL (ref 6.3–8.2)
VANCOMYCIN RANDOM: 12.4 UG/ML

## 2023-05-10 PROCEDURE — 6370000000 HC RX 637 (ALT 250 FOR IP): Performed by: INTERNAL MEDICINE

## 2023-05-10 PROCEDURE — 83935 ASSAY OF URINE OSMOLALITY: CPT

## 2023-05-10 PROCEDURE — 99232 SBSQ HOSP IP/OBS MODERATE 35: CPT | Performed by: FAMILY MEDICINE

## 2023-05-10 PROCEDURE — 80202 ASSAY OF VANCOMYCIN: CPT

## 2023-05-10 PROCEDURE — 87641 MR-STAPH DNA AMP PROBE: CPT

## 2023-05-10 PROCEDURE — 6370000000 HC RX 637 (ALT 250 FOR IP): Performed by: FAMILY MEDICINE

## 2023-05-10 PROCEDURE — 82947 ASSAY GLUCOSE BLOOD QUANT: CPT

## 2023-05-10 PROCEDURE — 6360000002 HC RX W HCPCS: Performed by: NURSE PRACTITIONER

## 2023-05-10 PROCEDURE — 73701 CT LOWER EXTREMITY W/DYE: CPT

## 2023-05-10 PROCEDURE — 83605 ASSAY OF LACTIC ACID: CPT

## 2023-05-10 PROCEDURE — 6360000004 HC RX CONTRAST MEDICATION: Performed by: FAMILY MEDICINE

## 2023-05-10 PROCEDURE — 84295 ASSAY OF SERUM SODIUM: CPT

## 2023-05-10 PROCEDURE — 84300 ASSAY OF URINE SODIUM: CPT

## 2023-05-10 PROCEDURE — 71045 X-RAY EXAM CHEST 1 VIEW: CPT

## 2023-05-10 PROCEDURE — 85610 PROTHROMBIN TIME: CPT

## 2023-05-10 PROCEDURE — 2580000003 HC RX 258: Performed by: FAMILY MEDICINE

## 2023-05-10 PROCEDURE — 99233 SBSQ HOSP IP/OBS HIGH 50: CPT | Performed by: INTERNAL MEDICINE

## 2023-05-10 PROCEDURE — 99222 1ST HOSP IP/OBS MODERATE 55: CPT | Performed by: NURSE PRACTITIONER

## 2023-05-10 PROCEDURE — 2580000003 HC RX 258: Performed by: NURSE PRACTITIONER

## 2023-05-10 PROCEDURE — 80053 COMPREHEN METABOLIC PANEL: CPT

## 2023-05-10 PROCEDURE — 2060000000 HC ICU INTERMEDIATE R&B

## 2023-05-10 PROCEDURE — 6370000000 HC RX 637 (ALT 250 FOR IP): Performed by: NURSE PRACTITIONER

## 2023-05-10 PROCEDURE — 82565 ASSAY OF CREATININE: CPT

## 2023-05-10 PROCEDURE — 74177 CT ABD & PELVIS W/CONTRAST: CPT

## 2023-05-10 PROCEDURE — 36415 COLL VENOUS BLD VENIPUNCTURE: CPT

## 2023-05-10 RX ORDER — SODIUM CHLORIDE 0.9 % (FLUSH) 0.9 %
10 SYRINGE (ML) INJECTION PRN
Status: DISCONTINUED | OUTPATIENT
Start: 2023-05-10 | End: 2023-05-14 | Stop reason: SDUPTHER

## 2023-05-10 RX ORDER — 0.9 % SODIUM CHLORIDE 0.9 %
80 INTRAVENOUS SOLUTION INTRAVENOUS ONCE
Status: DISCONTINUED | OUTPATIENT
Start: 2023-05-10 | End: 2023-05-16 | Stop reason: HOSPADM

## 2023-05-10 RX ADMIN — INSULIN LISPRO 4 UNITS: 100 INJECTION, SOLUTION INTRAVENOUS; SUBCUTANEOUS at 18:23

## 2023-05-10 RX ADMIN — IOPAMIDOL 75 ML: 755 INJECTION, SOLUTION INTRAVENOUS at 17:51

## 2023-05-10 RX ADMIN — INSULIN GLARGINE 10 UNITS: 100 INJECTION, SOLUTION SUBCUTANEOUS at 09:20

## 2023-05-10 RX ADMIN — SODIUM CHLORIDE, PRESERVATIVE FREE 10 ML: 5 INJECTION INTRAVENOUS at 20:01

## 2023-05-10 RX ADMIN — MIDODRINE HYDROCHLORIDE 10 MG: 10 TABLET ORAL at 12:22

## 2023-05-10 RX ADMIN — MIDODRINE HYDROCHLORIDE 10 MG: 10 TABLET ORAL at 09:20

## 2023-05-10 RX ADMIN — INSULIN GLARGINE 10 UNITS: 100 INJECTION, SOLUTION SUBCUTANEOUS at 20:01

## 2023-05-10 RX ADMIN — CEFTRIAXONE SODIUM 2000 MG: 2 INJECTION, POWDER, FOR SOLUTION INTRAMUSCULAR; INTRAVENOUS at 14:35

## 2023-05-10 RX ADMIN — MIDODRINE HYDROCHLORIDE 10 MG: 10 TABLET ORAL at 18:23

## 2023-05-10 RX ADMIN — SODIUM CHLORIDE, PRESERVATIVE FREE 10 ML: 5 INJECTION INTRAVENOUS at 17:51

## 2023-05-10 RX ADMIN — CEFEPIME 2000 MG: 2 INJECTION, POWDER, FOR SOLUTION INTRAVENOUS at 09:43

## 2023-05-10 RX ADMIN — INSULIN LISPRO 2 UNITS: 100 INJECTION, SOLUTION INTRAVENOUS; SUBCUTANEOUS at 12:28

## 2023-05-10 RX ADMIN — PANTOPRAZOLE SODIUM 40 MG: 40 TABLET, DELAYED RELEASE ORAL at 06:05

## 2023-05-10 RX ADMIN — Medication 80 ML: at 17:52

## 2023-05-10 RX ADMIN — Medication 500 MG: at 09:21

## 2023-05-11 ENCOUNTER — APPOINTMENT (OUTPATIENT)
Dept: INTERVENTIONAL RADIOLOGY/VASCULAR | Age: 73
DRG: 872 | End: 2023-05-11
Payer: COMMERCIAL

## 2023-05-11 ENCOUNTER — APPOINTMENT (OUTPATIENT)
Dept: GENERAL RADIOLOGY | Age: 73
DRG: 872 | End: 2023-05-11
Payer: COMMERCIAL

## 2023-05-11 PROBLEM — K74.69 OTHER CIRRHOSIS OF LIVER (HCC): Status: ACTIVE | Noted: 2023-05-11

## 2023-05-11 PROBLEM — J90 PLEURAL EFFUSION ON RIGHT: Status: ACTIVE | Noted: 2023-05-11

## 2023-05-11 PROBLEM — Z85.46 HISTORY OF PROSTATE CANCER: Status: ACTIVE | Noted: 2023-05-11

## 2023-05-11 LAB
ABSOLUTE EOS #: 0.06 K/UL (ref 0–0.4)
ABSOLUTE IMMATURE GRANULOCYTE: 0 K/UL (ref 0–0.3)
ABSOLUTE LYMPH #: 0.2 K/UL (ref 1–4.8)
ABSOLUTE MONO #: 0.35 K/UL (ref 0.2–0.8)
ANION GAP SERPL CALCULATED.3IONS-SCNC: 7 MMOL/L (ref 9–17)
BASOPHILS # BLD: 0 %
BASOPHILS ABSOLUTE: 0 K/UL (ref 0–0.2)
BUN SERPL-MCNC: 21 MG/DL (ref 8–23)
BUN/CREAT BLD: 29 (ref 9–20)
CALCIUM SERPL-MCNC: 8.2 MG/DL (ref 8.6–10.4)
CHLORIDE SERPL-SCNC: 101 MMOL/L (ref 98–107)
CO2 SERPL-SCNC: 22 MMOL/L (ref 20–31)
CREAT SERPL-MCNC: 0.72 MG/DL (ref 0.7–1.2)
CRP SERPL HS-MCNC: 111.4 MG/L (ref 0–5)
EOSINOPHILS RELATIVE PERCENT: 2 % (ref 1–4)
FIO2: 21
GFR SERPL CREATININE-BSD FRML MDRD: >60 ML/MIN/1.73M2
GLUCOSE BLD-MCNC: 150 MG/DL (ref 75–110)
GLUCOSE BLD-MCNC: 274 MG/DL (ref 75–110)
GLUCOSE BLD-MCNC: 275 MG/DL (ref 75–110)
GLUCOSE SERPL-MCNC: 111 MG/DL (ref 70–99)
GLUCOSE, FLUID: 107 MG/DL
HCT VFR BLD AUTO: 39.1 % (ref 40.7–50.3)
HGB BLD-MCNC: 12.2 G/DL (ref 13–17)
IMMATURE GRANULOCYTES: 0 %
LACTATE DEHYDROGENASE, FLUID: 14 U/L
LACTATE DEHYDROGENASE, FLUID: 24 U/L
LYMPHOCYTES # BLD: 7 % (ref 24–44)
MCH RBC QN AUTO: 24.7 PG (ref 25.2–33.5)
MCHC RBC AUTO-ENTMCNC: 31.2 G/DL (ref 28.4–34.8)
MCV RBC AUTO: 79.1 FL (ref 82.6–102.9)
MICROORGANISM SPEC CULT: ABNORMAL
MONOCYTES # BLD: 12 % (ref 1–7)
MORPHOLOGY: ABNORMAL
MORPHOLOGY: ABNORMAL
MRSA, DNA, NASAL: NEGATIVE
NEGATIVE BASE EXCESS, ART: 3 (ref 0–2)
NRBC AUTOMATED: 0 PER 100 WBC
PARTIAL THROMBOPLASTIN TIME: 32.4 SEC (ref 23.9–33.8)
PDW BLD-RTO: 19.5 % (ref 11.8–14.4)
PH FLUID: 6
PH FLUID: 6
PLATELET # BLD AUTO: 53 K/UL (ref 138–453)
PMV BLD AUTO: ABNORMAL FL (ref 8.1–13.5)
POC HCO3: 20.3 MMOL/L (ref 21–28)
POC O2 SATURATION: 98 % (ref 94–98)
POC PCO2: 30 MM HG (ref 35–48)
POC PH: 7.44 (ref 7.35–7.45)
POC PO2: 94.7 MM HG (ref 83–108)
POTASSIUM SERPL-SCNC: 4.4 MMOL/L (ref 3.7–5.3)
RBC # BLD: 4.94 M/UL (ref 4.21–5.77)
SARS-COV-2 RDRP RESP QL NAA+PROBE: NOT DETECTED
SEG NEUTROPHILS: 79 % (ref 36–66)
SEGMENTED NEUTROPHILS ABSOLUTE COUNT: 2.29 K/UL (ref 1.8–7.7)
SERVICE CMNT-IMP: ABNORMAL
SODIUM SERPL-SCNC: 130 MMOL/L (ref 135–144)
SPECIMEN DESCRIPTION: ABNORMAL
SPECIMEN DESCRIPTION: NORMAL
SPECIMEN DESCRIPTION: NORMAL
SPECIMEN TYPE: NORMAL
TOTAL PROTEIN, BODY FLUID: <1 G/DL
TOTAL PROTEIN, BODY FLUID: <1 G/DL
WBC # BLD AUTO: 2.9 K/UL (ref 3.5–11.3)

## 2023-05-11 PROCEDURE — 36600 WITHDRAWAL OF ARTERIAL BLOOD: CPT

## 2023-05-11 PROCEDURE — 83615 LACTATE (LD) (LDH) ENZYME: CPT

## 2023-05-11 PROCEDURE — 87635 SARS-COV-2 COVID-19 AMP PRB: CPT

## 2023-05-11 PROCEDURE — 82945 GLUCOSE OTHER FLUID: CPT

## 2023-05-11 PROCEDURE — 99232 SBSQ HOSP IP/OBS MODERATE 35: CPT | Performed by: FAMILY MEDICINE

## 2023-05-11 PROCEDURE — 82947 ASSAY GLUCOSE BLOOD QUANT: CPT

## 2023-05-11 PROCEDURE — 6370000000 HC RX 637 (ALT 250 FOR IP): Performed by: NURSE PRACTITIONER

## 2023-05-11 PROCEDURE — 71045 X-RAY EXAM CHEST 1 VIEW: CPT

## 2023-05-11 PROCEDURE — 2700000000 HC OXYGEN THERAPY PER DAY

## 2023-05-11 PROCEDURE — 99232 SBSQ HOSP IP/OBS MODERATE 35: CPT | Performed by: NURSE PRACTITIONER

## 2023-05-11 PROCEDURE — 85025 COMPLETE CBC W/AUTO DIFF WBC: CPT

## 2023-05-11 PROCEDURE — 85730 THROMBOPLASTIN TIME PARTIAL: CPT

## 2023-05-11 PROCEDURE — 2709999900 IR US GUIDED PARACENTESIS

## 2023-05-11 PROCEDURE — 82803 BLOOD GASES ANY COMBINATION: CPT

## 2023-05-11 PROCEDURE — 83986 ASSAY PH BODY FLUID NOS: CPT

## 2023-05-11 PROCEDURE — 88305 TISSUE EXAM BY PATHOLOGIST: CPT

## 2023-05-11 PROCEDURE — 80048 BASIC METABOLIC PNL TOTAL CA: CPT

## 2023-05-11 PROCEDURE — 99232 SBSQ HOSP IP/OBS MODERATE 35: CPT | Performed by: INTERNAL MEDICINE

## 2023-05-11 PROCEDURE — 6370000000 HC RX 637 (ALT 250 FOR IP): Performed by: FAMILY MEDICINE

## 2023-05-11 PROCEDURE — 2580000003 HC RX 258: Performed by: PHYSICIAN ASSISTANT

## 2023-05-11 PROCEDURE — 86140 C-REACTIVE PROTEIN: CPT

## 2023-05-11 PROCEDURE — 87075 CULTR BACTERIA EXCEPT BLOOD: CPT

## 2023-05-11 PROCEDURE — 87206 SMEAR FLUORESCENT/ACID STAI: CPT

## 2023-05-11 PROCEDURE — 49083 ABD PARACENTESIS W/IMAGING: CPT

## 2023-05-11 PROCEDURE — 87086 URINE CULTURE/COLONY COUNT: CPT

## 2023-05-11 PROCEDURE — 6360000002 HC RX W HCPCS: Performed by: NURSE PRACTITIONER

## 2023-05-11 PROCEDURE — 84157 ASSAY OF PROTEIN OTHER: CPT

## 2023-05-11 PROCEDURE — 36415 COLL VENOUS BLD VENIPUNCTURE: CPT

## 2023-05-11 PROCEDURE — 87102 FUNGUS ISOLATION CULTURE: CPT

## 2023-05-11 PROCEDURE — 99222 1ST HOSP IP/OBS MODERATE 55: CPT | Performed by: PHYSICAL MEDICINE & REHABILITATION

## 2023-05-11 PROCEDURE — 2580000003 HC RX 258: Performed by: NURSE PRACTITIONER

## 2023-05-11 PROCEDURE — 87205 SMEAR GRAM STAIN: CPT

## 2023-05-11 PROCEDURE — 32555 ASPIRATE PLEURA W/ IMAGING: CPT

## 2023-05-11 PROCEDURE — 2709999900 IR GUIDED THORACENTESIS PLEURAL

## 2023-05-11 PROCEDURE — 89051 BODY FLUID CELL COUNT: CPT

## 2023-05-11 PROCEDURE — 94660 CPAP INITIATION&MGMT: CPT

## 2023-05-11 PROCEDURE — 2580000003 HC RX 258: Performed by: FAMILY MEDICINE

## 2023-05-11 PROCEDURE — 87070 CULTURE OTHR SPECIMN AEROBIC: CPT

## 2023-05-11 PROCEDURE — 88112 CYTOPATH CELL ENHANCE TECH: CPT

## 2023-05-11 PROCEDURE — 87116 MYCOBACTERIA CULTURE: CPT

## 2023-05-11 PROCEDURE — 6370000000 HC RX 637 (ALT 250 FOR IP): Performed by: INTERNAL MEDICINE

## 2023-05-11 PROCEDURE — 94761 N-INVAS EAR/PLS OXIMETRY MLT: CPT

## 2023-05-11 PROCEDURE — 2060000000 HC ICU INTERMEDIATE R&B

## 2023-05-11 RX ADMIN — SODIUM CHLORIDE, PRESERVATIVE FREE 10 ML: 5 INJECTION INTRAVENOUS at 21:26

## 2023-05-11 RX ADMIN — Medication 400 MG: at 10:30

## 2023-05-11 RX ADMIN — SODIUM CHLORIDE, PRESERVATIVE FREE 10 ML: 5 INJECTION INTRAVENOUS at 10:32

## 2023-05-11 RX ADMIN — PANTOPRAZOLE SODIUM 40 MG: 40 TABLET, DELAYED RELEASE ORAL at 10:30

## 2023-05-11 RX ADMIN — SODIUM CHLORIDE, PRESERVATIVE FREE 10 ML: 5 INJECTION INTRAVENOUS at 10:31

## 2023-05-11 RX ADMIN — CEFTRIAXONE SODIUM 2000 MG: 2 INJECTION, POWDER, FOR SOLUTION INTRAMUSCULAR; INTRAVENOUS at 15:06

## 2023-05-11 RX ADMIN — Medication 500 MG: at 13:02

## 2023-05-11 RX ADMIN — INSULIN LISPRO 4 UNITS: 100 INJECTION, SOLUTION INTRAVENOUS; SUBCUTANEOUS at 18:06

## 2023-05-11 RX ADMIN — INSULIN GLARGINE 10 UNITS: 100 INJECTION, SOLUTION SUBCUTANEOUS at 13:05

## 2023-05-11 RX ADMIN — MIDODRINE HYDROCHLORIDE 10 MG: 10 TABLET ORAL at 18:07

## 2023-05-11 RX ADMIN — MIDODRINE HYDROCHLORIDE 10 MG: 10 TABLET ORAL at 10:18

## 2023-05-11 RX ADMIN — SODIUM CHLORIDE, PRESERVATIVE FREE 10 ML: 5 INJECTION INTRAVENOUS at 16:02

## 2023-05-11 RX ADMIN — INSULIN GLARGINE 10 UNITS: 100 INJECTION, SOLUTION SUBCUTANEOUS at 21:25

## 2023-05-11 RX ADMIN — MIDODRINE HYDROCHLORIDE 10 MG: 10 TABLET ORAL at 13:01

## 2023-05-11 ASSESSMENT — PAIN SCALES - GENERAL
PAINLEVEL_OUTOF10: 0

## 2023-05-11 ASSESSMENT — ENCOUNTER SYMPTOMS
RESPIRATORY NEGATIVE: 1
GASTROINTESTINAL NEGATIVE: 1

## 2023-05-12 ENCOUNTER — APPOINTMENT (OUTPATIENT)
Dept: GENERAL RADIOLOGY | Age: 73
DRG: 872 | End: 2023-05-12
Payer: COMMERCIAL

## 2023-05-12 PROBLEM — R78.81 BACTEREMIA DUE TO GRAM-NEGATIVE BACTERIA: Status: ACTIVE | Noted: 2023-05-12

## 2023-05-12 LAB
ABSOLUTE EOS #: 0.11 K/UL (ref 0–0.4)
ABSOLUTE IMMATURE GRANULOCYTE: 0.06 K/UL (ref 0–0.3)
ABSOLUTE LYMPH #: 0.25 K/UL (ref 1–4.8)
ABSOLUTE MONO #: 0.69 K/UL (ref 0.2–0.8)
ANION GAP SERPL CALCULATED.3IONS-SCNC: 6 MMOL/L (ref 9–17)
BASOPHILS # BLD: 1 %
BASOPHILS ABSOLUTE: 0.01 K/UL (ref 0–0.2)
BUN SERPL-MCNC: 23 MG/DL (ref 8–23)
BUN/CREAT BLD: 38 (ref 9–20)
CALCIUM SERPL-MCNC: 8.3 MG/DL (ref 8.6–10.4)
CASE NUMBER:: NORMAL
CASE NUMBER:: NORMAL
CHLORIDE SERPL-SCNC: 101 MMOL/L (ref 98–107)
CO2 SERPL-SCNC: 22 MMOL/L (ref 20–31)
CREAT SERPL-MCNC: 0.6 MG/DL (ref 0.7–1.2)
EOSINOPHILS RELATIVE PERCENT: 8 % (ref 1–4)
GFR SERPL CREATININE-BSD FRML MDRD: >60 ML/MIN/1.73M2
GLUCOSE BLD-MCNC: 180 MG/DL (ref 75–110)
GLUCOSE BLD-MCNC: 190 MG/DL (ref 75–110)
GLUCOSE BLD-MCNC: 239 MG/DL (ref 75–110)
GLUCOSE BLD-MCNC: 256 MG/DL (ref 75–110)
GLUCOSE SERPL-MCNC: 163 MG/DL (ref 70–99)
HCT VFR BLD AUTO: 36.7 % (ref 40.7–50.3)
HGB BLD-MCNC: 11.7 G/DL (ref 13–17)
IMMATURE GRANULOCYTES: 4 %
INR PPP: 1.4
LYMPHOCYTES # BLD: 18 % (ref 24–44)
MCH RBC QN AUTO: 24.8 PG (ref 25.2–33.5)
MCHC RBC AUTO-ENTMCNC: 31.9 G/DL (ref 28.4–34.8)
MCV RBC AUTO: 77.9 FL (ref 82.6–102.9)
MICROORGANISM SPEC CULT: NO GROWTH
MONOCYTES # BLD: 49 % (ref 1–7)
MORPHOLOGY: ABNORMAL
MORPHOLOGY: ABNORMAL
NRBC AUTOMATED: 0 PER 100 WBC
PDW BLD-RTO: 19.2 % (ref 11.8–14.4)
PLATELET # BLD AUTO: 52 K/UL (ref 138–453)
PMV BLD AUTO: ABNORMAL FL (ref 8.1–13.5)
POTASSIUM SERPL-SCNC: 4.7 MMOL/L (ref 3.7–5.3)
PROTHROMBIN TIME: 16.7 SEC (ref 11.5–14.2)
RBC # BLD: 4.71 M/UL (ref 4.21–5.77)
SEG NEUTROPHILS: 20 % (ref 36–66)
SEGMENTED NEUTROPHILS ABSOLUTE COUNT: 0.28 K/UL (ref 1.8–7.7)
SODIUM SERPL-SCNC: 129 MMOL/L (ref 135–144)
SPECIMEN DESCRIPTION: NORMAL
WBC # BLD AUTO: 1.4 K/UL (ref 3.5–11.3)

## 2023-05-12 PROCEDURE — 2580000003 HC RX 258: Performed by: NURSE PRACTITIONER

## 2023-05-12 PROCEDURE — 85610 PROTHROMBIN TIME: CPT

## 2023-05-12 PROCEDURE — 80048 BASIC METABOLIC PNL TOTAL CA: CPT

## 2023-05-12 PROCEDURE — 36415 COLL VENOUS BLD VENIPUNCTURE: CPT

## 2023-05-12 PROCEDURE — 71045 X-RAY EXAM CHEST 1 VIEW: CPT

## 2023-05-12 PROCEDURE — 99232 SBSQ HOSP IP/OBS MODERATE 35: CPT | Performed by: INTERNAL MEDICINE

## 2023-05-12 PROCEDURE — 94761 N-INVAS EAR/PLS OXIMETRY MLT: CPT

## 2023-05-12 PROCEDURE — 6360000002 HC RX W HCPCS: Performed by: NURSE PRACTITIONER

## 2023-05-12 PROCEDURE — 2580000003 HC RX 258: Performed by: PHYSICIAN ASSISTANT

## 2023-05-12 PROCEDURE — 82947 ASSAY GLUCOSE BLOOD QUANT: CPT

## 2023-05-12 PROCEDURE — 99232 SBSQ HOSP IP/OBS MODERATE 35: CPT | Performed by: FAMILY MEDICINE

## 2023-05-12 PROCEDURE — 97530 THERAPEUTIC ACTIVITIES: CPT

## 2023-05-12 PROCEDURE — 2060000000 HC ICU INTERMEDIATE R&B

## 2023-05-12 PROCEDURE — 97535 SELF CARE MNGMENT TRAINING: CPT

## 2023-05-12 PROCEDURE — 6370000000 HC RX 637 (ALT 250 FOR IP): Performed by: NURSE PRACTITIONER

## 2023-05-12 PROCEDURE — 6370000000 HC RX 637 (ALT 250 FOR IP): Performed by: FAMILY MEDICINE

## 2023-05-12 PROCEDURE — 6370000000 HC RX 637 (ALT 250 FOR IP): Performed by: INTERNAL MEDICINE

## 2023-05-12 PROCEDURE — 85025 COMPLETE CBC W/AUTO DIFF WBC: CPT

## 2023-05-12 RX ORDER — SPIRONOLACTONE 25 MG/1
25 TABLET ORAL DAILY
Status: DISCONTINUED | OUTPATIENT
Start: 2023-05-12 | End: 2023-05-13

## 2023-05-12 RX ORDER — MIDODRINE HYDROCHLORIDE 10 MG/1
10 TABLET ORAL
Status: DISCONTINUED | OUTPATIENT
Start: 2023-05-12 | End: 2023-05-14

## 2023-05-12 RX ADMIN — SODIUM CHLORIDE, PRESERVATIVE FREE 10 ML: 5 INJECTION INTRAVENOUS at 09:17

## 2023-05-12 RX ADMIN — INSULIN GLARGINE 10 UNITS: 100 INJECTION, SOLUTION SUBCUTANEOUS at 20:16

## 2023-05-12 RX ADMIN — MIDODRINE HYDROCHLORIDE 10 MG: 10 TABLET ORAL at 17:18

## 2023-05-12 RX ADMIN — MIDODRINE HYDROCHLORIDE 10 MG: 10 TABLET ORAL at 09:16

## 2023-05-12 RX ADMIN — PANTOPRAZOLE SODIUM 40 MG: 40 TABLET, DELAYED RELEASE ORAL at 06:43

## 2023-05-12 RX ADMIN — MIDODRINE HYDROCHLORIDE 10 MG: 10 TABLET ORAL at 13:12

## 2023-05-12 RX ADMIN — SPIRONOLACTONE 25 MG: 25 TABLET ORAL at 17:18

## 2023-05-12 RX ADMIN — TRAMADOL HYDROCHLORIDE 50 MG: 50 TABLET, COATED ORAL at 09:16

## 2023-05-12 RX ADMIN — TRAMADOL HYDROCHLORIDE 50 MG: 50 TABLET, COATED ORAL at 20:15

## 2023-05-12 RX ADMIN — Medication 500 MG: at 09:17

## 2023-05-12 RX ADMIN — CEFTRIAXONE SODIUM 2000 MG: 2 INJECTION, POWDER, FOR SOLUTION INTRAMUSCULAR; INTRAVENOUS at 13:19

## 2023-05-12 RX ADMIN — SODIUM CHLORIDE, PRESERVATIVE FREE 10 ML: 5 INJECTION INTRAVENOUS at 09:19

## 2023-05-12 RX ADMIN — SODIUM CHLORIDE, PRESERVATIVE FREE 10 ML: 5 INJECTION INTRAVENOUS at 20:35

## 2023-05-12 RX ADMIN — INSULIN LISPRO 2 UNITS: 100 INJECTION, SOLUTION INTRAVENOUS; SUBCUTANEOUS at 17:18

## 2023-05-12 RX ADMIN — INSULIN GLARGINE 10 UNITS: 100 INJECTION, SOLUTION SUBCUTANEOUS at 09:16

## 2023-05-12 RX ADMIN — MIDODRINE HYDROCHLORIDE 10 MG: 10 TABLET ORAL at 20:15

## 2023-05-12 ASSESSMENT — PAIN DESCRIPTION - LOCATION
LOCATION: LEG
LOCATION: LEG

## 2023-05-12 ASSESSMENT — PAIN SCALES - GENERAL
PAINLEVEL_OUTOF10: 8
PAINLEVEL_OUTOF10: 7

## 2023-05-12 ASSESSMENT — ENCOUNTER SYMPTOMS
COLOR CHANGE: 1
GASTROINTESTINAL NEGATIVE: 1
RESPIRATORY NEGATIVE: 1

## 2023-05-12 ASSESSMENT — PAIN DESCRIPTION - ORIENTATION: ORIENTATION: RIGHT

## 2023-05-12 ASSESSMENT — PAIN DESCRIPTION - DESCRIPTORS: DESCRIPTORS: PRESSURE;THROBBING

## 2023-05-13 ENCOUNTER — APPOINTMENT (OUTPATIENT)
Dept: GENERAL RADIOLOGY | Age: 73
DRG: 872 | End: 2023-05-13
Payer: COMMERCIAL

## 2023-05-13 LAB
ABSOLUTE EOS #: 0.15 K/UL (ref 0–0.4)
ABSOLUTE IMMATURE GRANULOCYTE: 0.11 K/UL (ref 0–0.3)
ABSOLUTE LYMPH #: 0.35 K/UL (ref 1–4.8)
ABSOLUTE MONO #: 0.56 K/UL (ref 0.2–0.8)
ALBUMIN SERPL-MCNC: 1.9 G/DL (ref 3.5–5.2)
ALP SERPL-CCNC: 205 U/L (ref 40–129)
ALT SERPL-CCNC: 51 U/L (ref 5–41)
ANION GAP SERPL CALCULATED.3IONS-SCNC: 6 MMOL/L (ref 9–17)
AST SERPL-CCNC: 55 U/L
BASOPHILS # BLD: 1 %
BASOPHILS ABSOLUTE: 0.01 K/UL (ref 0–0.2)
BILIRUB SERPL-MCNC: 0.9 MG/DL (ref 0.3–1.2)
BUN SERPL-MCNC: 17 MG/DL (ref 8–23)
BUN/CREAT BLD: 33 (ref 9–20)
CALCIUM SERPL-MCNC: 7.9 MG/DL (ref 8.6–10.4)
CHLORIDE SERPL-SCNC: 102 MMOL/L (ref 98–107)
CO2 SERPL-SCNC: 22 MMOL/L (ref 20–31)
CREAT SERPL-MCNC: 0.51 MG/DL (ref 0.7–1.2)
CRP SERPL HS-MCNC: 40.3 MG/L (ref 0–5)
EOSINOPHILS RELATIVE PERCENT: 11 % (ref 1–4)
GFR SERPL CREATININE-BSD FRML MDRD: >60 ML/MIN/1.73M2
GLUCOSE BLD-MCNC: 119 MG/DL (ref 75–110)
GLUCOSE BLD-MCNC: 162 MG/DL (ref 75–110)
GLUCOSE BLD-MCNC: 199 MG/DL (ref 75–110)
GLUCOSE BLD-MCNC: 208 MG/DL (ref 75–110)
GLUCOSE SERPL-MCNC: 133 MG/DL (ref 70–99)
HCT VFR BLD AUTO: 36.6 % (ref 40.7–50.3)
HGB BLD-MCNC: 11.4 G/DL (ref 13–17)
IMMATURE GRANULOCYTES: 8 %
INR PPP: 1.3
LYMPHOCYTES # BLD: 25 % (ref 24–44)
LYMPHOCYTES, BODY FLUID: 20 %
LYMPHOCYTES, BODY FLUID: 9 %
MCH RBC QN AUTO: 24.5 PG (ref 25.2–33.5)
MCHC RBC AUTO-ENTMCNC: 31.1 G/DL (ref 28.4–34.8)
MCV RBC AUTO: 78.5 FL (ref 82.6–102.9)
MONOCYTES # BLD: 39 % (ref 1–7)
MORPHOLOGY: ABNORMAL
NEUTROPHIL, FLUID: 11 %
NEUTROPHIL, FLUID: 14 %
NRBC AUTOMATED: 0 PER 100 WBC
OTHER CELLS FLUID: NORMAL %
OTHER CELLS FLUID: NORMAL %
PDW BLD-RTO: 19.9 % (ref 11.8–14.4)
PLATELET # BLD AUTO: 61 K/UL (ref 138–453)
PMV BLD AUTO: ABNORMAL FL (ref 8.1–13.5)
POTASSIUM SERPL-SCNC: 4.4 MMOL/L (ref 3.7–5.3)
PROT SERPL-MCNC: 6.2 G/DL (ref 6.4–8.3)
PROTHROMBIN TIME: 16.3 SEC (ref 11.5–14.2)
RBC # BLD: 4.66 M/UL (ref 4.21–5.77)
RBC FLUID: <3000 CELLS/UL
RBC FLUID: <3000 CELLS/UL
SEG NEUTROPHILS: 16 % (ref 36–66)
SEGMENTED NEUTROPHILS ABSOLUTE COUNT: 0.22 K/UL (ref 1.8–7.7)
SODIUM SERPL-SCNC: 130 MMOL/L (ref 135–144)
SPECIMEN TYPE: NORMAL
SPECIMEN TYPE: NORMAL
WBC # BLD AUTO: 1.4 K/UL (ref 3.5–11.3)
WBC FLUID: 55 CELLS/UL
WBC FLUID: 59 CELLS/UL

## 2023-05-13 PROCEDURE — 6370000000 HC RX 637 (ALT 250 FOR IP): Performed by: INTERNAL MEDICINE

## 2023-05-13 PROCEDURE — 99231 SBSQ HOSP IP/OBS SF/LOW 25: CPT | Performed by: INTERNAL MEDICINE

## 2023-05-13 PROCEDURE — 71045 X-RAY EXAM CHEST 1 VIEW: CPT

## 2023-05-13 PROCEDURE — 6360000002 HC RX W HCPCS: Performed by: FAMILY MEDICINE

## 2023-05-13 PROCEDURE — 82947 ASSAY GLUCOSE BLOOD QUANT: CPT

## 2023-05-13 PROCEDURE — 2580000003 HC RX 258: Performed by: NURSE PRACTITIONER

## 2023-05-13 PROCEDURE — 36415 COLL VENOUS BLD VENIPUNCTURE: CPT

## 2023-05-13 PROCEDURE — 2060000000 HC ICU INTERMEDIATE R&B

## 2023-05-13 PROCEDURE — 80053 COMPREHEN METABOLIC PANEL: CPT

## 2023-05-13 PROCEDURE — 99233 SBSQ HOSP IP/OBS HIGH 50: CPT | Performed by: INTERNAL MEDICINE

## 2023-05-13 PROCEDURE — 97110 THERAPEUTIC EXERCISES: CPT

## 2023-05-13 PROCEDURE — 99232 SBSQ HOSP IP/OBS MODERATE 35: CPT | Performed by: FAMILY MEDICINE

## 2023-05-13 PROCEDURE — 86140 C-REACTIVE PROTEIN: CPT

## 2023-05-13 PROCEDURE — 6370000000 HC RX 637 (ALT 250 FOR IP): Performed by: NURSE PRACTITIONER

## 2023-05-13 PROCEDURE — 6360000002 HC RX W HCPCS: Performed by: NURSE PRACTITIONER

## 2023-05-13 PROCEDURE — 85610 PROTHROMBIN TIME: CPT

## 2023-05-13 PROCEDURE — 6370000000 HC RX 637 (ALT 250 FOR IP): Performed by: FAMILY MEDICINE

## 2023-05-13 PROCEDURE — 97116 GAIT TRAINING THERAPY: CPT

## 2023-05-13 PROCEDURE — 85025 COMPLETE CBC W/AUTO DIFF WBC: CPT

## 2023-05-13 PROCEDURE — 2580000003 HC RX 258: Performed by: PHYSICIAN ASSISTANT

## 2023-05-13 RX ORDER — ENOXAPARIN SODIUM 100 MG/ML
40 INJECTION SUBCUTANEOUS DAILY
Status: DISCONTINUED | OUTPATIENT
Start: 2023-05-13 | End: 2023-05-16 | Stop reason: HOSPADM

## 2023-05-13 RX ORDER — PROPRANOLOL HYDROCHLORIDE 10 MG/1
20 TABLET ORAL 2 TIMES DAILY
Status: DISCONTINUED | OUTPATIENT
Start: 2023-05-13 | End: 2023-05-16 | Stop reason: HOSPADM

## 2023-05-13 RX ORDER — SPIRONOLACTONE 25 MG/1
50 TABLET ORAL DAILY
Status: DISCONTINUED | OUTPATIENT
Start: 2023-05-14 | End: 2023-05-14

## 2023-05-13 RX ADMIN — Medication 500 MG: at 08:55

## 2023-05-13 RX ADMIN — ENOXAPARIN SODIUM 40 MG: 100 INJECTION SUBCUTANEOUS at 13:39

## 2023-05-13 RX ADMIN — MIDODRINE HYDROCHLORIDE 10 MG: 10 TABLET ORAL at 17:15

## 2023-05-13 RX ADMIN — Medication 400 MG: at 08:52

## 2023-05-13 RX ADMIN — TRAMADOL HYDROCHLORIDE 50 MG: 50 TABLET, COATED ORAL at 08:52

## 2023-05-13 RX ADMIN — INSULIN GLARGINE 10 UNITS: 100 INJECTION, SOLUTION SUBCUTANEOUS at 08:47

## 2023-05-13 RX ADMIN — TRAMADOL HYDROCHLORIDE 50 MG: 50 TABLET, COATED ORAL at 18:40

## 2023-05-13 RX ADMIN — SODIUM CHLORIDE, PRESERVATIVE FREE 10 ML: 5 INJECTION INTRAVENOUS at 08:56

## 2023-05-13 RX ADMIN — MIDODRINE HYDROCHLORIDE 10 MG: 10 TABLET ORAL at 13:05

## 2023-05-13 RX ADMIN — INSULIN LISPRO 2 UNITS: 100 INJECTION, SOLUTION INTRAVENOUS; SUBCUTANEOUS at 17:10

## 2023-05-13 RX ADMIN — INSULIN GLARGINE 10 UNITS: 100 INJECTION, SOLUTION SUBCUTANEOUS at 21:44

## 2023-05-13 RX ADMIN — PROPRANOLOL HYDROCHLORIDE 20 MG: 10 TABLET ORAL at 10:09

## 2023-05-13 RX ADMIN — SODIUM CHLORIDE, PRESERVATIVE FREE 10 ML: 5 INJECTION INTRAVENOUS at 21:45

## 2023-05-13 RX ADMIN — MIDODRINE HYDROCHLORIDE 10 MG: 10 TABLET ORAL at 08:52

## 2023-05-13 RX ADMIN — PROPRANOLOL HYDROCHLORIDE 20 MG: 10 TABLET ORAL at 21:44

## 2023-05-13 RX ADMIN — PANTOPRAZOLE SODIUM 40 MG: 40 TABLET, DELAYED RELEASE ORAL at 05:49

## 2023-05-13 RX ADMIN — SPIRONOLACTONE 25 MG: 25 TABLET ORAL at 08:53

## 2023-05-13 RX ADMIN — CEFTRIAXONE SODIUM 2000 MG: 2 INJECTION, POWDER, FOR SOLUTION INTRAMUSCULAR; INTRAVENOUS at 13:08

## 2023-05-13 ASSESSMENT — ENCOUNTER SYMPTOMS
COLOR CHANGE: 1
RESPIRATORY NEGATIVE: 1
GASTROINTESTINAL NEGATIVE: 1

## 2023-05-13 ASSESSMENT — PAIN SCALES - GENERAL
PAINLEVEL_OUTOF10: 10
PAINLEVEL_OUTOF10: 0
PAINLEVEL_OUTOF10: 8

## 2023-05-13 ASSESSMENT — PAIN DESCRIPTION - DESCRIPTORS: DESCRIPTORS: THROBBING

## 2023-05-14 LAB
GLUCOSE BLD-MCNC: 101 MG/DL (ref 75–110)
GLUCOSE BLD-MCNC: 122 MG/DL (ref 75–110)
GLUCOSE BLD-MCNC: 129 MG/DL (ref 75–110)
GLUCOSE BLD-MCNC: 185 MG/DL (ref 75–110)
GLUCOSE BLD-MCNC: 213 MG/DL (ref 75–110)
INR PPP: 1.4
PROTHROMBIN TIME: 17 SEC (ref 11.5–14.2)

## 2023-05-14 PROCEDURE — 6360000002 HC RX W HCPCS: Performed by: FAMILY MEDICINE

## 2023-05-14 PROCEDURE — 2580000003 HC RX 258: Performed by: PHYSICIAN ASSISTANT

## 2023-05-14 PROCEDURE — 6370000000 HC RX 637 (ALT 250 FOR IP): Performed by: FAMILY MEDICINE

## 2023-05-14 PROCEDURE — 99232 SBSQ HOSP IP/OBS MODERATE 35: CPT | Performed by: FAMILY MEDICINE

## 2023-05-14 PROCEDURE — 1200000000 HC SEMI PRIVATE

## 2023-05-14 PROCEDURE — 6360000002 HC RX W HCPCS: Performed by: NURSE PRACTITIONER

## 2023-05-14 PROCEDURE — 97535 SELF CARE MNGMENT TRAINING: CPT

## 2023-05-14 PROCEDURE — 6370000000 HC RX 637 (ALT 250 FOR IP): Performed by: NURSE PRACTITIONER

## 2023-05-14 PROCEDURE — 2580000003 HC RX 258: Performed by: NURSE PRACTITIONER

## 2023-05-14 PROCEDURE — 85610 PROTHROMBIN TIME: CPT

## 2023-05-14 PROCEDURE — 6370000000 HC RX 637 (ALT 250 FOR IP): Performed by: INTERNAL MEDICINE

## 2023-05-14 PROCEDURE — 99232 SBSQ HOSP IP/OBS MODERATE 35: CPT | Performed by: INTERNAL MEDICINE

## 2023-05-14 PROCEDURE — 82947 ASSAY GLUCOSE BLOOD QUANT: CPT

## 2023-05-14 PROCEDURE — 97530 THERAPEUTIC ACTIVITIES: CPT

## 2023-05-14 PROCEDURE — 36415 COLL VENOUS BLD VENIPUNCTURE: CPT

## 2023-05-14 RX ORDER — SPIRONOLACTONE 100 MG/1
100 TABLET, FILM COATED ORAL DAILY
Status: DISCONTINUED | OUTPATIENT
Start: 2023-05-15 | End: 2023-05-16 | Stop reason: HOSPADM

## 2023-05-14 RX ORDER — MIDODRINE HYDROCHLORIDE 5 MG/1
5 TABLET ORAL
Status: DISCONTINUED | OUTPATIENT
Start: 2023-05-14 | End: 2023-05-15

## 2023-05-14 RX ADMIN — ENOXAPARIN SODIUM 40 MG: 100 INJECTION SUBCUTANEOUS at 07:40

## 2023-05-14 RX ADMIN — MIDODRINE HYDROCHLORIDE 5 MG: 5 TABLET ORAL at 14:15

## 2023-05-14 RX ADMIN — TRAMADOL HYDROCHLORIDE 50 MG: 50 TABLET, COATED ORAL at 08:55

## 2023-05-14 RX ADMIN — INSULIN GLARGINE 10 UNITS: 100 INJECTION, SOLUTION SUBCUTANEOUS at 07:39

## 2023-05-14 RX ADMIN — PROPRANOLOL HYDROCHLORIDE 20 MG: 10 TABLET ORAL at 21:10

## 2023-05-14 RX ADMIN — Medication 500 MG: at 07:39

## 2023-05-14 RX ADMIN — SPIRONOLACTONE 50 MG: 25 TABLET ORAL at 07:41

## 2023-05-14 RX ADMIN — MIDODRINE HYDROCHLORIDE 10 MG: 10 TABLET ORAL at 07:42

## 2023-05-14 RX ADMIN — INSULIN GLARGINE 10 UNITS: 100 INJECTION, SOLUTION SUBCUTANEOUS at 21:10

## 2023-05-14 RX ADMIN — SODIUM CHLORIDE, PRESERVATIVE FREE 10 ML: 5 INJECTION INTRAVENOUS at 07:45

## 2023-05-14 RX ADMIN — PROPRANOLOL HYDROCHLORIDE 20 MG: 10 TABLET ORAL at 07:41

## 2023-05-14 RX ADMIN — PANTOPRAZOLE SODIUM 40 MG: 40 TABLET, DELAYED RELEASE ORAL at 07:44

## 2023-05-14 RX ADMIN — CEFTRIAXONE SODIUM 2000 MG: 2 INJECTION, POWDER, FOR SOLUTION INTRAMUSCULAR; INTRAVENOUS at 14:18

## 2023-05-14 RX ADMIN — MIDODRINE HYDROCHLORIDE 5 MG: 5 TABLET ORAL at 17:29

## 2023-05-14 RX ADMIN — SODIUM CHLORIDE, PRESERVATIVE FREE 10 ML: 5 INJECTION INTRAVENOUS at 21:10

## 2023-05-14 ASSESSMENT — PAIN SCALES - GENERAL
PAINLEVEL_OUTOF10: 10
PAINLEVEL_OUTOF10: 0
PAINLEVEL_OUTOF10: 8

## 2023-05-14 ASSESSMENT — ENCOUNTER SYMPTOMS
RESPIRATORY NEGATIVE: 1
GASTROINTESTINAL NEGATIVE: 1
COLOR CHANGE: 1

## 2023-05-14 ASSESSMENT — PAIN DESCRIPTION - LOCATION: LOCATION: LEG

## 2023-05-15 ENCOUNTER — APPOINTMENT (OUTPATIENT)
Dept: ULTRASOUND IMAGING | Age: 73
DRG: 872 | End: 2023-05-15
Payer: COMMERCIAL

## 2023-05-15 ENCOUNTER — APPOINTMENT (OUTPATIENT)
Dept: GENERAL RADIOLOGY | Age: 73
DRG: 872 | End: 2023-05-15
Payer: COMMERCIAL

## 2023-05-15 LAB
ABSOLUTE EOS #: 0.06 K/UL (ref 0–0.4)
ABSOLUTE IMMATURE GRANULOCYTE: 0.01 K/UL (ref 0–0.3)
ABSOLUTE LYMPH #: 0.26 K/UL (ref 1–4.8)
ABSOLUTE MONO #: 0.5 K/UL (ref 0.2–0.8)
ALBUMIN SERPL-MCNC: 2 G/DL (ref 3.5–5.2)
ALP SERPL-CCNC: 257 U/L (ref 40–129)
ALT SERPL-CCNC: 51 U/L (ref 5–41)
ANION GAP SERPL CALCULATED.3IONS-SCNC: 8 MMOL/L (ref 9–17)
AST SERPL-CCNC: 51 U/L
BASOPHILS # BLD: 1 %
BASOPHILS ABSOLUTE: 0.01 K/UL (ref 0–0.2)
BILIRUB SERPL-MCNC: 1 MG/DL (ref 0.3–1.2)
BUN SERPL-MCNC: 11 MG/DL (ref 8–23)
BUN/CREAT BLD: 24 (ref 9–20)
CALCIUM SERPL-MCNC: 8 MG/DL (ref 8.6–10.4)
CHLORIDE SERPL-SCNC: 100 MMOL/L (ref 98–107)
CO2 SERPL-SCNC: 25 MMOL/L (ref 20–31)
CREAT SERPL-MCNC: 0.45 MG/DL (ref 0.7–1.2)
CRP SERPL HS-MCNC: 24.4 MG/L (ref 0–5)
EOSINOPHILS RELATIVE PERCENT: 5 % (ref 1–4)
GFR SERPL CREATININE-BSD FRML MDRD: >60 ML/MIN/1.73M2
GLUCOSE BLD-MCNC: 120 MG/DL (ref 75–110)
GLUCOSE BLD-MCNC: 122 MG/DL (ref 75–110)
GLUCOSE BLD-MCNC: 165 MG/DL (ref 75–110)
GLUCOSE BLD-MCNC: 224 MG/DL (ref 75–110)
GLUCOSE SERPL-MCNC: 161 MG/DL (ref 70–99)
HCT VFR BLD AUTO: 38.7 % (ref 40.7–50.3)
HGB BLD-MCNC: 12.1 G/DL (ref 13–17)
IMMATURE GRANULOCYTES: 1 %
LYMPHOCYTES # BLD: 24 % (ref 24–44)
MCH RBC QN AUTO: 24.9 PG (ref 25.2–33.5)
MCHC RBC AUTO-ENTMCNC: 31.3 G/DL (ref 28.4–34.8)
MCV RBC AUTO: 79.8 FL (ref 82.6–102.9)
MICROORGANISM SPEC CULT: NORMAL
MICROORGANISM/AGENT SPEC: NORMAL
MICROORGANISM/AGENT SPEC: NORMAL
MONOCYTES # BLD: 45 % (ref 1–7)
MORPHOLOGY: ABNORMAL
NRBC AUTOMATED: 0 PER 100 WBC
PDW BLD-RTO: 20.2 % (ref 11.8–14.4)
PLATELET # BLD AUTO: 79 K/UL (ref 138–453)
PMV BLD AUTO: ABNORMAL FL (ref 8.1–13.5)
POTASSIUM SERPL-SCNC: 4.6 MMOL/L (ref 3.7–5.3)
PROT SERPL-MCNC: 6.6 G/DL (ref 6.4–8.3)
RBC # BLD: 4.85 M/UL (ref 4.21–5.77)
SEG NEUTROPHILS: 24 % (ref 36–66)
SEGMENTED NEUTROPHILS ABSOLUTE COUNT: 0.26 K/UL (ref 1.8–7.7)
SODIUM SERPL-SCNC: 133 MMOL/L (ref 135–144)
SPECIMEN DESCRIPTION: NORMAL
SURGICAL PATHOLOGY REPORT: NORMAL
WBC # BLD AUTO: 1.1 K/UL (ref 3.5–11.3)

## 2023-05-15 PROCEDURE — 0W993ZZ DRAINAGE OF RIGHT PLEURAL CAVITY, PERCUTANEOUS APPROACH: ICD-10-PCS | Performed by: RADIOLOGY

## 2023-05-15 PROCEDURE — 99232 SBSQ HOSP IP/OBS MODERATE 35: CPT | Performed by: FAMILY MEDICINE

## 2023-05-15 PROCEDURE — 97530 THERAPEUTIC ACTIVITIES: CPT

## 2023-05-15 PROCEDURE — 99232 SBSQ HOSP IP/OBS MODERATE 35: CPT | Performed by: INTERNAL MEDICINE

## 2023-05-15 PROCEDURE — 6370000000 HC RX 637 (ALT 250 FOR IP): Performed by: FAMILY MEDICINE

## 2023-05-15 PROCEDURE — 86140 C-REACTIVE PROTEIN: CPT

## 2023-05-15 PROCEDURE — 36415 COLL VENOUS BLD VENIPUNCTURE: CPT

## 2023-05-15 PROCEDURE — 85025 COMPLETE CBC W/AUTO DIFF WBC: CPT

## 2023-05-15 PROCEDURE — 80053 COMPREHEN METABOLIC PANEL: CPT

## 2023-05-15 PROCEDURE — 97116 GAIT TRAINING THERAPY: CPT

## 2023-05-15 PROCEDURE — 87040 BLOOD CULTURE FOR BACTERIA: CPT

## 2023-05-15 PROCEDURE — 1200000000 HC SEMI PRIVATE

## 2023-05-15 PROCEDURE — 71045 X-RAY EXAM CHEST 1 VIEW: CPT

## 2023-05-15 PROCEDURE — 97110 THERAPEUTIC EXERCISES: CPT

## 2023-05-15 PROCEDURE — 6370000000 HC RX 637 (ALT 250 FOR IP): Performed by: INTERNAL MEDICINE

## 2023-05-15 PROCEDURE — 93971 EXTREMITY STUDY: CPT

## 2023-05-15 PROCEDURE — 97535 SELF CARE MNGMENT TRAINING: CPT

## 2023-05-15 PROCEDURE — 82947 ASSAY GLUCOSE BLOOD QUANT: CPT

## 2023-05-15 PROCEDURE — 2580000003 HC RX 258: Performed by: NURSE PRACTITIONER

## 2023-05-15 PROCEDURE — 6360000002 HC RX W HCPCS: Performed by: NURSE PRACTITIONER

## 2023-05-15 PROCEDURE — 6370000000 HC RX 637 (ALT 250 FOR IP): Performed by: NURSE PRACTITIONER

## 2023-05-15 PROCEDURE — 32555 ASPIRATE PLEURA W/ IMAGING: CPT

## 2023-05-15 PROCEDURE — 99231 SBSQ HOSP IP/OBS SF/LOW 25: CPT | Performed by: INTERNAL MEDICINE

## 2023-05-15 RX ORDER — MIDODRINE HYDROCHLORIDE 5 MG/1
5 TABLET ORAL 3 TIMES DAILY PRN
Status: DISCONTINUED | OUTPATIENT
Start: 2023-05-15 | End: 2023-05-16 | Stop reason: HOSPADM

## 2023-05-15 RX ORDER — FUROSEMIDE 20 MG/1
20 TABLET ORAL DAILY
Qty: 60 TABLET | Refills: 3 | Status: ON HOLD | OUTPATIENT
Start: 2023-05-15 | End: 2023-05-24 | Stop reason: HOSPADM

## 2023-05-15 RX ORDER — MIDODRINE HYDROCHLORIDE 5 MG/1
5 TABLET ORAL 3 TIMES DAILY PRN
Qty: 90 TABLET | Refills: 3 | Status: ON HOLD | OUTPATIENT
Start: 2023-05-15 | End: 2023-05-24 | Stop reason: HOSPADM

## 2023-05-15 RX ORDER — ACETAMINOPHEN 325 MG/1
650 TABLET ORAL EVERY 4 HOURS PRN
Status: CANCELLED | OUTPATIENT
Start: 2023-05-15

## 2023-05-15 RX ORDER — PROPRANOLOL HYDROCHLORIDE 20 MG/1
20 TABLET ORAL 2 TIMES DAILY
Qty: 90 TABLET | Refills: 3 | Status: ON HOLD | OUTPATIENT
Start: 2023-05-15 | End: 2023-05-24 | Stop reason: HOSPADM

## 2023-05-15 RX ORDER — LEVOFLOXACIN 500 MG/1
500 TABLET, FILM COATED ORAL DAILY
Qty: 7 TABLET | Refills: 0 | Status: ON HOLD | OUTPATIENT
Start: 2023-05-15 | End: 2023-05-24 | Stop reason: HOSPADM

## 2023-05-15 RX ORDER — FUROSEMIDE 20 MG/1
20 TABLET ORAL DAILY
Status: DISCONTINUED | OUTPATIENT
Start: 2023-05-15 | End: 2023-05-16 | Stop reason: HOSPADM

## 2023-05-15 RX ORDER — TRAMADOL HYDROCHLORIDE 50 MG/1
50 TABLET ORAL EVERY 6 HOURS PRN
Qty: 9 TABLET | Refills: 0 | Status: ON HOLD | OUTPATIENT
Start: 2023-05-15 | End: 2023-05-23

## 2023-05-15 RX ADMIN — CEFTRIAXONE SODIUM 2000 MG: 2 INJECTION, POWDER, FOR SOLUTION INTRAMUSCULAR; INTRAVENOUS at 14:28

## 2023-05-15 RX ADMIN — PROPRANOLOL HYDROCHLORIDE 20 MG: 10 TABLET ORAL at 09:07

## 2023-05-15 RX ADMIN — SODIUM CHLORIDE, PRESERVATIVE FREE 10 ML: 5 INJECTION INTRAVENOUS at 09:08

## 2023-05-15 RX ADMIN — Medication 400 MG: at 09:07

## 2023-05-15 RX ADMIN — Medication 500 MG: at 09:14

## 2023-05-15 RX ADMIN — SODIUM CHLORIDE, PRESERVATIVE FREE 10 ML: 5 INJECTION INTRAVENOUS at 20:48

## 2023-05-15 RX ADMIN — PANTOPRAZOLE SODIUM 40 MG: 40 TABLET, DELAYED RELEASE ORAL at 06:04

## 2023-05-15 RX ADMIN — SPIRONOLACTONE 100 MG: 100 TABLET ORAL at 09:07

## 2023-05-15 RX ADMIN — INSULIN GLARGINE 10 UNITS: 100 INJECTION, SOLUTION SUBCUTANEOUS at 09:07

## 2023-05-15 RX ADMIN — MIDODRINE HYDROCHLORIDE 5 MG: 5 TABLET ORAL at 14:15

## 2023-05-15 RX ADMIN — FUROSEMIDE 20 MG: 20 TABLET ORAL at 16:31

## 2023-05-15 RX ADMIN — MIDODRINE HYDROCHLORIDE 5 MG: 5 TABLET ORAL at 16:31

## 2023-05-15 RX ADMIN — INSULIN GLARGINE 10 UNITS: 100 INJECTION, SOLUTION SUBCUTANEOUS at 20:47

## 2023-05-15 RX ADMIN — MIDODRINE HYDROCHLORIDE 5 MG: 5 TABLET ORAL at 09:07

## 2023-05-15 RX ADMIN — TRAMADOL HYDROCHLORIDE 50 MG: 50 TABLET, COATED ORAL at 16:01

## 2023-05-15 RX ADMIN — TRAMADOL HYDROCHLORIDE 50 MG: 50 TABLET, COATED ORAL at 09:17

## 2023-05-15 RX ADMIN — PROPRANOLOL HYDROCHLORIDE 20 MG: 10 TABLET ORAL at 20:48

## 2023-05-15 ASSESSMENT — PAIN DESCRIPTION - DESCRIPTORS
DESCRIPTORS: ACHING
DESCRIPTORS: ACHING

## 2023-05-15 ASSESSMENT — PAIN SCALES - GENERAL
PAINLEVEL_OUTOF10: 7
PAINLEVEL_OUTOF10: 8

## 2023-05-15 ASSESSMENT — PAIN DESCRIPTION - ORIENTATION
ORIENTATION: RIGHT
ORIENTATION: RIGHT

## 2023-05-15 ASSESSMENT — PAIN DESCRIPTION - LOCATION
LOCATION: LEG
LOCATION: LEG

## 2023-05-15 ASSESSMENT — ENCOUNTER SYMPTOMS
GASTROINTESTINAL NEGATIVE: 1
RESPIRATORY NEGATIVE: 1
COLOR CHANGE: 1

## 2023-05-15 NOTE — DISCHARGE INSTR - COC
Continuity of Care Form    Patient Name: Rosalina Blanco   :  1950  MRN:  [id number]    Admit date:  2023  Discharge date:  2023    Code Status Order: Full Code   Advance Directives:     Admitting Physician:  Anthony Poole MD  PCP: SCARLET Copeland CNP    Discharging Nurse: [facility] Unit/Room#:   Discharging Unit Phone Number: [phone]    Emergency Contact:   Extended Emergency Contact Information  Primary Emergency Contact: Latisha GARCIA  Address: [address] Phone: [phone]  Mobile Phone: [phone]  Relation: Spouse  Secondary Emergency Contact: [address]  Phone: [phone]  Mobile Phone: [phone]  Relation: Child  Preferred language: English   needed? No    Past Surgical History:  Past Surgical History:   Procedure Laterality Date    COLONOSCOPY  2014    COLONOSCOPY  2019    COLONOSCOPY N/A 2019    COLONOSCOPY POLYPECTOMY SNARE/HOT BIOPSY performed by Shiraz Cronin MD at [place] [address], COLON, DIAGNOSTIC  2014    stomach ulcers    HIP ARTHROPLASTY Right 2014    HIP ARTHROPLASTY Left 03/10/2015    INGUINAL HERNIA REPAIR Right     INGUINAL HERNIA REPAIR Left 2018    incarcerated. By Dr. Mark Medina OFFICE/OUTPT VISIT,PROCEDURE ONLY Left 2018    INCARCERATED INGUINAL HERNIA performed by Dante Henriquez MD at [address]  2014    PROSTATECTOMY  2015    robotic.  lap    UPPER GASTROINTESTINAL ENDOSCOPY  10/19/2016    no lesions seen    UPPER GASTROINTESTINAL ENDOSCOPY  2019    UPPER GASTROINTESTINAL ENDOSCOPY N/A 2019    EGD BIOPSY performed by Shiraz Cronin MD at [address] [place] [address]       Immunization History:   Immunization History   Administered Date(s) Administered    COVID-19, PFIZER Bivalent, DO NOT Dilute, (age 12y+), IM, 30 mcg/0.3 mL 10/09/2022    COVID-19, PFIZER PURPLE

## 2023-05-15 NOTE — FLOWSHEET NOTE
Pt tolerated procedure without distress. 1900 ml of yellow pleural Dressing applied to procedure site.  Pt returned to room in nad

## 2023-05-15 NOTE — PLAN OF CARE
Pt is A&Ox4. Up stand by. Had a thoracentesis this afternoon which removed 1900 mL from right pleural effusion. Repeat CXR s/p thoracentesis showed a decrease in the pleural effusion. Doppler repeated on the RLE which was negative. RLE still swollen so is not wrapped with ACE wrap during the day and can come off at night. Patient's lasix restarted. Midodrine changed to prn as BP's have been 931'V-646'L systolic. Patient waiting on precert to go to [address] rehab. [facility] rehab concerned with patient's WBC count which Dr Cheryle Moreno states is chronic and has addressed in his note. Problem: Discharge Planning  Goal: Discharge to home or other facility with appropriate resources  Outcome: Progressing     Problem: Pain  Goal: Verbalizes/displays adequate comfort level or baseline comfort level  Outcome: Progressing     Problem: Safety - Adult  Goal: Free from fall injury  Outcome: Progressing     Problem: Cardiovascular - Adult  Goal: Maintains optimal cardiac output and hemodynamic stability  Outcome: Progressing     Problem: Respiratory - Adult  Goal: Achieves optimal ventilation and oxygenation  Outcome: Progressing     Problem: Metabolic/Fluid and Electrolytes - Adult  Goal: Glucose maintained within prescribed range  Outcome: Progressing     Problem: Skin/Tissue Integrity - Adult  Goal: Skin integrity remains intact  Outcome: Progressing     Problem: Musculoskeletal - Adult  Goal: Maintain proper alignment of affected body part  Outcome: Progressing     Problem: Chronic Conditions and Co-morbidities  Goal: Patient's chronic conditions and co-morbidity symptoms are monitored and maintained or improved  Outcome: Progressing     Problem: Skin/Tissue Integrity  Goal: Absence of new skin breakdown  Description: 1. Monitor for areas of redness and/or skin breakdown  2. Assess vascular access sites hourly  3. Every 4-6 hours minimum:  Change oxygen saturation probe site  4.   Every 4-6 hours:  If on nasal continuous

## 2023-05-15 NOTE — CONSULTS
GASTROENTEROLOGY CONSULT   [facility]    Reason for Consult:  TIPS evaluation    Requesting Physician:  Cuca Whitaker MD    HISTORY OF PRESENT ILLNESS:    The patient is a 68 y.o. male who presented 5/8/23 with known cirrhosis follows Dr. Pao Fall last seen in office on 3/1/23. Had paracentesis in December depicting transudative ascites. No SBP. Admitted s/p fall and right leg injury, developed edema in right leg and difficulty walking. Initially:  Hematology consulted for MGUS, cytopenia and leukocytosis. Attributing cytopenias to liver cirrhosis and splenomegaly. ID consulted for serratia marcescens bacteremia of unclear source. Per ID presumed secondary to lower extremity cellulitis. He is on IV Rocephin currently. Pulmonology consulted for R pleural effusion being attributed to hydrothorax secondary to ascites. Had R Thoracentesis 5/11. Patient underwent paracentesis no sbp, he is again undergoing a thoracentesis later this afternoon. GI was asked to see him for TIPS evaluation. He is on aldactone 100mg PO and Lasix 20mg PO daily. No abdominal pain, no blood in or with his stools. He is oriented, no hx of H.E. no hx of being on lactulose/Xifaxan. His wife is an RN, but was not at bedside to discuss with today when I saw him.          Past Medical History:   Diagnosis Date    Anemia     Arthritis     Avascular necrosis [facility])     sees Dr Carlos Medina    BPH (benign prostatic hyperplasia)     Gastroesophageal reflux disease 05/25/2022    History of blood transfusion 12/2014    after total hip replacement    Hypertension     Iron deficiency anemia     Leukopenia     MGUS (monoclonal gammopathy of unknown significance)     Osteoarthritis     Other cirrhosis of liver ([facility] [address] 3/2/2023    Patient in clinical research study 01/19/2017    Positive FIT (fecal immunochemical test)     Stomach ulcer     x 2      Past Surgical History:   Procedure Laterality Date

## 2023-05-15 NOTE — BRIEF OP NOTE
Brief Postoperative Note for Thoracentesis    Catherene Kidney  YOB: 1950  [id number]    Pre-operative Diagnosis: Right Pleural effusion      Post-operative Diagnosis: Same    Procedure: Ultrasound guided Thoracentesis    Anesthesia: 1% Lidocaine     Surgeons/Assistants: Wanda Yeager MD    Complications: None    Specimens: were not obtained    Ultrasound guided right thoracentesis performed. 1900 ml clear yellow fluid obtained. Dressing applied. Chest x-ray ordered.         Electronically signed by Wanda Yeager MD on 5/15/2023 at 2:42 PM

## 2023-05-16 ENCOUNTER — HOSPITAL ENCOUNTER (INPATIENT)
Age: 73
LOS: 8 days | Discharge: HOME HEALTH | DRG: 948 | End: 2023-05-24
Attending: STUDENT IN AN ORGANIZED HEALTH CARE EDUCATION/TRAINING PROGRAM | Admitting: STUDENT IN AN ORGANIZED HEALTH CARE EDUCATION/TRAINING PROGRAM
Payer: COMMERCIAL

## 2023-05-16 VITALS
WEIGHT: 136 LBS | BODY MASS INDEX: 18.42 KG/M2 | SYSTOLIC BLOOD PRESSURE: 106 MMHG | TEMPERATURE: 97.5 F | HEIGHT: 72 IN | RESPIRATION RATE: 17 BRPM | HEART RATE: 66 BPM | OXYGEN SATURATION: 99 % | DIASTOLIC BLOOD PRESSURE: 71 MMHG

## 2023-05-16 DIAGNOSIS — L03.115 CELLULITIS OF RIGHT LOWER EXTREMITY: ICD-10-CM

## 2023-05-16 PROBLEM — R53.81 DEBILITY: Status: ACTIVE | Noted: 2023-05-16

## 2023-05-16 PROBLEM — E87.5 HYPERKALEMIA: Status: ACTIVE | Noted: 2023-05-16

## 2023-05-16 LAB
GLUCOSE BLD-MCNC: 109 MG/DL (ref 75–110)
GLUCOSE BLD-MCNC: 161 MG/DL (ref 75–110)
GLUCOSE BLD-MCNC: 196 MG/DL (ref 75–110)
GLUCOSE BLD-MCNC: 227 MG/DL (ref 75–110)
GLUCOSE BLD-MCNC: 83 MG/DL (ref 75–110)

## 2023-05-16 PROCEDURE — 2580000003 HC RX 258: Performed by: NURSE PRACTITIONER

## 2023-05-16 PROCEDURE — 6370000000 HC RX 637 (ALT 250 FOR IP): Performed by: FAMILY MEDICINE

## 2023-05-16 PROCEDURE — 97110 THERAPEUTIC EXERCISES: CPT

## 2023-05-16 PROCEDURE — 99239 HOSP IP/OBS DSCHRG MGMT >30: CPT | Performed by: FAMILY MEDICINE

## 2023-05-16 PROCEDURE — 6360000002 HC RX W HCPCS: Performed by: NURSE PRACTITIONER

## 2023-05-16 PROCEDURE — 82947 ASSAY GLUCOSE BLOOD QUANT: CPT

## 2023-05-16 PROCEDURE — 6370000000 HC RX 637 (ALT 250 FOR IP): Performed by: NURSE PRACTITIONER

## 2023-05-16 PROCEDURE — 97530 THERAPEUTIC ACTIVITIES: CPT

## 2023-05-16 PROCEDURE — 1180000000 HC REHAB R&B

## 2023-05-16 PROCEDURE — 6370000000 HC RX 637 (ALT 250 FOR IP): Performed by: INTERNAL MEDICINE

## 2023-05-16 PROCEDURE — 6360000002 HC RX W HCPCS: Performed by: FAMILY MEDICINE

## 2023-05-16 PROCEDURE — 99232 SBSQ HOSP IP/OBS MODERATE 35: CPT | Performed by: INTERNAL MEDICINE

## 2023-05-16 RX ORDER — SPIRONOLACTONE 25 MG/1
100 TABLET ORAL DAILY
Status: DISCONTINUED | OUTPATIENT
Start: 2023-05-17 | End: 2023-05-24 | Stop reason: HOSPADM

## 2023-05-16 RX ORDER — PROPRANOLOL HYDROCHLORIDE 20 MG/1
20 TABLET ORAL 2 TIMES DAILY
Status: DISCONTINUED | OUTPATIENT
Start: 2023-05-16 | End: 2023-05-23

## 2023-05-16 RX ORDER — LEVOFLOXACIN 500 MG/1
500 TABLET, FILM COATED ORAL DAILY
Status: CANCELLED | OUTPATIENT
Start: 2023-05-16 | End: 2023-05-27

## 2023-05-16 RX ORDER — SPIRONOLACTONE 100 MG/1
100 TABLET, FILM COATED ORAL DAILY
Status: CANCELLED | OUTPATIENT
Start: 2023-05-17

## 2023-05-16 RX ORDER — INSULIN LISPRO 100 [IU]/ML
0-4 INJECTION, SOLUTION INTRAVENOUS; SUBCUTANEOUS NIGHTLY
Status: DISCONTINUED | OUTPATIENT
Start: 2023-05-16 | End: 2023-05-24 | Stop reason: HOSPADM

## 2023-05-16 RX ORDER — PANTOPRAZOLE SODIUM 40 MG/1
40 TABLET, DELAYED RELEASE ORAL
Status: DISCONTINUED | OUTPATIENT
Start: 2023-05-17 | End: 2023-05-24 | Stop reason: HOSPADM

## 2023-05-16 RX ORDER — CALCIUM CARBONATE 200(500)MG
500 TABLET,CHEWABLE ORAL DAILY
Status: DISCONTINUED | OUTPATIENT
Start: 2023-05-17 | End: 2023-05-24 | Stop reason: HOSPADM

## 2023-05-16 RX ORDER — POLYETHYLENE GLYCOL 3350 17 G/17G
17 POWDER, FOR SOLUTION ORAL DAILY
Status: DISCONTINUED | OUTPATIENT
Start: 2023-05-16 | End: 2023-05-24 | Stop reason: HOSPADM

## 2023-05-16 RX ORDER — INSULIN LISPRO 100 [IU]/ML
0-8 INJECTION, SOLUTION INTRAVENOUS; SUBCUTANEOUS
Status: DISCONTINUED | OUTPATIENT
Start: 2023-05-16 | End: 2023-05-24 | Stop reason: HOSPADM

## 2023-05-16 RX ORDER — TRAMADOL HYDROCHLORIDE 50 MG/1
50 TABLET ORAL EVERY 6 HOURS PRN
Status: DISCONTINUED | OUTPATIENT
Start: 2023-05-16 | End: 2023-05-24 | Stop reason: HOSPADM

## 2023-05-16 RX ORDER — INSULIN LISPRO 100 [IU]/ML
0-8 INJECTION, SOLUTION INTRAVENOUS; SUBCUTANEOUS
Status: CANCELLED | OUTPATIENT
Start: 2023-05-16

## 2023-05-16 RX ORDER — SENNA PLUS 8.6 MG/1
2 TABLET ORAL DAILY PRN
Status: DISCONTINUED | OUTPATIENT
Start: 2023-05-16 | End: 2023-05-24 | Stop reason: HOSPADM

## 2023-05-16 RX ORDER — MIDODRINE HYDROCHLORIDE 5 MG/1
5 TABLET ORAL 3 TIMES DAILY PRN
Status: DISCONTINUED | OUTPATIENT
Start: 2023-05-16 | End: 2023-05-24 | Stop reason: HOSPADM

## 2023-05-16 RX ORDER — POLYETHYLENE GLYCOL 3350 17 G/17G
17 POWDER, FOR SOLUTION ORAL DAILY PRN
Status: CANCELLED | OUTPATIENT
Start: 2023-05-16

## 2023-05-16 RX ORDER — FUROSEMIDE 40 MG/1
40 TABLET ORAL DAILY
Status: DISCONTINUED | OUTPATIENT
Start: 2023-05-17 | End: 2023-05-24 | Stop reason: HOSPADM

## 2023-05-16 RX ORDER — LEVOFLOXACIN 500 MG/1
500 TABLET, FILM COATED ORAL DAILY
Status: DISCONTINUED | OUTPATIENT
Start: 2023-05-16 | End: 2023-05-18

## 2023-05-16 RX ORDER — FUROSEMIDE 40 MG/1
40 TABLET ORAL DAILY
Status: CANCELLED | OUTPATIENT
Start: 2023-05-17

## 2023-05-16 RX ORDER — INSULIN GLARGINE 100 [IU]/ML
10 INJECTION, SOLUTION SUBCUTANEOUS 2 TIMES DAILY
Status: CANCELLED | OUTPATIENT
Start: 2023-05-16

## 2023-05-16 RX ORDER — INSULIN LISPRO 100 [IU]/ML
0-4 INJECTION, SOLUTION INTRAVENOUS; SUBCUTANEOUS NIGHTLY
Status: CANCELLED | OUTPATIENT
Start: 2023-05-16

## 2023-05-16 RX ORDER — TRAMADOL HYDROCHLORIDE 50 MG/1
50 TABLET ORAL EVERY 6 HOURS PRN
Status: CANCELLED | OUTPATIENT
Start: 2023-05-16

## 2023-05-16 RX ORDER — MIDODRINE HYDROCHLORIDE 5 MG/1
5 TABLET ORAL 3 TIMES DAILY PRN
Status: CANCELLED | OUTPATIENT
Start: 2023-05-16

## 2023-05-16 RX ORDER — ENOXAPARIN SODIUM 100 MG/ML
40 INJECTION SUBCUTANEOUS DAILY
Status: CANCELLED | OUTPATIENT
Start: 2023-05-17

## 2023-05-16 RX ORDER — CALCIUM CARBONATE 500 MG/1
500 TABLET, CHEWABLE ORAL DAILY
Status: CANCELLED | OUTPATIENT
Start: 2023-05-17

## 2023-05-16 RX ORDER — ACETAMINOPHEN 325 MG/1
650 TABLET ORAL EVERY 4 HOURS PRN
Status: DISCONTINUED | OUTPATIENT
Start: 2023-05-16 | End: 2023-05-24 | Stop reason: HOSPADM

## 2023-05-16 RX ORDER — PROPRANOLOL HYDROCHLORIDE 10 MG/1
20 TABLET ORAL 2 TIMES DAILY
Status: CANCELLED | OUTPATIENT
Start: 2023-05-16

## 2023-05-16 RX ORDER — ENOXAPARIN SODIUM 100 MG/ML
40 INJECTION SUBCUTANEOUS DAILY
Status: DISCONTINUED | OUTPATIENT
Start: 2023-05-17 | End: 2023-05-24 | Stop reason: HOSPADM

## 2023-05-16 RX ORDER — LANOLIN ALCOHOL/MO/W.PET/CERES
400 CREAM (GRAM) TOPICAL EVERY OTHER DAY
Status: DISCONTINUED | OUTPATIENT
Start: 2023-05-17 | End: 2023-05-24 | Stop reason: HOSPADM

## 2023-05-16 RX ORDER — PANTOPRAZOLE SODIUM 40 MG/1
40 TABLET, DELAYED RELEASE ORAL
Status: CANCELLED | OUTPATIENT
Start: 2023-05-17

## 2023-05-16 RX ORDER — INSULIN GLARGINE 100 [IU]/ML
10 INJECTION, SOLUTION SUBCUTANEOUS 2 TIMES DAILY
Status: DISCONTINUED | OUTPATIENT
Start: 2023-05-16 | End: 2023-05-22

## 2023-05-16 RX ORDER — BISACODYL 10 MG
10 SUPPOSITORY, RECTAL RECTAL DAILY PRN
Status: DISCONTINUED | OUTPATIENT
Start: 2023-05-16 | End: 2023-05-24 | Stop reason: HOSPADM

## 2023-05-16 RX ORDER — LANOLIN ALCOHOL/MO/W.PET/CERES
400 CREAM (GRAM) TOPICAL EVERY OTHER DAY
Status: CANCELLED | OUTPATIENT
Start: 2023-05-17

## 2023-05-16 RX ADMIN — SODIUM CHLORIDE, PRESERVATIVE FREE 10 ML: 5 INJECTION INTRAVENOUS at 09:05

## 2023-05-16 RX ADMIN — ENOXAPARIN SODIUM 40 MG: 100 INJECTION SUBCUTANEOUS at 09:04

## 2023-05-16 RX ADMIN — PROPRANOLOL HYDROCHLORIDE 20 MG: 20 TABLET ORAL at 21:04

## 2023-05-16 RX ADMIN — PROPRANOLOL HYDROCHLORIDE 20 MG: 10 TABLET ORAL at 09:04

## 2023-05-16 RX ADMIN — PANTOPRAZOLE SODIUM 40 MG: 40 TABLET, DELAYED RELEASE ORAL at 06:00

## 2023-05-16 RX ADMIN — INSULIN GLARGINE 10 UNITS: 100 INJECTION, SOLUTION SUBCUTANEOUS at 21:04

## 2023-05-16 RX ADMIN — SPIRONOLACTONE 100 MG: 100 TABLET ORAL at 09:04

## 2023-05-16 RX ADMIN — TRAMADOL HYDROCHLORIDE 50 MG: 50 TABLET, COATED ORAL at 09:42

## 2023-05-16 RX ADMIN — LEVOFLOXACIN 500 MG: 500 TABLET, FILM COATED ORAL at 17:46

## 2023-05-16 RX ADMIN — INSULIN LISPRO 2 UNITS: 100 INJECTION, SOLUTION INTRAVENOUS; SUBCUTANEOUS at 17:40

## 2023-05-16 RX ADMIN — FUROSEMIDE 20 MG: 20 TABLET ORAL at 09:04

## 2023-05-16 RX ADMIN — CEFTRIAXONE SODIUM 2000 MG: 2 INJECTION, POWDER, FOR SOLUTION INTRAMUSCULAR; INTRAVENOUS at 14:13

## 2023-05-16 RX ADMIN — INSULIN GLARGINE 10 UNITS: 100 INJECTION, SOLUTION SUBCUTANEOUS at 09:04

## 2023-05-16 RX ADMIN — TRAMADOL HYDROCHLORIDE 50 MG: 50 TABLET, COATED ORAL at 14:56

## 2023-05-16 RX ADMIN — TRAMADOL HYDROCHLORIDE 50 MG: 50 TABLET, COATED ORAL at 21:03

## 2023-05-16 RX ADMIN — Medication 500 MG: at 09:05

## 2023-05-16 ASSESSMENT — ENCOUNTER SYMPTOMS
SINUS PRESSURE: 0
WHEEZING: 0
COUGH: 0
VOICE CHANGE: 0
COLOR CHANGE: 1
CHEST TIGHTNESS: 0
GASTROINTESTINAL NEGATIVE: 1
ABDOMINAL PAIN: 0
SHORTNESS OF BREATH: 0
RHINORRHEA: 0
DIARRHEA: 0
VOMITING: 0
CHOKING: 0
RESPIRATORY NEGATIVE: 1
NAUSEA: 0
BACK PAIN: 0
CONSTIPATION: 0

## 2023-05-16 ASSESSMENT — PAIN SCALES - GENERAL: PAINLEVEL_OUTOF10: 8

## 2023-05-16 ASSESSMENT — PAIN DESCRIPTION - ORIENTATION: ORIENTATION: RIGHT

## 2023-05-16 ASSESSMENT — PAIN DESCRIPTION - LOCATION: LOCATION: LEG

## 2023-05-16 NOTE — PLAN OF CARE
Problem: Discharge Planning  Goal: Discharge to home or other facility with appropriate resources  5/16/2023 1211 by Willard Farnsworth RN  Outcome: Progressing  Flowsheets (Taken 5/16/2023 6411)  Discharge to home or other facility with appropriate resources:   Identify barriers to discharge with patient and caregiver   Arrange for needed discharge resources and transportation as appropriate   Identify discharge learning needs (meds, wound care, etc)   Refer to discharge planning if patient needs post-hospital services based on physician order or complex needs related to functional status, cognitive ability or social support system  5/15/2023 2312 by Easton Baxter RN  Outcome: Progressing  Flowsheets (Taken 5/15/2023 1930)  Discharge to home or other facility with appropriate resources: Identify barriers to discharge with patient and caregiver     Problem: Pain  Goal: Verbalizes/displays adequate comfort level or baseline comfort level  5/16/2023 1211 by Willard Farnsworth RN  Outcome: Progressing  5/15/2023 2312 by Easton Baxter RN  Outcome: Progressing     Problem: Safety - Adult  Goal: Free from fall injury  5/16/2023 1211 by Willard Farnsworth RN  Outcome: Progressing  5/15/2023 2312 by Easton Baxter RN  Outcome: Progressing     Problem: Cardiovascular - Adult  Goal: Maintains optimal cardiac output and hemodynamic stability  5/16/2023 1211 by Willard Farnsworth RN  Outcome: Progressing  Flowsheets (Taken 5/16/2023 0816)  Maintains optimal cardiac output and hemodynamic stability: Monitor blood pressure and heart rate  5/15/2023 2312 by Easton Baxter RN  Outcome: Progressing  Flowsheets (Taken 5/15/2023 1930)  Maintains optimal cardiac output and hemodynamic stability: Monitor blood pressure and heart rate     Problem: Respiratory - Adult  Goal: Achieves optimal ventilation and oxygenation  5/16/2023 1211 by Willard Farnsworth RN  Outcome: Progressing  5/15/2023 2312 by Easton Baxter RN  Outcome:

## 2023-05-16 NOTE — CARE COORDINATION
Received Voicemail from [employer].      Patient authorized for admission to Acute Inpatient Rehabilitation Stay under Auth/CaseRef# [id number]     Patient approved for 14 days for Acute Inpatient Rehabilitation level of care    Next Review date: 5/28/2023   Continued stay clinical documentation to be submitted via: Fax: [phone]  Utilization Management : Lizz Mckeon Phone: [phone]    Updated Viral Coppola CM: Via Telephone Conversation at this time at phone/extension: [phone]
Social work: faxed PT/OT notes to [facility]. Will need  DISCHARGE/READMIT at discharge.  Aaron aceves
Social work: spoke to wife to advise of auth from insurance for [facility]. Planning transport today around 2-4 pm if [facility] can accommodate. NEED DISCHARGE/READMIT at discharge.    Report: [phone]  Fax: [phone] on unit or in [place] [address] fax; [phone]  Sameera aceves
ADLs:  LE Bathing: Minimal assistance  LE Dressing: Maximum assistance    Current functional status for bed, chair, wheelchair transfers:  Transfers  Sit to Stand: Moderate Assistance, 2 Person Assistance (to R/W or Darrall Pillar)  Stand to Sit: Moderate Assistance, 2 Person Assistance (From R/W or Darrall Pillar)  Bed to Chair: Dependent/Total (Required Darrall Pillar)  Stand Pivot Transfers: Dependent/Total (required Darrall Pillar)  Lateral Transfers: Dependent/Total (Required Darrall Pillar)  Comment: MOD VC + tactile assist on correct use of upper body for safe sit/stand, nose over toes tech, upright posture to stand to R/walker unable to tolerate WB on RLE so required Darrall Pillar, placed & stood into device with 2MOD assist, mobilized to chair & sat from device with 2MOD assist.  Pt required MOD cues/tactile assist for B hand placement on tatyana stedy as well as reaching back, safety features of lift, upright posture and weight shifting, pursed lip breathing, controlled stand to sit to increase safety/reduce falls    Current functional status for toilet transfers:   Minimal Assistance    Current functional status for locomotion:  Ambulation  Surface: Level tile  Device: [address]:  Moderate assistance, 2 Person assistance  Quality of Gait: can only WS, unable to attempt any steps due to inc pain  Distance: in place    Current functional status for comprehension: Within Functional Limits    Current functional status for expression: Within Functional Limits    Current functional status for social interaction: Within Functional Limits    Current functional status for problem solving: Within Functional Limits    Current functional status for memory: Within Functional Limits    Current Deficits R/T Impairment: Impaired Functional Mobility and Decreased ADLs    Required Therapy Services:  Physical Therapy: Yes  Occupational Therapy: Yes  Speech Therapy: Not Indicated At This Time      Additional Services:    [x] Social

## 2023-05-16 NOTE — PROGRESS NOTES
Attempted to evaluate  x2 however patient unavailable    Noted white blood cell count 1.1-does patient need to be in reverse isolation?    Discussed with Dr Francisco - notes ideally pt should be in reverse isolation, however, he lives at WBC1-2- difficult to maintain lifelong reverse isolation -  pt ok'd for continued rehab at [facility].   
Comprehensive Nutrition Assessment    Type and Reason for Visit:  RD Nutrition Re-Screen/LOS (Length of stay)    Nutrition Recommendations/Plan:   Continue ADULT DIET; Regular; 4 carb choices (60 gm/meal); Low Sodium (2 gm); 1500 ml  Continue Glucerna 3x/day  Monitor p.o intakes and labs     Malnutrition Assessment:  Malnutrition Status: At risk for malnutrition (Comment) (05/15/23 1410)      Nutrition Assessment:    Patient assessed for length of stay. Patient admission is related to hyperkalemia, hypernatremia and cellulitis of right lower extremity. Patient reports he is eating well and has a good appetite. Patient reports pancakes, potatoes, eggs, sausage and apple juice for breakfast this morning. Patient also is consuming Glucerna 3x/day and states he loves the supplement. Continue current diet. Monitor p.o intakes and labs. Nutrition Related Findings:    Edema: +2 non-pitting general edema. +2 pitting RLE. Active bowel sounds. Wound Type: None       Current Nutrition Intake & Therapies:    Average Meal Intake: 51-75%, %     ADULT DIET; Regular; 4 carb choices (60 gm/meal); Low Sodium (2 gm); 1500 ml  ADULT ORAL NUTRITION SUPPLEMENT; Breakfast, Lunch, Dinner; Diabetic Oral Supplement    Anthropometric Measures:  Height: 6' (182.9 cm)  Ideal Body Weight (IBW): 178 lbs (81 kg)       Current Body Weight: 141 lb (64 kg), 79.2 % IBW.  Weight Source: Bed Scale  Current BMI (kg/m2): 19.1                          BMI Categories: Underweight (BMI less than 22) age over 72    Estimated Daily Nutrient Needs:  Energy Requirements Based On: Kcal/kg  Weight Used for Energy Requirements: Current  Energy (kcal/day): 7308-0711 kcal (28-30 kcal/kg)  Weight Used for Protein Requirements: Admission  Protein (g/day): 77-83 gm of protein (1.2-1.3 gm/kg)         Nutrition Diagnosis:   Altered nutrition-related lab values related to endocrine dysfuntion as evidenced by lab values    Nutrition Interventions:   Food and/or
[place] 2  PROGRESS NOTE    Room # 2005/2005-01   Name: Rebecca Urban              Reason for visit: Routine    I visited the patient. Admit Date & Time: 5/8/2023  4:30 PM    Assessment: Rebecca Urban is a 68 y.o. male. Upon entering the room patient states about their medical condition, states struggles with their medical situation. States worries, fears frustrations. Patient states well , treated well. Patient states good family support, shares about spiritual life, Rastafarian background, shares Rastafarian beliefs. Patient shares about outside interests. Intervention:   provided a ministry presence, listening and prayer. Outcome:  Patient open to visit. Plan:  Chaplains will remain available to offer spiritual and emotional support as needed. Electronically signed by Chaplain Lakeshia, on 5/16/2023 at 2:43 PM.  Tommie      05/16/23 1440   Encounter Summary   Service Provided For: Patient and family together   Referral/Consult From: Rounding;Family   Support System Spouse   Last Encounter  05/16/23   Complexity of Encounter Moderate   Begin Time 0140   End Time  0155   Total Time Calculated 15 min   Encounter    Type Follow up   Assessment/Intervention/Outcome   Assessment Anxious   Intervention Active listening;Discussed illness injury and its impact;Prayer (assurance of)/[place];Sustaining Presence/Ministry of presence   Outcome Engaged in conversation;Expressed feelings, needs, and concerns;Expressed Gratitude;Receptive
Occupational Therapy  Facility/Department: [facility]  Rehabilitation Occupational Therapy Daily Treatment Note    Date: 5/15/23  Patient Name: Sheng Enriquez       Room:   MRN: [id number]  Account: [de-identified]   : 1950  (78 y.o.) Gender: male      MARGIE Gramajo Shown reports patient is medically stable for therapy treatment this date. Chart reviewed prior to treatment and patient is agreeable for therapy. All lines intact and patient positioned comfortably at end of treatment. All patient needs addressed prior to ending therapy session. Pt is currently functional below baseline and recommend comprehensive and intensive skilled therapy by a multidisciplinary team. Would expect patient to be able to tolerate 3 hours of therapy per day and able to tolerate at least one hour up in chair. Please refer to AM-PAC score for current mobility/adl level. Past Medical History:  has a past medical history of Anemia, Arthritis, Avascular necrosis ([address] BPH (benign prostatic hyperplasia), Gastroesophageal reflux disease, History of blood transfusion, Hypertension, Iron deficiency anemia, Leukopenia, MGUS (monoclonal gammopathy of unknown significance), Osteoarthritis, Other cirrhosis of liver ([address] Patient in clinical research study, Positive FIT (fecal immunochemical test), and Stomach ulcer. Past Surgical History:   has a past surgical history that includes Endoscopy, colon, diagnostic (2014); Prostate Biopsy (2014); Hip Arthroplasty (Right, 2014); Hip Arthroplasty (Left, 03/10/2015); Prostatectomy (2015); Colonoscopy (2014); Upper gastrointestinal endoscopy (10/19/2016); pr office/outpt visit,procedure only (Left, 2018); Inguinal hernia repair (Right, ); Inguinal hernia repair (Left, 2018); Upper gastrointestinal endoscopy (2019); Colonoscopy (2019);  Colonoscopy (N/A, 2019); and Upper gastrointestinal endoscopy (N/A,
Occupational Therapy  Facility/Department: [facility]  Rehabilitation Occupational Therapy Daily Treatment Note    Date: 23  Patient Name: Yamini Pruitt       Room:   MRN: [id number]  Account: [de-identified]   : 1950  (78 y.o.) Gender: male      MARGIE Villarreal reports patient is medically stable for therapy treatment this date. Chart reviewed prior to treatment and patient is agreeable for therapy. All lines intact and patient positioned comfortably at end of treatment. All patient needs addressed prior to ending therapy session. Pt is currently functional below baseline and recommend comprehensive and intensive skilled therapy by a multidisciplinary team. Would expect patient to be able to tolerate 3 hours of therapy per day and able to tolerate at least one hour up in chair. Please refer to AM-PAC score for current mobility/adl level. Past Medical History:  has a past medical history of Anemia, Arthritis, Avascular necrosis ([address] BPH (benign prostatic hyperplasia), Gastroesophageal reflux disease, History of blood transfusion, Hypertension, Iron deficiency anemia, Leukopenia, MGUS (monoclonal gammopathy of unknown significance), Osteoarthritis, Other cirrhosis of liver ([address] Patient in clinical research study, Positive FIT (fecal immunochemical test), and Stomach ulcer. Past Surgical History:   has a past surgical history that includes Endoscopy, colon, diagnostic (2014); Prostate Biopsy (2014); Hip Arthroplasty (Right, 2014); Hip Arthroplasty (Left, 03/10/2015); Prostatectomy (2015); Colonoscopy (2014); Upper gastrointestinal endoscopy (10/19/2016); pr office/outpt visit,procedure only (Left, 2018); Inguinal hernia repair (Right, ); Inguinal hernia repair (Left, 2018); Upper gastrointestinal endoscopy (2019); Colonoscopy (2019);  Colonoscopy (N/A, 2019); and Upper gastrointestinal endoscopy (N/A,
PATIENT REFUSES TO WEAR BIPAP     [x] Risks and benefits explained to patient   [x] Patient refuses to wear Bipap stating No  [x] Patient verbalizes understanding of information presented.
Patient is for transfer to [facility] unit. At [facility], report given to Janis Hargrove, all questions answered. Transferred with all his belongings. At 2034, tried to contact his wife Yas Florian to inform her of the transfer, message left on her mobile phone. Home number was also contacted but it was busy.
Pt discharged to Acute Rehab facility at [facility]. He left unit via stretcher accompanied by [facility] with no distress. Packet given to [facility]. Left in stable condition.
Pulmonary Critical Care Progress Note  Amish Kuhn MD     Patient seen for the follow up of  Sepsis [facility])     Subjective:  No significant overnight events noted. He had right thoracentesis yesterday with 1900 mL removed. He denies any shortness breath, cough or chest pain. He remains on room air. Examination:  Vitals: /75   Pulse 60   Temp 97.5 °F (36.4 °C) (Oral)   Resp 17   Ht 6' (1.829 m)   Wt 136 lb (61.7 kg)   SpO2 99%   BMI 18.44 kg/m²   General appearance: alert and cooperative with exam  Neck: No JVD  Lungs: Moderate air exchange, no crackles or wheezing  Heart: regular rate and rhythm, S1, S2 normal, no gallop  Abdomen: Soft, non tender, + BS, +ascites  Extremities: no cyanosis or clubbing.   Positive right lower extremity edema    LABs:  CBC:   Recent Labs     05/15/23  0909   WBC 1.1*   HGB 12.1*   HCT 38.7*   PLT 79*       BMP:   Recent Labs     05/15/23  0909   *   K 4.6   CO2 25   BUN 11   CREATININE 0.45*   LABGLOM >60   GLUCOSE 161*       PT/INR:   Recent Labs     05/14/23  0325   PROTIME 17.0*   INR 1.4       LIVER PROFILE:  Recent Labs     05/15/23  0909   AST 51*   ALT 51*   LABALBU 2.0*       ABG:  Lab Results   Component Value Date/Time    FIO2 21.0 05/11/2023 01:30 AM       Lab Results   Component Value Date/Time    POCPH 7.438 05/11/2023 01:30 AM    POCPCO2 30.0 05/11/2023 01:30 AM    POCPO2 94.7 05/11/2023 01:30 AM    POCHCO3 20.3 05/11/2023 01:30 AM    ZNBQ2HWC 98 05/11/2023 01:30 AM    FIO2 21.0 05/11/2023 01:30 AM     Radiology:  X-ray chest 5/15/2023 status post right thoracentesis      X-ray chest 5/15/2023      Impression:  Right pleural effusion most likely hydrothorax secondary to ascites   S/p thoracentesis 5/11/2023 with 600 mL clear yellow fluid removed  S/P right thoracentesis 5/15/2023 with 1.9 L removed  Ascites/liver cirrhosis with a history of esophageal varices  status post paracentesis 5/11/2023 with 1.1 L removed  History of MUGUS, BPH,
Pulmonary Critical Care Progress Note  Gina Will MD     Patient seen for the follow up of  Sepsis [facility])     Subjective:  No significant overnight events noted. He is currently sitting up in the bedside chair his family is at bedside. He just finished showering and denied any shortness of breath, cough or any chest pain. He is on room air saturating well. His only complaint is swelling/pain to his right leg which worsens when he is ambulating and improves with elevation. Examination:  Vitals: /64   Pulse 61   Temp 97.7 °F (36.5 °C) (Oral)   Resp 16   Ht 6' (1.829 m)   Wt 141 lb 4 oz (64.1 kg)   SpO2 100%   BMI 19.16 kg/m²   General appearance: alert and cooperative with exam  Neck: No JVD  Lungs: Moderate air exchange, diminished on the right, no crackles or wheezing  Heart: regular rate and rhythm, S1, S2 normal, no gallop  Abdomen: Soft, non tender, + BS, +ascites  Extremities: no cyanosis or clubbing.   Positive right lower extremity edema    LABs:  CBC:   Recent Labs     05/13/23  0315 05/15/23  0909   WBC 1.4* 1.1*   HGB 11.4* 12.1*   HCT 36.6* 38.7*   PLT 61* 79*       BMP:   Recent Labs     05/13/23  0315 05/15/23  0909   * 133*   K 4.4 4.6   CO2 22 25   BUN 17 11   CREATININE 0.51* 0.45*   LABGLOM >60 >60   GLUCOSE 133* 161*       PT/INR:   Recent Labs     05/13/23 0315 05/14/23  0325   PROTIME 16.3* 17.0*   INR 1.3 1.4       LIVER PROFILE:  Recent Labs     05/13/23  0315 05/15/23  0909   AST 55* 51*   ALT 51* 51*   LABALBU 1.9* 2.0*       ABG:  Lab Results   Component Value Date/Time    FIO2 21.0 05/11/2023 01:30 AM       Lab Results   Component Value Date/Time    POCPH 7.438 05/11/2023 01:30 AM    POCPCO2 30.0 05/11/2023 01:30 AM    POCPO2 94.7 05/11/2023 01:30 AM    POCHCO3 20.3 05/11/2023 01:30 AM    NDGK4FAW 98 05/11/2023 01:30 AM    FIO2 21.0 05/11/2023 01:30 AM     Radiology:  X-ray chest 5/15/2023      X-ray chest: 5/13/2023      Impression:  Right pleural effusion most
Received this patient from the ICU. He is alert and oriented. He was brought with all his belongings to room 2005. Bedside table near patient as well as call light. Bed alarm applied. Patient oriented to room and call light. Will continue to round on patient through out the shift.
Report given to [facility].
Today's Date: 5/13/2023  Patient Name: Matthias Chung  Date of admission: 5/8/2023  4:30 PM  Patient's age: 68 y.o., 1950  Admission Dx: Hyperkalemia [E87.5]  Hypernatremia [E87.0]  Cellulitis of right lower extremity [L03.115]    Reason for Consult: management recommendations  Requesting Physician: Darylene Sparks, MD    CHIEF COMPLAINT: MGUS. Cytopenias and leukocytosis  Interim history  Patient is seen and evaluated. Plan for repeat  ultrasound and thoracentesis today  Continues to be leukopenic. White count is 1.1 today. Looking at his history, this is close to his baseline. HISTORY OF PRESENT ILLNESS:      The patient is a 68 y.o.  male who is admitted to the hospital with chief complaint of leg pain and ankle pain. Patient had a fall few days back and is not able to put any weight on the leg. Patient has past medical history of cirrhosis pancytopenia and MGUS. Patient after falling noticed swelling and pain of her right leg with difficulty walking. Patient has history of cirrhosis last paracentesis was about 2 months ago. Ultrasound Doppler in the ER was negative. Patient lactic acid was 4.3. Patient also has history of right total hip arthroplasty. Patient started on IV antibiotics vancomycin and cefepime in the ER. Oncology service consulted due to abnormal cell counts. Patient has had a bone marrow biopsy done in the past which showed 5 to 10% plasma cells which were negative for monitor. .      Past Medical History:   has a past medical history of Anemia, Arthritis, Avascular necrosis ([address] BPH (benign prostatic hyperplasia), Gastroesophageal reflux disease, History of blood transfusion, Hypertension, Iron deficiency anemia, Leukopenia, MGUS (monoclonal gammopathy of unknown significance), Osteoarthritis, Other cirrhosis of liver ([address] Patient in clinical research study, Positive FIT (fecal immunochemical test), and Stomach ulcer.     Past Surgical History:   has
Contact-guard assistance; Additional time  Stand Pivot Transfers: Stand-by assistance  Bed to Chair: Contact-guard assistance; Additional time  Comment: Patient requires VCs and tactile cues to use arms of chairs to push up from for sit to stand transfer, instead of pulling of RW, to promote safety and correct transfer technique. Gait Training  Gait Training: Yes  Gait  Overall Level of Assistance: Contact-guard assistance; Additional time  Interventions: Verbal cues; Safety awareness training  Base of Support: Narrowed  Speed/Jewell: Slow  Step Length: Left shortened;Right shortened  Gait Abnormalities: Other (comment) (Increased WBing on Rt LE noted today reporting improve tolerance this afternoon but still required increased assist with UB on RW to offload Rt LE some. Short step to gait pattern. dicussed step through gait pattern, patient reported too much pain.)  Distance (ft):  (30' x 2)  Assistive Device: Walker, rolling;Gait belt    Neuromuscular Education  Neuromuscular Education: Yes  Treatment: Gait ;Lower extremity; Sitting;Standing    PT Exercises  A/AROM Exercises: LAQ, Hip Abd/Add, Heel/Toe-raises, HS curls, Hip flexion x 20 reps with orange tband while seated in chair. Circulation/Endurance Exercises: ankle pumps x 20  Functional Mobility Circuit Training: sit to stands x 3 reps with RW and CGA. Discussed extended Rt LE out prior to sitting to decreased WBing if to painful as patient was unable to descend safely to chair or EOB. Noted improve technique post VCs. Postural Correction Exercises: upright posture  Breathing Techniques: VCs for pursed lip breathing duirng ambulation and exercises. Safety Devices  Type of Devices: Call light within reach;Gait belt; Heels elevated for pressure relief;Nurse notified; All fall risk precautions in place; Bed alarm in place; Left in bed       Goals  Short Term Goals  Time Frame for Short Term Goals: 12 visits  Short Term Goal 1:  Inc bed-mobility & transfers to
Lymphocytes, Body Fluid % 9 [1]  20 CM[1]  26 CM        Comment: The reference range and other method performance specifications have not been established   for this body fluid. The test result must be integrated into the clinical context for   interpretation. Other Cells, Fluid % PENDING [2]  PENDING [2]  MONOCYTES, MESOTHELIAL CELLS CM          Component Ref Range & Units 5/11/23 1250 5/11/23 1250 5/11/23 1250 5/11/23 1250 12/9/22 1125 12/9/22 1125 12/9/22 1125 12/9/22 1125 12/9/22 1125   Specimen Type  . PLEURAL FLUID [1]  . PLEURAL FLUID CM  . PLEURAL FLUID CM  . ASCITIC FLUID  . PARACENTESIS FLUID  . PARACENTESIS FLUID  . PARACENTESIS FLUID  . PARACENTESIS FLUID [1]  . PARACENTESIS FLUID    Comment: R   LD, Fluid U/L 24 [2]        18 CM[2]     Comment: There are no normals for body fluid samples. Component 5/11/23 1250 Resulting Agency   Specimen Type . PLEURAL FLUID  [facility]   Comment: R   pH, Fluid 6.0  [facility]   Comment: There are no normals for body fluid samples. Specimen Collected: 05/11/23 12:50 EDT Last Resulted: 05/11/23 19:29 EDT                      Component Ref Range & Units 5/11/23 1250 12/9/22 1125 12/9/22 1125 12/9/22 1125 12/9/22 1125 12/9/22 1125 12/17/15 0548   WBC, Fluid cells/uL 55 [1]  100 R, CM[1]         Comment: The reference range and other method performance specifications have not been established   for this body fluid. The test result must be integrated into the clinical context for   interpretation. RBC, Fluid cells/uL <3,000 [1]  <3,000 R, CM[1]         Comment: The reference range and other method performance specifications have not been established   for this body fluid. The test result must be integrated into the clinical context for   interpretation. Specimen Type  . ASCITIC FLUID [2]  . PARACENTESIS FLUID [2]  . PARACENTESIS FLUID  . PARACENTESIS FLUID  . PARACENTESIS FLUID  . PARACENTESIS FLUID  NOT REPORTED    Neutrophil
Final Result   Mildly increased right pleural effusion, otherwise stable exam.         XR CHEST PORTABLE   Final Result   Moderate right pleural effusion with right basilar haziness. Right basilar   haziness may represent layering effusion with atelectasis, underlying   infection is difficult to exclude. CT TIBIA FIBULA RIGHT W CONTRAST   Final Result   1. Nonspecific subcutaneous edema of the imaged right lower leg. Correlate   clinically for cellulitis. No drainable fluid collection identified. No   subcutaneous gas evident. 2. No acute osseous findings and no CT evidence of osteomyelitis. CT ABDOMEN PELVIS W IV CONTRAST Additional Contrast? None   Final Result   Cirrhotic configuration of the liver with multiple hypodense lesions within   the right lobe and left lobe of the liver mostly consistent with simple   cysts. Previously reported cholangiocarcinoma on MRI of 11/02/2019 is not   well visualized on provided images. Massive amount of ascites and signs of portal hypertension. Massive splenomegaly and findings suggestive of extensive splenic hypodensity   predominantly involving the posteroinferior aspect of the spleen mostly   likely suggestive of splenic infarct. .      Cholelithiasis with no evidence of cholecystitis. Massive expansile right pleural effusion causing mediastinal shift to the   left side is associated compressive atelectatic changes of right lower lobe. Massively enlarged spleens compresses the left kidney. Diverticulosis with no evidence of diverticulitis. Bilateral inguinal hernias larger on the right side containing ascitic fluid. .         XR HIP RIGHT (2-3 VIEWS)   Final Result   Right hip: Status post right total hip arthroplasty. No acute abnormality   detected. Right femur: More distal aspects of the right femur are intact. Right tibia and fibula: No acute bony abnormality.   Circumferential soft   tissue swelling, greatest
culture 1/2 is growing Serratia, unclear source,ID is following, so far culture from ascitic fluid and pleural fluid with no significant growth   continue current IV antibiotic. Repeat CRP from this a.m. is reflecting improvement    Right lower extremity cellulitis: Acute fracture ruled out, CT with no abscess, fluid collection or gas formation DVT ruled out, continue antibiotics,  CRP is trending down  Given pancytopenia we will continue to monitor closely, monitor CRP [continues to improve]  Patient clinically continues with significant right lower extremity pain and redness, patient had Doppler initially on admission that was negative for clot, Patient with persistent right lower extremity pain, more swollen will rule out DVT    Large ascites/right-sided pleural effusion: Plan for paracentesis and thoracentesis this a.m., likely related to liver cirrhosis. Patient Lasix and Aldactone held on admission secondary to hypotension , dehydration and sepsis    History of esophageal varices: Patient is on enteral at home, this being held here due to hypotension    Hypotension: Pressure continues to improve and be back to baseline, Aldactone and Inderal slowly restarted, will restart Lasix today titrate down midodrine     Elevated lactic acid level: Likely secondary to above and ascites , trending down . Hyponatremia: improved, continue to monitor daily, continue with fluid restriction     Pancytopenia: White cell count is less today which is the patient's baseline, likely related to his liver cirrhosis, also has known MGUS, oncology following  Continue to monitor signs or symptoms for worsening or new infection.      cirrhosis of liver /portal hypertension /splenomegaly patient  and wife wanted to discuss potential of TIPS afterwards, consult added      Type 2 diabetes mellitus with hyperglycemia: Initiate basal Lantus 10 units twice daily, sliding scale hypoglycemia protocol      Protein calorie malnutrition:
guide or monitor treatment for MRSA infections. COVID-19, Rapid [[id number]] Collected: 05/11/23 8510   Order Status: Completed Specimen: Nasopharyngeal Swab Updated: 05/11/23 0846    Specimen Description . NASOPHARYNGEAL SWAB    SARS-CoV-2, Rapid Not Detected    Comment:        Rapid NAAT:  The specimen is NEGATIVE for SARS-CoV-2, the novel coronavirus associated with   COVID-19. The ID NOW COVID-19 assay is designed to detect the virus that causes COVID-19 in patients   with signs and symptoms of infection who are suspected of COVID-19. An individual without symptoms of COVID-19 and who is not shedding SARS-CoV-2 virus would   expect to have a negative (not detected) result in this assay. Negative results should be treated as presumptive and, if inconsistent with clinical signs   and symptoms or necessary for patient management,   should be tested with an alternative molecular assay. Negative results do not preclude   SARS-CoV-2 infection and   should not be used as the sole basis for patient management decisions. Fact sheet for Healthcare Providers: http://www.charley-marisela.aditya/   Fact sheet for Patients: http://www.charley-marisela.biz/         Methodology: Isothermal Nucleic Acid Amplification       Culture, Blood 1 [[id number]] (Abnormal)  Collected: 05/08/23 1850   Order Status: Completed Specimen: Blood Updated: 05/11/23 0658    Specimen Description . BLOOD    Special Requests RAC,12ML    Culture POSITIVE Blood Culture Abnormal      DIRECT GRAM STAIN FROM BOTTLE: GRAM NEGATIVE RODS     Serratia marcescens Detected: Methodology- Polymerase Chain Reaction (PCR)     SERRATIA MARCESCENS Abnormal      (NOTE) Direct Gram Stain from bottle and Polymerase Chain Reaction (PCR) results called to and read back by:RODO ALAN 5/9/23 11:29     Susceptibility    Serratia marcescens (4)    Antibiotic Interpretation Microscan Method Status    ceFAZolin Resistant >=64 BACTERIAL

## 2023-05-17 ENCOUNTER — CARE COORDINATION (OUTPATIENT)
Dept: OTHER | Facility: CLINIC | Age: 73
End: 2023-05-17

## 2023-05-17 LAB
ABSOLUTE BANDS #: 0.07 K/UL (ref 0–1)
ALBUMIN SERPL-MCNC: 1.8 G/DL (ref 3.5–5.2)
ALP SERPL-CCNC: 273 U/L (ref 40–129)
ALT SERPL-CCNC: 47 U/L (ref 5–41)
ANION GAP SERPL CALCULATED.3IONS-SCNC: 4 MMOL/L (ref 9–17)
AST SERPL-CCNC: 51 U/L
ATYPICAL LYMPHOCYTE ABSOLUTE COUNT: 0.01 K/UL
ATYPICAL LYMPHOCYTES: 1 %
BANDS: 5 % (ref 0–10)
BASOPHILS # BLD: 0 K/UL (ref 0–0.2)
BASOPHILS NFR BLD: 0 % (ref 0–2)
BILIRUB DIRECT SERPL-MCNC: 0.4 MG/DL
BILIRUB INDIRECT SERPL-MCNC: 0.7 MG/DL (ref 0–1)
BILIRUB SERPL-MCNC: 1.1 MG/DL (ref 0.3–1.2)
BUN SERPL-MCNC: 13 MG/DL (ref 8–23)
CALCIUM SERPL-MCNC: 8.1 MG/DL (ref 8.6–10.4)
CHLORIDE SERPL-SCNC: 100 MMOL/L (ref 98–107)
CO2 SERPL-SCNC: 28 MMOL/L (ref 20–31)
CREAT SERPL-MCNC: 0.74 MG/DL (ref 0.7–1.2)
EOSINOPHIL # BLD: 0.12 K/UL (ref 0–0.4)
EOSINOPHILS RELATIVE PERCENT: 9 % (ref 0–4)
ERYTHROCYTE [DISTWIDTH] IN BLOOD BY AUTOMATED COUNT: 20.8 % (ref 11.5–14.9)
GFR SERPL CREATININE-BSD FRML MDRD: >60 ML/MIN/1.73M2
GLUCOSE BLD-MCNC: 178 MG/DL (ref 75–110)
GLUCOSE BLD-MCNC: 188 MG/DL (ref 75–110)
GLUCOSE BLD-MCNC: 198 MG/DL (ref 75–110)
GLUCOSE BLD-MCNC: 95 MG/DL (ref 75–110)
GLUCOSE SERPL-MCNC: 105 MG/DL (ref 70–99)
HCT VFR BLD AUTO: 36.7 % (ref 41–53)
HGB BLD-MCNC: 12 G/DL (ref 13.5–17.5)
LYMPHOCYTES # BLD: 14 % (ref 24–44)
LYMPHOCYTES NFR BLD: 0.18 K/UL (ref 1–4.8)
MCH RBC QN AUTO: 25.1 PG (ref 26–34)
MCHC RBC AUTO-ENTMCNC: 32.6 G/DL (ref 31–37)
MCV RBC AUTO: 77.2 FL (ref 80–100)
MICROORGANISM SPEC CULT: ABNORMAL
MICROORGANISM SPEC CULT: ABNORMAL
MICROORGANISM/AGENT SPEC: ABNORMAL
MONOCYTES NFR BLD: 0.56 K/UL (ref 0.1–1.3)
MONOCYTES NFR BLD: 43 % (ref 1–7)
MORPHOLOGY: ABNORMAL
NEUTROPHILS NFR BLD: 28 % (ref 36–66)
NEUTS SEG NFR BLD: 0.36 K/UL (ref 1.3–9.1)
PLATELET # BLD AUTO: 145 K/UL (ref 150–450)
PMV BLD AUTO: 9.7 FL (ref 6–12)
POTASSIUM SERPL-SCNC: 4.4 MMOL/L (ref 3.7–5.3)
PROT SERPL-MCNC: 6.5 G/DL (ref 6.4–8.3)
RBC # BLD AUTO: 4.76 M/UL (ref 4.5–5.9)
REASON FOR REJECTION: NORMAL
SODIUM SERPL-SCNC: 132 MMOL/L (ref 135–144)
SPECIMEN DESCRIPTION: ABNORMAL
SPECIMEN DESCRIPTION: ABNORMAL
SPECIMEN SOURCE: NORMAL
WBC OTHER # BLD: 1.3 K/UL (ref 3.5–11)
ZZ NTE CLEAN UP: ORDERED TEST: NORMAL

## 2023-05-17 PROCEDURE — 99222 1ST HOSP IP/OBS MODERATE 55: CPT | Performed by: INTERNAL MEDICINE

## 2023-05-17 PROCEDURE — 6370000000 HC RX 637 (ALT 250 FOR IP): Performed by: FAMILY MEDICINE

## 2023-05-17 PROCEDURE — 97116 GAIT TRAINING THERAPY: CPT

## 2023-05-17 PROCEDURE — 1180000000 HC REHAB R&B

## 2023-05-17 PROCEDURE — 99222 1ST HOSP IP/OBS MODERATE 55: CPT | Performed by: STUDENT IN AN ORGANIZED HEALTH CARE EDUCATION/TRAINING PROGRAM

## 2023-05-17 PROCEDURE — 80048 BASIC METABOLIC PNL TOTAL CA: CPT

## 2023-05-17 PROCEDURE — 97110 THERAPEUTIC EXERCISES: CPT

## 2023-05-17 PROCEDURE — 82947 ASSAY GLUCOSE BLOOD QUANT: CPT

## 2023-05-17 PROCEDURE — 97166 OT EVAL MOD COMPLEX 45 MIN: CPT

## 2023-05-17 PROCEDURE — 97162 PT EVAL MOD COMPLEX 30 MIN: CPT

## 2023-05-17 PROCEDURE — 97535 SELF CARE MNGMENT TRAINING: CPT

## 2023-05-17 PROCEDURE — 80076 HEPATIC FUNCTION PANEL: CPT

## 2023-05-17 PROCEDURE — 6370000000 HC RX 637 (ALT 250 FOR IP): Performed by: STUDENT IN AN ORGANIZED HEALTH CARE EDUCATION/TRAINING PROGRAM

## 2023-05-17 PROCEDURE — 85025 COMPLETE CBC W/AUTO DIFF WBC: CPT

## 2023-05-17 PROCEDURE — 6360000002 HC RX W HCPCS: Performed by: FAMILY MEDICINE

## 2023-05-17 PROCEDURE — 92523 SPEECH SOUND LANG COMPREHEN: CPT

## 2023-05-17 PROCEDURE — 99221 1ST HOSP IP/OBS SF/LOW 40: CPT | Performed by: INTERNAL MEDICINE

## 2023-05-17 PROCEDURE — 97530 THERAPEUTIC ACTIVITIES: CPT

## 2023-05-17 PROCEDURE — 36415 COLL VENOUS BLD VENIPUNCTURE: CPT

## 2023-05-17 RX ORDER — DIAPER,BRIEF,INFANT-TODD,DISP
EACH MISCELLANEOUS DAILY PRN
Status: DISCONTINUED | OUTPATIENT
Start: 2023-05-17 | End: 2023-05-24 | Stop reason: HOSPADM

## 2023-05-17 RX ORDER — HYDROCORTISONE 25 MG/G
CREAM TOPICAL 2 TIMES DAILY
Status: DISCONTINUED | OUTPATIENT
Start: 2023-05-17 | End: 2023-05-17

## 2023-05-17 RX ADMIN — POLYETHYLENE GLYCOL 3350 17 G: 17 POWDER, FOR SOLUTION ORAL at 08:14

## 2023-05-17 RX ADMIN — FUROSEMIDE 40 MG: 40 TABLET ORAL at 08:13

## 2023-05-17 RX ADMIN — TRAMADOL HYDROCHLORIDE 50 MG: 50 TABLET, COATED ORAL at 05:09

## 2023-05-17 RX ADMIN — PROPRANOLOL HYDROCHLORIDE 20 MG: 20 TABLET ORAL at 21:25

## 2023-05-17 RX ADMIN — PANTOPRAZOLE SODIUM 40 MG: 40 TABLET, DELAYED RELEASE ORAL at 05:09

## 2023-05-17 RX ADMIN — PROPRANOLOL HYDROCHLORIDE 20 MG: 20 TABLET ORAL at 08:13

## 2023-05-17 RX ADMIN — ENOXAPARIN SODIUM 40 MG: 100 INJECTION SUBCUTANEOUS at 08:14

## 2023-05-17 RX ADMIN — SPIRONOLACTONE 100 MG: 25 TABLET ORAL at 08:13

## 2023-05-17 RX ADMIN — LEVOFLOXACIN 500 MG: 500 TABLET, FILM COATED ORAL at 08:13

## 2023-05-17 RX ADMIN — INSULIN GLARGINE 10 UNITS: 100 INJECTION, SOLUTION SUBCUTANEOUS at 08:14

## 2023-05-17 RX ADMIN — ANTACID TABLETS 500 MG: 500 TABLET, CHEWABLE ORAL at 08:13

## 2023-05-17 RX ADMIN — MAGNESIUM OXIDE 400 MG (241.3 MG MAGNESIUM) TABLET 400 MG: TABLET at 08:13

## 2023-05-17 RX ADMIN — INSULIN GLARGINE 10 UNITS: 100 INJECTION, SOLUTION SUBCUTANEOUS at 21:25

## 2023-05-17 ASSESSMENT — ENCOUNTER SYMPTOMS
ABDOMINAL PAIN: 0
SHORTNESS OF BREATH: 0

## 2023-05-17 ASSESSMENT — PAIN DESCRIPTION - DESCRIPTORS: DESCRIPTORS: ACHING;SQUEEZING

## 2023-05-17 ASSESSMENT — PAIN DESCRIPTION - LOCATION: LOCATION: ABDOMEN

## 2023-05-17 ASSESSMENT — PAIN SCALES - GENERAL: PAINLEVEL_OUTOF10: 6

## 2023-05-17 NOTE — ADT AUTH CERT
peritonitis   3. Large right pleural effusion    - Status post thoracentesis 5/11/2023   4. Recent fall with concern for right lower extremity cellulitis   5. Coagulase-negative staphylococcus isolated in 1 out of 2 sets of blood cultures, this likely represents a contaminated blood culture   6. History of bilateral avascular necrosis of the hips, status post bilateral hip arthroplasty   7. History of esophageal varices       Recommendations:   - Continue intravenous antimicrobial therapy with Rocephin 2 g IV daily   - On discharge p.o. levofloxacin to complete a 14-day course of therapy   - No growth on ascitic fluid culture from 5/11/2023   - Pleural fluid culture from 5/11/2023 pending   - Growth on urine culture from 5/11/2023   - Follow cultures and adjust antibiotics as needed   - Continue supportive therapy      HEMATOLOGY ONCOLOGY:   IMPRESSION:   Sepsis      RECOMMENDATIONS:   Might benefit from TIPS        MEDICATIONS:   Lovenox 40mg SC daily   Lantus 10units SC BID   Humalog 0-8units SC TID   Proamatine 10mg PO QID   Protonic 40mg PO daily   Inderal 20mg PO BID   Tramadol 50mg PO q6h PRN x 2      ORDERS:   Pulse oximetry, continuous with q4h RT checks   Telemetry monitoring   Elevate head of bed 30 degrees   POCT Glucose QID   Daily weights   Intake and output q8h      PT/OT/SLP/CM ASSESSMENT OR NOTES:   PT:   Assessment    Assessment: Pt able to tolerate inc functional mobility and ther ex this tx. Activity Tolerance: Patient limited by fatigue;Patient limited by pain; Patient limited by endurance       Discharge Recommendations:   Patient would benefit from continued therapy after discharge, Patient able to tolerate 3hrs of therapy a day

## 2023-05-17 NOTE — CARE COORDINATION
Chart review conducted. Pt was discharged from acute setting at [facility] to [facility] inpatient rehab unit. Will follow    Katy HAWKINS RN- [facility]  Associate Care Manager  [phone]  [email or web address]

## 2023-05-18 LAB
GLUCOSE BLD-MCNC: 105 MG/DL (ref 75–110)
GLUCOSE BLD-MCNC: 132 MG/DL (ref 75–110)
GLUCOSE BLD-MCNC: 147 MG/DL (ref 75–110)
GLUCOSE BLD-MCNC: 191 MG/DL (ref 75–110)
GLUCOSE BLD-MCNC: 263 MG/DL (ref 75–110)
GLUCOSE BLD-MCNC: 64 MG/DL (ref 75–110)
GLUCOSE BLD-MCNC: 65 MG/DL (ref 75–110)
SURGICAL PATHOLOGY REPORT: NORMAL

## 2023-05-18 PROCEDURE — 51798 US URINE CAPACITY MEASURE: CPT

## 2023-05-18 PROCEDURE — 97116 GAIT TRAINING THERAPY: CPT

## 2023-05-18 PROCEDURE — 97530 THERAPEUTIC ACTIVITIES: CPT

## 2023-05-18 PROCEDURE — 99232 SBSQ HOSP IP/OBS MODERATE 35: CPT | Performed by: STUDENT IN AN ORGANIZED HEALTH CARE EDUCATION/TRAINING PROGRAM

## 2023-05-18 PROCEDURE — 97129 THER IVNTJ 1ST 15 MIN: CPT

## 2023-05-18 PROCEDURE — 97130 THER IVNTJ EA ADDL 15 MIN: CPT

## 2023-05-18 PROCEDURE — 97535 SELF CARE MNGMENT TRAINING: CPT

## 2023-05-18 PROCEDURE — 97110 THERAPEUTIC EXERCISES: CPT

## 2023-05-18 PROCEDURE — 6370000000 HC RX 637 (ALT 250 FOR IP): Performed by: FAMILY MEDICINE

## 2023-05-18 PROCEDURE — 82947 ASSAY GLUCOSE BLOOD QUANT: CPT

## 2023-05-18 PROCEDURE — 6370000000 HC RX 637 (ALT 250 FOR IP): Performed by: STUDENT IN AN ORGANIZED HEALTH CARE EDUCATION/TRAINING PROGRAM

## 2023-05-18 PROCEDURE — 1180000000 HC REHAB R&B

## 2023-05-18 PROCEDURE — 6360000002 HC RX W HCPCS: Performed by: FAMILY MEDICINE

## 2023-05-18 RX ORDER — LEVOFLOXACIN 500 MG/1
500 TABLET, FILM COATED ORAL DAILY
Status: COMPLETED | OUTPATIENT
Start: 2023-05-19 | End: 2023-05-23

## 2023-05-18 RX ADMIN — INSULIN GLARGINE 10 UNITS: 100 INJECTION, SOLUTION SUBCUTANEOUS at 20:19

## 2023-05-18 RX ADMIN — ANTACID TABLETS 500 MG: 500 TABLET, CHEWABLE ORAL at 12:25

## 2023-05-18 RX ADMIN — ENOXAPARIN SODIUM 40 MG: 100 INJECTION SUBCUTANEOUS at 08:02

## 2023-05-18 RX ADMIN — PROPRANOLOL HYDROCHLORIDE 20 MG: 20 TABLET ORAL at 09:00

## 2023-05-18 RX ADMIN — PANTOPRAZOLE SODIUM 40 MG: 40 TABLET, DELAYED RELEASE ORAL at 05:26

## 2023-05-18 RX ADMIN — SPIRONOLACTONE 100 MG: 25 TABLET ORAL at 08:02

## 2023-05-18 RX ADMIN — PROPRANOLOL HYDROCHLORIDE 20 MG: 20 TABLET ORAL at 20:18

## 2023-05-18 RX ADMIN — FUROSEMIDE 40 MG: 40 TABLET ORAL at 08:03

## 2023-05-18 RX ADMIN — ACETAMINOPHEN 650 MG: 325 TABLET ORAL at 14:53

## 2023-05-18 RX ADMIN — TRAMADOL HYDROCHLORIDE 50 MG: 50 TABLET, COATED ORAL at 09:45

## 2023-05-18 RX ADMIN — LEVOFLOXACIN 500 MG: 500 TABLET, FILM COATED ORAL at 08:03

## 2023-05-18 ASSESSMENT — PAIN DESCRIPTION - DESCRIPTORS: DESCRIPTORS: THROBBING

## 2023-05-18 ASSESSMENT — PAIN DESCRIPTION - ORIENTATION
ORIENTATION: RIGHT
ORIENTATION: RIGHT

## 2023-05-18 ASSESSMENT — PAIN DESCRIPTION - LOCATION
LOCATION: LEG
LOCATION: LEG

## 2023-05-18 ASSESSMENT — PAIN SCALES - GENERAL: PAINLEVEL_OUTOF10: 8

## 2023-05-19 LAB
GLUCOSE BLD-MCNC: 130 MG/DL (ref 75–110)
GLUCOSE BLD-MCNC: 151 MG/DL (ref 75–110)
GLUCOSE BLD-MCNC: 296 MG/DL (ref 75–110)
PATH REV BLD -IMP: NORMAL

## 2023-05-19 PROCEDURE — 97129 THER IVNTJ 1ST 15 MIN: CPT

## 2023-05-19 PROCEDURE — 99222 1ST HOSP IP/OBS MODERATE 55: CPT | Performed by: INTERNAL MEDICINE

## 2023-05-19 PROCEDURE — 6370000000 HC RX 637 (ALT 250 FOR IP): Performed by: FAMILY MEDICINE

## 2023-05-19 PROCEDURE — 6360000002 HC RX W HCPCS: Performed by: FAMILY MEDICINE

## 2023-05-19 PROCEDURE — 99231 SBSQ HOSP IP/OBS SF/LOW 25: CPT | Performed by: INTERNAL MEDICINE

## 2023-05-19 PROCEDURE — 1180000000 HC REHAB R&B

## 2023-05-19 PROCEDURE — 97535 SELF CARE MNGMENT TRAINING: CPT

## 2023-05-19 PROCEDURE — 97110 THERAPEUTIC EXERCISES: CPT

## 2023-05-19 PROCEDURE — 6370000000 HC RX 637 (ALT 250 FOR IP): Performed by: STUDENT IN AN ORGANIZED HEALTH CARE EDUCATION/TRAINING PROGRAM

## 2023-05-19 PROCEDURE — 99232 SBSQ HOSP IP/OBS MODERATE 35: CPT | Performed by: INTERNAL MEDICINE

## 2023-05-19 PROCEDURE — 97530 THERAPEUTIC ACTIVITIES: CPT

## 2023-05-19 PROCEDURE — 97130 THER IVNTJ EA ADDL 15 MIN: CPT

## 2023-05-19 PROCEDURE — 97116 GAIT TRAINING THERAPY: CPT

## 2023-05-19 PROCEDURE — 99232 SBSQ HOSP IP/OBS MODERATE 35: CPT | Performed by: STUDENT IN AN ORGANIZED HEALTH CARE EDUCATION/TRAINING PROGRAM

## 2023-05-19 PROCEDURE — 82947 ASSAY GLUCOSE BLOOD QUANT: CPT

## 2023-05-19 RX ADMIN — MAGNESIUM OXIDE 400 MG (241.3 MG MAGNESIUM) TABLET 400 MG: TABLET at 08:28

## 2023-05-19 RX ADMIN — PANTOPRAZOLE SODIUM 40 MG: 40 TABLET, DELAYED RELEASE ORAL at 05:53

## 2023-05-19 RX ADMIN — TRAMADOL HYDROCHLORIDE 50 MG: 50 TABLET, COATED ORAL at 09:14

## 2023-05-19 RX ADMIN — INSULIN GLARGINE 10 UNITS: 100 INJECTION, SOLUTION SUBCUTANEOUS at 08:28

## 2023-05-19 RX ADMIN — PROPRANOLOL HYDROCHLORIDE 20 MG: 20 TABLET ORAL at 08:28

## 2023-05-19 RX ADMIN — LEVOFLOXACIN 500 MG: 500 TABLET, FILM COATED ORAL at 08:28

## 2023-05-19 RX ADMIN — ENOXAPARIN SODIUM 40 MG: 100 INJECTION SUBCUTANEOUS at 08:28

## 2023-05-19 RX ADMIN — PROPRANOLOL HYDROCHLORIDE 20 MG: 20 TABLET ORAL at 20:17

## 2023-05-19 RX ADMIN — SPIRONOLACTONE 100 MG: 25 TABLET ORAL at 08:28

## 2023-05-19 RX ADMIN — INSULIN GLARGINE 10 UNITS: 100 INJECTION, SOLUTION SUBCUTANEOUS at 20:17

## 2023-05-19 RX ADMIN — ANTACID TABLETS 500 MG: 500 TABLET, CHEWABLE ORAL at 08:27

## 2023-05-19 RX ADMIN — FUROSEMIDE 40 MG: 40 TABLET ORAL at 08:28

## 2023-05-19 ASSESSMENT — ENCOUNTER SYMPTOMS
BLOOD IN STOOL: 0
NAUSEA: 0
APNEA: 0
CHOKING: 0
TROUBLE SWALLOWING: 0
BACK PAIN: 0
SORE THROAT: 0
COUGH: 0
SHORTNESS OF BREATH: 0
VOICE CHANGE: 0
VOMITING: 0
WHEEZING: 0
CONSTIPATION: 0
ABDOMINAL DISTENTION: 1
ABDOMINAL PAIN: 0
DIARRHEA: 0

## 2023-05-19 ASSESSMENT — PAIN DESCRIPTION - ORIENTATION: ORIENTATION: RIGHT

## 2023-05-19 ASSESSMENT — PAIN SCALES - GENERAL
PAINLEVEL_OUTOF10: 8
PAINLEVEL_OUTOF10: 8

## 2023-05-19 ASSESSMENT — PAIN DESCRIPTION - LOCATION: LOCATION: LEG

## 2023-05-20 LAB
ALBUMIN SERPL-MCNC: 1.9 G/DL (ref 3.5–5.2)
ALP SERPL-CCNC: 275 U/L (ref 40–129)
ALT SERPL-CCNC: 45 U/L (ref 5–41)
ANION GAP SERPL CALCULATED.3IONS-SCNC: 6 MMOL/L (ref 9–17)
AST SERPL-CCNC: 51 U/L
BILIRUB SERPL-MCNC: 1.6 MG/DL (ref 0.3–1.2)
BUN SERPL-MCNC: 15 MG/DL (ref 8–23)
CALCIUM SERPL-MCNC: 8.3 MG/DL (ref 8.6–10.4)
CHLORIDE SERPL-SCNC: 99 MMOL/L (ref 98–107)
CO2 SERPL-SCNC: 27 MMOL/L (ref 20–31)
CREAT SERPL-MCNC: 0.45 MG/DL (ref 0.7–1.2)
ERYTHROCYTE [DISTWIDTH] IN BLOOD BY AUTOMATED COUNT: 20.5 % (ref 11.5–14.9)
GFR SERPL CREATININE-BSD FRML MDRD: >60 ML/MIN/1.73M2
GLUCOSE BLD-MCNC: 166 MG/DL (ref 75–110)
GLUCOSE BLD-MCNC: 183 MG/DL (ref 75–110)
GLUCOSE BLD-MCNC: 194 MG/DL (ref 75–110)
GLUCOSE BLD-MCNC: 62 MG/DL (ref 75–110)
GLUCOSE BLD-MCNC: 70 MG/DL (ref 75–110)
GLUCOSE SERPL-MCNC: 119 MG/DL (ref 70–99)
HCT VFR BLD AUTO: 35.9 % (ref 41–53)
HGB BLD-MCNC: 11.9 G/DL (ref 13.5–17.5)
MCH RBC QN AUTO: 25.4 PG (ref 26–34)
MCHC RBC AUTO-ENTMCNC: 33.1 G/DL (ref 31–37)
MCV RBC AUTO: 76.6 FL (ref 80–100)
MICROORGANISM SPEC CULT: NORMAL
PLATELET # BLD AUTO: 114 K/UL (ref 150–450)
PMV BLD AUTO: 9.2 FL (ref 6–12)
POTASSIUM SERPL-SCNC: 5.1 MMOL/L (ref 3.7–5.3)
PROT SERPL-MCNC: 6.9 G/DL (ref 6.4–8.3)
RBC # BLD AUTO: 4.69 M/UL (ref 4.5–5.9)
REASON FOR REJECTION: NORMAL
SERVICE CMNT-IMP: NORMAL
SODIUM SERPL-SCNC: 132 MMOL/L (ref 135–144)
SPECIMEN DESCRIPTION: NORMAL
SPECIMEN SOURCE: NORMAL
WBC OTHER # BLD: 1 K/UL (ref 3.5–11)
ZZ NTE CLEAN UP: ORDERED TEST: NORMAL

## 2023-05-20 PROCEDURE — 6370000000 HC RX 637 (ALT 250 FOR IP): Performed by: INTERNAL MEDICINE

## 2023-05-20 PROCEDURE — 6370000000 HC RX 637 (ALT 250 FOR IP): Performed by: FAMILY MEDICINE

## 2023-05-20 PROCEDURE — 97110 THERAPEUTIC EXERCISES: CPT

## 2023-05-20 PROCEDURE — 85027 COMPLETE CBC AUTOMATED: CPT

## 2023-05-20 PROCEDURE — 82947 ASSAY GLUCOSE BLOOD QUANT: CPT

## 2023-05-20 PROCEDURE — 6370000000 HC RX 637 (ALT 250 FOR IP): Performed by: STUDENT IN AN ORGANIZED HEALTH CARE EDUCATION/TRAINING PROGRAM

## 2023-05-20 PROCEDURE — 99232 SBSQ HOSP IP/OBS MODERATE 35: CPT | Performed by: INTERNAL MEDICINE

## 2023-05-20 PROCEDURE — 1180000000 HC REHAB R&B

## 2023-05-20 PROCEDURE — 97530 THERAPEUTIC ACTIVITIES: CPT

## 2023-05-20 PROCEDURE — 80053 COMPREHEN METABOLIC PANEL: CPT

## 2023-05-20 PROCEDURE — 97129 THER IVNTJ 1ST 15 MIN: CPT

## 2023-05-20 PROCEDURE — 97130 THER IVNTJ EA ADDL 15 MIN: CPT

## 2023-05-20 PROCEDURE — 97535 SELF CARE MNGMENT TRAINING: CPT

## 2023-05-20 PROCEDURE — 6360000002 HC RX W HCPCS: Performed by: FAMILY MEDICINE

## 2023-05-20 PROCEDURE — 97116 GAIT TRAINING THERAPY: CPT

## 2023-05-20 PROCEDURE — 36415 COLL VENOUS BLD VENIPUNCTURE: CPT

## 2023-05-20 RX ORDER — POLYETHYLENE GLYCOL 3350 17 G/17G
17 POWDER, FOR SOLUTION ORAL DAILY PRN
Status: DISCONTINUED | OUTPATIENT
Start: 2023-05-20 | End: 2023-05-24 | Stop reason: HOSPADM

## 2023-05-20 RX ADMIN — ANTACID TABLETS 500 MG: 500 TABLET, CHEWABLE ORAL at 08:00

## 2023-05-20 RX ADMIN — SPIRONOLACTONE 100 MG: 25 TABLET ORAL at 07:59

## 2023-05-20 RX ADMIN — ENOXAPARIN SODIUM 40 MG: 100 INJECTION SUBCUTANEOUS at 08:00

## 2023-05-20 RX ADMIN — EMPAGLIFLOZIN 10 MG: 10 TABLET, FILM COATED ORAL at 20:49

## 2023-05-20 RX ADMIN — PANTOPRAZOLE SODIUM 40 MG: 40 TABLET, DELAYED RELEASE ORAL at 06:02

## 2023-05-20 RX ADMIN — POLYETHYLENE GLYCOL 3350 17 G: 17 POWDER, FOR SOLUTION ORAL at 08:00

## 2023-05-20 RX ADMIN — PROPRANOLOL HYDROCHLORIDE 20 MG: 20 TABLET ORAL at 20:49

## 2023-05-20 RX ADMIN — PROPRANOLOL HYDROCHLORIDE 20 MG: 20 TABLET ORAL at 07:59

## 2023-05-20 RX ADMIN — LEVOFLOXACIN 500 MG: 500 TABLET, FILM COATED ORAL at 07:59

## 2023-05-20 RX ADMIN — FUROSEMIDE 40 MG: 40 TABLET ORAL at 07:59

## 2023-05-20 ASSESSMENT — PAIN SCALES - GENERAL
PAINLEVEL_OUTOF10: 5
PAINLEVEL_OUTOF10: 5

## 2023-05-20 ASSESSMENT — PAIN DESCRIPTION - LOCATION
LOCATION: LEG
LOCATION: LEG

## 2023-05-20 ASSESSMENT — PAIN DESCRIPTION - ORIENTATION
ORIENTATION: RIGHT
ORIENTATION: RIGHT

## 2023-05-20 ASSESSMENT — PAIN DESCRIPTION - FREQUENCY
FREQUENCY: INTERMITTENT
FREQUENCY: INTERMITTENT

## 2023-05-20 ASSESSMENT — PAIN DESCRIPTION - DESCRIPTORS
DESCRIPTORS: ACHING;BURNING
DESCRIPTORS: ACHING;BURNING

## 2023-05-20 ASSESSMENT — PAIN - FUNCTIONAL ASSESSMENT
PAIN_FUNCTIONAL_ASSESSMENT: PREVENTS OR INTERFERES SOME ACTIVE ACTIVITIES AND ADLS
PAIN_FUNCTIONAL_ASSESSMENT: PREVENTS OR INTERFERES SOME ACTIVE ACTIVITIES AND ADLS

## 2023-05-21 LAB
GLUCOSE BLD-MCNC: 154 MG/DL (ref 75–110)
GLUCOSE BLD-MCNC: 178 MG/DL (ref 75–110)
GLUCOSE BLD-MCNC: 196 MG/DL (ref 75–110)
GLUCOSE BLD-MCNC: 215 MG/DL (ref 75–110)

## 2023-05-21 PROCEDURE — 6360000002 HC RX W HCPCS: Performed by: FAMILY MEDICINE

## 2023-05-21 PROCEDURE — 97530 THERAPEUTIC ACTIVITIES: CPT

## 2023-05-21 PROCEDURE — 97110 THERAPEUTIC EXERCISES: CPT

## 2023-05-21 PROCEDURE — 82947 ASSAY GLUCOSE BLOOD QUANT: CPT

## 2023-05-21 PROCEDURE — 99232 SBSQ HOSP IP/OBS MODERATE 35: CPT | Performed by: INTERNAL MEDICINE

## 2023-05-21 PROCEDURE — 1180000000 HC REHAB R&B

## 2023-05-21 PROCEDURE — 97116 GAIT TRAINING THERAPY: CPT

## 2023-05-21 PROCEDURE — 6370000000 HC RX 637 (ALT 250 FOR IP): Performed by: STUDENT IN AN ORGANIZED HEALTH CARE EDUCATION/TRAINING PROGRAM

## 2023-05-21 PROCEDURE — 6370000000 HC RX 637 (ALT 250 FOR IP): Performed by: FAMILY MEDICINE

## 2023-05-21 PROCEDURE — 6370000000 HC RX 637 (ALT 250 FOR IP): Performed by: INTERNAL MEDICINE

## 2023-05-21 RX ADMIN — SPIRONOLACTONE 100 MG: 25 TABLET ORAL at 08:19

## 2023-05-21 RX ADMIN — EMPAGLIFLOZIN 10 MG: 10 TABLET, FILM COATED ORAL at 08:18

## 2023-05-21 RX ADMIN — ANTACID TABLETS 500 MG: 500 TABLET, CHEWABLE ORAL at 08:18

## 2023-05-21 RX ADMIN — PROPRANOLOL HYDROCHLORIDE 20 MG: 20 TABLET ORAL at 08:18

## 2023-05-21 RX ADMIN — MAGNESIUM OXIDE 400 MG (241.3 MG MAGNESIUM) TABLET 400 MG: TABLET at 08:18

## 2023-05-21 RX ADMIN — PROPRANOLOL HYDROCHLORIDE 20 MG: 20 TABLET ORAL at 21:04

## 2023-05-21 RX ADMIN — PANTOPRAZOLE SODIUM 40 MG: 40 TABLET, DELAYED RELEASE ORAL at 06:03

## 2023-05-21 RX ADMIN — FUROSEMIDE 40 MG: 40 TABLET ORAL at 08:18

## 2023-05-21 RX ADMIN — ENOXAPARIN SODIUM 40 MG: 100 INJECTION SUBCUTANEOUS at 08:29

## 2023-05-21 RX ADMIN — LEVOFLOXACIN 500 MG: 500 TABLET, FILM COATED ORAL at 08:18

## 2023-05-21 RX ADMIN — TRAMADOL HYDROCHLORIDE 50 MG: 50 TABLET, COATED ORAL at 10:55

## 2023-05-21 ASSESSMENT — PAIN SCALES - GENERAL
PAINLEVEL_OUTOF10: 8
PAINLEVEL_OUTOF10: 7

## 2023-05-21 ASSESSMENT — PAIN - FUNCTIONAL ASSESSMENT
PAIN_FUNCTIONAL_ASSESSMENT: ACTIVITIES ARE NOT PREVENTED
PAIN_FUNCTIONAL_ASSESSMENT: ACTIVITIES ARE NOT PREVENTED

## 2023-05-21 ASSESSMENT — PAIN DESCRIPTION - DESCRIPTORS
DESCRIPTORS: ACHING;PRESSURE
DESCRIPTORS: ACHING;SORE

## 2023-05-21 ASSESSMENT — PAIN DESCRIPTION - ORIENTATION
ORIENTATION: RIGHT
ORIENTATION: RIGHT

## 2023-05-21 ASSESSMENT — PAIN DESCRIPTION - LOCATION
LOCATION: LEG
LOCATION: LEG

## 2023-05-22 LAB
GLUCOSE BLD-MCNC: 138 MG/DL (ref 75–110)
GLUCOSE BLD-MCNC: 147 MG/DL (ref 75–110)
GLUCOSE BLD-MCNC: 212 MG/DL (ref 75–110)
GLUCOSE BLD-MCNC: 213 MG/DL (ref 75–110)
MICROORGANISM SPEC CULT: NORMAL
MICROORGANISM/AGENT SPEC: NORMAL
MICROORGANISM/AGENT SPEC: NORMAL
SPECIMEN DESCRIPTION: NORMAL

## 2023-05-22 PROCEDURE — 97535 SELF CARE MNGMENT TRAINING: CPT

## 2023-05-22 PROCEDURE — 6370000000 HC RX 637 (ALT 250 FOR IP): Performed by: FAMILY MEDICINE

## 2023-05-22 PROCEDURE — 6360000002 HC RX W HCPCS: Performed by: FAMILY MEDICINE

## 2023-05-22 PROCEDURE — 97110 THERAPEUTIC EXERCISES: CPT

## 2023-05-22 PROCEDURE — 6370000000 HC RX 637 (ALT 250 FOR IP): Performed by: STUDENT IN AN ORGANIZED HEALTH CARE EDUCATION/TRAINING PROGRAM

## 2023-05-22 PROCEDURE — 6370000000 HC RX 637 (ALT 250 FOR IP): Performed by: INTERNAL MEDICINE

## 2023-05-22 PROCEDURE — 99232 SBSQ HOSP IP/OBS MODERATE 35: CPT | Performed by: INTERNAL MEDICINE

## 2023-05-22 PROCEDURE — 97530 THERAPEUTIC ACTIVITIES: CPT

## 2023-05-22 PROCEDURE — 97129 THER IVNTJ 1ST 15 MIN: CPT

## 2023-05-22 PROCEDURE — 1180000000 HC REHAB R&B

## 2023-05-22 PROCEDURE — 99232 SBSQ HOSP IP/OBS MODERATE 35: CPT | Performed by: PHYSICAL MEDICINE & REHABILITATION

## 2023-05-22 PROCEDURE — 97116 GAIT TRAINING THERAPY: CPT

## 2023-05-22 PROCEDURE — 97130 THER IVNTJ EA ADDL 15 MIN: CPT

## 2023-05-22 PROCEDURE — 82947 ASSAY GLUCOSE BLOOD QUANT: CPT

## 2023-05-22 RX ORDER — INSULIN GLARGINE 100 [IU]/ML
8 INJECTION, SOLUTION SUBCUTANEOUS NIGHTLY
Status: DISCONTINUED | OUTPATIENT
Start: 2023-05-22 | End: 2023-05-24 | Stop reason: HOSPADM

## 2023-05-22 RX ADMIN — FUROSEMIDE 40 MG: 40 TABLET ORAL at 08:33

## 2023-05-22 RX ADMIN — ENOXAPARIN SODIUM 40 MG: 100 INJECTION SUBCUTANEOUS at 08:32

## 2023-05-22 RX ADMIN — EMPAGLIFLOZIN 10 MG: 10 TABLET, FILM COATED ORAL at 08:33

## 2023-05-22 RX ADMIN — PROPRANOLOL HYDROCHLORIDE 20 MG: 20 TABLET ORAL at 08:36

## 2023-05-22 RX ADMIN — LEVOFLOXACIN 500 MG: 500 TABLET, FILM COATED ORAL at 08:32

## 2023-05-22 RX ADMIN — PROPRANOLOL HYDROCHLORIDE 20 MG: 20 TABLET ORAL at 20:52

## 2023-05-22 RX ADMIN — INSULIN GLARGINE 8 UNITS: 100 INJECTION, SOLUTION SUBCUTANEOUS at 20:53

## 2023-05-22 RX ADMIN — SPIRONOLACTONE 100 MG: 25 TABLET ORAL at 08:34

## 2023-05-22 RX ADMIN — PANTOPRAZOLE SODIUM 40 MG: 40 TABLET, DELAYED RELEASE ORAL at 06:30

## 2023-05-22 RX ADMIN — INSULIN LISPRO 2 UNITS: 100 INJECTION, SOLUTION INTRAVENOUS; SUBCUTANEOUS at 13:15

## 2023-05-22 RX ADMIN — ANTACID TABLETS 500 MG: 500 TABLET, CHEWABLE ORAL at 08:33

## 2023-05-22 NOTE — PATIENT CARE CONFERENCE
[facility] Acute Inpatient Rehabilitation  TEAM CONFERENCE NOTE  Date: 23  Patient Name: Joyce Osorio       Room: 4319/7063-06  MRN: [id number]       : 1950  (78 y.o.)     Gender: male     Referring Practitioner: Roxann Dempsey MD     PRINCIPAL DIAGNOSIS: Cristobal Tristan    Bladder Continence  Always Continent  Bowel Continence Always Continent    Date of Last BM: 23    Bladder/Bowel Program Interventions: No Bladder or Bowel Program Indicated    Wounds/Incisions/Ulcers:  right foot continues to be red and swollen but improving    Pain Control: No pain concerns to address    Pain Medication Regimen Usage Pattern: MAR reviewed and pain medications are being used at the following frequency (Specify Medication, # Doses Administered on average per day, identified patterns of use - for example: time of day, prior to activity/therapy)  Tylenol 650 mg every 4 hours PRN. Taken x 1 on .     Fall Risk:  Falling star program initiated    Medication Education Program: Patient currently unable to manage medications and responsible party being educated    Discharge Preparation Patient/Responsible Party Education In Progress:   No Educational Needs Identified    Nursing specific communication for TEAM: No additional information identified requiring communication at this time    PHYSICAL THERAPY  Bed mobility  Bridging: Supervision  Rolling to Left: Supervision  Rolling to Right: Supervision  Supine to Sit: Supervision  Sit to Supine: Supervision  Scooting: Supervision  Bed Mobility Comments: bed mobility completed on hospital bed in room    Transfers:  Sit to Stand: Supervision (multi-level surfaces<>rollator)  Stand to Sit: Supervision (Demos good technique + hand placement on all transfers except 1x where pt \"plops\" into chair)  Bed to Chair: Supervision (use of rollator)    Ambulation  Surface: Level tile  Device: Rollator  Assistance: Stand by assistance  Quality of Gait: Good upright

## 2023-05-22 NOTE — CARE COORDINATION
ARU CASE MANAGEMENT NOTE:    Patient is alert and oriented x4. Spoke with patient regarding discharge plan: Return home with spouse and [facility]. Outside appointments while in ARU: None    Will continue to follow for additional discharge needs.

## 2023-05-22 NOTE — INTERDISCIPLINARY ROUNDS
[address]  Date: 23  Patient Name: Lillian Joyner       Room: 3976/2972-16  MRN: [id number]       : 1950  (78 y.o.)     Gender: male     Patient's care team, including nursing, speech language pathologist, occupational therapist, and/or physical therapist, met to discuss patient's care needs. Any patient concerns, change in medical status, and current transfer/mobility status were identified at this time.      Any Additional Pertinent Information:   Not Applicable    Participating Team Members:  Nurse: Milena Hauser RN    Occupational Therapist: Dona Roberson OT    Physical Therapist: Jadon Baig PT  Speech Therapist:  N/A

## 2023-05-23 LAB
ABSOLUTE BANDS #: 0.05 K/UL (ref 0–1)
ATYPICAL LYMPHOCYTE ABSOLUTE COUNT: 0.03 K/UL
ATYPICAL LYMPHOCYTES: 2 %
BANDS: 4 % (ref 0–10)
BASOPHILS # BLD: 0 K/UL (ref 0–0.2)
BASOPHILS NFR BLD: 0 % (ref 0–2)
EOSINOPHIL # BLD: 0.01 K/UL (ref 0–0.4)
EOSINOPHILS RELATIVE PERCENT: 1 % (ref 0–4)
ERYTHROCYTE [DISTWIDTH] IN BLOOD BY AUTOMATED COUNT: 21.2 % (ref 11.5–14.9)
GLUCOSE BLD-MCNC: 111 MG/DL (ref 75–110)
GLUCOSE BLD-MCNC: 128 MG/DL (ref 75–110)
GLUCOSE BLD-MCNC: 140 MG/DL (ref 75–110)
GLUCOSE BLD-MCNC: 173 MG/DL (ref 75–110)
HCT VFR BLD AUTO: 34.3 % (ref 41–53)
HGB BLD-MCNC: 11.3 G/DL (ref 13.5–17.5)
IRON SATN MFR SERPL: 20 % (ref 20–55)
IRON SERPL-MCNC: 43 UG/DL (ref 59–158)
LYMPHOCYTES # BLD: 22 % (ref 24–44)
LYMPHOCYTES NFR BLD: 0.29 K/UL (ref 1–4.8)
MCH RBC QN AUTO: 25.5 PG (ref 26–34)
MCHC RBC AUTO-ENTMCNC: 33 G/DL (ref 31–37)
MCV RBC AUTO: 77.3 FL (ref 80–100)
MONOCYTES NFR BLD: 0.79 K/UL (ref 0.1–1.3)
MONOCYTES NFR BLD: 61 % (ref 1–7)
MORPHOLOGY: ABNORMAL
NEUTROPHILS NFR BLD: 10 % (ref 36–66)
NEUTS SEG NFR BLD: 0.13 K/UL (ref 1.3–9.1)
PLATELET # BLD AUTO: 116 K/UL (ref 150–450)
PMV BLD AUTO: 11.1 FL (ref 6–12)
RBC # BLD AUTO: 4.43 M/UL (ref 4.5–5.9)
TIBC SERPL-MCNC: 220 UG/DL (ref 250–450)
UNSATURATED IRON BINDING CAPACITY: 177 UG/DL (ref 112–347)
WBC OTHER # BLD: 1.3 K/UL (ref 3.5–11)

## 2023-05-23 PROCEDURE — 97130 THER IVNTJ EA ADDL 15 MIN: CPT

## 2023-05-23 PROCEDURE — 6370000000 HC RX 637 (ALT 250 FOR IP): Performed by: FAMILY MEDICINE

## 2023-05-23 PROCEDURE — 83550 IRON BINDING TEST: CPT

## 2023-05-23 PROCEDURE — 82947 ASSAY GLUCOSE BLOOD QUANT: CPT

## 2023-05-23 PROCEDURE — 6370000000 HC RX 637 (ALT 250 FOR IP): Performed by: STUDENT IN AN ORGANIZED HEALTH CARE EDUCATION/TRAINING PROGRAM

## 2023-05-23 PROCEDURE — 6360000002 HC RX W HCPCS: Performed by: FAMILY MEDICINE

## 2023-05-23 PROCEDURE — 36415 COLL VENOUS BLD VENIPUNCTURE: CPT

## 2023-05-23 PROCEDURE — 97129 THER IVNTJ 1ST 15 MIN: CPT

## 2023-05-23 PROCEDURE — 97110 THERAPEUTIC EXERCISES: CPT

## 2023-05-23 PROCEDURE — 99233 SBSQ HOSP IP/OBS HIGH 50: CPT | Performed by: PHYSICAL MEDICINE & REHABILITATION

## 2023-05-23 PROCEDURE — 85025 COMPLETE CBC W/AUTO DIFF WBC: CPT

## 2023-05-23 PROCEDURE — 6370000000 HC RX 637 (ALT 250 FOR IP): Performed by: INTERNAL MEDICINE

## 2023-05-23 PROCEDURE — 97535 SELF CARE MNGMENT TRAINING: CPT

## 2023-05-23 PROCEDURE — 97530 THERAPEUTIC ACTIVITIES: CPT

## 2023-05-23 PROCEDURE — 97116 GAIT TRAINING THERAPY: CPT

## 2023-05-23 PROCEDURE — 1180000000 HC REHAB R&B

## 2023-05-23 PROCEDURE — 99232 SBSQ HOSP IP/OBS MODERATE 35: CPT | Performed by: INTERNAL MEDICINE

## 2023-05-23 PROCEDURE — 83540 ASSAY OF IRON: CPT

## 2023-05-23 RX ORDER — PROPRANOLOL HYDROCHLORIDE 20 MG/1
40 TABLET ORAL 2 TIMES DAILY
Status: DISCONTINUED | OUTPATIENT
Start: 2023-05-23 | End: 2023-05-24 | Stop reason: HOSPADM

## 2023-05-23 RX ADMIN — INSULIN GLARGINE 8 UNITS: 100 INJECTION, SOLUTION SUBCUTANEOUS at 21:11

## 2023-05-23 RX ADMIN — PROPRANOLOL HYDROCHLORIDE 40 MG: 20 TABLET ORAL at 21:11

## 2023-05-23 RX ADMIN — ENOXAPARIN SODIUM 40 MG: 100 INJECTION SUBCUTANEOUS at 10:13

## 2023-05-23 RX ADMIN — EMPAGLIFLOZIN 10 MG: 10 TABLET, FILM COATED ORAL at 10:13

## 2023-05-23 RX ADMIN — POLYETHYLENE GLYCOL 3350 17 G: 17 POWDER, FOR SOLUTION ORAL at 10:14

## 2023-05-23 RX ADMIN — FUROSEMIDE 40 MG: 40 TABLET ORAL at 10:13

## 2023-05-23 RX ADMIN — TRAMADOL HYDROCHLORIDE 50 MG: 50 TABLET, COATED ORAL at 10:13

## 2023-05-23 RX ADMIN — ANTACID TABLETS 500 MG: 500 TABLET, CHEWABLE ORAL at 10:13

## 2023-05-23 RX ADMIN — SPIRONOLACTONE 100 MG: 25 TABLET ORAL at 10:13

## 2023-05-23 RX ADMIN — PANTOPRAZOLE SODIUM 40 MG: 40 TABLET, DELAYED RELEASE ORAL at 06:28

## 2023-05-23 RX ADMIN — PROPRANOLOL HYDROCHLORIDE 20 MG: 20 TABLET ORAL at 10:13

## 2023-05-23 RX ADMIN — LEVOFLOXACIN 500 MG: 500 TABLET, FILM COATED ORAL at 10:16

## 2023-05-23 RX ADMIN — MAGNESIUM OXIDE 400 MG (241.3 MG MAGNESIUM) TABLET 400 MG: TABLET at 10:13

## 2023-05-23 NOTE — CARE COORDINATION
ARU CASE MANAGEMENT NOTE:    Patient is alert and oriented x4. Spoke with patient regarding discharge plan: Return home with spouse and [facility]. Outside appointments while in ARU: None    Will continue to follow for additional discharge needs.

## 2023-05-24 ENCOUNTER — CARE COORDINATION (OUTPATIENT)
Dept: OTHER | Facility: CLINIC | Age: 73
End: 2023-05-24

## 2023-05-24 VITALS
BODY MASS INDEX: 18.56 KG/M2 | TEMPERATURE: 97.3 F | WEIGHT: 137 LBS | DIASTOLIC BLOOD PRESSURE: 67 MMHG | SYSTOLIC BLOOD PRESSURE: 108 MMHG | OXYGEN SATURATION: 97 % | RESPIRATION RATE: 16 BRPM | HEART RATE: 56 BPM | HEIGHT: 72 IN

## 2023-05-24 LAB
ANION GAP SERPL CALCULATED.3IONS-SCNC: 6 MMOL/L (ref 9–17)
BASOPHILS # BLD: 0.01 K/UL (ref 0–0.2)
BASOPHILS NFR BLD: 1 % (ref 0–2)
BUN SERPL-MCNC: 16 MG/DL (ref 8–23)
CALCIUM SERPL-MCNC: 8.3 MG/DL (ref 8.6–10.4)
CHLORIDE SERPL-SCNC: 100 MMOL/L (ref 98–107)
CO2 SERPL-SCNC: 27 MMOL/L (ref 20–31)
CREAT SERPL-MCNC: 0.62 MG/DL (ref 0.7–1.2)
EOSINOPHIL # BLD: 0.05 K/UL (ref 0–0.4)
EOSINOPHILS RELATIVE PERCENT: 5 % (ref 0–4)
ERYTHROCYTE [DISTWIDTH] IN BLOOD BY AUTOMATED COUNT: 21.6 % (ref 11.5–14.9)
GFR SERPL CREATININE-BSD FRML MDRD: >60 ML/MIN/1.73M2
GLUCOSE BLD-MCNC: 77 MG/DL (ref 75–110)
GLUCOSE SERPL-MCNC: 126 MG/DL (ref 70–99)
HCT VFR BLD AUTO: 37.3 % (ref 41–53)
HGB BLD-MCNC: 11.8 G/DL (ref 13.5–17.5)
LYMPHOCYTES # BLD: 34 % (ref 24–44)
LYMPHOCYTES NFR BLD: 0.31 K/UL (ref 1–4.8)
MCH RBC QN AUTO: 24.4 PG (ref 26–34)
MCHC RBC AUTO-ENTMCNC: 31.5 G/DL (ref 31–37)
MCV RBC AUTO: 77.4 FL (ref 80–100)
MONOCYTES NFR BLD: 0.38 K/UL (ref 0.1–1.3)
MONOCYTES NFR BLD: 43 % (ref 1–7)
MORPHOLOGY: ABNORMAL
NEUTROPHILS NFR BLD: 17 % (ref 36–66)
NEUTS SEG NFR BLD: 0.15 K/UL (ref 1.3–9.1)
PLATELET # BLD AUTO: 115 K/UL (ref 150–450)
PMV BLD AUTO: 11 FL (ref 6–12)
POTASSIUM SERPL-SCNC: 4.6 MMOL/L (ref 3.7–5.3)
RBC # BLD AUTO: 4.82 M/UL (ref 4.5–5.9)
SODIUM SERPL-SCNC: 133 MMOL/L (ref 135–144)
WBC OTHER # BLD: 0.9 K/UL (ref 3.5–11)

## 2023-05-24 PROCEDURE — 97530 THERAPEUTIC ACTIVITIES: CPT

## 2023-05-24 PROCEDURE — 36415 COLL VENOUS BLD VENIPUNCTURE: CPT

## 2023-05-24 PROCEDURE — 6370000000 HC RX 637 (ALT 250 FOR IP): Performed by: FAMILY MEDICINE

## 2023-05-24 PROCEDURE — 97116 GAIT TRAINING THERAPY: CPT

## 2023-05-24 PROCEDURE — 85025 COMPLETE CBC W/AUTO DIFF WBC: CPT

## 2023-05-24 PROCEDURE — 97129 THER IVNTJ 1ST 15 MIN: CPT

## 2023-05-24 PROCEDURE — 6360000002 HC RX W HCPCS: Performed by: FAMILY MEDICINE

## 2023-05-24 PROCEDURE — 6370000000 HC RX 637 (ALT 250 FOR IP): Performed by: INTERNAL MEDICINE

## 2023-05-24 PROCEDURE — 97110 THERAPEUTIC EXERCISES: CPT

## 2023-05-24 PROCEDURE — 97535 SELF CARE MNGMENT TRAINING: CPT

## 2023-05-24 PROCEDURE — 82947 ASSAY GLUCOSE BLOOD QUANT: CPT

## 2023-05-24 PROCEDURE — 80048 BASIC METABOLIC PNL TOTAL CA: CPT

## 2023-05-24 PROCEDURE — 99238 HOSP IP/OBS DSCHRG MGMT 30/<: CPT | Performed by: PHYSICAL MEDICINE & REHABILITATION

## 2023-05-24 PROCEDURE — 97130 THER IVNTJ EA ADDL 15 MIN: CPT

## 2023-05-24 RX ORDER — MIDODRINE HYDROCHLORIDE 5 MG/1
5 TABLET ORAL 3 TIMES DAILY PRN
Qty: 90 TABLET | Refills: 3 | Status: SHIPPED | OUTPATIENT
Start: 2023-05-23

## 2023-05-24 RX ORDER — INSULIN GLARGINE 100 [IU]/ML
8 INJECTION, SOLUTION SUBCUTANEOUS NIGHTLY
Qty: 5 ADJUSTABLE DOSE PRE-FILLED PEN SYRINGE | Refills: 3 | Status: SHIPPED | OUTPATIENT
Start: 2023-05-23

## 2023-05-24 RX ORDER — FUROSEMIDE 40 MG/1
40 TABLET ORAL DAILY
Qty: 60 TABLET | Refills: 3 | Status: SHIPPED | OUTPATIENT
Start: 2023-05-24

## 2023-05-24 RX ORDER — PROPRANOLOL HYDROCHLORIDE 40 MG/1
40 TABLET ORAL 2 TIMES DAILY
Qty: 90 TABLET | Refills: 3 | Status: SHIPPED | OUTPATIENT
Start: 2023-05-24

## 2023-05-24 RX ORDER — GLYCERIN, PETROLATUM, PHENYLEPHRINE HCL, PRAMOXINE HCL 144; 2.5; 10; 15 MG/G; MG/G; MG/G; MG/G
1 CREAM TOPICAL 2 TIMES DAILY PRN
Qty: 25.5 G | Refills: 0
Start: 2023-05-23

## 2023-05-24 RX ORDER — LANOLIN ALCOHOL/MO/W.PET/CERES
400 CREAM (GRAM) TOPICAL EVERY OTHER DAY
Qty: 30 TABLET | Refills: 0 | Status: SHIPPED | OUTPATIENT
Start: 2023-05-25

## 2023-05-24 RX ORDER — PEN NEEDLE, DIABETIC 30 GX5/16"
1 NEEDLE, DISPOSABLE MISCELLANEOUS DAILY
Qty: 100 EACH | Refills: 3 | Status: SHIPPED | OUTPATIENT
Start: 2023-05-24

## 2023-05-24 RX ORDER — DIAPER,BRIEF,INFANT-TODD,DISP
EACH MISCELLANEOUS
Qty: 30 G | Refills: 1
Start: 2023-05-23 | End: 2023-05-30

## 2023-05-24 RX ORDER — POLYETHYLENE GLYCOL 3350 17 G/17G
17 POWDER, FOR SOLUTION ORAL DAILY PRN
Qty: 527 G | Refills: 1 | COMMUNITY
Start: 2023-05-23 | End: 2023-06-22

## 2023-05-24 RX ORDER — TRAMADOL HYDROCHLORIDE 50 MG/1
50 TABLET ORAL EVERY 6 HOURS PRN
Qty: 28 TABLET | Refills: 0 | Status: SHIPPED | OUTPATIENT
Start: 2023-05-23 | End: 2023-05-30

## 2023-05-24 RX ADMIN — ENOXAPARIN SODIUM 40 MG: 100 INJECTION SUBCUTANEOUS at 08:37

## 2023-05-24 RX ADMIN — ANTACID TABLETS 500 MG: 500 TABLET, CHEWABLE ORAL at 08:39

## 2023-05-24 RX ADMIN — FUROSEMIDE 40 MG: 40 TABLET ORAL at 08:43

## 2023-05-24 RX ADMIN — PANTOPRAZOLE SODIUM 40 MG: 40 TABLET, DELAYED RELEASE ORAL at 06:15

## 2023-05-24 RX ADMIN — PROPRANOLOL HYDROCHLORIDE 40 MG: 20 TABLET ORAL at 08:38

## 2023-05-24 RX ADMIN — SPIRONOLACTONE 100 MG: 25 TABLET ORAL at 08:38

## 2023-05-24 NOTE — CARE COORDINATION
ARU CASE MANAGEMENT DISCHARGE NOTE    Discharge disposition to : Home    Transportation per : Spouse    IMM Letter Status: Not Applicable: Patient does not have Medicare as a payor. HHC: [facility]    OP Therapy: N/A    DME: None    Current reconciled medication list electronically sent:to the subsequent provider: Yes    Confirmation Received      ACUTE [address] [facility]    Case Management Assessment: IRF LIZZIE 4.0   If score is above 15, Notify PM&R Physician    Patient Health Questionnaire-9 (PHQ-2 to 9)   Over the last 2 weeks, how often have you been bothered by any of the following problems? 1. Little Interest or pleasure in doing things? Never or 1 Day - Score 0    2. Feeling down, depressed or hopeless? Never or 1 Day - Score 0    3. Trouble falling or staying asleep, or sleeping too much? Never or 1 Day - Score 0    4. Feeling tired or having little energy? Never or 1 Day - Score 0    5. Poor appetite or overeating? Never or 1 Day - Score 0    6. Feeling bad about yourself-or that you are a failure or have let yourself or your family down? Never or 1 Day - Score 0    7. Trouble concentrating on things, such as reading the newspaper or watching television? Never or 1 Day - Score 0    8. Moving or speaking so slowly that other people could have noticed? Or the opposite-being so fidgety or restless that you have been moving around a lot more than usual?   Never or 1 Day - Score 0    9. Thoughts that you would be better off dead or of hurting yourself in some way? Never or 1 Day - Score 0    Total Score: 0    If you checked off any problems, how difficult have these problems made it for you to do your work, take care of things at home, or get along with other people?    [x] Not difficult at all     [] Somewhat Difficult     [] Very Difficult     [] Extremely Difficult           Social Isolation     How often do you feel lonely or isolated from those around

## 2023-05-24 NOTE — CARE COORDINATION
Chart reviewed. Pt still inpatient status at [place] [id number], [facility]  Associate Care Manager  [phone]  [email or web address]

## 2023-05-24 NOTE — CARE COORDINATION
Spoke with Hailey Muro from [place], states it takes approximately 3-5 days to get [address]. Patient and spouse notified.

## 2023-05-24 NOTE — DISCHARGE INSTR - COC
Continuity of Care Form    Patient Name: Maurice Charles   :  1950  MRN:  [id number]    Admit date:  2023  Discharge date:  ***    Code Status Order: Full Code   Advance Directives:     Admitting Physician:  Albina Sanchez MD  PCP: SCARLET Rodriguez CNP    Discharging Nurse: [facility] Unit/Room#: 3823/5174-55  Discharging Unit Phone Number: [phone]    Emergency Contact:   Extended Emergency Contact Information  Primary Emergency Contact: Nisa GARCIA  Address: [place] [address]           [place], [address] [place] of [address]  Phone: [phone]  Mobile Phone: [phone]  Relation: Spouse  Secondary Emergency Contact: [address]  Phone: [phone]  Mobile Phone: [phone]  Relation: Child  Preferred language: English   needed? No    Past Surgical History:  Past Surgical History:   Procedure Laterality Date    COLONOSCOPY  2014    COLONOSCOPY  2019    COLONOSCOPY N/A 2019    COLONOSCOPY POLYPECTOMY SNARE/HOT BIOPSY performed by Lear Blizzard, MD at [place] [address], COLON, DIAGNOSTIC  2014    stomach ulcers    HIP ARTHROPLASTY Right 2014    HIP ARTHROPLASTY Left 03/10/2015    INGUINAL HERNIA REPAIR Right     INGUINAL HERNIA REPAIR Left 2018    incarcerated. By Dr. Patty Arrington OFFICE/OUTPT VISIT,PROCEDURE ONLY Left 2018    INCARCERATED INGUINAL HERNIA performed by Leesa Costello MD at [address] [place] [address] Road  2014    PROSTATECTOMY  2015    robotic.  lap    UPPER GASTROINTESTINAL ENDOSCOPY  10/19/2016    no lesions seen    UPPER GASTROINTESTINAL ENDOSCOPY  2019    UPPER GASTROINTESTINAL ENDOSCOPY N/A 2019    EGD BIOPSY performed by Lear Blizzard, MD at [facility]       Immunization History:   Immunization History   Administered Date(s) Administered    COVID-19, PFIZER Bivalent, DO NOT Dilute, (age 12y+), IM, 30 mcg/0.3 mL 10/09/2022    COVID-19, PFIZER PURPLE top, DILUTE

## 2023-05-24 NOTE — PROGRESS NOTES
[address]    Acute Rehabilitation Occupational Therapy Daily Treatment Note    Date: 23  Patient Name: Les Herman       Room: 3992/7964-18  MRN: [id number]  Account: [de-identified]   : 1950  (78 y.o.) Gender: male       Referring Practitioner: Nishi Pepper MD  Diagnosis: Debility       Treatment Diagnosis: Impaired self-care status. Past Medical History:  has a past medical history of Anemia, Arthritis, Avascular necrosis ([address] BPH (benign prostatic hyperplasia), Gastroesophageal reflux disease, History of blood transfusion, Hypertension, Iron deficiency anemia, Leukopenia, MGUS (monoclonal gammopathy of unknown significance), Osteoarthritis, Other cirrhosis of liver ([address] Patient in clinical research study, Positive FIT (fecal immunochemical test), and Stomach ulcer. Past Surgical History:   has a past surgical history that includes Endoscopy, colon, diagnostic (2014); Prostate Biopsy (2014); Hip Arthroplasty (Right, 2014); Hip Arthroplasty (Left, 03/10/2015); Prostatectomy (2015); Colonoscopy (2014); Upper gastrointestinal endoscopy (10/19/2016); pr office/outpt visit,procedure only (Left, 2018); Inguinal hernia repair (Right, ); Inguinal hernia repair (Left, 2018); Upper gastrointestinal endoscopy (2019); Colonoscopy (2019); Colonoscopy (N/A, 2019); and Upper gastrointestinal endoscopy (N/A, 2019). Restrictions  Restrictions/Precautions  Restrictions/Precautions: Fall Risk, General Precautions  Required Braces or Orthoses?: No  Implants present? : Metal implants (B CHERI )     Position Activity Restriction  Other position/activity restrictions: Elevate affected RLE          Vitals        Subjective  Subjective  Subjective: \"I can do this on my own. \" Pt pleasant and agreeable to OT.   Pain: Pt did not comment on pain       Objective  Cognition  Overall Orientation Status: Within Functional
[address]    Acute Rehabilitation Occupational Therapy Daily Treatment Note    Date: 23  Patient Name: Maurice Charles       Room: 6109/8800-99  MRN: [id number]  Account: [de-identified]   : 1950  (78 y.o.) Gender: male       Referring Practitioner: Albina Sanchez MD  Diagnosis: Debility       Treatment Diagnosis: Impaired self-care status. Past Medical History:  has a past medical history of Anemia, Arthritis, Avascular necrosis ([address] BPH (benign prostatic hyperplasia), Gastroesophageal reflux disease, History of blood transfusion, Hypertension, Iron deficiency anemia, Leukopenia, MGUS (monoclonal gammopathy of unknown significance), Osteoarthritis, Other cirrhosis of liver ([address] Patient in clinical research study, Positive FIT (fecal immunochemical test), and Stomach ulcer. Past Surgical History:   has a past surgical history that includes Endoscopy, colon, diagnostic (2014); Prostate Biopsy (2014); Hip Arthroplasty (Right, 2014); Hip Arthroplasty (Left, 03/10/2015); Prostatectomy (2015); Colonoscopy (2014); Upper gastrointestinal endoscopy (10/19/2016); pr office/outpt visit,procedure only (Left, 2018); Inguinal hernia repair (Right, ); Inguinal hernia repair (Left, 2018); Upper gastrointestinal endoscopy (2019); Colonoscopy (2019); Colonoscopy (N/A, 2019); and Upper gastrointestinal endoscopy (N/A, 2019). Restrictions  Restrictions/Precautions  Restrictions/Precautions: Fall Risk, General Precautions  Required Braces or Orthoses?: No  Implants present? : Metal implants (B CHERI )     Position Activity Restriction  Other position/activity restrictions: Elevate affected RLE    Subjective  Subjective  Subjective: \"I just came back, I've done stairs and everything\" Pt reports fatigue with second therapy session this AM and politely declines all ADL or IADLs.  Education provided on OT POC and daily
ACUTE INPATIENT [address]    Patient Name: Carlos Coronel  MRN: [id number]     Patient discharged in stable condition as per order of attending physician. AVS provided by nurse at time of discharge, which includes all necessary medical information pertaining to the patients current course of illness, treatment, medications, post-discharge goals of care, and treatment preferences. Provision of Current Reconciled Medication List to Patient at Discharge   Indicate the route(s) of transmission of the current reconciled medication list to the patient/family/caregiver. Electronic Health Record (e.g. electronic access to patient portal)     Availability of \"My Chart\" offered to patient as a tool for updated health record. Steps for activation discussed with patient as mentioned on AVS.      Patient/responsible party verbalize understanding of discharge plan and are in agreement with goal/plan/treatment preferences. Belongings including  CELL PHONE  sent with patient/responsible party. Home medications sent home with patient/responsible party yes    Car Transfer   Level of assistance required for patient transfer into vehicle:   INDEPENDENT: Patient completes the activity by him/herself with no assistance from a helper     High-Risk Drug Classes: Use and Indication   Check if the patient is taking any medications by pharmacological classification If yes, check if there is an indication noted for all meds in the drug class   Antipsychotic No If yes: indication noted? [] If no indication noted, follow up with provider for order clarification   Anticoagulant No If yes: indication noted? []    Antibiotic No If yes: indication noted? []    Opioid No If yes: indication noted? []    Antiplatelet No If yes: indication noted? []    Hypoglycemic (Including Insulin) Yes If yes: indication noted?   []        Pain Assessment   Over the past 5 days, how much of the time has pain made
[facility] Internal Medicine    CONSULTATION / HISTORY AND PHYSICAL EXAMINATION            Date:   5/23/2023  Patient name:  Hattie Finley  Date of admission:  5/16/2023  3:41 PM  MRN:   [id number]  Account:  [de-identified]  YOB: 1950  PCP:    SCARLET Calvo CNP  Room:   1137/6809-54  Code Status:    Full Code    Physician Requesting Consult: Jeff Holly MD    Reason for Consult:  medical management    Chief Complaint:     No chief complaint on file.       History Obtained From:     Patient medical record nursing staff    History of Present Illness:   Patient, has past medical history of nonalcoholic liver cirrhosis, ascites, esophageal varices, MGUS, hypertension, admitted to [facility] originally with cellulitis of right leg, patient blood culture was positive for Serratia marcescens   Patient was Evaluated by ID , getting Levaquin, initially treated with IV Rocephin  Patient's symptoms are improving  He required large-volume paracentesis and thoracentesis on right side  Patient denying complaints of shortness of breath, cough, chest pain, abdominal pain    5/19   No new complaints  Patient evaluated by hematologist  Seen by GI today,  Past Medical History:     Past Medical History:   Diagnosis Date    Anemia     Arthritis     Avascular necrosis ([address]     sees Dr Sal Hernandez    BPH (benign prostatic hyperplasia)     Gastroesophageal reflux disease 05/25/2022    History of blood transfusion 12/2014    after total hip replacement    Hypertension     Iron deficiency anemia     Leukopenia     MGUS (monoclonal gammopathy of unknown significance)     Osteoarthritis     Other cirrhosis of liver ([address] 3/2/2023    Patient in clinical research study 01/19/2017    Positive FIT (fecal immunochemical test)     Stomach ulcer     x 2         Past Surgical History:     Past Surgical History:   Procedure Laterality Date    COLONOSCOPY  07/14/2014    COLONOSCOPY
[facility] Internal Medicine    CONSULTATION / HISTORY AND PHYSICAL EXAMINATION            Date:   5/22/2023  Patient name:  Lillian Peters  Date of admission:  5/16/2023  3:41 PM  MRN:   [id number]  Account:  [de-identified]  YOB: 1950  PCP:    SCARLET Loredo CNP  Room:   0349/8305-26  Code Status:    Full Code    Physician Requesting Consult: Saeed Gauthier MD    Reason for Consult:  medical management    Chief Complaint:     No chief complaint on file.       History Obtained From:     Patient medical record nursing staff    History of Present Illness:   Patient, has past medical history of nonalcoholic liver cirrhosis, ascites, esophageal varices, MGUS, hypertension, admitted to [facility] originally with cellulitis of right leg, patient blood culture was positive for Serratia marcescens   Patient was Evaluated by ID , getting Levaquin, initially treated with IV Rocephin  Patient's symptoms are improving  He required large-volume paracentesis and thoracentesis on right side  Patient denying complaints of shortness of breath, cough, chest pain, abdominal pain    5/19   No new complaints  Patient evaluated by hematologist  Seen by GI today,  Past Medical History:     Past Medical History:   Diagnosis Date    Anemia     Arthritis     Avascular necrosis ([address]     sees Dr Adelaida Newell    BPH (benign prostatic hyperplasia)     Gastroesophageal reflux disease 05/25/2022    History of blood transfusion 12/2014    after total hip replacement    Hypertension     Iron deficiency anemia     Leukopenia     MGUS (monoclonal gammopathy of unknown significance)     Osteoarthritis     Other cirrhosis of liver ([address] 3/2/2023    Patient in clinical research study 01/19/2017    Positive FIT (fecal immunochemical test)     Stomach ulcer     x 2         Past Surgical History:     Past Surgical History:   Procedure Laterality Date    COLONOSCOPY  07/14/2014    COLONOSCOPY
Comprehensive Nutrition Assessment    Type and Reason for Visit:  Reassess    Nutrition Recommendations/Plan:   Continue current diet   Continue oral nutrition supplement, Glucerna 3 x daily     Malnutrition Assessment:  Malnutrition Status: At risk for malnutrition (Comment) (05/17/23 1403)    Context:  Chronic Illness     Findings of the 6 clinical characteristics of malnutrition:  Energy Intake:  No significant decrease in energy intake  Weight Loss:  Mild weight loss (specify amount and time period) (possible wt loss)     Body Fat Loss:  Unable to assess     Muscle Mass Loss:  Unable to assess    Fluid Accumulation:  Mild (Mild to moderate; may not be related to malnutrition) Extremities   Strength:  Not Performed    Nutrition Assessment:    Pt reports having a good appetite and eating almost all of every meal. Pt states that he really likes the Glucerna and makes sure to drink one with every meal.    Nutrition Related Findings:    Edema: +1 nonpitting generalized; +2 pitting RLE; trace LLE. Labs: 5/20 Na 132, POC glucose since 5/21 138-215. Meds: Glycolax, Humalog Corrective Algorithm, Lantus. Other meds and labs reviewed. Last BM 5/21. Wound Type: None       Current Nutrition Intake & Therapies:    Average Meal Intake: %  Average Supplements Intake: %  ADULT ORAL NUTRITION SUPPLEMENT; Breakfast, Lunch, Dinner; Diabetic Oral Supplement  ADULT DIET; Regular; 5 carb choices (75 gm/meal); Low Sodium (2 gm); 1500 ml    Anthropometric Measures:  Height: 6' (182.9 cm)  Ideal Body Weight (IBW): 178 lbs (81 kg)    Admission Body Weight: 136 lb 0.4 oz (61.7 kg)  Current Body Weight: 137 lb (62.1 kg), 77 % IBW.  Weight Source: Bed Scale  Current BMI (kg/m2): 18.6  Usual Body Weight: 140 lb (63.5 kg)  % Weight Change (Calculated): -2.1                    BMI Categories: Underweight (BMI less than 22) age over 72    Estimated Daily Nutrient Needs:  Energy Requirements Based On: Formula  Weight Used for
Kevin Mcadams will cover [facility] for patient upon discharge
Physical Medicine & Rehabilitation  Progress Note      Subjective:      68year-old male with debility secondary to R lower leg cellulitis, bacteremia, chronic liver disease. Patient is doing well today and denies any new complaints. ROS:  Denies fevers, chills, sweats. No chest pain, palpitations, lightheadedness. Denies coughing, wheezing or shortness of breath. Denies abdominal pain, nausea, diarrhea or constipation. No new areas of joint pain. Denies new areas of numbness or weakness. Denies new anxiety or depression issues. No new skin problems. Rehabilitation:   Progressing in therapies. PT:    Bed mobility  Bridging: Supervision  Rolling to Left: Supervision  Rolling to Right: Supervision  Supine to Sit: Supervision  Sit to Supine: Supervision  Scooting: Supervision  Bed Mobility Comments: bed mobility completed on hospital bed in room         Transfers  Sit to Stand: Supervision (multi-level surfaces<>rollator)  Stand to Sit: Supervision (Demos good technique + hand placement on all transfers except 1x where pt \"plops\" into chair)  Bed to Chair: Supervision (use of rollator)  Stand Pivot Transfers: Supervision (use of rollator)  Lateral Transfers:  (Required Margeret Handler)  Comment: patient demonstrates good technique of hand placement and locking rollator brakes         Ambulation  Surface: Level tile  Device: Rollator  Assistance: Stand by assistance  Quality of Gait: Good upright posture, steady gait, steady delfino, narrowed ANAHY  Gait Deviations: Decreased step length, Decreased step height  Distance: 290' x 2 in AM and 290' x 1 in PM  Comments: Patient demonstrating improved stride length with reciprocal pattern. More Ambulation?: No  Ambulation 2  Surface - 2: level tile  Device 2: Rollator  Assistance 2: Stand by assistance  Quality of Gait 2: decreased right LE stance time; antalgic right LE. Forward trunk flexion noted.   Gait Deviations: Decreased step length, Decreased step height,
Physical Therapy  Facility/Department: [facility]  Rehabilitation Physical Therapy Initial Assessment    NAME: Carlos Coronel  : 1950 (33 y.o.)  MRN: [id number]  CODE STATUS: Full Code    Date of Service: 23      Past Medical History:   Diagnosis Date    Anemia     Arthritis     Avascular necrosis [facility])     sees Dr Zaida Castellanos    BPH (benign prostatic hyperplasia)     Gastroesophageal reflux disease 2022    History of blood transfusion 2014    after total hip replacement    Hypertension     Iron deficiency anemia     Leukopenia     MGUS (monoclonal gammopathy of unknown significance)     Osteoarthritis     Other cirrhosis of liver [address] 3/2/2023    Patient in clinical research study 2017    Positive FIT (fecal immunochemical test)     Stomach ulcer     x 2      Past Surgical History:   Procedure Laterality Date    COLONOSCOPY  2014    COLONOSCOPY  2019    COLONOSCOPY N/A 2019    COLONOSCOPY POLYPECTOMY SNARE/HOT BIOPSY performed by Dmitriy Yeung MD at [place] [address], COLON, DIAGNOSTIC  2014    stomach ulcers    HIP ARTHROPLASTY Right 2014    HIP ARTHROPLASTY Left 03/10/2015    INGUINAL HERNIA REPAIR Right     INGUINAL HERNIA REPAIR Left 2018    incarcerated. By Dr. Sapna Arreguin OFFICE/OUTPT VISIT,PROCEDURE ONLY Left 2018    INCARCERATED INGUINAL HERNIA performed by Patty Scales MD at [address] [place] [address]  2014    PROSTATECTOMY  2015    robotic. lap    UPPER GASTROINTESTINAL ENDOSCOPY  10/19/2016    no lesions seen    UPPER GASTROINTESTINAL ENDOSCOPY  2019    UPPER GASTROINTESTINAL ENDOSCOPY N/A 2019    EGD BIOPSY performed by Dmitriy Yeung MD at [address] [place] [address]       Chart Reviewed: Yes  Patient assessed for rehabilitation services?: Yes  Additional Pertinent Hx: Carlos Coronel is a 68 y.o. RHD male admitted to  [facility]  on 2023. Patient admitted with painful and red right leg after a fall.
Cognition  Overall Cognitive Status: Exceptions  Arousal/Alertness: Delayed responses to stimuli  Following Commands: Follows multistep commands with increased time; Follows multistep commands with repitition  Attention Span: Attends with cues to redirect  Memory: Decreased short term memory  Safety Judgement: Decreased awareness of need for assistance;Decreased awareness of need for safety  Problem Solving: Assistance required to implement solutions;Assistance required to generate solutions;Assistance required to identify errors made;Assistance required to correct errors made;Decreased awareness of errors  Insights: Decreased awareness of deficits  Initiation: Does not require cues  Sequencing: Requires cues for some    Activities of Daily Living  Feeding  Assistance Level: Independent  Skilled Clinical Factors: per pt report    Grooming/Oral Hygiene  Assistance Level: Modified independent  Skilled Clinical Factors: standing at sink to complete oral care    Upper Extremity Bathing  Assistance Level: Modified independent  Skilled Clinical Factors: completed sitting/standing at the sink    Lower Extremity Bathing  Assistance Level: Modified independent  Skilled Clinical Factors: completes sitting/standing at sink. steady, no LOB. Upper Extremity Dressing  Assistance Level: Modified independent  Skilled Clinical Factors: [place]/donning [place] shirt    Lower Extremity Dressing  Assistance Level: Modified independent  Skilled Clinical Factors: Pt able to thread and pull over hips with good safety and no LOB. Putting On/Taking Off Footwear  Assistance Level: Minimal assistance  Skilled Clinical Factors: Pt ableto doff/luis B socks. A for B TEDs only. Family training on use of EZ-slide with wife. VErbalizes good understanding. Toileting  Assistance Level: Modified independent  Skilled Clinical Factors: Per pt report.     Toilet Transfers  Technique:  (ambulating with [id number])  Equipment: Standard toilet;Grab
Divergent naming (parts of car)- 11 items in 60 seconds     Other:  No family present. Pt very pleasant and cooperative for tx. Plan:  [x] Continue ST services    [] Discharge from ST:      Discharge recommendations: []  Further therapy recommended at discharge. The patient should be able to tolerate at least 3 hours of therapy per day over 5 days or 15 hours over 7 days. [] Further therapy recommended at discharge. [] No therapy recommended at discharge. Treatment completed by: Tommie Matias A.CCC/SLP
admission score). Organization: n/a    Problem Solving/Reasoning: 3 reasons for a situation- 100%    Other:  Pt. Wife present, reports pt. To have cognitive decline prior to hospitalization, but is now more pronounced. Pt. Wife inquiring if ST \"is the one to say no driving. \"  ST educ. That it is felt pt. Not safe to drive at this time, however physician will let pt. Know if driving is recommended or not. Pt. Wife reports she will speak c physician re: driving recommendations. Plan:  [x] Continue ST services    [] Discharge from ST:      Discharge recommendations: []  Further therapy recommended at discharge. The patient should be able to tolerate at least 3 hours of therapy per day over 5 days or 15 hours over 7 days. [] Further therapy recommended at discharge. [] No therapy recommended at discharge. Treatment completed by: Trista Carcamo. LUIGI/SLP
continuation of cognitively stimulating activities at home, ie, puzzles, reading, word search activities. She verbalized understanding. Plan:  [x] Continue ST services  (as needed)  [] Discharge from ST:      Discharge recommendations: []  Further therapy recommended at discharge. The patient should be able to tolerate at least 3 hours of therapy per day over 5 days or 15 hours over 7 days. [] Further therapy recommended at discharge. [] No therapy recommended at discharge. Treatment completed by: Carmela Portillo A.CCC/SLP
Oral, TID PRN  magnesium oxide (MAG-OX) tablet 400 mg, 400 mg, Oral, Every Other Day  insulin lispro (HUMALOG) injection vial 0-4 Units, 0-4 Units, SubCUTAneous, Nightly  insulin lispro (HUMALOG) injection vial 0-8 Units, 0-8 Units, SubCUTAneous, TID WC  pantoprazole (PROTONIX) tablet 40 mg, 40 mg, Oral, QAM AC  propranolol (INDERAL) tablet 20 mg, 20 mg, Oral, BID  spironolactone (ALDACTONE) tablet 100 mg, 100 mg, Oral, Daily  traMADol (ULTRAM) tablet 50 mg, 50 mg, Oral, Q6H PRN  enoxaparin (LOVENOX) injection 40 mg, 40 mg, SubCUTAneous, Daily  acetaminophen (TYLENOL) tablet 650 mg, 650 mg, Oral, Q4H PRN  polyethylene glycol (GLYCOLAX) packet 17 g, 17 g, Oral, Daily  senna (SENOKOT) tablet 17.2 mg, 2 tablet, Oral, Daily PRN  bisacodyl (DULCOLAX) suppository 10 mg, 10 mg, Rectal, Daily PRN      Impression/Plan:   Impaired ADLs, gait, and mobility due to:      Debility:  Secondary to right lower limb cellulitis, bacteremia, chronic liver disease. PT/OT for gait, mobility, strengthening, endurance, ADLs, and self care. Right lower limb cellulitis, Serratia marcescens bacteremia:  On levaquin for a total of 14 days of antibiotic treatment (through 5/23/23). Pancytopenia:  Secondary to MGUS. Hematology following - MGUS stable. Reverse isolation/precautions when leaving the room. IM checking CBC and iron studies in am.  Non-alcoholic liver cirrhosis:  With ascites, esophageal varices. S/p paracentesis and right thoracentesis on 5/11/23. Has mildly elevated LFTs, stable. On lasix, spironolactone. On propranolol. GI consulted - recommend continuing current medications and scheduled EGD first week of June. HTN:  On lasix, spironolactone, propranolol as above  GERD:  On protonix  BPH  Bowel Management: Miralax daily, senokot prn, dulcolax prn.   DVT Prophylaxis:  low molecular weight heparin  Internal Medicine for medical management  Follow up PCP 1-2 weeks, GI, Hematology      Electronically signed by Gera Ansari
WFL    Functional Mobility    Bed mobility  Bridging: Independent  Rolling to Left: Independent  Rolling to Right: Independent  Supine to Sit: Independent  Sit to Supine: Independent  Scooting: Independent  Bed Mobility Comments: bed mobility completed on flat surface    Transfers  Sit to Stand: Supervision; Independent (multi-level surfaces<>rollator)  Stand to Sit: Independent (Demos good technique + hand placement on all transfers)  Bed to Chair: Independent  Stand Pivot Transfers: Independent  Comment: transfers completed with and without rollator    Balance  Posture: Fair  Sitting - Static: Good  Sitting - Dynamic: Good;-  Standing - Static: Fair  Standing - Dynamic: Fair  Single Leg Stance R Le  Single Leg Stance L Le  Comments: Standing balance assessed with Rollator    Environmental Mobility    Ambulation  Surface: Level tile; Ramp  Device: Rollator  Assistance: Independent  Quality of Gait: Good upright posture, steady gait, steady delfino, narrowed ANAHY  Gait Deviations:  (fast delfino; vcs to slow down)  Distance: 285' 100'  Comments: Reciprocal gait pattern, good stride length, good delfino  More Ambulation?: No  Stairs/Curb  Stairs?: Yes    Stairs  # Steps : 17  Stairs Height:  (7.5\")  Rails: Left ascending  Assistance: Stand by assistance  Comment: Demonstrating reciprocal gait pattern, slow steady pace, no LOB. PT Exercises  Circulation/Endurance Exercises: NuStep 15 min L7  Pressure Relief Exercises: surface and positional changes  Functional Mobility Circuit Training: STS x5 reps  Motor Control/Coordination: vcs for slower delfino  Standing Open/Closed Kinetic Chain Exercises:  (Marching, 3 way hip, HS curls)  Disease-specific Exercises: \    ASSESSMENT  Vitals  O2 Device: None (Room air)    Activity Tolerance  Activity Tolerance: Patient tolerated treatment well;Patient limited by pain  Activity Tolerance Comments: rest breaks as needed    GOALS  Patient Goals   Patient Goals :  \"To get
Recommendations: Patient would benefit from continued therapy after discharge; Patient able to tolerate 3hrs of therapy a day  PT D/C Equipment  Equipment Needed: No (Pt reports he will use Mother in laws' rollator at home. Denied the need to order a new assistive device, rollator in good condition)  PT Equipment Recommendations  Equipment Needed: No (Pt reports he will use Mother in laws' rollator at home. Denied the need to order a new assistive device, rollator in good condition)        GOALS  Patient Goals   Patient Goals :  \"To get stronger\"  Short Term Goals  Time Frame for Short Term Goals: 5 days  Short Term Goal 1: Pt to demo functional bed moblity with superivison  Short Term Goal 2: pt to demo safe and functional transfers with rollator SBA  Short Term Goal 3: pt to ambulate 150' with rollator on level surfaces and SBA  Short Term Goal 4: pt to negotiate Full Flight of stairs with single Rail and CGA  Short Term Goal 5: Pt to participate in Tinetti balance assessment to obtain baseline balance data for balance progression  Long Term Goals  Time Frame for Long Term Goals : Until D/C  Long Term Goal 1: Pt to demo functional bed moblity independently  Long Term Goal 2: pt to demo safe and functional transfers with least restrictive device Mod I  Long Term Goal 3: pt to ambulate 150' with least restrictive device on level surfaces Mod I  Long Term Goal 4: pt to negotiate 2 steps without rails and Full Flight of stairs with single Rail and supervision to allow for safe home entry and access to basement level of home  Long Term Goal 5: pt to ambulate over uneven surfaces x25-50' with least restrictive device and SBA  Additional Goals?: Yes  Long term goal 6: pt to demo actual or simulated car transfer with device and supervision  Long term goal 7: pt to demo improved gait speed during 2MWT by 50' or greater to demo progression from household ambulator to limited community ambulator. (initial 2MWT distance

## 2023-05-24 NOTE — PLAN OF CARE
Problem: Discharge Planning  Goal: Discharge to home or other facility with appropriate resources  5/22/2023 0335 by Shilpa Morales RN  Outcome: Progressing     Problem: Safety - Adult  Goal: Free from fall injury  5/22/2023 0335 by Shilpa Mroales RN  Outcome: Progressing     Problem: ABCDS Injury Assessment  Goal: Absence of physical injury  5/22/2023 0335 by Shilpa Morales RN  Outcome: Progressing     Problem: Skin/Tissue Integrity  Goal: Absence of new skin breakdown  Description: 1. Monitor for areas of redness and/or skin breakdown  2. Assess vascular access sites hourly  3. Every 4-6 hours minimum:  Change oxygen saturation probe site  4. Every 4-6 hours:  If on nasal continuous positive airway pressure, respiratory therapy assess nares and determine need for appliance change or resting period.   5/22/2023 0335 by Shilpa Morales RN  Outcome: Progressing     Problem: Pain  Goal: Verbalizes/displays adequate comfort level or baseline comfort level  5/22/2023 0335 by Shilpa Morales RN  Outcome: Progressing     Problem: Chronic Conditions and Co-morbidities  Goal: Patient's chronic conditions and co-morbidity symptoms are monitored and maintained or improved  5/22/2023 0335 by Shilpa Morales RN  Outcome: Progressing     Problem: Nutrition Deficit:  Goal: Optimize nutritional status  5/22/2023 0335 by Shilpa Morales RN  Outcome: Progressing
Problem: Discharge Planning  Goal: Discharge to home or other facility with appropriate resources  Outcome: Adequate for Discharge  Flowsheets (Taken 5/23/2023 1023)  Discharge to home or other facility with appropriate resources: Identify barriers to discharge with patient and caregiver     Problem: Safety - Adult  Goal: Free from fall injury  Outcome: Adequate for Discharge  Flowsheets (Taken 5/23/2023 1454)  Free From Fall Injury: Instruct family/caregiver on patient safety     Problem: ABCDS Injury Assessment  Goal: Absence of physical injury  Outcome: Adequate for Discharge  Flowsheets (Taken 5/23/2023 1454)  Absence of Physical Injury: Implement safety measures based on patient assessment     Problem: Skin/Tissue Integrity  Goal: Absence of new skin breakdown  Description: 1. Monitor for areas of redness and/or skin breakdown  2. Assess vascular access sites hourly  3. Every 4-6 hours minimum:  Change oxygen saturation probe site  4. Every 4-6 hours:  If on nasal continuous positive airway pressure, respiratory therapy assess nares and determine need for appliance change or resting period.   Outcome: Adequate for Discharge     Problem: Pain  Goal: Verbalizes/displays adequate comfort level or baseline comfort level  Outcome: Adequate for Discharge     Problem: Chronic Conditions and Co-morbidities  Goal: Patient's chronic conditions and co-morbidity symptoms are monitored and maintained or improved  Outcome: Adequate for Discharge  Flowsheets (Taken 5/23/2023 1023)  Care Plan - Patient's Chronic Conditions and Co-Morbidity Symptoms are Monitored and Maintained or Improved:   Monitor and assess patient's chronic conditions and comorbid symptoms for stability, deterioration, or improvement   Collaborate with multidisciplinary team to address chronic and comorbid conditions and prevent exacerbation or deterioration   Update acute care plan with appropriate goals if chronic or comorbid symptoms are
Problem: Discharge Planning  Goal: Discharge to home or other facility with appropriate resources  Outcome: Completed     Problem: Safety - Adult  Goal: Free from fall injury  Outcome: Completed     Problem: ABCDS Injury Assessment  Goal: Absence of physical injury  Outcome: Completed     Problem: Skin/Tissue Integrity  Goal: Absence of new skin breakdown  Description: 1. Monitor for areas of redness and/or skin breakdown  2. Assess vascular access sites hourly  3. Every 4-6 hours minimum:  Change oxygen saturation probe site  4. Every 4-6 hours:  If on nasal continuous positive airway pressure, respiratory therapy assess nares and determine need for appliance change or resting period. Outcome: Completed     Problem: Pain  Goal: Verbalizes/displays adequate comfort level or baseline comfort level  Outcome: Completed     Problem: Chronic Conditions and Co-morbidities  Goal: Patient's chronic conditions and co-morbidity symptoms are monitored and maintained or improved  Outcome: Completed     Problem: Nutrition Deficit:  Goal: Optimize nutritional status  Outcome: Completed     Problem: Discharge Planning  Goal: Discharge to home or other facility with appropriate resources  Outcome: Completed     Problem: Safety - Adult  Goal: Free from fall injury  Outcome: Completed     Problem: ABCDS Injury Assessment  Goal: Absence of physical injury  Outcome: Completed     Problem: Skin/Tissue Integrity  Goal: Absence of new skin breakdown  Description: 1. Monitor for areas of redness and/or skin breakdown  2. Assess vascular access sites hourly  3. Every 4-6 hours minimum:  Change oxygen saturation probe site  4. Every 4-6 hours:  If on nasal continuous positive airway pressure, respiratory therapy assess nares and determine need for appliance change or resting period.   Outcome: Completed     Problem: Pain  Goal: Verbalizes/displays adequate comfort level or baseline comfort level  Outcome: Completed     Problem:
Progressing  Note: Assess and document any skin issues every shift.  5/22/2023 0335 by Lilia Choe RN  Outcome: Progressing     Problem: Pain  Goal: Verbalizes/displays adequate comfort level or baseline comfort level  5/22/2023 1400 by Vinayak Quiñonez RN  Outcome: Progressing  Flowsheets (Taken 5/22/2023 1400)  Verbalizes/displays adequate comfort level or baseline comfort level:   Encourage patient to monitor pain and request assistance   Assess pain using appropriate pain scale   Administer analgesics based on type and severity of pain and evaluate response   Implement non-pharmacological measures as appropriate and evaluate response   Consider cultural and social influences on pain and pain management   Notify Licensed Independent Practitioner if interventions unsuccessful or patient reports new pain  Note: Monitor and medicate for pain as needed as ordered.   5/22/2023 0335 by Lilia Choe RN  Outcome: Progressing     Problem: Chronic Conditions and Co-morbidities  Goal: Patient's chronic conditions and co-morbidity symptoms are monitored and maintained or improved  5/22/2023 1400 by Vinayak Quiñonez RN  Outcome: Progressing  Flowsheets (Taken 5/22/2023 1400)  Care Plan - Patient's Chronic Conditions and Co-Morbidity Symptoms are Monitored and Maintained or Improved:   Monitor and assess patient's chronic conditions and comorbid symptoms for stability, deterioration, or improvement   Collaborate with multidisciplinary team to address chronic and comorbid conditions and prevent exacerbation or deterioration   Update acute care plan with appropriate goals if chronic or comorbid symptoms are exacerbated and prevent overall improvement and discharge  5/22/2023 0335 by Lilia Choe RN  Outcome: Progressing     Problem: Nutrition Deficit:  Goal: Optimize nutritional status  5/22/2023 1400 by Vinayak Quiñonez RN  Outcome: Progressing  Flowsheets  Taken 5/22/2023 1400 by Vinayak Quiñonez

## 2023-05-24 NOTE — DISCHARGE SUMMARY
discharged in good stable condition. Consults:   GI, Hematology/Oncology, and Internal Medicine    Significant Diagnostics:   CBC:   Lab Results   Component Value Date/Time    WBC 0.9 05/24/2023 07:49 AM    RBC 4.82 05/24/2023 07:49 AM    HGB 11.8 05/24/2023 07:49 AM    HCT 37.3 05/24/2023 07:49 AM    MCV 77.4 05/24/2023 07:49 AM    MCH 24.4 05/24/2023 07:49 AM    MCHC 31.5 05/24/2023 07:49 AM    RDW 21.6 05/24/2023 07:49 AM     05/24/2023 07:49 AM    MPV 11.0 05/24/2023 07:49 AM     CMP:    Lab Results   Component Value Date/Time     05/24/2023 07:49 AM    K 4.6 05/24/2023 07:49 AM     05/24/2023 07:49 AM    CO2 27 05/24/2023 07:49 AM    BUN 16 05/24/2023 07:49 AM    CREATININE 0.62 05/24/2023 07:49 AM    GFRAA >60 09/28/2022 01:46 PM    LABGLOM >60 05/24/2023 07:49 AM    GLUCOSE 126 05/24/2023 07:49 AM    PROT 6.9 05/20/2023 06:09 AM    LABALBU 1.9 05/20/2023 06:09 AM    CALCIUM 8.3 05/24/2023 07:49 AM    BILITOT 1.6 05/20/2023 06:09 AM    ALKPHOS 275 05/20/2023 06:09 AM    AST 51 05/20/2023 06:09 AM    ALT 45 05/20/2023 06:09 AM         Patient Instructions: Activity as tolerated    Medications, precautions and follow up reviewed with patient and family    Follow-up visits: See after visit summary from hospitalization: PCP 1-2 weeks, Gastroenterology, Hematology    Disposition:  Home with home health    Discharge Medications:         Medication List        START taking these medications      * blood glucose monitor kit and supplies  Dispense sufficient amount for indicated testing frequency plus additional to accommodate PRN testing needs. Dispense all needed supplies for [employer]     * blood glucose monitor kit and supplies  Dispense sufficient amount for indicated testing frequency plus additional to accommodate PRN testing needs. Dispense all needed supplies to include: monitor, strips, lancing device, lancets, control solutions, alcohol swabs.      hydrocortisone 1 %

## 2023-05-25 ENCOUNTER — CARE COORDINATION (OUTPATIENT)
Dept: OTHER | Facility: CLINIC | Age: 73
End: 2023-05-25

## 2023-05-25 ENCOUNTER — TELEPHONE (OUTPATIENT)
Dept: PHARMACY | Facility: CLINIC | Age: 73
End: 2023-05-25

## 2023-05-25 NOTE — CARE COORDINATION
[address] Care Transitions Initial Follow Up Call    Call within 2 business days of discharge: Yes    Patient Current Location:  [facility]    Care Transition Nurse contacted the  wife  by telephone to perform post hospital discharge assessment. Verified name and  with  wife  as identifiers. Provided introduction to self, and explanation of the Care Transition Nurse role. Patient: Maurice Charles Patient : 1950   MRN: [id number]  Reason for Admission: Cellulitis of RLE  Discharge Date: 23 RARS: Readmission Risk Score: 21.4      Last Discharge [address]       Date Complaint Diagnosis Description Type Department Provider    23  Cellulitis of right lower extremity Admission (Discharged) Ronnell Mishra MD            Was this an external facility discharge? No Discharge Facility: [facility]     Challenges to be reviewed by the provider   Additional needs identified to be addressed with provider: No  none               Method of communication with provider: none. Wife states the home care nurse was there this morning and reviewed the medications and also assessment, she called the pulmonology office and spoke with the staff about diminished breath sounds on the right. Staff instructed the nurse to tell them he can go to the ED . No orders for CXR just go to the ED. Wife said the nurse checked his oxygen sat and did not say it was bad. Wife said he doesn't seem to be having trouble breathing thought he had a thoracentesis at [facility]. And wife said they tapped out 2 litres, but a repeat CXR was not done. Wife said she will see how he does and decide what to do. PT and OT will be seeing patient at home as well, he is ambulatory without any assistive device. Wife said he is wearing his ELAINE hose his leg does bother him some but he is making great improvements with therapy.  She has ordered a free style Parlin Edison currently she has the prodigy and she is checking his

## 2023-05-26 ENCOUNTER — HOSPITAL ENCOUNTER (OUTPATIENT)
Age: 73
Discharge: HOME | End: 2023-05-26
Payer: COMMERCIAL

## 2023-05-26 ENCOUNTER — TELEPHONE (OUTPATIENT)
Dept: FAMILY MEDICINE CLINIC | Age: 73
End: 2023-05-26

## 2023-05-26 ENCOUNTER — HOSPITAL ENCOUNTER (OUTPATIENT)
Dept: GENERAL RADIOLOGY | Age: 73
End: 2023-05-26
Payer: COMMERCIAL

## 2023-05-26 DIAGNOSIS — J90 PLEURAL EFFUSION ON RIGHT: Primary | ICD-10-CM

## 2023-05-26 DIAGNOSIS — J90 PLEURAL EFFUSION ON RIGHT: ICD-10-CM

## 2023-05-26 PROCEDURE — 71046 X-RAY EXAM CHEST 2 VIEWS: CPT

## 2023-05-26 NOTE — TELEPHONE ENCOUNTER
Pt's wife called today requesting a possible repeat CXR d/t having a thoracentesis done by Dr Alma Song 10 days ago while inpt and having no repeat imaging done for f/u. He is asymptomatic, sats are stable, work of breathing normal per wife who is an RN. This was however also the case when he was inpt and a large pleural effusion causing mediastinal shift was found. CXR show a continued right pleural effusion that is increased in size from last film. Attempted to contact Dr Justino Wilde office to discuss as pt is not scheduled to see him until 6/6. Unfortunately no provider available in office to review results, a message was left for the answering service to be sent to on call provider. Goal is to consider if pt needs to be seen by pulm sooner than scheduled visit or to possibly have outpt IR thoracentesis scheduled and completed prior. Pt's wife called and updated, await call back from on call provider.

## 2023-05-30 ENCOUNTER — CARE COORDINATION (OUTPATIENT)
Dept: OTHER | Facility: CLINIC | Age: 73
End: 2023-05-30

## 2023-05-30 LAB
MICROORGANISM SPEC CULT: NORMAL
MICROORGANISM/AGENT SPEC: NORMAL
MICROORGANISM/AGENT SPEC: NORMAL
SPECIMEN DESCRIPTION: NORMAL

## 2023-05-30 NOTE — CARE COORDINATION
Care Transitions Follow Up Call    ACM attempted to reach spouse for Care Transitions follow up call. HIPAA compliant message left requesting a return phone call at spouse convenience. Plan for follow-up call in 1-2 days    Future Appointments   Date Time Provider Krystal Deanne   5/31/2023 11:15 AM Silviano Ortiz MD INFT DISEASE [facility]   6/1/2023  1:20 PM SCARLET Lofton - CNP W RAMOS  [facility]   6/2/2023  2:00 PM STA IR ROOM 1 [facility] Radiolog   8/2/2023  1:30 PM Rafa Kapadia MD grtlk exc [facility]   9/12/2023  3:45 PM Fransico Mata MD [facility] HAWKINS, RN- [facility]  Associate Care Manager  [phone]  [email or web address]

## 2023-05-31 ENCOUNTER — OFFICE VISIT (OUTPATIENT)
Dept: INFECTIOUS DISEASES | Age: 73
End: 2023-05-31
Payer: COMMERCIAL

## 2023-05-31 VITALS
SYSTOLIC BLOOD PRESSURE: 126 MMHG | DIASTOLIC BLOOD PRESSURE: 80 MMHG | TEMPERATURE: 96.8 F | HEART RATE: 63 BPM | BODY MASS INDEX: 17.23 KG/M2 | HEIGHT: 73 IN | WEIGHT: 130 LBS

## 2023-05-31 DIAGNOSIS — R78.81 BACTEREMIA DUE TO GRAM-NEGATIVE BACTERIA: Primary | ICD-10-CM

## 2023-05-31 DIAGNOSIS — R18.8 CIRRHOSIS OF LIVER WITH ASCITES, UNSPECIFIED HEPATIC CIRRHOSIS TYPE (HCC): ICD-10-CM

## 2023-05-31 DIAGNOSIS — K74.60 CIRRHOSIS OF LIVER WITH ASCITES, UNSPECIFIED HEPATIC CIRRHOSIS TYPE (HCC): ICD-10-CM

## 2023-05-31 DIAGNOSIS — R18.8 OTHER ASCITES: ICD-10-CM

## 2023-05-31 DIAGNOSIS — J90 PLEURAL EFFUSION ON RIGHT: ICD-10-CM

## 2023-05-31 PROCEDURE — 3074F SYST BP LT 130 MM HG: CPT | Performed by: INTERNAL MEDICINE

## 2023-05-31 PROCEDURE — 99214 OFFICE O/P EST MOD 30 MIN: CPT | Performed by: INTERNAL MEDICINE

## 2023-05-31 PROCEDURE — 1123F ACP DISCUSS/DSCN MKR DOCD: CPT | Performed by: INTERNAL MEDICINE

## 2023-05-31 PROCEDURE — 3079F DIAST BP 80-89 MM HG: CPT | Performed by: INTERNAL MEDICINE

## 2023-05-31 ASSESSMENT — ENCOUNTER SYMPTOMS
GASTROINTESTINAL NEGATIVE: 1
RESPIRATORY NEGATIVE: 1

## 2023-05-31 NOTE — PROGRESS NOTES
HCT 37.3 05/24/2023 07:49 AM    HCT 34.3 05/23/2023 06:30 AM     05/24/2023 07:49 AM     05/23/2023 06:30 AM    LYMPHOPCT 34 05/24/2023 07:49 AM    LYMPHOPCT 22 05/23/2023 06:30 AM    LYMPHOPCT 61 06/04/2019 12:00 AM    MONOPCT 43 05/24/2023 07:49 AM    MONOPCT 61 05/23/2023 06:30 AM    MONOPCT 10 06/04/2019 12:00 AM    EOSPCT 4 06/04/2019 12:00 AM       BMP:  Lab Results   Component Value Date/Time     05/24/2023 07:49 AM     05/20/2023 06:09 AM    K 4.6 05/24/2023 07:49 AM    K 5.1 05/20/2023 06:09 AM     05/24/2023 07:49 AM    CL 99 05/20/2023 06:09 AM    CO2 27 05/24/2023 07:49 AM    CO2 27 05/20/2023 06:09 AM    BUN 16 05/24/2023 07:49 AM    BUN 15 05/20/2023 06:09 AM    CREATININE 0.62 05/24/2023 07:49 AM    CREATININE 0.45 05/20/2023 06:09 AM    MG 1.8 06/05/2019 02:46 AM    MG 1.8 06/04/2019 07:45 PM       Hepatic Function Panel:   Lab Results   Component Value Date/Time    PROT 6.9 05/20/2023 06:09 AM    PROT 6.5 05/17/2023 06:10 AM    LABALBU 1.9 05/20/2023 06:09 AM    LABALBU 1.8 05/17/2023 06:10 AM    BILIDIR 0.4 05/17/2023 06:10 AM    BILIDIR 0.5 05/08/2023 10:01 PM    IBILI 0.7 05/17/2023 06:10 AM    IBILI 0.9 05/08/2023 10:01 PM    BILITOT 1.6 05/20/2023 06:09 AM    BILITOT 1.1 05/17/2023 06:10 AM    ALKPHOS 275 05/20/2023 06:09 AM    ALKPHOS 273 05/17/2023 06:10 AM    ALT 45 05/20/2023 06:09 AM    ALT 47 05/17/2023 06:10 AM    AST 51 05/20/2023 06:09 AM    AST 51 05/17/2023 06:10 AM       Lab Results   Component Value Date/Time    CRP 24.4 05/15/2023 09:09 AM    CRP 40.3 05/13/2023 03:15 AM     No results found for: SEDRATE      Imaging Studies:   TWO XRAY VIEWS OF THE CHEST 5/26/2023 1:08 pm  FINDINGS:  Larger right pleural effusion. Heart size stable. No airspace disease. No  pneumothorax.      IMPRESSION:  Larger right pleural effusion              Specimen Collected: 05/26/23 13:10 EDT Last Resulted: 05/26/23 13:31 EDT           Cultures:   No new culture

## 2023-06-01 ENCOUNTER — CARE COORDINATION (OUTPATIENT)
Dept: OTHER | Facility: CLINIC | Age: 73
End: 2023-06-01

## 2023-06-01 ENCOUNTER — OFFICE VISIT (OUTPATIENT)
Dept: FAMILY MEDICINE CLINIC | Age: 73
End: 2023-06-01

## 2023-06-01 VITALS
BODY MASS INDEX: 17.42 KG/M2 | SYSTOLIC BLOOD PRESSURE: 130 MMHG | TEMPERATURE: 97.1 F | WEIGHT: 132 LBS | DIASTOLIC BLOOD PRESSURE: 74 MMHG | HEART RATE: 62 BPM | OXYGEN SATURATION: 98 %

## 2023-06-01 DIAGNOSIS — R18.8 CIRRHOSIS OF LIVER WITH ASCITES, UNSPECIFIED HEPATIC CIRRHOSIS TYPE (HCC): ICD-10-CM

## 2023-06-01 DIAGNOSIS — Z91.81 AT HIGH RISK FOR FALLS: ICD-10-CM

## 2023-06-01 DIAGNOSIS — K74.60 CIRRHOSIS OF LIVER WITH ASCITES, UNSPECIFIED HEPATIC CIRRHOSIS TYPE (HCC): ICD-10-CM

## 2023-06-01 DIAGNOSIS — J90 PLEURAL EFFUSION ON RIGHT: ICD-10-CM

## 2023-06-01 DIAGNOSIS — I10 ESSENTIAL HYPERTENSION: ICD-10-CM

## 2023-06-01 DIAGNOSIS — L03.115 CELLULITIS OF RIGHT LOWER EXTREMITY: ICD-10-CM

## 2023-06-01 DIAGNOSIS — E11.65 TYPE 2 DIABETES MELLITUS WITH HYPERGLYCEMIA, WITHOUT LONG-TERM CURRENT USE OF INSULIN (HCC): ICD-10-CM

## 2023-06-01 DIAGNOSIS — I85.00 IDIOPATHIC ESOPHAGEAL VARICES WITHOUT BLEEDING (HCC): ICD-10-CM

## 2023-06-01 DIAGNOSIS — Z09 HOSPITAL DISCHARGE FOLLOW-UP: Primary | ICD-10-CM

## 2023-06-01 DIAGNOSIS — R78.81 BACTEREMIA DUE TO GRAM-NEGATIVE BACTERIA: ICD-10-CM

## 2023-06-01 LAB — HBA1C MFR BLD: 6.8 %

## 2023-06-01 SDOH — ECONOMIC STABILITY: INCOME INSECURITY: HOW HARD IS IT FOR YOU TO PAY FOR THE VERY BASICS LIKE FOOD, HOUSING, MEDICAL CARE, AND HEATING?: NOT HARD AT ALL

## 2023-06-01 SDOH — ECONOMIC STABILITY: TRANSPORTATION INSECURITY
IN THE PAST 12 MONTHS, HAS THE LACK OF TRANSPORTATION KEPT YOU FROM MEDICAL APPOINTMENTS OR FROM GETTING MEDICATIONS?: NO

## 2023-06-01 SDOH — ECONOMIC STABILITY: TRANSPORTATION INSECURITY
IN THE PAST 12 MONTHS, HAS LACK OF TRANSPORTATION KEPT YOU FROM MEETINGS, WORK, OR FROM GETTING THINGS NEEDED FOR DAILY LIVING?: NO

## 2023-06-01 SDOH — ECONOMIC STABILITY: FOOD INSECURITY: WITHIN THE PAST 12 MONTHS, THE FOOD YOU BOUGHT JUST DIDN'T LAST AND YOU DIDN'T HAVE MONEY TO GET MORE.: NEVER TRUE

## 2023-06-01 SDOH — ECONOMIC STABILITY: INCOME INSECURITY: IN THE LAST 12 MONTHS, WAS THERE A TIME WHEN YOU WERE NOT ABLE TO PAY THE MORTGAGE OR RENT ON TIME?: NO

## 2023-06-01 SDOH — ECONOMIC STABILITY: FOOD INSECURITY: WITHIN THE PAST 12 MONTHS, YOU WORRIED THAT YOUR FOOD WOULD RUN OUT BEFORE YOU GOT MONEY TO BUY MORE.: NEVER TRUE

## 2023-06-01 SDOH — ECONOMIC STABILITY: HOUSING INSECURITY: IN THE LAST 12 MONTHS, HOW MANY PLACES HAVE YOU LIVED?: 1

## 2023-06-01 ASSESSMENT — ENCOUNTER SYMPTOMS
VOMITING: 0
ABDOMINAL PAIN: 0
SORE THROAT: 0
SHORTNESS OF BREATH: 0
NAUSEA: 0
COLOR CHANGE: 0
WHEEZING: 0
CONSTIPATION: 0
COUGH: 0
EYE PAIN: 0
EYE DISCHARGE: 0
DIARRHEA: 0
ABDOMINAL DISTENTION: 1
CHEST TIGHTNESS: 0
BACK PAIN: 0

## 2023-06-01 ASSESSMENT — PATIENT HEALTH QUESTIONNAIRE - PHQ9: DEPRESSION UNABLE TO ASSESS: URGENT/EMERGENT SITUATION

## 2023-06-01 NOTE — CARE COORDINATION
[facility] Care Transitions Follow Up Call    Patient [place] Location:  [facility] [address]    Care Transition Nurse contacted the family by telephone to follow up after admission on 23 . Verified name and  with family as identifiers. Patient: Nadine Durbin  Patient : 1950   MRN: [id number]  Reason for Admission: Sepsis, ascites paracentesis   Discharge Date: 23 RARS: Readmission Risk Score: 21.4      Needs to be reviewed by the provider   Additional needs identified to be addressed with provider: No  none             Method of communication with provider: none. Spoke with wife who said they ID saw Wing Crain yesterday and has signed off. Wife said Wing Crain is having the thoracentesis tomorrow @ 2 pm He had repeat CXR last week. Wife said the order got put in late so it could not be done until tomorrow. Wife said he is of course a little \"winded\" but doing ok. She checks his oxygen sats routinely. She said his edema is much better than when he first came home. Home care nurse is coming tomorrow and PT was there on Monday. They have an appt with the NP today and wife is going to have her put the order in for the freestyle jasper. They have no needs right now, wife had a zoom call she needed to get on. I will follow up     Addressed changes since last contact:  Thoracentesis scheduled for tomorrow due to increase in pleural effusion  Discussed follow-up appointments. If no appointment was previously scheduled, appointment scheduling offered: n/a. Is follow up appointment scheduled within 7 days of discharge?  No.    Follow Up  Future Appointments   Date Time Provider Krystal Alicea   2023  1:20 PM SCARLET Vitale - CNP W [facility]   2023  2:00 PM STA IR ROOM 1 [facility] Radiolog   2023  1:30 PM Cleo Colon MD grtlk exc [facility]   2023  3:45 PM Dillan Lucio MD [facility]     Non-[facility] follow up appointment(s): n/a    Care Transition Nurse

## 2023-06-01 NOTE — PROGRESS NOTES
Cheo Gil (:  1950) is a 68 y.o. male,Established patient, here for evaluation of the following chief complaint(s):  Diabetes          Subjective   SUBJECTIVE/OBJECTIVE:  Pt here today for DM 3 month f/u and hospital f/u, accompanied by wife  VSS    Pt is doing well s/p d/c from inpt rehab after acute hospitalization for sepsis d/t cellulitis  He was seen by ID yesterday and cleared from abx use  He is continuing to wear compression stockings bilaterally, has mild swelling noted to RLE still but this is greatly improved per pt and wife    He has a scheduled thoracentesis tomorrow at [facility], wife is concerned pt may need repeat paracentesis as his abdomen is more distended and taut since being home  Denies SOB, CP    A1C improved today in office from 8.1 to 6.8. SGLT2 was stopped while inpt d/t continued hyponatremia, uncertain of etiology being sepsis related or because of increased diuresis. Pt is tolerating nightly Lantus well, no AM lows      Review of Systems   Constitutional:  Negative for activity change, appetite change, chills, fatigue, fever and unexpected weight change. HENT:  Negative for congestion, ear pain and sore throat. Eyes:  Negative for pain, discharge and visual disturbance. Respiratory:  Negative for cough, chest tightness, shortness of breath and wheezing. Cardiovascular:  Positive for leg swelling. Negative for chest pain and palpitations. Gastrointestinal:  Positive for abdominal distention. Negative for abdominal pain, constipation, diarrhea, nausea and vomiting. Genitourinary:  Negative for difficulty urinating and dysuria. Musculoskeletal:  Negative for back pain, gait problem and neck pain. Skin:  Negative for color change, rash and wound. Neurological:  Negative for dizziness, seizures, syncope, weakness, light-headedness, numbness and headaches. Psychiatric/Behavioral:  Negative for confusion.          Objective   Physical Exam  Vitals and nursing

## 2023-06-01 NOTE — TELEPHONE ENCOUNTER
Second attempt to contact patient regarding DM Program. Someone seemed to  and then disconnect. Sending [employer] Ligado message.        Jacquie King, [address]   Phone: [phone]       For Pharmacy Admin Tracking Only    Program: [address] in place:  No  Gap Closed?: No   Time Spent (min): 10

## 2023-06-02 ENCOUNTER — HOSPITAL ENCOUNTER (OUTPATIENT)
Dept: INTERVENTIONAL RADIOLOGY/VASCULAR | Age: 73
End: 2023-06-02
Payer: COMMERCIAL

## 2023-06-02 ENCOUNTER — HOSPITAL ENCOUNTER (OUTPATIENT)
Dept: GENERAL RADIOLOGY | Age: 73
End: 2023-06-02
Payer: COMMERCIAL

## 2023-06-02 VITALS — HEART RATE: 57 BPM | DIASTOLIC BLOOD PRESSURE: 72 MMHG | RESPIRATION RATE: 14 BRPM | SYSTOLIC BLOOD PRESSURE: 112 MMHG

## 2023-06-02 DIAGNOSIS — J90 PLEURAL EFFUSION: ICD-10-CM

## 2023-06-02 PROCEDURE — 87206 SMEAR FLUORESCENT/ACID STAI: CPT

## 2023-06-02 PROCEDURE — 89051 BODY FLUID CELL COUNT: CPT

## 2023-06-02 PROCEDURE — 88112 CYTOPATH CELL ENHANCE TECH: CPT

## 2023-06-02 PROCEDURE — 84311 SPECTROPHOTOMETRY: CPT

## 2023-06-02 PROCEDURE — 32555 ASPIRATE PLEURA W/ IMAGING: CPT

## 2023-06-02 PROCEDURE — 87205 SMEAR GRAM STAIN: CPT

## 2023-06-02 PROCEDURE — 71045 X-RAY EXAM CHEST 1 VIEW: CPT

## 2023-06-02 PROCEDURE — 87116 MYCOBACTERIA CULTURE: CPT

## 2023-06-02 PROCEDURE — 83986 ASSAY PH BODY FLUID NOS: CPT

## 2023-06-02 PROCEDURE — 87070 CULTURE OTHR SPECIMN AEROBIC: CPT

## 2023-06-02 PROCEDURE — 2709999900 IR GUIDED THORACENTESIS PLEURAL

## 2023-06-02 PROCEDURE — 76775 US EXAM ABDO BACK WALL LIM: CPT

## 2023-06-02 PROCEDURE — 87075 CULTR BACTERIA EXCEPT BLOOD: CPT

## 2023-06-02 PROCEDURE — 87015 SPECIMEN INFECT AGNT CONCNTJ: CPT

## 2023-06-02 PROCEDURE — 82945 GLUCOSE OTHER FLUID: CPT

## 2023-06-02 PROCEDURE — 88305 TISSUE EXAM BY PATHOLOGIST: CPT

## 2023-06-02 PROCEDURE — 84157 ASSAY OF PROTEIN OTHER: CPT

## 2023-06-02 PROCEDURE — 83615 LACTATE (LD) (LDH) ENZYME: CPT

## 2023-06-02 PROCEDURE — 87102 FUNGUS ISOLATION CULTURE: CPT

## 2023-06-02 NOTE — FLOWSHEET NOTE
Pt tolerated procedure without distress. 1.5 liters of cloudy yellow pleural fluid removed specimen collected for labs pcxr taken and reviewed by physician Dressing applied to procedure site. paracentesis not done r/t lack of fluid identified with u/s Discharge instructions reviewed understanding verbalized, pt released ambulatory in nad with spouse

## 2023-06-03 LAB
BODY FLD TYPE: NORMAL
GLUCOSE FLD-MCNC: 199 MG/DL
LDH FLD L TO P-CCNC: 18 U/L
LYMPHOCYTES NFR FLD: 55 %
MICROORGANISM SPEC CULT: NORMAL
MICROORGANISM/AGENT SPEC: NORMAL
NEUTROPHILS NFR FLD: 12 %
PH FLUID: 8
PROT FLD-MCNC: <1 G/DL
RBC # FLD: <3000 CELLS/UL
SPECIMEN DESCRIPTION: NORMAL
SPECIMEN TYPE: NORMAL
UNIDENT CELLS NFR FLD: NORMAL %
WBC # FLD: 23 CELLS/UL

## 2023-06-04 LAB
CHOLESTEROL FLUID: 8 MG/DL
MICROORGANISM SPEC CULT: ABNORMAL
MICROORGANISM/AGENT SPEC: ABNORMAL
SOURCE: NORMAL
SPECIMEN DESCRIPTION: ABNORMAL

## 2023-06-05 ENCOUNTER — HOSPITAL ENCOUNTER (OUTPATIENT)
Age: 73
Discharge: HOME | End: 2023-06-05
Payer: COMMERCIAL

## 2023-06-05 DIAGNOSIS — K21.9 GASTROESOPHAGEAL REFLUX DISEASE, UNSPECIFIED WHETHER ESOPHAGITIS PRESENT: ICD-10-CM

## 2023-06-05 DIAGNOSIS — R18.8 OTHER ASCITES: ICD-10-CM

## 2023-06-05 DIAGNOSIS — D70.8 OTHER NEUTROPENIA (HCC): ICD-10-CM

## 2023-06-05 DIAGNOSIS — K74.69 OTHER CIRRHOSIS OF LIVER (HCC): ICD-10-CM

## 2023-06-05 DIAGNOSIS — D61.818 PANCYTOPENIA (HCC): ICD-10-CM

## 2023-06-05 LAB
ALBUMIN SERPL-MCNC: 2.1 G/DL (ref 3.5–5.2)
ALBUMIN/GLOB SERPL: 0.4 {RATIO} (ref 1–2.5)
ALP SERPL-CCNC: 230 U/L (ref 40–129)
ALT SERPL-CCNC: 44 U/L (ref 5–41)
ANION GAP SERPL CALCULATED.3IONS-SCNC: 8 MMOL/L (ref 9–17)
AST SERPL-CCNC: 51 U/L
BASOPHILS # BLD: 0 K/UL (ref 0–0.2)
BASOPHILS NFR BLD: 0 % (ref 0–2)
BILIRUB DIRECT SERPL-MCNC: 0.3 MG/DL
BILIRUB INDIRECT SERPL-MCNC: 0.8 MG/DL (ref 0–1)
BILIRUB SERPL-MCNC: 1.1 MG/DL (ref 0.3–1.2)
BUN SERPL-MCNC: 20 MG/DL (ref 8–23)
CELLS COUNTED: 50
CHLORIDE SERPL-SCNC: 101 MMOL/L (ref 98–107)
CO2 SERPL-SCNC: 21 MMOL/L (ref 20–31)
CREAT SERPL-MCNC: 0.6 MG/DL (ref 0.7–1.2)
EOSINOPHIL # BLD: 0.1 K/UL (ref 0–0.4)
EOSINOPHILS RELATIVE PERCENT: 6 % (ref 1–4)
ERYTHROCYTE [DISTWIDTH] IN BLOOD BY AUTOMATED COUNT: 22.3 % (ref 11.8–14.4)
GFR SERPL CREATININE-BSD FRML MDRD: >60 ML/MIN/1.73M2
HCT VFR BLD AUTO: 39.2 % (ref 40.7–50.3)
HGB BLD-MCNC: 11.8 G/DL (ref 13–17)
IMM GRANULOCYTES # BLD AUTO: 0 K/UL (ref 0–0.3)
IMM GRANULOCYTES NFR BLD: 0 %
LYMPHOCYTES # BLD: 28 % (ref 24–44)
LYMPHOCYTES NFR BLD: 0.48 K/UL (ref 1–4.8)
MCH RBC QN AUTO: 25.5 PG (ref 25.2–33.5)
MCHC RBC AUTO-ENTMCNC: 30.1 G/DL (ref 28.4–34.8)
MCV RBC AUTO: 84.7 FL (ref 82.6–102.9)
MICROORGANISM SPEC CULT: NORMAL
MICROORGANISM/AGENT SPEC: NORMAL
MONOCYTES NFR BLD: 0.75 K/UL (ref 0.1–0.8)
MONOCYTES NFR BLD: 44 % (ref 1–7)
MORPHOLOGY: ABNORMAL
NEUTROPHILS NFR BLD: 22 % (ref 36–66)
NEUTS SEG NFR BLD: 0.37 K/UL (ref 1.8–7.7)
NRBC AUTOMATED: 0 PER 100 WBC
PLATELET # BLD AUTO: ABNORMAL K/UL (ref 138–453)
PLATELET, FLUORESCENCE: 60 K/UL (ref 138–453)
PLATELETS.RETICULATED NFR BLD AUTO: 6.8 % (ref 1.1–10.3)
POTASSIUM SERPL-SCNC: 4.3 MMOL/L (ref 3.7–5.3)
PROT SERPL-MCNC: 7.1 G/DL (ref 6.4–8.3)
RBC # BLD AUTO: 4.63 M/UL (ref 4.21–5.77)
SODIUM SERPL-SCNC: 130 MMOL/L (ref 135–144)
SPECIMEN DESCRIPTION: NORMAL
WBC OTHER # BLD: 1.7 K/UL (ref 3.5–11.3)

## 2023-06-05 PROCEDURE — 80051 ELECTROLYTE PANEL: CPT

## 2023-06-05 PROCEDURE — 84520 ASSAY OF UREA NITROGEN: CPT

## 2023-06-05 PROCEDURE — 36415 COLL VENOUS BLD VENIPUNCTURE: CPT

## 2023-06-05 PROCEDURE — 80076 HEPATIC FUNCTION PANEL: CPT

## 2023-06-05 PROCEDURE — 85027 COMPLETE CBC AUTOMATED: CPT

## 2023-06-05 PROCEDURE — 85055 RETICULATED PLATELET ASSAY: CPT

## 2023-06-05 PROCEDURE — 82565 ASSAY OF CREATININE: CPT

## 2023-06-06 ENCOUNTER — TELEPHONE (OUTPATIENT)
Dept: INTERVENTIONAL RADIOLOGY/VASCULAR | Age: 73
End: 2023-06-06

## 2023-06-06 ENCOUNTER — CARE COORDINATION (OUTPATIENT)
Dept: OTHER | Facility: CLINIC | Age: 73
End: 2023-06-06

## 2023-06-06 ENCOUNTER — ANESTHESIA EVENT (OUTPATIENT)
Dept: OPERATING ROOM | Age: 73
End: 2023-06-06
Payer: COMMERCIAL

## 2023-06-06 DIAGNOSIS — K70.30 ALCOHOLIC CIRRHOSIS, UNSPECIFIED WHETHER ASCITES PRESENT (HCC): Primary | ICD-10-CM

## 2023-06-06 LAB
BODY FLD TYPE: NORMAL
LYMPHOCYTES NFR FLD: 55 %
MICROORGANISM SPEC CULT: NORMAL
NEUTROPHILS NFR FLD: 12 %
RBC # FLD: <3000 CELLS/UL
SPECIMEN DESCRIPTION: NORMAL
SURGICAL PATHOLOGY REPORT: NORMAL
UNIDENT CELLS NFR FLD: NORMAL %
WBC # FLD: 23 CELLS/UL

## 2023-06-06 PROCEDURE — 85610 PROTHROMBIN TIME: CPT | Performed by: INTERNAL MEDICINE

## 2023-06-06 NOTE — TELEPHONE ENCOUNTER
Attempted to contact the patient to schedule a aspira drain placement, he is currently scheduled for a thoracentesis on 6/9 that will not be needed.   JOYCE

## 2023-06-06 NOTE — CARE COORDINATION
Care Transitions Follow Up Call    ACM attempted to reach spouse for Care Transitions follow up call. HIPAA compliant message left requesting a return phone call at spouse convenience. Plan for follow-up call in 7-10 days    Future Appointments   Date Time Provider Krystal Alicea   6/9/2023  3:00 PM STA IR ROOM 1 [facility] Radiolog   8/2/2023  1:30 PM Yancey Osler, MD grtlk exc [facility]   9/1/2023  1:00 PM SCARLET Lopez - CNP W [facility] FP [facility]   9/12/2023  3:45 PM Maribel Montes MD [facility]- [facility]  Associate Care Manager  [phone]  [email or web address]

## 2023-06-07 ENCOUNTER — ANESTHESIA (OUTPATIENT)
Dept: OPERATING ROOM | Age: 73
End: 2023-06-07
Payer: COMMERCIAL

## 2023-06-07 ENCOUNTER — HOSPITAL ENCOUNTER (OUTPATIENT)
Age: 73
Setting detail: OUTPATIENT SURGERY
Discharge: HOME | End: 2023-06-07
Attending: INTERNAL MEDICINE | Admitting: INTERNAL MEDICINE
Payer: COMMERCIAL

## 2023-06-07 VITALS
OXYGEN SATURATION: 97 % | BODY MASS INDEX: 17.93 KG/M2 | TEMPERATURE: 96.8 F | HEART RATE: 57 BPM | WEIGHT: 132.4 LBS | HEIGHT: 72 IN | SYSTOLIC BLOOD PRESSURE: 124 MMHG | RESPIRATION RATE: 19 BRPM | DIASTOLIC BLOOD PRESSURE: 89 MMHG

## 2023-06-07 DIAGNOSIS — K74.69 OTHER CIRRHOSIS OF LIVER (HCC): ICD-10-CM

## 2023-06-07 DIAGNOSIS — D61.818 PANCYTOPENIA (HCC): ICD-10-CM

## 2023-06-07 DIAGNOSIS — D70.8 OTHER NEUTROPENIA (HCC): ICD-10-CM

## 2023-06-07 DIAGNOSIS — K21.9 GASTROESOPHAGEAL REFLUX DISEASE, UNSPECIFIED WHETHER ESOPHAGITIS PRESENT: ICD-10-CM

## 2023-06-07 DIAGNOSIS — R18.8 OTHER ASCITES: ICD-10-CM

## 2023-06-07 LAB
GLUCOSE BLD-MCNC: 110 MG/DL (ref 75–110)
GLUCOSE BLD-MCNC: 96 MG/DL (ref 75–110)

## 2023-06-07 PROCEDURE — 6370000000 HC RX 637 (ALT 250 FOR IP)

## 2023-06-07 PROCEDURE — 2500000003 HC RX 250 WO HCPCS: Performed by: NURSE ANESTHETIST, CERTIFIED REGISTERED

## 2023-06-07 PROCEDURE — 3609012400 HC EGD TRANSORAL BIOPSY SINGLE/MULTIPLE: Performed by: INTERNAL MEDICINE

## 2023-06-07 PROCEDURE — 2709999900 HC NON-CHARGEABLE SUPPLY: Performed by: INTERNAL MEDICINE

## 2023-06-07 PROCEDURE — 7100000010 HC PHASE II RECOVERY - FIRST 15 MIN: Performed by: INTERNAL MEDICINE

## 2023-06-07 PROCEDURE — 82947 ASSAY GLUCOSE BLOOD QUANT: CPT

## 2023-06-07 PROCEDURE — 6360000002 HC RX W HCPCS: Performed by: NURSE ANESTHETIST, CERTIFIED REGISTERED

## 2023-06-07 PROCEDURE — 88341 IMHCHEM/IMCYTCHM EA ADD ANTB: CPT

## 2023-06-07 PROCEDURE — 2580000003 HC RX 258: Performed by: ANESTHESIOLOGY

## 2023-06-07 PROCEDURE — 2720000010 HC SURG SUPPLY STERILE: Performed by: INTERNAL MEDICINE

## 2023-06-07 PROCEDURE — 3700000000 HC ANESTHESIA ATTENDED CARE: Performed by: INTERNAL MEDICINE

## 2023-06-07 PROCEDURE — 88342 IMHCHEM/IMCYTCHM 1ST ANTB: CPT

## 2023-06-07 PROCEDURE — 88305 TISSUE EXAM BY PATHOLOGIST: CPT

## 2023-06-07 PROCEDURE — 7100000011 HC PHASE II RECOVERY - ADDTL 15 MIN: Performed by: INTERNAL MEDICINE

## 2023-06-07 PROCEDURE — 3700000001 HC ADD 15 MINUTES (ANESTHESIA): Performed by: INTERNAL MEDICINE

## 2023-06-07 RX ORDER — SODIUM CHLORIDE 9 MG/ML
INJECTION, SOLUTION INTRAVENOUS CONTINUOUS
Status: DISCONTINUED | OUTPATIENT
Start: 2023-06-07 | End: 2023-06-07 | Stop reason: HOSPADM

## 2023-06-07 RX ORDER — IPRATROPIUM BROMIDE AND ALBUTEROL SULFATE 2.5; .5 MG/3ML; MG/3ML
SOLUTION RESPIRATORY (INHALATION)
Status: COMPLETED
Start: 2023-06-07 | End: 2023-06-07

## 2023-06-07 RX ORDER — SODIUM CHLORIDE 0.9 % (FLUSH) 0.9 %
5-40 SYRINGE (ML) INJECTION PRN
Status: DISCONTINUED | OUTPATIENT
Start: 2023-06-07 | End: 2023-06-07 | Stop reason: HOSPADM

## 2023-06-07 RX ORDER — IPRATROPIUM BROMIDE AND ALBUTEROL SULFATE 2.5; .5 MG/3ML; MG/3ML
1 SOLUTION RESPIRATORY (INHALATION)
Status: DISCONTINUED | OUTPATIENT
Start: 2023-06-07 | End: 2023-06-07 | Stop reason: HOSPADM

## 2023-06-07 RX ORDER — PROPOFOL 10 MG/ML
INJECTION, EMULSION INTRAVENOUS PRN
Status: DISCONTINUED | OUTPATIENT
Start: 2023-06-07 | End: 2023-06-07 | Stop reason: SDUPTHER

## 2023-06-07 RX ORDER — SODIUM CHLORIDE, SODIUM LACTATE, POTASSIUM CHLORIDE, CALCIUM CHLORIDE 600; 310; 30; 20 MG/100ML; MG/100ML; MG/100ML; MG/100ML
INJECTION, SOLUTION INTRAVENOUS CONTINUOUS
Status: DISCONTINUED | OUTPATIENT
Start: 2023-06-07 | End: 2023-06-07 | Stop reason: HOSPADM

## 2023-06-07 RX ORDER — LIDOCAINE HYDROCHLORIDE 10 MG/ML
1 INJECTION, SOLUTION EPIDURAL; INFILTRATION; INTRACAUDAL; PERINEURAL
Status: DISCONTINUED | OUTPATIENT
Start: 2023-06-07 | End: 2023-06-07 | Stop reason: HOSPADM

## 2023-06-07 RX ORDER — SODIUM CHLORIDE 0.9 % (FLUSH) 0.9 %
5-40 SYRINGE (ML) INJECTION EVERY 12 HOURS SCHEDULED
Status: DISCONTINUED | OUTPATIENT
Start: 2023-06-07 | End: 2023-06-07 | Stop reason: HOSPADM

## 2023-06-07 RX ORDER — LIDOCAINE HYDROCHLORIDE 10 MG/ML
INJECTION, SOLUTION EPIDURAL; INFILTRATION; INTRACAUDAL; PERINEURAL PRN
Status: DISCONTINUED | OUTPATIENT
Start: 2023-06-07 | End: 2023-06-07 | Stop reason: SDUPTHER

## 2023-06-07 RX ORDER — SODIUM CHLORIDE 9 MG/ML
INJECTION, SOLUTION INTRAVENOUS PRN
Status: DISCONTINUED | OUTPATIENT
Start: 2023-06-07 | End: 2023-06-07 | Stop reason: HOSPADM

## 2023-06-07 RX ADMIN — SODIUM CHLORIDE, POTASSIUM CHLORIDE, SODIUM LACTATE AND CALCIUM CHLORIDE: 600; 310; 30; 20 INJECTION, SOLUTION INTRAVENOUS at 08:27

## 2023-06-07 RX ADMIN — PROPOFOL 30 MG: 10 INJECTION, EMULSION INTRAVENOUS at 09:37

## 2023-06-07 RX ADMIN — PROPOFOL 20 MG: 10 INJECTION, EMULSION INTRAVENOUS at 09:30

## 2023-06-07 RX ADMIN — LIDOCAINE HYDROCHLORIDE 50 MG: 10 INJECTION, SOLUTION EPIDURAL; INFILTRATION; INTRACAUDAL; PERINEURAL at 09:28

## 2023-06-07 RX ADMIN — PROPOFOL 20 MG: 10 INJECTION, EMULSION INTRAVENOUS at 09:32

## 2023-06-07 RX ADMIN — IPRATROPIUM BROMIDE AND ALBUTEROL SULFATE: .5; 2.5 SOLUTION RESPIRATORY (INHALATION) at 10:40

## 2023-06-07 RX ADMIN — PROPOFOL 40 MG: 10 INJECTION, EMULSION INTRAVENOUS at 09:28

## 2023-06-07 RX ADMIN — PROPOFOL 20 MG: 10 INJECTION, EMULSION INTRAVENOUS at 09:34

## 2023-06-07 ASSESSMENT — PAIN - FUNCTIONAL ASSESSMENT: PAIN_FUNCTIONAL_ASSESSMENT: 0-10

## 2023-06-07 NOTE — ANESTHESIA PRE PROCEDURE
Findings:           Anesthesia Plan      general     ASA 3       Induction: intravenous. MIPS: prophylactic pharmacologic antiemetic agents not administered perioperatively for documented reasons. Anesthetic plan and risks discussed with patient. Plan discussed with CRNA.     Attending anesthesiologist reviewed and agrees with Preprocedure content                Roseanne Pereira DO   6/7/2023

## 2023-06-07 NOTE — H&P
units CAPS capsule Take by mouth Indications: three times weekly    Historical Provider, MD   calcium carbonate (OSCAL) 500 MG TABS tablet Take 1 tablet by mouth daily    Historical Provider, MD   Multiple Vitamins-Minerals (MULTI COMPLETE PO) Take 1 tablet by mouth daily. Historical Provider, MD       This is a 68 y.o. male who is pleasant, cooperative, alert and oriented x 3, in no acute distress. Thin appearance. Heart: Asymptomatic bradycardia. HR 56 BPM. Regular rhythm without murmur, gallop, or rub. Lungs: Diminished in the RLL. Normal respiratory effort and unlabored. No wheezes or rales bilaterally. Abdomen: Distended. Soft, nontender, and active bowel sounds. Extremities: Right lower extremity 2+ pitting edema. Left lower extremity 1+ pitting edema. Unable to assess the skin on the lower extremities due to compression stockings. Pedal pulses: are palpable bilaterally. Labs:  Recent Labs     06/05/23  1422 05/24/23  0749 05/15/23  0909 05/14/23  0325 05/12/23  0425 05/11/23  0517   HGB 11.8* 11.8*   < >  --    < > 12.2*   HCT 39.2* 37.3*   < >  --    < > 39.1*   WBC 1.7* 0.9*   < >  --    < > 2.9*   MCV 84.7 77.4*   < >  --    < > 79.1*   PLT See Reflexed IPF Result 115*   < >  --    < > 53*   * 133*   < >  --    < > 130*   K 4.3 4.6   < >  --    < > 4.4    100   < >  --    < > 101   CO2 21 27   < >  --    < > 22   BUN 20 16   < >  --    < > 21   CREATININE 0.60* 0.62*   < >  --    < > 0.72   GLUCOSE  --  126*   < >  --    < > 111*   INR  --   --   --  1.4   < >  --    PROTIME  --   --   --  17.0*   < >  --    APTT  --   --   --   --   --  32.4   AST 51*  --    < >  --    < >  --    ALT 44*  --    < >  --    < >  --    LABALBU 2.1*  --    < >  --    < >  --     < > = values in this interval not displayed.        Recent Labs     05/11/23  0010   COVID19 Not Detected         SCARLET Dowell CNP   Electronically signed 6/7/2023 at 9:10 AM     SCARLET Jaquez - NANCY  Nurse

## 2023-06-07 NOTE — ANESTHESIA POSTPROCEDURE EVALUATION
Department of Anesthesiology  Postprocedure Note    Patient: Mee Kay  MRN: [id number]  YOB: 1950  Date of evaluation: 6/7/2023      Procedure Summary     Date: 06/07/23 Room / Location: [place] [address] / [facility] - INPATIENT    Anesthesia Start: 7841 Anesthesia Stop: 1570    Procedure: EGD BIOPSY, BANDING Diagnosis:       Pancytopenia ([address]      Gastroesophageal reflux disease, unspecified whether esophagitis present      Other ascites      Other neutropenia ([address]      Other cirrhosis of liver ([address]      (Pancytopenia ([address] [D61.818])      (Gastroesophageal reflux disease, unspecified whether esophagitis present [K21.9])      (Other ascites [R18.8])      (Other neutropenia ([address] [D70.8])      (Other cirrhosis of liver ([address] [K74.69])    Surgeons: Ed Bryant MD Responsible Provider: Collette Gutierrez DO    Anesthesia Type: general ASA Status: 3          Anesthesia Type: No value filed.     Kayleigh Phase I:      Kayleigh Phase II: Kayleigh Score: 10      Anesthesia Post Evaluation    Patient location during evaluation: PACU  Patient participation: complete - patient participated  Level of consciousness: awake and alert  Airway patency: patent  Nausea & Vomiting: no nausea and no vomiting  Complications: no  Cardiovascular status: hemodynamically stable  Respiratory status: acceptable  Hydration status: stable

## 2023-06-07 NOTE — OP NOTE
PROCEDURE NOTE    DATE OF PROCEDURE: 6/7/2023     SURGEON: Shiraz Cronin MD    ASSISTANT: None    PREOPERATIVE DIAGNOSIS: CIRRHOSIS  ANEMIA    POSTOPERATIVE DIAGNOSIS: As described below    OPERATION: Upper GI endoscopy with Biopsy  ESOPHAGEAL VARICEAL BANDING X 3  ANESTHESIA: MAC PER ANESTHESIA     ESTIMATED BLOOD LOSS: Less than 50 ml    COMPLICATIONS: None. SPECIMENS:  Was Obtained:     HISTORY: The patient is a 68y.o. year old male with history of above preop diagnosis. I recommended esophagogastroduodenoscopy with possible biopsy and I explained the risk, benefits, expected outcome, and alternatives to the procedure. Risks included but are not limited to bleeding, infection, respiratory distress, hypotension, and perforation of the esophagus, stomach, or duodenum. Patient understands and is in agreement. PROCEDURE: The patient was given IV conscious sedation. The patient's SPO2 remained above 90% throughout the procedure. The gastroscope was inserted orally and advanced under direct vision through the esophagus, through the stomach, through the pylorus, and into the descending duodenum. Findings:    Retropharyngeal area was grossly normal appearing    Esophagus: abnormal: EVIDENCE OF GRADE 3 MID TO DISTAL ESOPHAGEAL VARICES  THREE BANDS WERE DEPLOYED ON THREE LARGE VARICES  PICTURES WERE TAKEN    Stomach:    Fundus: abnormal: MOD PORTAL HYPERTENSIVE GASTROPATHY    Body: abnormal: AS ABOVE    Antrum: abnormal: VERY LARGE POLYPOID LESIONS 1-2.5 CM NOTED CLOSE TO ANTRUM  MULTIPLE BIOPSIES WERE TAKEN   WILL NEED TO BE REMOVED WITH SNARE AT A LATER DATE    Duodenum:     Descending: normal    Bulb: normal    The scope was removed and the patient tolerated the procedure well.      Recommendations/Plan:   F/U Biopsies  F/U In Office in 3-4 weeks  Discussed with the family  Post sedation patient was stable with stable vital signs and stable O2 saturations    Electronically signed by Shiraz Cronin MD

## 2023-06-07 NOTE — DISCHARGE INSTRUCTIONS
Home.       © 2790-8111 [employer]. Care instructions adapted under license by [facility]. This care instruction is for use with your licensed healthcare professional. If you have questions about a medical condition or this instruction, always ask your healthcare professional. [address] any warranty or liability for your use of this information.   Content Version: 6.6.935406; Last Revised: February 20, 2013

## 2023-06-08 LAB — SURGICAL PATHOLOGY REPORT: NORMAL

## 2023-06-08 RX ORDER — SUCRALFATE 1 G/1
1 TABLET ORAL EVERY 6 HOURS SCHEDULED
Status: ACTIVE | OUTPATIENT
Start: 2023-06-08

## 2023-06-09 ENCOUNTER — HOSPITAL ENCOUNTER (OUTPATIENT)
Dept: INTERVENTIONAL RADIOLOGY/VASCULAR | Age: 73
Discharge: HOME | End: 2023-06-09
Payer: COMMERCIAL

## 2023-06-09 VITALS
OXYGEN SATURATION: 91 % | RESPIRATION RATE: 13 BRPM | TEMPERATURE: 98.2 F | BODY MASS INDEX: 17.88 KG/M2 | SYSTOLIC BLOOD PRESSURE: 123 MMHG | HEART RATE: 59 BPM | DIASTOLIC BLOOD PRESSURE: 79 MMHG | WEIGHT: 132 LBS | HEIGHT: 72 IN

## 2023-06-09 DIAGNOSIS — J90 PLEURAL EFFUSION: ICD-10-CM

## 2023-06-09 LAB
MICROORGANISM SPEC CULT: ABNORMAL
MICROORGANISM/AGENT SPEC: ABNORMAL
SPECIMEN DESCRIPTION: ABNORMAL

## 2023-06-09 PROCEDURE — 6360000002 HC RX W HCPCS: Performed by: RADIOLOGY

## 2023-06-09 PROCEDURE — 75989 ABSCESS DRAINAGE UNDER X-RAY: CPT

## 2023-06-09 PROCEDURE — 32550 INSERT PLEURAL CATH: CPT

## 2023-06-09 PROCEDURE — 99152 MOD SED SAME PHYS/QHP 5/>YRS: CPT

## 2023-06-09 PROCEDURE — 6370000000 HC RX 637 (ALT 250 FOR IP): Performed by: RADIOLOGY

## 2023-06-09 PROCEDURE — 2580000003 HC RX 258: Performed by: RADIOLOGY

## 2023-06-09 PROCEDURE — 99153 MOD SED SAME PHYS/QHP EA: CPT

## 2023-06-09 PROCEDURE — C1729 CATH, DRAINAGE: HCPCS

## 2023-06-09 RX ORDER — MIDAZOLAM HYDROCHLORIDE 1 MG/ML
INJECTION INTRAMUSCULAR; INTRAVENOUS PRN
Status: COMPLETED | OUTPATIENT
Start: 2023-06-09 | End: 2023-06-09

## 2023-06-09 RX ORDER — SODIUM CHLORIDE 9 MG/ML
INJECTION, SOLUTION INTRAVENOUS PRN
Status: ACTIVE | OUTPATIENT
Start: 2023-06-09

## 2023-06-09 RX ORDER — TRAMADOL HYDROCHLORIDE 50 MG/1
50 TABLET ORAL ONCE
Status: COMPLETED | OUTPATIENT
Start: 2023-06-09 | End: 2023-06-09

## 2023-06-09 RX ORDER — SODIUM CHLORIDE 0.9 % (FLUSH) 0.9 %
5-40 SYRINGE (ML) INJECTION EVERY 12 HOURS SCHEDULED
Status: ACTIVE | OUTPATIENT
Start: 2023-06-09

## 2023-06-09 RX ORDER — FENTANYL CITRATE 50 UG/ML
INJECTION, SOLUTION INTRAMUSCULAR; INTRAVENOUS PRN
Status: COMPLETED | OUTPATIENT
Start: 2023-06-09 | End: 2023-06-09

## 2023-06-09 RX ORDER — SODIUM CHLORIDE 0.9 % (FLUSH) 0.9 %
5-40 SYRINGE (ML) INJECTION PRN
Status: ACTIVE | OUTPATIENT
Start: 2023-06-09

## 2023-06-09 RX ORDER — SODIUM CHLORIDE 9 MG/ML
INJECTION, SOLUTION INTRAVENOUS CONTINUOUS
Status: ACTIVE | OUTPATIENT
Start: 2023-06-09

## 2023-06-09 RX ADMIN — FENTANYL CITRATE 50 MCG: 50 INJECTION INTRAMUSCULAR; INTRAVENOUS at 11:50

## 2023-06-09 RX ADMIN — SODIUM CHLORIDE: 9 INJECTION, SOLUTION INTRAVENOUS at 09:52

## 2023-06-09 RX ADMIN — Medication 2000 MG: at 11:35

## 2023-06-09 RX ADMIN — TRAMADOL HYDROCHLORIDE 50 MG: 50 TABLET, COATED ORAL at 13:35

## 2023-06-09 RX ADMIN — MIDAZOLAM 1 MG: 1 INJECTION INTRAMUSCULAR; INTRAVENOUS at 11:50

## 2023-06-09 ASSESSMENT — PAIN - FUNCTIONAL ASSESSMENT: PAIN_FUNCTIONAL_ASSESSMENT: 0-10

## 2023-06-09 ASSESSMENT — PAIN SCALES - GENERAL
PAINLEVEL_OUTOF10: 10
PAINLEVEL_OUTOF10: 5

## 2023-06-09 ASSESSMENT — PAIN DESCRIPTION - ORIENTATION: ORIENTATION: RIGHT

## 2023-06-09 ASSESSMENT — PAIN DESCRIPTION - LOCATION: LOCATION: ABDOMEN

## 2023-06-09 NOTE — PRE SEDATION
5/24/23   Vanessa Oliveira MD   pantoprazole (PROTONIX) 40 MG tablet TAKE 1 TABLET BY MOUTH ONE TIME A DAY  Patient taking differently: TAKE 1 TABLET BY MOUTH ONE TIME A DAY- taking every other day 3/1/23   Brenda Gallagher MD   spironolactone (ALDACTONE) 100 MG tablet Take 1 tablet by mouth daily 12/30/22   Brenda Gallagher MD   Cholecalciferol (VITAMIN D) 2000 units CAPS capsule Take by mouth Indications: three times weekly    Historical Provider, MD   calcium carbonate (OSCAL) 500 MG TABS tablet Take 1 tablet by mouth daily    Historical Provider, MD   Multiple Vitamins-Minerals (MULTI COMPLETE PO) Take 1 tablet by mouth daily. Historical Provider, MD     Coumadin Use Last 7 Days:  no  Antiplatelet drug therapy use last 7 days: no  Other anticoagulant use last 7 days: no  Additional Medication Information:  see med rec      Pre-Sedation Documentation and Exam:   I have reviewed the patient's history and review of systems.     Mallampati Airway Assessment:  Mallampati Class II - (soft palate, fauces & uvula are visible)    Prior History of Anesthesia Complications:   none    ASA Classification:  Class 2 - A normal healthy patient with mild systemic disease    Sedation/ Anesthesia Plan:   intravenous sedation    Medications Planned:   midazolam (Versed) intravenously and fentanyl intravenously    Patient is an appropriate candidate for plan of sedation: yes    Electronically signed by Ramiro Trinidad MD on 6/9/2023 at 12:17 PM

## 2023-06-09 NOTE — DISCHARGE INSTRUCTIONS
Watch for signs and symptoms of infection including fever, chills, headache, vomiting. Watch for increased redness, swelling or drainage at the site. Watch for stomach distention. Watch for any bleeding from the site. No heavy lifting for 24 hours. Call doctor for any complications from procedure. Follow instructions given from the IR department.

## 2023-06-09 NOTE — POST SEDATION
Sedation Post Procedure Note    Patient Name: Oscar Albarado   YOB: 1950  Room/Bed: Room/bed info not found  Medical Record Number: [id number]  Date: 6/9/2023   Time: 12:18 PM         Physicians/Assistants: Ramiro Trinidad MD, MD    Procedure Performed:   Tunneled right pleural drain    Post-Sedation Vital Signs:  Vitals:    06/09/23 1155   BP: 129/79   Pulse: 54   Resp: 16   Temp:    SpO2: 97%      Vital signs were reviewed and were stable after the procedure (see flow sheet for vitals)              Complications: none    Electronically signed by Ramiro Trinidad MD on 6/9/2023 at 12:18 PM

## 2023-06-09 NOTE — BRIEF OP NOTE
Brief Postoperative Note    Tammie Neely  YOB: 1950  [id number]    Pre-operative Diagnosis: Recurrent right pleural effusion    Post-operative Diagnosis: Same    Procedure:  Tunneled right pleural drain placement    Anesthesia: Moderate Sedation    Surgeons/Assistants: Adonay    Estimated Blood Loss: less than 50     Complications: None    Specimens: Was Not Obtained    Electronically signed by Farshad Mandel MD on 6/9/2023 at 12:18 PM

## 2023-06-12 LAB
MICROORGANISM SPEC CULT: NORMAL
MICROORGANISM SPEC CULT: NORMAL
SPECIMEN DESCRIPTION: NORMAL
SPECIMEN DESCRIPTION: NORMAL

## 2023-06-15 DIAGNOSIS — D70.8 OTHER NEUTROPENIA (HCC): ICD-10-CM

## 2023-06-15 DIAGNOSIS — D61.818 PANCYTOPENIA (HCC): ICD-10-CM

## 2023-06-15 DIAGNOSIS — K21.9 GASTROESOPHAGEAL REFLUX DISEASE, UNSPECIFIED WHETHER ESOPHAGITIS PRESENT: ICD-10-CM

## 2023-06-15 DIAGNOSIS — R18.8 OTHER ASCITES: ICD-10-CM

## 2023-06-15 DIAGNOSIS — K74.69 OTHER CIRRHOSIS OF LIVER (HCC): ICD-10-CM

## 2023-06-16 DIAGNOSIS — J90 PLEURAL EFFUSION: Primary | ICD-10-CM

## 2023-06-16 RX ORDER — POTASSIUM CHLORIDE 20 MEQ/1
TABLET, EXTENDED RELEASE ORAL
Qty: 90 TABLET | Refills: 3 | OUTPATIENT
Start: 2023-06-16

## 2023-06-19 ENCOUNTER — CARE COORDINATION (OUTPATIENT)
Dept: OTHER | Facility: CLINIC | Age: 73
End: 2023-06-19

## 2023-06-19 DIAGNOSIS — E87.1 HYPONATREMIA: Primary | ICD-10-CM

## 2023-06-19 LAB
MICROORGANISM SPEC CULT: NORMAL
MICROORGANISM/AGENT SPEC: NORMAL
SPECIMEN DESCRIPTION: NORMAL

## 2023-06-19 RX ORDER — POTASSIUM CHLORIDE 20 MEQ/1
20 TABLET, EXTENDED RELEASE ORAL DAILY
Qty: 90 TABLET | Refills: 3 | Status: SHIPPED | OUTPATIENT
Start: 2023-06-19

## 2023-06-19 SDOH — HEALTH STABILITY: PHYSICAL HEALTH: ON AVERAGE, HOW MANY DAYS PER WEEK DO YOU ENGAGE IN MODERATE TO STRENUOUS EXERCISE (LIKE A BRISK WALK)?: 0 DAYS

## 2023-06-19 SDOH — HEALTH STABILITY: PHYSICAL HEALTH: ON AVERAGE, HOW MANY MINUTES DO YOU ENGAGE IN EXERCISE AT THIS LEVEL?: 0 MIN

## 2023-06-19 ASSESSMENT — SOCIAL DETERMINANTS OF HEALTH (SDOH)
WITHIN THE LAST YEAR, HAVE YOU BEEN KICKED, HIT, SLAPPED, OR OTHERWISE PHYSICALLY HURT BY YOUR PARTNER OR EX-PARTNER?: NO
WITHIN THE LAST YEAR, HAVE YOU BEEN AFRAID OF YOUR PARTNER OR EX-PARTNER?: NO
WITHIN THE LAST YEAR, HAVE TO BEEN RAPED OR FORCED TO HAVE ANY KIND OF SEXUAL ACTIVITY BY YOUR PARTNER OR EX-PARTNER?: NO
WITHIN THE LAST YEAR, HAVE YOU BEEN HUMILIATED OR EMOTIONALLY ABUSED IN OTHER WAYS BY YOUR PARTNER OR EX-PARTNER?: NO

## 2023-06-19 NOTE — TELEPHONE ENCOUNTER
Patrice Moffett is calling to request a refill on the following medication(s):    Medication Request:  Requested Prescriptions     Pending Prescriptions Disp Refills    potassium chloride (KLOR-CON M) 20 MEQ extended release tablet 90 tablet 3     Sig: TAKE 1 TABLET BY MOUTH ONE TIME A DAY  Strength: 20 mEq       Last Visit Date (If Applicable):  2/8/8934    Next Visit Date:    9/1/2023

## 2023-06-19 NOTE — PROGRESS NOTES
Pt's wife Enrique Reynolds called and labs reviewed together. Based on significant hyponatremia and anticipated further issue w/ elyte disturbance we both feel it is reasonable to consult nephrology for a helping hand in monitoring lytes and managing diuretics. Pt is up and feeling well this morning per wife, asymptomatic of hyponatremia and his wife is having him increase some sodium intake.      Nephrology referral placed and repeat labs placed for [facility] RN to draw this Thursday for joel.

## 2023-06-19 NOTE — CARE COORDINATION
up on:  therapy progress, chest tube output, po intake, blood sugars and blood pressure, pain level. Patient  /wife verbalized understanding and is agreeable to follow up call. Julio Cesar HAWKINS, RN- [facility]  Associate Care Manager  [phone]  [email or web address]

## 2023-06-20 ENCOUNTER — TELEPHONE (OUTPATIENT)
Dept: FAMILY MEDICINE CLINIC | Age: 73
End: 2023-06-20

## 2023-06-20 NOTE — TELEPHONE ENCOUNTER
[facility] called to report that patient stated he fell over the weekend while with family. She stated he had no injury. Did not hit his head.  Was able to be assisted getting up by family members

## 2023-06-22 LAB
ALBUMIN SERPL-MCNC: 2.1 G/DL
ALP BLD-CCNC: 226 U/L
ALT SERPL-CCNC: 53 U/L
ANION GAP SERPL CALCULATED.3IONS-SCNC: 7 MMOL/L
AST SERPL-CCNC: 49 U/L
BASOPHILS ABSOLUTE: 0 /ΜL
BASOPHILS RELATIVE PERCENT: 1 %
BILIRUB SERPL-MCNC: 1.3 MG/DL (ref 0.1–1.4)
BUN BLDV-MCNC: 19 MG/DL
CALCIUM SERPL-MCNC: 8.6 MG/DL
CHLORIDE BLD-SCNC: 99 MMOL/L
CO2: 25 MMOL/L
CREAT SERPL-MCNC: 0.6 MG/DL
EGFR: >90
EOSINOPHILS ABSOLUTE: 0.1 /ΜL
EOSINOPHILS RELATIVE PERCENT: 8 %
GLUCOSE BLD-MCNC: 139 MG/DL
HCT VFR BLD CALC: 37.4 % (ref 41–53)
HEMOGLOBIN: 12.3 G/DL (ref 13.5–17.5)
LYMPHOCYTES ABSOLUTE: 0.5 /ΜL
LYMPHOCYTES RELATIVE PERCENT: 27 %
MAGNESIUM: 1.7 MG/DL
MCH RBC QN AUTO: 25.9 PG
MCHC RBC AUTO-ENTMCNC: 33 G/DL
MCV RBC AUTO: 78 FL
MONOCYTES ABSOLUTE: 0.6 /ΜL
MONOCYTES RELATIVE PERCENT: 32 %
NEUTROPHILS ABSOLUTE: 0.5 /ΜL
NEUTROPHILS RELATIVE PERCENT: 24 %
PDW BLD-RTO: 22.5 %
PLATELET # BLD: 89 K/ΜL
PMV BLD AUTO: 9.2 FL
POTASSIUM SERPL-SCNC: 4.4 MMOL/L
RBC # BLD: 4.77 10^6/ΜL
SODIUM BLD-SCNC: 131 MMOL/L
TOTAL PROTEIN: 7.4
WBC # BLD: 1.7 10^3/ML

## 2023-06-23 DIAGNOSIS — E87.1 HYPONATREMIA: ICD-10-CM

## 2023-06-23 DIAGNOSIS — E11.65 TYPE 2 DIABETES MELLITUS WITH HYPERGLYCEMIA, WITHOUT LONG-TERM CURRENT USE OF INSULIN (HCC): Primary | ICD-10-CM

## 2023-06-23 RX ORDER — LANCETS 30 GAUGE
1 EACH MISCELLANEOUS 4 TIMES DAILY
Qty: 200 EACH | Refills: 0 | Status: SHIPPED | OUTPATIENT
Start: 2023-06-23

## 2023-06-23 RX ORDER — PEN NEEDLE, DIABETIC 30 GX5/16"
1 NEEDLE, DISPOSABLE MISCELLANEOUS DAILY
Qty: 100 EACH | Refills: 3 | Status: SHIPPED | OUTPATIENT
Start: 2023-06-23

## 2023-06-26 LAB
MICROORGANISM SPEC CULT: NORMAL
MICROORGANISM/AGENT SPEC: NORMAL
SPECIMEN DESCRIPTION: NORMAL

## 2023-06-27 ENCOUNTER — CARE COORDINATION (OUTPATIENT)
Dept: OTHER | Facility: CLINIC | Age: 73
End: 2023-06-27

## 2023-06-27 NOTE — CARE COORDINATION
Called patients wife, she was at the Dr Office with her mother, requested I call back another time    Gilberto HAWKINS, [address] [facility]  [phone]  [email or web address]

## 2023-06-28 ENCOUNTER — CARE COORDINATION (OUTPATIENT)
Dept: OTHER | Facility: CLINIC | Age: 73
End: 2023-06-28

## 2023-06-28 DIAGNOSIS — E11.65 TYPE 2 DIABETES MELLITUS WITH HYPERGLYCEMIA, WITHOUT LONG-TERM CURRENT USE OF INSULIN (HCC): ICD-10-CM

## 2023-06-28 RX ORDER — BLOOD SUGAR DIAGNOSTIC
STRIP MISCELLANEOUS
Qty: 50 STRIP | Refills: 0 | Status: SHIPPED | OUTPATIENT
Start: 2023-06-28 | End: 2023-06-28 | Stop reason: SDUPTHER

## 2023-06-28 RX ORDER — BLOOD PRESSURE TEST KIT
KIT MISCELLANEOUS
Qty: 100 EACH | Refills: 0 | Status: ON HOLD | OUTPATIENT
Start: 2023-06-28

## 2023-06-28 RX ORDER — BLOOD SUGAR DIAGNOSTIC
STRIP MISCELLANEOUS
Qty: 50 STRIP | Refills: 0 | Status: ON HOLD | OUTPATIENT
Start: 2023-06-28

## 2023-06-28 NOTE — TELEPHONE ENCOUNTER
Pt advised, will cb for apt
Received BP readings from patient. Please see attachment.
These are ok. No need to change meds.   BUT, I need him to bring his cuff by so we can check it against ours to makes sure that his readings are reliable
Never

## 2023-07-02 ENCOUNTER — HOSPITAL ENCOUNTER (INPATIENT)
Age: 73
LOS: 5 days | Discharge: HOME HEALTH | DRG: 871 | End: 2023-07-07
Attending: EMERGENCY MEDICINE | Admitting: INTERNAL MEDICINE
Payer: COMMERCIAL

## 2023-07-02 ENCOUNTER — APPOINTMENT (OUTPATIENT)
Dept: GENERAL RADIOLOGY | Age: 73
DRG: 871 | End: 2023-07-02
Payer: COMMERCIAL

## 2023-07-02 DIAGNOSIS — R65.10 SIRS (SYSTEMIC INFLAMMATORY RESPONSE SYNDROME) (HCC): Primary | ICD-10-CM

## 2023-07-02 LAB
ANION GAP SERPL CALCULATED.3IONS-SCNC: 11 MMOL/L (ref 9–17)
BACTERIA URNS QL MICRO: ABNORMAL
BASOPHILS # BLD: 0.11 K/UL (ref 0–0.2)
BASOPHILS NFR BLD: 1 % (ref 0–2)
BILIRUB UR QL STRIP: NEGATIVE
BUN SERPL-MCNC: 21 MG/DL (ref 8–23)
BUN/CREAT SERPL: 24 (ref 9–20)
CALCIUM SERPL-MCNC: 8.2 MG/DL (ref 8.6–10.4)
CHLORIDE SERPL-SCNC: 93 MMOL/L (ref 98–107)
CLARITY UR: CLEAR
CO2 SERPL-SCNC: 22 MMOL/L (ref 20–31)
COLOR UR: YELLOW
CREAT SERPL-MCNC: 0.86 MG/DL (ref 0.7–1.2)
EOSINOPHIL # BLD: 0 K/UL (ref 0–0.44)
EOSINOPHILS RELATIVE PERCENT: 0 % (ref 1–4)
EPI CELLS #/AREA URNS HPF: ABNORMAL /HPF (ref 0–5)
ERYTHROCYTE [DISTWIDTH] IN BLOOD BY AUTOMATED COUNT: 21.6 % (ref 11.8–14.4)
FLUAV AG SPEC QL: NEGATIVE
FLUBV AG SPEC QL: NEGATIVE
GFR SERPL CREATININE-BSD FRML MDRD: >60 ML/MIN/1.73M2
GLUCOSE BLD-MCNC: 180 MG/DL (ref 75–110)
GLUCOSE SERPL-MCNC: 154 MG/DL (ref 70–99)
GLUCOSE UR STRIP-MCNC: NEGATIVE MG/DL
HCT VFR BLD AUTO: 36.8 % (ref 40.7–50.3)
HGB BLD-MCNC: 11.5 G/DL (ref 13–17)
HGB UR QL STRIP.AUTO: ABNORMAL
IMM GRANULOCYTES # BLD AUTO: 0.11 K/UL (ref 0–0.3)
IMM GRANULOCYTES NFR BLD: 1 %
KETONES UR STRIP-MCNC: ABNORMAL MG/DL
LACTATE BLDV-SCNC: 2.3 MMOL/L (ref 0.5–1.9)
LACTATE BLDV-SCNC: 3.4 MMOL/L (ref 0.5–1.9)
LACTATE BLDV-SCNC: 4 MMOL/L (ref 0.5–1.9)
LEUKOCYTE ESTERASE UR QL STRIP: NEGATIVE
LYMPHOCYTES # BLD: 3 % (ref 24–43)
LYMPHOCYTES NFR BLD: 0.33 K/UL (ref 1.1–3.7)
MCH RBC QN AUTO: 25.7 PG (ref 25.2–33.5)
MCHC RBC AUTO-ENTMCNC: 31.3 G/DL (ref 28.4–34.8)
MCV RBC AUTO: 82.1 FL (ref 82.6–102.9)
MONOCYTES NFR BLD: 1.53 K/UL (ref 0.1–1.2)
MONOCYTES NFR BLD: 14 % (ref 3–12)
MORPHOLOGY: ABNORMAL
NEUTROPHILS NFR BLD: 81 % (ref 36–65)
NEUTS SEG NFR BLD: 8.82 K/UL (ref 1.5–8.1)
NITRITE UR QL STRIP: NEGATIVE
NRBC BLD-RTO: 0 PER 100 WBC
PH UR STRIP: 5.5 [PH] (ref 5–8)
PLATELET # BLD AUTO: 90 K/UL (ref 138–453)
PMV BLD AUTO: ABNORMAL FL (ref 8.1–13.5)
POTASSIUM SERPL-SCNC: 4 MMOL/L (ref 3.7–5.3)
PROT UR STRIP-MCNC: NEGATIVE MG/DL
RBC # BLD AUTO: 4.48 M/UL (ref 4.21–5.77)
RBC #/AREA URNS HPF: ABNORMAL /HPF (ref 0–2)
SARS-COV-2 RDRP RESP QL NAA+PROBE: NOT DETECTED
SODIUM SERPL-SCNC: 126 MMOL/L (ref 135–144)
SP GR UR STRIP: 1.01 (ref 1–1.03)
SPECIMEN DESCRIPTION: NORMAL
TROPONIN I SERPL HS-MCNC: 11 NG/L (ref 0–22)
UROBILINOGEN UR STRIP-ACNC: NORMAL
WBC #/AREA URNS HPF: ABNORMAL /HPF (ref 0–5)
WBC OTHER # BLD: 10.9 K/UL (ref 3.5–11.3)

## 2023-07-02 PROCEDURE — 1200000000 HC SEMI PRIVATE

## 2023-07-02 PROCEDURE — 6370000000 HC RX 637 (ALT 250 FOR IP): Performed by: EMERGENCY MEDICINE

## 2023-07-02 PROCEDURE — 87040 BLOOD CULTURE FOR BACTERIA: CPT

## 2023-07-02 PROCEDURE — 83935 ASSAY OF URINE OSMOLALITY: CPT

## 2023-07-02 PROCEDURE — 99285 EMERGENCY DEPT VISIT HI MDM: CPT

## 2023-07-02 PROCEDURE — 82947 ASSAY GLUCOSE BLOOD QUANT: CPT

## 2023-07-02 PROCEDURE — 96365 THER/PROPH/DIAG IV INF INIT: CPT

## 2023-07-02 PROCEDURE — 84484 ASSAY OF TROPONIN QUANT: CPT

## 2023-07-02 PROCEDURE — 2580000003 HC RX 258: Performed by: EMERGENCY MEDICINE

## 2023-07-02 PROCEDURE — 93005 ELECTROCARDIOGRAM TRACING: CPT | Performed by: EMERGENCY MEDICINE

## 2023-07-02 PROCEDURE — 87186 SC STD MICRODIL/AGAR DIL: CPT

## 2023-07-02 PROCEDURE — 81001 URINALYSIS AUTO W/SCOPE: CPT

## 2023-07-02 PROCEDURE — 36415 COLL VENOUS BLD VENIPUNCTURE: CPT

## 2023-07-02 PROCEDURE — 84300 ASSAY OF URINE SODIUM: CPT

## 2023-07-02 PROCEDURE — 87635 SARS-COV-2 COVID-19 AMP PRB: CPT

## 2023-07-02 PROCEDURE — 87804 INFLUENZA ASSAY W/OPTIC: CPT

## 2023-07-02 PROCEDURE — 87154 CUL TYP ID BLD PTHGN 6+ TRGT: CPT

## 2023-07-02 PROCEDURE — 71045 X-RAY EXAM CHEST 1 VIEW: CPT

## 2023-07-02 PROCEDURE — 2580000003 HC RX 258: Performed by: NURSE PRACTITIONER

## 2023-07-02 PROCEDURE — 87086 URINE CULTURE/COLONY COUNT: CPT

## 2023-07-02 PROCEDURE — 85027 COMPLETE CBC AUTOMATED: CPT

## 2023-07-02 PROCEDURE — 6360000002 HC RX W HCPCS: Performed by: EMERGENCY MEDICINE

## 2023-07-02 PROCEDURE — 6370000000 HC RX 637 (ALT 250 FOR IP): Performed by: NURSE PRACTITIONER

## 2023-07-02 PROCEDURE — 83605 ASSAY OF LACTIC ACID: CPT

## 2023-07-02 PROCEDURE — 80048 BASIC METABOLIC PNL TOTAL CA: CPT

## 2023-07-02 PROCEDURE — 87205 SMEAR GRAM STAIN: CPT

## 2023-07-02 RX ORDER — ENOXAPARIN SODIUM 100 MG/ML
40 INJECTION SUBCUTANEOUS DAILY
Status: DISCONTINUED | OUTPATIENT
Start: 2023-07-03 | End: 2023-07-07 | Stop reason: HOSPADM

## 2023-07-02 RX ORDER — POTASSIUM CHLORIDE 7.45 MG/ML
10 INJECTION INTRAVENOUS PRN
Status: DISCONTINUED | OUTPATIENT
Start: 2023-07-02 | End: 2023-07-07 | Stop reason: HOSPADM

## 2023-07-02 RX ORDER — PANTOPRAZOLE SODIUM 40 MG/1
40 TABLET, DELAYED RELEASE ORAL
Status: DISCONTINUED | OUTPATIENT
Start: 2023-07-03 | End: 2023-07-07 | Stop reason: HOSPADM

## 2023-07-02 RX ORDER — ONDANSETRON 2 MG/ML
4 INJECTION INTRAMUSCULAR; INTRAVENOUS EVERY 6 HOURS PRN
Status: DISCONTINUED | OUTPATIENT
Start: 2023-07-02 | End: 2023-07-07 | Stop reason: HOSPADM

## 2023-07-02 RX ORDER — 0.9 % SODIUM CHLORIDE 0.9 %
30 INTRAVENOUS SOLUTION INTRAVENOUS ONCE
Status: COMPLETED | OUTPATIENT
Start: 2023-07-02 | End: 2023-07-02

## 2023-07-02 RX ORDER — MAGNESIUM SULFATE 1 G/100ML
1000 INJECTION INTRAVENOUS PRN
Status: DISCONTINUED | OUTPATIENT
Start: 2023-07-02 | End: 2023-07-07 | Stop reason: HOSPADM

## 2023-07-02 RX ORDER — DEXTROSE MONOHYDRATE 100 MG/ML
INJECTION, SOLUTION INTRAVENOUS CONTINUOUS PRN
Status: DISCONTINUED | OUTPATIENT
Start: 2023-07-02 | End: 2023-07-07 | Stop reason: HOSPADM

## 2023-07-02 RX ORDER — ACETAMINOPHEN 650 MG/1
650 SUPPOSITORY RECTAL EVERY 6 HOURS PRN
Status: DISCONTINUED | OUTPATIENT
Start: 2023-07-02 | End: 2023-07-07 | Stop reason: HOSPADM

## 2023-07-02 RX ORDER — POLYETHYLENE GLYCOL 3350 17 G/17G
17 POWDER, FOR SOLUTION ORAL DAILY PRN
Status: DISCONTINUED | OUTPATIENT
Start: 2023-07-02 | End: 2023-07-07 | Stop reason: HOSPADM

## 2023-07-02 RX ORDER — ONDANSETRON 4 MG/1
4 TABLET, ORALLY DISINTEGRATING ORAL EVERY 8 HOURS PRN
Status: DISCONTINUED | OUTPATIENT
Start: 2023-07-02 | End: 2023-07-07 | Stop reason: HOSPADM

## 2023-07-02 RX ORDER — SODIUM CHLORIDE 9 MG/ML
INJECTION, SOLUTION INTRAVENOUS PRN
Status: DISCONTINUED | OUTPATIENT
Start: 2023-07-02 | End: 2023-07-07 | Stop reason: HOSPADM

## 2023-07-02 RX ORDER — POTASSIUM CHLORIDE 20 MEQ/1
40 TABLET, EXTENDED RELEASE ORAL PRN
Status: DISCONTINUED | OUTPATIENT
Start: 2023-07-02 | End: 2023-07-07 | Stop reason: HOSPADM

## 2023-07-02 RX ORDER — MIDODRINE HYDROCHLORIDE 5 MG/1
5 TABLET ORAL 3 TIMES DAILY PRN
Status: DISCONTINUED | OUTPATIENT
Start: 2023-07-02 | End: 2023-07-07 | Stop reason: HOSPADM

## 2023-07-02 RX ORDER — LIDOCAINE HYDROCHLORIDE 20 MG/ML
15 SOLUTION OROPHARYNGEAL ONCE
Status: COMPLETED | OUTPATIENT
Start: 2023-07-02 | End: 2023-07-02

## 2023-07-02 RX ORDER — SODIUM CHLORIDE 0.9 % (FLUSH) 0.9 %
5-40 SYRINGE (ML) INJECTION PRN
Status: DISCONTINUED | OUTPATIENT
Start: 2023-07-02 | End: 2023-07-07 | Stop reason: HOSPADM

## 2023-07-02 RX ORDER — ACETAMINOPHEN 325 MG/1
650 TABLET ORAL EVERY 6 HOURS PRN
Status: DISCONTINUED | OUTPATIENT
Start: 2023-07-02 | End: 2023-07-07 | Stop reason: HOSPADM

## 2023-07-02 RX ORDER — INSULIN GLARGINE 100 [IU]/ML
10 INJECTION, SOLUTION SUBCUTANEOUS NIGHTLY
Status: DISCONTINUED | OUTPATIENT
Start: 2023-07-02 | End: 2023-07-03

## 2023-07-02 RX ORDER — PROPRANOLOL HCL 60 MG
60 CAPSULE, EXTENDED RELEASE 24HR ORAL 2 TIMES DAILY
Status: DISCONTINUED | OUTPATIENT
Start: 2023-07-03 | End: 2023-07-07 | Stop reason: HOSPADM

## 2023-07-02 RX ORDER — 0.9 % SODIUM CHLORIDE 0.9 %
1000 INTRAVENOUS SOLUTION INTRAVENOUS ONCE
Status: COMPLETED | OUTPATIENT
Start: 2023-07-03 | End: 2023-07-03

## 2023-07-02 RX ORDER — INSULIN GLARGINE 100 [IU]/ML
8 INJECTION, SOLUTION SUBCUTANEOUS NIGHTLY
Status: DISCONTINUED | OUTPATIENT
Start: 2023-07-02 | End: 2023-07-02

## 2023-07-02 RX ORDER — MIDODRINE HYDROCHLORIDE 10 MG/1
10 TABLET ORAL ONCE
Status: COMPLETED | OUTPATIENT
Start: 2023-07-02 | End: 2023-07-02

## 2023-07-02 RX ORDER — FERROUS SULFATE 325(65) MG
325 TABLET ORAL
COMMUNITY

## 2023-07-02 RX ORDER — SODIUM CHLORIDE 0.9 % (FLUSH) 0.9 %
5-40 SYRINGE (ML) INJECTION EVERY 12 HOURS SCHEDULED
Status: DISCONTINUED | OUTPATIENT
Start: 2023-07-02 | End: 2023-07-07 | Stop reason: HOSPADM

## 2023-07-02 RX ORDER — SODIUM CHLORIDE 0.9 % (FLUSH) 0.9 %
10 SYRINGE (ML) INJECTION PRN
Status: DISCONTINUED | OUTPATIENT
Start: 2023-07-02 | End: 2023-07-07 | Stop reason: HOSPADM

## 2023-07-02 RX ADMIN — SODIUM CHLORIDE 1674 ML: 9 INJECTION, SOLUTION INTRAVENOUS at 18:08

## 2023-07-02 RX ADMIN — MIDODRINE HYDROCHLORIDE 10 MG: 10 TABLET ORAL at 18:46

## 2023-07-02 RX ADMIN — INSULIN GLARGINE 10 UNITS: 100 INJECTION, SOLUTION SUBCUTANEOUS at 23:45

## 2023-07-02 RX ADMIN — SODIUM CHLORIDE 1000 ML: 9 INJECTION, SOLUTION INTRAVENOUS at 23:48

## 2023-07-02 RX ADMIN — LIDOCAINE HYDROCHLORIDE 15 ML: 20 SOLUTION ORAL; TOPICAL at 19:14

## 2023-07-02 RX ADMIN — SODIUM CHLORIDE, PRESERVATIVE FREE 10 ML: 5 INJECTION INTRAVENOUS at 23:46

## 2023-07-02 RX ADMIN — CEFTRIAXONE SODIUM 1000 MG: 1 INJECTION, POWDER, FOR SOLUTION INTRAMUSCULAR; INTRAVENOUS at 20:30

## 2023-07-02 ASSESSMENT — ENCOUNTER SYMPTOMS
SHORTNESS OF BREATH: 0
ABDOMINAL DISTENTION: 0
ABDOMINAL PAIN: 0
EYE DISCHARGE: 0
BACK PAIN: 0
EYE PAIN: 0
FACIAL SWELLING: 0
CHEST TIGHTNESS: 0

## 2023-07-02 ASSESSMENT — PAIN - FUNCTIONAL ASSESSMENT: PAIN_FUNCTIONAL_ASSESSMENT: NONE - DENIES PAIN

## 2023-07-02 ASSESSMENT — LIFESTYLE VARIABLES: HOW OFTEN DO YOU HAVE A DRINK CONTAINING ALCOHOL: NEVER

## 2023-07-03 ENCOUNTER — APPOINTMENT (OUTPATIENT)
Dept: CT IMAGING | Age: 73
DRG: 871 | End: 2023-07-03
Payer: COMMERCIAL

## 2023-07-03 PROBLEM — A41.53 SEPSIS DUE TO SERRATIA (HCC): Status: ACTIVE | Noted: 2023-07-03

## 2023-07-03 PROBLEM — Z79.4 CONTROLLED TYPE 2 DIABETES MELLITUS WITH HYPERGLYCEMIA, WITH LONG-TERM CURRENT USE OF INSULIN (HCC): Status: ACTIVE | Noted: 2023-03-08

## 2023-07-03 PROBLEM — A41.50 GRAM NEGATIVE SEPSIS (HCC): Status: ACTIVE | Noted: 2023-07-03

## 2023-07-03 PROBLEM — R65.10 SIRS (SYSTEMIC INFLAMMATORY RESPONSE SYNDROME) (HCC): Status: ACTIVE | Noted: 2023-07-03

## 2023-07-03 PROBLEM — E43 SEVERE MALNUTRITION (HCC): Status: ACTIVE | Noted: 2023-03-08

## 2023-07-03 LAB
AFP SERPL-MCNC: 6 UG/L
ALBUMIN SERPL-MCNC: 1.6 G/DL (ref 3.5–5.2)
ALP SERPL-CCNC: 169 U/L (ref 40–129)
ALT SERPL-CCNC: 36 U/L (ref 5–41)
ANION GAP SERPL CALCULATED.3IONS-SCNC: 10 MMOL/L (ref 9–17)
AST SERPL-CCNC: 32 U/L
BASOPHILS # BLD: 0.12 K/UL (ref 0–0.2)
BASOPHILS NFR BLD: 1 %
BILIRUB SERPL-MCNC: 0.6 MG/DL (ref 0.3–1.2)
BUN SERPL-MCNC: 27 MG/DL (ref 8–23)
BUN/CREAT SERPL: 35 (ref 9–20)
CALCIUM SERPL-MCNC: 7.5 MG/DL (ref 8.6–10.4)
CHLORIDE SERPL-SCNC: 100 MMOL/L (ref 98–107)
CO2 SERPL-SCNC: 17 MMOL/L (ref 20–31)
CREAT SERPL-MCNC: 0.77 MG/DL (ref 0.7–1.2)
EKG ATRIAL RATE: 75 BPM
EKG P AXIS: 31 DEGREES
EKG P-R INTERVAL: 132 MS
EKG Q-T INTERVAL: 368 MS
EKG QRS DURATION: 78 MS
EKG QTC CALCULATION (BAZETT): 410 MS
EKG R AXIS: 24 DEGREES
EKG VENTRICULAR RATE: 75 BPM
EOSINOPHIL # BLD: 0 K/UL (ref 0–0.4)
EOSINOPHILS RELATIVE PERCENT: 0 % (ref 1–4)
ERYTHROCYTE [DISTWIDTH] IN BLOOD BY AUTOMATED COUNT: 21.2 % (ref 11.8–14.4)
GFR SERPL CREATININE-BSD FRML MDRD: >60 ML/MIN/1.73M2
GLUCOSE BLD-MCNC: 159 MG/DL (ref 75–110)
GLUCOSE BLD-MCNC: 226 MG/DL (ref 75–110)
GLUCOSE BLD-MCNC: 226 MG/DL (ref 75–110)
GLUCOSE BLD-MCNC: 277 MG/DL (ref 75–110)
GLUCOSE SERPL-MCNC: 246 MG/DL (ref 70–99)
HCT VFR BLD AUTO: 30.9 % (ref 40.7–50.3)
HGB BLD-MCNC: 9.9 G/DL (ref 13–17)
IMM GRANULOCYTES # BLD AUTO: 0.12 K/UL (ref 0–0.3)
IMM GRANULOCYTES NFR BLD: 1 %
LACTATE BLDV-SCNC: 2.5 MMOL/L (ref 0.5–1.9)
LACTATE BLDV-SCNC: 4.1 MMOL/L (ref 0.5–1.9)
LYMPHOCYTES # BLD: 4 % (ref 24–44)
LYMPHOCYTES NFR BLD: 0.5 K/UL (ref 1–4.8)
MCH RBC QN AUTO: 26.2 PG (ref 25.2–33.5)
MCHC RBC AUTO-ENTMCNC: 32 G/DL (ref 28.4–34.8)
MCV RBC AUTO: 81.7 FL (ref 82.6–102.9)
MICROORGANISM SPEC CULT: NO GROWTH
MICROORGANISM SPEC CULT: NORMAL
MICROORGANISM SPEC CULT: NORMAL
MICROORGANISM/AGENT SPEC: NORMAL
MONOCYTES NFR BLD: 1.24 K/UL (ref 0.2–0.8)
MONOCYTES NFR BLD: 10 % (ref 1–7)
MORPHOLOGY: ABNORMAL
NEUTROPHILS NFR BLD: 84 % (ref 36–66)
NEUTS SEG NFR BLD: 10.42 K/UL (ref 1.8–7.7)
NRBC BLD-RTO: 0 PER 100 WBC
OSMOLALITY SERPL: 292 MOSM/KG (ref 275–295)
OSMOLALITY UR: 422 MOSM/KG (ref 80–1300)
PLATELET # BLD AUTO: 71 K/UL (ref 138–453)
PMV BLD AUTO: ABNORMAL FL (ref 8.1–13.5)
POTASSIUM SERPL-SCNC: 4.1 MMOL/L (ref 3.7–5.3)
PROT SERPL-MCNC: 5.3 G/DL (ref 6.4–8.3)
RBC # BLD AUTO: 3.78 M/UL (ref 4.21–5.77)
SODIUM SERPL-SCNC: 126 MMOL/L (ref 135–144)
SODIUM SERPL-SCNC: 127 MMOL/L (ref 135–144)
SODIUM SERPL-SCNC: 129 MMOL/L (ref 135–144)
SODIUM SERPL-SCNC: 130 MMOL/L (ref 135–144)
SODIUM UR-SCNC: 20 MMOL/L
SPECIMEN DESCRIPTION: NORMAL
TROPONIN I SERPL HS-MCNC: 11 NG/L (ref 0–22)
WBC OTHER # BLD: 12.4 K/UL (ref 3.5–11.3)

## 2023-07-03 PROCEDURE — 97163 PT EVAL HIGH COMPLEX 45 MIN: CPT

## 2023-07-03 PROCEDURE — 6360000004 HC RX CONTRAST MEDICATION: Performed by: INTERNAL MEDICINE

## 2023-07-03 PROCEDURE — 6370000000 HC RX 637 (ALT 250 FOR IP): Performed by: NURSE PRACTITIONER

## 2023-07-03 PROCEDURE — 88305 TISSUE EXAM BY PATHOLOGIST: CPT

## 2023-07-03 PROCEDURE — 88112 CYTOPATH CELL ENHANCE TECH: CPT

## 2023-07-03 PROCEDURE — 74177 CT ABD & PELVIS W/CONTRAST: CPT

## 2023-07-03 PROCEDURE — 99223 1ST HOSP IP/OBS HIGH 75: CPT | Performed by: INTERNAL MEDICINE

## 2023-07-03 PROCEDURE — 80053 COMPREHEN METABOLIC PANEL: CPT

## 2023-07-03 PROCEDURE — 89051 BODY FLUID CELL COUNT: CPT

## 2023-07-03 PROCEDURE — 82105 ALPHA-FETOPROTEIN SERUM: CPT

## 2023-07-03 PROCEDURE — 6360000002 HC RX W HCPCS: Performed by: INTERNAL MEDICINE

## 2023-07-03 PROCEDURE — APPSS45 APP SPLIT SHARED TIME 31-45 MINUTES: Performed by: NURSE PRACTITIONER

## 2023-07-03 PROCEDURE — 82947 ASSAY GLUCOSE BLOOD QUANT: CPT

## 2023-07-03 PROCEDURE — 87070 CULTURE OTHR SPECIMN AEROBIC: CPT

## 2023-07-03 PROCEDURE — 87075 CULTR BACTERIA EXCEPT BLOOD: CPT

## 2023-07-03 PROCEDURE — 6370000000 HC RX 637 (ALT 250 FOR IP): Performed by: INTERNAL MEDICINE

## 2023-07-03 PROCEDURE — 83605 ASSAY OF LACTIC ACID: CPT

## 2023-07-03 PROCEDURE — 97167 OT EVAL HIGH COMPLEX 60 MIN: CPT

## 2023-07-03 PROCEDURE — 2580000003 HC RX 258: Performed by: NURSE PRACTITIONER

## 2023-07-03 PROCEDURE — 97530 THERAPEUTIC ACTIVITIES: CPT

## 2023-07-03 PROCEDURE — P9047 ALBUMIN (HUMAN), 25%, 50ML: HCPCS | Performed by: INTERNAL MEDICINE

## 2023-07-03 PROCEDURE — 36415 COLL VENOUS BLD VENIPUNCTURE: CPT

## 2023-07-03 PROCEDURE — 97116 GAIT TRAINING THERAPY: CPT

## 2023-07-03 PROCEDURE — 85027 COMPLETE CBC AUTOMATED: CPT

## 2023-07-03 PROCEDURE — 87205 SMEAR GRAM STAIN: CPT

## 2023-07-03 PROCEDURE — 99255 IP/OBS CONSLTJ NEW/EST HI 80: CPT | Performed by: INTERNAL MEDICINE

## 2023-07-03 PROCEDURE — 2580000003 HC RX 258: Performed by: INTERNAL MEDICINE

## 2023-07-03 PROCEDURE — 97535 SELF CARE MNGMENT TRAINING: CPT

## 2023-07-03 PROCEDURE — 2580000003 HC RX 258: Performed by: EMERGENCY MEDICINE

## 2023-07-03 PROCEDURE — 1200000000 HC SEMI PRIVATE

## 2023-07-03 PROCEDURE — 84295 ASSAY OF SERUM SODIUM: CPT

## 2023-07-03 PROCEDURE — 84484 ASSAY OF TROPONIN QUANT: CPT

## 2023-07-03 PROCEDURE — 83930 ASSAY OF BLOOD OSMOLALITY: CPT

## 2023-07-03 RX ORDER — SODIUM CHLORIDE 0.9 % (FLUSH) 0.9 %
10 SYRINGE (ML) INJECTION PRN
Status: DISCONTINUED | OUTPATIENT
Start: 2023-07-03 | End: 2023-07-07 | Stop reason: HOSPADM

## 2023-07-03 RX ORDER — INSULIN LISPRO 100 [IU]/ML
0-4 INJECTION, SOLUTION INTRAVENOUS; SUBCUTANEOUS NIGHTLY
Status: DISCONTINUED | OUTPATIENT
Start: 2023-07-03 | End: 2023-07-07 | Stop reason: HOSPADM

## 2023-07-03 RX ORDER — 0.9 % SODIUM CHLORIDE 0.9 %
500 INTRAVENOUS SOLUTION INTRAVENOUS ONCE
Status: COMPLETED | OUTPATIENT
Start: 2023-07-03 | End: 2023-07-03

## 2023-07-03 RX ORDER — TRAMADOL HYDROCHLORIDE 50 MG/1
50 TABLET ORAL PRN
COMMUNITY

## 2023-07-03 RX ORDER — CYCLOBENZAPRINE HCL 10 MG
10 TABLET ORAL PRN
COMMUNITY

## 2023-07-03 RX ORDER — PROPRANOLOL HCL 60 MG
60 CAPSULE, EXTENDED RELEASE 24HR ORAL 2 TIMES DAILY
COMMUNITY

## 2023-07-03 RX ORDER — INSULIN GLARGINE 100 [IU]/ML
14 INJECTION, SOLUTION SUBCUTANEOUS NIGHTLY
Status: DISCONTINUED | OUTPATIENT
Start: 2023-07-03 | End: 2023-07-07 | Stop reason: HOSPADM

## 2023-07-03 RX ORDER — ALBUMIN (HUMAN) 12.5 G/50ML
25 SOLUTION INTRAVENOUS EVERY 8 HOURS
Status: COMPLETED | OUTPATIENT
Start: 2023-07-03 | End: 2023-07-04

## 2023-07-03 RX ORDER — INSULIN LISPRO 100 [IU]/ML
0-4 INJECTION, SOLUTION INTRAVENOUS; SUBCUTANEOUS
Status: DISCONTINUED | OUTPATIENT
Start: 2023-07-03 | End: 2023-07-07 | Stop reason: HOSPADM

## 2023-07-03 RX ORDER — 0.9 % SODIUM CHLORIDE 0.9 %
80 INTRAVENOUS SOLUTION INTRAVENOUS ONCE
Status: COMPLETED | OUTPATIENT
Start: 2023-07-03 | End: 2023-07-03

## 2023-07-03 RX ADMIN — ALBUMIN (HUMAN) 25 G: 0.25 INJECTION, SOLUTION INTRAVENOUS at 23:59

## 2023-07-03 RX ADMIN — ALBUMIN (HUMAN) 25 G: 0.25 INJECTION, SOLUTION INTRAVENOUS at 15:20

## 2023-07-03 RX ADMIN — CEFTRIAXONE SODIUM 2000 MG: 2 INJECTION, POWDER, FOR SOLUTION INTRAMUSCULAR; INTRAVENOUS at 23:00

## 2023-07-03 RX ADMIN — INSULIN GLARGINE 14 UNITS: 100 INJECTION, SOLUTION SUBCUTANEOUS at 23:10

## 2023-07-03 RX ADMIN — PROPRANOLOL HYDROCHLORIDE 60 MG: 60 CAPSULE, EXTENDED RELEASE ORAL at 23:10

## 2023-07-03 RX ADMIN — PANTOPRAZOLE SODIUM 40 MG: 40 TABLET, DELAYED RELEASE ORAL at 06:15

## 2023-07-03 RX ADMIN — MIDODRINE HYDROCHLORIDE 5 MG: 5 TABLET ORAL at 16:57

## 2023-07-03 RX ADMIN — INSULIN LISPRO 1 UNITS: 100 INJECTION, SOLUTION INTRAVENOUS; SUBCUTANEOUS at 12:24

## 2023-07-03 RX ADMIN — SODIUM CHLORIDE, PRESERVATIVE FREE 10 ML: 5 INJECTION INTRAVENOUS at 08:08

## 2023-07-03 RX ADMIN — MIDODRINE HYDROCHLORIDE 5 MG: 5 TABLET ORAL at 12:30

## 2023-07-03 RX ADMIN — CEFTRIAXONE SODIUM 1000 MG: 1 INJECTION, POWDER, FOR SOLUTION INTRAMUSCULAR; INTRAVENOUS at 13:41

## 2023-07-03 RX ADMIN — SODIUM CHLORIDE 80 ML: 9 INJECTION, SOLUTION INTRAVENOUS at 05:54

## 2023-07-03 RX ADMIN — SODIUM CHLORIDE, PRESERVATIVE FREE 10 ML: 5 INJECTION INTRAVENOUS at 05:54

## 2023-07-03 RX ADMIN — INSULIN LISPRO 1 UNITS: 100 INJECTION, SOLUTION INTRAVENOUS; SUBCUTANEOUS at 08:07

## 2023-07-03 RX ADMIN — SODIUM CHLORIDE 500 ML: 9 INJECTION, SOLUTION INTRAVENOUS at 03:25

## 2023-07-03 RX ADMIN — IOPAMIDOL 75 ML: 755 INJECTION, SOLUTION INTRAVENOUS at 05:54

## 2023-07-03 RX ADMIN — SODIUM CHLORIDE: 9 INJECTION, SOLUTION INTRAVENOUS at 13:36

## 2023-07-03 RX ADMIN — INSULIN LISPRO 2 UNITS: 100 INJECTION, SOLUTION INTRAVENOUS; SUBCUTANEOUS at 16:57

## 2023-07-03 ASSESSMENT — ENCOUNTER SYMPTOMS
ABDOMINAL DISTENTION: 1
RESPIRATORY NEGATIVE: 1
CONSTIPATION: 0
DIARRHEA: 0
ABDOMINAL PAIN: 0
NAUSEA: 1
EYES NEGATIVE: 1
VOMITING: 0

## 2023-07-03 NOTE — H&P
[unfilled]    [facility]    HISTORY AND PHYSICAL EXAMINATION            Date:   7/3/2023  Patient name:  Wyatt Kurtz  Date of admission:  7/2/2023  5:00 PM  MRN:   [id number]  Account:  [de-identified]  YOB: 1950  PCP:    SCARLET Duggan CNP  Room:   2016/2016-02  Code Status:    Full Code    Chief Complaint:     Chief Complaint   Patient presents with    Chills     Pt has been having chills all day and had a fever. Also with hypotension. Hypotension    Fever       History Obtained From:     patient    History of Present Illness: Wyatt Kurtz is a 68 y.o. Non- / non  male who presents with Chills (Pt has been having chills all day and had a fever. Also with hypotension.), Hypotension, and Fever   and is admitted to the hospital for the management of Hyponatremia. Patient has a history of prostate ca, recurrent pleural effusion, hypotension, and cirrhosis. Yesterday he was taking a shower when he started to feel very cold and nauseated. He denies emesis. He had a chests tube placed 2-3 weeks ago for pleural effusion that his family manages and drains M-W-F. He also has a hx of sepsis and his family was concerned that he was becoming septic again. While in the ED, patient was hypotensive with pressures in the 80s. He was given  IVF and started on IV Rocephin. His lactic acid was sl elevated, UA showed moderate bacteria. His Na was also noted to be low at 126. Pressures improved with IVF. CXR showed his known right sided pleural effusion and atelectasis. He was admitted for further management of hyponatremia.      Past Medical History:     Past Medical History:   Diagnosis Date    Anemia     Arthritis     Avascular necrosis [facility])     sees Dr Alecia Ayon    BPH (benign prostatic hyperplasia)     Cancer ([facility])     prostate    Diabetes mellitus ([facility])     Gastroesophageal reflux disease 05/25/2022    History of blood

## 2023-07-03 NOTE — PLAN OF CARE
Problem: Discharge Planning  Goal: Discharge to home or other facility with appropriate resources  Outcome: Progressing  Flowsheets (Taken 7/2/2023 2306)  Discharge to home or other facility with appropriate resources: Identify barriers to discharge with patient and caregiver     Problem: Safety - Adult  Goal: Free from fall injury  Outcome: Progressing  Flowsheets (Taken 7/3/2023 7230)  Free From Fall Injury:   Instruct family/caregiver on patient safety   Based on caregiver fall risk screen, instruct family/caregiver to ask for assistance with transferring infant if caregiver noted to have fall risk factors

## 2023-07-03 NOTE — ED NOTES
ED to inpatient nurses report     Chief Complaint   Patient presents with    Chills     Pt has been having chills all day and had a fever. Also with hypotension. Hypotension    Fever      Present to ED from home  LOC: alert and orientated to name, place, date  Vital signs   Vitals:    07/02/23 1957 07/02/23 1958 07/02/23 1959 07/02/23 2015   BP:    (!) 95/55   Pulse: 75 75 75 76   Resp: 24 21  24   Temp:       TempSrc:       SpO2: 93% 95%  97%   Weight:       Height:          Oxygen Baseline room air    Current needs required room air   SEPSIS: yes  [yes] Lactate X 2 ordered (Yes or No)  [yes] Antibiotics given (Yes or No)  [yes] IV Fluids ordered (Yes or No)             [yes] 2nd IV completed (Yes or No)  [yes] Hourly Vital Signs (Validated)  [none] Outstanding Orders:     LDAs:   Peripheral IV 07/02/23 Left Forearm (Active)   Site Assessment Clean, dry & intact 07/02/23 1737   Line Status Flushed;Specimen collected;Normal saline locked; Blood return noted 07/02/23 1737       Peripheral IV 07/02/23 Left (Active)   Site Assessment Clean, dry & intact 07/02/23 1957   Phlebitis Assessment No symptoms 07/02/23 1957   Infiltration Assessment 0 07/02/23 1957     Mobility: Requires assistance * 1  Fall Risk: pt has some lower extremity weakness, reports under physical therapy to strengthen legs  Pending ED orders: none  Present condition: stable  Code Status:   Consults: IP CONSULT TO INTERNAL MEDICINE  []  Hospitalist  Completed  [] yes [] no Who:   []  Medicine  Completed  [] yes [] No Who:   []  Cardiology  Completed  [] yes [] No Who:   []  GI   Completed  [] yes [] No Who:   []  Neurology  Completed  [] yes [] No Who:   []  Nephrology Completed  [] yes [] No Who:    []  Vascular  Completed  [] yes [] No Who:   []  Ortho  Completed  [] yes [] No Who:     []  Surgery  Completed  [] yes [] No Who:    []  Urology  Completed  [] yes [] No Who:    []  CT Surgery Completed  [] yes [] No Who:   []  Podiatry  Completed  []

## 2023-07-03 NOTE — CARE COORDINATION
Case Management Assessment  Initial Evaluation    Date/Time of Evaluation: 7/3/2023 12:07 PM  Assessment Completed by: DASHAWN Pickering, MEGHAW    If patient is discharged prior to next notation, then this note serves as note for discharge by case management. Patient Name: Celestino Banda                   YOB: 1950  Diagnosis: Hyponatremia [E87.1]  SIRS (systemic inflammatory response syndrome) ([address]) [R65.10]                   Date / Time: 7/2/2023  5:00 PM    Patient Admission Status: Inpatient   Readmission Risk (Low < 19, Mod (19-27), High > 27): Readmission Risk Score: 21    Current PCP: SCARLET Man CNP  PCP verified by CM? Yes    Chart Reviewed: Yes      History Provided by: Patient, Spouse  Patient Orientation: Alert and Oriented    Patient Cognition: Alert    Hospitalization in the last 30 days (Readmission):  Yes    If yes, Readmission Assessment in CM Navigator will be completed. Advance Directives:      Code Status: Full Code   Patient's Primary Decision Maker is: Legal Next of Kin    Primary Decision Maker: [address] [place] [address] [phone]    Discharge Planning:    Patient lives with: Spouse/Significant Other Type of Home: House  Primary Care Giver: Spouse  Patient Support Systems include: Spouse/Significant Other   Current Financial resources: Other (Comment) (wife still works they are on her insurance as primary)  Current community resources: ECF/Home Care  Current services prior to admission: Home Care            Current DME:              Type of Home Care services:  PT, OT    ADLS  Prior functional level: Independent in ADLs/IADLs  Current functional level: Assistance with the following:, Cooking, Housework, Shopping    PT AM-PAC:   /24  OT AM-PAC:   /24    Family can provide assistance at DC: Yes  Would you like Case Management to discuss the discharge plan with any other family members/significant others, and if so, who?  Yes  Plans to Return to Present Housing:

## 2023-07-03 NOTE — ED NOTES
Family reports that his chest is scheduled to be drained tomorrow. Reports that they brought in his supplies to have tube drained.       Zahra Azul RN  07/02/23 0881

## 2023-07-03 NOTE — DISCHARGE INSTR - COC
Continuity of Care Form    Patient Name: Jimbo Garcia   :  1950  MRN:  [id number]    Admit date:  2023  Discharge date:  2023    Code Status Order: Full Code   Advance Directives:     Admitting Physician:  Shekhar Knight DO  PCP: Ashley Kunz, APRN - CNP    Discharging Nurse: Ron Pineda. RN  [facility] Unit/Room#:   Discharging Unit Phone Number: [phone]    Emergency Contact:   Extended Emergency Contact Information  Primary Emergency Contact: Maday GARCIA  Address: [address] [place] [address]           Kait Granados, [facility] of [address] [address] Phone: [phone]  Mobile Phone: [phone]  Relation: Spouse  Secondary Emergency Contact: [address] [place] [address] Phone: [phone]  Relation: Child    Past Surgical History:  Past Surgical History:   Procedure Laterality Date    COLONOSCOPY  2014    COLONOSCOPY  2019    COLONOSCOPY N/A 2019    COLONOSCOPY POLYPECTOMY SNARE/HOT BIOPSY performed by Barbara Yao MD at [address] [facility], COLON, DIAGNOSTIC  2014    stomach ulcers    HIP ARTHROPLASTY Right 2014    HIP ARTHROPLASTY Left 03/10/2015    INGUINAL HERNIA REPAIR Right     INGUINAL HERNIA REPAIR Left 2018    incarcerated. By Dr. Elder Osorio     [address] CATH PLEURAL DRAIN W/IMAG  2023    IR PERC CATH PLEURAL DRAIN W/IMAG 2023 [facility] PROCEDURES    PARACENTESIS      PLEURAL CATH INSERTION  2023    right upper abdomen    MS OFFICE/OUTPT VISIT,PROCEDURE ONLY Left 2018    INCARCERATED INGUINAL HERNIA performed by Lauro Means MD at [address] [place] [address]  2014    PROSTATECTOMY  2015    robotic.  lap    THORACENTESIS      UPPER GASTROINTESTINAL ENDOSCOPY  10/19/2016    no lesions seen    UPPER GASTROINTESTINAL ENDOSCOPY  2019    UPPER GASTROINTESTINAL ENDOSCOPY N/A 2019    [facility] [address] [facility] N/A 2023    EGD

## 2023-07-04 LAB
ALBUMIN SERPL-MCNC: 2.2 G/DL (ref 3.5–5.2)
ALP SERPL-CCNC: 148 U/L (ref 40–129)
ALT SERPL-CCNC: 32 U/L (ref 5–41)
ANION GAP SERPL CALCULATED.3IONS-SCNC: 9 MMOL/L (ref 9–17)
AST SERPL-CCNC: 28 U/L
BILIRUB DIRECT SERPL-MCNC: 0.3 MG/DL
BILIRUB INDIRECT SERPL-MCNC: 0.5 MG/DL (ref 0–1)
BILIRUB SERPL-MCNC: 0.8 MG/DL (ref 0.3–1.2)
BUN SERPL-MCNC: 15 MG/DL (ref 8–23)
BUN/CREAT SERPL: 27 (ref 9–20)
CALCIUM SERPL-MCNC: 7.9 MG/DL (ref 8.6–10.4)
CHLORIDE SERPL-SCNC: 103 MMOL/L (ref 98–107)
CO2 SERPL-SCNC: 20 MMOL/L (ref 20–31)
CREAT SERPL-MCNC: 0.56 MG/DL (ref 0.7–1.2)
GFR SERPL CREATININE-BSD FRML MDRD: >60 ML/MIN/1.73M2
GLUCOSE BLD-MCNC: 298 MG/DL (ref 75–110)
GLUCOSE BLD-MCNC: 328 MG/DL (ref 75–110)
GLUCOSE BLD-MCNC: 374 MG/DL (ref 75–110)
GLUCOSE BLD-MCNC: 98 MG/DL (ref 75–110)
GLUCOSE SERPL-MCNC: 88 MG/DL (ref 70–99)
POTASSIUM SERPL-SCNC: 3.8 MMOL/L (ref 3.7–5.3)
PROT SERPL-MCNC: 5.8 G/DL (ref 6.4–8.3)
SODIUM SERPL-SCNC: 128 MMOL/L (ref 135–144)
SODIUM SERPL-SCNC: 132 MMOL/L (ref 135–144)

## 2023-07-04 PROCEDURE — 80048 BASIC METABOLIC PNL TOTAL CA: CPT

## 2023-07-04 PROCEDURE — 2580000003 HC RX 258: Performed by: NURSE PRACTITIONER

## 2023-07-04 PROCEDURE — 99231 SBSQ HOSP IP/OBS SF/LOW 25: CPT | Performed by: INTERNAL MEDICINE

## 2023-07-04 PROCEDURE — 6370000000 HC RX 637 (ALT 250 FOR IP): Performed by: INTERNAL MEDICINE

## 2023-07-04 PROCEDURE — 6370000000 HC RX 637 (ALT 250 FOR IP): Performed by: NURSE PRACTITIONER

## 2023-07-04 PROCEDURE — 80076 HEPATIC FUNCTION PANEL: CPT

## 2023-07-04 PROCEDURE — 99233 SBSQ HOSP IP/OBS HIGH 50: CPT | Performed by: INTERNAL MEDICINE

## 2023-07-04 PROCEDURE — 99232 SBSQ HOSP IP/OBS MODERATE 35: CPT | Performed by: INTERNAL MEDICINE

## 2023-07-04 PROCEDURE — 6360000002 HC RX W HCPCS: Performed by: INTERNAL MEDICINE

## 2023-07-04 PROCEDURE — 1200000000 HC SEMI PRIVATE

## 2023-07-04 PROCEDURE — 36415 COLL VENOUS BLD VENIPUNCTURE: CPT

## 2023-07-04 PROCEDURE — P9047 ALBUMIN (HUMAN), 25%, 50ML: HCPCS | Performed by: INTERNAL MEDICINE

## 2023-07-04 PROCEDURE — 2580000003 HC RX 258: Performed by: INTERNAL MEDICINE

## 2023-07-04 PROCEDURE — 82947 ASSAY GLUCOSE BLOOD QUANT: CPT

## 2023-07-04 PROCEDURE — APPSS30 APP SPLIT SHARED TIME 16-30 MINUTES: Performed by: NURSE PRACTITIONER

## 2023-07-04 RX ORDER — INSULIN LISPRO 100 [IU]/ML
4 INJECTION, SOLUTION INTRAVENOUS; SUBCUTANEOUS ONCE
Status: COMPLETED | OUTPATIENT
Start: 2023-07-04 | End: 2023-07-04

## 2023-07-04 RX ORDER — FUROSEMIDE 40 MG/1
40 TABLET ORAL DAILY
Status: DISCONTINUED | OUTPATIENT
Start: 2023-07-04 | End: 2023-07-07 | Stop reason: HOSPADM

## 2023-07-04 RX ORDER — SPIRONOLACTONE 100 MG/1
100 TABLET, FILM COATED ORAL DAILY
Status: DISCONTINUED | OUTPATIENT
Start: 2023-07-04 | End: 2023-07-07 | Stop reason: HOSPADM

## 2023-07-04 RX ADMIN — MIDODRINE HYDROCHLORIDE 5 MG: 5 TABLET ORAL at 05:35

## 2023-07-04 RX ADMIN — FUROSEMIDE 40 MG: 40 TABLET ORAL at 10:19

## 2023-07-04 RX ADMIN — SODIUM CHLORIDE, PRESERVATIVE FREE 10 ML: 5 INJECTION INTRAVENOUS at 08:34

## 2023-07-04 RX ADMIN — PANTOPRAZOLE SODIUM 40 MG: 40 TABLET, DELAYED RELEASE ORAL at 05:35

## 2023-07-04 RX ADMIN — PROPRANOLOL HYDROCHLORIDE 60 MG: 60 CAPSULE, EXTENDED RELEASE ORAL at 19:57

## 2023-07-04 RX ADMIN — INSULIN LISPRO 4 UNITS: 100 INJECTION, SOLUTION INTRAVENOUS; SUBCUTANEOUS at 16:40

## 2023-07-04 RX ADMIN — INSULIN GLARGINE 14 UNITS: 100 INJECTION, SOLUTION SUBCUTANEOUS at 19:58

## 2023-07-04 RX ADMIN — ALBUMIN (HUMAN) 25 G: 0.25 INJECTION, SOLUTION INTRAVENOUS at 05:38

## 2023-07-04 RX ADMIN — PROPRANOLOL HYDROCHLORIDE 60 MG: 60 CAPSULE, EXTENDED RELEASE ORAL at 08:34

## 2023-07-04 RX ADMIN — INSULIN LISPRO 3 UNITS: 100 INJECTION, SOLUTION INTRAVENOUS; SUBCUTANEOUS at 19:57

## 2023-07-04 RX ADMIN — SPIRONOLACTONE 100 MG: 100 TABLET ORAL at 10:19

## 2023-07-04 RX ADMIN — INSULIN LISPRO 2 UNITS: 100 INJECTION, SOLUTION INTRAVENOUS; SUBCUTANEOUS at 12:34

## 2023-07-04 RX ADMIN — CEFTRIAXONE SODIUM 2000 MG: 2 INJECTION, POWDER, FOR SOLUTION INTRAMUSCULAR; INTRAVENOUS at 20:05

## 2023-07-04 ASSESSMENT — PAIN SCALES - GENERAL: PAINLEVEL_OUTOF10: 0

## 2023-07-04 NOTE — CONSULTS
([address])       Current Facility-Administered Medications   Medication Dose Route Frequency Provider Last Rate Last Admin    insulin lispro (HUMALOG) injection vial 0-4 Units  0-4 Units SubCUTAneous TID WC SCARLET Rich - NP   1 Units at 07/03/23 1224    insulin lispro (HUMALOG) injection vial 0-4 Units  0-4 Units SubCUTAneous Nightly Cash Moon APRN - NP        sodium chloride flush 0.9 % injection 10 mL  10 mL IntraVENous PRN Naresh P Blood, DO   10 mL at 07/03/23 0554    cefTRIAXone (ROCEPHIN) 2,000 mg in sodium chloride 0.9 % 50 mL IVPB (mini-bag)  2,000 mg IntraVENous Q24H Naresh P Blood, DO        insulin glargine (LANTUS) injection vial 14 Units  14 Units SubCUTAneous Nightly Naresh P Blood, DO        albumin human 25% IV solution 25 g  25 g IntraVENous Q8H Leandro Vail MD   Stopped at 07/03/23 1601    sodium chloride flush 0.9 % injection 5-40 mL  5-40 mL IntraVENous 2 times per day Yara Ng MD   10 mL at 07/03/23 0808    sodium chloride flush 0.9 % injection 5-40 mL  5-40 mL IntraVENous PRN Yara Ng MD        0.9 % sodium chloride infusion   IntraVENous PRN Yara Ng MD 10 mL/hr at 07/03/23 1336 New Bag at 07/03/23 1336    pantoprazole (PROTONIX) tablet 40 mg  40 mg Oral QAM AC Zeus Brownlee, APRN - NP   40 mg at 07/03/23 0615    midodrine (PROAMATINE) tablet 5 mg  5 mg Oral TID PRN Zeus Brownlee, APRN - NP   5 mg at 07/03/23 1230    propranolol (INDERAL LA) extended release capsule 60 mg  60 mg Oral BID Cash Moon APRN - NP        sodium chloride flush 0.9 % injection 5-40 mL  5-40 mL IntraVENous 2 times per day Zeus Brownlee, APRN - NP   10 mL at 07/03/23 3582    sodium chloride flush 0.9 % injection 10 mL  10 mL IntraVENous PRN Zeus Kem, APRN - NP        0.9 % sodium chloride infusion   IntraVENous PRN Zeus Kem, APRN - NP        potassium chloride (KLOR-CON M) extended release tablet 40 mEq  40 mEq Oral PRN Zeus Ku, APRN - NP        Or    potassium
05:47 AM    RBC 3.78 07/03/2023 05:47 AM    HGB 9.9 07/03/2023 05:47 AM    HCT 30.9 07/03/2023 05:47 AM    PLT 71 07/03/2023 05:47 AM    MCV 81.7 07/03/2023 05:47 AM    MCH 26.2 07/03/2023 05:47 AM    MCHC 32.0 07/03/2023 05:47 AM    RDW 21.2 07/03/2023 05:47 AM    NRBC 4 12/02/2018 06:14 AM    LYMPHOPCT 4 07/03/2023 05:47 AM    LYMPHOPCT 27.0 06/22/2023 12:00 AM    MONOPCT 10 07/03/2023 05:47 AM    MONOPCT 32.0 06/22/2023 12:00 AM    EOSPCT 8.0 06/22/2023 12:00 AM    BASOPCT 1 07/03/2023 05:47 AM    BASOPCT 1.0 06/22/2023 12:00 AM    MONOSABS 1.24 07/03/2023 05:47 AM    MONOSABS 0.6 06/22/2023 12:00 AM    LYMPHSABS 0.50 07/03/2023 05:47 AM    LYMPHSABS 0.5 06/22/2023 12:00 AM    EOSABS 0.00 07/03/2023 05:47 AM    EOSABS 0.1 06/22/2023 12:00 AM    BASOSABS 0.12 07/03/2023 05:47 AM    DIFFTYPE NOT REPORTED 09/14/2021 10:20 AM     BMP   Lab Results   Component Value Date/Time     07/04/2023 05:52 AM    K 3.8 07/04/2023 05:52 AM     07/04/2023 05:52 AM    CO2 20 07/04/2023 05:52 AM    BUN 15 07/04/2023 05:52 AM    CREATININE 0.56 07/04/2023 05:52 AM    GLUCOSE 88 07/04/2023 05:52 AM    CALCIUM 7.9 07/04/2023 05:52 AM    MG 1.7 06/22/2023 12:00 AM     LFTS  Lab Results   Component Value Date/Time    ALKPHOS 148 07/04/2023 05:52 AM    ALT 32 07/04/2023 05:52 AM    AST 28 07/04/2023 05:52 AM    PROT 5.8 07/04/2023 05:52 AM    BILITOT 0.8 07/04/2023 05:52 AM    BILIDIR 0.3 07/04/2023 05:52 AM    IBILI 0.5 07/04/2023 05:52 AM    LABALBU 2.2 07/04/2023 05:52 AM     PTT  Lab Results   Component Value Date    APTT 32.4 05/11/2023     INR   Lab Results   Component Value Date    INR 1.4 05/14/2023    INR 1.3 05/13/2023    INR 1.4 05/12/2023    PROTIME 17.0 (H) 05/14/2023    PROTIME 16.3 (H) 05/13/2023    PROTIME 16.7 (H) 05/12/2023       Radiology    CXR  7/2/2023       CT Scans  CT abdomen pelvis 7/3/2023  IMPRESSION:  1. Large right pleural effusion with compressive atelectasis right lower  lobe.      2.  Nodular
questions or concerns.      Yunior Noguera MD
Electronically signed by Piedad Smith MD on 7/3/2023 at 3:40 PM      [facility]  Piedad Smith MD  [employer]  OFFICE: [phone]    Thank you for allowing us to participate in the care of this patient. Please call with questions. This note is created with the assistance of a speech recognition program.  While intending to generate a document that actually reflects the content of the visit, the document can still have some errors including those of syntax and sound a like substitutions which may escape proof reading. In such instances, actual meaning can be extrapolated by contextual diversion.

## 2023-07-04 NOTE — PLAN OF CARE
Problem: Discharge Planning  Goal: Discharge to home or other facility with appropriate resources  Outcome: Progressing  Flowsheets (Taken 7/4/2023 0246)  Discharge to home or other facility with appropriate resources:   Identify barriers to discharge with patient and caregiver   Arrange for needed discharge resources and transportation as appropriate   Identify discharge learning needs (meds, wound care, etc)   Arrange for interpreters to assist at discharge as needed   Refer to discharge planning if patient needs post-hospital services based on physician order or complex needs related to functional status, cognitive ability or social support system     Problem: Safety - Adult  Goal: Free from fall injury  Outcome: Progressing  Flowsheets (Taken 7/4/2023 0838)  Free From Fall Injury:   Instruct family/caregiver on patient safety   Based on caregiver fall risk screen, instruct family/caregiver to ask for assistance with transferring infant if caregiver noted to have fall risk factors     Problem: Chronic Conditions and Co-morbidities  Goal: Patient's chronic conditions and co-morbidity symptoms are monitored and maintained or improved  Outcome: Progressing  Flowsheets (Taken 7/4/2023 0838)  Care Plan - Patient's Chronic Conditions and Co-Morbidity Symptoms are Monitored and Maintained or Improved:   Monitor and assess patient's chronic conditions and comorbid symptoms for stability, deterioration, or improvement   Collaborate with multidisciplinary team to address chronic and comorbid conditions and prevent exacerbation or deterioration   Update acute care plan with appropriate goals if chronic or comorbid symptoms are exacerbated and prevent overall improvement and discharge     Problem: ABCDS Injury Assessment  Goal: Absence of physical injury  Outcome: Progressing

## 2023-07-05 ENCOUNTER — TELEPHONE (OUTPATIENT)
Dept: GASTROENTEROLOGY | Age: 73
End: 2023-07-05

## 2023-07-05 LAB
ANION GAP SERPL CALCULATED.3IONS-SCNC: 8 MMOL/L (ref 9–17)
BASOPHILS # BLD: 0.02 K/UL (ref 0–0.2)
BASOPHILS NFR BLD: 1 % (ref 0–2)
BODY FLD TYPE: NORMAL
BUN SERPL-MCNC: 14 MG/DL (ref 8–23)
BUN/CREAT SERPL: 26 (ref 9–20)
CALCIUM SERPL-MCNC: 8.6 MG/DL (ref 8.6–10.4)
CHLORIDE SERPL-SCNC: 102 MMOL/L (ref 98–107)
CO2 SERPL-SCNC: 24 MMOL/L (ref 20–31)
CREAT SERPL-MCNC: 0.53 MG/DL (ref 0.7–1.2)
EOSINOPHIL # BLD: 0.11 K/UL (ref 0–0.44)
EOSINOPHILS RELATIVE PERCENT: 7 % (ref 1–4)
ERYTHROCYTE [DISTWIDTH] IN BLOOD BY AUTOMATED COUNT: 20.8 % (ref 11.8–14.4)
ERYTHROCYTE [DISTWIDTH] IN BLOOD BY AUTOMATED COUNT: 21.3 % (ref 11.8–14.4)
GFR SERPL CREATININE-BSD FRML MDRD: >60 ML/MIN/1.73M2
GLUCOSE BLD-MCNC: 103 MG/DL (ref 75–110)
GLUCOSE BLD-MCNC: 123 MG/DL (ref 75–110)
GLUCOSE BLD-MCNC: 282 MG/DL (ref 75–110)
GLUCOSE BLD-MCNC: 286 MG/DL (ref 75–110)
GLUCOSE BLD-MCNC: 307 MG/DL (ref 75–110)
GLUCOSE SERPL-MCNC: 112 MG/DL (ref 70–99)
HCT VFR BLD AUTO: 34.3 % (ref 40.7–50.3)
HCT VFR BLD AUTO: 36.9 % (ref 40.7–50.3)
HGB BLD-MCNC: 10.8 G/DL (ref 13–17)
HGB BLD-MCNC: 11.6 G/DL (ref 13–17)
IMM GRANULOCYTES # BLD AUTO: 0.02 K/UL (ref 0–0.3)
IMM GRANULOCYTES NFR BLD: 1 %
LV EF: 63 %
LVEF MODALITY: NORMAL
LYMPHOCYTES # BLD: 20 % (ref 24–43)
LYMPHOCYTES NFR BLD: 0.3 K/UL (ref 1.1–3.7)
LYMPHOCYTES NFR FLD: 47 %
MCH RBC QN AUTO: 25.4 PG (ref 25.2–33.5)
MCH RBC QN AUTO: 25.8 PG (ref 25.2–33.5)
MCHC RBC AUTO-ENTMCNC: 31.4 G/DL (ref 28.4–34.8)
MCHC RBC AUTO-ENTMCNC: 31.5 G/DL (ref 28.4–34.8)
MCV RBC AUTO: 80.7 FL (ref 82.6–102.9)
MCV RBC AUTO: 82 FL (ref 82.6–102.9)
MICROORGANISM SPEC CULT: ABNORMAL
MONOCYTES NFR BLD: 0.54 K/UL (ref 0.1–1.2)
MONOCYTES NFR BLD: 37 % (ref 3–12)
MORPHOLOGY: ABNORMAL
NEUTROPHILS NFR BLD: 34 % (ref 36–65)
NEUTROPHILS NFR FLD: 7 %
NEUTS SEG NFR BLD: 0.51 K/UL (ref 1.5–8.1)
NRBC BLD-RTO: 0 PER 100 WBC
NRBC BLD-RTO: 0 PER 100 WBC
PLATELET # BLD AUTO: 56 K/UL (ref 138–453)
PLATELET # BLD AUTO: 59 K/UL (ref 138–453)
PMV BLD AUTO: ABNORMAL FL (ref 8.1–13.5)
PMV BLD AUTO: ABNORMAL FL (ref 8.1–13.5)
POTASSIUM SERPL-SCNC: 3.8 MMOL/L (ref 3.7–5.3)
RBC # BLD AUTO: 4.25 M/UL (ref 4.21–5.77)
RBC # BLD AUTO: 4.5 M/UL (ref 4.21–5.77)
RBC # FLD: <3000 CELLS/UL
SERVICE CMNT-IMP: ABNORMAL
SODIUM SERPL-SCNC: 134 MMOL/L (ref 135–144)
SPECIMEN DESCRIPTION: ABNORMAL
UNIDENT CELLS NFR FLD: NORMAL %
WBC # FLD: 93 CELLS/UL
WBC OTHER # BLD: 1.5 K/UL (ref 3.5–11.3)
WBC OTHER # BLD: 1.7 K/UL (ref 3.5–11.3)

## 2023-07-05 PROCEDURE — 97535 SELF CARE MNGMENT TRAINING: CPT

## 2023-07-05 PROCEDURE — 6370000000 HC RX 637 (ALT 250 FOR IP): Performed by: INTERNAL MEDICINE

## 2023-07-05 PROCEDURE — 6360000002 HC RX W HCPCS: Performed by: INTERNAL MEDICINE

## 2023-07-05 PROCEDURE — 99231 SBSQ HOSP IP/OBS SF/LOW 25: CPT | Performed by: INTERNAL MEDICINE

## 2023-07-05 PROCEDURE — 97116 GAIT TRAINING THERAPY: CPT

## 2023-07-05 PROCEDURE — 2580000003 HC RX 258: Performed by: INTERNAL MEDICINE

## 2023-07-05 PROCEDURE — 1200000000 HC SEMI PRIVATE

## 2023-07-05 PROCEDURE — 85027 COMPLETE CBC AUTOMATED: CPT

## 2023-07-05 PROCEDURE — 99232 SBSQ HOSP IP/OBS MODERATE 35: CPT | Performed by: INTERNAL MEDICINE

## 2023-07-05 PROCEDURE — 82947 ASSAY GLUCOSE BLOOD QUANT: CPT

## 2023-07-05 PROCEDURE — 2580000003 HC RX 258: Performed by: NURSE PRACTITIONER

## 2023-07-05 PROCEDURE — 99232 SBSQ HOSP IP/OBS MODERATE 35: CPT | Performed by: NURSE PRACTITIONER

## 2023-07-05 PROCEDURE — 97110 THERAPEUTIC EXERCISES: CPT

## 2023-07-05 PROCEDURE — 97530 THERAPEUTIC ACTIVITIES: CPT

## 2023-07-05 PROCEDURE — 93306 TTE W/DOPPLER COMPLETE: CPT

## 2023-07-05 PROCEDURE — 80048 BASIC METABOLIC PNL TOTAL CA: CPT

## 2023-07-05 PROCEDURE — 36415 COLL VENOUS BLD VENIPUNCTURE: CPT

## 2023-07-05 PROCEDURE — 6370000000 HC RX 637 (ALT 250 FOR IP): Performed by: NURSE PRACTITIONER

## 2023-07-05 PROCEDURE — 2580000003 HC RX 258: Performed by: EMERGENCY MEDICINE

## 2023-07-05 PROCEDURE — APPSS30 APP SPLIT SHARED TIME 16-30 MINUTES: Performed by: NURSE PRACTITIONER

## 2023-07-05 RX ADMIN — SODIUM CHLORIDE, PRESERVATIVE FREE 10 ML: 5 INJECTION INTRAVENOUS at 19:59

## 2023-07-05 RX ADMIN — SPIRONOLACTONE 100 MG: 100 TABLET ORAL at 10:25

## 2023-07-05 RX ADMIN — SODIUM CHLORIDE, PRESERVATIVE FREE 10 ML: 5 INJECTION INTRAVENOUS at 10:26

## 2023-07-05 RX ADMIN — FUROSEMIDE 40 MG: 40 TABLET ORAL at 10:25

## 2023-07-05 RX ADMIN — PROPRANOLOL HYDROCHLORIDE 60 MG: 60 CAPSULE, EXTENDED RELEASE ORAL at 10:25

## 2023-07-05 RX ADMIN — PANTOPRAZOLE SODIUM 40 MG: 40 TABLET, DELAYED RELEASE ORAL at 06:43

## 2023-07-05 RX ADMIN — INSULIN GLARGINE 14 UNITS: 100 INJECTION, SOLUTION SUBCUTANEOUS at 21:05

## 2023-07-05 RX ADMIN — CEFTRIAXONE SODIUM 2000 MG: 2 INJECTION, POWDER, FOR SOLUTION INTRAMUSCULAR; INTRAVENOUS at 20:03

## 2023-07-05 RX ADMIN — SODIUM CHLORIDE, PRESERVATIVE FREE 10 ML: 5 INJECTION INTRAVENOUS at 10:36

## 2023-07-05 RX ADMIN — INSULIN LISPRO 2 UNITS: 100 INJECTION, SOLUTION INTRAVENOUS; SUBCUTANEOUS at 17:17

## 2023-07-05 ASSESSMENT — ENCOUNTER SYMPTOMS
GASTROINTESTINAL NEGATIVE: 1
RESPIRATORY NEGATIVE: 1

## 2023-07-05 NOTE — PLAN OF CARE
Pt alert and oriented. Transfers with one assist. Wife present most of the day. Obtained 1100 slightly cloudy yellow fluid from pleurix drain. Pt tolerated well. Denies SOB/pain. Safety measures remain in place.     Problem: Safety - Adult  Goal: Free from fall injury  Outcome: Progressing     Problem: Chronic Conditions and Co-morbidities  Goal: Patient's chronic conditions and co-morbidity symptoms are monitored and maintained or improved  Outcome: Progressing

## 2023-07-06 ENCOUNTER — APPOINTMENT (OUTPATIENT)
Dept: GENERAL RADIOLOGY | Age: 73
DRG: 871 | End: 2023-07-06
Payer: COMMERCIAL

## 2023-07-06 ENCOUNTER — APPOINTMENT (OUTPATIENT)
Dept: INTERVENTIONAL RADIOLOGY/VASCULAR | Age: 73
DRG: 871 | End: 2023-07-06
Payer: COMMERCIAL

## 2023-07-06 LAB
ANION GAP SERPL CALCULATED.3IONS-SCNC: 9 MMOL/L (ref 9–17)
BASOPHILS # BLD: 0.01 K/UL (ref 0–0.2)
BASOPHILS NFR BLD: 1 % (ref 0–2)
BUN SERPL-MCNC: 15 MG/DL (ref 8–23)
BUN/CREAT SERPL: 28 (ref 9–20)
CALCIUM SERPL-MCNC: 8.2 MG/DL (ref 8.6–10.4)
CHLORIDE SERPL-SCNC: 102 MMOL/L (ref 98–107)
CO2 SERPL-SCNC: 23 MMOL/L (ref 20–31)
CREAT SERPL-MCNC: 0.53 MG/DL (ref 0.7–1.2)
EOSINOPHIL # BLD: 0.11 K/UL (ref 0–0.44)
EOSINOPHILS RELATIVE PERCENT: 8 % (ref 1–4)
ERYTHROCYTE [DISTWIDTH] IN BLOOD BY AUTOMATED COUNT: 20.6 % (ref 11.8–14.4)
GFR SERPL CREATININE-BSD FRML MDRD: >60 ML/MIN/1.73M2
GLUCOSE BLD-MCNC: 148 MG/DL (ref 75–110)
GLUCOSE BLD-MCNC: 160 MG/DL (ref 75–110)
GLUCOSE BLD-MCNC: 283 MG/DL (ref 75–110)
GLUCOSE BLD-MCNC: 327 MG/DL (ref 75–110)
GLUCOSE SERPL-MCNC: 180 MG/DL (ref 70–99)
HCT VFR BLD AUTO: 31.1 % (ref 40.7–50.3)
HGB BLD-MCNC: 10.2 G/DL (ref 13–17)
IMM GRANULOCYTES # BLD AUTO: 0.01 K/UL (ref 0–0.3)
IMM GRANULOCYTES NFR BLD: 1 %
LYMPHOCYTES # BLD: 27 % (ref 24–43)
LYMPHOCYTES NFR BLD: 0.38 K/UL (ref 1.1–3.7)
MCH RBC QN AUTO: 26 PG (ref 25.2–33.5)
MCHC RBC AUTO-ENTMCNC: 32.8 G/DL (ref 28.4–34.8)
MCV RBC AUTO: 79.1 FL (ref 82.6–102.9)
MONOCYTES NFR BLD: 0.51 K/UL (ref 0.1–1.2)
MONOCYTES NFR BLD: 36 % (ref 3–12)
MORPHOLOGY: ABNORMAL
NEUTROPHILS NFR BLD: 27 % (ref 36–65)
NEUTS SEG NFR BLD: 0.38 K/UL (ref 1.5–8.1)
NRBC BLD-RTO: 0 PER 100 WBC
PLATELET # BLD AUTO: 50 K/UL (ref 138–453)
PMV BLD AUTO: ABNORMAL FL (ref 8.1–13.5)
POTASSIUM SERPL-SCNC: 3.9 MMOL/L (ref 3.7–5.3)
RBC # BLD AUTO: 3.93 M/UL (ref 4.21–5.77)
SODIUM SERPL-SCNC: 134 MMOL/L (ref 135–144)
SURGICAL PATHOLOGY REPORT: NORMAL
WBC OTHER # BLD: 1.4 K/UL (ref 3.5–11.3)

## 2023-07-06 PROCEDURE — 97110 THERAPEUTIC EXERCISES: CPT

## 2023-07-06 PROCEDURE — 6370000000 HC RX 637 (ALT 250 FOR IP): Performed by: NURSE PRACTITIONER

## 2023-07-06 PROCEDURE — C1729 CATH, DRAINAGE: HCPCS

## 2023-07-06 PROCEDURE — 99232 SBSQ HOSP IP/OBS MODERATE 35: CPT | Performed by: INTERNAL MEDICINE

## 2023-07-06 PROCEDURE — 71045 X-RAY EXAM CHEST 1 VIEW: CPT

## 2023-07-06 PROCEDURE — 49083 ABD PARACENTESIS W/IMAGING: CPT

## 2023-07-06 PROCEDURE — 97530 THERAPEUTIC ACTIVITIES: CPT

## 2023-07-06 PROCEDURE — 36415 COLL VENOUS BLD VENIPUNCTURE: CPT

## 2023-07-06 PROCEDURE — 6360000002 HC RX W HCPCS: Performed by: INTERNAL MEDICINE

## 2023-07-06 PROCEDURE — 2580000003 HC RX 258: Performed by: INTERNAL MEDICINE

## 2023-07-06 PROCEDURE — 6370000000 HC RX 637 (ALT 250 FOR IP): Performed by: INTERNAL MEDICINE

## 2023-07-06 PROCEDURE — 85027 COMPLETE CBC AUTOMATED: CPT

## 2023-07-06 PROCEDURE — 2580000003 HC RX 258: Performed by: EMERGENCY MEDICINE

## 2023-07-06 PROCEDURE — 80048 BASIC METABOLIC PNL TOTAL CA: CPT

## 2023-07-06 PROCEDURE — 02HV33Z INSERTION OF INFUSION DEVICE INTO SUPERIOR VENA CAVA, PERCUTANEOUS APPROACH: ICD-10-PCS | Performed by: INTERNAL MEDICINE

## 2023-07-06 PROCEDURE — 1200000000 HC SEMI PRIVATE

## 2023-07-06 PROCEDURE — 82947 ASSAY GLUCOSE BLOOD QUANT: CPT

## 2023-07-06 PROCEDURE — 0W9G3ZZ DRAINAGE OF PERITONEAL CAVITY, PERCUTANEOUS APPROACH: ICD-10-PCS | Performed by: RADIOLOGY

## 2023-07-06 PROCEDURE — 97116 GAIT TRAINING THERAPY: CPT

## 2023-07-06 PROCEDURE — 99232 SBSQ HOSP IP/OBS MODERATE 35: CPT | Performed by: NURSE PRACTITIONER

## 2023-07-06 PROCEDURE — 2580000003 HC RX 258: Performed by: NURSE PRACTITIONER

## 2023-07-06 RX ORDER — SPIRONOLACTONE 100 MG/1
TABLET, FILM COATED ORAL
Qty: 90 TABLET | Refills: 4 | Status: SHIPPED | OUTPATIENT
Start: 2023-07-06

## 2023-07-06 RX ADMIN — SPIRONOLACTONE 100 MG: 100 TABLET ORAL at 10:47

## 2023-07-06 RX ADMIN — INSULIN LISPRO 4 UNITS: 100 INJECTION, SOLUTION INTRAVENOUS; SUBCUTANEOUS at 20:52

## 2023-07-06 RX ADMIN — SODIUM CHLORIDE, PRESERVATIVE FREE 10 ML: 5 INJECTION INTRAVENOUS at 10:47

## 2023-07-06 RX ADMIN — PROPRANOLOL HYDROCHLORIDE 60 MG: 60 CAPSULE, EXTENDED RELEASE ORAL at 10:47

## 2023-07-06 RX ADMIN — CEFTRIAXONE SODIUM 2000 MG: 2 INJECTION, POWDER, FOR SOLUTION INTRAMUSCULAR; INTRAVENOUS at 20:25

## 2023-07-06 RX ADMIN — SODIUM CHLORIDE, PRESERVATIVE FREE 10 ML: 5 INJECTION INTRAVENOUS at 21:01

## 2023-07-06 RX ADMIN — PROPRANOLOL HYDROCHLORIDE 60 MG: 60 CAPSULE, EXTENDED RELEASE ORAL at 20:31

## 2023-07-06 RX ADMIN — SODIUM CHLORIDE, PRESERVATIVE FREE 10 ML: 5 INJECTION INTRAVENOUS at 20:54

## 2023-07-06 RX ADMIN — FUROSEMIDE 40 MG: 40 TABLET ORAL at 10:47

## 2023-07-06 RX ADMIN — INSULIN LISPRO 2 UNITS: 100 INJECTION, SOLUTION INTRAVENOUS; SUBCUTANEOUS at 17:34

## 2023-07-06 RX ADMIN — INSULIN GLARGINE 14 UNITS: 100 INJECTION, SOLUTION SUBCUTANEOUS at 20:28

## 2023-07-06 ASSESSMENT — ENCOUNTER SYMPTOMS
GASTROINTESTINAL NEGATIVE: 1
RESPIRATORY NEGATIVE: 1

## 2023-07-06 ASSESSMENT — PAIN SCALES - GENERAL: PAINLEVEL_OUTOF10: 0

## 2023-07-06 NOTE — DISCHARGE SUMMARY
[facility]  Office: [address], DO, Kathryn Cisneros, DO, Stefano Hyatt, DO, Yanick Knight, DO, Homar Best MD, Rebecca Gustafson MD, Jose Lester MD, Orin Conway MD,  Valencia Alston MD, Paris Davila MD, Shreya Toro DO, Matt Crystal MD,  Denae Meyer MD, Shanda Rider MD, Angela Duran, DO, Alejo Fernando MD,  Yandy Saldaña, DO, Erma Kirkpatrick MD, Dariana Buchanan MD, Vania Palmer MD, Lynn Dawson MD,  Lidya Yeh MD, Sorin Paulson MD, Dre Ansari DO, Jacinto Khan MD,  Art Barreto MD, Quin Hancock, CNP,  Robert Ricardo, CNP, Demetris Will, CNP, Denice Cushing, CNP,  Nicolle Conte, [facility], Munira Sher, CNP, Alexx Strange, CNP, Chidi Galloway, CNP, Moy Glass, CNP, Riccardo Meza, CNP, Sukhi Foster, PA-C, Ezra Wellington, CNS, Che Flolana, CNP, John Pearson, [facility]    Discharge Summary     Patient ID: Gail Azul. :  1950   MRN: [id number]     ACCOUNT:  [de-identified]   Patient's PCP: SCARLET Babb CNP  Admit Date: 2023   Discharge Date: 2023     Length of Stay: 4  Code Status:  Full Code  Admitting Physician: Stefanie Knight DO  Discharge Physician: Chris Gonzales DO     Active Discharge Diagnoses:     Hospital Problem Lists:  Principal Problem:    Sepsis due to [facility])  Active Problems:    Gastroesophageal reflux disease    Portal hypertension ([address])    Cirrhosis of liver with ascites ([address])    Controlled type 2 diabetes mellitus with hyperglycemia, with long-term current use of insulin (HCC)    Severe malnutrition (HCC)    Hyponatremia    Essential hypertension    Elevated lactic acid level    Thrombocytopenia (HCC)    Pleural effusion on right    SIRS (systemic inflammatory response syndrome) ([address])    Gram negative sepsis ([address])  Resolved Problems:    * No resolved hospital problems.  *      Admission Condition:

## 2023-07-06 NOTE — PLAN OF CARE
Problem: Discharge Planning  Goal: Discharge to home or other facility with appropriate resources  Outcome: Progressing  Flowsheets (Taken 7/6/2023 0800)  Discharge to home or other facility with appropriate resources:   Identify barriers to discharge with patient and caregiver   Arrange for needed discharge resources and transportation as appropriate   Identify discharge learning needs (meds, wound care, etc)   Refer to discharge planning if patient needs post-hospital services based on physician order or complex needs related to functional status, cognitive ability or social support system     Problem: Safety - Adult  Goal: Free from fall injury  Outcome: Progressing     Problem: Chronic Conditions and Co-morbidities  Goal: Patient's chronic conditions and co-morbidity symptoms are monitored and maintained or improved  Outcome: Progressing  Flowsheets (Taken 7/6/2023 0800)  Care Plan - Patient's Chronic Conditions and Co-Morbidity Symptoms are Monitored and Maintained or Improved:   Monitor and assess patient's chronic conditions and comorbid symptoms for stability, deterioration, or improvement   Collaborate with multidisciplinary team to address chronic and comorbid conditions and prevent exacerbation or deterioration   Update acute care plan with appropriate goals if chronic or comorbid symptoms are exacerbated and prevent overall improvement and discharge     Problem: ABCDS Injury Assessment  Goal: Absence of physical injury  Outcome: Progressing

## 2023-07-06 NOTE — BRIEF OP NOTE
Brief Postoperative Note    Jamil Gill.   YOB: 1950  [id number]    Pre-operative Diagnosis: ascites; abdominal discomfort    Post-operative Diagnosis: Same    Procedure: US guided paracentesis     Anesthesia: Local    Surgeons/Assistants: Adonay    Estimated Blood Loss: less than 50     Complications: None    Specimens: Was Obtained: non turbid light yellow ascites     Electronically signed by Yael Herbert MD on 7/6/2023 at 12:17 PM

## 2023-07-06 NOTE — CARE COORDINATION
[facility] have checked benefits and no cost to the pt for Rocephin. They will deliver medication to pt 7-7 in time for his afternoon administration.

## 2023-07-06 NOTE — CARE COORDINATION
Discharge Planning    Spoke with pt and his wife who is a nurse about the need for IV antibiotics for 6 wks. His wife is agreeable to administering. Script for rocephin faxed to [facility]  at fax [phone] and they will check benefits. PICC line to be inserted later today. Pt current with Demetrius and updated Erin on need for a visit later in the day on 7-7 to initiate IV antibiotic teaching.   Pt is on Rocephin daily at 8pm.

## 2023-07-06 NOTE — FLOWSHEET NOTE
Dr. Betty Kulkarni performs paracentesis;  of    ascitic fluid drained. Specimen sent to lab per MD orders. Patient tolerated well 2x2x gauze dressing w/ transparent bandage applied to procedures site that is clean dry & intact. Patient transported back to room via wheelchair in stable condition.

## 2023-07-06 NOTE — PLAN OF CARE
Problem: Discharge Planning  Goal: Discharge to home or other facility with appropriate resources  7/5/2023 2151 by Roscoe Glez RN  Outcome: Progressing     Problem: Safety - Adult  Goal: Free from fall injury  7/5/2023 2151 by Roscoe Glez RN  Outcome: Progressing     Problem: Chronic Conditions and Co-morbidities  Goal: Patient's chronic conditions and co-morbidity symptoms are monitored and maintained or improved  7/5/2023 2151 by Roscoe Glez RN  Outcome: Progressing     Problem: ABCDS Injury Assessment  Goal: Absence of physical injury  7/5/2023 2151 by Roscoe Glez RN  Outcome: Progressing

## 2023-07-07 ENCOUNTER — APPOINTMENT (OUTPATIENT)
Dept: GENERAL RADIOLOGY | Age: 73
DRG: 871 | End: 2023-07-07
Payer: COMMERCIAL

## 2023-07-07 VITALS
RESPIRATION RATE: 16 BRPM | DIASTOLIC BLOOD PRESSURE: 71 MMHG | SYSTOLIC BLOOD PRESSURE: 106 MMHG | BODY MASS INDEX: 16.46 KG/M2 | WEIGHT: 121.5 LBS | HEART RATE: 64 BPM | TEMPERATURE: 97.3 F | OXYGEN SATURATION: 100 % | HEIGHT: 72 IN

## 2023-07-07 LAB
CASE NUMBER:: NORMAL
GLUCOSE BLD-MCNC: 113 MG/DL (ref 75–110)
GLUCOSE BLD-MCNC: 263 MG/DL (ref 75–110)
MICROORGANISM SPEC CULT: NORMAL
SERVICE CMNT-IMP: NORMAL
SPECIMEN DESCRIPTION: NORMAL

## 2023-07-07 PROCEDURE — 2580000003 HC RX 258: Performed by: NURSE PRACTITIONER

## 2023-07-07 PROCEDURE — 6370000000 HC RX 637 (ALT 250 FOR IP): Performed by: NURSE PRACTITIONER

## 2023-07-07 PROCEDURE — 82947 ASSAY GLUCOSE BLOOD QUANT: CPT

## 2023-07-07 PROCEDURE — 71045 X-RAY EXAM CHEST 1 VIEW: CPT

## 2023-07-07 PROCEDURE — 6370000000 HC RX 637 (ALT 250 FOR IP): Performed by: INTERNAL MEDICINE

## 2023-07-07 PROCEDURE — 99232 SBSQ HOSP IP/OBS MODERATE 35: CPT | Performed by: INTERNAL MEDICINE

## 2023-07-07 RX ADMIN — PROPRANOLOL HYDROCHLORIDE 60 MG: 60 CAPSULE, EXTENDED RELEASE ORAL at 09:46

## 2023-07-07 RX ADMIN — FUROSEMIDE 40 MG: 40 TABLET ORAL at 09:46

## 2023-07-07 RX ADMIN — SPIRONOLACTONE 100 MG: 100 TABLET ORAL at 09:46

## 2023-07-07 RX ADMIN — PANTOPRAZOLE SODIUM 40 MG: 40 TABLET, DELAYED RELEASE ORAL at 05:46

## 2023-07-07 RX ADMIN — SODIUM CHLORIDE, PRESERVATIVE FREE 10 ML: 5 INJECTION INTRAVENOUS at 09:47

## 2023-07-07 ASSESSMENT — PAIN SCALES - GENERAL
PAINLEVEL_OUTOF10: 0
PAINLEVEL_OUTOF10: 0

## 2023-07-07 NOTE — FLOWSHEET NOTE
07/06/23 2130   Treatment Team Notification   Reason for Communication Review case;Evaluate   Team Member Notified Ahmed   Treatment Team Role Consulting Provider   Method of Communication Call   Response Other (Comment)  (see note)     Pulmonology called via telephone regarding [facility] putting a discharge order in for the pt. Per pulmonology: pt not okay to discharge, drain Pleurx catheter tomorrow morning, and repeat chest X-ray afterwards.

## 2023-07-07 NOTE — PLAN OF CARE
Problem: Discharge Planning  Goal: Discharge to home or other facility with appropriate resources  7/7/2023 0539 by Drenda Prader, RN  Outcome: Progressing  Flowsheets (Taken 7/6/2023 2000)  Discharge to home or other facility with appropriate resources:   Identify barriers to discharge with patient and caregiver   Arrange for needed discharge resources and transportation as appropriate   Identify discharge learning needs (meds, wound care, etc)  7/6/2023 1657 by Obey Syed RN  Outcome: Progressing  Flowsheets (Taken 7/6/2023 0800)  Discharge to home or other facility with appropriate resources:   Identify barriers to discharge with patient and caregiver   Arrange for needed discharge resources and transportation as appropriate   Identify discharge learning needs (meds, wound care, etc)   Refer to discharge planning if patient needs post-hospital services based on physician order or complex needs related to functional status, cognitive ability or social support system     Problem: Safety - Adult  Goal: Free from fall injury  7/7/2023 0539 by Drenda Prader, RN  Outcome: Progressing  7/6/2023 1657 by Obey Syed RN  Outcome: Progressing     Problem: Chronic Conditions and Co-morbidities  Goal: Patient's chronic conditions and co-morbidity symptoms are monitored and maintained or improved  7/7/2023 0539 by Drenda Prader, RN  Outcome: Progressing  Flowsheets (Taken 7/6/2023 2000)  Care Plan - Patient's Chronic Conditions and Co-Morbidity Symptoms are Monitored and Maintained or Improved:   Monitor and assess patient's chronic conditions and comorbid symptoms for stability, deterioration, or improvement   Collaborate with multidisciplinary team to address chronic and comorbid conditions and prevent exacerbation or deterioration   Update acute care plan with appropriate goals if chronic or comorbid symptoms are exacerbated and prevent overall improvement and discharge  7/6/2023 1657 by Obey Syed

## 2023-07-07 NOTE — CARE COORDINATION
Discharge Planning    Home today with Keo Latif updated on discharge and [facility], message left with Tyrell Hoovercos[employer] from [facility], anticipated supply delivery between 3-4 today. 11:23  [facility] updated on weekly BMP CBC lab work ordered with Dr. Astrid Miles on [address] [facility]. Patient to have PICC dressing change and then home. Nurse Florencio Turpin updated.

## 2023-07-07 NOTE — PLAN OF CARE
Problem: Discharge Planning  Goal: Discharge to home or other facility with appropriate resources  7/7/2023 1243 by Iván Johnson RN  Outcome: Adequate for Discharge     Problem: Safety - Adult  Goal: Free from fall injury  7/7/2023 1243 by Iván Johnson RN  Outcome: Adequate for Discharge     Problem: Chronic Conditions and Co-morbidities  Goal: Patient's chronic conditions and co-morbidity symptoms are monitored and maintained or improved  7/7/2023 1243 by Iván Johnson RN  Outcome: Adequate for Discharge     Problem: ABCDS Injury Assessment  Goal: Absence of physical injury  7/7/2023 1243 by Iván Johnson RN  Outcome: Adequate for Discharge

## 2023-07-07 NOTE — CARE COORDINATION
Discharge Planning    Erin from [place] called to confirm PICC insertion and discharge today, plan is home with [facility] infusion for this evening dose.

## 2023-07-07 NOTE — PLAN OF CARE
Problem: Discharge Planning  Goal: Discharge to home or other facility with appropriate resources  7/7/2023 1101 by Arlene Way RN  Outcome: Progressing  Flowsheets (Taken 7/7/2023 1101)  Discharge to home or other facility with appropriate resources: Identify barriers to discharge with patient and caregiver     Problem: Safety - Adult  Goal: Free from fall injury  7/7/2023 1101 by Arlene Way RN  Outcome: Progressing  Flowsheets (Taken 7/7/2023 1101)  Free From Fall Injury: Instruct family/caregiver on patient safety     Problem: Chronic Conditions and Co-morbidities  Goal: Patient's chronic conditions and co-morbidity symptoms are monitored and maintained or improved  7/7/2023 1101 by Arlene Way RN  Outcome: Progressing  Flowsheets (Taken 7/7/2023 1101)  Care Plan - Patient's Chronic Conditions and Co-Morbidity Symptoms are Monitored and Maintained or Improved: Monitor and assess patient's chronic conditions and comorbid symptoms for stability, deterioration, or improvement     Problem: ABCDS Injury Assessment  Goal: Absence of physical injury  7/7/2023 1101 by Arlene Way RN  Outcome: Progressing  Flowsheets (Taken 7/7/2023 1101)  Absence of Physical Injury: Implement safety measures based on patient assessment

## 2023-07-10 ENCOUNTER — CARE COORDINATION (OUTPATIENT)
Dept: OTHER | Facility: CLINIC | Age: 73
End: 2023-07-10

## 2023-07-10 LAB
MICROORGANISM SPEC CULT: NORMAL
MICROORGANISM/AGENT SPEC: NORMAL
SPECIMEN DESCRIPTION: NORMAL
SURGICAL PATHOLOGY REPORT: NORMAL

## 2023-07-10 NOTE — CARE COORDINATION
Care Transitions Outreach Attempt    Call within 2 business days of discharge: Yes   Attempted to reach patient for transitions of care follow up. Unable to reach patient. Patient: Skye Marshall. Patient : 1950 MRN: [id number]    Last Discharge [address]       Date Complaint Diagnosis Description Type Department Provider    23 Chills; Hypotension; Fever SIRS (systemic inflammatory response syndrome) [facility]) ED to Hosp-Admission (Discharged) (ADMITTED) HILDA Patino DO; Milly Van. .. HIPAA compliant message left requesting a return phone call at patients convenience. Will continue to follow. Was this an external facility discharge?  No Discharge Facility: [facility]    Noted following upcoming appointments from discharge chart review:   [facility] follow up appointment(s):   Future Appointments   Date Time Provider [address]   2023  1:30 PM Jorden Tobin MD [facility] exc [facility]   2023  1:00 PM SCARLET Cruz - CNP [address] [facility]   2023  3:45 PM Nayla Osborn MD [address] [facility]     Non-[facility] follow up appointment(s): n/a

## 2023-07-11 ENCOUNTER — CARE COORDINATION (OUTPATIENT)
Dept: OTHER | Facility: CLINIC | Age: 73
End: 2023-07-11

## 2023-07-11 NOTE — CARE COORDINATION
[facility] Care Transitions Initial Follow Up Call    Call within 2 business days of discharge: Yes    Patient Current Location:  Home: [address] [place] [address]    Care Transition Nurse contacted the  spouse  by telephone to perform post hospital discharge assessment. Verified name and  with  spouse  as identifiers. Provided introduction to self, and explanation of the Care Transition Nurse role. Patient: Deric Aguilar Patient : 1950   MRN: [id number]  Reason for Admission: SIRS  Discharge Date: 23 RARS: Readmission Risk Score: 24.4      Last Discharge [address] [facility]       Date Complaint Diagnosis Description Type Department Provider    23 Chills; Hypotension; Fever SIRS (systemic inflammatory response syndrome) [facility]) ED to Hosp-Admission (Discharged) (ADMITTED) HILDA Patino, DO; Kendal Line. .. Was this an external facility discharge? No Discharge Facility: [facility]    Challenges to be reviewed by the provider   Additional needs identified to be addressed with provider: No  none               Method of communication with provider: none. Wife states he is doing good, has been sitting out on the porch with his kids who are visiting, breathing is good today she said. He continues with home care kvng, nurse comes 1x week to change PICC dsg and flush the line. Wife is administering the IV antibiotics daily. They have them all at home. Nurse is coming tomorrow she said. Wife states he is eating well, BS this morning was 157. She denies he has had a fever. He continues with the right chest drain tube , is emptied 4x a week now, usually has a large out put she said. But this keeps the fluid off his lungs. A paracentesis was done the day before his discharge. She said his feet are barely swollen and he is wearing the compression socks. Abdomen is not swollen today. His right arm PICC is intact she said no issues.  They have a pulse oximeter at home but it barely

## 2023-07-12 NOTE — TELEPHONE ENCOUNTER
Rupertr received a call from patients wife requesting a refill on his propranolol to be sent to [facility]. Please sign if appropriate.

## 2023-07-13 NOTE — TELEPHONE ENCOUNTER
Called the patient and his wife Martha answered. On hippa. We scheduled the EGD for 8/8/23 STA @ 10 a.m. Reviewed EGD prep instructions with wife  over phone and sent via [employer]. Writer thanked for the help and call ended.

## 2023-07-14 RX ORDER — PROPRANOLOL HCL 60 MG
60 CAPSULE, EXTENDED RELEASE 24HR ORAL 2 TIMES DAILY
Qty: 60 CAPSULE | Refills: 0 | Status: SHIPPED | OUTPATIENT
Start: 2023-07-14 | End: 2023-08-13

## 2023-07-17 ENCOUNTER — OFFICE VISIT (OUTPATIENT)
Dept: FAMILY MEDICINE CLINIC | Age: 73
End: 2023-07-17

## 2023-07-17 VITALS
TEMPERATURE: 97 F | BODY MASS INDEX: 17.22 KG/M2 | HEART RATE: 63 BPM | SYSTOLIC BLOOD PRESSURE: 96 MMHG | WEIGHT: 127 LBS | DIASTOLIC BLOOD PRESSURE: 62 MMHG | OXYGEN SATURATION: 99 %

## 2023-07-17 DIAGNOSIS — I10 ESSENTIAL HYPERTENSION: ICD-10-CM

## 2023-07-17 DIAGNOSIS — E43 SEVERE MALNUTRITION (HCC): ICD-10-CM

## 2023-07-17 DIAGNOSIS — Z09 HOSPITAL DISCHARGE FOLLOW-UP: Primary | ICD-10-CM

## 2023-07-17 DIAGNOSIS — A41.53: ICD-10-CM

## 2023-07-17 DIAGNOSIS — E11.65 TYPE 2 DIABETES MELLITUS WITH HYPERGLYCEMIA, WITHOUT LONG-TERM CURRENT USE OF INSULIN (HCC): ICD-10-CM

## 2023-07-17 DIAGNOSIS — E83.42 HYPOMAGNESEMIA: ICD-10-CM

## 2023-07-17 LAB
MICROORGANISM SPEC CULT: NORMAL
MICROORGANISM/AGENT SPEC: NORMAL
SPECIMEN DESCRIPTION: NORMAL

## 2023-07-17 RX ORDER — BLOOD SUGAR DIAGNOSTIC
STRIP MISCELLANEOUS
Qty: 100 STRIP | Refills: 0 | Status: SHIPPED | OUTPATIENT
Start: 2023-07-17

## 2023-07-17 RX ORDER — INSULIN GLARGINE 100 [IU]/ML
14 INJECTION, SOLUTION SUBCUTANEOUS NIGHTLY
Qty: 1 ADJUSTABLE DOSE PRE-FILLED PEN SYRINGE | Refills: 2 | Status: SHIPPED | OUTPATIENT
Start: 2023-07-17

## 2023-07-17 RX ORDER — PEN NEEDLE, DIABETIC 30 GX5/16"
1 NEEDLE, DISPOSABLE MISCELLANEOUS DAILY
Qty: 100 EACH | Refills: 3 | Status: SHIPPED | OUTPATIENT
Start: 2023-07-17

## 2023-07-17 SDOH — ECONOMIC STABILITY: INCOME INSECURITY: HOW HARD IS IT FOR YOU TO PAY FOR THE VERY BASICS LIKE FOOD, HOUSING, MEDICAL CARE, AND HEATING?: NOT HARD AT ALL

## 2023-07-17 SDOH — ECONOMIC STABILITY: FOOD INSECURITY: WITHIN THE PAST 12 MONTHS, YOU WORRIED THAT YOUR FOOD WOULD RUN OUT BEFORE YOU GOT MONEY TO BUY MORE.: NEVER TRUE

## 2023-07-17 SDOH — ECONOMIC STABILITY: FOOD INSECURITY: WITHIN THE PAST 12 MONTHS, THE FOOD YOU BOUGHT JUST DIDN'T LAST AND YOU DIDN'T HAVE MONEY TO GET MORE.: NEVER TRUE

## 2023-07-17 ASSESSMENT — PATIENT HEALTH QUESTIONNAIRE - PHQ9
SUM OF ALL RESPONSES TO PHQ QUESTIONS 1-9: 0
1. LITTLE INTEREST OR PLEASURE IN DOING THINGS: 0
SUM OF ALL RESPONSES TO PHQ9 QUESTIONS 1 & 2: 0
2. FEELING DOWN, DEPRESSED OR HOPELESS: 0
SUM OF ALL RESPONSES TO PHQ QUESTIONS 1-9: 0

## 2023-07-18 ENCOUNTER — CARE COORDINATION (OUTPATIENT)
Dept: OTHER | Facility: CLINIC | Age: 73
End: 2023-07-18

## 2023-07-18 NOTE — CARE COORDINATION
Care Transitions Follow Up Call    [facility] attempted to reach patient for Care Transitions follow up call. HIPAA compliant message left requesting a return phone call at patient convenience. Plan for follow-up call in 5-7 days    Future Appointments   Date Time Provider [address]   8/2/2023  9:30 AM Marjan Gomes MD INFT DISEASE [facility]   8/2/2023  1:30 PM Cathy Cronin MD [facility] exc [facility]   9/1/2023  1:00 PM Rome Jamison APRN - CNP [facility]   9/12/2023  3:45 PM Zaid Corcoran MD [address] [facility]         Ileana Oppenheim BSN, RN- [facility]  Associate Care Manager  [phone]  [email or web address]

## 2023-07-19 LAB
CHOLEST SERPL-MCNC: 150 MG/DL
CHOLESTEROL/HDL RATIO: 2.8
EST. AVERAGE GLUCOSE BLD GHB EST-MCNC: 134 MG/DL
GLUCOSE SERPL-MCNC: 142 MG/DL (ref 70–99)
HBA1C MFR BLD: 6.3 % (ref 4–6)
HDLC SERPL-MCNC: 53 MG/DL
LDLC SERPL CALC-MCNC: 90 MG/DL (ref 0–130)
MAGNESIUM: 1.6 MG/DL
PATIENT FASTING?: YES
TRIGL SERPL-MCNC: 34 MG/DL

## 2023-07-19 RX ORDER — PROPRANOLOL HCL 60 MG
CAPSULE, EXTENDED RELEASE 24HR ORAL
Qty: 60 CAPSULE | Refills: 0 | OUTPATIENT
Start: 2023-07-19

## 2023-07-20 ENCOUNTER — CARE COORDINATION (OUTPATIENT)
Dept: OTHER | Facility: CLINIC | Age: 73
End: 2023-07-20

## 2023-07-20 NOTE — CARE COORDINATION
[facility] Care Transitions Follow Up Call    Patient Current Location:  Home: [address] [place] [address]    Care Transition Nurse contacted the  wife   by telephone to follow up after admission on 23   Verified name and  with  wife   as identifiers. Patient: Choco Diaz. Patient : 1950   MRN: [id number]  Reason for Admission: Sepsis due to Serratia   Discharge Date: 23 RARS: Readmission Risk Score: 24.4      Needs to be reviewed by the provider   Additional needs identified to be addressed with provider: No  none             Method of communication with provider: none. Wife returned call. She had already contacted the np about the GI issues Ashley Aparicio is having with the mag oxide. I informed his wife when we spoke that the NP had responded to her request and she needed to respond to her in Candelario's my chart. Said she will do that . Son Bagley is doing well, no swelling in his feet or abdomen, he has the pleura vac and she empties 4x a week with an output of 1400 each time, it is keeping his breathing good and keeping the fluid off his lungs she said. He is getting around the house good, no falls. Wife said the home care nurse continues to come and draw labs, which go to [facility]. The nurse told her that the [facility] infusion [address] Is contracted with [facility] and they labs have to go there  Wife said the drainage supplies are from [facility]  by the [employer]. Wife said she has not yet received any bills since May. His follow up with pulmonary is  , GI is   and his EGD is . I will follow up on my return from vacation on how this is all being billed. Wife is ok to wait until I return in one week. Addressed changes since last contact:   GI issues with current mag  NP may change to [facility]   Discussed follow-up appointments. If no appointment was previously scheduled, appointment scheduling offered: n/a.    Is follow up appointment scheduled within 7 days

## 2023-07-21 DIAGNOSIS — E83.42 HYPOMAGNESEMIA: ICD-10-CM

## 2023-07-21 DIAGNOSIS — K90.89 OTHER SPECIFIED INTESTINAL MALABSORPTION: Primary | ICD-10-CM

## 2023-07-21 RX ORDER — MAGNESIUM CHLORIDE 71.5 G/G
2 TABLET ORAL DAILY
Qty: 60 TABLET | Refills: 0 | Status: SHIPPED | OUTPATIENT
Start: 2023-07-21

## 2023-07-31 ENCOUNTER — CARE COORDINATION (OUTPATIENT)
Dept: OTHER | Facility: CLINIC | Age: 73
End: 2023-07-31

## 2023-07-31 NOTE — CARE COORDINATION
[facility] Care Transitions Follow Up Call    Patient Current Location:  Home: [address] [place] [address]    Care Transition Nurse contacted the  wife   by telephone to follow up after admission on 23 . Verified name and  with  wife   as identifiers. Patient: Libby Moncada. Patient : 1950   MRN: [id number]  Reason for Admission: Sepsis due to Serratia   Discharge Date: 23 RARS: Readmission Risk Score: 24.4      Needs to be reviewed by the provider   Additional needs identified to be addressed with provider: No  none             Method of communication with provider: none. Spoke with wife today who said Da Kitchen is doing well, continues to have around 1400 cc output with each emptying of the drains. No LE swelling, she said he seems to feel well, labs were drawn and everything looked good she said and was improving but his Magnesium so they increased his dose to daily she said. [place] PICC line intact antibiotics will continue til . Home care nurse is coming weekly and draws the labs and she has been sending them to [facility] said she has to due to the contract they have with the infusion company. Wife said she is going to talk to the home care nurse today about it. Da Kitchen has several appts this week with his physicians she said. He has family in town and has been busy with them and is active around the house. Wife has no questions today, she is doing well with managing his care at home     Addressed changes since last contact:   mag oxide increased to daily   Discussed follow-up appointments. If no appointment was previously scheduled, appointment scheduling offered: n/a. Is follow up appointment scheduled within 7 days of discharge?  No.    Follow Up  Future Appointments   Date Time Provider [address]   2023  9:30 AM Tenisha Costa MD INFT DISEASE [facility]   2023  1:30 PM MD mac Stuartk exc [facility]   2023  1:00 PM SCARLET العلي - NANCY JOHNSON

## 2023-08-01 ENCOUNTER — HOSPITAL ENCOUNTER (OUTPATIENT)
Dept: GENERAL RADIOLOGY | Age: 73
Discharge: HOME | End: 2023-08-03
Payer: COMMERCIAL

## 2023-08-01 ENCOUNTER — HOSPITAL ENCOUNTER (OUTPATIENT)
Age: 73
Discharge: HOME | End: 2023-08-03
Payer: COMMERCIAL

## 2023-08-01 DIAGNOSIS — J90 PLEURAL EFFUSION, NOT ELSEWHERE CLASSIFIED: ICD-10-CM

## 2023-08-01 PROCEDURE — 71046 X-RAY EXAM CHEST 2 VIEWS: CPT

## 2023-08-02 ENCOUNTER — OFFICE VISIT (OUTPATIENT)
Dept: INFECTIOUS DISEASES | Age: 73
End: 2023-08-02
Payer: COMMERCIAL

## 2023-08-02 ENCOUNTER — OFFICE VISIT (OUTPATIENT)
Dept: GASTROENTEROLOGY | Age: 73
End: 2023-08-02
Payer: COMMERCIAL

## 2023-08-02 VITALS
WEIGHT: 121 LBS | TEMPERATURE: 97.3 F | SYSTOLIC BLOOD PRESSURE: 106 MMHG | BODY MASS INDEX: 16.41 KG/M2 | DIASTOLIC BLOOD PRESSURE: 63 MMHG

## 2023-08-02 VITALS
DIASTOLIC BLOOD PRESSURE: 69 MMHG | HEART RATE: 62 BPM | TEMPERATURE: 96.8 F | BODY MASS INDEX: 16.66 KG/M2 | SYSTOLIC BLOOD PRESSURE: 108 MMHG | WEIGHT: 123 LBS | HEIGHT: 72 IN

## 2023-08-02 DIAGNOSIS — R18.8 OTHER ASCITES: ICD-10-CM

## 2023-08-02 DIAGNOSIS — K74.60 CIRRHOSIS OF LIVER WITH ASCITES, UNSPECIFIED HEPATIC CIRRHOSIS TYPE (HCC): ICD-10-CM

## 2023-08-02 DIAGNOSIS — A41.53 SERRATIA SEPSIS (HCC): Primary | ICD-10-CM

## 2023-08-02 DIAGNOSIS — R18.8 CIRRHOSIS OF LIVER WITH ASCITES, UNSPECIFIED HEPATIC CIRRHOSIS TYPE (HCC): ICD-10-CM

## 2023-08-02 DIAGNOSIS — K74.69 OTHER CIRRHOSIS OF LIVER (HCC): ICD-10-CM

## 2023-08-02 DIAGNOSIS — D70.8 OTHER NEUTROPENIA (HCC): ICD-10-CM

## 2023-08-02 DIAGNOSIS — K21.9 GASTROESOPHAGEAL REFLUX DISEASE, UNSPECIFIED WHETHER ESOPHAGITIS PRESENT: ICD-10-CM

## 2023-08-02 DIAGNOSIS — J90 PLEURAL EFFUSION ON RIGHT: ICD-10-CM

## 2023-08-02 DIAGNOSIS — D61.818 PANCYTOPENIA (HCC): ICD-10-CM

## 2023-08-02 PROCEDURE — 3074F SYST BP LT 130 MM HG: CPT | Performed by: INTERNAL MEDICINE

## 2023-08-02 PROCEDURE — 99214 OFFICE O/P EST MOD 30 MIN: CPT | Performed by: INTERNAL MEDICINE

## 2023-08-02 PROCEDURE — 1123F ACP DISCUSS/DSCN MKR DOCD: CPT | Performed by: INTERNAL MEDICINE

## 2023-08-02 PROCEDURE — 3078F DIAST BP <80 MM HG: CPT | Performed by: INTERNAL MEDICINE

## 2023-08-02 RX ORDER — FUROSEMIDE 40 MG/1
40 TABLET ORAL DAILY
Qty: 60 TABLET | Refills: 3 | Status: SHIPPED | OUTPATIENT
Start: 2023-08-02

## 2023-08-02 RX ORDER — PROPRANOLOL HCL 60 MG
60 CAPSULE, EXTENDED RELEASE 24HR ORAL 2 TIMES DAILY
Qty: 60 CAPSULE | Refills: 0 | Status: SHIPPED | OUTPATIENT
Start: 2023-08-02 | End: 2023-09-01

## 2023-08-02 RX ORDER — PANTOPRAZOLE SODIUM 40 MG/1
TABLET, DELAYED RELEASE ORAL
Qty: 90 TABLET | Refills: 1 | Status: SHIPPED | OUTPATIENT
Start: 2023-08-02

## 2023-08-02 ASSESSMENT — ENCOUNTER SYMPTOMS
GASTROINTESTINAL NEGATIVE: 1
ANAL BLEEDING: 0
NAUSEA: 0
ABDOMINAL DISTENTION: 0
VOMITING: 0
ABDOMINAL PAIN: 0
RESPIRATORY NEGATIVE: 1
COLOR CHANGE: 0
CONSTIPATION: 0
BLOOD IN STOOL: 0
SHORTNESS OF BREATH: 0
DIARRHEA: 0
WHEEZING: 0
TROUBLE SWALLOWING: 0
COUGH: 0
RECTAL PAIN: 0
SORE THROAT: 0
CHOKING: 0

## 2023-08-02 NOTE — PROGRESS NOTES
effusion, not elsewhere classified FINDINGS: Right arm PICC line tip is at the SVC/RA junction region. Tunneled right pleural drainage catheter in place with considerable decrease in size of right pleural effusion. There is slight blunting of the right costophrenic angle suggesting a small effusion. Heart size within normal limits with a slightly ectatic thoracic aorta. Lungs appear essentially clear with no new focal infiltrates identified. There is no acute osseous abnormality. Tunneled right pleural drainage catheter in place with a small residual right pleural effusion, significantly improved since the prior study. XR CHEST PORTABLE    Result Date: 7/7/2023  EXAMINATION: ONE XRAY VIEW OF THE CHEST 7/7/2023 5:42 am COMPARISON: 7/6/2023 HISTORY: ORDERING SYSTEM PROVIDED HISTORY: fluid TECHNOLOGIST PROVIDED HISTORY: fluid Reason for Exam: fluid Additional signs and symptoms: fluid FINDINGS: Stable right pleural effusion. No change in appearance of the right lung. PICC line is stable. Left lung is clear. Cardiomediastinal silhouette and bony thorax are unchanged. Stable examination with moderate right pleural effusion. XR CHEST PORTABLE    Result Date: 7/6/2023  EXAMINATION: ONE XRAY VIEW OF THE CHEST 7/6/2023 6:47 am COMPARISON: July 2, 2023 HISTORY: ORDERING SYSTEM PROVIDED HISTORY: infiltrate TECHNOLOGIST PROVIDED HISTORY: infiltrate Reason for Exam: infiltrate FINDINGS: Left lung remains clear. Interval worsening of right-sided pleural effusion with atelectasis or infiltrate at the right base. Cardiac size stable mediastinum unremarkable. No acute osseous abnormality. Telemetry leads overlie the chest.     Interval worsening of right lower lung infiltrate and right-sided pleural effusion.      IR US GUIDED PARACENTESIS    Result Date: 7/6/2023  PROCEDURE: PARACENTESIS WITH IMAGE GUIDANCE US ABDOMEN LIMITED 7/6/2023 HISTORY: ORDERING SYSTEM PROVIDED HISTORY: ascites, sepsis TECHNOLOGIST

## 2023-08-02 NOTE — PROGRESS NOTES
[facility]  Office Progress Note  Today's Date and Time: 8/2/2023, 10:03 AM    Impression:     1. Serratia sepsis ([address])    2. Cirrhosis of liver with ascites, unspecified hepatic cirrhosis type ([address])    3. Pleural effusion on right         Recommendations   Patient continues on intravenous antimicrobial therapy with Rocephin 2 g IV daily through August 19, 2023  Tolerating the antibiotic therapy well and the plan will be to check repeat blood cultures a week after completing the antibiotic therapy  The patient continues to have pancytopenia/leukopenia but this has remained stable and may well have been the reason he has recurrent infection  We will follow him thereafter to ensure no relapse of the infection but hopefully the extended course of treatment will have treated the nidus of the infection  We will continue to follow his progress and adjust therapy accordingly    I have ordered the following medications/ labs:  Orders Placed This Encounter   Procedures    Culture, Blood 2     Standing Status:   Future     Standing Expiration Date:   10/2/2023    Culture, Blood 1     Standing Status:   Future     Standing Expiration Date:   10/2/2023      No orders of the defined types were placed in this encounter. Chief complaint/reason for consultation:     Chief Complaint   Patient presents with    Frequent Infections     Follow up for Bacteremia        History of Present Illness: Gail Nesbitt is a 68y.o.-year-old male who has a history of cirrhosis with recurrent ascites, moderate portal hypertensive gastropathy with esophageal varices status post banding x3 6/7/2023 BPH status post prostatectomy, C status post bilateral pancytopenia felt secondary to cirrhosis, gastroesophageal reflux disease, chronic hypotension on midodrine.      The patient was placed hospitalized with Serratia marcescens bacteremia in May 2023 and he was treated with Rocephin while hospitalized and switched to

## 2023-08-03 ENCOUNTER — HOSPITAL ENCOUNTER (OUTPATIENT)
Age: 73
Setting detail: SPECIMEN
Discharge: HOME | End: 2023-08-03

## 2023-08-03 LAB
ANION GAP SERPL CALCULATED.3IONS-SCNC: 7 MMOL/L (ref 9–17)
BASOPHILS # BLD: 0.02 K/UL (ref 0–0.2)
BASOPHILS NFR BLD: 1 % (ref 0–2)
BUN SERPL-MCNC: 17 MG/DL (ref 8–23)
CALCIUM SERPL-MCNC: 8.5 MG/DL (ref 8.6–10.4)
CHLORIDE SERPL-SCNC: 102 MMOL/L (ref 98–107)
CO2 SERPL-SCNC: 23 MMOL/L (ref 20–31)
CREAT SERPL-MCNC: 0.6 MG/DL (ref 0.7–1.2)
EOSINOPHIL # BLD: 0.12 K/UL (ref 0–0.4)
EOSINOPHILS RELATIVE PERCENT: 7 % (ref 1–4)
ERYTHROCYTE [DISTWIDTH] IN BLOOD BY AUTOMATED COUNT: 18.4 % (ref 11.8–14.4)
GFR SERPL CREATININE-BSD FRML MDRD: >60 ML/MIN/1.73M2
GLUCOSE SERPL-MCNC: 167 MG/DL (ref 70–99)
HCT VFR BLD AUTO: 35.4 % (ref 40.7–50.3)
HGB BLD-MCNC: 11.1 G/DL (ref 13–17)
IMM GRANULOCYTES # BLD AUTO: 0.02 K/UL (ref 0–0.3)
IMM GRANULOCYTES NFR BLD: 1 %
LYMPHOCYTES NFR BLD: 0.34 K/UL (ref 1–4.8)
LYMPHOCYTES RELATIVE PERCENT: 20 % (ref 24–44)
MAGNESIUM SERPL-MCNC: 1.9 MG/DL (ref 1.6–2.6)
MCH RBC QN AUTO: 25.1 PG (ref 25.2–33.5)
MCHC RBC AUTO-ENTMCNC: 31.4 G/DL (ref 28.4–34.8)
MCV RBC AUTO: 80.1 FL (ref 82.6–102.9)
MONOCYTES NFR BLD: 0.64 K/UL (ref 0.1–0.8)
MONOCYTES NFR BLD: 38 % (ref 1–7)
MORPHOLOGY: ABNORMAL
MORPHOLOGY: ABNORMAL
NEUTROPHILS NFR BLD: 33 % (ref 36–66)
NEUTS SEG NFR BLD: 0.56 K/UL (ref 1.8–7.7)
NRBC BLD-RTO: 0 PER 100 WBC
PLATELET # BLD AUTO: ABNORMAL K/UL (ref 138–453)
PLATELET, FLUORESCENCE: 69 K/UL (ref 138–453)
PLATELETS.RETICULATED NFR BLD AUTO: 8.7 % (ref 1.1–10.3)
POTASSIUM SERPL-SCNC: 4.4 MMOL/L (ref 3.7–5.3)
RBC # BLD AUTO: 4.42 M/UL (ref 4.21–5.77)
SODIUM SERPL-SCNC: 132 MMOL/L (ref 135–144)
WBC OTHER # BLD: 1.7 K/UL (ref 3.5–11.3)

## 2023-08-06 DIAGNOSIS — E11.65 TYPE 2 DIABETES MELLITUS WITH HYPERGLYCEMIA, WITHOUT LONG-TERM CURRENT USE OF INSULIN (HCC): ICD-10-CM

## 2023-08-07 ENCOUNTER — TELEPHONE (OUTPATIENT)
Dept: GASTROENTEROLOGY | Age: 73
End: 2023-08-07

## 2023-08-07 RX ORDER — BLOOD SUGAR DIAGNOSTIC
STRIP MISCELLANEOUS
Qty: 50 STRIP | Refills: 0 | OUTPATIENT
Start: 2023-08-07

## 2023-08-08 ENCOUNTER — ANESTHESIA (OUTPATIENT)
Dept: OPERATING ROOM | Age: 73
End: 2023-08-08
Payer: COMMERCIAL

## 2023-08-08 ENCOUNTER — HOSPITAL ENCOUNTER (OUTPATIENT)
Age: 73
Setting detail: OUTPATIENT SURGERY
Discharge: HOME | End: 2023-08-08
Attending: INTERNAL MEDICINE | Admitting: INTERNAL MEDICINE
Payer: COMMERCIAL

## 2023-08-08 ENCOUNTER — ANESTHESIA EVENT (OUTPATIENT)
Dept: OPERATING ROOM | Age: 73
End: 2023-08-08
Payer: COMMERCIAL

## 2023-08-08 VITALS
OXYGEN SATURATION: 100 % | HEIGHT: 72 IN | WEIGHT: 121 LBS | BODY MASS INDEX: 16.39 KG/M2 | HEART RATE: 60 BPM | RESPIRATION RATE: 15 BRPM | SYSTOLIC BLOOD PRESSURE: 108 MMHG | DIASTOLIC BLOOD PRESSURE: 69 MMHG | TEMPERATURE: 97.7 F

## 2023-08-08 DIAGNOSIS — I10 ESSENTIAL HYPERTENSION: ICD-10-CM

## 2023-08-08 DIAGNOSIS — R78.81 BACTEREMIA DUE TO GRAM-NEGATIVE BACTERIA: ICD-10-CM

## 2023-08-08 DIAGNOSIS — D61.818 PANCYTOPENIA (HCC): ICD-10-CM

## 2023-08-08 DIAGNOSIS — Z87.19 HISTORY OF HEMATEMESIS: Chronic | ICD-10-CM

## 2023-08-08 DIAGNOSIS — R65.10 SIRS (SYSTEMIC INFLAMMATORY RESPONSE SYNDROME) (HCC): ICD-10-CM

## 2023-08-08 DIAGNOSIS — E11.65 CONTROLLED TYPE 2 DIABETES MELLITUS WITH HYPERGLYCEMIA, WITH LONG-TERM CURRENT USE OF INSULIN (HCC): ICD-10-CM

## 2023-08-08 DIAGNOSIS — R19.5 POSITIVE FIT (FECAL IMMUNOCHEMICAL TEST): ICD-10-CM

## 2023-08-08 DIAGNOSIS — D50.9 MICROCYTIC HYPOCHROMIC ANEMIA: ICD-10-CM

## 2023-08-08 DIAGNOSIS — K92.0 HEMATEMESIS WITHOUT NAUSEA: ICD-10-CM

## 2023-08-08 DIAGNOSIS — J90 PLEURAL EFFUSION ON RIGHT: ICD-10-CM

## 2023-08-08 DIAGNOSIS — A41.53: ICD-10-CM

## 2023-08-08 DIAGNOSIS — A41.50 GRAM NEGATIVE SEPSIS (HCC): Primary | ICD-10-CM

## 2023-08-08 DIAGNOSIS — D69.6 THROMBOCYTOPENIA (HCC): ICD-10-CM

## 2023-08-08 DIAGNOSIS — R18.8 OTHER ASCITES: ICD-10-CM

## 2023-08-08 DIAGNOSIS — K92.2 UPPER GI BLEED: ICD-10-CM

## 2023-08-08 DIAGNOSIS — C61 PROSTATE CANCER (HCC): ICD-10-CM

## 2023-08-08 DIAGNOSIS — D47.2 MGUS (MONOCLONAL GAMMOPATHY OF UNKNOWN SIGNIFICANCE): ICD-10-CM

## 2023-08-08 DIAGNOSIS — E87.0 HYPERNATREMIA: ICD-10-CM

## 2023-08-08 DIAGNOSIS — R79.89 ELEVATED LACTIC ACID LEVEL: ICD-10-CM

## 2023-08-08 DIAGNOSIS — R53.81 DEBILITY: ICD-10-CM

## 2023-08-08 DIAGNOSIS — E87.1 HYPONATREMIA: ICD-10-CM

## 2023-08-08 DIAGNOSIS — Z85.46 HISTORY OF PROSTATE CANCER: ICD-10-CM

## 2023-08-08 DIAGNOSIS — Z79.4 CONTROLLED TYPE 2 DIABETES MELLITUS WITH HYPERGLYCEMIA, WITH LONG-TERM CURRENT USE OF INSULIN (HCC): ICD-10-CM

## 2023-08-08 DIAGNOSIS — K74.69 OTHER CIRRHOSIS OF LIVER (HCC): ICD-10-CM

## 2023-08-08 DIAGNOSIS — K76.6 PORTAL HYPERTENSION (HCC): ICD-10-CM

## 2023-08-08 DIAGNOSIS — E87.5 HYPERKALEMIA: ICD-10-CM

## 2023-08-08 DIAGNOSIS — E43 SEVERE MALNUTRITION (HCC): ICD-10-CM

## 2023-08-08 DIAGNOSIS — K21.9 GASTROESOPHAGEAL REFLUX DISEASE, UNSPECIFIED WHETHER ESOPHAGITIS PRESENT: ICD-10-CM

## 2023-08-08 DIAGNOSIS — I85.00 IDIOPATHIC ESOPHAGEAL VARICES WITHOUT BLEEDING (HCC): ICD-10-CM

## 2023-08-08 DIAGNOSIS — R97.20 PSA ELEVATION: ICD-10-CM

## 2023-08-08 LAB
METER GLUCOSE: 111 MG/DL (ref 75–110)
METER GLUCOSE: 120 MG/DL (ref 75–110)

## 2023-08-08 PROCEDURE — C1889 IMPLANT/INSERT DEVICE, NOC: HCPCS | Performed by: INTERNAL MEDICINE

## 2023-08-08 PROCEDURE — 2580000003 HC RX 258: Performed by: SPECIALIST

## 2023-08-08 PROCEDURE — 2500000003 HC RX 250 WO HCPCS: Performed by: SPECIALIST

## 2023-08-08 PROCEDURE — 7100000010 HC PHASE II RECOVERY - FIRST 15 MIN: Performed by: INTERNAL MEDICINE

## 2023-08-08 PROCEDURE — 3700000000 HC ANESTHESIA ATTENDED CARE: Performed by: INTERNAL MEDICINE

## 2023-08-08 PROCEDURE — 7100000011 HC PHASE II RECOVERY - ADDTL 15 MIN: Performed by: INTERNAL MEDICINE

## 2023-08-08 PROCEDURE — 3609013400 HC EGD REMOVAL LESION(S) BY HOT BIOPSY FORCEPS: Performed by: INTERNAL MEDICINE

## 2023-08-08 PROCEDURE — 3700000001 HC ADD 15 MINUTES (ANESTHESIA): Performed by: INTERNAL MEDICINE

## 2023-08-08 PROCEDURE — 2709999900 HC NON-CHARGEABLE SUPPLY: Performed by: INTERNAL MEDICINE

## 2023-08-08 PROCEDURE — 43251 EGD REMOVE LESION SNARE: CPT | Performed by: INTERNAL MEDICINE

## 2023-08-08 PROCEDURE — 88305 TISSUE EXAM BY PATHOLOGIST: CPT

## 2023-08-08 PROCEDURE — 2580000003 HC RX 258: Performed by: ANESTHESIOLOGY

## 2023-08-08 PROCEDURE — 82947 ASSAY GLUCOSE BLOOD QUANT: CPT

## 2023-08-08 PROCEDURE — 6360000002 HC RX W HCPCS: Performed by: SPECIALIST

## 2023-08-08 RX ORDER — PROPOFOL 10 MG/ML
INJECTION, EMULSION INTRAVENOUS PRN
Status: DISCONTINUED | OUTPATIENT
Start: 2023-08-08 | End: 2023-08-08 | Stop reason: SDUPTHER

## 2023-08-08 RX ORDER — SODIUM CHLORIDE 9 MG/ML
INJECTION, SOLUTION INTRAVENOUS PRN
Status: CANCELLED | OUTPATIENT
Start: 2023-08-08

## 2023-08-08 RX ORDER — SODIUM CHLORIDE, SODIUM LACTATE, POTASSIUM CHLORIDE, CALCIUM CHLORIDE 600; 310; 30; 20 MG/100ML; MG/100ML; MG/100ML; MG/100ML
INJECTION, SOLUTION INTRAVENOUS CONTINUOUS PRN
Status: DISCONTINUED | OUTPATIENT
Start: 2023-08-08 | End: 2023-08-08 | Stop reason: SDUPTHER

## 2023-08-08 RX ORDER — FENTANYL CITRATE 50 UG/ML
25 INJECTION, SOLUTION INTRAMUSCULAR; INTRAVENOUS EVERY 5 MIN PRN
Status: CANCELLED | OUTPATIENT
Start: 2023-08-08

## 2023-08-08 RX ORDER — HYDROMORPHONE HYDROCHLORIDE 1 MG/ML
0.5 INJECTION, SOLUTION INTRAMUSCULAR; INTRAVENOUS; SUBCUTANEOUS EVERY 5 MIN PRN
Status: CANCELLED | OUTPATIENT
Start: 2023-08-08

## 2023-08-08 RX ORDER — SODIUM CHLORIDE, SODIUM LACTATE, POTASSIUM CHLORIDE, CALCIUM CHLORIDE 600; 310; 30; 20 MG/100ML; MG/100ML; MG/100ML; MG/100ML
INJECTION, SOLUTION INTRAVENOUS CONTINUOUS
Status: DISCONTINUED | OUTPATIENT
Start: 2023-08-08 | End: 2023-08-08 | Stop reason: HOSPADM

## 2023-08-08 RX ORDER — LIDOCAINE HYDROCHLORIDE 10 MG/ML
INJECTION, SOLUTION EPIDURAL; INFILTRATION; INTRACAUDAL; PERINEURAL PRN
Status: DISCONTINUED | OUTPATIENT
Start: 2023-08-08 | End: 2023-08-08 | Stop reason: SDUPTHER

## 2023-08-08 RX ORDER — SODIUM CHLORIDE 0.9 % (FLUSH) 0.9 %
5-40 SYRINGE (ML) INJECTION PRN
Status: CANCELLED | OUTPATIENT
Start: 2023-08-08

## 2023-08-08 RX ORDER — ONDANSETRON 2 MG/ML
4 INJECTION INTRAMUSCULAR; INTRAVENOUS
Status: CANCELLED | OUTPATIENT
Start: 2023-08-08 | End: 2023-08-09

## 2023-08-08 RX ORDER — SODIUM CHLORIDE 0.9 % (FLUSH) 0.9 %
5-40 SYRINGE (ML) INJECTION EVERY 12 HOURS SCHEDULED
Status: CANCELLED | OUTPATIENT
Start: 2023-08-08

## 2023-08-08 RX ORDER — LIDOCAINE HYDROCHLORIDE 10 MG/ML
1 INJECTION, SOLUTION EPIDURAL; INFILTRATION; INTRACAUDAL; PERINEURAL
Status: DISCONTINUED | OUTPATIENT
Start: 2023-08-09 | End: 2023-08-08 | Stop reason: HOSPADM

## 2023-08-08 RX ORDER — SODIUM CHLORIDE 9 MG/ML
INJECTION, SOLUTION INTRAVENOUS CONTINUOUS
Status: DISCONTINUED | OUTPATIENT
Start: 2023-08-08 | End: 2023-08-08 | Stop reason: HOSPADM

## 2023-08-08 RX ADMIN — PROPOFOL 70 MG: 10 INJECTION, EMULSION INTRAVENOUS at 10:11

## 2023-08-08 RX ADMIN — LIDOCAINE HYDROCHLORIDE 50 MG: 10 INJECTION, SOLUTION EPIDURAL; INFILTRATION; INTRACAUDAL; PERINEURAL at 10:11

## 2023-08-08 RX ADMIN — PROPOFOL 40 MG: 10 INJECTION, EMULSION INTRAVENOUS at 10:24

## 2023-08-08 RX ADMIN — SODIUM CHLORIDE, POTASSIUM CHLORIDE, SODIUM LACTATE AND CALCIUM CHLORIDE: 600; 310; 30; 20 INJECTION, SOLUTION INTRAVENOUS at 09:29

## 2023-08-08 RX ADMIN — PROPOFOL 70 MG: 10 INJECTION, EMULSION INTRAVENOUS at 10:17

## 2023-08-08 RX ADMIN — SODIUM CHLORIDE, POTASSIUM CHLORIDE, SODIUM LACTATE AND CALCIUM CHLORIDE: 600; 310; 30; 20 INJECTION, SOLUTION INTRAVENOUS at 08:30

## 2023-08-08 ASSESSMENT — PAIN - FUNCTIONAL ASSESSMENT: PAIN_FUNCTIONAL_ASSESSMENT: 0-10

## 2023-08-08 NOTE — OP NOTE
PROCEDURE NOTE    DATE OF PROCEDURE: 8/8/2023     SURGEON: Rick Marion MD    ASSISTANT: None    PREOPERATIVE DIAGNOSIS: CIRRHOSIS  S/P VARICEAL BANDING    POSTOPERATIVE DIAGNOSIS: As described below    OPERATION: Upper GI endoscopy with HOT SNARE POLYPECTOMIES X 5  HEMO CLIP APPLICATION X 3    ANESTHESIA: MAC PER ANESTHESIA     ESTIMATED BLOOD LOSS: Less than 50 ml    COMPLICATIONS: None. SPECIMENS:  Was Obtained:     HISTORY: The patient is a 68y.o. year old male with history of above preop diagnosis. I recommended esophagogastroduodenoscopy with possible biopsy and I explained the risk, benefits, expected outcome, and alternatives to the procedure. Risks included but are not limited to bleeding, infection, respiratory distress, hypotension, and perforation of the esophagus, stomach, or duodenum. Patient understands and is in agreement. PROCEDURE: The patient was given IV conscious sedation. The patient's SPO2 remained above 90% throughout the procedure. The gastroscope was inserted orally and advanced under direct vision through the esophagus, through the stomach, through the pylorus, and into the descending duodenum. Findings:    Retropharyngeal area was grossly normal appearing    Esophagus: abnormal: MINIMAL ESOPHAGEAL VARICES NO RED WHALE SIGNS. PREVIOUSLY BANDED VARICES HAD WELL HEALED AREAS  MULTIPLE PICTURES WERE TAKEN     Stomach:    Fundus: abnormal: SEVERE PORTAL HYPERTENSIVE GASTROPATHY    Body: abnormal: AS ABOVE    Antrum: abnormal: LARGE POLYPS 1 CM TO 2.5 CM  THESE WERE REMOVED WITH HOT SNARE  RETRIEVED WITH PONCE NET  LARGE POLYPECTOMY SITE HAD SOME OOZING OF BLOOD  THREE HEMO CLIPS WERE APPLIED TO COMPLETE HEMOSTASIS   MULTIPLE PICTURES WERE TAKEN    Duodenum:     Descending: normal    Bulb: normal    The scope was removed and the patient tolerated the procedure well.      Recommendations/Plan:   F/U Biopsies  CONT PPI  CARAFATE QID  F/U In Office in 3-4 weeks  Discussed with the

## 2023-08-08 NOTE — ANESTHESIA POSTPROCEDURE EVALUATION
Department of Anesthesiology  Postprocedure Note    Patient: Allan Alex MRN: [id number]  YOB: 1950  Date of evaluation: 8/8/2023      Procedure Summary     Date: 08/08/23 Room / Location: [place] [address] / [facility] - INPATIENT    Anesthesia Start: 1008 Anesthesia Stop: 8604    Procedure: EGD POLYP HOT FORCEP/CAUTERY Diagnosis:       Gastroesophageal reflux disease, unspecified whether esophagitis present      Pancytopenia (HCC)      Other ascites      (Gastroesophageal reflux disease, unspecified whether esophagitis present [K21.9])      (Pancytopenia ([address]) [D61.818])      (Other ascites [R18.8])    Surgeons: Javier Alfaro MD Responsible Provider: Priya Gant MD    Anesthesia Type: MAC ASA Status: 3          Anesthesia Type: No value filed.     Kayleigh Phase I: Kayleigh Score: 10    Kayleigh Phase II: Kayleigh Score: 4      Anesthesia Post Evaluation    Patient location during evaluation: PACU  Patient participation: complete - patient participated  Level of consciousness: awake and alert  Airway patency: patent  Nausea & Vomiting: no nausea and no vomiting  Complications: no  Cardiovascular status: hemodynamically stable  Respiratory status: acceptable  Hydration status: euvolemic  Pain management: adequate

## 2023-08-08 NOTE — H&P
Chief Complaint   Patient presents with    Cough     dry cough x 2 days     Nasal Discharge     starting today     GERD DONE IN PAPER CHARTS

## 2023-08-08 NOTE — ANESTHESIA PRE PROCEDURE
Department of Anesthesiology  Preprocedure Note       Name:  Taniya Mckeon. Age:  68 y.o.  :  1950                                          MRN:  [id number]         Date:  2023      Surgeon: Kia Ford):  Regina Sierra MD    Procedure: Procedure(s):  EGD ESOPHAGOGASTRODUODENOSCOPY    Medications prior to admission:   Prior to Admission medications    Medication Sig Start Date End Date Taking?  Authorizing Provider   propranolol (INDERAL LA) 60 MG extended release capsule Take 1 capsule by mouth 2 times daily 23  Regina Sierra MD   furosemide (LASIX) 40 MG tablet Take 1 tablet by mouth daily 23   Regina Sierra MD   pantoprazole (PROTONIX) 40 MG tablet TAKE 1 TABLET BY MOUTH ONE TIME A DAY 23   Regina Sierra MD   magnesium cl-calcium carbonate (SLOW-MAG) 71.5-119 MG TBEC tablet Take 2 tablets by mouth daily 23   SCARLET Batista CNP   blood glucose test strips (PRODIGY NO CODING BLOOD GLUC) strip USE TO TEST BLOOD GLUCOSE ONCE DAILY AND AS NEEDED FOR SIGNS OF HYPOGLYCEMIA 23   SCARLET Batista CNP   Insulin Pen Needle (PEN NEEDLES 3/16\") 31G X 5 MM MISC 1 each by Does not apply route daily 23   SCARLET Batista CNP   insulin glargine (LANTUS SOLOSTAR) 100 UNIT/ML injection pen Inject 14 Units into the skin nightly 23   SCARLET Batista CNP   cefTRIAXone (ROCEPHIN) infusion Infuse 2,000 mg intravenously every 24 hours Compound per protocol 23  Millicent  SCARLET Valenzuela CNP   spironolactone (ALDACTONE) 100 MG tablet TAKE ONE TABLET BY MOUTH ONE TIME A DAY 23   Regina Sierra MD   cyclobenzaprine (FLEXERIL) 10 MG tablet Take 1 tablet by mouth as needed for Muscle spasms    Historical Provider, MD   ferrous sulfate (IRON 325) 325 (65 Fe) MG tablet Take 1 tablet by mouth daily (with breakfast) Every other day    Historical Provider, MD   Alcohol Swabs PADS USE TO TEST ONCE DAILY AND AS NEEDED 23   Berta Wiggins, APRN - CNP   Lancets

## 2023-08-09 ENCOUNTER — HOSPITAL ENCOUNTER (OUTPATIENT)
Age: 73
Discharge: HOME | End: 2023-08-09
Payer: COMMERCIAL

## 2023-08-09 DIAGNOSIS — Z87.19 HISTORY OF HEMATEMESIS: Chronic | ICD-10-CM

## 2023-08-09 DIAGNOSIS — D69.6 THROMBOCYTOPENIA (HCC): ICD-10-CM

## 2023-08-09 DIAGNOSIS — K76.6 PORTAL HYPERTENSION (HCC): ICD-10-CM

## 2023-08-09 DIAGNOSIS — J90 PLEURAL EFFUSION ON RIGHT: ICD-10-CM

## 2023-08-09 DIAGNOSIS — E87.0 HYPERNATREMIA: ICD-10-CM

## 2023-08-09 DIAGNOSIS — D47.2 MGUS (MONOCLONAL GAMMOPATHY OF UNKNOWN SIGNIFICANCE): ICD-10-CM

## 2023-08-09 DIAGNOSIS — E87.1 HYPONATREMIA: ICD-10-CM

## 2023-08-09 DIAGNOSIS — A41.50 GRAM NEGATIVE SEPSIS (HCC): ICD-10-CM

## 2023-08-09 DIAGNOSIS — K74.69 OTHER CIRRHOSIS OF LIVER (HCC): ICD-10-CM

## 2023-08-09 DIAGNOSIS — D61.818 PANCYTOPENIA (HCC): ICD-10-CM

## 2023-08-09 DIAGNOSIS — Z79.4 CONTROLLED TYPE 2 DIABETES MELLITUS WITH HYPERGLYCEMIA, WITH LONG-TERM CURRENT USE OF INSULIN (HCC): ICD-10-CM

## 2023-08-09 DIAGNOSIS — K92.2 UPPER GI BLEED: ICD-10-CM

## 2023-08-09 DIAGNOSIS — R97.20 PSA ELEVATION: ICD-10-CM

## 2023-08-09 DIAGNOSIS — R65.10 SIRS (SYSTEMIC INFLAMMATORY RESPONSE SYNDROME) (HCC): ICD-10-CM

## 2023-08-09 DIAGNOSIS — E83.42 HYPOMAGNESEMIA: ICD-10-CM

## 2023-08-09 DIAGNOSIS — Z85.46 HISTORY OF PROSTATE CANCER: ICD-10-CM

## 2023-08-09 DIAGNOSIS — E11.65 CONTROLLED TYPE 2 DIABETES MELLITUS WITH HYPERGLYCEMIA, WITH LONG-TERM CURRENT USE OF INSULIN (HCC): ICD-10-CM

## 2023-08-09 DIAGNOSIS — R18.8 OTHER ASCITES: ICD-10-CM

## 2023-08-09 DIAGNOSIS — K92.0 HEMATEMESIS WITHOUT NAUSEA: ICD-10-CM

## 2023-08-09 DIAGNOSIS — I10 ESSENTIAL HYPERTENSION: ICD-10-CM

## 2023-08-09 DIAGNOSIS — E87.5 HYPERKALEMIA: ICD-10-CM

## 2023-08-09 DIAGNOSIS — R19.5 POSITIVE FIT (FECAL IMMUNOCHEMICAL TEST): ICD-10-CM

## 2023-08-09 DIAGNOSIS — K21.9 GASTROESOPHAGEAL REFLUX DISEASE, UNSPECIFIED WHETHER ESOPHAGITIS PRESENT: ICD-10-CM

## 2023-08-09 DIAGNOSIS — A41.53: ICD-10-CM

## 2023-08-09 DIAGNOSIS — E43 SEVERE MALNUTRITION (HCC): ICD-10-CM

## 2023-08-09 DIAGNOSIS — D50.9 MICROCYTIC HYPOCHROMIC ANEMIA: ICD-10-CM

## 2023-08-09 DIAGNOSIS — C61 PROSTATE CANCER (HCC): ICD-10-CM

## 2023-08-09 DIAGNOSIS — R79.89 ELEVATED LACTIC ACID LEVEL: ICD-10-CM

## 2023-08-09 DIAGNOSIS — I85.00 IDIOPATHIC ESOPHAGEAL VARICES WITHOUT BLEEDING (HCC): ICD-10-CM

## 2023-08-09 DIAGNOSIS — R78.81 BACTEREMIA DUE TO GRAM-NEGATIVE BACTERIA: ICD-10-CM

## 2023-08-09 DIAGNOSIS — R53.81 DEBILITY: ICD-10-CM

## 2023-08-09 LAB
AMMONIA PLAS-SCNC: 41 UMOL/L (ref 16–60)
ANION GAP SERPL CALCULATED.3IONS-SCNC: 18 MMOL/L (ref 9–17)
BASOPHILS # BLD: 0 K/UL (ref 0–0.2)
BASOPHILS NFR BLD: 0 % (ref 0–2)
BUN SERPL-MCNC: 19 MG/DL (ref 8–23)
CALCIUM SERPL-MCNC: 8.8 MG/DL (ref 8.6–10.4)
CHLORIDE SERPL-SCNC: 101 MMOL/L (ref 98–107)
CO2 SERPL-SCNC: 16 MMOL/L (ref 20–31)
CREAT SERPL-MCNC: 0.7 MG/DL (ref 0.7–1.2)
EOSINOPHIL # BLD: 0.18 K/UL (ref 0–0.4)
EOSINOPHILS RELATIVE PERCENT: 3 % (ref 1–4)
ERYTHROCYTE [DISTWIDTH] IN BLOOD BY AUTOMATED COUNT: 19.1 % (ref 11.8–14.4)
GFR SERPL CREATININE-BSD FRML MDRD: >60 ML/MIN/1.73M2
GLUCOSE SERPL-MCNC: 129 MG/DL (ref 70–99)
HCT VFR BLD AUTO: 39.9 % (ref 40.7–50.3)
HGB BLD-MCNC: 12.1 G/DL (ref 13–17)
IMM GRANULOCYTES # BLD AUTO: 0 K/UL (ref 0–0.3)
IMM GRANULOCYTES NFR BLD: 0 %
LYMPHOCYTES NFR BLD: 0.31 K/UL (ref 1–4.8)
LYMPHOCYTES RELATIVE PERCENT: 5 % (ref 24–44)
MAGNESIUM SERPL-MCNC: 1.9 MG/DL (ref 1.6–2.6)
MCH RBC QN AUTO: 25.2 PG (ref 25.2–33.5)
MCHC RBC AUTO-ENTMCNC: 30.3 G/DL (ref 28.4–34.8)
MCV RBC AUTO: 83 FL (ref 82.6–102.9)
MONOCYTES NFR BLD: 1.16 K/UL (ref 0.1–0.8)
MONOCYTES NFR BLD: 19 % (ref 1–7)
MORPHOLOGY: ABNORMAL
NEUTROPHILS NFR BLD: 73 % (ref 36–66)
NEUTS SEG NFR BLD: 4.45 K/UL (ref 1.8–7.7)
NRBC BLD-RTO: 0 PER 100 WBC
PLATELET # BLD AUTO: ABNORMAL K/UL (ref 138–453)
PLATELET, FLUORESCENCE: 67 K/UL (ref 138–453)
PLATELETS.RETICULATED NFR BLD AUTO: 9 % (ref 1.1–10.3)
POTASSIUM SERPL-SCNC: 4.5 MMOL/L (ref 3.7–5.3)
RBC # BLD AUTO: 4.81 M/UL (ref 4.21–5.77)
SODIUM SERPL-SCNC: 135 MMOL/L (ref 135–144)
WBC OTHER # BLD: 6.1 K/UL (ref 3.5–11.3)

## 2023-08-09 PROCEDURE — 85055 RETICULATED PLATELET ASSAY: CPT

## 2023-08-09 PROCEDURE — 80048 BASIC METABOLIC PNL TOTAL CA: CPT

## 2023-08-09 PROCEDURE — 83735 ASSAY OF MAGNESIUM: CPT

## 2023-08-09 PROCEDURE — 82140 ASSAY OF AMMONIA: CPT

## 2023-08-09 PROCEDURE — 85025 COMPLETE CBC W/AUTO DIFF WBC: CPT

## 2023-08-09 PROCEDURE — 36415 COLL VENOUS BLD VENIPUNCTURE: CPT

## 2023-08-10 ENCOUNTER — CARE COORDINATION (OUTPATIENT)
Dept: OTHER | Facility: CLINIC | Age: 73
End: 2023-08-10

## 2023-08-10 LAB — SURGICAL PATHOLOGY REPORT: NORMAL

## 2023-08-10 NOTE — CARE COORDINATION
Ambulatory Care Coordination Note  8/10/2023    Patient Current Location:  Home: [address] [place] [address] Eliazarcalillie   in [facility] contacted the  wife  by telephone. Verified name and  with  wife  as identifiers. Provided introduction to self, and explanation of the ACM role. Challenges to be reviewed by the provider   Additional needs identified to be addressed with provider: No  none               Method of communication with provider: none. ACM: Cuba Santoyo RN    Spoke briefly with wife who is the primary care giver for Terri burns. She was out to lunch with her mom , she said Terri burns at first was having nausea etc.. but now is doing better and no issues. Will call at later time as wife is out with family at this time. Offered patient enrollment in the Remote Patient Monitoring (RPM) program for in-home monitoring: NA.        Care Coordination Interventions    Referral from Primary Care Provider: No  Suggested Interventions and Community Resources  Disease Specific Clinic: Completed (Comment: Dr Adriana Kaplan ( GI)  Dr Elke Kelley (Pulmonary))  Home Health Services: Completed (Comment: [address] skilled nursing and therapy)          Goals Addressed                      This Visit's Progress      Conditions and Symptoms   On track      I will schedule office visits, as directed by my provider. I will keep my appointment or reschedule if I have to cancel. I will notify my provider of any barriers to my plan of care. I will follow my Zone Management tool to seek urgent or emergent care. I will notify my provider of any symptoms that indicate a worsening of my condition.     Barriers: overwhelmed by complexity of regimen  Plan for overcoming my barriers: Pt/Wife will continue to engage with ACM and providers to promote success in meeting goals   Confidence: 10/10  Anticipated Goal Completion Date: 23 and ongoing  23 - Goal being met           Patient Stated (pt-stated)   On track      Pt's pain

## 2023-08-14 ENCOUNTER — TELEPHONE (OUTPATIENT)
Dept: INFECTIOUS DISEASES | Age: 73
End: 2023-08-14

## 2023-08-14 NOTE — TELEPHONE ENCOUNTER
Called [facility] back and notified to continue with planned stop date of 8/19. They are asking to pull the PICC line. 8/14/2023 1:09 PM  can they pull the line after last dose  Read 8/14/2023 1:09 PM    8/14/2023 1:09 PM  Yes    [facility] notified of order to pull PICC line after last dose per Dr. René Gupta.

## 2023-08-14 NOTE — TELEPHONE ENCOUNTER
[facility] called to check status of Rocephin. Per the plan, patient is to be on Rocephin though 8/19/23. They want to know if patient will be continuing it or stopping as planned. Patient is scheduled to follow up on 9/6/23. Please advise. Thank you. Perfect Serve sent to Dr. Pancho Love.

## 2023-08-16 ENCOUNTER — HOSPITAL ENCOUNTER (OUTPATIENT)
Age: 73
Setting detail: SPECIMEN
Discharge: HOME | End: 2023-08-16
Payer: COMMERCIAL

## 2023-08-16 LAB
ANION GAP SERPL CALCULATED.3IONS-SCNC: 8 MMOL/L (ref 9–17)
BUN SERPL-MCNC: 16 MG/DL (ref 8–23)
BUN/CREAT SERPL: 32 (ref 9–20)
CALCIUM SERPL-MCNC: 8.3 MG/DL (ref 8.6–10.4)
CHLORIDE SERPL-SCNC: 100 MMOL/L (ref 98–107)
CO2 SERPL-SCNC: 22 MMOL/L (ref 20–31)
CREAT SERPL-MCNC: 0.5 MG/DL (ref 0.7–1.2)
ERYTHROCYTE [DISTWIDTH] IN BLOOD BY AUTOMATED COUNT: 18.4 % (ref 11.8–14.4)
GFR SERPL CREATININE-BSD FRML MDRD: >60 ML/MIN/1.73M2
GLUCOSE SERPL-MCNC: 119 MG/DL (ref 70–99)
HCT VFR BLD AUTO: 33.7 % (ref 40.7–50.3)
HGB BLD-MCNC: 10.7 G/DL (ref 13–17)
MAGNESIUM SERPL-MCNC: 1.9 MG/DL (ref 1.6–2.6)
MCH RBC QN AUTO: 25.7 PG (ref 25.2–33.5)
MCHC RBC AUTO-ENTMCNC: 31.8 G/DL (ref 28.4–34.8)
MCV RBC AUTO: 80.8 FL (ref 82.6–102.9)
NRBC BLD-RTO: 0 PER 100 WBC
PLATELET # BLD AUTO: 65 K/UL (ref 138–453)
PMV BLD AUTO: ABNORMAL FL (ref 8.1–13.5)
POTASSIUM SERPL-SCNC: 4 MMOL/L (ref 3.7–5.3)
RBC # BLD AUTO: 4.17 M/UL (ref 4.21–5.77)
SODIUM SERPL-SCNC: 130 MMOL/L (ref 135–144)
WBC OTHER # BLD: 1.6 K/UL (ref 3.5–11.3)

## 2023-08-16 PROCEDURE — 85027 COMPLETE CBC AUTOMATED: CPT

## 2023-08-16 PROCEDURE — 83735 ASSAY OF MAGNESIUM: CPT

## 2023-08-16 PROCEDURE — 80048 BASIC METABOLIC PNL TOTAL CA: CPT

## 2023-08-16 RX ORDER — PROPRANOLOL HCL 60 MG
CAPSULE, EXTENDED RELEASE 24HR ORAL
Qty: 60 CAPSULE | Refills: 0 | OUTPATIENT
Start: 2023-08-16

## 2023-08-17 ENCOUNTER — CARE COORDINATION (OUTPATIENT)
Dept: OTHER | Facility: CLINIC | Age: 73
End: 2023-08-17

## 2023-08-17 NOTE — CARE COORDINATION
Ambulatory Care Coordination Note    ACM attempted to reach patient for care management follow up call regarding IV antibiotics, GI symptoms, po intake PICC line and Home Care . HIPAA compliant message left requesting a return phone call at spouse convenience. Plan for follow-up call in 7-10 days    Future Appointments   Date Time Provider [address]   9/1/2023  1:00 PM SCARLET Babb - CNP W [facility]   9/6/2023 10:30 AM Romeo Schaumann, MD INFT DISEASE [facility]   9/12/2023  3:45 PM Iwona Jain MD [address] [facility]         Amalia HAWKINS, RN- [facility]  Associate Care Manager  [phone]  [email or web address]

## 2023-08-21 ENCOUNTER — HOSPITAL ENCOUNTER (OUTPATIENT)
Age: 73
Setting detail: SPECIMEN
Discharge: HOME | End: 2023-08-21

## 2023-08-21 LAB
ANION GAP SERPL CALCULATED.3IONS-SCNC: 8 MMOL/L (ref 9–17)
BASOPHILS # BLD: 0.02 K/UL (ref 0–0.2)
BASOPHILS NFR BLD: 1 % (ref 0–2)
BUN SERPL-MCNC: 17 MG/DL (ref 8–23)
CALCIUM SERPL-MCNC: 8.4 MG/DL (ref 8.6–10.4)
CHLORIDE SERPL-SCNC: 98 MMOL/L (ref 98–107)
CO2 SERPL-SCNC: 22 MMOL/L (ref 20–31)
CREAT SERPL-MCNC: 0.6 MG/DL (ref 0.7–1.2)
EOSINOPHIL # BLD: 0.11 K/UL (ref 0–0.4)
EOSINOPHILS RELATIVE PERCENT: 7 % (ref 1–4)
ERYTHROCYTE [DISTWIDTH] IN BLOOD BY AUTOMATED COUNT: 18.5 % (ref 11.8–14.4)
GFR SERPL CREATININE-BSD FRML MDRD: >60 ML/MIN/1.73M2
GLUCOSE SERPL-MCNC: 136 MG/DL (ref 70–99)
HCT VFR BLD AUTO: 34.5 % (ref 40.7–50.3)
HGB BLD-MCNC: 11 G/DL (ref 13–17)
IMM GRANULOCYTES # BLD AUTO: 0 K/UL (ref 0–0.3)
IMM GRANULOCYTES NFR BLD: 0 %
LYMPHOCYTES NFR BLD: 0.44 K/UL (ref 1–4.8)
LYMPHOCYTES RELATIVE PERCENT: 29 % (ref 24–44)
MAGNESIUM SERPL-MCNC: 1.8 MG/DL (ref 1.6–2.6)
MCH RBC QN AUTO: 25.5 PG (ref 25.2–33.5)
MCHC RBC AUTO-ENTMCNC: 31.9 G/DL (ref 28.4–34.8)
MCV RBC AUTO: 79.9 FL (ref 82.6–102.9)
MONOCYTES NFR BLD: 0.6 K/UL (ref 0.1–0.8)
MONOCYTES NFR BLD: 41 % (ref 1–7)
MORPHOLOGY: ABNORMAL
MORPHOLOGY: ABNORMAL
NEUTROPHILS NFR BLD: 22 % (ref 36–66)
NEUTS SEG NFR BLD: 0.33 K/UL (ref 1.8–7.7)
NRBC BLD-RTO: 0 PER 100 WBC
PLATELET # BLD AUTO: ABNORMAL K/UL (ref 138–453)
PLATELET, FLUORESCENCE: 73 K/UL (ref 138–453)
PLATELETS.RETICULATED NFR BLD AUTO: 6 % (ref 1.1–10.3)
POTASSIUM SERPL-SCNC: 4.1 MMOL/L (ref 3.7–5.3)
RBC # BLD AUTO: 4.32 M/UL (ref 4.21–5.77)
SODIUM SERPL-SCNC: 128 MMOL/L (ref 135–144)
WBC OTHER # BLD: 1.5 K/UL (ref 3.5–11.3)

## 2023-08-22 ENCOUNTER — TELEPHONE (OUTPATIENT)
Dept: INFECTIOUS DISEASES | Age: 73
End: 2023-08-22

## 2023-08-23 DIAGNOSIS — E11.65 TYPE 2 DIABETES MELLITUS WITH HYPERGLYCEMIA, WITHOUT LONG-TERM CURRENT USE OF INSULIN (HCC): ICD-10-CM

## 2023-08-23 DIAGNOSIS — E87.1 HYPONATREMIA: Primary | ICD-10-CM

## 2023-08-23 RX ORDER — LANCETS 28 GAUGE
EACH MISCELLANEOUS
Qty: 200 EACH | Refills: 0 | Status: SHIPPED | OUTPATIENT
Start: 2023-08-23

## 2023-08-23 NOTE — TELEPHONE ENCOUNTER
Allan Chong. is calling to request a refill on the following medication(s):    Medication Request:  Requested Prescriptions     Pending Prescriptions Disp Refills    Prodigy Twist Top Lancets 28G Krystal Veras [Pharmacy Med Name: PRODIGY TWIST TOP LANCETS 28G] 200 each 0     Sig: USE 4 TIMES A DAY       Last Visit Date (If Applicable):  5/16/1927    Next Visit Date:    9/1/2023

## 2023-08-28 ENCOUNTER — HOSPITAL ENCOUNTER (OUTPATIENT)
Age: 73
Discharge: HOME | End: 2023-08-28
Payer: COMMERCIAL

## 2023-08-28 ENCOUNTER — CARE COORDINATION (OUTPATIENT)
Dept: OTHER | Facility: CLINIC | Age: 73
End: 2023-08-28

## 2023-08-28 DIAGNOSIS — K90.89 OTHER SPECIFIED INTESTINAL MALABSORPTION: ICD-10-CM

## 2023-08-28 DIAGNOSIS — A41.53 SERRATIA SEPSIS (HCC): ICD-10-CM

## 2023-08-28 DIAGNOSIS — E87.1 HYPONATREMIA: ICD-10-CM

## 2023-08-28 DIAGNOSIS — E87.1 HYPONATREMIA: Primary | ICD-10-CM

## 2023-08-28 LAB
ALBUMIN SERPL-MCNC: 2.4 G/DL (ref 3.5–5.2)
ALBUMIN/GLOB SERPL: 0.6 {RATIO} (ref 1–2.5)
ALP SERPL-CCNC: 171 U/L (ref 40–129)
ALT SERPL-CCNC: 38 U/L (ref 5–41)
ANION GAP SERPL CALCULATED.3IONS-SCNC: 10 MMOL/L (ref 9–17)
AST SERPL-CCNC: 42 U/L
BASOPHILS # BLD: 0 K/UL (ref 0–0.2)
BASOPHILS NFR BLD: 0 % (ref 0–2)
BILIRUB SERPL-MCNC: 1 MG/DL (ref 0.3–1.2)
BUN SERPL-MCNC: 32 MG/DL (ref 8–23)
CALCIUM SERPL-MCNC: 8.5 MG/DL (ref 8.6–10.4)
CHLORIDE SERPL-SCNC: 100 MMOL/L (ref 98–107)
CO2 SERPL-SCNC: 18 MMOL/L (ref 20–31)
CREAT SERPL-MCNC: 0.8 MG/DL (ref 0.7–1.2)
EOSINOPHIL # BLD: 0.05 K/UL (ref 0–0.4)
EOSINOPHILS RELATIVE PERCENT: 2 % (ref 1–4)
ERYTHROCYTE [DISTWIDTH] IN BLOOD BY AUTOMATED COUNT: 18.2 % (ref 11.8–14.4)
GFR SERPL CREATININE-BSD FRML MDRD: >60 ML/MIN/1.73M2
GLUCOSE SERPL-MCNC: 173 MG/DL (ref 70–99)
HCT VFR BLD AUTO: 36.3 % (ref 40.7–50.3)
HGB BLD-MCNC: 11.1 G/DL (ref 13–17)
IMM GRANULOCYTES # BLD AUTO: 0 K/UL (ref 0–0.3)
IMM GRANULOCYTES NFR BLD: 0 %
LYMPHOCYTES NFR BLD: 0.34 K/UL (ref 1–4.8)
LYMPHOCYTES RELATIVE PERCENT: 14 % (ref 24–44)
MAGNESIUM SERPL-MCNC: 2 MG/DL (ref 1.6–2.6)
MCH RBC QN AUTO: 25.2 PG (ref 25.2–33.5)
MCHC RBC AUTO-ENTMCNC: 30.6 G/DL (ref 28.4–34.8)
MCV RBC AUTO: 82.3 FL (ref 82.6–102.9)
MONOCYTES NFR BLD: 0.48 K/UL (ref 0.1–0.8)
MONOCYTES NFR BLD: 20 % (ref 1–7)
MORPHOLOGY: ABNORMAL
NEUTROPHILS NFR BLD: 64 % (ref 36–66)
NEUTS SEG NFR BLD: 1.53 K/UL (ref 1.8–7.7)
NRBC BLD-RTO: 0 PER 100 WBC
PLATELET # BLD AUTO: ABNORMAL K/UL (ref 138–453)
PLATELET, FLUORESCENCE: 59 K/UL (ref 138–453)
PLATELETS.RETICULATED NFR BLD AUTO: 6.2 % (ref 1.1–10.3)
POTASSIUM SERPL-SCNC: 4.8 MMOL/L (ref 3.7–5.3)
PROT SERPL-MCNC: 6.5 G/DL (ref 6.4–8.3)
RBC # BLD AUTO: 4.41 M/UL (ref 4.21–5.77)
SODIUM SERPL-SCNC: 128 MMOL/L (ref 135–144)
WBC OTHER # BLD: 2.4 K/UL (ref 3.5–11.3)

## 2023-08-28 PROCEDURE — 83735 ASSAY OF MAGNESIUM: CPT

## 2023-08-28 PROCEDURE — 85055 RETICULATED PLATELET ASSAY: CPT

## 2023-08-28 PROCEDURE — 87040 BLOOD CULTURE FOR BACTERIA: CPT

## 2023-08-28 PROCEDURE — 87077 CULTURE AEROBIC IDENTIFY: CPT

## 2023-08-28 PROCEDURE — 80053 COMPREHEN METABOLIC PANEL: CPT

## 2023-08-28 PROCEDURE — 36415 COLL VENOUS BLD VENIPUNCTURE: CPT

## 2023-08-28 PROCEDURE — 87186 SC STD MICRODIL/AGAR DIL: CPT

## 2023-08-28 PROCEDURE — 85025 COMPLETE CBC W/AUTO DIFF WBC: CPT

## 2023-08-28 PROCEDURE — 87205 SMEAR GRAM STAIN: CPT

## 2023-08-29 ENCOUNTER — CARE COORDINATION (OUTPATIENT)
Dept: OTHER | Facility: CLINIC | Age: 73
End: 2023-08-29

## 2023-08-29 PROBLEM — N18.1 CKD (CHRONIC KIDNEY DISEASE), STAGE I: Status: ACTIVE | Noted: 2023-08-29

## 2023-08-29 NOTE — CARE COORDINATION
Ambulatory Care Coordination Note    ACM attempted to reach spouse for care management follow up call regarding mobility, falls review, status of pleura vacs home care  . HIPAA compliant message left requesting a return phone call at spouse convenience. Plan for follow-up call in 10-14 days    Future Appointments   Date Time Provider [address]   8/30/2023  1:30 PM Ayad Meade MD [facility]   9/1/2023  1:00 PM SCARLET Gibbs - CNP W [facility]   9/6/2023 10:30 AM Yuriy Umanzor MD [facility]FT DISEASE [facility]   9/12/2023  3:45 PM Ava Pinon MD [address] [facility]         Danilo Boyer BSN, RN- [facility]  Associate Care Manager  [phone]  [email or web address]

## 2023-08-30 DIAGNOSIS — A41.50 GRAM NEGATIVE SEPSIS (HCC): Primary | ICD-10-CM

## 2023-08-30 RX ORDER — LEVOFLOXACIN 500 MG/1
500 TABLET, FILM COATED ORAL DAILY
Qty: 10 TABLET | Refills: 0 | Status: SHIPPED | OUTPATIENT
Start: 2023-08-30 | End: 2023-09-09

## 2023-08-30 NOTE — PROGRESS NOTES
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 I suspect that the leukopenia is making him immunocompromised and this is why he continues to have recurrent bacteremia. I called in a prescription for levofloxacin. I did call his wife and discussed this on the phone with him and tells me that he has had some altered mentation/inability to focus but he is not hypotensive or febrile at this time.   The patient will continue this until he sees me in the office but if he were to get more ill I have advised him to go to the emergency department    Electronically signed by Ania Iglesias MD on 8/30/2023 at 10:19 AM

## 2023-08-31 LAB
MICROORGANISM SPEC CULT: ABNORMAL
SERVICE CMNT-IMP: ABNORMAL
SPECIMEN DESCRIPTION: ABNORMAL

## 2023-09-01 ENCOUNTER — OFFICE VISIT (OUTPATIENT)
Dept: FAMILY MEDICINE CLINIC | Age: 73
End: 2023-09-01

## 2023-09-01 VITALS
WEIGHT: 116 LBS | DIASTOLIC BLOOD PRESSURE: 70 MMHG | HEART RATE: 63 BPM | SYSTOLIC BLOOD PRESSURE: 98 MMHG | OXYGEN SATURATION: 98 % | BODY MASS INDEX: 15.73 KG/M2 | TEMPERATURE: 97.5 F

## 2023-09-01 DIAGNOSIS — J90 PLEURAL EFFUSION ON RIGHT: ICD-10-CM

## 2023-09-01 DIAGNOSIS — E43 SEVERE MALNUTRITION (HCC): ICD-10-CM

## 2023-09-01 DIAGNOSIS — N18.1 CKD (CHRONIC KIDNEY DISEASE), STAGE I: ICD-10-CM

## 2023-09-01 DIAGNOSIS — I10 ESSENTIAL HYPERTENSION: ICD-10-CM

## 2023-09-01 DIAGNOSIS — E87.1 HYPONATREMIA: ICD-10-CM

## 2023-09-01 DIAGNOSIS — E83.42 HYPOMAGNESEMIA: ICD-10-CM

## 2023-09-01 DIAGNOSIS — E11.65 TYPE 2 DIABETES MELLITUS WITH HYPERGLYCEMIA, WITHOUT LONG-TERM CURRENT USE OF INSULIN (HCC): Primary | ICD-10-CM

## 2023-09-01 DIAGNOSIS — R60.0 BILATERAL LOWER EXTREMITY EDEMA: ICD-10-CM

## 2023-09-01 DIAGNOSIS — K74.60 CIRRHOSIS OF LIVER WITH ASCITES, UNSPECIFIED HEPATIC CIRRHOSIS TYPE (HCC): ICD-10-CM

## 2023-09-01 DIAGNOSIS — R78.81 BACTEREMIA DUE TO GRAM-NEGATIVE BACTERIA: ICD-10-CM

## 2023-09-01 DIAGNOSIS — R18.8 CIRRHOSIS OF LIVER WITH ASCITES, UNSPECIFIED HEPATIC CIRRHOSIS TYPE (HCC): ICD-10-CM

## 2023-09-01 PROBLEM — A41.50 GRAM NEGATIVE SEPSIS (HCC): Status: RESOLVED | Noted: 2023-07-03 | Resolved: 2023-09-01

## 2023-09-01 PROBLEM — A41.53 SEPSIS DUE TO SERRATIA (HCC): Status: RESOLVED | Noted: 2023-07-03 | Resolved: 2023-09-01

## 2023-09-01 PROBLEM — D61.818 PANCYTOPENIA (HCC): Status: RESOLVED | Noted: 2018-12-02 | Resolved: 2023-09-01

## 2023-09-01 PROBLEM — R65.10 SIRS (SYSTEMIC INFLAMMATORY RESPONSE SYNDROME) (HCC): Status: RESOLVED | Noted: 2023-07-03 | Resolved: 2023-09-01

## 2023-09-01 PROBLEM — E87.0 HYPERNATREMIA: Status: RESOLVED | Noted: 2023-05-10 | Resolved: 2023-09-01

## 2023-09-01 RX ORDER — INSULIN GLARGINE 100 [IU]/ML
16 INJECTION, SOLUTION SUBCUTANEOUS NIGHTLY
Qty: 1 ADJUSTABLE DOSE PRE-FILLED PEN SYRINGE | Refills: 2 | Status: SHIPPED | OUTPATIENT
Start: 2023-09-01

## 2023-09-01 SDOH — ECONOMIC STABILITY: FOOD INSECURITY: WITHIN THE PAST 12 MONTHS, YOU WORRIED THAT YOUR FOOD WOULD RUN OUT BEFORE YOU GOT MONEY TO BUY MORE.: NEVER TRUE

## 2023-09-01 SDOH — ECONOMIC STABILITY: INCOME INSECURITY: HOW HARD IS IT FOR YOU TO PAY FOR THE VERY BASICS LIKE FOOD, HOUSING, MEDICAL CARE, AND HEATING?: NOT HARD AT ALL

## 2023-09-01 SDOH — ECONOMIC STABILITY: FOOD INSECURITY: WITHIN THE PAST 12 MONTHS, THE FOOD YOU BOUGHT JUST DIDN'T LAST AND YOU DIDN'T HAVE MONEY TO GET MORE.: NEVER TRUE

## 2023-09-01 ASSESSMENT — ENCOUNTER SYMPTOMS
WHEEZING: 0
COLOR CHANGE: 0
CONSTIPATION: 0
BACK PAIN: 0
SHORTNESS OF BREATH: 0
ABDOMINAL PAIN: 0
CHEST TIGHTNESS: 0
COUGH: 0
SORE THROAT: 0
VOMITING: 0
EYE DISCHARGE: 0
DIARRHEA: 0
NAUSEA: 0
EYE PAIN: 0

## 2023-09-01 NOTE — PROGRESS NOTES
Choco Royal (:  1950) is a 68 y.o. male,Established patient, here for evaluation of the following chief complaint(s):  Diabetes and Follow-up Chronic Condition          Subjective   SUBJECTIVE/OBJECTIVE:  Pt here today for 3 month f/u, accompanied by wife  VSS    Weight continues to trend down, pt eats regularly and attempts to supplement w/ protein shakes  Since last OV he has had repeat complications of his serratia gram negative infection re-arising   It is still unknown where the source of this is but pt will typically have a notable change in mentation and fatigue which is a sign to his wife to have lytes evaluated  This was done, lytes were essentially stable for pt's baseline  Blood cultures were done via ID and gram negative again noted; he is on a 10 day course of Levaquin currently  Wife reports he is returning to baseline in mentation, thought process remains a bit foggy but there is significant improvement    Pt recently established w/ nephrology (Michael)   Additional workup being completed, possible consideration of thyroid vs adrenal aspect but low suspicion  Per note nephrology also feels most of e-lyte disturbance is r/t liver function    Pt was referred to [facility] transplant team for consideration of liver transplant  He knew this was being considered but did not feel he was at this point yet  Pt was contacted by transplant coordinator, he stated he preferred to discuss this w/ his GI specialist further  Pt's wife was not home during this call, he was also slightly altered from his infection that had not begun tx yet  Wife supportive in the sense that she will not push him to consider this unless he is fully onboard and understanding, support given in office today      Review of Systems   Constitutional:  Negative for activity change, appetite change, chills, fatigue, fever and unexpected weight change. HENT:  Negative for congestion, ear pain and sore throat.     Eyes:

## 2023-09-01 NOTE — PATIENT INSTRUCTIONS
Transplant Center    [address]    [facility], [address]    [phone]

## 2023-09-02 LAB
MICROORGANISM SPEC CULT: NORMAL
SERVICE CMNT-IMP: NORMAL
SPECIMEN DESCRIPTION: NORMAL

## 2023-09-05 ENCOUNTER — TELEPHONE (OUTPATIENT)
Dept: FAMILY MEDICINE CLINIC | Age: 73
End: 2023-09-05

## 2023-09-05 NOTE — TELEPHONE ENCOUNTER
[facility] called into office stating LANTUS was sent in Dispense Quantity: 1 Adjustable Dose Pre-filled Pen Syringe, But 1 BOX comes with 5 pens so they can not just dispense one pen.      Please confirm if its ok to give verbal to dispense 5 pre-filled syringe

## 2023-09-06 ENCOUNTER — HOSPITAL ENCOUNTER (OUTPATIENT)
Age: 73
Discharge: HOME | End: 2023-09-06
Payer: COMMERCIAL

## 2023-09-06 ENCOUNTER — OFFICE VISIT (OUTPATIENT)
Dept: INFECTIOUS DISEASES | Age: 73
End: 2023-09-06
Payer: COMMERCIAL

## 2023-09-06 VITALS
BODY MASS INDEX: 15.71 KG/M2 | DIASTOLIC BLOOD PRESSURE: 63 MMHG | HEART RATE: 63 BPM | HEIGHT: 72 IN | SYSTOLIC BLOOD PRESSURE: 96 MMHG | WEIGHT: 116 LBS | TEMPERATURE: 96.8 F

## 2023-09-06 DIAGNOSIS — K74.60 CIRRHOSIS OF LIVER WITH ASCITES, UNSPECIFIED HEPATIC CIRRHOSIS TYPE (HCC): ICD-10-CM

## 2023-09-06 DIAGNOSIS — D72.819 LEUKOPENIA, UNSPECIFIED TYPE: ICD-10-CM

## 2023-09-06 DIAGNOSIS — N18.2 CKD (CHRONIC KIDNEY DISEASE), STAGE II: ICD-10-CM

## 2023-09-06 DIAGNOSIS — N18.2 BENIGN HYPERTENSION WITH CKD (CHRONIC KIDNEY DISEASE), STAGE II: ICD-10-CM

## 2023-09-06 DIAGNOSIS — R18.8 CIRRHOSIS OF LIVER WITH ASCITES, UNSPECIFIED HEPATIC CIRRHOSIS TYPE (HCC): ICD-10-CM

## 2023-09-06 DIAGNOSIS — A48.8 SERRATIA MARCESCENS INFECTION: Primary | ICD-10-CM

## 2023-09-06 DIAGNOSIS — A48.8 SERRATIA MARCESCENS INFECTION: ICD-10-CM

## 2023-09-06 DIAGNOSIS — D63.1 ANEMIA IN STAGE 2 CHRONIC KIDNEY DISEASE: ICD-10-CM

## 2023-09-06 DIAGNOSIS — D47.2 MGUS (MONOCLONAL GAMMOPATHY OF UNKNOWN SIGNIFICANCE): ICD-10-CM

## 2023-09-06 DIAGNOSIS — E87.1 HYPONATREMIA: ICD-10-CM

## 2023-09-06 DIAGNOSIS — N18.2 SECONDARY DIABETES MELLITUS WITH STAGE 2 CHRONIC KIDNEY DISEASE (HCC): ICD-10-CM

## 2023-09-06 DIAGNOSIS — E13.22 SECONDARY DIABETES MELLITUS WITH STAGE 2 CHRONIC KIDNEY DISEASE (HCC): ICD-10-CM

## 2023-09-06 DIAGNOSIS — E11.65 TYPE 2 DIABETES MELLITUS WITH HYPERGLYCEMIA, WITHOUT LONG-TERM CURRENT USE OF INSULIN (HCC): ICD-10-CM

## 2023-09-06 DIAGNOSIS — N18.2 ANEMIA IN STAGE 2 CHRONIC KIDNEY DISEASE: ICD-10-CM

## 2023-09-06 DIAGNOSIS — I12.9 BENIGN HYPERTENSION WITH CKD (CHRONIC KIDNEY DISEASE), STAGE II: ICD-10-CM

## 2023-09-06 DIAGNOSIS — R78.81 BACTEREMIA DUE TO GRAM-NEGATIVE BACTERIA: ICD-10-CM

## 2023-09-06 DIAGNOSIS — J90 PLEURAL EFFUSION ON RIGHT: ICD-10-CM

## 2023-09-06 LAB
AFP SERPL-MCNC: 10.1 UG/L
ALBUMIN SERPL-MCNC: 2.6 G/DL (ref 3.5–5.2)
ALBUMIN/GLOB SERPL: 0.6 {RATIO} (ref 1–2.5)
ALP SERPL-CCNC: 225 U/L (ref 40–129)
ALT SERPL-CCNC: 43 U/L (ref 5–41)
ANION GAP SERPL CALCULATED.3IONS-SCNC: 7 MMOL/L (ref 9–17)
AST SERPL-CCNC: 43 U/L
BASOPHILS # BLD: 0 K/UL (ref 0–0.2)
BASOPHILS NFR BLD: 0 % (ref 0–2)
BILIRUB SERPL-MCNC: 0.8 MG/DL (ref 0.3–1.2)
BUN SERPL-MCNC: 27 MG/DL (ref 8–23)
CALCIUM SERPL-MCNC: 8.7 MG/DL (ref 8.6–10.4)
CHLORIDE SERPL-SCNC: 98 MMOL/L (ref 98–107)
CO2 SERPL-SCNC: 25 MMOL/L (ref 20–31)
CREAT SERPL-MCNC: 0.8 MG/DL (ref 0.7–1.2)
EOSINOPHIL # BLD: 0.11 K/UL (ref 0–0.4)
EOSINOPHILS RELATIVE PERCENT: 5 % (ref 1–4)
ERYTHROCYTE [DISTWIDTH] IN BLOOD BY AUTOMATED COUNT: 18.5 % (ref 11.8–14.4)
EST. AVERAGE GLUCOSE BLD GHB EST-MCNC: 160 MG/DL
FREE KAPPA/LAMBDA RATIO: 1.14 (ref 0.26–1.65)
GFR SERPL CREATININE-BSD FRML MDRD: >60 ML/MIN/1.73M2
GLUCOSE SERPL-MCNC: 219 MG/DL (ref 70–99)
HBA1C MFR BLD: 7.2 % (ref 4–6)
HCT VFR BLD AUTO: 37.2 % (ref 40.7–50.3)
HGB BLD-MCNC: 11.4 G/DL (ref 13–17)
IGA SERPL-MCNC: 1163 MG/DL (ref 70–400)
IGG SERPL-MCNC: 2069 MG/DL (ref 700–1600)
IGM SERPL-MCNC: 38 MG/DL (ref 40–230)
IMM GRANULOCYTES # BLD AUTO: 0.02 K/UL (ref 0–0.3)
IMM GRANULOCYTES NFR BLD: 1 %
IRON SATN MFR SERPL: 14 % (ref 20–55)
IRON SERPL-MCNC: 42 UG/DL (ref 59–158)
KAPPA LC FREE SER-MCNC: 76 MG/L (ref 3.7–19.4)
LAMBDA LC FREE SERPL-MCNC: 66.9 MG/L (ref 5.7–26.3)
LYMPHOCYTES NFR BLD: 0.77 K/UL (ref 1–4.8)
LYMPHOCYTES RELATIVE PERCENT: 37 % (ref 24–44)
MAGNESIUM SERPL-MCNC: 1.9 MG/DL (ref 1.6–2.6)
MCH RBC QN AUTO: 24.9 PG (ref 25.2–33.5)
MCHC RBC AUTO-ENTMCNC: 30.6 G/DL (ref 28.4–34.8)
MCV RBC AUTO: 81.2 FL (ref 82.6–102.9)
MONOCYTES NFR BLD: 0.57 K/UL (ref 0.1–0.8)
MONOCYTES NFR BLD: 27 % (ref 1–7)
MORPHOLOGY: ABNORMAL
NEUTROPHILS NFR BLD: 30 % (ref 36–66)
NEUTS SEG NFR BLD: 0.63 K/UL (ref 1.8–7.7)
NRBC BLD-RTO: 0 PER 100 WBC
PLATELET # BLD AUTO: ABNORMAL K/UL (ref 138–453)
PLATELET, FLUORESCENCE: 87 K/UL (ref 138–453)
PLATELETS.RETICULATED NFR BLD AUTO: 5 % (ref 1.1–10.3)
POTASSIUM SERPL-SCNC: 4.6 MMOL/L (ref 3.7–5.3)
PROT SERPL-MCNC: 7 G/DL (ref 6.4–8.3)
RBC # BLD AUTO: 4.58 M/UL (ref 4.21–5.77)
SODIUM SERPL-SCNC: 130 MMOL/L (ref 135–144)
TIBC SERPL-MCNC: 306 UG/DL (ref 250–450)
UNSATURATED IRON BINDING CAPACITY: 264 UG/DL (ref 112–347)
WBC OTHER # BLD: 2.1 K/UL (ref 3.5–11.3)

## 2023-09-06 PROCEDURE — 82105 ALPHA-FETOPROTEIN SERUM: CPT

## 2023-09-06 PROCEDURE — 83521 IG LIGHT CHAINS FREE EACH: CPT

## 2023-09-06 PROCEDURE — 80053 COMPREHEN METABOLIC PANEL: CPT

## 2023-09-06 PROCEDURE — 36415 COLL VENOUS BLD VENIPUNCTURE: CPT

## 2023-09-06 PROCEDURE — 83550 IRON BINDING TEST: CPT

## 2023-09-06 PROCEDURE — 3078F DIAST BP <80 MM HG: CPT | Performed by: INTERNAL MEDICINE

## 2023-09-06 PROCEDURE — 85025 COMPLETE CBC W/AUTO DIFF WBC: CPT

## 2023-09-06 PROCEDURE — 1123F ACP DISCUSS/DSCN MKR DOCD: CPT | Performed by: INTERNAL MEDICINE

## 2023-09-06 PROCEDURE — 83735 ASSAY OF MAGNESIUM: CPT

## 2023-09-06 PROCEDURE — 80048 BASIC METABOLIC PNL TOTAL CA: CPT

## 2023-09-06 PROCEDURE — 83540 ASSAY OF IRON: CPT

## 2023-09-06 PROCEDURE — 99214 OFFICE O/P EST MOD 30 MIN: CPT | Performed by: INTERNAL MEDICINE

## 2023-09-06 PROCEDURE — 85055 RETICULATED PLATELET ASSAY: CPT

## 2023-09-06 PROCEDURE — 83036 HEMOGLOBIN GLYCOSYLATED A1C: CPT

## 2023-09-06 PROCEDURE — 82784 ASSAY IGA/IGD/IGG/IGM EACH: CPT

## 2023-09-06 PROCEDURE — 82746 ASSAY OF FOLIC ACID SERUM: CPT

## 2023-09-06 PROCEDURE — 3074F SYST BP LT 130 MM HG: CPT | Performed by: INTERNAL MEDICINE

## 2023-09-06 PROCEDURE — 82607 VITAMIN B-12: CPT

## 2023-09-06 RX ORDER — LEVOFLOXACIN 250 MG/1
250 TABLET ORAL DAILY
Qty: 30 TABLET | Refills: 0 | Status: SHIPPED | OUTPATIENT
Start: 2023-09-06 | End: 2023-09-11 | Stop reason: SDUPTHER

## 2023-09-06 ASSESSMENT — ENCOUNTER SYMPTOMS
GASTROINTESTINAL NEGATIVE: 1
RESPIRATORY NEGATIVE: 1

## 2023-09-06 NOTE — PROGRESS NOTES
08/28/2023 01:37 PM    MONOPCT 41 08/21/2023 11:45 AM    EOSPCT 8.0 06/22/2023 12:00 AM    EOSPCT 4 06/04/2019 12:00 AM       BMP:  Lab Results   Component Value Date/Time     08/28/2023 01:37 PM     08/21/2023 11:45 AM    K 4.8 08/28/2023 01:37 PM    K 4.1 08/21/2023 11:45 AM     08/28/2023 01:37 PM    CL 98 08/21/2023 11:45 AM    CO2 18 08/28/2023 01:37 PM    CO2 22 08/21/2023 11:45 AM    BUN 32 08/28/2023 01:37 PM    BUN 17 08/21/2023 11:45 AM    CREATININE 0.8 08/28/2023 01:37 PM    CREATININE 0.6 08/21/2023 11:45 AM    MG 2.0 08/28/2023 01:37 PM    MG 1.8 08/21/2023 11:45 AM       Hepatic Function Panel:   Lab Results   Component Value Date/Time    PROT 6.5 08/28/2023 01:37 PM    PROT 5.8 07/04/2023 05:52 AM    LABALBU 2.4 08/28/2023 01:37 PM    LABALBU 2.2 07/04/2023 05:52 AM    BILIDIR 0.3 07/04/2023 05:52 AM    BILIDIR 0.3 06/05/2023 02:22 PM    IBILI 0.5 07/04/2023 05:52 AM    IBILI 0.8 06/05/2023 02:22 PM    BILITOT 1.0 08/28/2023 01:37 PM    BILITOT 0.8 07/04/2023 05:52 AM    ALKPHOS 171 08/28/2023 01:37 PM    ALKPHOS 148 07/04/2023 05:52 AM    ALT 38 08/28/2023 01:37 PM    ALT 32 07/04/2023 05:52 AM    AST 42 08/28/2023 01:37 PM    AST 28 07/04/2023 05:52 AM       Lab Results   Component Value Date/Time    CRP 24.4 05/15/2023 09:09 AM    CRP 40.3 05/13/2023 03:15 AM     No results found for: SEDRATE      Imaging Studies:   TWO XRAY VIEWS OF THE CHEST 8/1/2023 4:25 pm  IMPRESSION:  Tunneled right pleural drainage catheter in place with a small residual right  pleural effusion, significantly improved since the prior study. Specimen Collected: 08/01/23 16:28 EDT Last Resulted: 08/02/23 10:19 EDT           Cultures:     Component  Resulting Agency   Specimen Description . BLOOD  [address] [facility]   Special Requests 11ML RAC  [address] [facility]   Culture POSITIVE Blood Culture Abnormal   [address]    DIRECT GRAM STAIN FROM Aundrea Coyle NEGATIVE

## 2023-09-07 ENCOUNTER — TELEPHONE (OUTPATIENT)
Dept: INFECTIOUS DISEASES | Age: 73
End: 2023-09-07

## 2023-09-07 LAB
ANION GAP SERPL CALCULATED.3IONS-SCNC: 7 MMOL/L (ref 9–17)
BUN SERPL-MCNC: 27 MG/DL (ref 8–23)
CALCIUM SERPL-MCNC: 8.7 MG/DL (ref 8.6–10.4)
CHLORIDE SERPL-SCNC: 98 MMOL/L (ref 98–107)
CO2 SERPL-SCNC: 25 MMOL/L (ref 20–31)
CREAT SERPL-MCNC: 0.8 MG/DL (ref 0.7–1.2)
FOLATE SERPL-MCNC: >20 NG/ML
GFR SERPL CREATININE-BSD FRML MDRD: >60 ML/MIN/1.73M2
GLUCOSE SERPL-MCNC: 219 MG/DL (ref 70–99)
POTASSIUM SERPL-SCNC: 4.6 MMOL/L (ref 3.7–5.3)
SODIUM SERPL-SCNC: 130 MMOL/L (ref 135–144)
VIT B12 SERPL-MCNC: >2000 PG/ML (ref 232–1245)

## 2023-09-07 NOTE — TELEPHONE ENCOUNTER
Zeus from Nuclear Medicine called with specific questions regarding order and would like to talk to doctor.   Please call him at [phone] or   Radiologist Dr. WILDE [facility] [phone]

## 2023-09-11 ENCOUNTER — HOSPITAL ENCOUNTER (OUTPATIENT)
Dept: NUCLEAR MEDICINE | Age: 73
Discharge: HOME | End: 2023-09-13
Attending: INTERNAL MEDICINE
Payer: COMMERCIAL

## 2023-09-11 ENCOUNTER — HOSPITAL ENCOUNTER (OUTPATIENT)
Age: 73
Discharge: HOME | End: 2023-09-11
Attending: INTERNAL MEDICINE
Payer: COMMERCIAL

## 2023-09-11 DIAGNOSIS — N18.1 CKD (CHRONIC KIDNEY DISEASE), STAGE I: Primary | ICD-10-CM

## 2023-09-11 DIAGNOSIS — A48.8 SERRATIA MARCESCENS INFECTION: ICD-10-CM

## 2023-09-11 DIAGNOSIS — D72.819 LEUKOPENIA, UNSPECIFIED TYPE: ICD-10-CM

## 2023-09-11 DIAGNOSIS — N18.1 CKD (CHRONIC KIDNEY DISEASE), STAGE I: ICD-10-CM

## 2023-09-11 LAB
BASOPHILS # BLD: 0.02 K/UL (ref 0–0.2)
BASOPHILS NFR BLD: 1 % (ref 0–2)
EOSINOPHIL # BLD: 0.07 K/UL (ref 0–0.44)
EOSINOPHILS RELATIVE PERCENT: 3 % (ref 1–4)
ERYTHROCYTE [DISTWIDTH] IN BLOOD BY AUTOMATED COUNT: 18.4 % (ref 11.8–14.4)
HCT VFR BLD AUTO: 34.2 % (ref 40.7–50.3)
HGB BLD-MCNC: 10.5 G/DL (ref 13–17)
IMM GRANULOCYTES # BLD AUTO: 0 K/UL (ref 0–0.3)
IMM GRANULOCYTES NFR BLD: 0 %
LYMPHOCYTES NFR BLD: 0.35 K/UL (ref 1.1–3.7)
LYMPHOCYTES RELATIVE PERCENT: 15 % (ref 24–43)
MCH RBC QN AUTO: 24.8 PG (ref 25.2–33.5)
MCHC RBC AUTO-ENTMCNC: 30.7 G/DL (ref 28.4–34.8)
MCV RBC AUTO: 80.9 FL (ref 82.6–102.9)
MONOCYTES NFR BLD: 1.03 K/UL (ref 0.1–1.2)
MONOCYTES NFR BLD: 45 % (ref 3–12)
NEUTROPHILS NFR BLD: 36 % (ref 36–65)
NEUTS SEG NFR BLD: 0.83 K/UL (ref 1.5–8.1)
NRBC BLD-RTO: 0 PER 100 WBC
PLATELET # BLD AUTO: 74 K/UL (ref 138–453)
PMV BLD AUTO: ABNORMAL FL (ref 8.1–13.5)
RBC # BLD AUTO: 4.23 M/UL (ref 4.21–5.77)
WBC OTHER # BLD: 2.3 K/UL (ref 3.5–11.3)

## 2023-09-11 PROCEDURE — 78802 RP LOCLZJ TUM WHBDY 1 D IMG: CPT

## 2023-09-11 PROCEDURE — 85025 COMPLETE CBC W/AUTO DIFF WBC: CPT

## 2023-09-11 PROCEDURE — 3430000000 HC RX DIAGNOSTIC RADIOPHARMACEUTICAL: Performed by: INTERNAL MEDICINE

## 2023-09-11 PROCEDURE — A9547 IN111 OXYQUINOLINE: HCPCS | Performed by: INTERNAL MEDICINE

## 2023-09-11 RX ORDER — INDIUM IN-111 OXYQUINOLINE 1 UG/ML
366 SOLUTION INTRAVENOUS
Status: COMPLETED | OUTPATIENT
Start: 2023-09-11 | End: 2023-09-11

## 2023-09-11 RX ORDER — LEVOFLOXACIN 250 MG/1
250 TABLET ORAL DAILY
Qty: 30 TABLET | Refills: 0 | Status: SHIPPED | OUTPATIENT
Start: 2023-09-11 | End: 2023-10-11

## 2023-09-11 RX ADMIN — INDIUM IN-111 OXYQUINOLINE 0.37 MILLICURIE: 1 SOLUTION INTRAVENOUS at 11:40

## 2023-09-12 ENCOUNTER — OFFICE VISIT (OUTPATIENT)
Dept: ONCOLOGY | Age: 73
End: 2023-09-12
Payer: COMMERCIAL

## 2023-09-12 ENCOUNTER — HOSPITAL ENCOUNTER (OUTPATIENT)
Dept: NUCLEAR MEDICINE | Age: 73
Discharge: HOME | End: 2023-09-14
Attending: INTERNAL MEDICINE

## 2023-09-12 ENCOUNTER — HOSPITAL ENCOUNTER (OUTPATIENT)
Age: 73
Setting detail: SPECIMEN
Discharge: HOME | End: 2023-09-12
Payer: COMMERCIAL

## 2023-09-12 ENCOUNTER — CARE COORDINATION (OUTPATIENT)
Dept: OTHER | Facility: CLINIC | Age: 73
End: 2023-09-12

## 2023-09-12 ENCOUNTER — TELEPHONE (OUTPATIENT)
Dept: ONCOLOGY | Age: 73
End: 2023-09-12

## 2023-09-12 VITALS
RESPIRATION RATE: 16 BRPM | HEART RATE: 67 BPM | BODY MASS INDEX: 16.26 KG/M2 | WEIGHT: 119.9 LBS | SYSTOLIC BLOOD PRESSURE: 111 MMHG | TEMPERATURE: 98.2 F | DIASTOLIC BLOOD PRESSURE: 69 MMHG

## 2023-09-12 DIAGNOSIS — D61.818 PANCYTOPENIA (HCC): ICD-10-CM

## 2023-09-12 DIAGNOSIS — D47.2 MGUS (MONOCLONAL GAMMOPATHY OF UNKNOWN SIGNIFICANCE): Primary | ICD-10-CM

## 2023-09-12 LAB
ANION GAP SERPL CALCULATED.3IONS-SCNC: 8 MMOL/L (ref 9–17)
BUN SERPL-MCNC: 45 MG/DL (ref 8–23)
BUN/CREAT SERPL: 64 (ref 9–20)
CALCIUM SERPL-MCNC: 8.7 MG/DL (ref 8.6–10.4)
CHLORIDE SERPL-SCNC: 99 MMOL/L (ref 98–107)
CO2 SERPL-SCNC: 24 MMOL/L (ref 20–31)
CREAT SERPL-MCNC: 0.7 MG/DL (ref 0.7–1.2)
GFR SERPL CREATININE-BSD FRML MDRD: >60 ML/MIN/1.73M2
GLUCOSE SERPL-MCNC: 188 MG/DL (ref 70–99)
POTASSIUM SERPL-SCNC: 5 MMOL/L (ref 3.7–5.3)
SODIUM SERPL-SCNC: 131 MMOL/L (ref 135–144)

## 2023-09-12 PROCEDURE — 99214 OFFICE O/P EST MOD 30 MIN: CPT | Performed by: INTERNAL MEDICINE

## 2023-09-12 PROCEDURE — 3078F DIAST BP <80 MM HG: CPT | Performed by: INTERNAL MEDICINE

## 2023-09-12 PROCEDURE — 36415 COLL VENOUS BLD VENIPUNCTURE: CPT

## 2023-09-12 PROCEDURE — 1123F ACP DISCUSS/DSCN MKR DOCD: CPT | Performed by: INTERNAL MEDICINE

## 2023-09-12 PROCEDURE — 99211 OFF/OP EST MAY X REQ PHY/QHP: CPT | Performed by: INTERNAL MEDICINE

## 2023-09-12 PROCEDURE — 80048 BASIC METABOLIC PNL TOTAL CA: CPT

## 2023-09-12 PROCEDURE — 3074F SYST BP LT 130 MM HG: CPT | Performed by: INTERNAL MEDICINE

## 2023-09-12 NOTE — TELEPHONE ENCOUNTER
Fernando Smith MD VISIT  RV 6 months with labs before RV  LABS TO BE DONE 1 WK PRIOR TO RV   MD VISIT 3/19/24 @ 9:15AM  AVS PRINTED W/ INSTRUCTIONS AND GIVEN TO PT ON EXIT

## 2023-09-12 NOTE — CARE COORDINATION
Ambulatory Care Coordination Note  2023    Patient Current Location:  Home: [address] [place] [id number]     ACM contacted the patient /wife by telephone. Verified name and  with family as identifiers. Provided introduction to self, and explanation of the ACM role. Challenges to be reviewed by the provider   Additional needs identified to be addressed with provider: No  none               Method of communication with provider: none. ACM: Zoraida Lares, RN    Spoke with wife today, said the doctor feels Stephane Wyatt still has some underlying infection somewhere . They had a nuclear scan today and more labs he continues on Levaquin 250 mg po daily. No fever. Wife said he is eating great and drinking normally. No falls. She said they just are not sure where the infection is at. He still has the pleura vacs draining around 1000 which is what it has been, but this has kept him from having to go to the hospital for effusions. Wife said the fluid is clear. He is seeing hematologist today at 3 pm and is now seeing a nephrologist due to his ongoing issue with low sodium level. Wife said the blood cultures have come back with the same bacterial growth. His PICC line has been out for some time now. Home care will be discharging him next week wife said. Will follow up with one more call and then discuss graduation     Offered patient enrollment in the Remote Patient Monitoring (RPM) program for in-home monitoring: NA.             Goals Addressed                      This Visit's Progress      Conditions and Symptoms   On track      I will schedule office visits, as directed by my provider. I will keep my appointment or reschedule if I have to cancel. I will notify my provider of any barriers to my plan of care. I will follow my Zone Management tool to seek urgent or emergent care. I will notify my provider of any symptoms that indicate a worsening of my condition.     Barriers: overwhelmed by complexity of

## 2023-09-12 NOTE — PROGRESS NOTES
_     Chief Complaint   Patient presents with    Follow-up    Discuss Labs       DIAGNOSIS:    Anemia  Leukopenia  Liver cirrhosis  MGUS     Back pain        CURRENT THERAPY:    S/p hospitalization for further management of above issues. S/p blood transfusion  S/P IV Iron infusion. BRIEF CASE HISTORY:      The patient is a 77 y.o. AA male who is admitted to the hospital for severe anemia and leukopenia. He had no previous labs. He was seen by PCP for routine health exam as a new patient. Labs showed severe anemia and leukopenia. He had chronic back pain for one year. No fever. No night sweats. No lymphadenopathy   He has recent weight loss and anorexia. No GI symptoms. GI work up was negative. He received blood transfusion and IV Iron infusion. He had Rt hip surgery on 05/9/73 with no complications. He had left hip surgery in March 2015. No complications. Prostate surgery December 7457 with no complications. INTERIM HISTORY:   Patient is seen for follow up above problems. Patient is clinically stable. No active bleeding. No melena or hematochezia. No hematemesis. Patient had stool fit test which was positive. He had evaluation by gastroenterology. He had EGD and colonoscopy. Small polyps were resected. He was noted to have varices. He had liver ultrasound. Ultrasound shows possible cirrhosis. He had multiple other test done by gastroenterology. MRI of the liver showed questionable liver lesions concerning for malignancy. MRI also showed questionable problem with hemochromatosis. Liver biopsy was negative. Patient's ferritin level has been normal for so many years. He had previous problem with iron deficiency. Repeated labs continue to have iron deficiency. Oral iron will be resumed. Patient has cytopenia. No repeated infections. Clinically patient is doing fine. No weakness or

## 2023-09-22 ENCOUNTER — HOSPITAL ENCOUNTER (OUTPATIENT)
Age: 73
Discharge: HOME | End: 2023-09-22
Payer: COMMERCIAL

## 2023-09-22 DIAGNOSIS — N18.2 BENIGN HYPERTENSION WITH CKD (CHRONIC KIDNEY DISEASE), STAGE II: ICD-10-CM

## 2023-09-22 DIAGNOSIS — N18.2 CKD (CHRONIC KIDNEY DISEASE), STAGE II: ICD-10-CM

## 2023-09-22 DIAGNOSIS — E13.22 SECONDARY DIABETES MELLITUS WITH STAGE 2 CHRONIC KIDNEY DISEASE (HCC): ICD-10-CM

## 2023-09-22 DIAGNOSIS — E11.65 TYPE 2 DIABETES MELLITUS WITH HYPERGLYCEMIA, WITHOUT LONG-TERM CURRENT USE OF INSULIN (HCC): ICD-10-CM

## 2023-09-22 DIAGNOSIS — D63.1 ANEMIA IN STAGE 2 CHRONIC KIDNEY DISEASE: ICD-10-CM

## 2023-09-22 DIAGNOSIS — N18.2 ANEMIA IN STAGE 2 CHRONIC KIDNEY DISEASE: ICD-10-CM

## 2023-09-22 DIAGNOSIS — D47.2 MGUS (MONOCLONAL GAMMOPATHY OF UNKNOWN SIGNIFICANCE): ICD-10-CM

## 2023-09-22 DIAGNOSIS — E87.1 HYPONATREMIA: ICD-10-CM

## 2023-09-22 DIAGNOSIS — N18.2 SECONDARY DIABETES MELLITUS WITH STAGE 2 CHRONIC KIDNEY DISEASE (HCC): ICD-10-CM

## 2023-09-22 DIAGNOSIS — I12.9 BENIGN HYPERTENSION WITH CKD (CHRONIC KIDNEY DISEASE), STAGE II: ICD-10-CM

## 2023-09-22 LAB
ALBUMIN SERPL-MCNC: 2.5 G/DL (ref 3.5–5.2)
ALBUMIN/GLOB SERPL: 0.7 {RATIO} (ref 1–2.5)
ALP SERPL-CCNC: 168 U/L (ref 40–129)
ALT SERPL-CCNC: 36 U/L (ref 5–41)
ANION GAP SERPL CALCULATED.3IONS-SCNC: 9 MMOL/L (ref 9–17)
AST SERPL-CCNC: 37 U/L
BASOPHILS # BLD: 0.02 K/UL (ref 0–0.2)
BASOPHILS NFR BLD: 1 % (ref 0–2)
BILIRUB SERPL-MCNC: 0.7 MG/DL (ref 0.3–1.2)
BILIRUB UR QL STRIP: NEGATIVE
BUN SERPL-MCNC: 28 MG/DL (ref 8–23)
CALCIUM SERPL-MCNC: 8.7 MG/DL (ref 8.6–10.4)
CHLORIDE SERPL-SCNC: 99 MMOL/L (ref 98–107)
CLARITY UR: CLEAR
CO2 SERPL-SCNC: 23 MMOL/L (ref 20–31)
COLOR UR: YELLOW
CORTIS SERPL-MCNC: 17.2 UG/DL (ref 2.7–18.4)
CREAT SERPL-MCNC: 0.6 MG/DL (ref 0.7–1.2)
CREAT UR-MCNC: 81.4 MG/DL (ref 39–259)
EOSINOPHIL # BLD: 0.09 K/UL (ref 0–0.4)
EOSINOPHILS RELATIVE PERCENT: 4 % (ref 1–4)
EPI CELLS #/AREA URNS HPF: NORMAL /HPF (ref 0–5)
ERYTHROCYTE [DISTWIDTH] IN BLOOD BY AUTOMATED COUNT: 18.7 % (ref 11.8–14.4)
GFR SERPL CREATININE-BSD FRML MDRD: >60 ML/MIN/1.73M2
GLUCOSE SERPL-MCNC: 136 MG/DL (ref 70–99)
GLUCOSE UR STRIP-MCNC: NEGATIVE MG/DL
HCT VFR BLD AUTO: 31.6 % (ref 40.7–50.3)
HGB BLD-MCNC: 9.5 G/DL (ref 13–17)
HGB UR QL STRIP.AUTO: NEGATIVE
IMM GRANULOCYTES # BLD AUTO: 0 K/UL (ref 0–0.3)
IMM GRANULOCYTES NFR BLD: 0 %
KETONES UR STRIP-MCNC: NEGATIVE MG/DL
LEUKOCYTE ESTERASE UR QL STRIP: NEGATIVE
LYMPHOCYTES NFR BLD: 0.51 K/UL (ref 1–4.8)
LYMPHOCYTES RELATIVE PERCENT: 22 % (ref 24–44)
MAGNESIUM SERPL-MCNC: 1.8 MG/DL (ref 1.6–2.6)
MCH RBC QN AUTO: 24.5 PG (ref 25.2–33.5)
MCHC RBC AUTO-ENTMCNC: 30.1 G/DL (ref 28.4–34.8)
MCV RBC AUTO: 81.4 FL (ref 82.6–102.9)
MICROALBUMIN UR-MCNC: <12 MG/L
MICROALBUMIN/CREAT UR-RTO: NORMAL MCG/MG CREAT
MONOCYTES NFR BLD: 0.92 K/UL (ref 0.1–0.8)
MONOCYTES NFR BLD: 40 % (ref 1–7)
MORPHOLOGY: ABNORMAL
NEUTROPHILS NFR BLD: 33 % (ref 36–66)
NEUTS SEG NFR BLD: 0.76 K/UL (ref 1.8–7.7)
NITRITE UR QL STRIP: NEGATIVE
NRBC BLD-RTO: 0 PER 100 WBC
OSMOLALITY SERPL: 288 MOSM/KG (ref 275–295)
OSMOLALITY UR: 586 MOSM/KG (ref 80–1300)
PH UR STRIP: 6 [PH] (ref 5–8)
PHOSPHATE SERPL-MCNC: 3.9 MG/DL (ref 2.5–4.5)
PLATELET # BLD AUTO: ABNORMAL K/UL (ref 138–453)
PLATELET, FLUORESCENCE: 94 K/UL (ref 138–453)
PLATELETS.RETICULATED NFR BLD AUTO: 6.9 % (ref 1.1–10.3)
POTASSIUM SERPL-SCNC: 4.8 MMOL/L (ref 3.7–5.3)
PROT SERPL-MCNC: 6.1 G/DL (ref 6.4–8.3)
PROT UR STRIP-MCNC: NEGATIVE MG/DL
RBC # BLD AUTO: 3.88 M/UL (ref 4.21–5.77)
RBC #/AREA URNS HPF: NORMAL /HPF (ref 0–4)
SODIUM SERPL-SCNC: 131 MMOL/L (ref 135–144)
SODIUM UR-SCNC: <20 MMOL/L
SP GR UR STRIP: 1.02 (ref 1–1.03)
T4 FREE SERPL-MCNC: 1.5 NG/DL (ref 0.9–1.7)
TSH SERPL DL<=0.05 MIU/L-ACNC: 2.22 UIU/ML (ref 0.3–5)
UROBILINOGEN UR STRIP-ACNC: NORMAL EU/DL (ref 0–1)
WBC #/AREA URNS HPF: NORMAL /HPF (ref 0–5)
WBC OTHER # BLD: 2.3 K/UL (ref 3.5–11.3)

## 2023-09-22 PROCEDURE — 84439 ASSAY OF FREE THYROXINE: CPT

## 2023-09-22 PROCEDURE — 85055 RETICULATED PLATELET ASSAY: CPT

## 2023-09-22 PROCEDURE — 83935 ASSAY OF URINE OSMOLALITY: CPT

## 2023-09-22 PROCEDURE — 84100 ASSAY OF PHOSPHORUS: CPT

## 2023-09-22 PROCEDURE — 83735 ASSAY OF MAGNESIUM: CPT

## 2023-09-22 PROCEDURE — 83930 ASSAY OF BLOOD OSMOLALITY: CPT

## 2023-09-22 PROCEDURE — 82043 UR ALBUMIN QUANTITATIVE: CPT

## 2023-09-22 PROCEDURE — 84300 ASSAY OF URINE SODIUM: CPT

## 2023-09-22 PROCEDURE — 85025 COMPLETE CBC W/AUTO DIFF WBC: CPT

## 2023-09-22 PROCEDURE — 80053 COMPREHEN METABOLIC PANEL: CPT

## 2023-09-22 PROCEDURE — 81001 URINALYSIS AUTO W/SCOPE: CPT

## 2023-09-22 PROCEDURE — 82570 ASSAY OF URINE CREATININE: CPT

## 2023-09-22 PROCEDURE — 84443 ASSAY THYROID STIM HORMONE: CPT

## 2023-09-22 PROCEDURE — 36415 COLL VENOUS BLD VENIPUNCTURE: CPT

## 2023-09-22 PROCEDURE — 82533 TOTAL CORTISOL: CPT

## 2023-09-25 RX ORDER — PROPRANOLOL HCL 60 MG
60 CAPSULE, EXTENDED RELEASE 24HR ORAL 2 TIMES DAILY
Qty: 180 CAPSULE | Refills: 0 | Status: SHIPPED | OUTPATIENT
Start: 2023-09-25

## 2023-09-28 ENCOUNTER — CARE COORDINATION (OUTPATIENT)
Dept: OTHER | Facility: CLINIC | Age: 73
End: 2023-09-28

## 2023-09-28 NOTE — CARE COORDINATION
Ambulatory Care Coordination Note    ACM attempted to reach patient ./ wife for care management follow up call regarding discuss graduation . HIPAA compliant message left requesting a return phone call at patient convenience. Plan for follow-up call in 3-5 days    Future Appointments   Date Time Provider [address]   10/11/2023 10:30 AM Ny Woo MD INFT DISEASE [facility]   11/22/2023  3:00 PM Yary Hamlin MD grtlk exc [facility]   12/1/2023  2:40 PM Rayshawn Mauro APRN - CNP W RAMOS [facility]   3/19/2024  9:15 AM Earline Stokes MD [facility] Bettie HAWKINS, RN- [facility]  Associate Care Manager  [phone]  [email or web address]

## 2023-09-29 ENCOUNTER — HOSPITAL ENCOUNTER (OUTPATIENT)
Age: 73
Discharge: HOME | End: 2023-09-29
Payer: COMMERCIAL

## 2023-09-29 DIAGNOSIS — I12.9 BENIGN HYPERTENSION WITH CKD (CHRONIC KIDNEY DISEASE) STAGE I: ICD-10-CM

## 2023-09-29 DIAGNOSIS — D63.1 ANEMIA IN STAGE 1 CHRONIC KIDNEY DISEASE: ICD-10-CM

## 2023-09-29 DIAGNOSIS — N18.1 SECONDARY DIABETES MELLITUS WITH STAGE 1 CHRONIC KIDNEY DISEASE (HCC): ICD-10-CM

## 2023-09-29 DIAGNOSIS — N18.1 CKD (CHRONIC KIDNEY DISEASE), STAGE I: ICD-10-CM

## 2023-09-29 DIAGNOSIS — N18.1 BENIGN HYPERTENSION WITH CKD (CHRONIC KIDNEY DISEASE) STAGE I: ICD-10-CM

## 2023-09-29 DIAGNOSIS — D47.2 MGUS (MONOCLONAL GAMMOPATHY OF UNKNOWN SIGNIFICANCE): ICD-10-CM

## 2023-09-29 DIAGNOSIS — N18.1 ANEMIA IN STAGE 1 CHRONIC KIDNEY DISEASE: ICD-10-CM

## 2023-09-29 DIAGNOSIS — E13.22 SECONDARY DIABETES MELLITUS WITH STAGE 1 CHRONIC KIDNEY DISEASE (HCC): ICD-10-CM

## 2023-09-29 LAB
ANION GAP SERPL CALCULATED.3IONS-SCNC: 7 MMOL/L (ref 9–17)
BUN SERPL-MCNC: 22 MG/DL (ref 8–23)
CALCIUM SERPL-MCNC: 8.3 MG/DL (ref 8.6–10.4)
CHLORIDE SERPL-SCNC: 99 MMOL/L (ref 98–107)
CO2 SERPL-SCNC: 22 MMOL/L (ref 20–31)
CREAT SERPL-MCNC: 0.7 MG/DL (ref 0.7–1.2)
GFR SERPL CREATININE-BSD FRML MDRD: >60 ML/MIN/1.73M2
GLUCOSE SERPL-MCNC: 124 MG/DL (ref 70–99)
POTASSIUM SERPL-SCNC: 4.7 MMOL/L (ref 3.7–5.3)
SODIUM SERPL-SCNC: 128 MMOL/L (ref 135–144)

## 2023-09-29 PROCEDURE — 80048 BASIC METABOLIC PNL TOTAL CA: CPT

## 2023-09-29 PROCEDURE — 36415 COLL VENOUS BLD VENIPUNCTURE: CPT

## 2023-10-02 ENCOUNTER — CARE COORDINATION (OUTPATIENT)
Dept: OTHER | Facility: CLINIC | Age: 73
End: 2023-10-02

## 2023-10-02 RX ORDER — LEVOFLOXACIN 250 MG/1
250 TABLET, FILM COATED ORAL EVERY OTHER DAY
Qty: 30 TABLET | Refills: 0 | Status: SHIPPED | OUTPATIENT
Start: 2023-10-02 | End: 2023-10-16 | Stop reason: SDUPTHER

## 2023-10-02 NOTE — CARE COORDINATION
Patient has graduated from the Complex Case Management  program on 10/2/23 . Patient/family has the ability to self-manage at this time. Care management goals have been completed. No further Ambulatory Care Manager follow up scheduled. Wife is very happy with all providers. Helen Downey is stable she said, Home care signed off 10 days ago. He will continue with the pleura vac chest tube to keep the fluid down to decrease need for repeated thoracentesis. Wife said they went to [place] for a visit and he did good, he is going outside now and in the barn working on his car. Wife has no needs said she is doing well without the support of home care. Wife has my contact information if needed for future reference. Wife was agreeable to graduation today      Goals Addressed                      This Visit's Progress      COMPLETED: Conditions and Symptoms         I will schedule office visits, as directed by my provider. I will keep my appointment or reschedule if I have to cancel. I will notify my provider of any barriers to my plan of care. I will follow my Zone Management tool to seek urgent or emergent care. I will notify my provider of any symptoms that indicate a worsening of my condition.     Barriers: overwhelmed by complexity of regimen  Plan for overcoming my barriers: Pt/Wife will continue to engage with ACM and providers to promote success in meeting goals   Confidence: 10/10  Anticipated Goal Completion Date: 5/25/23 and ongoing  7/20/23 - Goal being met   10/2/23 - Goal met, patient attends all appts           COMPLETED: Patient Stated (pt-stated)         Pt's pain will be at a tolerable level     Barriers: overwhelmed by complexity of regimen and recent insertion of right chest tube   Plan for overcoming my barriers: Continue to openly engage with ACM and providers to promote success in reaching goals    Confidence: 10/10  Anticipated Goal Completion Date: 6/19/23 and ongoing  No issues with tolerating the CT

## 2023-10-11 ENCOUNTER — OFFICE VISIT (OUTPATIENT)
Dept: INFECTIOUS DISEASES | Age: 73
End: 2023-10-11

## 2023-10-11 VITALS
SYSTOLIC BLOOD PRESSURE: 103 MMHG | WEIGHT: 127.6 LBS | HEIGHT: 72 IN | HEART RATE: 60 BPM | TEMPERATURE: 96.7 F | DIASTOLIC BLOOD PRESSURE: 62 MMHG | BODY MASS INDEX: 17.28 KG/M2

## 2023-10-11 DIAGNOSIS — A48.8 SERRATIA MARCESCENS INFECTION: Primary | ICD-10-CM

## 2023-10-11 DIAGNOSIS — D72.819 LEUKOPENIA, UNSPECIFIED TYPE: ICD-10-CM

## 2023-10-11 DIAGNOSIS — J90 PLEURAL EFFUSION ON RIGHT: ICD-10-CM

## 2023-10-11 DIAGNOSIS — R18.8 CIRRHOSIS OF LIVER WITH ASCITES, UNSPECIFIED HEPATIC CIRRHOSIS TYPE (HCC): ICD-10-CM

## 2023-10-11 DIAGNOSIS — K74.60 CIRRHOSIS OF LIVER WITH ASCITES, UNSPECIFIED HEPATIC CIRRHOSIS TYPE (HCC): ICD-10-CM

## 2023-10-11 NOTE — PROGRESS NOTES
[facility]  Office Progress Note  Today's Date and Time: 10/11/2023, 10:48 AM    Impression:     1. Serratia marcescens infection    2. Cirrhosis of liver with ascites, unspecified hepatic cirrhosis type (HCC)    3. Leukopenia, unspecified type    4. Pleural effusion on right         Recommendations   Given the multiple recurrent episodes of Serratia bacteremia despite multiple courses of antibiotics it is clear that the patient has either an endovascular infection or a source of infection likely related to the cirrhosis/varices. The patient also has chronic leukopenia/neutropenia which is a risk factor for recurrent infections as well   The plan at this point time is for him to continue levaquin at the lowest dose possible which is 250 mg every other day  While there is a risk factor for adverse effects with the prolonged fluoroquinolone use such as tendon rupture, aortitis, cardiac arrhythmias and C. difficile infection it is clear that the patient has shown the significant risk of having recurrent infection with stopping the antibiotic therapy as this has occurred on 2 occasions already  It makes any sense to stop the antimicrobial therapy at this point  RSV, flu, COVID    I have ordered the following medications/ labs:  Orders Placed This Encounter   Procedures    Influenza, FLUAD, (age 72 y+), IM, Preservative Free, 0.5 mL      No orders of the defined types were placed in this encounter. Chief complaint/reason for consultation:     Chief Complaint   Patient presents with    Frequent Infections     1 mo f/u        History of Present Illness:    Albert Kate. is a 68y.o.-year-old male who has a history of cirrhosis with recurrent ascites, moderate portal hypertensive gastropathy with esophageal varices status post banding x3 6/7/2023 BPH status post prostatectomy, C status post bilateral pancytopenia felt secondary to cirrhosis, gastroesophageal reflux disease, chronic hypotension

## 2023-10-12 ENCOUNTER — HOSPITAL ENCOUNTER (OUTPATIENT)
Age: 73
Setting detail: SPECIMEN
Discharge: HOME | End: 2023-10-12
Payer: COMMERCIAL

## 2023-10-12 DIAGNOSIS — E13.22 SECONDARY DIABETES MELLITUS WITH STAGE 1 CHRONIC KIDNEY DISEASE (HCC): ICD-10-CM

## 2023-10-12 DIAGNOSIS — N18.1 BENIGN HYPERTENSION WITH CKD (CHRONIC KIDNEY DISEASE) STAGE I: ICD-10-CM

## 2023-10-12 DIAGNOSIS — N18.1 CKD (CHRONIC KIDNEY DISEASE), STAGE I: ICD-10-CM

## 2023-10-12 DIAGNOSIS — N18.1 ANEMIA IN STAGE 1 CHRONIC KIDNEY DISEASE: ICD-10-CM

## 2023-10-12 DIAGNOSIS — N18.1 SECONDARY DIABETES MELLITUS WITH STAGE 1 CHRONIC KIDNEY DISEASE (HCC): ICD-10-CM

## 2023-10-12 DIAGNOSIS — I12.9 BENIGN HYPERTENSION WITH CKD (CHRONIC KIDNEY DISEASE) STAGE I: ICD-10-CM

## 2023-10-12 DIAGNOSIS — D47.2 MGUS (MONOCLONAL GAMMOPATHY OF UNKNOWN SIGNIFICANCE): ICD-10-CM

## 2023-10-12 DIAGNOSIS — D63.1 ANEMIA IN STAGE 1 CHRONIC KIDNEY DISEASE: ICD-10-CM

## 2023-10-12 LAB
ANION GAP SERPL CALCULATED.3IONS-SCNC: 7 MMOL/L (ref 9–16)
BUN SERPL-MCNC: 19 MG/DL (ref 8–23)
CALCIUM SERPL-MCNC: 8.3 MG/DL (ref 8.6–10.4)
CHLORIDE SERPL-SCNC: 102 MMOL/L (ref 98–107)
CO2 SERPL-SCNC: 22 MMOL/L (ref 20–31)
CREAT SERPL-MCNC: 0.7 MG/DL (ref 0.7–1.2)
GFR SERPL CREATININE-BSD FRML MDRD: >60 ML/MIN/1.73M2
GLUCOSE SERPL-MCNC: 135 MG/DL (ref 74–99)
POTASSIUM SERPL-SCNC: 4.5 MMOL/L (ref 3.7–5.3)
SODIUM SERPL-SCNC: 131 MMOL/L (ref 136–145)

## 2023-10-12 PROCEDURE — 80048 BASIC METABOLIC PNL TOTAL CA: CPT

## 2023-10-12 PROCEDURE — 36415 COLL VENOUS BLD VENIPUNCTURE: CPT

## 2023-10-16 RX ORDER — LEVOFLOXACIN 250 MG/1
250 TABLET ORAL EVERY OTHER DAY
Qty: 15 TABLET | Refills: 3 | Status: SHIPPED | OUTPATIENT
Start: 2023-10-16

## 2023-10-20 ASSESSMENT — ENCOUNTER SYMPTOMS
GASTROINTESTINAL NEGATIVE: 1
RESPIRATORY NEGATIVE: 1

## 2023-10-26 ENCOUNTER — HOSPITAL ENCOUNTER (OUTPATIENT)
Age: 73
Discharge: HOME | End: 2023-10-26
Payer: COMMERCIAL

## 2023-10-26 DIAGNOSIS — I12.9 BENIGN HYPERTENSION WITH CKD (CHRONIC KIDNEY DISEASE) STAGE I: ICD-10-CM

## 2023-10-26 DIAGNOSIS — N18.1 CKD (CHRONIC KIDNEY DISEASE), STAGE I: ICD-10-CM

## 2023-10-26 DIAGNOSIS — N18.1 SECONDARY DIABETES MELLITUS WITH STAGE 1 CHRONIC KIDNEY DISEASE (HCC): ICD-10-CM

## 2023-10-26 DIAGNOSIS — N18.1 BENIGN HYPERTENSION WITH CKD (CHRONIC KIDNEY DISEASE) STAGE I: ICD-10-CM

## 2023-10-26 DIAGNOSIS — N18.1 ANEMIA IN STAGE 1 CHRONIC KIDNEY DISEASE: ICD-10-CM

## 2023-10-26 DIAGNOSIS — D47.2 MGUS (MONOCLONAL GAMMOPATHY OF UNKNOWN SIGNIFICANCE): ICD-10-CM

## 2023-10-26 DIAGNOSIS — D63.1 ANEMIA IN STAGE 1 CHRONIC KIDNEY DISEASE: ICD-10-CM

## 2023-10-26 DIAGNOSIS — E13.22 SECONDARY DIABETES MELLITUS WITH STAGE 1 CHRONIC KIDNEY DISEASE (HCC): ICD-10-CM

## 2023-10-26 LAB
ANION GAP SERPL CALCULATED.3IONS-SCNC: 8 MMOL/L (ref 9–17)
BUN SERPL-MCNC: 23 MG/DL (ref 8–23)
CALCIUM SERPL-MCNC: 8.3 MG/DL (ref 8.6–10.4)
CHLORIDE SERPL-SCNC: 100 MMOL/L (ref 98–107)
CO2 SERPL-SCNC: 22 MMOL/L (ref 20–31)
CREAT SERPL-MCNC: 0.7 MG/DL (ref 0.7–1.2)
GFR SERPL CREATININE-BSD FRML MDRD: >60 ML/MIN/1.73M2
GLUCOSE SERPL-MCNC: 128 MG/DL (ref 70–99)
POTASSIUM SERPL-SCNC: 4.7 MMOL/L (ref 3.7–5.3)
SODIUM SERPL-SCNC: 130 MMOL/L (ref 135–144)

## 2023-10-26 PROCEDURE — 36415 COLL VENOUS BLD VENIPUNCTURE: CPT

## 2023-10-26 PROCEDURE — 80048 BASIC METABOLIC PNL TOTAL CA: CPT

## 2023-11-09 ENCOUNTER — HOSPITAL ENCOUNTER (OUTPATIENT)
Age: 73
Discharge: HOME | End: 2023-11-09
Payer: COMMERCIAL

## 2023-11-09 DIAGNOSIS — N18.1 ANEMIA IN STAGE 1 CHRONIC KIDNEY DISEASE: ICD-10-CM

## 2023-11-09 DIAGNOSIS — N18.1 SECONDARY DIABETES MELLITUS WITH STAGE 1 CHRONIC KIDNEY DISEASE (HCC): ICD-10-CM

## 2023-11-09 DIAGNOSIS — D63.1 ANEMIA IN STAGE 1 CHRONIC KIDNEY DISEASE: ICD-10-CM

## 2023-11-09 DIAGNOSIS — N18.1 CKD (CHRONIC KIDNEY DISEASE), STAGE I: ICD-10-CM

## 2023-11-09 DIAGNOSIS — E13.22 SECONDARY DIABETES MELLITUS WITH STAGE 1 CHRONIC KIDNEY DISEASE (HCC): ICD-10-CM

## 2023-11-09 DIAGNOSIS — I12.9 BENIGN HYPERTENSION WITH CKD (CHRONIC KIDNEY DISEASE) STAGE I: ICD-10-CM

## 2023-11-09 DIAGNOSIS — D47.2 MGUS (MONOCLONAL GAMMOPATHY OF UNKNOWN SIGNIFICANCE): ICD-10-CM

## 2023-11-09 DIAGNOSIS — N18.1 BENIGN HYPERTENSION WITH CKD (CHRONIC KIDNEY DISEASE) STAGE I: ICD-10-CM

## 2023-11-09 LAB
ANION GAP SERPL CALCULATED.3IONS-SCNC: 8 MMOL/L (ref 9–17)
BUN SERPL-MCNC: 22 MG/DL (ref 8–23)
CALCIUM SERPL-MCNC: 8.4 MG/DL (ref 8.6–10.4)
CHLORIDE SERPL-SCNC: 99 MMOL/L (ref 98–107)
CO2 SERPL-SCNC: 23 MMOL/L (ref 20–31)
CREAT SERPL-MCNC: 0.7 MG/DL (ref 0.7–1.2)
GFR SERPL CREATININE-BSD FRML MDRD: >60 ML/MIN/1.73M2
GLUCOSE SERPL-MCNC: 131 MG/DL (ref 70–99)
POTASSIUM SERPL-SCNC: 4.9 MMOL/L (ref 3.7–5.3)
SODIUM SERPL-SCNC: 130 MMOL/L (ref 135–144)

## 2023-11-09 PROCEDURE — 36415 COLL VENOUS BLD VENIPUNCTURE: CPT

## 2023-11-09 PROCEDURE — 80048 BASIC METABOLIC PNL TOTAL CA: CPT

## 2023-11-22 ENCOUNTER — OFFICE VISIT (OUTPATIENT)
Dept: GASTROENTEROLOGY | Age: 73
End: 2023-11-22
Payer: COMMERCIAL

## 2023-11-22 ENCOUNTER — HOSPITAL ENCOUNTER (OUTPATIENT)
Age: 73
Discharge: HOME | End: 2023-11-22
Payer: COMMERCIAL

## 2023-11-22 VITALS
TEMPERATURE: 97.3 F | BODY MASS INDEX: 17.36 KG/M2 | DIASTOLIC BLOOD PRESSURE: 70 MMHG | SYSTOLIC BLOOD PRESSURE: 114 MMHG | WEIGHT: 128 LBS

## 2023-11-22 DIAGNOSIS — D63.1 ANEMIA IN STAGE 1 CHRONIC KIDNEY DISEASE: ICD-10-CM

## 2023-11-22 DIAGNOSIS — E87.1 HYPONATREMIA: ICD-10-CM

## 2023-11-22 DIAGNOSIS — D47.2 MGUS (MONOCLONAL GAMMOPATHY OF UNKNOWN SIGNIFICANCE): Primary | ICD-10-CM

## 2023-11-22 DIAGNOSIS — I85.00 IDIOPATHIC ESOPHAGEAL VARICES WITHOUT BLEEDING (HCC): ICD-10-CM

## 2023-11-22 DIAGNOSIS — D47.2 MGUS (MONOCLONAL GAMMOPATHY OF UNKNOWN SIGNIFICANCE): ICD-10-CM

## 2023-11-22 DIAGNOSIS — N18.1 BENIGN HYPERTENSION WITH CKD (CHRONIC KIDNEY DISEASE) STAGE I: ICD-10-CM

## 2023-11-22 DIAGNOSIS — D50.8 OTHER IRON DEFICIENCY ANEMIA: ICD-10-CM

## 2023-11-22 DIAGNOSIS — K74.69 OTHER CIRRHOSIS OF LIVER (HCC): ICD-10-CM

## 2023-11-22 DIAGNOSIS — I12.9 BENIGN HYPERTENSION WITH CKD (CHRONIC KIDNEY DISEASE) STAGE I: ICD-10-CM

## 2023-11-22 DIAGNOSIS — E13.22 SECONDARY DIABETES MELLITUS WITH STAGE 1 CHRONIC KIDNEY DISEASE (HCC): ICD-10-CM

## 2023-11-22 DIAGNOSIS — N18.1 CKD (CHRONIC KIDNEY DISEASE), STAGE I: ICD-10-CM

## 2023-11-22 DIAGNOSIS — N18.1 ANEMIA IN STAGE 1 CHRONIC KIDNEY DISEASE: ICD-10-CM

## 2023-11-22 DIAGNOSIS — C61 PROSTATE CANCER (HCC): ICD-10-CM

## 2023-11-22 DIAGNOSIS — N18.1 SECONDARY DIABETES MELLITUS WITH STAGE 1 CHRONIC KIDNEY DISEASE (HCC): ICD-10-CM

## 2023-11-22 LAB
ALBUMIN SERPL-MCNC: 2.7 G/DL (ref 3.5–5.2)
ALBUMIN/GLOB SERPL: 1 {RATIO} (ref 1–2.5)
ALP SERPL-CCNC: 195 U/L (ref 40–129)
ALT SERPL-CCNC: 49 U/L (ref 10–50)
ANION GAP SERPL CALCULATED.3IONS-SCNC: 7 MMOL/L (ref 9–16)
AST SERPL-CCNC: 56 U/L (ref 10–50)
BACTERIA URNS QL MICRO: NORMAL
BASOPHILS # BLD: 0.03 K/UL (ref 0–0.2)
BASOPHILS NFR BLD: 2 % (ref 0–2)
BILIRUB SERPL-MCNC: 0.9 MG/DL (ref 0–1.2)
BILIRUB UR QL STRIP: NEGATIVE
BUN SERPL-MCNC: 21 MG/DL (ref 8–23)
CALCIUM SERPL-MCNC: 8.6 MG/DL (ref 8.6–10.4)
CASTS #/AREA URNS LPF: NORMAL /LPF (ref 0–8)
CHLORIDE SERPL-SCNC: 101 MMOL/L (ref 98–107)
CLARITY UR: CLEAR
CO2 SERPL-SCNC: 22 MMOL/L (ref 20–31)
COLOR UR: YELLOW
CREAT SERPL-MCNC: 0.7 MG/DL (ref 0.7–1.2)
EOSINOPHIL # BLD: 0.15 K/UL (ref 0–0.4)
EOSINOPHILS RELATIVE PERCENT: 9 % (ref 1–4)
EPI CELLS #/AREA URNS HPF: NORMAL /HPF (ref 0–5)
ERYTHROCYTE [DISTWIDTH] IN BLOOD BY AUTOMATED COUNT: 21.2 % (ref 11.8–14.4)
GFR SERPL CREATININE-BSD FRML MDRD: >60 ML/MIN/1.73M2
GLUCOSE SERPL-MCNC: 145 MG/DL (ref 74–99)
GLUCOSE UR STRIP-MCNC: NEGATIVE MG/DL
HCT VFR BLD AUTO: 38.1 % (ref 40.7–50.3)
HGB BLD-MCNC: 11.1 G/DL (ref 13–17)
HGB UR QL STRIP.AUTO: NEGATIVE
IMM GRANULOCYTES # BLD AUTO: 0 K/UL (ref 0–0.3)
IMM GRANULOCYTES NFR BLD: 0 %
KETONES UR STRIP-MCNC: NEGATIVE MG/DL
LEUKOCYTE ESTERASE UR QL STRIP: NEGATIVE
LYMPHOCYTES NFR BLD: 0.39 K/UL (ref 1–4.8)
LYMPHOCYTES RELATIVE PERCENT: 23 % (ref 24–44)
MAGNESIUM SERPL-MCNC: 2 MG/DL (ref 1.6–2.4)
MCH RBC QN AUTO: 23.5 PG (ref 25.2–33.5)
MCHC RBC AUTO-ENTMCNC: 29.1 G/DL (ref 28.4–34.8)
MCV RBC AUTO: 80.5 FL (ref 82.6–102.9)
MONOCYTES NFR BLD: 0.65 K/UL (ref 0.1–0.8)
MONOCYTES NFR BLD: 38 % (ref 1–7)
MORPHOLOGY: ABNORMAL
MORPHOLOGY: ABNORMAL
NEUTROPHILS NFR BLD: 28 % (ref 36–66)
NEUTS SEG NFR BLD: 0.48 K/UL (ref 1.8–7.7)
NITRITE UR QL STRIP: NEGATIVE
NRBC BLD-RTO: 0 PER 100 WBC
PH UR STRIP: 6.5 [PH] (ref 5–8)
PHOSPHATE SERPL-MCNC: 3.4 MG/DL (ref 2.5–4.5)
PLATELET # BLD AUTO: ABNORMAL K/UL (ref 138–453)
PLATELET, FLUORESCENCE: 69 K/UL (ref 138–453)
PLATELETS.RETICULATED NFR BLD AUTO: 7.6 % (ref 1.1–10.3)
POTASSIUM SERPL-SCNC: 4.8 MMOL/L (ref 3.7–5.3)
PROT SERPL-MCNC: 7 G/DL (ref 6.6–8.7)
PROT UR STRIP-MCNC: NEGATIVE MG/DL
RBC # BLD AUTO: 4.73 M/UL (ref 4.21–5.77)
RBC #/AREA URNS HPF: NORMAL /HPF (ref 0–4)
SODIUM SERPL-SCNC: 130 MMOL/L (ref 136–145)
SP GR UR STRIP: 1.01 (ref 1–1.03)
UROBILINOGEN UR STRIP-ACNC: NORMAL EU/DL (ref 0–1)
WBC #/AREA URNS HPF: NORMAL /HPF (ref 0–5)
WBC OTHER # BLD: 1.7 K/UL (ref 3.5–11.3)

## 2023-11-22 PROCEDURE — 3074F SYST BP LT 130 MM HG: CPT | Performed by: INTERNAL MEDICINE

## 2023-11-22 PROCEDURE — 81001 URINALYSIS AUTO W/SCOPE: CPT

## 2023-11-22 PROCEDURE — 1123F ACP DISCUSS/DSCN MKR DOCD: CPT | Performed by: INTERNAL MEDICINE

## 2023-11-22 PROCEDURE — 83735 ASSAY OF MAGNESIUM: CPT

## 2023-11-22 PROCEDURE — 84100 ASSAY OF PHOSPHORUS: CPT

## 2023-11-22 PROCEDURE — 85025 COMPLETE CBC W/AUTO DIFF WBC: CPT

## 2023-11-22 PROCEDURE — 3078F DIAST BP <80 MM HG: CPT | Performed by: INTERNAL MEDICINE

## 2023-11-22 PROCEDURE — 36415 COLL VENOUS BLD VENIPUNCTURE: CPT

## 2023-11-22 PROCEDURE — 85055 RETICULATED PLATELET ASSAY: CPT

## 2023-11-22 PROCEDURE — 80053 COMPREHEN METABOLIC PANEL: CPT

## 2023-11-22 PROCEDURE — 99214 OFFICE O/P EST MOD 30 MIN: CPT | Performed by: INTERNAL MEDICINE

## 2023-11-22 ASSESSMENT — ENCOUNTER SYMPTOMS
BLOOD IN STOOL: 0
ANAL BLEEDING: 0
COUGH: 0
SORE THROAT: 0
CHOKING: 0
NAUSEA: 0
TROUBLE SWALLOWING: 0
WHEEZING: 0
VOMITING: 0
CONSTIPATION: 0
DIARRHEA: 0
COLOR CHANGE: 0
ABDOMINAL PAIN: 0
ABDOMINAL DISTENTION: 0
RECTAL PAIN: 0
SHORTNESS OF BREATH: 0

## 2023-12-07 DIAGNOSIS — E83.42 HYPOMAGNESEMIA: ICD-10-CM

## 2023-12-07 DIAGNOSIS — E87.1 HYPONATREMIA: ICD-10-CM

## 2023-12-07 DIAGNOSIS — K90.49 MALABSORPTION DUE TO INTOLERANCE, NOT ELSEWHERE CLASSIFIED: Primary | ICD-10-CM

## 2023-12-11 ENCOUNTER — OFFICE VISIT (OUTPATIENT)
Dept: FAMILY MEDICINE CLINIC | Age: 73
End: 2023-12-11
Payer: COMMERCIAL

## 2023-12-11 VITALS
DIASTOLIC BLOOD PRESSURE: 72 MMHG | OXYGEN SATURATION: 99 % | WEIGHT: 129 LBS | TEMPERATURE: 97.4 F | SYSTOLIC BLOOD PRESSURE: 120 MMHG | HEART RATE: 58 BPM | BODY MASS INDEX: 17.5 KG/M2

## 2023-12-11 DIAGNOSIS — Z79.4 TYPE 2 DIABETES MELLITUS WITH HYPERGLYCEMIA, WITH LONG-TERM CURRENT USE OF INSULIN (HCC): ICD-10-CM

## 2023-12-11 DIAGNOSIS — E11.65 TYPE 2 DIABETES MELLITUS WITH HYPERGLYCEMIA, WITH LONG-TERM CURRENT USE OF INSULIN (HCC): ICD-10-CM

## 2023-12-11 DIAGNOSIS — R25.2 MUSCLE CRAMPS: ICD-10-CM

## 2023-12-11 DIAGNOSIS — I10 ESSENTIAL HYPERTENSION: Primary | ICD-10-CM

## 2023-12-11 LAB — HBA1C MFR BLD: 6.6 %

## 2023-12-11 PROCEDURE — 3044F HG A1C LEVEL LT 7.0%: CPT

## 2023-12-11 PROCEDURE — 3078F DIAST BP <80 MM HG: CPT

## 2023-12-11 PROCEDURE — 99214 OFFICE O/P EST MOD 30 MIN: CPT

## 2023-12-11 PROCEDURE — 1123F ACP DISCUSS/DSCN MKR DOCD: CPT

## 2023-12-11 PROCEDURE — 83036 HEMOGLOBIN GLYCOSYLATED A1C: CPT

## 2023-12-11 PROCEDURE — 3074F SYST BP LT 130 MM HG: CPT

## 2023-12-11 RX ORDER — BLOOD SUGAR DIAGNOSTIC
STRIP MISCELLANEOUS
Qty: 300 STRIP | Refills: 0 | Status: SHIPPED | OUTPATIENT
Start: 2023-12-11

## 2023-12-11 RX ORDER — BLOOD-GLUCOSE SENSOR
1 EACH MISCELLANEOUS
Qty: 4 EACH | Refills: 2 | Status: SHIPPED | OUTPATIENT
Start: 2023-12-11

## 2023-12-11 RX ORDER — SODIUM FLUORIDE 6 MG/ML
PASTE, DENTIFRICE DENTAL
COMMUNITY
Start: 2023-10-31

## 2023-12-11 RX ORDER — ASCORBIC ACID 500 MG
500 TABLET ORAL DAILY
COMMUNITY

## 2023-12-11 RX ORDER — CYCLOBENZAPRINE HCL 10 MG
10 TABLET ORAL PRN
Qty: 90 TABLET | Refills: 0 | Status: SHIPPED | OUTPATIENT
Start: 2023-12-11

## 2023-12-11 SDOH — ECONOMIC STABILITY: FOOD INSECURITY: WITHIN THE PAST 12 MONTHS, THE FOOD YOU BOUGHT JUST DIDN'T LAST AND YOU DIDN'T HAVE MONEY TO GET MORE.: NEVER TRUE

## 2023-12-11 SDOH — ECONOMIC STABILITY: INCOME INSECURITY: HOW HARD IS IT FOR YOU TO PAY FOR THE VERY BASICS LIKE FOOD, HOUSING, MEDICAL CARE, AND HEATING?: NOT HARD AT ALL

## 2023-12-11 SDOH — ECONOMIC STABILITY: FOOD INSECURITY: WITHIN THE PAST 12 MONTHS, YOU WORRIED THAT YOUR FOOD WOULD RUN OUT BEFORE YOU GOT MONEY TO BUY MORE.: NEVER TRUE

## 2023-12-11 ASSESSMENT — PATIENT HEALTH QUESTIONNAIRE - PHQ9
SUM OF ALL RESPONSES TO PHQ QUESTIONS 1-9: 0
SUM OF ALL RESPONSES TO PHQ QUESTIONS 1-9: 0
1. LITTLE INTEREST OR PLEASURE IN DOING THINGS: 0
SUM OF ALL RESPONSES TO PHQ QUESTIONS 1-9: 0
2. FEELING DOWN, DEPRESSED OR HOPELESS: 0
SUM OF ALL RESPONSES TO PHQ9 QUESTIONS 1 & 2: 0
SUM OF ALL RESPONSES TO PHQ QUESTIONS 1-9: 0

## 2023-12-11 ASSESSMENT — ENCOUNTER SYMPTOMS
NAUSEA: 0
CONSTIPATION: 0
COLOR CHANGE: 0
DIARRHEA: 0
COUGH: 0
CHEST TIGHTNESS: 0
SHORTNESS OF BREATH: 0

## 2023-12-11 NOTE — PROGRESS NOTES
sounds present. Musculoskeletal:         General: Normal range of motion. Cervical back: Normal range of motion. Right lower leg: No edema. Left lower leg: No edema. Neurological:      General: No focal deficit present. Mental Status: He is alert. ASSESSMENT/PLAN:  1. Essential hypertension  -stable    2. Type 2 diabetes mellitus with hyperglycemia, with long-term current use of insulin (HCC)  -stable  -CGM rx given for pt to take to [employer] store  -close monitoring d/t recent hypoglycemic episode and improving A1C    -     blood glucose test strips (PRODIGY NO CODING BLOOD GLUC) strip; USE TO TEST BLOOD GLUCOSE TWICE DAILY AND AS NEEDED FOR SIGNS OF HYPOGLYCEMIA, Disp-300 strip, R-0Normal  -     Continuous Blood Gluc Sensor (FREESTYLE JOSE DAVID 3 SENSOR) MISC; 1 each by Does not apply route every 14 days, Disp-4 each, R-2Print  -     POCT glycosylated hemoglobin (Hb A1C)    3. Muscle cramps  -     cyclobenzaprine (FLEXERIL) 10 MG tablet; Take 1 tablet by mouth as needed for Muscle spasms, Disp-90 tablet, R-0Normal      Return in about 6 months (around 6/11/2024), or if symptoms worsen or fail to improve, for DM/chronic conditions. An electronic signature was used to authenticate this note.     --SCARLET Anthony - CNP

## 2023-12-22 ENCOUNTER — HOSPITAL ENCOUNTER (OUTPATIENT)
Dept: GENERAL RADIOLOGY | Age: 73
Discharge: HOME | End: 2023-12-24
Payer: COMMERCIAL

## 2023-12-22 ENCOUNTER — HOSPITAL ENCOUNTER (OUTPATIENT)
Age: 73
Discharge: HOME | End: 2023-12-24
Payer: COMMERCIAL

## 2023-12-22 DIAGNOSIS — J90 EXUDATIVE PLEURISY: ICD-10-CM

## 2023-12-22 PROCEDURE — 71046 X-RAY EXAM CHEST 2 VIEWS: CPT

## 2023-12-26 RX ORDER — PROPRANOLOL HCL 60 MG
60 CAPSULE, EXTENDED RELEASE 24HR ORAL 2 TIMES DAILY
Qty: 180 CAPSULE | Refills: 0 | OUTPATIENT
Start: 2023-12-26

## 2023-12-27 NOTE — TELEPHONE ENCOUNTER
Writer called patient back to verify where patient would like us to call script in to. Pending call back. Thank you!     Mya Ames

## 2023-12-28 RX ORDER — PROPRANOLOL HCL 60 MG
60 CAPSULE, EXTENDED RELEASE 24HR ORAL 2 TIMES DAILY
Qty: 180 CAPSULE | Refills: 0 | Status: SHIPPED | OUTPATIENT
Start: 2023-12-28

## 2024-01-01 ENCOUNTER — TELEPHONE (OUTPATIENT)
Dept: PALLATIVE CARE | Age: 74
End: 2024-01-01

## 2024-01-10 DIAGNOSIS — D69.6 THROMBOCYTOPENIA (HCC): ICD-10-CM

## 2024-01-10 DIAGNOSIS — K74.60 CIRRHOSIS OF LIVER WITH ASCITES, UNSPECIFIED HEPATIC CIRRHOSIS TYPE (HCC): ICD-10-CM

## 2024-01-10 DIAGNOSIS — R18.8 CIRRHOSIS OF LIVER WITH ASCITES, UNSPECIFIED HEPATIC CIRRHOSIS TYPE (HCC): ICD-10-CM

## 2024-01-10 DIAGNOSIS — C61 PROSTATE CANCER (HCC): Primary | ICD-10-CM

## 2024-01-11 ENCOUNTER — OFFICE VISIT (OUTPATIENT)
Dept: INFECTIOUS DISEASES | Age: 74
End: 2024-01-11
Payer: MEDICARE

## 2024-01-11 VITALS
OXYGEN SATURATION: 99 % | BODY MASS INDEX: 17.72 KG/M2 | RESPIRATION RATE: 16 BRPM | DIASTOLIC BLOOD PRESSURE: 76 MMHG | TEMPERATURE: 96.6 F | WEIGHT: 130.8 LBS | HEIGHT: 72 IN | HEART RATE: 55 BPM | SYSTOLIC BLOOD PRESSURE: 129 MMHG

## 2024-01-11 DIAGNOSIS — R18.8 CIRRHOSIS OF LIVER WITH ASCITES, UNSPECIFIED HEPATIC CIRRHOSIS TYPE (HCC): Primary | ICD-10-CM

## 2024-01-11 DIAGNOSIS — K74.60 CIRRHOSIS OF LIVER WITH ASCITES, UNSPECIFIED HEPATIC CIRRHOSIS TYPE (HCC): Primary | ICD-10-CM

## 2024-01-11 DIAGNOSIS — A48.8 SERRATIA MARCESCENS INFECTION: ICD-10-CM

## 2024-01-11 DIAGNOSIS — J90 PLEURAL EFFUSION ON RIGHT: ICD-10-CM

## 2024-01-11 PROCEDURE — G8484 FLU IMMUNIZE NO ADMIN: HCPCS | Performed by: INTERNAL MEDICINE

## 2024-01-11 PROCEDURE — 3074F SYST BP LT 130 MM HG: CPT | Performed by: INTERNAL MEDICINE

## 2024-01-11 PROCEDURE — 3017F COLORECTAL CA SCREEN DOC REV: CPT | Performed by: INTERNAL MEDICINE

## 2024-01-11 PROCEDURE — 99213 OFFICE O/P EST LOW 20 MIN: CPT | Performed by: INTERNAL MEDICINE

## 2024-01-11 PROCEDURE — G8427 DOCREV CUR MEDS BY ELIG CLIN: HCPCS | Performed by: INTERNAL MEDICINE

## 2024-01-11 PROCEDURE — 3078F DIAST BP <80 MM HG: CPT | Performed by: INTERNAL MEDICINE

## 2024-01-11 PROCEDURE — 1123F ACP DISCUSS/DSCN MKR DOCD: CPT | Performed by: INTERNAL MEDICINE

## 2024-01-11 PROCEDURE — 1036F TOBACCO NON-USER: CPT | Performed by: INTERNAL MEDICINE

## 2024-01-11 PROCEDURE — G8419 CALC BMI OUT NRM PARAM NOF/U: HCPCS | Performed by: INTERNAL MEDICINE

## 2024-01-11 ASSESSMENT — ENCOUNTER SYMPTOMS
RESPIRATORY NEGATIVE: 1
GASTROINTESTINAL NEGATIVE: 1

## 2024-01-11 NOTE — PROGRESS NOTES
[facility]  Office Progress Note  Today's Date and Time: 1/11/2024, 10:38 AM    Impression:     1. Cirrhosis of liver with ascites, unspecified hepatic cirrhosis type (HCC)    2. Pleural effusion on right    3. Serratia marcescens infection         Recommendations   The patient has done well since the last infection but has been on oral suppressive antibiotic therapy most recently in levofloxacin 250 mg every other day.  We had a discussion about stopping the antimicrobial therapy and we have never been able to get a clear source of the infection and if this could be related to the ascites and leukopenia  For now the plan will be to stop the levofloxacin and observe  If she again has recurrent Serratia bacteremia then the patient will need to take the fluoroquinolone therapy indefinitely  The plan will be for follow-up in 2 to 3 months    I have ordered the following medications/ labs:  No orders of the defined types were placed in this encounter.     No orders of the defined types were placed in this encounter.      Chief complaint/reason for consultation:     Chief Complaint   Patient presents with    Frequent Infections     3 mth follow up.  Serratia marcescens infection.  Patient reports he is doing well         History of Present Illness:   Candelario Bliss Jr. is a 73 y.o.-year-old male who has a history of cirrhosis with recurrent ascites, moderate portal hypertensive gastropathy with esophageal varices status post banding x3 6/7/2023, BPH status post prostatectomy, C status post bilateral pancytopenia felt secondary to cirrhosis, gastroesophageal reflux disease, chronic hypotension on midodrine.     He has had recurrent episodes of Serratia marcescens bacteremia that started in May 2023 treated with Rocephin intravenously and subsequently switched to oral levofloxacin to complete a 14-day course of therapy but he again had recurrent infection with Serratia marcescens bacteremia and this

## 2024-01-19 ENCOUNTER — HOSPITAL ENCOUNTER (OUTPATIENT)
Age: 74
Setting detail: SPECIMEN
Discharge: HOME | End: 2024-01-19

## 2024-01-19 DIAGNOSIS — D63.1 ANEMIA IN STAGE 1 CHRONIC KIDNEY DISEASE: ICD-10-CM

## 2024-01-19 DIAGNOSIS — I12.9 BENIGN HYPERTENSION WITH CKD (CHRONIC KIDNEY DISEASE) STAGE I: ICD-10-CM

## 2024-01-19 DIAGNOSIS — N18.1 ANEMIA IN STAGE 1 CHRONIC KIDNEY DISEASE: ICD-10-CM

## 2024-01-19 DIAGNOSIS — E87.1 HYPONATREMIA: ICD-10-CM

## 2024-01-19 DIAGNOSIS — E13.22 SECONDARY DIABETES MELLITUS WITH STAGE 1 CHRONIC KIDNEY DISEASE (HCC): ICD-10-CM

## 2024-01-19 DIAGNOSIS — D47.2 MGUS (MONOCLONAL GAMMOPATHY OF UNKNOWN SIGNIFICANCE): ICD-10-CM

## 2024-01-19 DIAGNOSIS — N18.1 SECONDARY DIABETES MELLITUS WITH STAGE 1 CHRONIC KIDNEY DISEASE (HCC): ICD-10-CM

## 2024-01-19 DIAGNOSIS — N18.1 BENIGN HYPERTENSION WITH CKD (CHRONIC KIDNEY DISEASE) STAGE I: ICD-10-CM

## 2024-01-19 DIAGNOSIS — N18.1 CKD (CHRONIC KIDNEY DISEASE), STAGE I: ICD-10-CM

## 2024-01-19 LAB
BACTERIA URNS QL MICRO: NORMAL
BASOPHILS # BLD: 0 K/UL (ref 0–0.2)
BASOPHILS NFR BLD: 0 % (ref 0–2)
BILIRUB UR QL STRIP: NEGATIVE
CASTS #/AREA URNS LPF: NORMAL /LPF (ref 0–8)
CLARITY UR: CLEAR
COLOR UR: YELLOW
EOSINOPHIL # BLD: 0.07 K/UL (ref 0–0.4)
EOSINOPHILS RELATIVE PERCENT: 5 % (ref 1–4)
EPI CELLS #/AREA URNS HPF: NORMAL /HPF (ref 0–5)
ERYTHROCYTE [DISTWIDTH] IN BLOOD BY AUTOMATED COUNT: 21.9 % (ref 11.8–14.4)
GLUCOSE UR STRIP-MCNC: NEGATIVE MG/DL
HCT VFR BLD AUTO: 42.8 % (ref 40.7–50.3)
HGB BLD-MCNC: 12.9 G/DL (ref 13–17)
HGB UR QL STRIP.AUTO: NEGATIVE
IMM GRANULOCYTES # BLD AUTO: 0 K/UL (ref 0–0.3)
IMM GRANULOCYTES NFR BLD: 0 %
KETONES UR STRIP-MCNC: NEGATIVE MG/DL
LEUKOCYTE ESTERASE UR QL STRIP: NEGATIVE
LYMPHOCYTES NFR BLD: 0.43 K/UL (ref 1–4.8)
LYMPHOCYTES RELATIVE PERCENT: 34 % (ref 24–44)
MCH RBC QN AUTO: 25.6 PG (ref 25.2–33.5)
MCHC RBC AUTO-ENTMCNC: 30.1 G/DL (ref 28.4–34.8)
MCV RBC AUTO: 85.1 FL (ref 82.6–102.9)
MONOCYTES NFR BLD: 0.42 K/UL (ref 0.1–0.8)
MONOCYTES NFR BLD: 32 % (ref 1–7)
MORPHOLOGY: ABNORMAL
MORPHOLOGY: ABNORMAL
NEUTROPHILS NFR BLD: 29 % (ref 36–66)
NEUTS SEG NFR BLD: 0.38 K/UL (ref 1.8–7.7)
NITRITE UR QL STRIP: NEGATIVE
NRBC BLD-RTO: 0 PER 100 WBC
PH UR STRIP: 6 [PH] (ref 5–8)
PLATELET # BLD AUTO: ABNORMAL K/UL (ref 138–453)
PLATELET, FLUORESCENCE: 59 K/UL (ref 138–453)
PLATELETS.RETICULATED NFR BLD AUTO: 7.7 % (ref 1.1–10.3)
PROT UR STRIP-MCNC: NEGATIVE MG/DL
RBC # BLD AUTO: 5.03 M/UL (ref 4.21–5.77)
RBC #/AREA URNS HPF: NORMAL /HPF (ref 0–4)
SP GR UR STRIP: 1.02 (ref 1–1.03)
UROBILINOGEN UR STRIP-ACNC: NORMAL EU/DL (ref 0–1)
WBC #/AREA URNS HPF: NORMAL /HPF (ref 0–5)
WBC OTHER # BLD: 1.3 K/UL (ref 3.5–11.3)

## 2024-01-20 LAB
ALBUMIN SERPL-MCNC: 2.9 G/DL (ref 3.5–5.2)
ALBUMIN/GLOB SERPL: 0.8 {RATIO} (ref 1–2.5)
ALP SERPL-CCNC: 208 U/L (ref 40–129)
ALT SERPL-CCNC: 55 U/L (ref 5–41)
ANION GAP SERPL CALCULATED.3IONS-SCNC: 9 MMOL/L (ref 9–17)
AST SERPL-CCNC: 62 U/L
BILIRUB SERPL-MCNC: 0.9 MG/DL (ref 0.3–1.2)
BUN SERPL-MCNC: 23 MG/DL (ref 8–23)
CALCIUM SERPL-MCNC: 8.6 MG/DL (ref 8.6–10.4)
CHLORIDE SERPL-SCNC: 99 MMOL/L (ref 98–107)
CO2 SERPL-SCNC: 23 MMOL/L (ref 20–31)
CREAT SERPL-MCNC: 0.7 MG/DL (ref 0.7–1.2)
GFR SERPL CREATININE-BSD FRML MDRD: >60 ML/MIN/1.73M2
GLUCOSE SERPL-MCNC: 142 MG/DL (ref 70–99)
MAGNESIUM SERPL-MCNC: 2.1 MG/DL (ref 1.6–2.6)
PHOSPHATE SERPL-MCNC: 3.5 MG/DL (ref 2.5–4.5)
POTASSIUM SERPL-SCNC: 4.3 MMOL/L (ref 3.7–5.3)
PROT SERPL-MCNC: 6.6 G/DL (ref 6.4–8.3)
SODIUM SERPL-SCNC: 131 MMOL/L (ref 135–144)

## 2024-02-12 ENCOUNTER — APPOINTMENT (OUTPATIENT)
Dept: GENERAL RADIOLOGY | Age: 74
DRG: 871 | End: 2024-02-12
Payer: MEDICARE

## 2024-02-12 ENCOUNTER — APPOINTMENT (OUTPATIENT)
Dept: CT IMAGING | Age: 74
DRG: 871 | End: 2024-02-12
Payer: MEDICARE

## 2024-02-12 ENCOUNTER — PATIENT MESSAGE (OUTPATIENT)
Dept: FAMILY MEDICINE CLINIC | Age: 74
End: 2024-02-12

## 2024-02-12 ENCOUNTER — HOSPITAL ENCOUNTER (INPATIENT)
Age: 74
LOS: 7 days | Discharge: HOME HEALTH | DRG: 871 | End: 2024-02-19
Attending: EMERGENCY MEDICINE | Admitting: FAMILY MEDICINE
Payer: MEDICARE

## 2024-02-12 DIAGNOSIS — E11.65 TYPE 2 DIABETES MELLITUS WITH HYPERGLYCEMIA, WITHOUT LONG-TERM CURRENT USE OF INSULIN (HCC): ICD-10-CM

## 2024-02-12 DIAGNOSIS — C61 PROSTATE CANCER (HCC): Primary | ICD-10-CM

## 2024-02-12 DIAGNOSIS — R78.81 BACTEREMIA DUE TO GRAM-NEGATIVE BACTERIA: ICD-10-CM

## 2024-02-12 DIAGNOSIS — I95.9 HYPOTENSION, UNSPECIFIED HYPOTENSION TYPE: Primary | ICD-10-CM

## 2024-02-12 DIAGNOSIS — J90 PLEURAL EFFUSION ON RIGHT: ICD-10-CM

## 2024-02-12 PROBLEM — R65.20 SEVERE SEPSIS (HCC): Status: ACTIVE | Noted: 2024-02-12

## 2024-02-12 PROBLEM — A41.9 SEVERE SEPSIS (HCC): Status: ACTIVE | Noted: 2024-02-12

## 2024-02-12 LAB
ALBUMIN SERPL-MCNC: 1.9 G/DL (ref 3.5–5.2)
ALP SERPL-CCNC: 117 U/L (ref 40–129)
ALT SERPL-CCNC: 40 U/L (ref 5–41)
ANION GAP SERPL CALCULATED.3IONS-SCNC: 9 MMOL/L (ref 9–17)
AST SERPL-CCNC: 47 U/L
BACTERIA URNS QL MICRO: ABNORMAL
BASOPHILS # BLD: 0.05 K/UL (ref 0–0.2)
BASOPHILS NFR BLD: 1 %
BILIRUB DIRECT SERPL-MCNC: 0.5 MG/DL
BILIRUB INDIRECT SERPL-MCNC: 0.5 MG/DL (ref 0–1)
BILIRUB SERPL-MCNC: 1 MG/DL (ref 0.3–1.2)
BILIRUB UR QL STRIP: NEGATIVE
BUN SERPL-MCNC: 20 MG/DL (ref 8–23)
BUN/CREAT SERPL: 25 (ref 9–20)
CALCIUM SERPL-MCNC: 8.5 MG/DL (ref 8.6–10.4)
CHLORIDE SERPL-SCNC: 100 MMOL/L (ref 98–107)
CLARITY UR: ABNORMAL
CO2 SERPL-SCNC: 23 MMOL/L (ref 20–31)
COLOR UR: YELLOW
CREAT SERPL-MCNC: 0.8 MG/DL (ref 0.7–1.2)
EOSINOPHIL # BLD: 0 K/UL (ref 0–0.4)
EOSINOPHILS RELATIVE PERCENT: 0 % (ref 1–4)
EPI CELLS #/AREA URNS HPF: ABNORMAL /HPF (ref 0–5)
ERYTHROCYTE [DISTWIDTH] IN BLOOD BY AUTOMATED COUNT: 18.8 % (ref 11.8–14.4)
GFR SERPL CREATININE-BSD FRML MDRD: >60 ML/MIN/1.73M2
GLUCOSE BLD-MCNC: 104 MG/DL (ref 75–110)
GLUCOSE SERPL-MCNC: 167 MG/DL (ref 70–99)
GLUCOSE UR STRIP-MCNC: ABNORMAL MG/DL
HCO3 VENOUS: 14.1 MMOL/L (ref 22–29)
HCT VFR BLD AUTO: 38.7 % (ref 40.7–50.3)
HGB BLD-MCNC: 12 G/DL (ref 13–17)
HGB UR QL STRIP.AUTO: ABNORMAL
IMM GRANULOCYTES # BLD AUTO: 0.05 K/UL (ref 0–0.3)
IMM GRANULOCYTES NFR BLD: 1 %
INR PPP: 1.2
KETONES UR STRIP-MCNC: NEGATIVE MG/DL
LACTATE BLDV-SCNC: 4.1 MMOL/L (ref 0.5–1.9)
LACTATE BLDV-SCNC: 4.2 MMOL/L (ref 0.5–1.9)
LACTATE BLDV-SCNC: 5.3 MMOL/L (ref 0.5–1.9)
LEUKOCYTE ESTERASE UR QL STRIP: ABNORMAL
LYMPHOCYTES NFR BLD: 0.1 K/UL (ref 1–4.8)
LYMPHOCYTES RELATIVE PERCENT: 2 % (ref 24–44)
MCH RBC QN AUTO: 26.7 PG (ref 25.2–33.5)
MCHC RBC AUTO-ENTMCNC: 31 G/DL (ref 28.4–34.8)
MCV RBC AUTO: 86.2 FL (ref 82.6–102.9)
MONOCYTES NFR BLD: 0.05 K/UL (ref 0.2–0.8)
MONOCYTES NFR BLD: 1 % (ref 1–7)
MORPHOLOGY: ABNORMAL
MORPHOLOGY: ABNORMAL
NEGATIVE BASE EXCESS, VEN: 9.5 MMOL/L (ref 0–2)
NEUTROPHILS NFR BLD: 95 % (ref 36–66)
NEUTS SEG NFR BLD: 4.65 K/UL (ref 1.8–7.7)
NITRITE UR QL STRIP: NEGATIVE
NRBC BLD-RTO: 0 PER 100 WBC
O2 SAT, VEN: 99.1 % (ref 60–85)
PARTIAL THROMBOPLASTIN TIME: 27.4 SEC (ref 23.9–33.8)
PCO2, VEN: 24.3 MM HG (ref 41–51)
PH UR STRIP: 7 [PH] (ref 5–8)
PH VENOUS: 7.37 (ref 7.32–7.43)
PLATELET # BLD AUTO: 75 K/UL (ref 138–453)
PMV BLD AUTO: ABNORMAL FL (ref 8.1–13.5)
PO2, VEN: 135.8 MM HG (ref 30–50)
POTASSIUM SERPL-SCNC: 5 MMOL/L (ref 3.7–5.3)
PROT SERPL-MCNC: 4.6 G/DL (ref 6.4–8.3)
PROT UR STRIP-MCNC: ABNORMAL MG/DL
PROTHROMBIN TIME: 15.3 SEC (ref 11.5–14.2)
RBC # BLD AUTO: 4.49 M/UL (ref 4.21–5.77)
RBC #/AREA URNS HPF: ABNORMAL /HPF (ref 0–2)
SODIUM SERPL-SCNC: 132 MMOL/L (ref 135–144)
SP GR UR STRIP: 1.02 (ref 1–1.03)
TROPONIN I SERPL HS-MCNC: 12 NG/L (ref 0–22)
TROPONIN I SERPL HS-MCNC: 9 NG/L (ref 0–22)
UROBILINOGEN UR STRIP-ACNC: NORMAL EU/DL (ref 0–1)
WBC #/AREA URNS HPF: ABNORMAL /HPF (ref 0–5)
WBC OTHER # BLD: 4.9 K/UL (ref 3.5–11.3)

## 2024-02-12 PROCEDURE — 71046 X-RAY EXAM CHEST 2 VIEWS: CPT

## 2024-02-12 PROCEDURE — 2000000000 HC ICU R&B

## 2024-02-12 PROCEDURE — 71260 CT THORAX DX C+: CPT

## 2024-02-12 PROCEDURE — 83605 ASSAY OF LACTIC ACID: CPT

## 2024-02-12 PROCEDURE — 2580000003 HC RX 258: Performed by: FAMILY MEDICINE

## 2024-02-12 PROCEDURE — 80048 BASIC METABOLIC PNL TOTAL CA: CPT

## 2024-02-12 PROCEDURE — 87205 SMEAR GRAM STAIN: CPT

## 2024-02-12 PROCEDURE — 85610 PROTHROMBIN TIME: CPT

## 2024-02-12 PROCEDURE — 96361 HYDRATE IV INFUSION ADD-ON: CPT

## 2024-02-12 PROCEDURE — 51702 INSERT TEMP BLADDER CATH: CPT

## 2024-02-12 PROCEDURE — 6360000002 HC RX W HCPCS: Performed by: EMERGENCY MEDICINE

## 2024-02-12 PROCEDURE — 80076 HEPATIC FUNCTION PANEL: CPT

## 2024-02-12 PROCEDURE — 71045 X-RAY EXAM CHEST 1 VIEW: CPT

## 2024-02-12 PROCEDURE — 84484 ASSAY OF TROPONIN QUANT: CPT

## 2024-02-12 PROCEDURE — 99285 EMERGENCY DEPT VISIT HI MDM: CPT

## 2024-02-12 PROCEDURE — 94761 N-INVAS EAR/PLS OXIMETRY MLT: CPT

## 2024-02-12 PROCEDURE — 2500000003 HC RX 250 WO HCPCS: Performed by: FAMILY MEDICINE

## 2024-02-12 PROCEDURE — 6360000002 HC RX W HCPCS: Performed by: INTERNAL MEDICINE

## 2024-02-12 PROCEDURE — 87154 CUL TYP ID BLD PTHGN 6+ TRGT: CPT

## 2024-02-12 PROCEDURE — 36620 INSERTION CATHETER ARTERY: CPT

## 2024-02-12 PROCEDURE — 6360000004 HC RX CONTRAST MEDICATION: Performed by: FAMILY MEDICINE

## 2024-02-12 PROCEDURE — 82947 ASSAY GLUCOSE BLOOD QUANT: CPT

## 2024-02-12 PROCEDURE — 2580000003 HC RX 258: Performed by: EMERGENCY MEDICINE

## 2024-02-12 PROCEDURE — 88305 TISSUE EXAM BY PATHOLOGIST: CPT

## 2024-02-12 PROCEDURE — 82533 TOTAL CORTISOL: CPT

## 2024-02-12 PROCEDURE — 2580000003 HC RX 258: Performed by: INTERNAL MEDICINE

## 2024-02-12 PROCEDURE — 87185 SC STD ENZYME DETCJ PER NZM: CPT

## 2024-02-12 PROCEDURE — 93005 ELECTROCARDIOGRAM TRACING: CPT | Performed by: EMERGENCY MEDICINE

## 2024-02-12 PROCEDURE — 85730 THROMBOPLASTIN TIME PARTIAL: CPT

## 2024-02-12 PROCEDURE — 88112 CYTOPATH CELL ENHANCE TECH: CPT

## 2024-02-12 PROCEDURE — 85025 COMPLETE CBC W/AUTO DIFF WBC: CPT

## 2024-02-12 PROCEDURE — 96360 HYDRATION IV INFUSION INIT: CPT

## 2024-02-12 PROCEDURE — 81001 URINALYSIS AUTO W/SCOPE: CPT

## 2024-02-12 PROCEDURE — 82803 BLOOD GASES ANY COMBINATION: CPT

## 2024-02-12 PROCEDURE — 87040 BLOOD CULTURE FOR BACTERIA: CPT

## 2024-02-12 PROCEDURE — 6370000000 HC RX 637 (ALT 250 FOR IP): Performed by: FAMILY MEDICINE

## 2024-02-12 PROCEDURE — 87086 URINE CULTURE/COLONY COUNT: CPT

## 2024-02-12 PROCEDURE — 2700000000 HC OXYGEN THERAPY PER DAY

## 2024-02-12 PROCEDURE — 36415 COLL VENOUS BLD VENIPUNCTURE: CPT

## 2024-02-12 PROCEDURE — 83615 LACTATE (LD) (LDH) ENZYME: CPT

## 2024-02-12 PROCEDURE — 87077 CULTURE AEROBIC IDENTIFY: CPT

## 2024-02-12 RX ORDER — PEN NEEDLE, DIABETIC 31 GX3/16"
1 NEEDLE, DISPOSABLE MISCELLANEOUS DAILY
Qty: 100 EACH | Refills: 3 | Status: ON HOLD | OUTPATIENT
Start: 2024-02-12

## 2024-02-12 RX ORDER — SODIUM CHLORIDE 0.9 % (FLUSH) 0.9 %
10 SYRINGE (ML) INJECTION ONCE
Status: COMPLETED | OUTPATIENT
Start: 2024-02-12 | End: 2024-02-12

## 2024-02-12 RX ORDER — DEXTROSE MONOHYDRATE 100 MG/ML
INJECTION, SOLUTION INTRAVENOUS CONTINUOUS PRN
Status: DISCONTINUED | OUTPATIENT
Start: 2024-02-12 | End: 2024-02-19 | Stop reason: HOSPADM

## 2024-02-12 RX ORDER — ACETAMINOPHEN 325 MG/1
650 TABLET ORAL EVERY 6 HOURS PRN
Status: DISCONTINUED | OUTPATIENT
Start: 2024-02-12 | End: 2024-02-19 | Stop reason: HOSPADM

## 2024-02-12 RX ORDER — MIDODRINE HYDROCHLORIDE 5 MG/1
5 TABLET ORAL 3 TIMES DAILY PRN
Status: DISCONTINUED | OUTPATIENT
Start: 2024-02-12 | End: 2024-02-12

## 2024-02-12 RX ORDER — NOREPINEPHRINE BITARTRATE 0.06 MG/ML
1-100 INJECTION, SOLUTION INTRAVENOUS CONTINUOUS
Status: DISCONTINUED | OUTPATIENT
Start: 2024-02-12 | End: 2024-02-13

## 2024-02-12 RX ORDER — ENOXAPARIN SODIUM 100 MG/ML
40 INJECTION SUBCUTANEOUS DAILY
Status: DISCONTINUED | OUTPATIENT
Start: 2024-02-12 | End: 2024-02-19 | Stop reason: HOSPADM

## 2024-02-12 RX ORDER — 0.9 % SODIUM CHLORIDE 0.9 %
1000 INTRAVENOUS SOLUTION INTRAVENOUS ONCE
Status: COMPLETED | OUTPATIENT
Start: 2024-02-12 | End: 2024-02-12

## 2024-02-12 RX ORDER — VITAMIN B COMPLEX
1000 TABLET ORAL DAILY
Status: DISCONTINUED | OUTPATIENT
Start: 2024-02-12 | End: 2024-02-19 | Stop reason: HOSPADM

## 2024-02-12 RX ORDER — LANOLIN ALCOHOL/MO/W.PET/CERES
325 CREAM (GRAM) TOPICAL
Status: DISCONTINUED | OUTPATIENT
Start: 2024-02-13 | End: 2024-02-19 | Stop reason: HOSPADM

## 2024-02-12 RX ORDER — LEVOFLOXACIN 5 MG/ML
750 INJECTION, SOLUTION INTRAVENOUS EVERY 24 HOURS
Status: DISCONTINUED | OUTPATIENT
Start: 2024-02-13 | End: 2024-02-13

## 2024-02-12 RX ORDER — INSULIN GLARGINE 100 [IU]/ML
18 INJECTION, SOLUTION SUBCUTANEOUS NIGHTLY
Qty: 4 ADJUSTABLE DOSE PRE-FILLED PEN SYRINGE | Refills: 2 | Status: ON HOLD | OUTPATIENT
Start: 2024-02-12

## 2024-02-12 RX ORDER — TRAMADOL HYDROCHLORIDE 50 MG/1
50 TABLET ORAL EVERY 8 HOURS PRN
Qty: 21 TABLET | Refills: 0 | Status: ON HOLD | OUTPATIENT
Start: 2024-02-12 | End: 2024-02-19

## 2024-02-12 RX ORDER — 0.9 % SODIUM CHLORIDE 0.9 %
80 INTRAVENOUS SOLUTION INTRAVENOUS ONCE
Status: COMPLETED | OUTPATIENT
Start: 2024-02-12 | End: 2024-02-12

## 2024-02-12 RX ORDER — ACETAMINOPHEN 650 MG/1
650 SUPPOSITORY RECTAL EVERY 6 HOURS PRN
Status: DISCONTINUED | OUTPATIENT
Start: 2024-02-12 | End: 2024-02-19 | Stop reason: HOSPADM

## 2024-02-12 RX ORDER — INSULIN GLARGINE 100 [IU]/ML
18 INJECTION, SOLUTION SUBCUTANEOUS NIGHTLY
Status: DISCONTINUED | OUTPATIENT
Start: 2024-02-12 | End: 2024-02-14

## 2024-02-12 RX ORDER — DOCUSATE SODIUM 100 MG/1
100 CAPSULE, LIQUID FILLED ORAL 2 TIMES DAILY
Status: DISCONTINUED | OUTPATIENT
Start: 2024-02-12 | End: 2024-02-19 | Stop reason: HOSPADM

## 2024-02-12 RX ORDER — LEVOFLOXACIN 5 MG/ML
750 INJECTION, SOLUTION INTRAVENOUS ONCE
Status: COMPLETED | OUTPATIENT
Start: 2024-02-12 | End: 2024-02-12

## 2024-02-12 RX ORDER — TRAMADOL HYDROCHLORIDE 50 MG/1
50 TABLET ORAL EVERY 6 HOURS PRN
Status: DISCONTINUED | OUTPATIENT
Start: 2024-02-12 | End: 2024-02-16

## 2024-02-12 RX ORDER — TRAMADOL HYDROCHLORIDE 50 MG/1
100 TABLET ORAL EVERY 6 HOURS PRN
Status: DISCONTINUED | OUTPATIENT
Start: 2024-02-12 | End: 2024-02-16

## 2024-02-12 RX ORDER — 0.9 % SODIUM CHLORIDE 0.9 %
500 INTRAVENOUS SOLUTION INTRAVENOUS ONCE
Status: COMPLETED | OUTPATIENT
Start: 2024-02-12 | End: 2024-02-12

## 2024-02-12 RX ORDER — SODIUM CHLORIDE 9 MG/ML
INJECTION, SOLUTION INTRAVENOUS PRN
Status: DISCONTINUED | OUTPATIENT
Start: 2024-02-12 | End: 2024-02-19 | Stop reason: HOSPADM

## 2024-02-12 RX ORDER — SODIUM CHLORIDE 9 MG/ML
INJECTION, SOLUTION INTRAVENOUS CONTINUOUS
Status: DISCONTINUED | OUTPATIENT
Start: 2024-02-12 | End: 2024-02-13

## 2024-02-12 RX ORDER — PEN NEEDLE, DIABETIC 30 GX5/16"
1 NEEDLE, DISPOSABLE MISCELLANEOUS DAILY
Qty: 100 EACH | Refills: 3 | Status: CANCELLED | OUTPATIENT
Start: 2024-02-12

## 2024-02-12 RX ORDER — SODIUM CHLORIDE 0.9 % (FLUSH) 0.9 %
5-40 SYRINGE (ML) INJECTION EVERY 12 HOURS SCHEDULED
Status: DISCONTINUED | OUTPATIENT
Start: 2024-02-12 | End: 2024-02-19 | Stop reason: HOSPADM

## 2024-02-12 RX ORDER — SODIUM CHLORIDE 0.9 % (FLUSH) 0.9 %
5-40 SYRINGE (ML) INJECTION PRN
Status: DISCONTINUED | OUTPATIENT
Start: 2024-02-12 | End: 2024-02-19 | Stop reason: HOSPADM

## 2024-02-12 RX ORDER — MIDODRINE HYDROCHLORIDE 10 MG/1
10 TABLET ORAL 3 TIMES DAILY PRN
Status: DISCONTINUED | OUTPATIENT
Start: 2024-02-12 | End: 2024-02-16

## 2024-02-12 RX ADMIN — HYDROCORTISONE SODIUM SUCCINATE 100 MG: 100 INJECTION, POWDER, FOR SOLUTION INTRAMUSCULAR; INTRAVENOUS at 22:54

## 2024-02-12 RX ADMIN — SODIUM CHLORIDE 500 ML: 9 INJECTION, SOLUTION INTRAVENOUS at 20:15

## 2024-02-12 RX ADMIN — TRAMADOL HYDROCHLORIDE 100 MG: 50 TABLET, COATED ORAL at 21:14

## 2024-02-12 RX ADMIN — LEVOFLOXACIN 750 MG: 5 INJECTION, SOLUTION INTRAVENOUS at 14:07

## 2024-02-12 RX ADMIN — Medication 1000 UNITS: at 20:12

## 2024-02-12 RX ADMIN — IOPAMIDOL 75 ML: 755 INJECTION, SOLUTION INTRAVENOUS at 17:30

## 2024-02-12 RX ADMIN — DOCUSATE SODIUM 100 MG: 100 CAPSULE, LIQUID FILLED ORAL at 20:12

## 2024-02-12 RX ADMIN — PIPERACILLIN AND TAZOBACTAM 4500 MG: 4; .5 INJECTION, POWDER, FOR SOLUTION INTRAVENOUS at 22:52

## 2024-02-12 RX ADMIN — VANCOMYCIN HYDROCHLORIDE 1500 MG: 1 INJECTION, POWDER, LYOPHILIZED, FOR SOLUTION INTRAVENOUS at 23:28

## 2024-02-12 RX ADMIN — SODIUM CHLORIDE 1000 ML: 9 INJECTION, SOLUTION INTRAVENOUS at 13:09

## 2024-02-12 RX ADMIN — SODIUM CHLORIDE 1000 ML: 9 INJECTION, SOLUTION INTRAVENOUS at 14:04

## 2024-02-12 RX ADMIN — SODIUM CHLORIDE, PRESERVATIVE FREE 10 ML: 5 INJECTION INTRAVENOUS at 17:30

## 2024-02-12 RX ADMIN — SODIUM CHLORIDE: 9 INJECTION, SOLUTION INTRAVENOUS at 18:24

## 2024-02-12 RX ADMIN — MIDODRINE HYDROCHLORIDE 10 MG: 10 TABLET ORAL at 18:41

## 2024-02-12 RX ADMIN — Medication 5 MCG/MIN: at 18:48

## 2024-02-12 RX ADMIN — SODIUM CHLORIDE 80 ML: 9 INJECTION, SOLUTION INTRAVENOUS at 17:31

## 2024-02-12 RX ADMIN — INSULIN GLARGINE 18 UNITS: 100 INJECTION, SOLUTION SUBCUTANEOUS at 20:12

## 2024-02-12 RX ADMIN — SODIUM CHLORIDE 1000 ML: 9 INJECTION, SOLUTION INTRAVENOUS at 15:31

## 2024-02-12 ASSESSMENT — PAIN SCALES - GENERAL
PAINLEVEL_OUTOF10: 7
PAINLEVEL_OUTOF10: 4
PAINLEVEL_OUTOF10: 8

## 2024-02-12 ASSESSMENT — PAIN DESCRIPTION - ORIENTATION
ORIENTATION: RIGHT
ORIENTATION: RIGHT
ORIENTATION: MID

## 2024-02-12 ASSESSMENT — PAIN DESCRIPTION - LOCATION
LOCATION: CHEST
LOCATION: BACK
LOCATION: CHEST

## 2024-02-12 ASSESSMENT — PAIN - FUNCTIONAL ASSESSMENT
PAIN_FUNCTIONAL_ASSESSMENT: 0-10
PAIN_FUNCTIONAL_ASSESSMENT: ACTIVITIES ARE NOT PREVENTED

## 2024-02-12 ASSESSMENT — PAIN DESCRIPTION - DESCRIPTORS: DESCRIPTORS: ACHING;DULL

## 2024-02-12 NOTE — ED NOTES
ED to inpatient nurses report     Chief Complaint   Patient presents with    Chest Pain     Pt reports right chest pain and SOB since 0700 this morning.      Present to ED from home  LOC: alert and orientated to name and place  Vital signs   Vitals:    02/12/24 1400 02/12/24 1420 02/12/24 1450 02/12/24 1500   BP: (!) 89/53 (!) 86/50 (!) 101/59 (!) 88/56   Pulse: 83 79 87 85   Resp: 29 30 30 (!) 32   Temp:       TempSrc:       SpO2:  96%  94%   Weight:          Oxygen Baseline     Current needs required    SEPSIS:   [] Lactate X 2 ordered (Yes or No)  [] Antibiotics given (Yes or No)  [] IV Fluids ordered (Yes or No)             [] 2nd IV completed (Yes or No)  [] Hourly Vital Signs (Validated)  [] Outstanding Orders:     LDAs:   Peripheral IV 02/12/24 Left Antecubital (Active)   Site Assessment Clean, dry & intact 02/12/24 1219   Line Status Blood return noted 02/12/24 1219     Mobility: Fully dependent  Fall Risk:    Pending ED orders:   Present condition:   Code Status:   Consults: IP CONSULT TO INFECTIOUS DISEASES  IP CONSULT TO HOSPITALIST  IP CONSULT TO INFECTIOUS DISEASES  []  Hospitalist  Completed  [] yes [] no Who:   []  Medicine  Completed  [] yes [] No Who:   []  Cardiology  Completed  [] yes [] No Who:   []  GI   Completed  [] yes [] No Who:   []  Neurology  Completed  [] yes [] No Who:   []  Nephrology Completed  [] yes [] No Who:    []  Vascular  Completed  [] yes [] No Who:   []  Ortho  Completed  [] yes [] No Who:     []  Surgery  Completed  [] yes [] No Who:    []  Urology  Completed  [] yes [] No Who:    []  CT Surgery Completed  [] yes [] No Who:   []  Podiatry  Completed  [] yes [] No Who:    []  Other    Completed  [] yes [] No Who:  Interventions: Levaquin, 2L NS  Important Events:         Electronically signed by ISAIAH RODRIGUES RN on 2/12/2024 at 3:20 PM

## 2024-02-12 NOTE — ED PROVIDER NOTES
[facility]  Emergency Department Encounter  Emergency Medicine Physician Note     Pt Name:Candelario Bliss Jr.  MRN: [id number]  Birthdate 1950  Date of evaluation: 2/12/24  PCP:  Alesha Zaragoza APRN - CNP  Note Started: 11:48 AM EST      CHIEF COMPLAINT       Chief Complaint   Patient presents with    Chest Pain     Pt reports right chest pain and SOB since 0700 this morning.       HISTORY OF PRESENT ILLNESS  (Location/Symptom, Timing/Onset, Context/Setting, Quality, Duration, Modifying Factors, Severity.)      Candelario Bliss Jr. is a 73 y.o. male who presents with generalized fatigue and right-sided chest pain with some shortness of breath that started this morning.  Patient gets normal pleural effusions drained at home, 1400 mL removed every other day.  Patient had removal of fluid yesterday with no complications.  Patient was able to perform activities daily without difficulty yesterday, sudden changes morning.  Patient has a history of Serratia infection with multiple admissions for sepsis.  Patient does have MGUS, noted to have leukopenia, per wife patient frequently demonstrates concerns for sepsis with normal leukocytosis.  Wife states patient's been more confused.  No nausea vomiting chest pain abdominal pain change in urination or bowel habits at this time per patient.    PAST MEDICAL / SURGICAL / SOCIAL / FAMILY HISTORY      has a past medical history of Anemia, Arthritis, Avascular necrosis (HCC), BPH (benign prostatic hyperplasia), Cancer (HCC), CKD (chronic kidney disease), stage I, Diabetes mellitus (HCC), Gastroesophageal reflux disease, Gram negative sepsis (HCC), Hematemesis without nausea, History of blood transfusion, Hypernatremia, Hypertension, Iron deficiency anemia, Leukopenia, MGUS (monoclonal gammopathy of unknown significance), Osteoarthritis, Other cirrhosis of liver (HCC), Pancytopenia (HCC), Patient in clinical research study, Positive FIT (fecal immunochemical test), Sepsis (HCC),

## 2024-02-12 NOTE — H&P
[facility]  Office: [phone]  Drew Avery DO, Carlos Enrique Varela DO, Erik Patino DO, Naresh Knight DO, Charity Landeros MD, Jacqueline Alvarez MD, Rosendo Leger MD, Neela Aquino MD,  Rayshawn Walker MD, Sarthak Cobb MD, Moe Garibay DO, Aimee Hendrix MD,  Jagdish Ly DO, Leslie Winchester MD, David Zepeda MD, Bharat Avery DO, Linnette Barlow MD,  Jan Herring DO, Mi Alarcon MD, Gayathri King MD, Nya Riley MD, Bibaina Giles MD,  Hugh Quinteros MD, Carolina Gan MD, Juma Guerrero MD, Albertina Valerio MD, Michael Cobos DO, Rosamaria Barnes MD,  Ciaran Perry MD, Andres Casas MD, Shirley Waterhouse, NANCY,  Iris Keane CNP,, Kirit Wilson, CNP,  Janette Joyner, [facility], Marcy Forbes, CNP, Cindy Vega, [facility], Mica Pritchett CNP, Lucretia Paniagua [facility], Maite Ferrer, CNP, Dottie Parnell, CNP, Kristie Shirley, CNS, Ayaka Steel, CNP, Rosario Gage, [facility]      HISTORY AND PHYSICAL EXAMINATION            Date:   2/12/2024  Patient name:  Candelario Bliss Jr.  Date of admission:  2/12/2024 11:27 AM  MRN:   [id number]  Account:  [id number]  YOB: 1950  PCP:    Alesha Zaragoza APRN - CNP  Room:   [place] [address]  Code Status:    Prior    Chief Complaint:     Chief Complaint   Patient presents with   • Chest Pain     Pt reports right chest pain and SOB since 0700 this morning.       History Obtained From:     patient, electronic medical record    History of Present Illness:     The patient is a 73 y.o.  Non- / non  male who presents with Chest Pain (Pt reports right chest pain and SOB since 0700 this morning.)   and he is admitted to the hospital for the management of  Severe sepsis (HCC).  Patient was brought to emergency room by his spouse who is a retired RN as he was having  fatigue and low dehydrated.  Symptoms were present for 3 days.  Wife reported that family was doing chores for last few days and she felt patient

## 2024-02-13 ENCOUNTER — APPOINTMENT (OUTPATIENT)
Dept: GENERAL RADIOLOGY | Age: 74
DRG: 871 | End: 2024-02-13
Payer: MEDICARE

## 2024-02-13 ENCOUNTER — APPOINTMENT (OUTPATIENT)
Age: 74
DRG: 871 | End: 2024-02-13
Attending: FAMILY MEDICINE
Payer: MEDICARE

## 2024-02-13 PROBLEM — I85.00 PORTAL HYPERTENSION WITH ESOPHAGEAL VARICES (HCC): Status: ACTIVE | Noted: 2022-05-25

## 2024-02-13 PROBLEM — A41.9 SEPTIC SHOCK (HCC): Status: ACTIVE | Noted: 2023-07-03

## 2024-02-13 PROBLEM — R65.21 GRAM NEGATIVE SEPTIC SHOCK (HCC): Status: ACTIVE | Noted: 2023-07-03

## 2024-02-13 LAB
ALBUMIN SERPL-MCNC: 2.3 G/DL (ref 3.5–5.2)
ALP SERPL-CCNC: 114 U/L (ref 40–129)
ALT SERPL-CCNC: 49 U/L (ref 5–41)
ANION GAP SERPL CALCULATED.3IONS-SCNC: 17 MMOL/L (ref 9–17)
ANION GAP SERPL CALCULATED.3IONS-SCNC: 18 MMOL/L (ref 9–17)
ANION GAP SERPL CALCULATED.3IONS-SCNC: 22 MMOL/L (ref 9–17)
AST SERPL-CCNC: 57 U/L
BASOPHILS # BLD: 0 K/UL (ref 0–0.2)
BASOPHILS NFR BLD: 0 % (ref 0–2)
BILIRUB SERPL-MCNC: 1.4 MG/DL (ref 0.3–1.2)
BUN SERPL-MCNC: 25 MG/DL (ref 8–23)
BUN SERPL-MCNC: 27 MG/DL (ref 8–23)
BUN SERPL-MCNC: 30 MG/DL (ref 8–23)
BUN/CREAT SERPL: 21 (ref 9–20)
BUN/CREAT SERPL: 27 (ref 9–20)
BUN/CREAT SERPL: 30 (ref 9–20)
CALCIUM SERPL-MCNC: 7.5 MG/DL (ref 8.6–10.4)
CALCIUM SERPL-MCNC: 8.1 MG/DL (ref 8.6–10.4)
CALCIUM SERPL-MCNC: 8.2 MG/DL (ref 8.6–10.4)
CHLORIDE SERPL-SCNC: 100 MMOL/L (ref 98–107)
CHLORIDE SERPL-SCNC: 103 MMOL/L (ref 98–107)
CHLORIDE SERPL-SCNC: 104 MMOL/L (ref 98–107)
CO2 SERPL-SCNC: 13 MMOL/L (ref 20–31)
CO2 SERPL-SCNC: 15 MMOL/L (ref 20–31)
CO2 SERPL-SCNC: 15 MMOL/L (ref 20–31)
CORTIS SERPL-MCNC: 71 UG/DL (ref 2.7–18.4)
CREAT SERPL-MCNC: 1 MG/DL (ref 0.7–1.2)
CREAT SERPL-MCNC: 1 MG/DL (ref 0.7–1.2)
CREAT SERPL-MCNC: 1.2 MG/DL (ref 0.7–1.2)
ECHO AO ROOT DIAM: 3.2 CM
ECHO AO ROOT INDEX: 1.75 CM/M2
ECHO AV MEAN GRADIENT: 2 MMHG
ECHO AV MEAN VELOCITY: 0.6 M/S
ECHO AV PEAK GRADIENT: 3 MMHG
ECHO AV PEAK VELOCITY: 0.9 M/S
ECHO AV VELOCITY RATIO: 0.78
ECHO AV VTI: 20 CM
ECHO BSA: 1.8 M2
ECHO EST RA PRESSURE: 8 MMHG
ECHO LA AREA 2C: 16.3 CM2
ECHO LA AREA 4C: 20.5 CM2
ECHO LA DIAMETER INDEX: 1.64 CM/M2
ECHO LA DIAMETER: 3 CM
ECHO LA MAJOR AXIS: 5.9 CM
ECHO LA MINOR AXIS: 5.3 CM
ECHO LA TO AORTIC ROOT RATIO: 0.94
ECHO LA VOL BP: 50 ML (ref 18–58)
ECHO LA VOL MOD A2C: 40 ML (ref 18–58)
ECHO LA VOL MOD A4C: 57 ML (ref 18–58)
ECHO LA VOL/BSA BIPLANE: 27 ML/M2 (ref 16–34)
ECHO LA VOLUME INDEX MOD A2C: 22 ML/M2 (ref 16–34)
ECHO LA VOLUME INDEX MOD A4C: 31 ML/M2 (ref 16–34)
ECHO LV E' LATERAL VELOCITY: 10 CM/S
ECHO LV E' SEPTAL VELOCITY: 6 CM/S
ECHO LV FRACTIONAL SHORTENING: 12 % (ref 28–44)
ECHO LV INTERNAL DIMENSION DIASTOLE INDEX: 2.35 CM/M2
ECHO LV INTERNAL DIMENSION DIASTOLIC: 4.3 CM (ref 4.2–5.9)
ECHO LV INTERNAL DIMENSION SYSTOLIC INDEX: 2.08 CM/M2
ECHO LV INTERNAL DIMENSION SYSTOLIC: 3.8 CM
ECHO LV IVSD: 1.1 CM (ref 0.6–1)
ECHO LV MASS 2D: 162.9 G (ref 88–224)
ECHO LV MASS INDEX 2D: 89 G/M2 (ref 49–115)
ECHO LV POSTERIOR WALL DIASTOLIC: 1.1 CM (ref 0.6–1)
ECHO LV RELATIVE WALL THICKNESS RATIO: 0.51
ECHO LVOT PEAK GRADIENT: 2 MMHG
ECHO LVOT PEAK VELOCITY: 0.7 M/S
ECHO MV A VELOCITY: 0.5 M/S
ECHO MV E DECELERATION TIME (DT): 134 MS
ECHO MV E VELOCITY: 0.69 M/S
ECHO MV E/A RATIO: 1.38
ECHO MV E/E' LATERAL: 6.9
ECHO MV E/E' RATIO (AVERAGED): 9.2
ECHO RIGHT VENTRICULAR SYSTOLIC PRESSURE (RVSP): 31 MMHG
ECHO TV REGURGITANT MAX VELOCITY: 2.4 M/S
ECHO TV REGURGITANT PEAK GRADIENT: 23 MMHG
EKG ATRIAL RATE: 70 BPM
EKG P AXIS: -22 DEGREES
EKG P-R INTERVAL: 156 MS
EKG Q-T INTERVAL: 414 MS
EKG QRS DURATION: 76 MS
EKG QTC CALCULATION (BAZETT): 447 MS
EKG R AXIS: 2 DEGREES
EKG T AXIS: 23 DEGREES
EKG VENTRICULAR RATE: 70 BPM
EOSINOPHIL # BLD: 0 K/UL (ref 0–0.44)
EOSINOPHILS RELATIVE PERCENT: 0 % (ref 1–4)
ERYTHROCYTE [DISTWIDTH] IN BLOOD BY AUTOMATED COUNT: 19.3 % (ref 11.8–14.4)
FIO2: 6
FIO2: 6
GFR SERPL CREATININE-BSD FRML MDRD: >60 ML/MIN/1.73M2
GLUCOSE BLD-MCNC: 175 MG/DL (ref 75–110)
GLUCOSE BLD-MCNC: 77 MG/DL (ref 75–110)
GLUCOSE BLD-MCNC: 92 MG/DL (ref 75–110)
GLUCOSE BLD-MCNC: 99 MG/DL (ref 75–110)
GLUCOSE SERPL-MCNC: 109 MG/DL (ref 70–99)
GLUCOSE SERPL-MCNC: 219 MG/DL (ref 70–99)
GLUCOSE SERPL-MCNC: 94 MG/DL (ref 70–99)
HCT VFR BLD AUTO: 41.5 % (ref 40.7–50.3)
HGB BLD-MCNC: 12.7 G/DL (ref 13–17)
IMM GRANULOCYTES # BLD AUTO: 0.22 K/UL (ref 0–0.3)
IMM GRANULOCYTES NFR BLD: 1 %
INR PPP: 1.6
LACTATE BLDV-SCNC: 4.9 MMOL/L (ref 0.5–1.9)
LDH SERPL-CCNC: 135 U/L (ref 135–225)
LYMPHOCYTES NFR BLD: 0.22 K/UL (ref 1.1–3.7)
LYMPHOCYTES RELATIVE PERCENT: 1 % (ref 24–43)
MAGNESIUM SERPL-MCNC: 1.4 MG/DL (ref 1.6–2.6)
MCH RBC QN AUTO: 26.6 PG (ref 25.2–33.5)
MCHC RBC AUTO-ENTMCNC: 30.6 G/DL (ref 28–38)
MCV RBC AUTO: 86.8 FL (ref 82.6–102.9)
MICROORGANISM SPEC CULT: ABNORMAL
MICROORGANISM SPEC CULT: NO GROWTH
MONOCYTES NFR BLD: 1.75 K/UL (ref 0.1–1.2)
MONOCYTES NFR BLD: 8 % (ref 3–12)
MORPHOLOGY: ABNORMAL
MORPHOLOGY: ABNORMAL
NEGATIVE BASE EXCESS, ART: 5.7 MMOL/L (ref 0–2)
NEGATIVE BASE EXCESS, ART: 8 MMOL/L (ref 0–2)
NEUTROPHILS NFR BLD: 90 % (ref 36–65)
NEUTS SEG NFR BLD: 19.71 K/UL (ref 1.5–8.1)
O2 DELIVERY DEVICE: ABNORMAL
O2 DELIVERY DEVICE: ABNORMAL
PATIENT TEMP: 37
PATIENT TEMP: 37
PLATELET # BLD AUTO: ABNORMAL K/UL (ref 138–453)
PMV BLD AUTO: ABNORMAL FL (ref 8.1–13.5)
POC HCO3: 15.2 MMOL/L (ref 21–28)
POC HCO3: 17.3 MMOL/L (ref 21–28)
POC O2 SATURATION: 96.7 % (ref 94–98)
POC O2 SATURATION: 97.2 % (ref 94–98)
POC PCO2: 25.5 MM HG (ref 35–48)
POC PCO2: 26.9 MM HG (ref 35–48)
POC PH: 7.38 (ref 7.35–7.45)
POC PH: 7.42 (ref 7.35–7.45)
POC PO2: 86.3 MM HG (ref 83–108)
POC PO2: 89.6 MM HG (ref 83–108)
POTASSIUM SERPL-SCNC: 4.3 MMOL/L (ref 3.7–5.3)
POTASSIUM SERPL-SCNC: 4.4 MMOL/L (ref 3.7–5.3)
POTASSIUM SERPL-SCNC: 4.4 MMOL/L (ref 3.7–5.3)
PROT SERPL-MCNC: 5.5 G/DL (ref 6.4–8.3)
PROTHROMBIN TIME: 18.5 SEC (ref 11.5–14.2)
RBC # BLD AUTO: 4.78 M/UL (ref 4.21–5.77)
SERVICE CMNT-IMP: ABNORMAL
SODIUM SERPL-SCNC: 135 MMOL/L (ref 135–144)
SODIUM SERPL-SCNC: 135 MMOL/L (ref 135–144)
SODIUM SERPL-SCNC: 137 MMOL/L (ref 135–144)
SPECIMEN DESCRIPTION: ABNORMAL
SPECIMEN DESCRIPTION: NORMAL
WBC OTHER # BLD: 21.9 K/UL (ref 3.5–11.3)

## 2024-02-13 PROCEDURE — 6370000000 HC RX 637 (ALT 250 FOR IP): Performed by: NURSE PRACTITIONER

## 2024-02-13 PROCEDURE — 85610 PROTHROMBIN TIME: CPT

## 2024-02-13 PROCEDURE — 82947 ASSAY GLUCOSE BLOOD QUANT: CPT

## 2024-02-13 PROCEDURE — 80048 BASIC METABOLIC PNL TOTAL CA: CPT

## 2024-02-13 PROCEDURE — 99223 1ST HOSP IP/OBS HIGH 75: CPT | Performed by: INTERNAL MEDICINE

## 2024-02-13 PROCEDURE — 93306 TTE W/DOPPLER COMPLETE: CPT | Performed by: INTERNAL MEDICINE

## 2024-02-13 PROCEDURE — 2000000000 HC ICU R&B

## 2024-02-13 PROCEDURE — 82803 BLOOD GASES ANY COMBINATION: CPT

## 2024-02-13 PROCEDURE — 2700000000 HC OXYGEN THERAPY PER DAY

## 2024-02-13 PROCEDURE — 80053 COMPREHEN METABOLIC PANEL: CPT

## 2024-02-13 PROCEDURE — 2580000003 HC RX 258: Performed by: FAMILY MEDICINE

## 2024-02-13 PROCEDURE — 6360000002 HC RX W HCPCS: Performed by: INTERNAL MEDICINE

## 2024-02-13 PROCEDURE — B548ZZA ULTRASONOGRAPHY OF SUPERIOR VENA CAVA, GUIDANCE: ICD-10-PCS | Performed by: FAMILY MEDICINE

## 2024-02-13 PROCEDURE — 85055 RETICULATED PLATELET ASSAY: CPT

## 2024-02-13 PROCEDURE — 2580000003 HC RX 258: Performed by: INTERNAL MEDICINE

## 2024-02-13 PROCEDURE — 2500000003 HC RX 250 WO HCPCS: Performed by: INTERNAL MEDICINE

## 2024-02-13 PROCEDURE — P9047 ALBUMIN (HUMAN), 25%, 50ML: HCPCS | Performed by: FAMILY MEDICINE

## 2024-02-13 PROCEDURE — 2500000003 HC RX 250 WO HCPCS: Performed by: NURSE PRACTITIONER

## 2024-02-13 PROCEDURE — 6360000002 HC RX W HCPCS: Performed by: NURSE PRACTITIONER

## 2024-02-13 PROCEDURE — 37799 UNLISTED PX VASCULAR SURGERY: CPT

## 2024-02-13 PROCEDURE — 6360000002 HC RX W HCPCS: Performed by: FAMILY MEDICINE

## 2024-02-13 PROCEDURE — 02HV33Z INSERTION OF INFUSION DEVICE INTO SUPERIOR VENA CAVA, PERCUTANEOUS APPROACH: ICD-10-PCS | Performed by: FAMILY MEDICINE

## 2024-02-13 PROCEDURE — 93306 TTE W/DOPPLER COMPLETE: CPT

## 2024-02-13 PROCEDURE — 71045 X-RAY EXAM CHEST 1 VIEW: CPT

## 2024-02-13 PROCEDURE — 83735 ASSAY OF MAGNESIUM: CPT

## 2024-02-13 PROCEDURE — 83605 ASSAY OF LACTIC ACID: CPT

## 2024-02-13 PROCEDURE — 2580000003 HC RX 258: Performed by: NURSE PRACTITIONER

## 2024-02-13 PROCEDURE — 85025 COMPLETE CBC W/AUTO DIFF WBC: CPT

## 2024-02-13 PROCEDURE — 94761 N-INVAS EAR/PLS OXIMETRY MLT: CPT

## 2024-02-13 PROCEDURE — 2500000003 HC RX 250 WO HCPCS: Performed by: FAMILY MEDICINE

## 2024-02-13 PROCEDURE — 6370000000 HC RX 637 (ALT 250 FOR IP): Performed by: FAMILY MEDICINE

## 2024-02-13 RX ORDER — POTASSIUM CHLORIDE 20 MEQ/1
40 TABLET, EXTENDED RELEASE ORAL PRN
Status: DISCONTINUED | OUTPATIENT
Start: 2024-02-13 | End: 2024-02-19 | Stop reason: HOSPADM

## 2024-02-13 RX ORDER — QUETIAPINE FUMARATE 25 MG/1
50 TABLET, FILM COATED ORAL NIGHTLY
Status: DISCONTINUED | OUTPATIENT
Start: 2024-02-13 | End: 2024-02-16

## 2024-02-13 RX ORDER — ALBUMIN (HUMAN) 12.5 G/50ML
25 SOLUTION INTRAVENOUS EVERY 6 HOURS
Status: COMPLETED | OUTPATIENT
Start: 2024-02-13 | End: 2024-02-14

## 2024-02-13 RX ORDER — NOREPINEPHRINE BITARTRATE 0.06 MG/ML
1-100 INJECTION, SOLUTION INTRAVENOUS CONTINUOUS
Status: DISCONTINUED | OUTPATIENT
Start: 2024-02-13 | End: 2024-02-16

## 2024-02-13 RX ORDER — POTASSIUM CHLORIDE 7.45 MG/ML
10 INJECTION INTRAVENOUS PRN
Status: DISCONTINUED | OUTPATIENT
Start: 2024-02-13 | End: 2024-02-19 | Stop reason: HOSPADM

## 2024-02-13 RX ORDER — MAGNESIUM SULFATE 1 G/100ML
1000 INJECTION INTRAVENOUS PRN
Status: DISCONTINUED | OUTPATIENT
Start: 2024-02-13 | End: 2024-02-19 | Stop reason: HOSPADM

## 2024-02-13 RX ADMIN — Medication 25 MCG/MIN: at 14:49

## 2024-02-13 RX ADMIN — FERROUS SULFATE TAB EC 325 MG (65 MG FE EQUIVALENT) 325 MG: 325 (65 FE) TABLET DELAYED RESPONSE at 08:38

## 2024-02-13 RX ADMIN — MAGNESIUM SULFATE HEPTAHYDRATE 1000 MG: 1 INJECTION, SOLUTION INTRAVENOUS at 05:01

## 2024-02-13 RX ADMIN — ALBUMIN (HUMAN) 25 G: 0.25 INJECTION, SOLUTION INTRAVENOUS at 22:02

## 2024-02-13 RX ADMIN — QUETIAPINE FUMARATE 50 MG: 25 TABLET ORAL at 22:00

## 2024-02-13 RX ADMIN — SODIUM BICARBONATE 100 MEQ: 84 INJECTION INTRAVENOUS at 15:40

## 2024-02-13 RX ADMIN — DOCUSATE SODIUM 100 MG: 100 CAPSULE, LIQUID FILLED ORAL at 08:38

## 2024-02-13 RX ADMIN — ALBUMIN (HUMAN) 25 G: 0.25 INJECTION, SOLUTION INTRAVENOUS at 11:49

## 2024-02-13 RX ADMIN — ALBUMIN (HUMAN) 25 G: 0.25 INJECTION, SOLUTION INTRAVENOUS at 16:58

## 2024-02-13 RX ADMIN — TRAMADOL HYDROCHLORIDE 50 MG: 50 TABLET, COATED ORAL at 14:43

## 2024-02-13 RX ADMIN — SODIUM BICARBONATE: 84 INJECTION, SOLUTION INTRAVENOUS at 11:49

## 2024-02-13 RX ADMIN — HYDROCORTISONE SODIUM SUCCINATE 100 MG: 100 INJECTION, POWDER, FOR SOLUTION INTRAMUSCULAR; INTRAVENOUS at 20:36

## 2024-02-13 RX ADMIN — HYDROCORTISONE SODIUM SUCCINATE 100 MG: 100 INJECTION, POWDER, FOR SOLUTION INTRAMUSCULAR; INTRAVENOUS at 13:58

## 2024-02-13 RX ADMIN — MAGNESIUM SULFATE HEPTAHYDRATE 1000 MG: 1 INJECTION, SOLUTION INTRAVENOUS at 06:08

## 2024-02-13 RX ADMIN — Medication 30 MCG/MIN: at 05:11

## 2024-02-13 RX ADMIN — SODIUM BICARBONATE 100 MEQ: 84 INJECTION INTRAVENOUS at 13:33

## 2024-02-13 RX ADMIN — DEXMEDETOMIDINE 0.2 MCG/KG/HR: 100 INJECTION, SOLUTION INTRAVENOUS at 14:05

## 2024-02-13 RX ADMIN — CEFTRIAXONE SODIUM 2000 MG: 2 INJECTION, POWDER, FOR SOLUTION INTRAMUSCULAR; INTRAVENOUS at 08:38

## 2024-02-13 RX ADMIN — VASOPRESSIN 0.03 UNITS/MIN: 20 INJECTION INTRAVENOUS at 03:00

## 2024-02-13 RX ADMIN — HYDROCORTISONE SODIUM SUCCINATE 100 MG: 100 INJECTION, POWDER, FOR SOLUTION INTRAMUSCULAR; INTRAVENOUS at 04:57

## 2024-02-13 RX ADMIN — DOCUSATE SODIUM 100 MG: 100 CAPSULE, LIQUID FILLED ORAL at 20:35

## 2024-02-13 RX ADMIN — MIDODRINE HYDROCHLORIDE 10 MG: 10 TABLET ORAL at 17:41

## 2024-02-13 RX ADMIN — Medication 1000 UNITS: at 20:35

## 2024-02-13 RX ADMIN — SODIUM CHLORIDE: 9 INJECTION, SOLUTION INTRAVENOUS at 05:10

## 2024-02-13 RX ADMIN — VASOPRESSIN 0.03 UNITS/MIN: 20 INJECTION INTRAVENOUS at 12:20

## 2024-02-13 RX ADMIN — SODIUM CHLORIDE, PRESERVATIVE FREE 10 ML: 5 INJECTION INTRAVENOUS at 13:34

## 2024-02-13 ASSESSMENT — PAIN SCALES - GENERAL
PAINLEVEL_OUTOF10: 0
PAINLEVEL_OUTOF10: 0
PAINLEVEL_OUTOF10: 9

## 2024-02-13 ASSESSMENT — PAIN DESCRIPTION - LOCATION: LOCATION: GENERALIZED

## 2024-02-13 NOTE — PROCEDURES
Picc placement note:  Dynamic Access RN procedure    Consent signed and obtained by proceduralist, from patient. See consent form in paper chart.    Prescribed IV Therapy = Vasopressors, multiple ABX, fluids, continued ICU therapy.  Peripheral ultrasound assessment done. Plan for left brachial vein insertion.   CVR measurement = 19 % (Linear CVR is preferred to be less than 45%).  Product type: Bard 5 fr TL lumen Power PICC.  History/Labs/Allergies Reviewed  Placed By: Martita Henley - RN (Dynamic Access)  Time out Performed using Two Identifiers  Lot # [id number]  Expiration date = 12-  Trimmed at 38 cm total  External catheter length 0 cm  Number of attempts 1  Special equipment used- Bard 3cg tip confirmation system, ultrasound, and micro-introducer (MST) technique   Catheter securement = adhesive 3M securement device  Dressing applied= Tegaderm CHG  Lidocaine administered intradermally conc.1%, approx 1 ml (Lidocaine Lot# - [id number] and Exp date - 01/30/2026 )      Lucretia HANSON RN aware CXR needed prior to using picc. CXR ordered and awaiting report. Rn aware new iv tubing required.     PICC education:     [ X ] Discussed with patient/Family or POA prior to procedure.  Risks and Benefits along with reason for procedure were discussed and teaching was reinforced with an education handout on line  insertion. CDC FAQ Catheter Associated Blood Stream Infections and [facility] [address] REV. 7/13 Nursing and Booklet left at bedside or in chart. Patient (Family or POA) acknowledged understanding of information taught and agreed to procedure.

## 2024-02-13 NOTE — FLOWSHEET NOTE
02/13/24 1237   Treatment Team Notification   Reason for Communication Evaluate;Review case   Name of Team Member Notified    Treatment Team Role Consulting Provider   Method of Communication Face to face   Response At bedside   Notification Time 1237     MD rounded, chart, labs, vitals & meds reviewed. New orders for bicarb IVP, Bipap @HS, drain CT fluid today, ABG's, seroquel & Precedex.  Wife updated at bedside by . MD aware patient on 6L/NC currently.

## 2024-02-13 NOTE — FLOWSHEET NOTE
02/12/24 1901   Treatment Team Notification   Reason for Communication Review case   Name of Team Member Notified Dr. Mills   Treatment Team Role Consulting Provider   Method of Communication Secure Message   Response See orders     Notified of new consult and current patient status. Orders received. Bolus started and fluid sample collected.

## 2024-02-13 NOTE — FLOWSHEET NOTE
02/12/24 1830   Treatment Team Notification   Reason for Communication Evaluate;Review case   Name of Team Member Notified Dr. Leger   Treatment Team Role Attending Provider   Method of Communication Page   Response See orders     Notified Dr. Leger of patients hypotension, chest pain and the fact that the chest tube output is cloudy which is new and family is requesting crit care consult. Levophed started

## 2024-02-13 NOTE — FLOWSHEET NOTE
02/13/24 1323   Treatment Team Notification   Reason for Communication Evaluate;Review case   Name of Team Member Notified    Treatment Team Role Consulting Provider   Method of Communication Face to face   Response In department   Notification Time 1323     MD rounded, will review antibiotics. States he doesn't need to be in isolation and will reach out to Infection Control for review.

## 2024-02-13 NOTE — CARE COORDINATION
Case Management Assessment  Initial Evaluation    Date/Time of Evaluation: 2/13/2024 2:09 PM  Assessment Completed by: Tameka Valenzuela RN    If patient is discharged prior to next notation, then this note serves as note for discharge by case management.    Patient Name: Candelario Bliss Jr.                   YOB: 1950  Diagnosis: Severe sepsis (HCC) [A41.9, R65.20]  Hypotension, unspecified hypotension type [I95.9]                   Date / Time: 2/12/2024 11:27 AM    Patient Admission Status: Inpatient   Readmission Risk (Low < 19, Mod (19-27), High > 27): Readmission Risk Score: 19.9    Current PCP: Alesha Zaragoza APRN - CNP  PCP verified by CM? Yes    Chart Reviewed: Yes      History Provided by: Patient  Patient Orientation: Alert and Oriented, Person, Place, Situation, Self    Patient Cognition: Alert    Hospitalization in the last 30 days (Readmission):  No    If yes, Readmission Assessment in CM Navigator will be completed.    Advance Directives:      Code Status: Full Code   Patient's Primary Decision Maker is: Legal Next of Kin    Primary Decision Maker: Martha Bliss P - Spouse - [phone]    Discharge Planning:    Patient lives with: Spouse/Significant Other Type of Home: House  Primary Care Giver: Self  Patient Support Systems include: Spouse/Significant Other   Current Financial resources: None  Current community resources: None  Current services prior to admission: Durable Medical Equipment            Current DME: Shower Chair            Type of Home Care services:  OT, PT, Skilled Therapy    ADLS  Prior functional level: Assistance with the following:, Housework, Shopping, Cooking  Current functional level: Assistance with the following:, Bathing, Toileting, Cooking, Housework, Shopping, Mobility    PT AM-PAC:   /24  OT AM-PAC:   /24    Family can provide assistance at DC: Yes  Would you like Case Management to discuss the discharge plan with any other family members/significant others,

## 2024-02-13 NOTE — FLOWSHEET NOTE
02/13/24 0830   Treatment Team Notification   Reason for Communication Evaluate;Review case   Name of Team Member Notified    Treatment Team Role Attending Provider   Method of Communication Face to face   Response At bedside   Notification Time 9710

## 2024-02-13 NOTE — FLOWSHEET NOTE
02/13/24 0935   Treatment Team Notification   Reason for Communication Evaluate;Review case   Name of Team Member Notified Darby   Treatment Team Role Advanced Practice Nurse   Method of Communication Face to face   Response In department   Notification Time 0946     NP reviewed chart, updated on labs, vitals & medications/IV's. New orders received.

## 2024-02-14 ENCOUNTER — APPOINTMENT (OUTPATIENT)
Dept: GENERAL RADIOLOGY | Age: 74
DRG: 871 | End: 2024-02-14
Payer: MEDICARE

## 2024-02-14 PROBLEM — I95.9 HYPOTENSION: Status: ACTIVE | Noted: 2024-02-14

## 2024-02-14 PROBLEM — E87.29 INCREASED ANION GAP METABOLIC ACIDOSIS: Status: ACTIVE | Noted: 2024-02-14

## 2024-02-14 PROBLEM — N18.1 CKD (CHRONIC KIDNEY DISEASE), STAGE I: Status: RESOLVED | Noted: 2023-08-29 | Resolved: 2024-02-14

## 2024-02-14 PROBLEM — L03.90 CELLULITIS: Status: RESOLVED | Noted: 2023-05-08 | Resolved: 2024-02-14

## 2024-02-14 PROBLEM — E87.5 HYPERKALEMIA: Status: RESOLVED | Noted: 2023-05-16 | Resolved: 2024-02-14

## 2024-02-14 LAB
AFP SERPL-MCNC: 4.1 UG/L
AMYLASE FLD-CCNC: 41 U/L
ANION GAP SERPL CALCULATED.3IONS-SCNC: 20 MMOL/L (ref 9–17)
BASOPHILS # BLD: 0 K/UL (ref 0–0.2)
BASOPHILS NFR BLD: 0 %
BUN SERPL-MCNC: 32 MG/DL (ref 8–23)
BUN/CREAT SERPL: 32 (ref 9–20)
CALCIUM SERPL-MCNC: 8.2 MG/DL (ref 8.6–10.4)
CHLORIDE SERPL-SCNC: 98 MMOL/L (ref 98–107)
CO2 SERPL-SCNC: 18 MMOL/L (ref 20–31)
CREAT SERPL-MCNC: 1 MG/DL (ref 0.7–1.2)
EOSINOPHIL # BLD: 0 K/UL (ref 0–0.4)
EOSINOPHILS RELATIVE PERCENT: 0 % (ref 1–4)
ERYTHROCYTE [DISTWIDTH] IN BLOOD BY AUTOMATED COUNT: 19.3 % (ref 11.8–14.4)
GFR SERPL CREATININE-BSD FRML MDRD: >60 ML/MIN/1.73M2
GLUCOSE BLD-MCNC: 233 MG/DL (ref 75–110)
GLUCOSE BLD-MCNC: 243 MG/DL (ref 75–110)
GLUCOSE BLD-MCNC: 285 MG/DL (ref 75–110)
GLUCOSE BLD-MCNC: 357 MG/DL (ref 75–110)
GLUCOSE FLD-MCNC: 218 MG/DL
GLUCOSE SERPL-MCNC: 289 MG/DL (ref 70–99)
HCT VFR BLD AUTO: 35.2 % (ref 40.7–50.3)
HGB BLD-MCNC: 10.7 G/DL (ref 13–17)
IMM GRANULOCYTES # BLD AUTO: 0 K/UL (ref 0–0.3)
IMM GRANULOCYTES NFR BLD: 0 %
LACTATE BLDV-SCNC: 7.2 MMOL/L (ref 0.5–2.2)
LDH FLD L TO P-CCNC: 349 U/L
LYMPHOCYTES NFR BLD: 0.5 K/UL (ref 1–4.8)
LYMPHOCYTES RELATIVE PERCENT: 2 % (ref 24–44)
MAGNESIUM SERPL-MCNC: 2.3 MG/DL (ref 1.6–2.6)
MCH RBC QN AUTO: 26.5 PG (ref 25.2–33.5)
MCHC RBC AUTO-ENTMCNC: 30.4 G/DL (ref 28–38)
MCV RBC AUTO: 87.1 FL (ref 82.6–102.9)
MICROORGANISM SPEC CULT: ABNORMAL
MICROORGANISM SPEC CULT: NO GROWTH
MONOCYTES NFR BLD: 1.75 K/UL (ref 0.2–0.8)
MONOCYTES NFR BLD: 7 % (ref 1–7)
MORPHOLOGY: ABNORMAL
MORPHOLOGY: ABNORMAL
NEGATIVE BASE EXCESS, ART: 5.1 MMOL/L (ref 0–2)
NEUTROPHILS NFR BLD: 91 % (ref 36–66)
NEUTS SEG NFR BLD: 22.75 K/UL (ref 1.8–7.7)
PLATELET # BLD AUTO: 66 K/UL (ref 130–400)
POC HCO3: 18.8 MMOL/L (ref 21–28)
POC O2 SATURATION: 98 % (ref 94–98)
POC PCO2: 30.3 MM HG (ref 35–48)
POC PH: 7.4 (ref 7.35–7.45)
POC PO2: 102.6 MM HG (ref 83–108)
POTASSIUM SERPL-SCNC: 4.2 MMOL/L (ref 3.7–5.3)
PROT FLD-MCNC: 1.3 G/DL
RBC # BLD AUTO: 4.04 M/UL (ref 4.21–5.77)
SERVICE CMNT-IMP: ABNORMAL
SODIUM SERPL-SCNC: 136 MMOL/L (ref 135–144)
SPECIMEN DESCRIPTION: ABNORMAL
SPECIMEN DESCRIPTION: NORMAL
SPECIMEN TYPE: NORMAL
WBC OTHER # BLD: 25 K/UL (ref 3.5–11.3)

## 2024-02-14 PROCEDURE — 99233 SBSQ HOSP IP/OBS HIGH 50: CPT | Performed by: STUDENT IN AN ORGANIZED HEALTH CARE EDUCATION/TRAINING PROGRAM

## 2024-02-14 PROCEDURE — 89051 BODY FLUID CELL COUNT: CPT

## 2024-02-14 PROCEDURE — 2700000000 HC OXYGEN THERAPY PER DAY

## 2024-02-14 PROCEDURE — P9047 ALBUMIN (HUMAN), 25%, 50ML: HCPCS | Performed by: FAMILY MEDICINE

## 2024-02-14 PROCEDURE — 94761 N-INVAS EAR/PLS OXIMETRY MLT: CPT

## 2024-02-14 PROCEDURE — 2000000000 HC ICU R&B

## 2024-02-14 PROCEDURE — 85055 RETICULATED PLATELET ASSAY: CPT

## 2024-02-14 PROCEDURE — 82803 BLOOD GASES ANY COMBINATION: CPT

## 2024-02-14 PROCEDURE — 99233 SBSQ HOSP IP/OBS HIGH 50: CPT | Performed by: INTERNAL MEDICINE

## 2024-02-14 PROCEDURE — 82150 ASSAY OF AMYLASE: CPT

## 2024-02-14 PROCEDURE — 6360000002 HC RX W HCPCS: Performed by: FAMILY MEDICINE

## 2024-02-14 PROCEDURE — 87102 FUNGUS ISOLATION CULTURE: CPT

## 2024-02-14 PROCEDURE — 6370000000 HC RX 637 (ALT 250 FOR IP): Performed by: FAMILY MEDICINE

## 2024-02-14 PROCEDURE — 6360000002 HC RX W HCPCS: Performed by: INTERNAL MEDICINE

## 2024-02-14 PROCEDURE — 87070 CULTURE OTHR SPECIMN AEROBIC: CPT

## 2024-02-14 PROCEDURE — 82947 ASSAY GLUCOSE BLOOD QUANT: CPT

## 2024-02-14 PROCEDURE — 83615 LACTATE (LD) (LDH) ENZYME: CPT

## 2024-02-14 PROCEDURE — 82042 OTHER SOURCE ALBUMIN QUAN EA: CPT

## 2024-02-14 PROCEDURE — 82945 GLUCOSE OTHER FLUID: CPT

## 2024-02-14 PROCEDURE — 87205 SMEAR GRAM STAIN: CPT

## 2024-02-14 PROCEDURE — 80048 BASIC METABOLIC PNL TOTAL CA: CPT

## 2024-02-14 PROCEDURE — 6370000000 HC RX 637 (ALT 250 FOR IP): Performed by: STUDENT IN AN ORGANIZED HEALTH CARE EDUCATION/TRAINING PROGRAM

## 2024-02-14 PROCEDURE — 2500000003 HC RX 250 WO HCPCS: Performed by: INTERNAL MEDICINE

## 2024-02-14 PROCEDURE — 82105 ALPHA-FETOPROTEIN SERUM: CPT

## 2024-02-14 PROCEDURE — 87075 CULTR BACTERIA EXCEPT BLOOD: CPT

## 2024-02-14 PROCEDURE — 37799 UNLISTED PX VASCULAR SURGERY: CPT

## 2024-02-14 PROCEDURE — 84157 ASSAY OF PROTEIN OTHER: CPT

## 2024-02-14 PROCEDURE — 87206 SMEAR FLUORESCENT/ACID STAI: CPT

## 2024-02-14 PROCEDURE — 2580000003 HC RX 258: Performed by: FAMILY MEDICINE

## 2024-02-14 PROCEDURE — 71045 X-RAY EXAM CHEST 1 VIEW: CPT

## 2024-02-14 PROCEDURE — 2580000003 HC RX 258: Performed by: INTERNAL MEDICINE

## 2024-02-14 PROCEDURE — 36415 COLL VENOUS BLD VENIPUNCTURE: CPT

## 2024-02-14 PROCEDURE — 83735 ASSAY OF MAGNESIUM: CPT

## 2024-02-14 PROCEDURE — 83605 ASSAY OF LACTIC ACID: CPT

## 2024-02-14 PROCEDURE — 2500000003 HC RX 250 WO HCPCS: Performed by: FAMILY MEDICINE

## 2024-02-14 PROCEDURE — 85025 COMPLETE CBC W/AUTO DIFF WBC: CPT

## 2024-02-14 RX ORDER — INSULIN GLARGINE 100 [IU]/ML
20 INJECTION, SOLUTION SUBCUTANEOUS NIGHTLY
Status: DISCONTINUED | OUTPATIENT
Start: 2024-02-14 | End: 2024-02-15

## 2024-02-14 RX ORDER — INSULIN LISPRO 100 [IU]/ML
0-4 INJECTION, SOLUTION INTRAVENOUS; SUBCUTANEOUS NIGHTLY
Status: DISCONTINUED | OUTPATIENT
Start: 2024-02-14 | End: 2024-02-19 | Stop reason: HOSPADM

## 2024-02-14 RX ORDER — INSULIN LISPRO 100 [IU]/ML
0-8 INJECTION, SOLUTION INTRAVENOUS; SUBCUTANEOUS
Status: DISCONTINUED | OUTPATIENT
Start: 2024-02-14 | End: 2024-02-19 | Stop reason: HOSPADM

## 2024-02-14 RX ORDER — POLYETHYLENE GLYCOL 3350 17 G/17G
17 POWDER, FOR SOLUTION ORAL DAILY PRN
Status: DISCONTINUED | OUTPATIENT
Start: 2024-02-14 | End: 2024-02-19 | Stop reason: HOSPADM

## 2024-02-14 RX ORDER — PANTOPRAZOLE SODIUM 40 MG/1
40 TABLET, DELAYED RELEASE ORAL
Status: DISCONTINUED | OUTPATIENT
Start: 2024-02-14 | End: 2024-02-19 | Stop reason: HOSPADM

## 2024-02-14 RX ADMIN — SODIUM BICARBONATE: 84 INJECTION, SOLUTION INTRAVENOUS at 21:29

## 2024-02-14 RX ADMIN — HYDROCORTISONE SODIUM SUCCINATE 100 MG: 100 INJECTION, POWDER, FOR SOLUTION INTRAMUSCULAR; INTRAVENOUS at 05:05

## 2024-02-14 RX ADMIN — PANTOPRAZOLE SODIUM 40 MG: 40 TABLET, DELAYED RELEASE ORAL at 12:38

## 2024-02-14 RX ADMIN — Medication 1000 UNITS: at 20:19

## 2024-02-14 RX ADMIN — INSULIN LISPRO 2 UNITS: 100 INJECTION, SOLUTION INTRAVENOUS; SUBCUTANEOUS at 12:35

## 2024-02-14 RX ADMIN — INSULIN LISPRO 4 UNITS: 100 INJECTION, SOLUTION INTRAVENOUS; SUBCUTANEOUS at 20:19

## 2024-02-14 RX ADMIN — CEFTRIAXONE SODIUM 2000 MG: 2 INJECTION, POWDER, FOR SOLUTION INTRAMUSCULAR; INTRAVENOUS at 09:12

## 2024-02-14 RX ADMIN — SODIUM BICARBONATE: 84 INJECTION, SOLUTION INTRAVENOUS at 09:14

## 2024-02-14 RX ADMIN — MIDODRINE HYDROCHLORIDE 10 MG: 10 TABLET ORAL at 17:38

## 2024-02-14 RX ADMIN — DOCUSATE SODIUM 100 MG: 100 CAPSULE, LIQUID FILLED ORAL at 20:19

## 2024-02-14 RX ADMIN — INSULIN GLARGINE 20 UNITS: 100 INJECTION, SOLUTION SUBCUTANEOUS at 21:08

## 2024-02-14 RX ADMIN — FERROUS SULFATE TAB EC 325 MG (65 MG FE EQUIVALENT) 325 MG: 325 (65 FE) TABLET DELAYED RESPONSE at 09:04

## 2024-02-14 RX ADMIN — ALBUMIN (HUMAN) 25 G: 0.25 INJECTION, SOLUTION INTRAVENOUS at 03:10

## 2024-02-14 RX ADMIN — HYDROCORTISONE SODIUM SUCCINATE 100 MG: 100 INJECTION, POWDER, FOR SOLUTION INTRAMUSCULAR; INTRAVENOUS at 16:29

## 2024-02-14 RX ADMIN — TRAMADOL HYDROCHLORIDE 100 MG: 50 TABLET, COATED ORAL at 16:23

## 2024-02-14 RX ADMIN — HYDROCORTISONE SODIUM SUCCINATE 100 MG: 100 INJECTION, POWDER, FOR SOLUTION INTRAMUSCULAR; INTRAVENOUS at 20:25

## 2024-02-14 RX ADMIN — Medication 18 MCG/MIN: at 00:34

## 2024-02-14 RX ADMIN — INSULIN LISPRO 2 UNITS: 100 INJECTION, SOLUTION INTRAVENOUS; SUBCUTANEOUS at 17:45

## 2024-02-14 RX ADMIN — DOCUSATE SODIUM 100 MG: 100 CAPSULE, LIQUID FILLED ORAL at 09:04

## 2024-02-14 RX ADMIN — INSULIN LISPRO 4 UNITS: 100 INJECTION, SOLUTION INTRAVENOUS; SUBCUTANEOUS at 09:02

## 2024-02-14 ASSESSMENT — PAIN DESCRIPTION - ORIENTATION: ORIENTATION: RIGHT

## 2024-02-14 ASSESSMENT — PAIN SCALES - GENERAL
PAINLEVEL_OUTOF10: 5
PAINLEVEL_OUTOF10: 10

## 2024-02-14 ASSESSMENT — PAIN DESCRIPTION - LOCATION: LOCATION: CHEST

## 2024-02-14 NOTE — FLOWSHEET NOTE
02/14/24 1130   Treatment Team Notification   Reason for Communication Evaluate   Name of Team Member Notified Dr. Guerrero   Treatment Team Role Attending Provider   Method of Communication Face to face   Response See orders

## 2024-02-14 NOTE — FLOWSHEET NOTE
02/14/24 1210   Treatment Team Notification   Reason for Communication Review case   Name of Team Member Notified Dr. Mancilla   Treatment Team Role Consulting Provider   Method of Communication Secure Message   Response See orders     Dr. Mancilla notified of new consult, orders received to increase Bicarb drip rate to 100.

## 2024-02-14 NOTE — FLOWSHEET NOTE
End Of Shift Note  [facility]  Summary of shift: Patient has decreased appetite, wife & staff encourage meals & protein shakes. Remains on vasopressor, was able to wean off vasopressin and levo still  infusing. His Chest tube was drained today with 900ml out, according to wife it's now more discolored & hazy. ABG\"s drawn and bicarb pushes were ordered earlier along with bicarb drip infusing. He remains on antibiotics and receives albumin BID.     Vitals:    Vitals:    02/13/24 1809 02/13/24 1900 02/13/24 1915 02/13/24 1930   BP:       Pulse: 88 87 87 86   Resp: (!) 33 29 26 28   Temp:       TempSrc:       SpO2: 94% 97% 95% 96%   Weight:       Height:            I&O:   Intake/Output Summary (Last 24 hours) at 2/13/2024 2023  Last data filed at 2/13/2024 1927  Gross per 24 hour   Intake 3256.52 ml   Output 2925 ml   Net 331.52 ml       Resp Status: 5L/NC    Ventilator Settings:     / / /     Critical Care IV infusions:   sodium bicarbonate 150 mEq in dextrose 5 % 1,000 mL infusion 50 mL/hr at 02/13/24 1927    dexmedeTOMIDine (PRECEDEX) 400 mcg in sodium chloride 0.9 % 100 mL infusion Stopped (02/13/24 1730)    norepinephrine 25 mcg/min (02/13/24 1927)    vasopressin 20 Units in sodium chloride 0.9 % 100 mL infusion Stopped (02/13/24 1543)    sodium chloride      dextrose          LDA:   PICC Triple Lumen 02/13/24 Left Brachial (Active)   Number of days: 0       Peripheral IV 02/12/24 Left Antecubital (Active)   Number of days: 1       Peripheral IV 02/12/24 Right Antecubital (Active)   Number of days: 1       Chest Tube Right (Active)   Number of days: 1       Urinary Catheter 02/12/24 2 Way (Active)   Number of days: 1       Arterial Line 02/12/24 Right Radial (Active)   Number of days: 0

## 2024-02-15 ENCOUNTER — APPOINTMENT (OUTPATIENT)
Dept: GENERAL RADIOLOGY | Age: 74
DRG: 871 | End: 2024-02-15
Payer: MEDICARE

## 2024-02-15 LAB
ALBUMIN FLD-MCNC: 0.7 G/DL
ANION GAP SERPL CALCULATED.3IONS-SCNC: 16 MMOL/L (ref 9–17)
BASOPHILS # BLD: 0 K/UL (ref 0–0.2)
BASOPHILS NFR BLD: 0 % (ref 0–2)
BODY FLD TYPE: NORMAL
BUN SERPL-MCNC: 28 MG/DL (ref 8–23)
BUN/CREAT SERPL: 40 (ref 9–20)
CALCIUM SERPL-MCNC: 8.6 MG/DL (ref 8.6–10.4)
CASE NUMBER:: NORMAL
CHLORIDE SERPL-SCNC: 96 MMOL/L (ref 98–107)
CO2 SERPL-SCNC: 29 MMOL/L (ref 20–31)
CREAT SERPL-MCNC: 0.7 MG/DL (ref 0.7–1.2)
EOSINOPHIL # BLD: 0 K/UL (ref 0–0.44)
EOSINOPHILS RELATIVE PERCENT: 0 % (ref 1–4)
ERYTHROCYTE [DISTWIDTH] IN BLOOD BY AUTOMATED COUNT: 18.9 % (ref 11.8–14.4)
GFR SERPL CREATININE-BSD FRML MDRD: >60 ML/MIN/1.73M2
GLUCOSE BLD-MCNC: 286 MG/DL (ref 75–110)
GLUCOSE BLD-MCNC: 347 MG/DL (ref 75–110)
GLUCOSE BLD-MCNC: 372 MG/DL (ref 75–110)
GLUCOSE BLD-MCNC: 376 MG/DL (ref 75–110)
GLUCOSE SERPL-MCNC: 316 MG/DL (ref 70–99)
HCT VFR BLD AUTO: 35.2 % (ref 40.7–50.3)
HGB BLD-MCNC: 11.2 G/DL (ref 13–17)
IMM GRANULOCYTES # BLD AUTO: 0.18 K/UL (ref 0–0.3)
IMM GRANULOCYTES NFR BLD: 1 %
LACTATE BLDV-SCNC: 5.6 MMOL/L (ref 0.5–2.2)
LYMPHOCYTES NFR BLD: 0.18 K/UL (ref 1.1–3.7)
LYMPHOCYTES NFR FLD: 0 %
LYMPHOCYTES RELATIVE PERCENT: 1 % (ref 24–43)
MAGNESIUM SERPL-MCNC: 2.5 MG/DL (ref 1.6–2.6)
MCH RBC QN AUTO: 26.9 PG (ref 25.2–33.5)
MCHC RBC AUTO-ENTMCNC: 31.8 G/DL (ref 28.4–34.8)
MCV RBC AUTO: 84.4 FL (ref 82.6–102.9)
MONOCYTES NFR BLD: 0.7 K/UL (ref 0.1–1.2)
MONOCYTES NFR BLD: 4 % (ref 3–12)
MORPHOLOGY: ABNORMAL
NEUTROPHILS NFR BLD: 94 % (ref 36–65)
NEUTROPHILS NFR FLD: 95 %
NEUTS SEG NFR BLD: 16.44 K/UL (ref 1.5–8.1)
NRBC BLD-RTO: 0 PER 100 WBC
PLATELET # BLD AUTO: ABNORMAL K/UL (ref 138–453)
PLATELET, FLUORESCENCE: 41 K/UL (ref 138–453)
PLATELETS.RETICULATED NFR BLD AUTO: 9.2 % (ref 1.1–10.3)
PMV BLD AUTO: ABNORMAL FL (ref 8.1–13.5)
POTASSIUM SERPL-SCNC: 4.3 MMOL/L (ref 3.7–5.3)
RBC # BLD AUTO: 4.17 M/UL (ref 4.21–5.77)
RBC # FLD: 7000 CELLS/UL
SODIUM SERPL-SCNC: 141 MMOL/L (ref 135–144)
SPECIMEN DESCRIPTION: NORMAL
SPECIMEN TYPE: NORMAL
UNIDENT CELLS NFR FLD: NORMAL %
WBC # FLD: NORMAL CELLS/UL
WBC OTHER # BLD: 17.5 K/UL (ref 3.5–11.3)

## 2024-02-15 PROCEDURE — 85055 RETICULATED PLATELET ASSAY: CPT

## 2024-02-15 PROCEDURE — 6360000002 HC RX W HCPCS: Performed by: INTERNAL MEDICINE

## 2024-02-15 PROCEDURE — 2500000003 HC RX 250 WO HCPCS: Performed by: INTERNAL MEDICINE

## 2024-02-15 PROCEDURE — 36415 COLL VENOUS BLD VENIPUNCTURE: CPT

## 2024-02-15 PROCEDURE — 2000000000 HC ICU R&B

## 2024-02-15 PROCEDURE — 6360000002 HC RX W HCPCS: Performed by: FAMILY MEDICINE

## 2024-02-15 PROCEDURE — 97167 OT EVAL HIGH COMPLEX 60 MIN: CPT

## 2024-02-15 PROCEDURE — 97530 THERAPEUTIC ACTIVITIES: CPT

## 2024-02-15 PROCEDURE — 94761 N-INVAS EAR/PLS OXIMETRY MLT: CPT

## 2024-02-15 PROCEDURE — 97110 THERAPEUTIC EXERCISES: CPT

## 2024-02-15 PROCEDURE — 83605 ASSAY OF LACTIC ACID: CPT

## 2024-02-15 PROCEDURE — 82947 ASSAY GLUCOSE BLOOD QUANT: CPT

## 2024-02-15 PROCEDURE — 2580000003 HC RX 258: Performed by: STUDENT IN AN ORGANIZED HEALTH CARE EDUCATION/TRAINING PROGRAM

## 2024-02-15 PROCEDURE — 83735 ASSAY OF MAGNESIUM: CPT

## 2024-02-15 PROCEDURE — 80048 BASIC METABOLIC PNL TOTAL CA: CPT

## 2024-02-15 PROCEDURE — 6370000000 HC RX 637 (ALT 250 FOR IP): Performed by: STUDENT IN AN ORGANIZED HEALTH CARE EDUCATION/TRAINING PROGRAM

## 2024-02-15 PROCEDURE — 2580000003 HC RX 258: Performed by: INTERNAL MEDICINE

## 2024-02-15 PROCEDURE — 71045 X-RAY EXAM CHEST 1 VIEW: CPT

## 2024-02-15 PROCEDURE — 99233 SBSQ HOSP IP/OBS HIGH 50: CPT | Performed by: STUDENT IN AN ORGANIZED HEALTH CARE EDUCATION/TRAINING PROGRAM

## 2024-02-15 PROCEDURE — 6370000000 HC RX 637 (ALT 250 FOR IP): Performed by: FAMILY MEDICINE

## 2024-02-15 PROCEDURE — 97535 SELF CARE MNGMENT TRAINING: CPT

## 2024-02-15 PROCEDURE — 85025 COMPLETE CBC W/AUTO DIFF WBC: CPT

## 2024-02-15 PROCEDURE — 97163 PT EVAL HIGH COMPLEX 45 MIN: CPT

## 2024-02-15 PROCEDURE — 2700000000 HC OXYGEN THERAPY PER DAY

## 2024-02-15 PROCEDURE — 99233 SBSQ HOSP IP/OBS HIGH 50: CPT | Performed by: INTERNAL MEDICINE

## 2024-02-15 PROCEDURE — 6370000000 HC RX 637 (ALT 250 FOR IP): Performed by: NURSE PRACTITIONER

## 2024-02-15 PROCEDURE — 86022 PLATELET ANTIBODIES: CPT

## 2024-02-15 PROCEDURE — 2580000003 HC RX 258: Performed by: FAMILY MEDICINE

## 2024-02-15 RX ORDER — INSULIN GLARGINE 100 [IU]/ML
20 INJECTION, SOLUTION SUBCUTANEOUS 2 TIMES DAILY
Status: DISCONTINUED | OUTPATIENT
Start: 2024-02-15 | End: 2024-02-18

## 2024-02-15 RX ORDER — SODIUM CHLORIDE 9 MG/ML
INJECTION, SOLUTION INTRAVENOUS CONTINUOUS
Status: DISCONTINUED | OUTPATIENT
Start: 2024-02-15 | End: 2024-02-16

## 2024-02-15 RX ADMIN — PANTOPRAZOLE SODIUM 40 MG: 40 TABLET, DELAYED RELEASE ORAL at 05:10

## 2024-02-15 RX ADMIN — DOCUSATE SODIUM 100 MG: 100 CAPSULE, LIQUID FILLED ORAL at 09:43

## 2024-02-15 RX ADMIN — Medication 1000 UNITS: at 20:25

## 2024-02-15 RX ADMIN — INSULIN GLARGINE 20 UNITS: 100 INJECTION, SOLUTION SUBCUTANEOUS at 11:19

## 2024-02-15 RX ADMIN — TRAMADOL HYDROCHLORIDE 100 MG: 50 TABLET, COATED ORAL at 05:18

## 2024-02-15 RX ADMIN — INSULIN LISPRO 4 UNITS: 100 INJECTION, SOLUTION INTRAVENOUS; SUBCUTANEOUS at 09:43

## 2024-02-15 RX ADMIN — INSULIN LISPRO 8 UNITS: 100 INJECTION, SOLUTION INTRAVENOUS; SUBCUTANEOUS at 18:02

## 2024-02-15 RX ADMIN — INSULIN LISPRO 4 UNITS: 100 INJECTION, SOLUTION INTRAVENOUS; SUBCUTANEOUS at 20:46

## 2024-02-15 RX ADMIN — HYDROCORTISONE SODIUM SUCCINATE 100 MG: 100 INJECTION, POWDER, FOR SOLUTION INTRAMUSCULAR; INTRAVENOUS at 05:10

## 2024-02-15 RX ADMIN — QUETIAPINE FUMARATE 50 MG: 25 TABLET ORAL at 20:25

## 2024-02-15 RX ADMIN — FERROUS SULFATE TAB EC 325 MG (65 MG FE EQUIVALENT) 325 MG: 325 (65 FE) TABLET DELAYED RESPONSE at 09:43

## 2024-02-15 RX ADMIN — HYDROCORTISONE SODIUM SUCCINATE 50 MG: 100 INJECTION, POWDER, FOR SOLUTION INTRAMUSCULAR; INTRAVENOUS at 16:23

## 2024-02-15 RX ADMIN — INSULIN LISPRO 6 UNITS: 100 INJECTION, SOLUTION INTRAVENOUS; SUBCUTANEOUS at 13:11

## 2024-02-15 RX ADMIN — DOCUSATE SODIUM 100 MG: 100 CAPSULE, LIQUID FILLED ORAL at 20:25

## 2024-02-15 RX ADMIN — HYDROCORTISONE SODIUM SUCCINATE 50 MG: 100 INJECTION, POWDER, FOR SOLUTION INTRAMUSCULAR; INTRAVENOUS at 22:13

## 2024-02-15 RX ADMIN — SODIUM CHLORIDE, PRESERVATIVE FREE 10 ML: 5 INJECTION INTRAVENOUS at 09:45

## 2024-02-15 RX ADMIN — INSULIN GLARGINE 20 UNITS: 100 INJECTION, SOLUTION SUBCUTANEOUS at 20:45

## 2024-02-15 RX ADMIN — SODIUM CHLORIDE, PRESERVATIVE FREE 10 ML: 5 INJECTION INTRAVENOUS at 16:24

## 2024-02-15 RX ADMIN — SODIUM CHLORIDE: 9 INJECTION, SOLUTION INTRAVENOUS at 18:03

## 2024-02-15 RX ADMIN — CEFTRIAXONE SODIUM 2000 MG: 2 INJECTION, POWDER, FOR SOLUTION INTRAMUSCULAR; INTRAVENOUS at 09:00

## 2024-02-15 RX ADMIN — SODIUM BICARBONATE: 84 INJECTION, SOLUTION INTRAVENOUS at 09:00

## 2024-02-15 RX ADMIN — SODIUM CHLORIDE, PRESERVATIVE FREE 10 ML: 5 INJECTION INTRAVENOUS at 20:26

## 2024-02-15 ASSESSMENT — PAIN SCALES - GENERAL
PAINLEVEL_OUTOF10: 7
PAINLEVEL_OUTOF10: 0

## 2024-02-15 ASSESSMENT — PAIN - FUNCTIONAL ASSESSMENT: PAIN_FUNCTIONAL_ASSESSMENT: ACTIVITIES ARE NOT PREVENTED

## 2024-02-15 ASSESSMENT — PAIN DESCRIPTION - LOCATION: LOCATION: GENERALIZED

## 2024-02-15 ASSESSMENT — PAIN DESCRIPTION - DESCRIPTORS: DESCRIPTORS: DISCOMFORT

## 2024-02-15 NOTE — FLOWSHEET NOTE
02/15/24 1038   Treatment Team Notification   Reason for Communication Evaluate;Review case   Name of Team Member Notified    Treatment Team Role Consulting Provider   Method of Communication Face to face   Response At bedside   Notification Time 1039     MD rounded, reviewed chart, meds & labs. Spoke with daughter at bedside with updated on POC.

## 2024-02-15 NOTE — FLOWSHEET NOTE
02/15/24 1420   Hygiene   Hygiene Bathed;Incontinence care;Gown changed;Nidhi care;Foot/heel care   Level of Assistance Moderate assist   Skin Care Chlorhexidine wipes;Foam skin cleanser  (mepilex applied for preventative)

## 2024-02-15 NOTE — FLOWSHEET NOTE
End Of Shift Note  [facility]  Summary of shift: Patient had uneventful day. Levophed was weaned off and didn't require midodrine today. PT/OT was able to work with patient and he did sit on edge of bed couple of times. Antonio in place & PICC lined maintained. He still needs encouragement to eat but drinks some ensure.    Vitals:    Vitals:    02/15/24 1230 02/15/24 1245 02/15/24 1300 02/15/24 1605   BP:   (!) 117/59    Pulse: 88 88 96    Resp: 15 14 21    Temp:    97.3 °F (36.3 °C)   TempSrc:    Temporal   SpO2: 93% 94% (!) 89%    Weight:       Height:            I&O:   Intake/Output Summary (Last 24 hours) at 2/15/2024 1816  Last data filed at 2/15/2024 1803  Gross per 24 hour   Intake 3477.34 ml   Output 2725 ml   Net 752.34 ml       Resp Status: 1L    Ventilator Settings:     / / /     Critical Care IV infusions:   sodium chloride 100 mL/hr at 02/15/24 1803    norepinephrine Stopped (02/15/24 0800)    sodium chloride      dextrose          LDA:   PICC Triple Lumen 02/13/24 Left Brachial (Active)   Number of days: 2       Peripheral IV 02/12/24 Right Antecubital (Active)   Number of days: 3       Chest Tube Right (Active)   Number of days: 2       Urinary Catheter 02/12/24 2 Way (Active)   Number of days: 2

## 2024-02-15 NOTE — FLOWSHEET NOTE
02/15/24 1014   Treatment Team Notification   Reason for Communication Evaluate;Review case   Name of Team Member Notified    Treatment Team Role Attending Provider   Method of Communication Face to face   Response At bedside   Notification Time 1017     MD rounded, chart reviewed, labs & vitals. Will hold lovenox.

## 2024-02-15 NOTE — CONSULTS
[facility]  Initial Consult Note  Date: 2/13/2024    Hospital day :1     Impression:   Septic shock on multiple pressors  Haemophilus influenza sepsis source at this point in time is not clear but concern for right pleural effusion infection  Nonalcoholic cirrhosis with hypertensive gastropathy  Chronic hypotension on midodrine  Pancytopenia    Recommendations   The patient was started on levofloxacin in the emergency department and Rocephin was added today  I will stop the levofloxacin and continue Rocephin alone for now  Haemophilus influenza is typically a respiratory pathogen and therefore I agree with sending of right-sided pleural fluid for evaluation for infection  I will follow the patient's clinical progress and adjust therapy accordingly    Chief complaint/reason for consultation:   Sepsis    History of Present Illness:   Candelario Bliss Jr. is a 74 y.o.-year-old male who was initially admitted on 2/12/2024.   Candelario is known to me and has cirrhosis with recurrent ascites, moderate portal hypertensive gastropathy with esophageal varices status post banding x3 6/7/2023, BPH status post prostatectomy, pancytopenia felt secondary to cirrhosis, gastroesophageal reflux disease, chronic hypotension on midodrine.     The patient has had recurrent episodes of Serratia marcescens bacteremia starting May 2023 and was on oral suppressive levofloxacin and to January of this year when we stopped it.    The patient was doing well and celebrated his birthday on [place] and subsequently developed some fatigue, acute right-sided chest pain, some tachypnea/increased work of breathing which prompted his wife to bring him into the emergency department for further evaluation.     The patient was found to be hypotensive in the emergency room, tachypneic, elevated lactic acid and due to concerns for sepsis the patient was started on antimicrobial therapy with levofloxacin and has since been admitted to 
 Pulmonary Medicine and Critical Care Consult        Patient - Candelario Bliss Jr.   MRN -  [id number]   [facility] # - [id number]   - 1950      Date of Admission -  2024 11:27 AM  Date of evaluation -  2024  Room - -   Hospital Day - 1  Consulting - Rosendo Leger MD Primary Care Physician - Alesha Zaragoza APRN - CNP     Reason for Consult    Sepsis    Assessment & Recommendations   Acute respiratory insufficiency   Supplemental oxygen as needed to maintain oxygen saturation >90%  Incentive spirometer q1h while awake     Sepsis/bacteremia with H. influenzae  Infectious disease on consult  Rocephin and Levaquin  Titrate Levophed and vasopressin to MAP 65, wean as tolerated  Solu-Cortef 100 mg every 8 hours  Pleural fluid studies pending  Urine culture pending    Chronic right pleural effusion/nonalcoholic hepatic cirrhosis  Continue to drain right pleural effusion every other day  Repeat x-ray chest in a.m.    DVT prophylaxis with Lovenox  Will follow with you    HPI     Candelario Bliss Jr. is 74 y.o.,  male, admitted because of sepsis.  Patient has a past medical history of nonalcoholic liver cirrhosis, avascular necrosis, prostate cancer, CKD, diabetes, GERD, MGUS.  Patient became provide aggressively shortness of breath with right upper chest pain on 2024.  He has no history of smoking.  He denies cough chest pain and shortness of breath at this time.  He is alert and oriented with family at the bedside    PMHx   Past Medical History      Diagnosis Date    Anemia     Arthritis     Avascular necrosis (HCC)     sees Dr Valdivia    BPH (benign prostatic hyperplasia)     Cancer (HCC)     prostate    CKD (chronic kidney disease), stage I 2023    Diabetes mellitus (HCC)     Gastroesophageal reflux disease 2022    Gram negative sepsis (HCC) 2023    Hematemesis without nausea 10/19/2016    History of blood transfusion 2014    after total hip replacement    
[facility]   Pharmacy Pharmacokinetic Monitoring Service - Vancomycin     Candelario Bliss Jr. is a 74 y.o. male starting on vancomycin therapy for sepsis. Pharmacy consulted by Dr. Mills for monitoring and adjustment.    Target Concentration: Goal AUC/JOCELYNE 400-600 mg*hr/L    Additional Antimicrobials: zosyn and levaquin    Pertinent Laboratory Values:   Wt Readings from Last 1 Encounters:   02/12/24 59.3 kg (130 lb 11.2 oz)     Temp Readings from Last 1 Encounters:   02/12/24 98.6 °F (37 °C)     Estimated Creatinine Clearance: 68 mL/min (based on SCr of 0.8 mg/dL).  Recent Labs     02/12/24  1212   CREATININE 0.8   BUN 20   WBC 4.9     Procalcitonin: --    Pertinent Cultures:  Culture Date Source Results   2/12/24 Blood x 2 No growth < 24hr   MRSA Nasal Swab: N/A. Non-respiratory infection.    Plan:  Dosing recommendations based on Bayesian software  Start vancomycin 1500mg x 1, then 500mg q8hr  Anticipated AUC of 473 and trough concentration of 15.1 at steady state  Renal labs as indicated   Vancomycin concentration ordered for 2/14/24 @ 0600   Pharmacy will continue to monitor patient and adjust therapy as indicated    Thank you for the consult,  Francois Rachel RPH  2/13/2024 12:07 AM    
APRN - NP   SODIUM FLUORIDE 5000 PPM 1.1 % PSTE  10/31/23   Chay Funez MD   vitamin C (ASCORBIC ACID) 500 MG tablet Take 1 tablet by mouth daily    Chay Funez MD   cyclobenzaprine (FLEXERIL) 10 MG tablet Take 1 tablet by mouth as needed for Muscle spasms 12/11/23   Alesha Zaragoza APRN - CNP   blood glucose test strips (PRODIGY NO CODING BLOOD GLUC) strip USE TO TEST BLOOD GLUCOSE TWICE DAILY AND AS NEEDED FOR SIGNS OF HYPOGLYCEMIA 12/11/23   Alesha Zaragoza APRN - CNP   Continuous Blood Gluc Sensor (FREESTYLE JOSE DAVID 3 SENSOR) MISC 1 each by Does not apply route every 14 days 12/11/23   Alesha Zaragoza APRN - CNP   Lactobacillus (PROBIOTIC ACIDOPHILUS PO) Take by mouth While on antibiotic    Chay Funez MD   Flaxseed, Linseed, (FLAX SEEDS PO) Take by mouth Every other day    Chay Funez MD   docusate sodium (COLACE) 100 MG capsule Take 1 capsule by mouth 2 times daily    Provider, Historical, MD   Prodigy Twist Top Lancets 28G MISC USE 4 TIMES A DAY 8/23/23   Alesha Zaragoza APRN - CNP   furosemide (LASIX) 40 MG tablet Take 1 tablet by mouth daily 8/2/23   Yohana Leger MD   magnesium cl-calcium carbonate (SLOW-MAG) 71.5-119 MG TBEC tablet Take 2 tablets by mouth daily 7/21/23   Alesha Zaragoza APRN - CNP   spironolactone (ALDACTONE) 100 MG tablet TAKE ONE TABLET BY MOUTH ONE TIME A DAY 7/6/23   Yohana Leger MD   ferrous sulfate (IRON 325) 325 (65 Fe) MG tablet Take 1 tablet by mouth daily (with breakfast) Every other day    Chay Funez MD   Alcohol Swabs PADS USE TO TEST ONCE DAILY AND AS NEEDED 6/28/23   Alesha Zaragoza APRN - CNP   potassium chloride (KLOR-CON M) 20 MEQ extended release tablet Take 1 tablet by mouth daily  Patient taking differently: Take 1 tablet by mouth three times a week [id number] 6/19/23   Alesha Zaragoza APRN - CNP   midodrine (PROAMATINE) 5 MG tablet Take 1 tablet by mouth 3 times daily as needed (for sbp <100) 5/23/23   Kyra 
Date/Time    PROTIME 18.5 02/13/2024 03:45 AM    INR 1.6 02/13/2024 03:45 AM     CBC:  Lab Results   Component Value Date/Time    WBC 17.5 02/15/2024 03:00 AM    RBC 4.17 02/15/2024 03:00 AM    HGB 11.2 02/15/2024 03:00 AM    HCT 35.2 02/15/2024 03:00 AM    MCV 84.4 02/15/2024 03:00 AM    MCH 26.9 02/15/2024 03:00 AM    MCHC 31.8 02/15/2024 03:00 AM    RDW 18.9 02/15/2024 03:00 AM    PLT See Reflexed IPF Result 02/15/2024 03:00 AM    MPV Abnormal 02/15/2024 03:00 AM     CMP:  Lab Results   Component Value Date/Time     02/15/2024 03:00 AM    K 4.3 02/15/2024 03:00 AM    CL 96 02/15/2024 03:00 AM    CO2 29 02/15/2024 03:00 AM    BUN 28 02/15/2024 03:00 AM    CREATININE 0.7 02/15/2024 03:00 AM    GFRAA >60 09/28/2022 01:46 PM    AGRATIO 0.8 01/19/2024 10:28 AM    LABGLOM >60 02/15/2024 03:00 AM    LABGLOM >90 06/22/2023 12:00 AM    GLUCOSE 316 02/15/2024 03:00 AM    CALCIUM 8.6 02/15/2024 03:00 AM     Magnesium:    Lab Results   Component Value Date/Time    MG 2.5 02/15/2024 03:00 AM     PTT:    Lab Results   Component Value Date/Time    APTT 27.4 02/12/2024 12:12 PM     TSH:    Lab Results   Component Value Date/Time    TSH 2.22 09/22/2023 09:06 AM     BNP: No results for input(s): \"BNP\", \"PROBNP\" in the last 72 hours.  BMP:  Lab Results   Component Value Date/Time     02/15/2024 03:00 AM    K 4.3 02/15/2024 03:00 AM    CL 96 02/15/2024 03:00 AM    CO2 29 02/15/2024 03:00 AM    BUN 28 02/15/2024 03:00 AM    LABALBU 2.3 02/13/2024 03:00 AM    CREATININE 0.7 02/15/2024 03:00 AM    CALCIUM 8.6 02/15/2024 03:00 AM    GFRAA >60 09/28/2022 01:46 PM    LABGLOM >60 02/15/2024 03:00 AM    LABGLOM >90 06/22/2023 12:00 AM    GLUCOSE 316 02/15/2024 03:00 AM     LIVER PROFILE:  Recent Labs     02/12/24  2247 02/13/24  0300   AST 47* 57*   ALT 40 49*   LABALBU 1.9* 2.3*   ALKPHOS 117 114   BILITOT 1.0 1.4*   BILIDIR 0.5*  --    IBILI 0.5  --    PROT 4.6* 5.5*     FLP:    Lab Results   Component Value Date/Time    CHOL

## 2024-02-16 ENCOUNTER — APPOINTMENT (OUTPATIENT)
Dept: GENERAL RADIOLOGY | Age: 74
DRG: 871 | End: 2024-02-16
Payer: MEDICARE

## 2024-02-16 PROBLEM — R65.21 SEPTIC SHOCK (HCC): Status: RESOLVED | Noted: 2023-07-03 | Resolved: 2024-02-16

## 2024-02-16 PROBLEM — A41.9 SEPTIC SHOCK (HCC): Status: RESOLVED | Noted: 2023-07-03 | Resolved: 2024-02-16

## 2024-02-16 LAB
AMMONIA PLAS-SCNC: 73 UMOL/L (ref 16–60)
ANION GAP SERPL CALCULATED.3IONS-SCNC: 8 MMOL/L (ref 9–17)
BASOPHILS # BLD: 0 K/UL (ref 0–0.2)
BASOPHILS NFR BLD: 0 %
BUN SERPL-MCNC: 26 MG/DL (ref 8–23)
BUN/CREAT SERPL: 37 (ref 9–20)
CALCIUM SERPL-MCNC: 8.6 MG/DL (ref 8.6–10.4)
CHLORIDE SERPL-SCNC: 99 MMOL/L (ref 98–107)
CO2 SERPL-SCNC: 35 MMOL/L (ref 20–31)
CREAT SERPL-MCNC: 0.7 MG/DL (ref 0.7–1.2)
EOSINOPHIL # BLD: 0 K/UL (ref 0–0.4)
EOSINOPHILS RELATIVE PERCENT: 0 % (ref 1–4)
ERYTHROCYTE [DISTWIDTH] IN BLOOD BY AUTOMATED COUNT: 18.5 % (ref 11.8–14.4)
GFR SERPL CREATININE-BSD FRML MDRD: >60 ML/MIN/1.73M2
GLUCOSE BLD-MCNC: 223 MG/DL (ref 75–110)
GLUCOSE BLD-MCNC: 224 MG/DL (ref 75–110)
GLUCOSE BLD-MCNC: 224 MG/DL (ref 75–110)
GLUCOSE BLD-MCNC: 229 MG/DL (ref 75–110)
GLUCOSE SERPL-MCNC: 220 MG/DL (ref 70–99)
HCT VFR BLD AUTO: 35.6 % (ref 40.7–50.3)
HGB BLD-MCNC: 11.3 G/DL (ref 13–17)
IMM GRANULOCYTES # BLD AUTO: 0 K/UL (ref 0–0.3)
IMM GRANULOCYTES NFR BLD: 0 %
INR PPP: 1.4
LYMPHOCYTES NFR BLD: 0.09 K/UL (ref 1–4.8)
LYMPHOCYTES RELATIVE PERCENT: 1 % (ref 24–44)
MCH RBC QN AUTO: 26.7 PG (ref 25.2–33.5)
MCHC RBC AUTO-ENTMCNC: 31.7 G/DL (ref 28.4–34.8)
MCV RBC AUTO: 84 FL (ref 82.6–102.9)
MONOCYTES NFR BLD: 0.63 K/UL (ref 0.2–0.8)
MONOCYTES NFR BLD: 7 % (ref 1–7)
NEUTROPHILS NFR BLD: 92 % (ref 36–66)
NEUTS SEG NFR BLD: 8.28 K/UL (ref 1.8–7.7)
NRBC BLD-RTO: 0 PER 100 WBC
PLATELET # BLD AUTO: ABNORMAL K/UL (ref 138–453)
PLATELET, FLUORESCENCE: 29 K/UL (ref 138–453)
PLATELETS.RETICULATED NFR BLD AUTO: 6.7 % (ref 1.1–10.3)
PMV BLD AUTO: ABNORMAL FL (ref 8.1–13.5)
POTASSIUM SERPL-SCNC: 4.1 MMOL/L (ref 3.7–5.3)
PROTHROMBIN TIME: 16.7 SEC (ref 11.5–14.2)
RBC # BLD AUTO: 4.24 M/UL (ref 4.21–5.77)
SODIUM SERPL-SCNC: 142 MMOL/L (ref 135–144)
SURGICAL PATHOLOGY REPORT: NORMAL
WBC OTHER # BLD: 9 K/UL (ref 3.5–11.3)

## 2024-02-16 PROCEDURE — 6370000000 HC RX 637 (ALT 250 FOR IP): Performed by: FAMILY MEDICINE

## 2024-02-16 PROCEDURE — 85610 PROTHROMBIN TIME: CPT

## 2024-02-16 PROCEDURE — 87040 BLOOD CULTURE FOR BACTERIA: CPT

## 2024-02-16 PROCEDURE — 6360000002 HC RX W HCPCS: Performed by: FAMILY MEDICINE

## 2024-02-16 PROCEDURE — 80048 BASIC METABOLIC PNL TOTAL CA: CPT

## 2024-02-16 PROCEDURE — 82947 ASSAY GLUCOSE BLOOD QUANT: CPT

## 2024-02-16 PROCEDURE — 2580000003 HC RX 258: Performed by: FAMILY MEDICINE

## 2024-02-16 PROCEDURE — 97530 THERAPEUTIC ACTIVITIES: CPT

## 2024-02-16 PROCEDURE — 85025 COMPLETE CBC W/AUTO DIFF WBC: CPT

## 2024-02-16 PROCEDURE — 2060000000 HC ICU INTERMEDIATE R&B

## 2024-02-16 PROCEDURE — 6360000002 HC RX W HCPCS: Performed by: INTERNAL MEDICINE

## 2024-02-16 PROCEDURE — 97116 GAIT TRAINING THERAPY: CPT

## 2024-02-16 PROCEDURE — 97535 SELF CARE MNGMENT TRAINING: CPT

## 2024-02-16 PROCEDURE — 2580000003 HC RX 258: Performed by: STUDENT IN AN ORGANIZED HEALTH CARE EDUCATION/TRAINING PROGRAM

## 2024-02-16 PROCEDURE — 71045 X-RAY EXAM CHEST 1 VIEW: CPT

## 2024-02-16 PROCEDURE — 82140 ASSAY OF AMMONIA: CPT

## 2024-02-16 PROCEDURE — 6370000000 HC RX 637 (ALT 250 FOR IP): Performed by: STUDENT IN AN ORGANIZED HEALTH CARE EDUCATION/TRAINING PROGRAM

## 2024-02-16 PROCEDURE — 99232 SBSQ HOSP IP/OBS MODERATE 35: CPT | Performed by: INTERNAL MEDICINE

## 2024-02-16 PROCEDURE — 36415 COLL VENOUS BLD VENIPUNCTURE: CPT

## 2024-02-16 PROCEDURE — 85055 RETICULATED PLATELET ASSAY: CPT

## 2024-02-16 RX ORDER — LACTULOSE 10 G/15ML
20 SOLUTION ORAL 2 TIMES DAILY
Status: DISCONTINUED | OUTPATIENT
Start: 2024-02-16 | End: 2024-02-19 | Stop reason: HOSPADM

## 2024-02-16 RX ORDER — MIDODRINE HYDROCHLORIDE 5 MG/1
5 TABLET ORAL
Status: DISCONTINUED | OUTPATIENT
Start: 2024-02-16 | End: 2024-02-17

## 2024-02-16 RX ORDER — FUROSEMIDE 20 MG/1
20 TABLET ORAL DAILY
Status: DISCONTINUED | OUTPATIENT
Start: 2024-02-16 | End: 2024-02-19

## 2024-02-16 RX ORDER — QUETIAPINE FUMARATE 25 MG/1
25 TABLET, FILM COATED ORAL NIGHTLY
Status: DISCONTINUED | OUTPATIENT
Start: 2024-02-16 | End: 2024-02-17

## 2024-02-16 RX ORDER — ENEMA 19; 7 G/133ML; G/133ML
1 ENEMA RECTAL
Status: ACTIVE | OUTPATIENT
Start: 2024-02-16 | End: 2024-02-17

## 2024-02-16 RX ORDER — TRAMADOL HYDROCHLORIDE 50 MG/1
50 TABLET ORAL EVERY 6 HOURS PRN
Status: DISCONTINUED | OUTPATIENT
Start: 2024-02-16 | End: 2024-02-19 | Stop reason: HOSPADM

## 2024-02-16 RX ORDER — SPIRONOLACTONE 25 MG/1
25 TABLET ORAL DAILY
Status: DISCONTINUED | OUTPATIENT
Start: 2024-02-16 | End: 2024-02-18

## 2024-02-16 RX ORDER — CALCIUM CARBONATE 500 MG/1
500 TABLET, CHEWABLE ORAL DAILY
Status: DISCONTINUED | OUTPATIENT
Start: 2024-02-16 | End: 2024-02-19 | Stop reason: HOSPADM

## 2024-02-16 RX ADMIN — SODIUM CHLORIDE: 9 INJECTION, SOLUTION INTRAVENOUS at 00:54

## 2024-02-16 RX ADMIN — SPIRONOLACTONE 25 MG: 25 TABLET ORAL at 10:47

## 2024-02-16 RX ADMIN — INSULIN GLARGINE 20 UNITS: 100 INJECTION, SOLUTION SUBCUTANEOUS at 08:34

## 2024-02-16 RX ADMIN — PANTOPRAZOLE SODIUM 40 MG: 40 TABLET, DELAYED RELEASE ORAL at 06:05

## 2024-02-16 RX ADMIN — HYDROCORTISONE SODIUM SUCCINATE 50 MG: 100 INJECTION, POWDER, FOR SOLUTION INTRAMUSCULAR; INTRAVENOUS at 13:41

## 2024-02-16 RX ADMIN — CEFTRIAXONE SODIUM 2000 MG: 2 INJECTION, POWDER, FOR SOLUTION INTRAMUSCULAR; INTRAVENOUS at 08:33

## 2024-02-16 RX ADMIN — FERROUS SULFATE TAB EC 325 MG (65 MG FE EQUIVALENT) 325 MG: 325 (65 FE) TABLET DELAYED RESPONSE at 08:34

## 2024-02-16 RX ADMIN — FUROSEMIDE 20 MG: 20 TABLET ORAL at 10:47

## 2024-02-16 RX ADMIN — TRAMADOL HYDROCHLORIDE 50 MG: 50 TABLET, COATED ORAL at 18:25

## 2024-02-16 RX ADMIN — LACTULOSE 20 G: 20 SOLUTION ORAL at 22:36

## 2024-02-16 RX ADMIN — SODIUM CHLORIDE, PRESERVATIVE FREE 10 ML: 5 INJECTION INTRAVENOUS at 08:35

## 2024-02-16 RX ADMIN — Medication 500 MG: at 10:47

## 2024-02-16 RX ADMIN — HYDROCORTISONE SODIUM SUCCINATE 50 MG: 100 INJECTION, POWDER, FOR SOLUTION INTRAMUSCULAR; INTRAVENOUS at 22:36

## 2024-02-16 RX ADMIN — INSULIN LISPRO 2 UNITS: 100 INJECTION, SOLUTION INTRAVENOUS; SUBCUTANEOUS at 18:24

## 2024-02-16 RX ADMIN — Medication 1000 UNITS: at 22:36

## 2024-02-16 RX ADMIN — INSULIN LISPRO 2 UNITS: 100 INJECTION, SOLUTION INTRAVENOUS; SUBCUTANEOUS at 13:41

## 2024-02-16 RX ADMIN — SODIUM CHLORIDE: 9 INJECTION, SOLUTION INTRAVENOUS at 11:55

## 2024-02-16 RX ADMIN — SODIUM CHLORIDE, PRESERVATIVE FREE 10 ML: 5 INJECTION INTRAVENOUS at 22:35

## 2024-02-16 RX ADMIN — MIDODRINE HYDROCHLORIDE 5 MG: 5 TABLET ORAL at 13:42

## 2024-02-16 RX ADMIN — QUETIAPINE FUMARATE 25 MG: 25 TABLET ORAL at 22:36

## 2024-02-16 RX ADMIN — INSULIN GLARGINE 20 UNITS: 100 INJECTION, SOLUTION SUBCUTANEOUS at 22:35

## 2024-02-16 RX ADMIN — DOCUSATE SODIUM 100 MG: 100 CAPSULE, LIQUID FILLED ORAL at 22:36

## 2024-02-16 RX ADMIN — INSULIN LISPRO 2 UNITS: 100 INJECTION, SOLUTION INTRAVENOUS; SUBCUTANEOUS at 08:33

## 2024-02-16 RX ADMIN — MIDODRINE HYDROCHLORIDE 5 MG: 5 TABLET ORAL at 18:33

## 2024-02-16 RX ADMIN — LACTULOSE 20 G: 20 SOLUTION ORAL at 10:47

## 2024-02-16 RX ADMIN — DOCUSATE SODIUM 100 MG: 100 CAPSULE, LIQUID FILLED ORAL at 08:34

## 2024-02-16 RX ADMIN — HYDROCORTISONE SODIUM SUCCINATE 50 MG: 100 INJECTION, POWDER, FOR SOLUTION INTRAMUSCULAR; INTRAVENOUS at 06:05

## 2024-02-16 NOTE — DISCHARGE INSTR - COC
Continuity of Care Form    Patient Name: Candelario Bliss Jr.   :  1950  MRN:  [id number]    Admit date:  2024  Discharge date:  24    Code Status Order: Full Code   Advance Directives:     Admitting Physician:  Rosendo Leger MD  PCP: Alesha Zaragoza APRN - CNP    Discharging Nurse: Mike HANSON  Discharging Hospital Unit/Room#: -  Discharging Unit Phone Number: [phone]    Emergency Contact:   Extended Emergency Contact Information  Primary Emergency Contact: Martha Bliss RADHA  Address: [address] [place] [address]  Home Phone: [phone]  Mobile Phone: [phone]  Relation: Spouse  Secondary Emergency Contact: Christopher Bliss  Home Phone: [phone]  Relation: Child    Past Surgical History:  Past Surgical History:   Procedure Laterality Date    COLONOSCOPY  2014    COLONOSCOPY  2019    COLONOSCOPY N/A 2019    COLONOSCOPY POLYPECTOMY SNARE/HOT BIOPSY performed by Yohana Leger MD at [facility] OR    ENDOSCOPY, COLON, DIAGNOSTIC  2014    stomach ulcers    HIP ARTHROPLASTY Right 2014    HIP ARTHROPLASTY Left 03/10/2015    INGUINAL HERNIA REPAIR Right     INGUINAL HERNIA REPAIR Left 2018    incarcerated. By Dr. Dobbs    IR PERC CATH PLEURAL DRAIN W/IMAG  2023    IR PERC CATH PLEURAL DRAIN W/IMAG 2023 [facility] SPECIAL PROCEDURES    JOINT REPLACEMENT      PARACENTESIS      PLEURAL CATH INSERTION  2023    right upper abdomen    DE OFFICE/OUTPT VISIT,PROCEDURE ONLY Left 2018    INCARCERATED INGUINAL HERNIA performed by Andre Dobbs MD at [facility] OR    PROSTATE BIOPSY  2014    PROSTATECTOMY  2015    robotic. lap    THORACENTESIS      UMBILICAL HERNIA REPAIR      UPPER GASTROINTESTINAL ENDOSCOPY  10/19/2016    no lesions seen    UPPER GASTROINTESTINAL ENDOSCOPY  2019    UPPER GASTROINTESTINAL ENDOSCOPY N/A 2019    EGD BIOPSY performed by Yohana Leger MD at [facility] OR    UPPER

## 2024-02-16 NOTE — CARE COORDINATION
Discharge planning    Off levo. Plan is home with wife and [facility] hc. GI, nephro, cards, and ID following. PT/OT evaluating daily. Wife is a retired ICU nurse. Hx. Of colon Cancer. Uses SC and walker. Home on oral atx. Has pleurex. Wife drains.

## 2024-02-17 ENCOUNTER — APPOINTMENT (OUTPATIENT)
Dept: GENERAL RADIOLOGY | Age: 74
DRG: 871 | End: 2024-02-17
Payer: MEDICARE

## 2024-02-17 LAB
ANION GAP SERPL CALCULATED.3IONS-SCNC: 6 MMOL/L (ref 9–17)
BASOPHILS # BLD: 0.04 K/UL (ref 0–0.2)
BASOPHILS NFR BLD: 1 %
BUN SERPL-MCNC: 26 MG/DL (ref 8–23)
BUN/CREAT SERPL: 43 (ref 9–20)
CALCIUM SERPL-MCNC: 8.8 MG/DL (ref 8.6–10.4)
CHLORIDE SERPL-SCNC: 102 MMOL/L (ref 98–107)
CO2 SERPL-SCNC: 34 MMOL/L (ref 20–31)
CREAT SERPL-MCNC: 0.6 MG/DL (ref 0.7–1.2)
EOSINOPHIL # BLD: 0 K/UL (ref 0–0.4)
EOSINOPHILS RELATIVE PERCENT: 0 % (ref 1–4)
ERYTHROCYTE [DISTWIDTH] IN BLOOD BY AUTOMATED COUNT: 18.6 % (ref 11.8–14.4)
GFR SERPL CREATININE-BSD FRML MDRD: >60 ML/MIN/1.73M2
GLUCOSE BLD-MCNC: 234 MG/DL (ref 75–110)
GLUCOSE BLD-MCNC: 246 MG/DL (ref 75–110)
GLUCOSE BLD-MCNC: 276 MG/DL (ref 75–110)
GLUCOSE BLD-MCNC: 322 MG/DL (ref 75–110)
GLUCOSE SERPL-MCNC: 244 MG/DL (ref 70–99)
HCT VFR BLD AUTO: 37 % (ref 40.7–50.3)
HGB BLD-MCNC: 11.6 G/DL (ref 13–17)
IMM GRANULOCYTES # BLD AUTO: 0.04 K/UL (ref 0–0.3)
IMM GRANULOCYTES NFR BLD: 1 %
LYMPHOCYTES NFR BLD: 0.15 K/UL (ref 1–4.8)
LYMPHOCYTES RELATIVE PERCENT: 4 % (ref 24–44)
MAGNESIUM SERPL-MCNC: 2.1 MG/DL (ref 1.6–2.6)
MCH RBC QN AUTO: 26.4 PG (ref 25.2–33.5)
MCHC RBC AUTO-ENTMCNC: 31.4 G/DL (ref 28.4–34.8)
MCV RBC AUTO: 84.3 FL (ref 82.6–102.9)
MONOCYTES NFR BLD: 0.72 K/UL (ref 0.2–0.8)
MONOCYTES NFR BLD: 19 % (ref 1–7)
MORPHOLOGY: ABNORMAL
MORPHOLOGY: ABNORMAL
NEUTROPHILS NFR BLD: 75 % (ref 36–66)
NEUTS SEG NFR BLD: 2.85 K/UL (ref 1.8–7.7)
NRBC BLD-RTO: 0 PER 100 WBC
PF4 HEPARIN CMPLX IGG SERPL IA: 0.32 O.D. (ref 0–0.4)
PLATELET # BLD AUTO: ABNORMAL K/UL (ref 138–453)
PLATELET, FLUORESCENCE: 25 K/UL (ref 138–453)
PLATELETS.RETICULATED NFR BLD AUTO: 9 % (ref 1.1–10.3)
PMV BLD AUTO: ABNORMAL FL (ref 8.1–13.5)
POTASSIUM SERPL-SCNC: 3.5 MMOL/L (ref 3.7–5.3)
RBC # BLD AUTO: 4.39 M/UL (ref 4.21–5.77)
SODIUM SERPL-SCNC: 142 MMOL/L (ref 135–144)
WBC OTHER # BLD: 3.8 K/UL (ref 3.5–11.3)

## 2024-02-17 PROCEDURE — 36415 COLL VENOUS BLD VENIPUNCTURE: CPT

## 2024-02-17 PROCEDURE — 6370000000 HC RX 637 (ALT 250 FOR IP): Performed by: FAMILY MEDICINE

## 2024-02-17 PROCEDURE — 99232 SBSQ HOSP IP/OBS MODERATE 35: CPT | Performed by: INTERNAL MEDICINE

## 2024-02-17 PROCEDURE — 83735 ASSAY OF MAGNESIUM: CPT

## 2024-02-17 PROCEDURE — 82947 ASSAY GLUCOSE BLOOD QUANT: CPT

## 2024-02-17 PROCEDURE — 2060000000 HC ICU INTERMEDIATE R&B

## 2024-02-17 PROCEDURE — 85055 RETICULATED PLATELET ASSAY: CPT

## 2024-02-17 PROCEDURE — 6370000000 HC RX 637 (ALT 250 FOR IP): Performed by: STUDENT IN AN ORGANIZED HEALTH CARE EDUCATION/TRAINING PROGRAM

## 2024-02-17 PROCEDURE — 2580000003 HC RX 258: Performed by: FAMILY MEDICINE

## 2024-02-17 PROCEDURE — 2700000000 HC OXYGEN THERAPY PER DAY

## 2024-02-17 PROCEDURE — 6370000000 HC RX 637 (ALT 250 FOR IP): Performed by: INTERNAL MEDICINE

## 2024-02-17 PROCEDURE — 71045 X-RAY EXAM CHEST 1 VIEW: CPT

## 2024-02-17 PROCEDURE — 6370000000 HC RX 637 (ALT 250 FOR IP): Performed by: NURSE PRACTITIONER

## 2024-02-17 PROCEDURE — 6360000002 HC RX W HCPCS: Performed by: FAMILY MEDICINE

## 2024-02-17 PROCEDURE — 6360000002 HC RX W HCPCS: Performed by: INTERNAL MEDICINE

## 2024-02-17 PROCEDURE — 94761 N-INVAS EAR/PLS OXIMETRY MLT: CPT

## 2024-02-17 PROCEDURE — 85025 COMPLETE CBC W/AUTO DIFF WBC: CPT

## 2024-02-17 PROCEDURE — 80048 BASIC METABOLIC PNL TOTAL CA: CPT

## 2024-02-17 RX ORDER — MIDODRINE HYDROCHLORIDE 5 MG/1
5 TABLET ORAL 3 TIMES DAILY PRN
Status: DISCONTINUED | OUTPATIENT
Start: 2024-02-17 | End: 2024-02-19 | Stop reason: HOSPADM

## 2024-02-17 RX ORDER — PROPRANOLOL HCL 60 MG
60 CAPSULE, EXTENDED RELEASE 24HR ORAL DAILY
Status: DISCONTINUED | OUTPATIENT
Start: 2024-02-17 | End: 2024-02-19 | Stop reason: HOSPADM

## 2024-02-17 RX ADMIN — FERROUS SULFATE TAB EC 325 MG (65 MG FE EQUIVALENT) 325 MG: 325 (65 FE) TABLET DELAYED RESPONSE at 10:14

## 2024-02-17 RX ADMIN — INSULIN LISPRO 2 UNITS: 100 INJECTION, SOLUTION INTRAVENOUS; SUBCUTANEOUS at 10:14

## 2024-02-17 RX ADMIN — PROPRANOLOL HYDROCHLORIDE 60 MG: 60 CAPSULE, EXTENDED RELEASE ORAL at 12:26

## 2024-02-17 RX ADMIN — SODIUM CHLORIDE, PRESERVATIVE FREE 10 ML: 5 INJECTION INTRAVENOUS at 21:17

## 2024-02-17 RX ADMIN — MIDODRINE HYDROCHLORIDE 5 MG: 5 TABLET ORAL at 10:13

## 2024-02-17 RX ADMIN — Medication 1000 UNITS: at 21:17

## 2024-02-17 RX ADMIN — Medication 500 MG: at 10:15

## 2024-02-17 RX ADMIN — SPIRONOLACTONE 25 MG: 25 TABLET ORAL at 10:14

## 2024-02-17 RX ADMIN — POTASSIUM CHLORIDE 40 MEQ: 1500 TABLET, EXTENDED RELEASE ORAL at 10:14

## 2024-02-17 RX ADMIN — FUROSEMIDE 20 MG: 20 TABLET ORAL at 10:13

## 2024-02-17 RX ADMIN — SODIUM CHLORIDE, PRESERVATIVE FREE 10 ML: 5 INJECTION INTRAVENOUS at 10:14

## 2024-02-17 RX ADMIN — INSULIN LISPRO 6 UNITS: 100 INJECTION, SOLUTION INTRAVENOUS; SUBCUTANEOUS at 17:27

## 2024-02-17 RX ADMIN — MIDODRINE HYDROCHLORIDE 5 MG: 5 TABLET ORAL at 17:27

## 2024-02-17 RX ADMIN — PANTOPRAZOLE SODIUM 40 MG: 40 TABLET, DELAYED RELEASE ORAL at 10:13

## 2024-02-17 RX ADMIN — INSULIN GLARGINE 20 UNITS: 100 INJECTION, SOLUTION SUBCUTANEOUS at 21:16

## 2024-02-17 RX ADMIN — HYDROCORTISONE SODIUM SUCCINATE 50 MG: 100 INJECTION, POWDER, FOR SOLUTION INTRAMUSCULAR; INTRAVENOUS at 07:39

## 2024-02-17 RX ADMIN — INSULIN LISPRO 2 UNITS: 100 INJECTION, SOLUTION INTRAVENOUS; SUBCUTANEOUS at 12:26

## 2024-02-17 RX ADMIN — CEFTRIAXONE SODIUM 2000 MG: 2 INJECTION, POWDER, FOR SOLUTION INTRAMUSCULAR; INTRAVENOUS at 10:22

## 2024-02-17 RX ADMIN — INSULIN GLARGINE 20 UNITS: 100 INJECTION, SOLUTION SUBCUTANEOUS at 10:33

## 2024-02-17 RX ADMIN — LACTULOSE 20 G: 20 SOLUTION ORAL at 10:14

## 2024-02-18 ENCOUNTER — APPOINTMENT (OUTPATIENT)
Dept: GENERAL RADIOLOGY | Age: 74
DRG: 871 | End: 2024-02-18
Payer: MEDICARE

## 2024-02-18 LAB
AMMONIA PLAS-SCNC: 54 UMOL/L (ref 16–60)
BASOPHILS # BLD: 0.03 K/UL (ref 0–0.2)
BASOPHILS NFR BLD: 1 %
EOSINOPHIL # BLD: 0.14 K/UL (ref 0–0.4)
EOSINOPHILS RELATIVE PERCENT: 4 % (ref 1–4)
ERYTHROCYTE [DISTWIDTH] IN BLOOD BY AUTOMATED COUNT: 18.8 % (ref 11.8–14.4)
GLUCOSE BLD-MCNC: 153 MG/DL (ref 75–110)
GLUCOSE BLD-MCNC: 197 MG/DL (ref 75–110)
GLUCOSE BLD-MCNC: 248 MG/DL (ref 75–110)
GLUCOSE BLD-MCNC: 99 MG/DL (ref 75–110)
HCT VFR BLD AUTO: 35.7 % (ref 40.7–50.3)
HGB BLD-MCNC: 11.2 G/DL (ref 13–17)
IMM GRANULOCYTES # BLD AUTO: 0.03 K/UL (ref 0–0.3)
IMM GRANULOCYTES NFR BLD: 1 %
LYMPHOCYTES NFR BLD: 0.31 K/UL (ref 1–4.8)
LYMPHOCYTES RELATIVE PERCENT: 9 % (ref 24–44)
MCH RBC QN AUTO: 26.2 PG (ref 25.2–33.5)
MCHC RBC AUTO-ENTMCNC: 31.4 G/DL (ref 28.4–34.8)
MCV RBC AUTO: 83.4 FL (ref 82.6–102.9)
MONOCYTES NFR BLD: 0.82 K/UL (ref 0.2–0.8)
MONOCYTES NFR BLD: 24 % (ref 1–7)
MORPHOLOGY: ABNORMAL
MORPHOLOGY: ABNORMAL
NEUTROPHILS NFR BLD: 61 % (ref 36–66)
NEUTS SEG NFR BLD: 2.07 K/UL (ref 1.8–7.7)
NRBC BLD-RTO: 0.6 PER 100 WBC
PLATELET # BLD AUTO: ABNORMAL K/UL (ref 138–453)
PLATELET, FLUORESCENCE: 29 K/UL (ref 138–453)
PLATELETS.RETICULATED NFR BLD AUTO: 11.4 % (ref 1.1–10.3)
PMV BLD AUTO: ABNORMAL FL (ref 8.1–13.5)
RBC # BLD AUTO: 4.28 M/UL (ref 4.21–5.77)
WBC OTHER # BLD: 3.4 K/UL (ref 3.5–11.3)

## 2024-02-18 PROCEDURE — 6370000000 HC RX 637 (ALT 250 FOR IP): Performed by: INTERNAL MEDICINE

## 2024-02-18 PROCEDURE — 2580000003 HC RX 258: Performed by: FAMILY MEDICINE

## 2024-02-18 PROCEDURE — 6370000000 HC RX 637 (ALT 250 FOR IP): Performed by: FAMILY MEDICINE

## 2024-02-18 PROCEDURE — 2700000000 HC OXYGEN THERAPY PER DAY

## 2024-02-18 PROCEDURE — 36415 COLL VENOUS BLD VENIPUNCTURE: CPT

## 2024-02-18 PROCEDURE — 85025 COMPLETE CBC W/AUTO DIFF WBC: CPT

## 2024-02-18 PROCEDURE — 71045 X-RAY EXAM CHEST 1 VIEW: CPT

## 2024-02-18 PROCEDURE — 2060000000 HC ICU INTERMEDIATE R&B

## 2024-02-18 PROCEDURE — 85055 RETICULATED PLATELET ASSAY: CPT

## 2024-02-18 PROCEDURE — 6370000000 HC RX 637 (ALT 250 FOR IP): Performed by: STUDENT IN AN ORGANIZED HEALTH CARE EDUCATION/TRAINING PROGRAM

## 2024-02-18 PROCEDURE — 99232 SBSQ HOSP IP/OBS MODERATE 35: CPT | Performed by: INTERNAL MEDICINE

## 2024-02-18 PROCEDURE — 97535 SELF CARE MNGMENT TRAINING: CPT | Performed by: NURSE PRACTITIONER

## 2024-02-18 PROCEDURE — 94761 N-INVAS EAR/PLS OXIMETRY MLT: CPT

## 2024-02-18 PROCEDURE — 6360000002 HC RX W HCPCS: Performed by: FAMILY MEDICINE

## 2024-02-18 PROCEDURE — 97530 THERAPEUTIC ACTIVITIES: CPT | Performed by: NURSE PRACTITIONER

## 2024-02-18 PROCEDURE — 82140 ASSAY OF AMMONIA: CPT

## 2024-02-18 PROCEDURE — 82947 ASSAY GLUCOSE BLOOD QUANT: CPT

## 2024-02-18 RX ORDER — INSULIN GLARGINE 100 [IU]/ML
20 INJECTION, SOLUTION SUBCUTANEOUS NIGHTLY
Status: DISCONTINUED | OUTPATIENT
Start: 2024-02-18 | End: 2024-02-19 | Stop reason: HOSPADM

## 2024-02-18 RX ORDER — SPIRONOLACTONE 100 MG/1
100 TABLET, FILM COATED ORAL DAILY
Status: DISCONTINUED | OUTPATIENT
Start: 2024-02-18 | End: 2024-02-19 | Stop reason: HOSPADM

## 2024-02-18 RX ADMIN — SODIUM CHLORIDE 20 ML: 9 INJECTION, SOLUTION INTRAVENOUS at 08:52

## 2024-02-18 RX ADMIN — INSULIN GLARGINE 20 UNITS: 100 INJECTION, SOLUTION SUBCUTANEOUS at 19:20

## 2024-02-18 RX ADMIN — FUROSEMIDE 20 MG: 20 TABLET ORAL at 10:34

## 2024-02-18 RX ADMIN — DOCUSATE SODIUM 100 MG: 100 CAPSULE, LIQUID FILLED ORAL at 10:33

## 2024-02-18 RX ADMIN — CEFTRIAXONE SODIUM 2000 MG: 2 INJECTION, POWDER, FOR SOLUTION INTRAMUSCULAR; INTRAVENOUS at 08:53

## 2024-02-18 RX ADMIN — SPIRONOLACTONE 100 MG: 100 TABLET ORAL at 10:34

## 2024-02-18 RX ADMIN — FERROUS SULFATE TAB EC 325 MG (65 MG FE EQUIVALENT) 325 MG: 325 (65 FE) TABLET DELAYED RESPONSE at 10:33

## 2024-02-18 RX ADMIN — SODIUM CHLORIDE, PRESERVATIVE FREE 10 ML: 5 INJECTION INTRAVENOUS at 10:44

## 2024-02-18 RX ADMIN — PROPRANOLOL HYDROCHLORIDE 60 MG: 60 CAPSULE, EXTENDED RELEASE ORAL at 10:34

## 2024-02-18 RX ADMIN — PANTOPRAZOLE SODIUM 40 MG: 40 TABLET, DELAYED RELEASE ORAL at 05:09

## 2024-02-18 RX ADMIN — LACTULOSE 20 G: 20 SOLUTION ORAL at 10:34

## 2024-02-18 RX ADMIN — Medication 500 MG: at 10:34

## 2024-02-18 RX ADMIN — Medication 1000 UNITS: at 19:20

## 2024-02-19 ENCOUNTER — APPOINTMENT (OUTPATIENT)
Dept: GENERAL RADIOLOGY | Age: 74
DRG: 871 | End: 2024-02-19
Payer: MEDICARE

## 2024-02-19 ENCOUNTER — APPOINTMENT (OUTPATIENT)
Dept: ULTRASOUND IMAGING | Age: 74
DRG: 871 | End: 2024-02-19
Payer: MEDICARE

## 2024-02-19 VITALS
HEIGHT: 72 IN | OXYGEN SATURATION: 95 % | HEART RATE: 85 BPM | SYSTOLIC BLOOD PRESSURE: 98 MMHG | BODY MASS INDEX: 20.25 KG/M2 | TEMPERATURE: 98.5 F | DIASTOLIC BLOOD PRESSURE: 71 MMHG | RESPIRATION RATE: 18 BRPM | WEIGHT: 149.47 LBS

## 2024-02-19 PROBLEM — A41.3 SEPSIS DUE TO HAEMOPHILUS INFLUENZAE (HCC): Status: ACTIVE | Noted: 2023-05-12

## 2024-02-19 LAB
GLUCOSE BLD-MCNC: 136 MG/DL (ref 75–110)
GLUCOSE BLD-MCNC: 194 MG/DL (ref 75–110)
MICROORGANISM SPEC CULT: NORMAL
MICROORGANISM/AGENT SPEC: NORMAL
SPECIMEN DESCRIPTION: NORMAL

## 2024-02-19 PROCEDURE — 6370000000 HC RX 637 (ALT 250 FOR IP): Performed by: INTERNAL MEDICINE

## 2024-02-19 PROCEDURE — 6360000002 HC RX W HCPCS: Performed by: FAMILY MEDICINE

## 2024-02-19 PROCEDURE — 2580000003 HC RX 258: Performed by: FAMILY MEDICINE

## 2024-02-19 PROCEDURE — 76705 ECHO EXAM OF ABDOMEN: CPT

## 2024-02-19 PROCEDURE — 71045 X-RAY EXAM CHEST 1 VIEW: CPT

## 2024-02-19 PROCEDURE — 6370000000 HC RX 637 (ALT 250 FOR IP): Performed by: STUDENT IN AN ORGANIZED HEALTH CARE EDUCATION/TRAINING PROGRAM

## 2024-02-19 PROCEDURE — 6370000000 HC RX 637 (ALT 250 FOR IP): Performed by: FAMILY MEDICINE

## 2024-02-19 PROCEDURE — 99232 SBSQ HOSP IP/OBS MODERATE 35: CPT | Performed by: INTERNAL MEDICINE

## 2024-02-19 PROCEDURE — 82947 ASSAY GLUCOSE BLOOD QUANT: CPT

## 2024-02-19 PROCEDURE — 99239 HOSP IP/OBS DSCHRG MGMT >30: CPT | Performed by: INTERNAL MEDICINE

## 2024-02-19 RX ORDER — LEVOFLOXACIN 500 MG/1
500 TABLET, FILM COATED ORAL DAILY
Qty: 7 TABLET | Refills: 0 | Status: SHIPPED | OUTPATIENT
Start: 2024-02-19 | End: 2024-02-26

## 2024-02-19 RX ORDER — INSULIN GLARGINE 100 [IU]/ML
20 INJECTION, SOLUTION SUBCUTANEOUS NIGHTLY
Qty: 4 ADJUSTABLE DOSE PRE-FILLED PEN SYRINGE | Refills: 2 | Status: SHIPPED
Start: 2024-02-19 | End: 2024-02-22 | Stop reason: SDUPTHER

## 2024-02-19 RX ORDER — FUROSEMIDE 40 MG/1
40 TABLET ORAL DAILY
Status: DISCONTINUED | OUTPATIENT
Start: 2024-02-19 | End: 2024-02-19 | Stop reason: HOSPADM

## 2024-02-19 RX ORDER — LACTULOSE 10 G/15ML
20 SOLUTION ORAL 2 TIMES DAILY
Qty: 1800 ML | Refills: 1 | Status: SHIPPED | OUTPATIENT
Start: 2024-02-19 | End: 2024-03-20

## 2024-02-19 RX ADMIN — PROPRANOLOL HYDROCHLORIDE 60 MG: 60 CAPSULE, EXTENDED RELEASE ORAL at 09:23

## 2024-02-19 RX ADMIN — PANTOPRAZOLE SODIUM 40 MG: 40 TABLET, DELAYED RELEASE ORAL at 09:29

## 2024-02-19 RX ADMIN — SODIUM CHLORIDE, PRESERVATIVE FREE 10 ML: 5 INJECTION INTRAVENOUS at 09:24

## 2024-02-19 RX ADMIN — MIDODRINE HYDROCHLORIDE 5 MG: 5 TABLET ORAL at 09:41

## 2024-02-19 RX ADMIN — FUROSEMIDE 40 MG: 40 TABLET ORAL at 09:23

## 2024-02-19 RX ADMIN — CEFTRIAXONE SODIUM 2000 MG: 2 INJECTION, POWDER, FOR SOLUTION INTRAMUSCULAR; INTRAVENOUS at 09:36

## 2024-02-19 RX ADMIN — SPIRONOLACTONE 100 MG: 100 TABLET ORAL at 09:23

## 2024-02-19 RX ADMIN — Medication 500 MG: at 09:36

## 2024-02-19 RX ADMIN — FERROUS SULFATE TAB EC 325 MG (65 MG FE EQUIVALENT) 325 MG: 325 (65 FE) TABLET DELAYED RESPONSE at 09:23

## 2024-02-19 ASSESSMENT — ENCOUNTER SYMPTOMS
GASTROINTESTINAL NEGATIVE: 1
SHORTNESS OF BREATH: 1

## 2024-02-19 NOTE — PROGRESS NOTES
GASTROENTEROLOGY NOTE       Patient:   Candelario Bliss Jr.   :    1950   [facility]:   [facility]  Date:     2/15/2024  Consultant:   SCARLET Petty - NANCY      SUBJECTIVE:      74 y.o. male admitted 2024 with Severe sepsis (HCC) [A41.9, R65.20]  Hypotension, unspecified hypotension type [I95.9].          73-year-old male brought to the ED by his wife who reported a 3-day history of worsening fatigue chest pain shortness of breath and altered mental status. 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 He ascites and a right pleural effusion with a pleural tube in his right chest. He drains 1400ml of fluid every other day at home. Chest x-ray was completed upon admission showing a right basilar chest tube with stable pleural effusion without pneumothorax. Blood cultures were positive and ID was consulted is. GI was consulted for management of cirrhosis. Initial serologic workup in the ED showed AST 57 total bilirubin 1.4 ALT 49 hemoglobin 12 platelet count 75 INR 1.6. CT of the chest abdomen pelvis was also completed:     IMPRESSION:  1. Large simple right pleural effusion with extensive right-sided volume  loss.  Mild dependent left basilar atelectasis.  No acute pulmonary  infiltrate.  2. Hepatic cirrhosis with evidence of portal hypertension including  splenomegaly, small quantity of ascites and third-spacing throughout the  abdomen and pelvis.  3. Geographic areas of decreased attenuation in the spleen suggesting  infarcts of indeterminate age.  4. Cholelithiasis.  Mild nonspecific gallbladder wall thickening.  5. Moderate stool throughout the colon consistent with constipation.  Colonic  diverticulosis with no acute features.  6. Partially visualized fluid in the left scrotum, likely a large hydrocele.   
       GASTROENTEROLOGY NOTE       Patient:   Candelario Bliss Jr.   :    1950   [facility]  Date:     2024  Consultant:   SCARLET Petty - NANCY      SUBJECTIVE:    74 y.o. male admitted 2024 with Severe sepsis (HCC) [A41.9, R65.20]  Hypotension, unspecified hypotension type [I95.9].        73-year-old male brought to the ED by his wife who reported a 3-day history of worsening fatigue chest pain shortness of breath and altered mental status. 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 He ascites and a right pleural effusion with a pleural tube in his right chest. He drains 1400ml of fluid every other day at home. Chest x-ray was completed upon admission showing a right basilar chest tube with stable pleural effusion without pneumothorax. Blood cultures were positive and ID was consulted is. GI was consulted for management of cirrhosis. Initial serologic workup in the ED showed AST 57 total bilirubin 1.4 ALT 49 hemoglobin 12 platelet count 75 INR 1.6. CT of the chest abdomen pelvis was also completed:     IMPRESSION:  1. Large simple right pleural effusion with extensive right-sided volume  loss.  Mild dependent left basilar atelectasis.  No acute pulmonary  infiltrate.  2. Hepatic cirrhosis with evidence of portal hypertension including  splenomegaly, small quantity of ascites and third-spacing throughout the  abdomen and pelvis.  3. Geographic areas of decreased attenuation in the spleen suggesting  infarcts of indeterminate age.  4. Cholelithiasis.  Mild nonspecific gallbladder wall thickening.  5. Moderate stool throughout the colon consistent with constipation.  Colonic  diverticulosis with no acute features.  6. Partially visualized fluid in the left scrotum, likely a large hydrocele.     Patient 
  [facility]  Progress Note    Candelario Bliss Jr.  MRN: [id number]  Date: 2/15/2024  LOS: 3     Reason for F/U :   Haemophilus influenza sepsis    Impression :   Septic shock   improving and now is weaned down to 1 pressor  Haemophilus influenza sepsis source is likely and infected right pleural effusion  Nonalcoholic cirrhosis with hypertensive gastropathy  Chronic hypotension on midodrine  Pancytopenia    Recommendations:   The patient continues on intravenous antimicrobial therapy with Rocephin.  Clinically the patient is improved which does seem to indicate response with antibiotic therapy.  The pleural fluid studies show significant leukocytosis with left shift and LDH is elevated   Clinically given the chest pain and the fluid findings to suggest empyema   He is responding to antimicrobial therapy and for now we will continue to follow the clinical progress and adjust therapy according    Infection Control Recommendations:   Universal precaution    Discharge Planning:   Estimated Length of IV antimicrobials: To be determined  Patient will need Midline Catheter Insertion/ PICC line Insertion: No  Patient will need: Home IV , Infusion Center,  SNF,  LTAC: Undetermined  Patient willneed outpatient wound care: No    Medical Decision making / Summary of Stay:   Candelario Bliss Jr. is a 74 y.o.-year-old male who was initially admitted on 2/12/2024.   Candelario is known to me and has cirrhosis with recurrent ascites, moderate portal hypertensive gastropathy with esophageal varices status post banding x3 6/7/2023, BPH status post prostatectomy, pancytopenia felt secondary to cirrhosis, gastroesophageal reflux disease, chronic hypotension on midodrine.      The patient has had recurrent episodes of Serratia marcescens bacteremia starting May 2023 and was on oral suppressive levofloxacin and to January of this year when we stopped it.     The patient was doing well and celebrated his birthday on Super Bowl [place] 
  [facility]  Progress Note    Candelario Bliss Jr.  MRN: [id number]  Date: 2/16/2024  LOS: 4     Reason for F/U :   Haemophilus influenza sepsis    Impression :   Septic shock   Resolved  Haemophilus influenza sepsis source is likely and infected right pleural effusion  Nonalcoholic cirrhosis with hypertensive gastropathy  Chronic hypotension on midodrine  Pancytopenia  Encephalopathy-likely hepatic    Recommendations:   The patient continues on intravenous antimicrobial therapy with Rocephin.  The patient by imaging does have continued pleural effusion and there are plans to drain the right chest today  The patient was started on lactulose and has not had a bowel movement during the hospital stay  Clinically he continues to improve and I will repeat blood culture dated today to document clearance    Infection Control Recommendations:   Universal precaution    Discharge Planning:   Estimated Length of IV antimicrobials: To be determined  Patient will need Midline Catheter Insertion/ PICC line Insertion: No  Patient will need: Home IV , Infusion Center,  SNF,  LTAC: Undetermined  Patient willneed outpatient wound care: No    Medical Decision making / Summary of Stay:   Candelario Bliss Jr. is a 74 y.o.-year-old male who was initially admitted on 2/12/2024.   Candelario is known to me and has cirrhosis with recurrent ascites, moderate portal hypertensive gastropathy with esophageal varices status post banding x3 6/7/2023, BPH status post prostatectomy, pancytopenia felt secondary to cirrhosis, gastroesophageal reflux disease, chronic hypotension on midodrine.      The patient has had recurrent episodes of Serratia marcescens bacteremia starting May 2023 and was on oral suppressive levofloxacin and to January of this year when we stopped it.     The patient was doing well and celebrated his birthday on Super Bowl [place] and subsequently developed some fatigue, acute right-sided chest pain, some tachypnea/increased 
  [facility]  Progress Note    Candelario Bliss Jr.  MRN: [id number]  Date: 2/14/2024  LOS: 2     Reason for F/U :   Haemophilus influenza sepsis    Impression :   Septic shock   improving and now is weaned down to 1 pressor  Haemophilus influenza sepsis source at this point in time is not clear but concern for right pleural effusion infection  Nonalcoholic cirrhosis with hypertensive gastropathy  Chronic hypotension on midodrine  Pancytopenia    Recommendations:   The patient continues on intravenous antimicrobial therapy with Rocephin.  Clinically the patient is improved which does seem to indicate response with antibiotic therapy.  The pleural fluid is still yet to be available/resulted and I have asked us to reach out to the lab for this.  We will follow the clinical progress and adjust therapy according    Infection Control Recommendations:   Universal precaution    Discharge Planning:   Estimated Length of IV antimicrobials: To be determined  Patient will need Midline Catheter Insertion/ PICC line Insertion: No  Patient will need: Home IV , Infusion Center,  SNF,  LTAC: Undetermined  Patient willneed outpatient wound care: No    Medical Decision making / Summary of Stay:   Candelario Bliss Jr. is a 74 y.o.-year-old male who was initially admitted on 2/12/2024.   Candelario is known to me and has cirrhosis with recurrent ascites, moderate portal hypertensive gastropathy with esophageal varices status post banding x3 6/7/2023, BPH status post prostatectomy, pancytopenia felt secondary to cirrhosis, gastroesophageal reflux disease, chronic hypotension on midodrine.      The patient has had recurrent episodes of Serratia marcescens bacteremia starting May 2023 and was on oral suppressive levofloxacin and to January of this year when we stopped it.     The patient was doing well and celebrated his birthday on [employer] [place] and subsequently developed some fatigue, acute right-sided chest pain, some 
  [facility]  Progress Note    Candelario Bliss Jr.  MRN: [id number]  Date: 2/19/2024  LOS: 7     Reason for F/U :   Haemophilus influenza sepsis    Impression :   Haemophilus influenza sepsis source is likely and infected right pleural effusion  Septic shock   Resolved  Nonalcoholic cirrhosis with hypertensive gastropathy  Chronic hypotension on midodrine  Pancytopenia  Encephalopathy-likely hepatic    Recommendations:   The patient continues on intravenous antimicrobial therapy with Rocephin.  The plan is for discharge on levofloxacin 500 mg orally daily for another 7 days.  The patient has been instructed to resume the 250 mg every other day dose once the 7 days above is completed.  The patient has a follow-up with me scheduled on March 14 and he is to keep this appointment  The care was discussed with the wife and daughter at bedside    Infection Control Recommendations:   Universal precaution    Discharge Planning:   Estimated Length of IV antimicrobials: While hospitalized  Patient will need Midline Catheter Insertion/ PICC line Insertion: No  Patient will need: Home IV , Infusion Center,  SNF,  LTAC: Undetermined  Patient willneed outpatient wound care: No    Medical Decision making / Summary of Stay:   Candelario Bliss Jr. is a 74 y.o.-year-old male who was initially admitted on 2/12/2024.   Candelario is known to me and has cirrhosis with recurrent ascites, moderate portal hypertensive gastropathy with esophageal varices status post banding x3 6/7/2023, BPH status post prostatectomy, pancytopenia felt secondary to cirrhosis, gastroesophageal reflux disease, chronic hypotension on midodrine.      The patient has had recurrent episodes of Serratia marcescens bacteremia starting May 2023 and was on oral suppressive levofloxacin and to January of this year when we stopped it.     The patient was doing well and celebrated his birthday on Super Bowl [place] and subsequently developed some fatigue, acute 
CLINICAL PHARMACY NOTE: MEDS TO BEDS    Total # of Prescriptions Filled: 2   The following medications were delivered to the patient:  Lactulose 10gm/15ml solution  Levofloxacin 500 mg tabs    Additional Documentation:   The pt's wife picked these meds up at the pharmacy.  
Comprehensive Nutrition Assessment    Type and Reason for Visit:  Initial, Positive Nutrition Screen    Nutrition Recommendations/Plan:   Provide diet rx as ordered   Provide supplements as ordered   Monitor labs as they become available   Monitor skin integrity/weights     Malnutrition Assessment:  Malnutrition Status:  Severe malnutrition (02/13/24 1342)    Context:  Acute Illness     Findings of the 6 clinical characteristics of malnutrition:  Energy Intake:  50% or less of estimated energy requirements for 5 or more days  Weight Loss:  Greater than 7.5% over 3 months     Body Fat Loss:  Mild body fat loss Triceps   Muscle Mass Loss:  Mild muscle mass loss Hand (interosseous)  Fluid Accumulation:  No significant fluid accumulation     Strength:  Not Performed    Nutrition Assessment:    Patient gets normal pleural effusions drained at home, 1400 mL removed every other day.  Patient had removal on 2/11. seen by this writer for positive nutrition screen of weight loss and poor appetite, seen patient in room wife was present and helping feed patient. She is trying vanilla ice cream and chocolate ensure together, he normally likes the strawberry but this is not in stock at this time. admitted for severe sepsis, and hypotension is on 6 liters NC. weight on 2/13 was 140#, weight history 2/12 was 130# doubt accuracy of 10# gain x 1 day, weight of 130# appears more accurate, 11/22 weight was 128#, 9/6/23 was 116#, weight fluctuations likely due to fluid shifts. Patient is noted to be underweight and malnourished due to poor appetite with muscle wasting noted. Monitor po intakes, weights, labs, skin integrity and supplement acceptance.    Nutrition Related Findings:    BM on 2/12 active sounds no edema noted. Wound Type: None       Current Nutrition Intake & Therapies:    Average Meal Intake: 26-50%  Average Supplements Intake: 51-75%  ADULT DIET; Regular; 5 carb choices (75 gm/meal)  ADULT ORAL NUTRITION SUPPLEMENT; 
DATE: 2024    NAME: Candelario Bliss Jr.  MRN: [id number]   : 1950    Patient not seen this date for Physical Therapy due to:      [] Cancel by RN or physician due to:    [] Hemodialysis    [] Critical Lab Value Level     [] Blood transfusion in progress    [] Acute or unstable cardiovascular status   _MAP < 55 or more than >115  _HR < 40 or > 130    [] Acute or unstable pulmonary status   -FiO2 > 60%   _RR < 5 or >40    _O2 sats < 85%    [] Strict Bedrest    [] Off Unit for surgery or procedure    [] Off Unit for testing       [] Pending imaging to R/O fracture    [x] Refusal by Patient    Pt declined therapy and wife states he just got back to bed. Wife states he has been up in a chair all day and just ambulated to/from the bathroom.  Will cont to follow.     [] Other      [] PT being discontinued at this time. Patient independent. No further needs.     [] PT being discontinued at this time as the patient has been transferred to hospice care. No further needs.      Francia Orozco, PTA     
Discharge Note:      All discharge instructions given at this time as well as all patient belongings returned to patient. PICC Line and telemetry removed from pt. Pt denies any further questions regarding discharge at this time. Pt also given discharge packet with unit letter, discharge instructions/restrictions. Pt and family denies any further issues at this time.     Pt wheeled out to discharge doors at this time. With family members    Pt left premises without any issues in private vehicle at this time.    
Dr FRANCY Mills notified of lactic acid trending up and current lab and imaging results. Order for VBG/Trop/LFT. IVATB added. GI Consult added.  
Dr FRANCY Mills pages writer regarding blood culture results. Per MD, odessa Gilmore and await ID rec. Care ongoing.   
EF 40-45. Primary Attending ordered Cardio consult. Dr FABIOLA Hussein notfied on new consult. Care ongoing at this time.   
End Of Shift Note  [facility]  Summary of shift: Anticipating discharge tomorrow.  Drained 1400 ml from pleural drain. Up to the bathroom and chair with assistance.     Vitals:    Vitals:    02/18/24 1143 02/18/24 1544 02/18/24 1600 02/18/24 1608   BP: 116/69 119/73 (!) 100/59 105/62   Pulse: 69 64 69 67   Resp: 16 17 18 17   Temp: 98.7 °F (37.1 °C) 97.3 °F (36.3 °C)     TempSrc: Temporal Temporal     SpO2: 94% 92% 93% 91%   Weight:       Height:            I&O:   Intake/Output Summary (Last 24 hours) at 2/18/2024 1909  Last data filed at 2/18/2024 1546  Gross per 24 hour   Intake --   Output 1400 ml   Net -1400 ml       Resp Status: respirations easy on room air    Ventilator Settings:     / / /     Critical Care IV infusions:   sodium chloride 20 mL (02/18/24 0852)    dextrose          LDA:   PICC Triple Lumen 02/13/24 Left Brachial (Active)   Number of days: 5       Chest Tube Right (Active)   Number of days: 5          
End Of Shift Note  [facility]  Summary of shift: Patient had uneventful day. Patient was up to chair majority of the day. Still very groggy and confused at this time. Seroquel was discontinued.     Patient had 4 loose bowel movements today. Dose of lactulose was given to help improve ammonia level, recheck is ordered for tomorrow morning. Patients blood pressure has improved scheduled midodrine was changed to prn and was given once after wife had drained 2300ml of fluid from pleural drain. Patients home dose of propanolol was restarted as well today.     Patients also received 40meq of oral potassium, daughter requested patient to get effer-k for the future if potassium is low again due to patient having difficulty with swallowing medication.     Vitals:    Vitals:    02/17/24 0800 02/17/24 1200 02/17/24 1226 02/17/24 1600   BP: 125/85 137/82  107/68   Pulse: 96 (!) 105 (!) 102 73   Resp: 19 21  12   Temp: 97.1 °F (36.2 °C) 97.5 °F (36.4 °C)  97.3 °F (36.3 °C)   TempSrc: Temporal Temporal  Temporal   SpO2: 96% 94%  96%   Weight:       Height:            I&O:   Intake/Output Summary (Last 24 hours) at 2/17/2024 1922  Last data filed at 2/17/2024 1800  Gross per 24 hour   Intake --   Output 2850 ml   Net -2850 ml       Resp Status: Patient remained on room air and tolerating it well.     Ventilator Settings:     / / /     Critical Care IV infusions:   sodium chloride      dextrose          LDA:   PICC Triple Lumen 02/13/24 Left Brachial (Active)   Number of days: 4       Chest Tube Right (Active)   Number of days: 4          
End Of Shift Note  [facility]  Summary of shift: Pt had an uneventful night. VSS. Possible discharge today. No needs expressed at time of writing.    Vitals:    Vitals:    02/19/24 0200 02/19/24 0300 02/19/24 0400 02/19/24 0516   BP: 115/77  117/88    Pulse: 73 69 70    Resp: 15 16 18    Temp:   98 °F (36.7 °C)    TempSrc:   Infrared    SpO2:   95%    Weight:    67.8 kg (149 lb 7.6 oz)   Height:            I&O:   Intake/Output Summary (Last 24 hours) at 2/19/2024 0530  Last data filed at 2/18/2024 1546  Gross per 24 hour   Intake --   Output 1400 ml   Net -1400 ml       Resp Status: RA    Ventilator Settings:     / / /     Critical Care IV infusions:   sodium chloride 20 mL (02/18/24 0852)    dextrose          LDA:   PICC Triple Lumen 02/13/24 Left Brachial (Active)   Number of days: 5       Chest Tube Right (Active)   Number of days: 6          
End Of Shift Note  [facility]  Summary of shift: Pt with VSS through shift, w/ complaints of constipation. Ambulating in room w/ 1 assist and walker. Family at beside throughout day. After multiple bathroom attempts, Fleets enema ordered and given, resulted in large bowel movement, pt felt relief.     Vitals:    Vitals:    02/16/24 1000 02/16/24 1100 02/16/24 1200 02/16/24 1600   BP: 115/83 121/85 116/76 (!) 146/89   Pulse: 95 93 89 96   Resp: 15 (!) 9 12    Temp:   98.2 °F (36.8 °C) 98 °F (36.7 °C)   TempSrc:   Temporal Temporal   SpO2:   93% 92%   Weight:       Height:            I&O:   Intake/Output Summary (Last 24 hours) at 2/16/2024 1956  Last data filed at 2/16/2024 1700  Gross per 24 hour   Intake 1172.21 ml   Output 3010 ml   Net -1837.79 ml       Resp Status: 1LNC    Ventilator Settings:     / / /     Critical Care IV infusions:   sodium chloride      dextrose          LDA:   PICC Triple Lumen 02/13/24 Left Brachial (Active)   Number of days: 3       Chest Tube Right (Active)   Number of days: 3       Urinary Catheter 02/12/24 2 Way (Active)   Number of days: 3      Sammi Arndt RN     
End Of Shift Note  [facility]  Summary of shift: The patient rested well all shift. Denied any pain or discomfort during shift. Patient was disoriented at times but reoriented to surrounding and situation. Patients current weight is 145.5lbs and all vitals were stable. Patient denied any needs at this time. At the time of this note call light is within reach and patient awake in be watching television. Care continues.     Vitals:    Vitals:    02/16/24 0315 02/16/24 0400 02/16/24 0500 02/16/24 0600   BP:  136/86 132/82 116/76   Pulse:  (!) 112 (!) 106 (!) 102   Resp:  23 27    Temp:  97.1 °F (36.2 °C)     TempSrc:  Temporal     SpO2:  91%     Weight: 66 kg (145 lb 6.4 oz) 67.9 kg (149 lb 11.2 oz)     Height:            I&O:   Intake/Output Summary (Last 24 hours) at 2/16/2024 0620  Last data filed at 2/16/2024 0611  Gross per 24 hour   Intake 2644.03 ml   Output 1510 ml   Net 1134.03 ml       Resp Status: 1L NC    Ventilator Settings:     / / /     Critical Care IV infusions:   sodium chloride 100 mL/hr at 02/16/24 0611    norepinephrine Stopped (02/15/24 0800)    sodium chloride      dextrose          LDA:   PICC Triple Lumen 02/13/24 Left Brachial (Active)   Number of days: 2       Chest Tube Right (Active)   Number of days: 3       Urinary Catheter 02/12/24 2 Way (Active)   Number of days: 3          
End Of Shift Note  [facility]  Summary of shift: pt had shane taken out at 2300 last night. Voided shortly after shane was taken out. Eternal cath in place. Pt had large firm BM at beginning of shift and later around 0300 this morning. BM this morning was large and loose. Pt still had triple lumin picc. Plan for home with [facility].      Vitals:    Vitals:    02/17/24 0300 02/17/24 0400 02/17/24 0500 02/17/24 0600   BP: (!) 130/107 115/73 128/84 129/82   Pulse: (!) 122 87 90 87   Resp: 21 12 13 14   Temp:  97.3 °F (36.3 °C)     TempSrc:  Temporal     SpO2: 96% 98% 98% 97%   Weight:  68 kg (150 lb)     Height:            I&O:   Intake/Output Summary (Last 24 hours) at 2/17/2024 0803  Last data filed at 2/16/2024 2300  Gross per 24 hour   Intake --   Output 2850 ml   Net -2850 ml       Resp Status: 1 Lnc    Ventilator Settings:     / / /     Critical Care IV infusions:   sodium chloride      dextrose          LDA:   PICC Triple Lumen 02/13/24 Left Brachial (Active)   Number of days: 3       Chest Tube Right (Active)   Number of days: 4       External Urinary Catheter (Active)   Number of days: 0         
End Of Shift Note  [facility]  Summary of shift: pt had uneventful shift. Pt Hemodynamically stable through night. Pt still on IV antibiotics. Plan for Home with [facility].     Vitals:    Vitals:    02/17/24 2000 02/18/24 0000 02/18/24 0054 02/18/24 0600   BP: 107/64 110/69  114/71   Pulse: 72 69 71 63   Resp: 12 12 (!) 9 12   Temp: 98.5 °F (36.9 °C) 97.8 °F (36.6 °C)     TempSrc: Temporal Oral     SpO2: 97% 96% 96% 95%   Weight:       Height:            I&O:   Intake/Output Summary (Last 24 hours) at 2/18/2024 0610  Last data filed at 2/17/2024 1800  Gross per 24 hour   Intake --   Output 2300 ml   Net -2300 ml       Resp Status: RA    Ventilator Settings:     / / /     Critical Care IV infusions:   sodium chloride      dextrose          LDA:   PICC Triple Lumen 02/13/24 Left Brachial (Active)   Number of days: 4       Chest Tube Right (Active)   Number of days: 5         
Fleets enema given.    Sammi Arndt RN   
Insert Arterial Line  Date/Time:  02/12/24, 11:28 PM  Performed by: SONA JASMINE JR, RCP    Patient identity confirmed: arm band and provided demographic data   Time out: Immediately prior to procedure a \"time out\" was called to verify the correct patient, procedure, equipment, support staff.    Preparation: Patient was prepped and draped in the usual sterile fashion.    Location:right radial    Kody's test normal: yes  Needle gauge: 20     Number of attempts: 1  Post-procedure: transparent dressing applied and line secured    Patient tolerance: well  
Insert Arterial Line  Date/Time:  02/12/24, 11:29 PM  Performed by: Yosvany Taylor    Patient identity confirmed: arm band and provided demographic data   Time out: Immediately prior to procedure a \"time out\" was called to verify the correct patient, procedure, equipment, support staff.    Preparation: Patient was prepped and draped in the usual sterile fashion.    Location:right radial    Kody's test normal: yes  Needle gauge: 20     Number of attempts: 1  Post-procedure: transparent dressing applied and line secured    Patient tolerance: well  
Message sent to NP Shirley Waterhouse to see if any additional coverage will be given to the patient for a BS of 376. Patient is set t receive 20 units of Lantus and 4 units of Humalog. Awaiting response.     --Message received that no additional coverage needs to be given.   
Micro STV calls unit to notify of postive blood cultures. S Siva CHRIS paged with results and current IV ATB. Per CHRIS, no orders at this time.   
Nutrition Assessment     Type and Reason for Visit: Reassess    Nutrition Recommendations/Plan:   Provide diet rx as ordered   Provide supplements as ordered   Monitor labs as they become available   Monitor skin integrity/weights     Malnutrition Assessment:  Malnutrition Status: Severe malnutrition    Nutrition Assessment:  Patient was sitting up in chair when writer checked on him, spouse and daughter stated he is eating 25% of meals and is drinking most of his ensures as he enjoys these. They expect to be in the hospital a couple more days. Plan is home with wife and [facility]. GI, nephro, cards, and ID following. PT/OT evaluating daily. Wife is a retired ICU nurse. Hx. Of colon Cancer. Uses SC and walker. Home on oral atx. weight on 2/16 was 145#, 2/14 was 144#, 1/25 was 136# unsure of weight gain. Monitor po intakes, supplement acceptance, weights, labs, skin integrity.    Estimated Daily Nutrient Needs:  Energy (kcal):  7903-6006 kcals per day using 30-35 g/kg of actual body weight Weight Used for Energy Requirements: Current     Protein (g):   grams per day using 1.2-1.3 g/kg of ideal body weight Weight Used for Protein Requirements: Ideal        Fluid (ml/day):  3333-5359 ml per day using 1ml/kcal Method Used for Fluid Requirements: 1 ml/kcal    Nutrition Related Findings:   BM on 2/12 hypoactive sounds, edema to BLE + 1 non-pitting. Wound Type: None    Current Nutrition Therapies:    ADULT DIET; Regular; 5 carb choices (75 gm/meal)  ADULT ORAL NUTRITION SUPPLEMENT; Breakfast, Dinner, Lunch; Standard High Calorie/High Protein Oral Supplement    Anthropometric Measures:  Height: 182.9 cm (6')  Current Body Wt: 59 kg (130 lb)   BMI: 17.6    Nutrition Diagnosis:   Severe malnutrition related to impaired respiratory function as evidenced by intake 26-50%, BMI, Criteria as identified in malnutrition assessment    Nutrition Interventions:   Food and/or Nutrient Delivery: Continue Current Diet, Continue 
Occupational Therapy  Facility/Department: [facility] CVICU  Daily Treatment Note  NAME: Candelario Bliss Jr.  : 1950  MRN: [id number]    Date of Service: 2024    Discharge Recommendations:  Patient would benefit from continued therapy after discharge   Due to recent hospitalization and medical condition, pt would benefit from additional intermittent skilled therapy at time of discharge.  Please refer to the AM-PAC score for current functional status.     Patient Diagnosis(es): The primary encounter diagnosis was Hypotension, unspecified hypotension type. A diagnosis of Bacteremia due to Gram-negative bacteria was also pertinent to this visit.     Assessment    Assessment: Pt continue to require assist with all transfers, functional mobility and ADLs, moslty due to decreased cognition. Would benefit from further edu on home setup and safety re: cognitive deficits. Pt would benefit from continued skilled OT services to address deficits in areas of functional balance, functional reach, ADL completion, safety awareness, ADL transfers, bed mobility, UE strength, and functional mobility, all limiting safe return home to [facility] and decrease caregiver burden.     Activity Tolerance: Patient tolerated treatment well  Discharge Recommendations: Patient would benefit from continued therapy after discharge      Plan   Occupational Therapy Plan  Times Per Week: 4-5x/week  Current Treatment Recommendations: ROM;Strengthening;Balance training;Functional mobility training;Endurance training;Cognitive reorientation;Pain management;Safety education & training;Patient/Caregiver education & training;Equipment evaluation, education, & procurement;Positioning;Self-Care / ADL;Home management training;Co-Treatment     Subjective   Subjective  Subjective: Pt agreeable fo therapy tx. Wife present.  Pain: No c/o pain  Orientation  Overall Orientation Status: Impaired  Orientation Level: Oriented to person;Disoriented to place;Disoriented to 
Occupational Therapy  Facility/Department: [facility]  Occupational Therapy Initial Assessment    Name: Candelario Bliss Jr.  : 1950  MRN: [id number]  Date of Service: 2/15/2024    Discharge Recommendations:  Patient would benefit from continued therapy after discharge. Due to recent hospitalization and medical condition, pt would benefit from additional intermittent skilled therapy at time of discharge.  Please refer to the AM-PAC score for current functional status.  Continue to assess.       RN reports patient is medically stable for therapy treatment this date.    Chart reviewed prior to treatment and patient is agreeable for therapy.  All lines intact and patient positioned comfortably at end of treatment.  All patient needs addressed prior to ending therapy session.       Patient Diagnosis(es): The primary encounter diagnosis was Hypotension, unspecified hypotension type. A diagnosis of Bacteremia due to Gram-negative bacteria was also pertinent to this visit.  Past Medical History:  has a past medical history of Anemia, Arthritis, Avascular necrosis (HCC), BPH (benign prostatic hyperplasia), Cancer (HCC), CKD (chronic kidney disease), stage I, Diabetes mellitus (HCC), Gastroesophageal reflux disease, Gram negative sepsis (HCC), Hematemesis without nausea, History of blood transfusion, Hypernatremia, Hypertension, Iron deficiency anemia, Leukopenia, MGUS (monoclonal gammopathy of unknown significance), Osteoarthritis, Other cirrhosis of liver (HCC), Pancytopenia (HCC), Patient in clinical research study, Positive FIT (fecal immunochemical test), Sepsis (HCC), Sepsis due to Serratia (HCC), SIRS (systemic inflammatory response syndrome) (HCC), Stomach ulcer, Type 2 diabetes mellitus without complication (HCC), and Upper GI bleed.  Past Surgical History:  has a past surgical history that includes Endoscopy, colon, diagnostic (2014); Prostate Biopsy (2014); Hip Arthroplasty (Right, 2014); Hip 
Occupational Therapy  Facility/Department: [facility] CVICU  Daily Treatment Note  NAME: Candelario Bliss Jr.  : 1950  MRN: [id number]    Date of Service: 2024    Discharge Recommendations:  Patient would benefit from continued therapy after discharge         Patient Diagnosis(es): The primary encounter diagnosis was Hypotension, unspecified hypotension type. A diagnosis of Bacteremia due to Gram-negative bacteria was also pertinent to this visit.     Assessment    Assessment: Patient no long needing co-tx, ambulating with Min A x2 with RW requiring additional assistance while navigating turns. Patient would benefit from continued skilled OT services to improve safety with ADL/IADL tasks.  Activity Tolerance: Patient limited by fatigue;Patient limited by endurance  Discharge Recommendations: Patient would benefit from continued therapy after discharge      Plan   Occupational Therapy Plan  Times Per Week: 4-5x/week  Current Treatment Recommendations: ROM;Strengthening;Balance training;Functional mobility training;Endurance training;Cognitive reorientation;Pain management;Safety education & training;Patient/Caregiver education & training;Equipment evaluation, education, & procurement;Positioning;Self-Care / ADL;Home management training;Co-Treatment     Restrictions   Restrictions/Precautions  Restrictions/Precautions: Fall Risk, General Precautions, Up as Tolerated, Bed Alarm  Position Activity Restriction  Other position/activity restrictions: R pleural drain, telemetry, cont puls ox, 1L O2    Subjective   Subjective  Subjective: Patient pleasant and agreeable for OT treatment session  Orientation  Overall Orientation Status: Impaired  Orientation Level: Disoriented to situation;Disoriented to place;Oriented to person  Cognition  Overall Cognitive Status: Exceptions  Arousal/Alertness: Delayed responses to stimuli  Following Commands: Follows one step commands with repetition;Follows one step commands with increased 
Occupational Therapy  [facility]  Occupational Therapy Not Seen Note    Patient not available for Occupational Therapy due to:    [] Testing:    [] Hemodialysis    [x] Cancelled by RN: not medically appropriate (on 2 pressors)    []Refusal by Patient:    [] Surgery:     [] Intubation:     [] Pain Medication:    [] Sedation:     [] Spine Precautions :    [] Medical Instability:    [] Other:       Sherrie Amaro OTR/L        
POCT Bs 357. Per SSI order, notify CHRSI/MD/DO. S WaterTaylor CHRIS notfied. Ordered received for increasing Lantus.  
Patient transported to room via ED RN on stretcher. Patient placed in bed and put on cardiac monitor. Vital signs obtained. Patient in a normal sinus rhythm. Patient updated on plan of care and verbalized understanding. All lines and tubes in tact. Wife at bedside.   
Physical Therapy        Physical Therapy Cancel Note      DATE: 2024    NAME: Candelario Bliss Jr.  MRN: [id number]   : 1950      Patient not seen this date for Physical Therapy due to:    RN CX- pt. On 2 pressors      Electronically signed by WILLIAM LAMAS PT on 2024 at 12:08 PM   
Physical Therapy        Physical Therapy Cancel Note      DATE: 2024    NAME: Candelario Bliss Jr.  MRN: [id number]   : 1950      Patient not seen this date for Physical Therapy due to:    RN states to plan for Evaluation tomorrow AM (2/15/24)      Electronically signed by WILLIAM LAMAS PT on 2024 at 3:20 PM   
Physical Therapy  Facility/Department: [facility]  Physical Therapy Initial Assessment    Name: Candelario Bliss Jr.  : 1950  MRN: [id number]  Date of Service: 2/15/2024    Discharge Recommendations:    Due to recent hospitalization and medical condition, pt would benefit from additional intermittent skilled therapy at time of discharge.  Please refer to the AM-PAC score for current functional status.  BEAN Bliss Jr. is a 73 y.o. male who presents with generalized fatigue and right-sided chest pain with some shortness of breath that started this morning.  Patient gets normal pleural effusions drained at home, 1400 mL removed every other day.  Patient had removal of fluid yesterday with no complications.  Patient was able to perform activities daily without difficulty yesterday, sudden changes morning.  Patient has a history of Serratia infection with multiple admissions for sepsis.  Patient does have MGUS, noted to have leukopenia, per wife patient frequently demonstrates concerns for sepsis with normal leukocytosis.  Wife states patient's been more confused.  No nausea vomiting chest pain abdominal pain change in urination or bowel habits at this time per patient.       Patient Diagnosis(es): The primary encounter diagnosis was Hypotension, unspecified hypotension type. A diagnosis of Bacteremia due to Gram-negative bacteria was also pertinent to this visit.  Past Medical History:  has a past medical history of Anemia, Arthritis, Avascular necrosis (HCC), BPH (benign prostatic hyperplasia), Cancer (HCC), CKD (chronic kidney disease), stage I, Diabetes mellitus (HCC), Gastroesophageal reflux disease, Gram negative sepsis (HCC), Hematemesis without nausea, History of blood transfusion, Hypernatremia, Hypertension, Iron deficiency anemia, Leukopenia, MGUS (monoclonal gammopathy of unknown significance), Osteoarthritis, Other cirrhosis of liver (HCC), Pancytopenia (HCC), Patient in clinical research study, 
Physical Therapy  Facility/Department: [facility] CVICU  Daily Treatment Note  NAME: Candelario Bliss Jr.  : 1950  MRN: [id number]    Date of Service: 2024    Discharge Recommendations:  Patient would benefit from continued therapy after discharge   Due to recent hospitalization and medical condition, pt would benefit from additional intermittent skilled therapy at time of discharge.  Please refer to the AM-PAC score for current functional status.         Patient Diagnosis(es): The primary encounter diagnosis was Hypotension, unspecified hypotension type. A diagnosis of Bacteremia due to Gram-negative bacteria was also pertinent to this visit.    Assessment   Assessment: Patient with global deconditioning noted this date.  Patient with progression in ambulation. Patient would benefit from continued skilled PT treatment to maximize return to PLOF.  Activity Tolerance: Patient limited by fatigue;Patient limited by endurance     Plan    Physical Therapy Plan  General Plan: 5-7 times per week  Current Treatment Recommendations: Strengthening;ROM;Balance training;Functional mobility training;Transfer training;Gait training;Endurance training;Home exercise program;Safety education & training;Patient/Caregiver education & training;Therapeutic activities     Restrictions  Restrictions/Precautions  Restrictions/Precautions: Fall Risk, General Precautions, Up as Tolerated, Bed Alarm  Position Activity Restriction  Other position/activity restrictions: R pleural drain, telemetry, cont puls ox, 1L O2     Subjective    Subjective  Subjective: Patient resting in bed upon arrival but agreeable for PT treatment. Daughter present and she reported patient had just returned  back to bed about 30 mins ago.  Orientation  Overall Orientation Status: Impaired  Orientation Level: Disoriented to situation;Disoriented to place;Oriented to person  Cognition  Overall Cognitive Status: Exceptions  Arousal/Alertness: Delayed responses to 
Pulmonary Critical Care Progress Note       Patient seen for the follow up of  Bacteremia due to Gram-negative bacteria     Subjective:  Patient sitting in bedside chair with family at the bedside. He denies chest pain. Mild occasional cough, mostly dry. Shortness of breath not much changed. He is off pressors since 2/15/24.     Examination:  Vitals: /74   Pulse 98   Temp 97.3 °F (36.3 °C) (Temporal)   Resp 15   Ht 1.829 m (6')   Wt 68 kg (150 lb)   SpO2 92%   BMI 20.34 kg/m²   General appearance: in no acute distress, alert and cooperative with exam  Neck: No JVD  Lungs: moderate air exchange, no wheeze or rhonchi  Heart: regular rate and rhythm, S1, S2 normal, no gallop  Abdomen: Soft, non tender, + BS  Extremities: no cyanosis or clubbing. No significant edema    LABs:  CBC:   Recent Labs     02/15/24  0300 02/16/24  0420 02/17/24  0527   WBC 17.5* 9.0 3.8   HGB 11.2* 11.3* 11.6*   HCT 35.2* 35.6* 37.0*   PLT See Reflexed IPF Result See Reflexed IPF Result See Reflexed IPF Result     BMP:   Recent Labs     02/15/24  0300 02/16/24  0420 02/17/24  0527    142 142   K 4.3 4.1 3.5*   CO2 29 35* 34*   BUN 28* 26* 26*   CREATININE 0.7 0.7 0.6*   LABGLOM >60 >60 >60   GLUCOSE 316* 220* 244*     PT/INR:   Recent Labs     02/16/24  0842   PROTIME 16.7*   INR 1.4     ABG:  Lab Results   Component Value Date/Time    FIO2 6.0 02/13/2024 06:01 PM       Lab Results   Component Value Date/Time    POCPH 7.400 02/14/2024 04:57 AM    POCPCO2 30.3 02/14/2024 04:57 AM    POCPO2 102.6 02/14/2024 04:57 AM    POCHCO3 18.8 02/14/2024 04:57 AM    IDOR0ROR 98.0 02/14/2024 04:57 AM    FIO2 6.0 02/13/2024 06:01 PM     Radiology:  X-ray chest 2/17/24    X-ray chest 2/16/24        Impression & Recommendations:  Acute respiratory insufficiency   Supplemental oxygen as needed to maintain oxygen saturation >90%  Incentive spirometer q1h while awake     Sepsis/bacteremia with H. Influenzae/septic shock  Infectious disease on 
Pulmonary Critical Care Progress Note       Patient seen for the follow up of  Bacteremia due to Gram-negative bacteria     Subjective:  Patient sitting up in bed. He denies chest pain, shortness of breath, or cough. He is off pressors since 2/15/24. Family is at bedside.    Examination:  Vitals: /71   Pulse 63   Temp 97.8 °F (36.6 °C) (Oral)   Resp 12   Ht 1.829 m (6')   Wt 68 kg (150 lb)   SpO2 95%   BMI 20.34 kg/m²   General appearance: in no acute distress, alert and cooperative with exam  Neck: No JVD  Lungs: moderate air exchange, diminished air exchange on left, no wheeze or rhonchi  Heart: regular rate and rhythm, S1, S2 normal, no gallop  Abdomen: Soft, non tender, + BS  Extremities: no cyanosis or clubbing. No significant edema    LABs:  CBC:   Recent Labs     02/16/24  0420 02/17/24  0527 02/18/24  0333   WBC 9.0 3.8 3.4*   HGB 11.3* 11.6* 11.2*   HCT 35.6* 37.0* 35.7*   PLT See Reflexed IPF Result See Reflexed IPF Result See Reflexed IPF Result     BMP:   Recent Labs     02/16/24  0420 02/17/24  0527    142   K 4.1 3.5*   CO2 35* 34*   BUN 26* 26*   CREATININE 0.7 0.6*   LABGLOM >60 >60   GLUCOSE 220* 244*     PT/INR:   Recent Labs     02/16/24  0842   PROTIME 16.7*   INR 1.4     ABG:  Lab Results   Component Value Date/Time    FIO2 6.0 02/13/2024 06:01 PM       Lab Results   Component Value Date/Time    POCPH 7.400 02/14/2024 04:57 AM    POCPCO2 30.3 02/14/2024 04:57 AM    POCPO2 102.6 02/14/2024 04:57 AM    POCHCO3 18.8 02/14/2024 04:57 AM    [id number] 98.0 02/14/2024 04:57 AM    FIO2 6.0 02/13/2024 06:01 PM     Radiology:  X-ray chest 2/18/24    X-ray chest 2/17/24      Impression & Recommendations:  Acute respiratory insufficiency   Supplemental oxygen as needed to maintain oxygen saturation >90%  Incentive spirometer q1h while awake     Sepsis/bacteremia with H. Influenzae/septic shock  Infectious disease on consult  Rocephin  Off vasopressin & levophed  Completed IV 
Pulmonary Critical Care Progress Note       Patient seen for the follow up of  Sepsis due to Haemophilus influenzae (HCC)     Subjective:  Patient sitting up in bed. He denies chest pain, shortness of breath, or cough. He is off pressors since 2/15/24.  He tolerates oral intake but has poor appetite.  Wife reports she drains his Pleurx catheter with 1500 output each time    Examination:  Vitals: BP 98/71   Pulse 85   Temp 98.5 °F (36.9 °C) (Temporal)   Resp 18   Ht 1.829 m (6')   Wt 67.8 kg (149 lb 7.6 oz)   SpO2 95%   BMI 20.27 kg/m²   General appearance: in no acute distress, alert and cooperative with exam  Neck: No JVD  Lungs: Mild decreased breath sounds , no wheeze or rhonchi  Heart: regular rate and rhythm, S1, S2 normal, no gallop  Abdomen: Soft, non tender, + BS  Extremities: no cyanosis or clubbing. No significant edema    LABs:  CBC:   Recent Labs     02/17/24  0527 02/18/24  0333   WBC 3.8 3.4*   HGB 11.6* 11.2*   HCT 37.0* 35.7*   PLT See Reflexed IPF Result See Reflexed IPF Result       BMP:   Recent Labs     02/17/24  0527      K 3.5*   CO2 34*   BUN 26*   CREATININE 0.6*   LABGLOM >60   GLUCOSE 244*       PT/INR:   No results for input(s): \"PROTIME\", \"INR\" in the last 72 hours.    ABG:  Lab Results   Component Value Date/Time    FIO2 6.0 02/13/2024 06:01 PM       Lab Results   Component Value Date/Time    POCPH 7.400 02/14/2024 04:57 AM    POCPCO2 30.3 02/14/2024 04:57 AM    POCPO2 102.6 02/14/2024 04:57 AM    POCHCO3 18.8 02/14/2024 04:57 AM    [id number] 98.0 02/14/2024 04:57 AM    FIO2 6.0 02/13/2024 06:01 PM     Radiology:  X-ray chest 2/18/24    X-ray chest 2/17/24      Impression & Recommendations:  Acute respiratory insufficiency   Supplemental oxygen as needed to maintain oxygen saturation >90%  Incentive spirometer q1h while awake     Sepsis/bacteremia with H. Influenzae/septic shock    Switch Rocephin to oral antibiotics per ID  Off vasopressin & levophed  Completed IV 
Pulmonary Critical Care Progress Note       Patient seen for the follow up of  Septic shock (HCC)     Subjective:  He denies chest pain. Mild occasional cough, mostly dry. Shortness of breath not much changed. He is now only on levophed.    Examination:  Vitals: /78   Pulse 89   Temp 97.7 °F (36.5 °C) (Temporal)   Resp 25   Ht 1.829 m (6')   Wt 65.5 kg (144 lb 8 oz)   SpO2 98%   BMI 19.60 kg/m²   General appearance: in no acute distress, alert and cooperative with exam  Neck: No JVD  Lungs: moderate air exchange, no wheeze or rhonchi  Heart: regular rate and rhythm, S1, S2 normal, no gallop  Abdomen: Soft, non tender, + BS  Extremities: no cyanosis or clubbing. No significant edema    LABs:  CBC:   Recent Labs     02/12/24  1212 02/13/24  0500 02/14/24  0315   WBC 4.9 21.9* 25.0*   HGB 12.0* 12.7* 10.7*   HCT 38.7* 41.5 35.2*   PLT 75* See Reflexed IPF Result 66*     BMP:   Recent Labs     02/12/24  1212 02/13/24  0300 02/13/24  1508 02/13/24  2213 02/14/24  0315   * 135 135 137 136   K 5.0 4.3 4.4 4.4 4.2   CO2 23 13* 15* 15* 18*   BUN 20 25* 27* 30* 32*   CREATININE 0.8 1.2 1.0 1.0 1.0   LABGLOM >60 >60 >60 >60 >60   GLUCOSE 167* 94 109* 219* 289*     PT/INR:   Recent Labs     02/12/24  1212 02/13/24  0345   PROTIME 15.3* 18.5*   INR 1.2 1.6     APTT:  Recent Labs     02/12/24  1212   APTT 27.4     LIVER PROFILE:  Recent Labs     02/12/24  2247 02/13/24  0300   AST 47* 57*   ALT 40 49*   LABALBU 1.9* 2.3*     ABG:  Lab Results   Component Value Date/Time    FIO2 6.0 02/13/2024 06:01 PM       Lab Results   Component Value Date/Time    POCPH 7.400 02/14/2024 04:57 AM    POCPCO2 30.3 02/14/2024 04:57 AM    POCPO2 102.6 02/14/2024 04:57 AM    POCHCO3 18.8 02/14/2024 04:57 AM    [id number] 98.0 02/14/2024 04:57 AM    FIO2 6.0 02/13/2024 06:01 PM     Radiology:  X-ray chest 2/14/24      Impression & Recommendations:  Acute respiratory insufficiency   Supplemental oxygen as needed to maintain oxygen 
Pulmonary Critical Care Progress Note       Patient seen for the follow up of  Septic shock (HCC)     Subjective:  Patient sitting bedside working with therapy. He denies chest pain. Mild occasional cough, mostly dry. Shortness of breath not much changed. He is off pressors.     Examination:  Vitals: /79   Pulse 87   Temp 98.4 °F (36.9 °C) (Temporal)   Resp 17   Ht 1.829 m (6')   Wt 65.5 kg (144 lb 8 oz)   SpO2 95%   BMI 19.60 kg/m²   General appearance: in no acute distress, alert and cooperative with exam  Neck: No JVD  Lungs: moderate air exchange, no wheeze or rhonchi  Heart: regular rate and rhythm, S1, S2 normal, no gallop  Abdomen: Soft, non tender, + BS  Extremities: no cyanosis or clubbing. No significant edema    LABs:  CBC:   Recent Labs     02/12/24  1212 02/13/24  0500 02/14/24  0315 02/15/24  0300   WBC 4.9 21.9* 25.0* 17.5*   HGB 12.0* 12.7* 10.7* 11.2*   HCT 38.7* 41.5 35.2* 35.2*   PLT 75* See Reflexed IPF Result 66* See Reflexed IPF Result       BMP:   Recent Labs     02/12/24  1212 02/13/24  0300 02/13/24  1508 02/13/24  2213 02/14/24  0315 02/15/24  0300   * 135 135 137 136 141   K 5.0 4.3 4.4 4.4 4.2 4.3   CO2 23 13* 15* 15* 18* 29   BUN 20 25* 27* 30* 32* 28*   CREATININE 0.8 1.2 1.0 1.0 1.0 0.7   LABGLOM >60 >60 >60 >60 >60 >60   GLUCOSE 167* 94 109* 219* 289* 316*       PT/INR:   Recent Labs     02/12/24  1212 02/13/24  0345   PROTIME 15.3* 18.5*   INR 1.2 1.6       APTT:  Recent Labs     02/12/24  1212   APTT 27.4       LIVER PROFILE:  Recent Labs     02/12/24  2247 02/13/24  0300   AST 47* 57*   ALT 40 49*   LABALBU 1.9* 2.3*       ABG:  Lab Results   Component Value Date/Time    FIO2 6.0 02/13/2024 06:01 PM       Lab Results   Component Value Date/Time    POCPH 7.400 02/14/2024 04:57 AM    POCPCO2 30.3 02/14/2024 04:57 AM    POCPO2 102.6 02/14/2024 04:57 AM    POCHCO3 18.8 02/14/2024 04:57 AM    LESY1OTE 98.0 02/14/2024 04:57 AM    FIO2 6.0 02/13/2024 06:01 PM 
Pulmonary Critical Care Progress Note       Patient seen for the follow up of  Septic shock (HCC)     Subjective:  Patient sitting in bedside chair with family at the bedside. He denies chest pain. Mild occasional cough, mostly dry. Shortness of breath not much changed. He is off pressors since 2/15/24.     Examination:  Vitals: /76   Pulse 88   Temp 98.9 °F (37.2 °C) (Temporal)   Resp 27   Ht 1.829 m (6')   Wt 67.9 kg (149 lb 11.2 oz)   SpO2 92%   BMI 20.30 kg/m²   General appearance: in no acute distress, alert and cooperative with exam  Neck: No JVD  Lungs: moderate air exchange, no wheeze or rhonchi  Heart: regular rate and rhythm, S1, S2 normal, no gallop  Abdomen: Soft, non tender, + BS  Extremities: no cyanosis or clubbing. No significant edema    LABs:  CBC:   Recent Labs     02/14/24  0315 02/15/24  0300 02/16/24  0420   WBC 25.0* 17.5* 9.0   HGB 10.7* 11.2* 11.3*   HCT 35.2* 35.2* 35.6*   PLT 66* See Reflexed IPF Result See Reflexed IPF Result       BMP:   Recent Labs     02/13/24  1508 02/13/24  2213 02/14/24  0315 02/15/24  0300 02/16/24  0420    137 136 141 142   K 4.4 4.4 4.2 4.3 4.1   CO2 15* 15* 18* 29 35*   BUN 27* 30* 32* 28* 26*   CREATININE 1.0 1.0 1.0 0.7 0.7   LABGLOM >60 >60 >60 >60 >60   GLUCOSE 109* 219* 289* 316* 220*       PT/INR:   Recent Labs     02/16/24  0842   PROTIME 16.7*   INR 1.4       ABG:  Lab Results   Component Value Date/Time    FIO2 6.0 02/13/2024 06:01 PM       Lab Results   Component Value Date/Time    POCPH 7.400 02/14/2024 04:57 AM    POCPCO2 30.3 02/14/2024 04:57 AM    POCPO2 102.6 02/14/2024 04:57 AM    POCHCO3 18.8 02/14/2024 04:57 AM    [id number] 98.0 02/14/2024 04:57 AM    FIO2 6.0 02/13/2024 06:01 PM     Radiology:  X-ray chest 2/16/24    X-ray chest 2/15/24    X-ray chest 2/14/24      Impression & Recommendations:  Acute respiratory insufficiency   Supplemental oxygen as needed to maintain oxygen saturation >90%  Incentive spirometer q1h while 
Right pleural catheter drained, 975cc of chelsie/cloudy body fluid drained. Stopped draining when patient started complaining of right chest pain. Dressing changed using sterile technique.  Specimen taken to Lab.  Patient tolerated well, medicated for pain. Continue to monitor.     
Right radial arterial line oozing, dressing replaced twice in one hour.  Catheter repositioned, wave form satisfactory, arm board applied.  Oozing improved.  Continue to monitor.    
S Waterhouse CHRIS paged regarding low UOP despite receiving 3.5 L of fluid in total and BVI of 231. Orders received for shane.   
SPIRITUAL CARE DEPARTMENT [facility]  PROGRESS NOTE    Room # 2030/2030-01   Name: Candelario Bliss Jr.              Reason for visit: Routine    I visited the patient.    Admit Date & Time: 2/12/2024 11:27 AM    Assessment:  Candelario Bliss Jr. is a 74 y.o. male.  Spouse speaks to  in the [place] about PT:.states patient  about their medical condition, states struggles with their medical situation. States worries, fears frustrations. Spouse, states about decision  making.  Spouse states treated well. Patient states good family support, shares about spiritual life, Latter-day background, shares Latter-day beliefs.    Intervention:   provided a ministry presence, listening and prayer.    Outcome:  Spouse open to visit.     Plan:  Chaplains will remain available to offer spiritual and emotional support as needed.    Electronically signed by Chaplain Nori, on 2/13/2024 at 12:03 PM.  Spiritual Care Department  [facility]      02/13/24 1155   Encounter Summary   Service Provided For: Family;Patient not available   Referral/Consult From: [facility]   Support System Spouse   Last Encounter  02/13/24   Complexity of Encounter Moderate   Begin Time 1133   End Time  1140   Total Time Calculated 7 min   Assessment/Intervention/Outcome   Assessment Anxious;Decisional conflict;Fearful   Intervention Active listening;Discussed belief system/Latter-day practices/cinthya;Discussed illness injury and it’s impact;Grief Care;Prayer (assurance of)/[place];Sustaining Presence/Ministry of presence   Outcome Engaged in conversation;Expressed feelings, needs, and concerns;Expressed Gratitude;Receptive;Venting emotion       
Transitions of Care Pharmacy Service   Medication Review    The patient's list of current home medications has been reviewed.     Source(s) of information: Lee ([employer](, Patient wife Martha (former nurse)    Based on information provided by the above source(s), I have updated the patient's home med list as described below.     Please review the ACTION REQUESTED section of this note for any discrepancies on current hospital orders.      I changed or updated the following medications on the patient's home medication list:  Discontinued Sodium Fluoride     Added NA     Adjusted   NA     Other Notes Iron is Monday, Wednesday, Friday          PROVIDER ACTION REQUESTED  Medications that need to be addressed by a physician/nurse practitioner:    Medication Action Requested        Home med list ready for provider review.         Please feel free to call me with any questions about this encounter. Thank you.    This note will be reviewed and co-signed by the Transitions of Care Pharmacist.    Golden Calderon, PharmD student  [facility] Pharmacy Service  Phone:  [phone]  Fax: [phone]      Electronically signed by Golden Calderon on 2/14/2024 at 11:20 AM         Prior to Admission medications    Medication Sig Start Date End Date Taking? Authorizing Provider   traMADol (ULTRAM) 50 MG tablet Take 1 tablet by mouth every 8 hours as needed for Pain for up to 7 days. Max Daily Amount: 150 mg 2/12/24 2/19/24  Alesha Zaragoza APRN - CNP   insulin glargine (LANTUS SOLOSTAR) 100 UNIT/ML injection pen Inject 18 Units into the skin nightly 2/12/24   Alesha Zaragoza APRN - CNP   Insulin Pen Needle (TECHLITE PEN NEEDLES) 31G X 5 MM MISC 1 each by Does not apply route daily 2/12/24   Alesha Zaragoza APRN - CNP   urea (URE-NA) 15 g PACK packet Take 15 g by mouth Twice a Week 1/8/24 6/24/24  Tomasa Melendez MD   propranolol (INDERAL LA) 60 MG extended release capsule Take 1 capsule by mouth 2 times daily 12/28/23  
[facility]  Office: [phone]  Drew Avery DO, Carlos Enrique Varela DO, Erik Patino DO, Naresh Knight DO, Charity Landeros MD, Jacqueline Alvarez MD, Rosendo Leger MD, Neela Aquino MD,  Rayshawn Walker MD, Sarthak Cobb MD, Aimee Hendrix MD,  Jagdish Ly DO, Leslie Winchester MD, David Zepeda MD, Bharat Avery DO, Linnette Barlow MD,  Jan Herring DO, Mi Alarcon MD, Gayathri King MD, Nya Riley MD, Bibiana Giles MD,  Hugh Quinteros MD, Carolina Gan MD, Juma Guerrero MD, Albertina Valerio MD, Andres Casas MD, Roberto Ramirez MD, Michael Cobos DO, Moe Garibay DO, Rosamaria Barnes MD,  Ciaran Perry MD, Shirley Waterhouse, CNP,  Iris Keane CNP, Kirit Wilson, CNP,  Janette Joyner, [place], Marcy Forbes, CNP, Cindy Vega, CNP, Mica Pritchett CNP, Lucretia Paniagua CNP, Maite Ferrer, CNP, Sangeetha Massey, PA-C, Michelle Hylton PA-C, Susan Nichols, CNP, Dottie Parnell, CNP, Kristie Shirley, CNS, Ayaka Steel, CNP, Rosario Gage CNP, Tracy Schwab, CNP         [facility]   IN-PATIENT SERVICE   [facility]    Progress Note    2/18/2024    8:11 AM    Name:   Candelario Bliss Jr.  MRN:     [id number]     Acct:      [id number]   Room:   2030/2030-01  IP Day:  6  Admit Date:  2/12/2024 11:27 AM    PCP:   Alesha Zaragoza APRN - CNP  Code Status:  Full Code    Subjective:     C/C:   Chief Complaint   Patient presents with    Chest Pain     Pt reports right chest pain and SOB since 0700 this morning.     Interval History Status: improved.     Patient feels better every day.  Denies any complaints of chest pain, shortness of breath, nausea or vomiting, fevers or chills.    Brief History:     er my colleague  \"Candelario Bliss Jr. is 74 y.o. male  Who was admitted to the hospital on 2/12/2024 for treatment of Severe sepsis (HCC).   Patient was brought to emergency room by his spouse who is a retired RN as he was having  fatigue and low dehydrated.  Symptoms were present for 3 
720 hours.   HGA1C:  No results for input(s): \"LABA1C\" in the last 720 hours.     ASSESSMENT/PLAN:  1.  Nonalcoholic liver cirrhosis with portal hypertension and chronic system hypotension with mild cardiomyopathy  -Continue optimal diuresis and midodrine for blood pressure support  -Continue to drain right pleural effusion every other day      2.  Haemophilus influenzae bacteremia with sepsis  -Optimal antibiotic therapy and pressor support as per ICU team     3. Diabetes, MGUS, BPH, GERD and other medical issues as per medicine    We will sign off at this point, thank you        The case is discussed with nursing staff, medicine team and family at the bedside    DELIA CARTER MD    
    Chemistry:  Recent Labs     02/15/24  0300 02/16/24  0420 02/17/24  0527    142 142   K 4.3 4.1 3.5*   CL 96* 99 102   CO2 29 35* 34*   GLUCOSE 316* 220* 244*   BUN 28* 26* 26*   CREATININE 0.7 0.7 0.6*   MG 2.5  --  2.1   ANIONGAP 16 8* 6*   LABGLOM >60 >60 >60   CALCIUM 8.6 8.6 8.8     Recent Labs     02/15/24  1625 02/15/24  2011 02/16/24  0712 02/16/24  0842 02/16/24  1132 02/16/24  1642 02/16/24 2018   AMMONIA  --   --   --  73*  --   --   --    POCGLU 372* 376* 223*  --  224* 224* 229*     ABG:  Lab Results   Component Value Date/Time    POCPH 7.400 02/14/2024 04:57 AM    POCPCO2 30.3 02/14/2024 04:57 AM    POCPO2 102.6 02/14/2024 04:57 AM    POCHCO3 18.8 02/14/2024 04:57 AM    NBEA 5.1 02/14/2024 04:57 AM    [id number] 98.0 02/14/2024 04:57 AM    FIO2 6.0 02/13/2024 06:01 PM     Lab Results   Component Value Date/Time    SPECIAL LAC 9ML 02/16/2024 01:33 PM    SPECIAL RFA 9ML 02/16/2024 01:33 PM     Lab Results   Component Value Date/Time    CULTURE NO GROWTH 12 HOURS 02/16/2024 01:33 PM    CULTURE NO GROWTH 12 HOURS 02/16/2024 01:33 PM       Radiology:  XR CHEST PORTABLE    Result Date: 2/16/2024  Interval enlargement of the large right pleural effusion associated with did probably compressive atelectasis of the right middle and right lower lobes. Stable left retrocardiac airspace opacity and small left pleural effusion.     XR CHEST PORTABLE    Result Date: 2/15/2024  1. Improving pulmonary vascular congestion. 2. Right worse than left bilateral pleural effusions are unchanged.     XR CHEST PORTABLE    Result Date: 2/14/2024  Stable exam since yesterday given differences in radiographic technique and patient positioning.     XR CHEST PORTABLE    Result Date: 2/13/2024  Left upper extremity PICC tip terminates in the region of the superior cavoatrial junction.     XR CHEST PORTABLE    Result Date: 2/13/2024  Moderate layering right pleural effusion unchanged.     CT CHEST ABDOMEN PELVIS W CONTRAST 
02/18/24  0333 02/18/24  0713 02/18/24  1118 02/18/24  1636 02/18/24  1911   AMMONIA 73*  --   --   --  54  --   --   --   --    POCGLU  --    < > 322* 276*  --  99 153* 197* 248*    < > = values in this interval not displayed.     ABG:  Lab Results   Component Value Date/Time    POCPH 7.400 02/14/2024 04:57 AM    POCPCO2 30.3 02/14/2024 04:57 AM    POCPO2 102.6 02/14/2024 04:57 AM    POCHCO3 18.8 02/14/2024 04:57 AM    NBEA 5.1 02/14/2024 04:57 AM    [id number] 98.0 02/14/2024 04:57 AM    FIO2 6.0 02/13/2024 06:01 PM     Lab Results   Component Value Date/Time    SPECIAL LAC 9ML 02/16/2024 01:33 PM    SPECIAL RFA 9ML 02/16/2024 01:33 PM     Lab Results   Component Value Date/Time    CULTURE NO GROWTH 2 DAYS 02/16/2024 01:33 PM    CULTURE NO GROWTH 2 DAYS 02/16/2024 01:33 PM       Radiology:  XR CHEST PORTABLE    Result Date: 2/18/2024  Overall, stable chest compared with 02/17/2024, 604 hours     XR CHEST PORTABLE    Result Date: 2/17/2024  1. Right-sided chest tube and left-sided PICC as above. 2. Low lung volume exam.  Stable multifocal airspace disease, right greater than left with moderate stable right-sided effusion.  Trace left effusion. 3. Stable cardiomegaly.     XR CHEST PORTABLE    Result Date: 2/16/2024  Interval enlargement of the large right pleural effusion associated with did probably compressive atelectasis of the right middle and right lower lobes. Stable left retrocardiac airspace opacity and small left pleural effusion.     XR CHEST PORTABLE    Result Date: 2/15/2024  1. Improving pulmonary vascular congestion. 2. Right worse than left bilateral pleural effusions are unchanged.     XR CHEST PORTABLE    Result Date: 2/14/2024  Stable exam since yesterday given differences in radiographic technique and patient positioning.     XR CHEST PORTABLE    Result Date: 2/13/2024  Left upper extremity PICC tip terminates in the region of the superior cavoatrial junction.     XR CHEST PORTABLE    Result Date: 
285* 233* 243* 357*    < > = values in this interval not displayed.     ABG:  Lab Results   Component Value Date/Time    POCPH 7.400 02/14/2024 04:57 AM    POCPCO2 30.3 02/14/2024 04:57 AM    POCPO2 102.6 02/14/2024 04:57 AM    POCHCO3 18.8 02/14/2024 04:57 AM    NBEA 5.1 02/14/2024 04:57 AM    [id number] 98.0 02/14/2024 04:57 AM    FIO2 6.0 02/13/2024 06:01 PM     Lab Results   Component Value Date/Time    SPECIAL RAC, 10ML, SS 02/12/2024 02:05 PM     Lab Results   Component Value Date/Time    CULTURE PENDING 02/14/2024 04:00 PM       Radiology:  XR CHEST PORTABLE    Result Date: 2/14/2024  Stable exam since yesterday given differences in radiographic technique and patient positioning.     XR CHEST PORTABLE    Result Date: 2/13/2024  Left upper extremity PICC tip terminates in the region of the superior cavoatrial junction.     XR CHEST PORTABLE    Result Date: 2/13/2024  Moderate layering right pleural effusion unchanged.     CT CHEST ABDOMEN PELVIS W CONTRAST Additional Contrast? Radiologist Recommendation    Result Date: 2/12/2024  1. Large simple right pleural effusion with extensive right-sided volume loss.  Mild dependent left basilar atelectasis.  No acute pulmonary infiltrate. 2. Hepatic cirrhosis with evidence of portal hypertension including splenomegaly, small quantity of ascites and third-spacing throughout the abdomen and pelvis. 3. Geographic areas of decreased attenuation in the spleen suggesting infarcts of indeterminate age. 4. Cholelithiasis.  Mild nonspecific gallbladder wall thickening. 5. Moderate stool throughout the colon consistent with constipation.  Colonic diverticulosis with no acute features. 6. Partially visualized fluid in the left scrotum, likely a large hydrocele.     XR CHEST PORTABLE    Result Date: 2/12/2024  Moderately large right pleural effusion increased     XR CHEST (2 VW)    Result Date: 2/12/2024  Right basilar chest tube with stable right pleural effusion.  No pneumothorax. 
357* 286* 347*    < > = values in this interval not displayed.       ABG:  Lab Results   Component Value Date/Time    POCPH 7.400 02/14/2024 04:57 AM    POCPCO2 30.3 02/14/2024 04:57 AM    POCPO2 102.6 02/14/2024 04:57 AM    POCHCO3 18.8 02/14/2024 04:57 AM    NBEA 5.1 02/14/2024 04:57 AM    [id number] 98.0 02/14/2024 04:57 AM    FIO2 6.0 02/13/2024 06:01 PM     Lab Results   Component Value Date/Time    SPECIAL RAC, 10ML, SS 02/12/2024 02:05 PM     Lab Results   Component Value Date/Time    CULTURE NO GROWTH 13 HOURS 02/14/2024 04:00 PM    CULTURE NO FUNGAL ELEMENTS SEEN 02/14/2024 04:00 PM       Radiology:  XR CHEST PORTABLE    Result Date: 2/14/2024  Stable exam since yesterday given differences in radiographic technique and patient positioning.     XR CHEST PORTABLE    Result Date: 2/13/2024  Left upper extremity PICC tip terminates in the region of the superior cavoatrial junction.     XR CHEST PORTABLE    Result Date: 2/13/2024  Moderate layering right pleural effusion unchanged.     CT CHEST ABDOMEN PELVIS W CONTRAST Additional Contrast? Radiologist Recommendation    Result Date: 2/12/2024  1. Large simple right pleural effusion with extensive right-sided volume loss.  Mild dependent left basilar atelectasis.  No acute pulmonary infiltrate. 2. Hepatic cirrhosis with evidence of portal hypertension including splenomegaly, small quantity of ascites and third-spacing throughout the abdomen and pelvis. 3. Geographic areas of decreased attenuation in the spleen suggesting infarcts of indeterminate age. 4. Cholelithiasis.  Mild nonspecific gallbladder wall thickening. 5. Moderate stool throughout the colon consistent with constipation.  Colonic diverticulosis with no acute features. 6. Partially visualized fluid in the left scrotum, likely a large hydrocele.     XR CHEST PORTABLE    Result Date: 2/12/2024  Moderately large right pleural effusion increased     XR CHEST (2 VW)    Result Date: 2/12/2024  Right basilar 
  Date Value Ref Range Status   02/12/2024 NEGATIVE NEGATIVE Final     WBC, UA   Date Value Ref Range Status   02/12/2024 TOO NUMEROUS TO COUNT 0 - 5 /HPF Final     Leukocyte Esterase, Urine   Date Value Ref Range Status   02/12/2024 LARGE (A) NEGATIVE Final       Imaging / Clinical Data :-   XR CHEST PORTABLE   Final Result   Moderately large right pleural effusion increased         CT CHEST ABDOMEN PELVIS W CONTRAST Additional Contrast? Radiologist Recommendation   Final Result   1. Large simple right pleural effusion with extensive right-sided volume   loss.  Mild dependent left basilar atelectasis.  No acute pulmonary   infiltrate.   2. Hepatic cirrhosis with evidence of portal hypertension including   splenomegaly, small quantity of ascites and third-spacing throughout the   abdomen and pelvis.   3. Geographic areas of decreased attenuation in the spleen suggesting   infarcts of indeterminate age.   4. Cholelithiasis.  Mild nonspecific gallbladder wall thickening.   5. Moderate stool throughout the colon consistent with constipation.  Colonic   diverticulosis with no acute features.   6. Partially visualized fluid in the left scrotum, likely a large hydrocele.         XR CHEST (2 VW)   Final Result   Right basilar chest tube with stable right pleural effusion.  No pneumothorax.         XR CHEST PORTABLE    (Results Pending)        Clinical Course : Worse than yesterday.  Assessment and Plan  :        Septic shock-H. influenzae bacteremia -on Levaquin per ID.  Add Rocephin.  Patient has prior history of persistent Serratia bacteremia with unknown source.  Negative CT chest, abdomen, pelvis.  Increased ProAmatine to 10 mg 3 times daily.  Continue to hold Lasix, aldactone, Inderal.  Add albumin.  ID and critical care consult.  Known nonalcoholic liver cirrhosis with ascites and portal hypertension - Hold inderal  Anion gap metabolic acidosis secondary to sepsis.  Start bicarb infusion.  Check BMP every 6 
  Final Result   Left upper extremity PICC tip terminates in the region of the superior   cavoatrial junction.         XR CHEST PORTABLE   Final Result   Moderate layering right pleural effusion unchanged.         XR CHEST PORTABLE   Final Result   Moderately large right pleural effusion increased         CT CHEST ABDOMEN PELVIS W CONTRAST Additional Contrast? Radiologist Recommendation   Final Result   1. Large simple right pleural effusion with extensive right-sided volume   loss.  Mild dependent left basilar atelectasis.  No acute pulmonary   infiltrate.   2. Hepatic cirrhosis with evidence of portal hypertension including   splenomegaly, small quantity of ascites and third-spacing throughout the   abdomen and pelvis.   3. Geographic areas of decreased attenuation in the spleen suggesting   infarcts of indeterminate age.   4. Cholelithiasis.  Mild nonspecific gallbladder wall thickening.   5. Moderate stool throughout the colon consistent with constipation.  Colonic   diverticulosis with no acute features.   6. Partially visualized fluid in the left scrotum, likely a large hydrocele.         XR CHEST (2 VW)   Final Result   Right basilar chest tube with stable right pleural effusion.  No pneumothorax.         XR CHEST PORTABLE    (Results Pending)        Clinical Course : Worse than yesterday.  Assessment and Plan  :        Septic shock -secondary to H-Influenzae bacteremia -resolved.  Treated with empiric Levaquin due to prior history of Serratia bacteremia and later switched to Rocephin.  Patient will need 2 weeks of IV antibiotics.  He has left arm PICC in place.    Negative CT chest, abdomen, pelvis.  Continue ProAmatine 10 mg 3 times daily.  Patient also on Solu-Cortef.  Can be decreased.  Management per critical care.  Resume low-dose Lasix, Aldactone.  Monitor blood pressure close..  Known nonalcoholic liver cirrhosis with ascites and portal hypertension -continue to hold inderal  Anion gap metabolic 
those of syntax and sound a like substitutions which may escape proof reading.  In such instances, actual meaning can be extrapolated by contextual diversion.

## 2024-02-19 NOTE — CARE COORDINATION
IMM letter provided to patient.  Patient offered four hours to make informed decision regarding appeal process; patient agreeable to discharge.

## 2024-02-19 NOTE — DISCHARGE SUMMARY
not apply route every 14 days     furosemide 40 MG tablet  Commonly known as: LASIX  Take 1 tablet by mouth daily     midodrine 5 MG tablet  Commonly known as: PROAMATINE  Take 1 tablet by mouth 3 times daily as needed (for sbp <100)     MULTI COMPLETE PO     potassium chloride 20 MEQ extended release tablet  Commonly known as: KLOR-CON M  Take 1 tablet by mouth daily     PROBIOTIC ACIDOPHILUS PO     Prodigy No Coding Blood Gluc strip  Generic drug: blood glucose test strips  USE TO TEST BLOOD GLUCOSE TWICE DAILY AND AS NEEDED FOR SIGNS OF HYPOGLYCEMIA     Prodigy Twist Top Lancets 28G Misc  USE 4 TIMES A DAY     propranolol 60 MG extended release capsule  Commonly known as: INDERAL LA  Take 1 capsule by mouth 2 times daily     Slow-Mag 71.5-119 MG Tbec tablet  Generic drug: magnesium cl-calcium carbonate  Take 2 tablets by mouth daily     spironolactone 100 MG tablet  Commonly known as: ALDACTONE  TAKE ONE TABLET BY MOUTH ONE TIME A DAY     TechLite Pen Needles 31G X 5 MM Misc  Generic drug: Insulin Pen Needle  1 each by Does not apply route daily     urea 15 g Pack packet  Commonly known as: URE-NA  Take 15 g by mouth Twice a Week     vitamin C 500 MG tablet  Commonly known as: ASCORBIC ACID     vitamin D 50 MCG (2000 UT) Caps capsule           * This list has 2 medication(s) that are the same as other medications prescribed for you. Read the directions carefully, and ask your doctor or other care provider to review them with you.                   Where to Get Your Medications        These medications were sent to [facility]/pharmacy #[id number] - [place], [place] - [address] - P [phone] - F [phone] [address] [facility] [address]      Phone: [phone]   TechLite Pen Needles 31G X 5 MM Misc  traMADol 50 MG tablet       These medications were sent to [id number] - [place], [place] - [address] - P [phone] - f [phone] [address] [address] [facility] [address]

## 2024-02-19 NOTE — PLAN OF CARE
Problem: Chronic Conditions and Co-morbidities  Goal: Patient's chronic conditions and co-morbidity symptoms are monitored and maintained or improved  2/16/2024 1740 by Sammi Arndt RN  Outcome: Progressing     Problem: Discharge Planning  Goal: Discharge to home or other facility with appropriate resources  2/16/2024 1740 by Sammi Arndt RN  Outcome: Progressing     Problem: Pain  Goal: Verbalizes/displays adequate comfort level or baseline comfort level  2/16/2024 1740 by Sammi Arndt RN  Outcome: Progressing     Problem: Safety - Adult  Goal: Free from fall injury  2/16/2024 1740 by Sammi Arndt RN  Outcome: Progressing     Problem: Nutrition Deficit:  Goal: Optimize nutritional status  2/16/2024 1740 by Sammi Arndt RN  Outcome: Progressing     Problem: Skin/Tissue Integrity  Goal: Absence of new skin breakdown  Description: 1.  Monitor for areas of redness and/or skin breakdown  2.  Assess vascular access sites hourly  3.  Every 4-6 hours minimum:  Change oxygen saturation probe site  4.  Every 4-6 hours:  If on nasal continuous positive airway pressure, respiratory therapy assess nares and determine need for appliance change or resting period.  2/16/2024 1740 by Sammi Arndt RN  Outcome: Progressing     Problem: Respiratory - Adult  Goal: Achieves optimal ventilation and oxygenation  2/16/2024 1740 by Sammi Arndt RN  Outcome: Progressing     Problem: Cardiovascular - Adult  Goal: Maintains optimal cardiac output and hemodynamic stability  2/16/2024 1740 by Sammi Arndt RN  Outcome: Progressing     Problem: Skin/Tissue Integrity - Adult  Goal: Skin integrity remains intact  2/16/2024 1740 by Sammi Arndt RN  Outcome: Progressing     Problem: Skin/Tissue Integrity - Adult  Goal: Incisions, wounds, or drain sites healing without S/S of infection  2/16/2024 1740 by Sammi Arndt RN  Outcome: Progressing     Problem: Skin/Tissue Integrity - Adult  Goal: Oral mucous membranes remain 
  Problem: Chronic Conditions and Co-morbidities  Goal: Patient's chronic conditions and co-morbidity symptoms are monitored and maintained or improved  2/19/2024 0451 by Vick Ledesma RN  Outcome: Progressing  2/18/2024 2009 by Halima Nix RN  Outcome: Progressing     Problem: Discharge Planning  Goal: Discharge to home or other facility with appropriate resources  2/19/2024 0451 by Vick Ledesma RN  Outcome: Progressing  2/18/2024 2009 by Halima Nix RN  Outcome: Progressing     Problem: Pain  Goal: Verbalizes/displays adequate comfort level or baseline comfort level  2/19/2024 0451 by Vick Ledesma RN  Outcome: Progressing  2/18/2024 2009 by Halima Nix RN  Outcome: Progressing     Problem: Safety - Adult  Goal: Free from fall injury  2/19/2024 0451 by Vick Ledesma RN  Outcome: Progressing  2/18/2024 2009 by Halima Nix RN  Outcome: Progressing     Problem: Nutrition Deficit:  Goal: Optimize nutritional status  2/19/2024 0451 by Vick Ledesma RN  Outcome: Progressing  2/18/2024 2009 by Halima Nix RN  Outcome: Progressing     Problem: Skin/Tissue Integrity  Goal: Absence of new skin breakdown  Description: 1.  Monitor for areas of redness and/or skin breakdown  2.  Assess vascular access sites hourly  3.  Every 4-6 hours minimum:  Change oxygen saturation probe site  4.  Every 4-6 hours:  If on nasal continuous positive airway pressure, respiratory therapy assess nares and determine need for appliance change or resting period.  2/19/2024 0451 by Vick Ledesma RN  Outcome: Progressing  2/18/2024 2009 by Halima Nix RN  Outcome: Progressing     Problem: Respiratory - Adult  Goal: Achieves optimal ventilation and oxygenation  2/19/2024 0451 by Vick Ledesma RN  Outcome: Progressing  2/18/2024 2009 by Halima Nix RN  Outcome: Progressing     Problem: Cardiovascular - Adult  Goal: Maintains optimal cardiac output 
  Problem: Chronic Conditions and Co-morbidities  Goal: Patient's chronic conditions and co-morbidity symptoms are monitored and maintained or improved  2/19/2024 1215 by Mike Luis RN  Outcome: Completed  2/19/2024 0451 by Vick Ledesma RN  Outcome: Progressing     Problem: Discharge Planning  Goal: Discharge to home or other facility with appropriate resources  2/19/2024 1215 by Mike Luis RN  Outcome: Completed  2/19/2024 0451 by Vick Ledesma RN  Outcome: Progressing     Problem: Pain  Goal: Verbalizes/displays adequate comfort level or baseline comfort level  2/19/2024 1215 by Mike Luis RN  Outcome: Completed  2/19/2024 0451 by Vick Ledesma RN  Outcome: Progressing     Problem: Safety - Adult  Goal: Free from fall injury  2/19/2024 1215 by Mike Luis RN  Outcome: Completed  2/19/2024 0451 by Vick Ledesma RN  Outcome: Progressing     Problem: Nutrition Deficit:  Goal: Optimize nutritional status  2/19/2024 1215 by Mike Luis RN  Outcome: Completed  2/19/2024 0451 by Vick Ledesma RN  Outcome: Progressing     Problem: Skin/Tissue Integrity  Goal: Absence of new skin breakdown  Description: 1.  Monitor for areas of redness and/or skin breakdown  2.  Assess vascular access sites hourly  3.  Every 4-6 hours minimum:  Change oxygen saturation probe site  4.  Every 4-6 hours:  If on nasal continuous positive airway pressure, respiratory therapy assess nares and determine need for appliance change or resting period.  2/19/2024 1215 by Mike Luis RN  Outcome: Completed  2/19/2024 0451 by Vick Ledesma RN  Outcome: Progressing     Problem: Respiratory - Adult  Goal: Achieves optimal ventilation and oxygenation  2/19/2024 1215 by Mike Luis RN  Outcome: Completed  2/19/2024 0451 by Vick Ledesma RN  Outcome: Progressing     Problem: Cardiovascular - Adult  Goal: Maintains optimal cardiac output and 
  Problem: Chronic Conditions and Co-morbidities  Goal: Patient's chronic conditions and co-morbidity symptoms are monitored and maintained or improved  Outcome: Progressing     Problem: Discharge Planning  Goal: Discharge to home or other facility with appropriate resources  Outcome: Progressing     Problem: Pain  Goal: Verbalizes/displays adequate comfort level or baseline comfort level  Outcome: Progressing     Problem: Safety - Adult  Goal: Free from fall injury  Outcome: Progressing     Problem: Nutrition Deficit:  Goal: Optimize nutritional status  Outcome: Progressing     Problem: Skin/Tissue Integrity  Goal: Absence of new skin breakdown  Description: 1.  Monitor for areas of redness and/or skin breakdown  2.  Assess vascular access sites hourly  3.  Every 4-6 hours minimum:  Change oxygen saturation probe site  4.  Every 4-6 hours:  If on nasal continuous positive airway pressure, respiratory therapy assess nares and determine need for appliance change or resting period.  Outcome: Progressing     Problem: Respiratory - Adult  Goal: Achieves optimal ventilation and oxygenation  Outcome: Progressing     Problem: Cardiovascular - Adult  Goal: Maintains optimal cardiac output and hemodynamic stability  Outcome: Progressing     Problem: Skin/Tissue Integrity - Adult  Goal: Skin integrity remains intact  Outcome: Progressing     Problem: Skin/Tissue Integrity - Adult  Goal: Incisions, wounds, or drain sites healing without S/S of infection  Outcome: Progressing     Problem: Skin/Tissue Integrity - Adult  Goal: Oral mucous membranes remain intact  Outcome: Progressing     Problem: Genitourinary - Adult  Goal: Urinary catheter remains patent  Outcome: Progressing     Problem: Infection - Adult  Goal: Absence of infection during hospitalization  Outcome: Progressing     
  Problem: Chronic Conditions and Co-morbidities  Goal: Patient's chronic conditions and co-morbidity symptoms are monitored and maintained or improved  Outcome: Progressing     Problem: Discharge Planning  Goal: Discharge to home or other facility with appropriate resources  Outcome: Progressing     Problem: Pain  Goal: Verbalizes/displays adequate comfort level or baseline comfort level  Outcome: Progressing     Problem: Safety - Adult  Goal: Free from fall injury  Outcome: Progressing  Flowsheets (Taken 2/13/2024 1900 by Jack Gann RN)  Free From Fall Injury:   Instruct family/caregiver on patient safety   Based on caregiver fall risk screen, instruct family/caregiver to ask for assistance with transferring infant if caregiver noted to have fall risk factors     Problem: Nutrition Deficit:  Goal: Optimize nutritional status  Outcome: Progressing     Problem: Skin/Tissue Integrity  Goal: Absence of new skin breakdown  Description: 1.  Monitor for areas of redness and/or skin breakdown  2.  Assess vascular access sites hourly  3.  Every 4-6 hours minimum:  Change oxygen saturation probe site  4.  Every 4-6 hours:  If on nasal continuous positive airway pressure, respiratory therapy assess nares and determine need for appliance change or resting period.  Outcome: Progressing     
  Problem: Chronic Conditions and Co-morbidities  Goal: Patient's chronic conditions and co-morbidity symptoms are monitored and maintained or improved  Outcome: Progressing  Flowsheets (Taken 2/15/2024 2000)  Care Plan - Patient's Chronic Conditions and Co-Morbidity Symptoms are Monitored and Maintained or Improved: Monitor and assess patient's chronic conditions and comorbid symptoms for stability, deterioration, or improvement     Problem: Discharge Planning  Goal: Discharge to home or other facility with appropriate resources  Outcome: Progressing  Flowsheets (Taken 2/15/2024 2000)  Discharge to home or other facility with appropriate resources:   Identify barriers to discharge with patient and caregiver   Arrange for needed discharge resources and transportation as appropriate   Identify discharge learning needs (meds, wound care, etc)     Problem: Pain  Goal: Verbalizes/displays adequate comfort level or baseline comfort level  Outcome: Progressing     Problem: Safety - Adult  Goal: Free from fall injury  Outcome: Progressing  Flowsheets (Taken 2/15/2024 2050)  Free From Fall Injury: Instruct family/caregiver on patient safety     Problem: Nutrition Deficit:  Goal: Optimize nutritional status  Outcome: Progressing     Problem: Skin/Tissue Integrity  Goal: Absence of new skin breakdown  Description: 1.  Monitor for areas of redness and/or skin breakdown  2.  Assess vascular access sites hourly  3.  Every 4-6 hours minimum:  Change oxygen saturation probe site  4.  Every 4-6 hours:  If on nasal continuous positive airway pressure, respiratory therapy assess nares and determine need for appliance change or resting period.  Outcome: Progressing     Problem: Respiratory - Adult  Goal: Achieves optimal ventilation and oxygenation  Outcome: Progressing  Flowsheets (Taken 2/15/2024 2000)  Achieves optimal ventilation and oxygenation:   Assess for changes in respiratory status   Assess for changes in mentation and 
  Problem: Chronic Conditions and Co-morbidities  Goal: Patient's chronic conditions and co-morbidity symptoms are monitored and maintained or improved  Recent Flowsheet Documentation  Taken 2/15/2024 0800 by Lucretia Myers RN  Care Plan - Patient's Chronic Conditions and Co-Morbidity Symptoms are Monitored and Maintained or Improved: Monitor and assess patient's chronic conditions and comorbid symptoms for stability, deterioration, or improvement     Problem: Discharge Planning  Goal: Discharge to home or other facility with appropriate resources  Recent Flowsheet Documentation  Taken 2/15/2024 0800 by Lucretia Myers RN  Discharge to home or other facility with appropriate resources: Identify barriers to discharge with patient and caregiver     Problem: Skin/Tissue Integrity - Adult  Goal: Skin integrity remains intact  Recent Flowsheet Documentation  Taken 2/15/2024 0800 by Lucretia Myers RN  Skin Integrity Remains Intact: Monitor for areas of redness and/or skin breakdown  Goal: Incisions, wounds, or drain sites healing without S/S of infection  Recent Flowsheet Documentation  Taken 2/15/2024 0800 by Lucretia Myers RN  Incisions, Wounds, or Drain Sites Healing Without Sign and Symptoms of Infection: ADMISSION and DAILY: Assess and document risk factors for pressure ulcer development  Goal: Oral mucous membranes remain intact  Recent Flowsheet Documentation  Taken 2/15/2024 0800 by Lucretia Myers RN  Oral Mucous Membranes Remain Intact: Assess oral mucosa and hygiene practices     Problem: Infection - Adult  Goal: Absence of infection during hospitalization  Recent Flowsheet Documentation  Taken 2/15/2024 0800 by Lucretia Myers RN  Absence of infection during hospitalization: Assess and monitor for signs and symptoms of infection     
Candelario Bliss Jr. requires a bedside commode due to being confined to a single room and due to medications has fecal urgency, and is physically incapable of utilizing regular toilet facilities. Current body weight: Weight - Scale: 67.8 kg (149 lb 7.6 oz).     Naresh Knight, DO    
Progressing     Problem: ABCDS Injury Assessment  Goal: Absence of physical injury  Outcome: Progressing  Flowsheets (Taken 2/18/2024 1900 by Jack Gann, RN)  Absence of Physical Injury: Implement safety measures based on patient assessment     
Progressing  Flowsheets  Taken 2/16/2024 2329  Incisions, Wounds, or Drain Sites Healing Without Sign and Symptoms of Infection: ADMISSION and DAILY: Assess and document risk factors for pressure ulcer development  Taken 2/16/2024 2000  Incisions, Wounds, or Drain Sites Healing Without Sign and Symptoms of Infection:   ADMISSION and DAILY: Assess and document risk factors for pressure ulcer development   TWICE DAILY: Assess and document skin integrity  Goal: Oral mucous membranes remain intact  2/17/2024 1109 by Tyra Pérez RN  Outcome: Progressing  2/17/2024 0236 by Loc Wolf RN  Outcome: Progressing  Flowsheets  Taken 2/16/2024 2329  Oral Mucous Membranes Remain Intact: Assess oral mucosa and hygiene practices  Taken 2/16/2024 2000  Oral Mucous Membranes Remain Intact:   Assess oral mucosa and hygiene practices   Implement preventative oral hygiene regimen     Problem: Genitourinary - Adult  Goal: Urinary catheter remains patent  2/17/2024 1109 by Tyra Pérez RN  Outcome: Progressing  2/17/2024 0236 by Loc Wolf RN  Outcome: Progressing  Flowsheets (Taken 2/16/2024 2000)  Urinary catheter remains patent:   Assess patency of urinary catheter   Irrigate catheter per Licensed Independent Practitioner order if indicated and notify Licensed Independent Practitioner if unable to irrigate     Problem: Infection - Adult  Goal: Absence of infection during hospitalization  2/17/2024 1109 by Tyra Pérez RN  Outcome: Progressing  2/17/2024 0236 by Loc Wolf RN  Outcome: Progressing  Flowsheets (Taken 2/16/2024 2000)  Absence of infection during hospitalization:   Assess and monitor for signs and symptoms of infection   Monitor lab/diagnostic results     Problem: ABCDS Injury Assessment  Goal: Absence of physical injury  2/17/2024 1109 by Tyra Pérez RN  Outcome: Progressing  2/17/2024 0236 by Loc Wolf RN  Outcome: Progressing     
Symptoms of Infection: ADMISSION and DAILY: Assess and document risk factors for pressure ulcer development  Taken 2/16/2024 2000  Incisions, Wounds, or Drain Sites Healing Without Sign and Symptoms of Infection:   ADMISSION and DAILY: Assess and document risk factors for pressure ulcer development   TWICE DAILY: Assess and document skin integrity  2/16/2024 1740 by Sammi Arndt RN  Outcome: Progressing  Goal: Oral mucous membranes remain intact  2/17/2024 0236 by Loc Wolf RN  Outcome: Progressing  Flowsheets  Taken 2/16/2024 2329  Oral Mucous Membranes Remain Intact: Assess oral mucosa and hygiene practices  Taken 2/16/2024 2000  Oral Mucous Membranes Remain Intact:   Assess oral mucosa and hygiene practices   Implement preventative oral hygiene regimen  2/16/2024 1740 by Sammi Arndt RN  Outcome: Progressing     Problem: Genitourinary - Adult  Goal: Urinary catheter remains patent  2/17/2024 0236 by Loc Wolf RN  Outcome: Progressing  Flowsheets (Taken 2/16/2024 2000)  Urinary catheter remains patent:   Assess patency of urinary catheter   Irrigate catheter per Licensed Independent Practitioner order if indicated and notify Licensed Independent Practitioner if unable to irrigate  2/16/2024 1740 by Sammi Arndt RN  Outcome: Progressing     Problem: Infection - Adult  Goal: Absence of infection during hospitalization  2/17/2024 0236 by Loc Wolf RN  Outcome: Progressing  Flowsheets (Taken 2/16/2024 2000)  Absence of infection during hospitalization:   Assess and monitor for signs and symptoms of infection   Monitor lab/diagnostic results  2/16/2024 1740 by Sammi Arndt RN  Outcome: Progressing     Problem: ABCDS Injury Assessment  Goal: Absence of physical injury  2/17/2024 0236 by Loc Wolf RN  Outcome: Progressing  2/16/2024 1740 by Sammi Arndt RN  Outcome: Progressing     
patent:   Assess patency of urinary catheter   Irrigate catheter per Licensed Independent Practitioner order if indicated and notify Licensed Independent Practitioner if unable to irrigate     Problem: Infection - Adult  Goal: Absence of infection during hospitalization  Outcome: Progressing  Flowsheets (Taken 2/17/2024 2000)  Absence of infection during hospitalization:   Assess and monitor for signs and symptoms of infection   Monitor lab/diagnostic results     Problem: ABCDS Injury Assessment  Goal: Absence of physical injury  Outcome: Progressing

## 2024-02-20 LAB
MICROORGANISM SPEC CULT: NORMAL
MICROORGANISM/AGENT SPEC: NORMAL
SPECIMEN DESCRIPTION: NORMAL

## 2024-02-21 LAB
MICROORGANISM SPEC CULT: NORMAL
MICROORGANISM SPEC CULT: NORMAL
SERVICE CMNT-IMP: NORMAL
SERVICE CMNT-IMP: NORMAL
SPECIMEN DESCRIPTION: NORMAL
SPECIMEN DESCRIPTION: NORMAL

## 2024-02-26 LAB
MICROORGANISM SPEC CULT: NORMAL
MICROORGANISM/AGENT SPEC: NORMAL
SPECIMEN DESCRIPTION: NORMAL

## 2024-02-27 ENCOUNTER — OFFICE VISIT (OUTPATIENT)
Dept: FAMILY MEDICINE CLINIC | Age: 74
End: 2024-02-27
Payer: MEDICARE

## 2024-02-27 VITALS
WEIGHT: 122 LBS | BODY MASS INDEX: 16.55 KG/M2 | OXYGEN SATURATION: 99 % | HEART RATE: 63 BPM | TEMPERATURE: 98.2 F | SYSTOLIC BLOOD PRESSURE: 118 MMHG | DIASTOLIC BLOOD PRESSURE: 82 MMHG

## 2024-02-27 DIAGNOSIS — R65.21: ICD-10-CM

## 2024-02-27 DIAGNOSIS — D69.6 THROMBOCYTOPENIA (HCC): ICD-10-CM

## 2024-02-27 DIAGNOSIS — E83.42 HYPOMAGNESEMIA: ICD-10-CM

## 2024-02-27 DIAGNOSIS — E43 SEVERE MALNUTRITION (HCC): ICD-10-CM

## 2024-02-27 DIAGNOSIS — Z09 HOSPITAL DISCHARGE FOLLOW-UP: Primary | ICD-10-CM

## 2024-02-27 DIAGNOSIS — J90 PLEURAL EFFUSION ON RIGHT: ICD-10-CM

## 2024-02-27 DIAGNOSIS — D61.9 APLASTIC ANEMIA, UNSPECIFIED (HCC): ICD-10-CM

## 2024-02-27 DIAGNOSIS — A41.3: ICD-10-CM

## 2024-02-27 DIAGNOSIS — G93.41: ICD-10-CM

## 2024-02-27 DIAGNOSIS — D61.818 PANCYTOPENIA (HCC): ICD-10-CM

## 2024-02-27 DIAGNOSIS — C61 PROSTATE CANCER (HCC): ICD-10-CM

## 2024-02-27 PROCEDURE — 1036F TOBACCO NON-USER: CPT

## 2024-02-27 PROCEDURE — 99214 OFFICE O/P EST MOD 30 MIN: CPT

## 2024-02-27 PROCEDURE — 1111F DSCHRG MED/CURRENT MED MERGE: CPT

## 2024-02-27 PROCEDURE — G8484 FLU IMMUNIZE NO ADMIN: HCPCS

## 2024-02-27 PROCEDURE — 1123F ACP DISCUSS/DSCN MKR DOCD: CPT

## 2024-02-27 PROCEDURE — 3017F COLORECTAL CA SCREEN DOC REV: CPT

## 2024-02-27 PROCEDURE — G8427 DOCREV CUR MEDS BY ELIG CLIN: HCPCS

## 2024-02-27 PROCEDURE — G8419 CALC BMI OUT NRM PARAM NOF/U: HCPCS

## 2024-02-27 SDOH — ECONOMIC STABILITY: INCOME INSECURITY: HOW HARD IS IT FOR YOU TO PAY FOR THE VERY BASICS LIKE FOOD, HOUSING, MEDICAL CARE, AND HEATING?: NOT HARD AT ALL

## 2024-02-27 SDOH — ECONOMIC STABILITY: FOOD INSECURITY: WITHIN THE PAST 12 MONTHS, THE FOOD YOU BOUGHT JUST DIDN'T LAST AND YOU DIDN'T HAVE MONEY TO GET MORE.: NEVER TRUE

## 2024-02-27 SDOH — ECONOMIC STABILITY: FOOD INSECURITY: WITHIN THE PAST 12 MONTHS, YOU WORRIED THAT YOUR FOOD WOULD RUN OUT BEFORE YOU GOT MONEY TO BUY MORE.: NEVER TRUE

## 2024-02-27 ASSESSMENT — PATIENT HEALTH QUESTIONNAIRE - PHQ9
SUM OF ALL RESPONSES TO PHQ QUESTIONS 1-9: 0
1. LITTLE INTEREST OR PLEASURE IN DOING THINGS: 0
SUM OF ALL RESPONSES TO PHQ QUESTIONS 1-9: 0
SUM OF ALL RESPONSES TO PHQ QUESTIONS 1-9: 0
SUM OF ALL RESPONSES TO PHQ9 QUESTIONS 1 & 2: 0
SUM OF ALL RESPONSES TO PHQ QUESTIONS 1-9: 0
2. FEELING DOWN, DEPRESSED OR HOPELESS: 0

## 2024-02-27 NOTE — PROGRESS NOTES
Post-Discharge Transitional Care  Follow Up      Candelario Bliss    YOB: 1950    Date of Office Visit:  2/27/2024  Date of Hospital Admission: 2/12/24  Date of Hospital Discharge: 2/19/24  Risk of hospital readmission (high >=14%. Medium >=10%) :Readmission Risk Score: 19.1      Care management risk score Rising risk (score 2-5) and Complex Care (Scores >=6): No Risk Score On File     Non face to face  following discharge, date last encounter closed (first attempt may have been earlier): *No documented post hospital discharge outreach found in the last 14 days    Call initiated 2 business days of discharge: *No response recorded in the last 14 days    ASSESSMENT/PLAN:   Hospital discharge follow-up  -     [place] DISCHARGE MEDS RECONCILED W/ CURRENT OUTPATIENT MED LIST    Sepsis due to Haemophilus influenzae with encephalopathy and septic shock (HCC)  Pleural effusion on right  Pancytopenia (HCC)  Severe malnutrition (HCC)  Aplastic anemia, unspecified (HCC)  Thrombocytopenia (HCC)  Hypomagnesemia  -     Magnesium; Standing  Prostate cancer (HCC)  -upcoming f/u w/ heme, ID, nephro, GI  -will add standing mag order to labs for eval    Medical Decision Making: moderate complexity  Return if symptoms worsen or fail to improve, for keep scheduled appt.    On this date 2/27/2024 I have spent 30 minutes reviewing previous notes, test results and face to face with the patient discussing the diagnosis and importance of compliance with the treatment plan as well as documenting on the day of the visit.       Subjective:   HPI:  Follow up of Hospital problems/diagnosis(es):     See above    Inpatient course: Discharge summary reviewed- see chart.    Interval history/Current status:     Overall pt is doing well, using walker for stability  Mentally clearing daily per wife  They have had to reduce lactulose dosing d/t GI distress, improved on minimal dose  Initially had to increase lantus to 20 units to cover d/t

## 2024-03-01 ENCOUNTER — HOSPITAL ENCOUNTER (OUTPATIENT)
Age: 74
Setting detail: SPECIMEN
Discharge: HOME | End: 2024-03-01
Payer: MEDICARE

## 2024-03-01 DIAGNOSIS — D50.8 OTHER IRON DEFICIENCY ANEMIA: ICD-10-CM

## 2024-03-01 DIAGNOSIS — I85.00 IDIOPATHIC ESOPHAGEAL VARICES WITHOUT BLEEDING (HCC): ICD-10-CM

## 2024-03-01 DIAGNOSIS — C61 PROSTATE CANCER (HCC): ICD-10-CM

## 2024-03-01 DIAGNOSIS — D47.2 MGUS (MONOCLONAL GAMMOPATHY OF UNKNOWN SIGNIFICANCE): Primary | ICD-10-CM

## 2024-03-01 DIAGNOSIS — D47.2 MGUS (MONOCLONAL GAMMOPATHY OF UNKNOWN SIGNIFICANCE): ICD-10-CM

## 2024-03-01 DIAGNOSIS — D50.9 MICROCYTIC HYPOCHROMIC ANEMIA: Primary | ICD-10-CM

## 2024-03-01 DIAGNOSIS — D69.6 THROMBOCYTOPENIA (HCC): ICD-10-CM

## 2024-03-01 DIAGNOSIS — K74.69 OTHER CIRRHOSIS OF LIVER (HCC): ICD-10-CM

## 2024-03-01 DIAGNOSIS — E83.42 HYPOMAGNESEMIA: ICD-10-CM

## 2024-03-01 DIAGNOSIS — D50.9 MICROCYTIC HYPOCHROMIC ANEMIA: ICD-10-CM

## 2024-03-01 DIAGNOSIS — N18.1 BENIGN HYPERTENSION WITH CKD (CHRONIC KIDNEY DISEASE) STAGE I: ICD-10-CM

## 2024-03-01 DIAGNOSIS — R18.8 CIRRHOSIS OF LIVER WITH ASCITES, UNSPECIFIED HEPATIC CIRRHOSIS TYPE (HCC): ICD-10-CM

## 2024-03-01 DIAGNOSIS — I12.9 BENIGN HYPERTENSION WITH CKD (CHRONIC KIDNEY DISEASE) STAGE I: ICD-10-CM

## 2024-03-01 DIAGNOSIS — K74.60 CIRRHOSIS OF LIVER WITH ASCITES, UNSPECIFIED HEPATIC CIRRHOSIS TYPE (HCC): ICD-10-CM

## 2024-03-01 LAB
AFP SERPL-MCNC: 9.3 UG/L
ALBUMIN SERPL-MCNC: 2.7 G/DL (ref 3.5–5.2)
ALBUMIN/GLOB SERPL: 0.6 {RATIO} (ref 1–2.5)
ALP SERPL-CCNC: 305 U/L (ref 40–129)
ALT SERPL-CCNC: 68 U/L (ref 5–41)
ANION GAP SERPL CALCULATED.3IONS-SCNC: 5 MMOL/L (ref 9–17)
ANION GAP SERPL CALCULATED.3IONS-SCNC: 5 MMOL/L (ref 9–17)
AST SERPL-CCNC: 64 U/L
BASOPHILS # BLD: 0.04 K/UL (ref 0–0.2)
BASOPHILS # BLD: 0.04 K/UL (ref 0–0.2)
BASOPHILS NFR BLD: 2 %
BASOPHILS NFR BLD: 2 %
BILIRUB DIRECT SERPL-MCNC: 0.5 MG/DL
BILIRUB INDIRECT SERPL-MCNC: 1.3 MG/DL (ref 0–1)
BILIRUB SERPL-MCNC: 1.8 MG/DL (ref 0.3–1.2)
BUN SERPL-MCNC: 15 MG/DL (ref 8–23)
BUN SERPL-MCNC: 15 MG/DL (ref 8–23)
CALCIUM SERPL-MCNC: 8.7 MG/DL (ref 8.6–10.4)
CHLORIDE SERPL-SCNC: 98 MMOL/L (ref 98–107)
CHLORIDE SERPL-SCNC: 98 MMOL/L (ref 98–107)
CO2 SERPL-SCNC: 24 MMOL/L (ref 20–31)
CO2 SERPL-SCNC: 24 MMOL/L (ref 20–31)
CREAT SERPL-MCNC: 0.6 MG/DL (ref 0.7–1.2)
CREAT SERPL-MCNC: 0.6 MG/DL (ref 0.7–1.2)
EOSINOPHIL # BLD: 0.04 K/UL (ref 0–0.4)
EOSINOPHIL # BLD: 0.04 K/UL (ref 0–0.4)
EOSINOPHILS RELATIVE PERCENT: 2 % (ref 1–4)
EOSINOPHILS RELATIVE PERCENT: 2 % (ref 1–4)
ERYTHROCYTE [DISTWIDTH] IN BLOOD BY AUTOMATED COUNT: 20.5 % (ref 11.8–14.4)
ERYTHROCYTE [DISTWIDTH] IN BLOOD BY AUTOMATED COUNT: 20.5 % (ref 11.8–14.4)
FOLATE SERPL-MCNC: 15 NG/ML
FREE KAPPA/LAMBDA RATIO: 0.82 (ref 0.26–1.65)
GFR SERPL CREATININE-BSD FRML MDRD: >60 ML/MIN/1.73M2
GFR SERPL CREATININE-BSD FRML MDRD: >60 ML/MIN/1.73M2
GLUCOSE SERPL-MCNC: 119 MG/DL (ref 70–99)
HCT VFR BLD AUTO: 43.2 % (ref 40.7–50.3)
HCT VFR BLD AUTO: 43.2 % (ref 40.7–50.3)
HGB BLD-MCNC: 13.7 G/DL (ref 13–17)
HGB BLD-MCNC: 13.7 G/DL (ref 13–17)
IGA SERPL-MCNC: 1267 MG/DL (ref 70–400)
IGG SERPL-MCNC: 1945 MG/DL (ref 700–1600)
IGM SERPL-MCNC: 203 MG/DL (ref 40–230)
IMM GRANULOCYTES # BLD AUTO: 0 K/UL (ref 0–0.3)
IMM GRANULOCYTES # BLD AUTO: 0 K/UL (ref 0–0.3)
IMM GRANULOCYTES NFR BLD: 0 %
IMM GRANULOCYTES NFR BLD: 0 %
IRON SATN MFR SERPL: 38 % (ref 20–55)
IRON SERPL-MCNC: 90 UG/DL (ref 59–158)
KAPPA LC FREE SER-MCNC: 80.5 MG/L (ref 3.7–19.4)
LAMBDA LC FREE SERPL-MCNC: 98 MG/L (ref 5.7–26.3)
LYMPHOCYTES NFR BLD: 0.43 K/UL (ref 1–4.8)
LYMPHOCYTES NFR BLD: 0.43 K/UL (ref 1–4.8)
LYMPHOCYTES RELATIVE PERCENT: 24 % (ref 24–44)
LYMPHOCYTES RELATIVE PERCENT: 24 % (ref 24–44)
MAGNESIUM SERPL-MCNC: 1.6 MG/DL (ref 1.6–2.6)
MCH RBC QN AUTO: 26.4 PG (ref 25.2–33.5)
MCH RBC QN AUTO: 26.4 PG (ref 25.2–33.5)
MCHC RBC AUTO-ENTMCNC: 31.7 G/DL (ref 28.4–34.8)
MCHC RBC AUTO-ENTMCNC: 31.7 G/DL (ref 28.4–34.8)
MCV RBC AUTO: 83.2 FL (ref 82.6–102.9)
MCV RBC AUTO: 83.2 FL (ref 82.6–102.9)
MONOCYTES NFR BLD: 0.65 K/UL (ref 0.2–0.8)
MONOCYTES NFR BLD: 0.65 K/UL (ref 0.2–0.8)
MONOCYTES NFR BLD: 36 % (ref 1–7)
MONOCYTES NFR BLD: 36 % (ref 1–7)
MORPHOLOGY: ABNORMAL
MORPHOLOGY: ABNORMAL
NEUTROPHILS NFR BLD: 36 % (ref 36–66)
NEUTROPHILS NFR BLD: 36 % (ref 36–66)
NEUTS SEG NFR BLD: 0.64 K/UL (ref 1.8–7.7)
NEUTS SEG NFR BLD: 0.64 K/UL (ref 1.8–7.7)
NRBC BLD-RTO: 0 PER 100 WBC
NRBC BLD-RTO: 0 PER 100 WBC
PLATELET # BLD AUTO: 66 K/UL (ref 138–453)
PLATELET # BLD AUTO: 66 K/UL (ref 138–453)
PMV BLD AUTO: ABNORMAL FL (ref 8.1–13.5)
PMV BLD AUTO: ABNORMAL FL (ref 8.1–13.5)
POTASSIUM SERPL-SCNC: 5 MMOL/L (ref 3.7–5.3)
POTASSIUM SERPL-SCNC: 5 MMOL/L (ref 3.7–5.3)
PROT SERPL-MCNC: 7.1 G/DL (ref 6.4–8.3)
RBC # BLD AUTO: 5.19 M/UL (ref 4.21–5.77)
RBC # BLD AUTO: 5.19 M/UL (ref 4.21–5.77)
SODIUM SERPL-SCNC: 127 MMOL/L (ref 135–144)
SODIUM SERPL-SCNC: 127 MMOL/L (ref 135–144)
TIBC SERPL-MCNC: 240 UG/DL (ref 250–450)
UNSATURATED IRON BINDING CAPACITY: 150 UG/DL (ref 112–347)
VIT B12 SERPL-MCNC: >2000 PG/ML (ref 232–1245)
WBC OTHER # BLD: 1.8 K/UL (ref 3.5–11.3)
WBC OTHER # BLD: 1.8 K/UL (ref 3.5–11.3)

## 2024-03-01 PROCEDURE — 82746 ASSAY OF FOLIC ACID SERUM: CPT

## 2024-03-01 PROCEDURE — 82565 ASSAY OF CREATININE: CPT

## 2024-03-01 PROCEDURE — 82105 ALPHA-FETOPROTEIN SERUM: CPT

## 2024-03-01 PROCEDURE — 83550 IRON BINDING TEST: CPT

## 2024-03-01 PROCEDURE — 83521 IG LIGHT CHAINS FREE EACH: CPT

## 2024-03-01 PROCEDURE — 85025 COMPLETE CBC W/AUTO DIFF WBC: CPT

## 2024-03-01 PROCEDURE — 84520 ASSAY OF UREA NITROGEN: CPT

## 2024-03-01 PROCEDURE — 36415 COLL VENOUS BLD VENIPUNCTURE: CPT

## 2024-03-01 PROCEDURE — 80076 HEPATIC FUNCTION PANEL: CPT

## 2024-03-01 PROCEDURE — 82607 VITAMIN B-12: CPT

## 2024-03-01 PROCEDURE — 80051 ELECTROLYTE PANEL: CPT

## 2024-03-01 PROCEDURE — 83735 ASSAY OF MAGNESIUM: CPT

## 2024-03-01 PROCEDURE — 82784 ASSAY IGA/IGD/IGG/IGM EACH: CPT

## 2024-03-01 PROCEDURE — 80048 BASIC METABOLIC PNL TOTAL CA: CPT

## 2024-03-01 PROCEDURE — 83540 ASSAY OF IRON: CPT

## 2024-03-01 PROCEDURE — 86334 IMMUNOFIX E-PHORESIS SERUM: CPT

## 2024-03-04 LAB
MICROBIOLOGY SEND OUT REPORT: NORMAL
MICROORGANISM SPEC CULT: NORMAL
MICROORGANISM/AGENT SPEC: NORMAL
SPECIMEN DESCRIPTION: NORMAL
TEST NAME: NORMAL

## 2024-03-05 LAB
ALBUMIN PERCENT: 42 % (ref 45–65)
ALBUMIN SERPL-MCNC: 3 G/DL (ref 3.2–5.2)
ALPHA 2 PERCENT: 8 % (ref 6–13)
ALPHA1 GLOB SERPL ELPH-MCNC: 0.1 G/DL (ref 0.1–0.4)
ALPHA1 GLOB SERPL ELPH-MCNC: 2 % (ref 3–6)
ALPHA2 GLOB SERPL ELPH-MCNC: 0.6 G/DL (ref 0.5–0.9)
B-GLOBULIN SERPL ELPH-MCNC: 1.1 G/DL (ref 0.5–1.1)
B-GLOBULIN SERPL ELPH-MCNC: 15 % (ref 11–19)
GAMMA GLOB SERPL ELPH-MCNC: 2.3 G/DL (ref 0.5–1.5)
GAMMA GLOBULIN %: 33 % (ref 9–20)
INTERPRETATION SERPL IFE-IMP: NORMAL
PATH REV: NORMAL
PATHOLOGIST: ABNORMAL
PROT PATTERN SERPL ELPH-IMP: ABNORMAL
PROT SERPL-MCNC: 7.1 G/DL (ref 6.4–8.3)
TOTAL PROT. SUM,%: 100 % (ref 98–102)
TOTAL PROT. SUM: 7.1 G/DL (ref 6.3–8.2)

## 2024-03-06 ENCOUNTER — OFFICE VISIT (OUTPATIENT)
Dept: GASTROENTEROLOGY | Age: 74
End: 2024-03-06
Payer: MEDICARE

## 2024-03-06 VITALS
SYSTOLIC BLOOD PRESSURE: 107 MMHG | BODY MASS INDEX: 16.41 KG/M2 | WEIGHT: 121 LBS | TEMPERATURE: 97.2 F | DIASTOLIC BLOOD PRESSURE: 66 MMHG

## 2024-03-06 DIAGNOSIS — K76.6 PORTAL HYPERTENSION WITH ESOPHAGEAL VARICES (HCC): ICD-10-CM

## 2024-03-06 DIAGNOSIS — D69.6 THROMBOCYTOPENIA (HCC): ICD-10-CM

## 2024-03-06 DIAGNOSIS — E87.1 HYPONATREMIA: Primary | ICD-10-CM

## 2024-03-06 DIAGNOSIS — I85.00 IDIOPATHIC ESOPHAGEAL VARICES WITHOUT BLEEDING (HCC): ICD-10-CM

## 2024-03-06 DIAGNOSIS — K74.60 CIRRHOSIS OF LIVER WITH ASCITES, UNSPECIFIED HEPATIC CIRRHOSIS TYPE (HCC): ICD-10-CM

## 2024-03-06 DIAGNOSIS — D50.8 OTHER IRON DEFICIENCY ANEMIA: ICD-10-CM

## 2024-03-06 DIAGNOSIS — K21.9 GASTROESOPHAGEAL REFLUX DISEASE, UNSPECIFIED WHETHER ESOPHAGITIS PRESENT: Primary | ICD-10-CM

## 2024-03-06 DIAGNOSIS — C61 PROSTATE CANCER (HCC): ICD-10-CM

## 2024-03-06 DIAGNOSIS — I85.00 PORTAL HYPERTENSION WITH ESOPHAGEAL VARICES (HCC): ICD-10-CM

## 2024-03-06 DIAGNOSIS — R18.8 CIRRHOSIS OF LIVER WITH ASCITES, UNSPECIFIED HEPATIC CIRRHOSIS TYPE (HCC): ICD-10-CM

## 2024-03-06 DIAGNOSIS — J90 PLEURAL EFFUSION ON RIGHT: ICD-10-CM

## 2024-03-06 PROCEDURE — G8427 DOCREV CUR MEDS BY ELIG CLIN: HCPCS | Performed by: INTERNAL MEDICINE

## 2024-03-06 PROCEDURE — G8419 CALC BMI OUT NRM PARAM NOF/U: HCPCS | Performed by: INTERNAL MEDICINE

## 2024-03-06 PROCEDURE — 1123F ACP DISCUSS/DSCN MKR DOCD: CPT | Performed by: INTERNAL MEDICINE

## 2024-03-06 PROCEDURE — 3017F COLORECTAL CA SCREEN DOC REV: CPT | Performed by: INTERNAL MEDICINE

## 2024-03-06 PROCEDURE — 1036F TOBACCO NON-USER: CPT | Performed by: INTERNAL MEDICINE

## 2024-03-06 PROCEDURE — 99214 OFFICE O/P EST MOD 30 MIN: CPT | Performed by: INTERNAL MEDICINE

## 2024-03-06 PROCEDURE — 1111F DSCHRG MED/CURRENT MED MERGE: CPT | Performed by: INTERNAL MEDICINE

## 2024-03-06 PROCEDURE — G8484 FLU IMMUNIZE NO ADMIN: HCPCS | Performed by: INTERNAL MEDICINE

## 2024-03-06 ASSESSMENT — ENCOUNTER SYMPTOMS
SHORTNESS OF BREATH: 0
RECTAL PAIN: 0
SORE THROAT: 0
BLOOD IN STOOL: 0
NAUSEA: 0
DIARRHEA: 0
ABDOMINAL PAIN: 0
WHEEZING: 0
TROUBLE SWALLOWING: 0
COLOR CHANGE: 0
ANAL BLEEDING: 0
CONSTIPATION: 0
CHOKING: 0
VOMITING: 0
COUGH: 0
ABDOMINAL DISTENTION: 0

## 2024-03-06 NOTE — PROGRESS NOTES
GI CLINIC FOLLOW UP    NTERVAL HISTORY:   No referring provider defined for this encounter.    Chief Complaint   Patient presents with    Results     Pt is here today for a f/u on labs/US results, last seen on 11/22/23 for MGUS, idiopathic esophageal varices, cirrhosis, & anemia.       1. Gastroesophageal reflux disease, unspecified whether esophagitis present    2. Portal hypertension with esophageal varices (HCC)    3. Cirrhosis of liver with ascites, unspecified hepatic cirrhosis type (HCC)    4. Prostate cancer (HCC)    5. Pleural effusion on right    6. Thrombocytopenia (HCC)    7. Idiopathic esophageal varices without bleeding (HCC)    8. 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 is a 74 y.o. male with a past history remarkable for , referred for evaluation of   Chief Complaint   Patient presents with    Results     Pt is here today for a f/u on labs/US results, last seen on 11/22/23 for MGUS, idiopathic esophageal varices, cirrhosis, & anemia.   .    Past Medical,Family, and Social History reviewed and does contribute to the patient presenting condition.    Patient's PMH/PSH,SH,PSYCH Hx, MEDs, ALLERGIES, and ROS were all reviewed and updated in the appropriate sections.    PAST MEDICAL HISTORY:  Past Medical History:   Diagnosis Date

## 2024-03-07 ENCOUNTER — TELEPHONE (OUTPATIENT)
Dept: INTERVENTIONAL RADIOLOGY/VASCULAR | Age: 74
End: 2024-03-07

## 2024-03-07 ENCOUNTER — TELEPHONE (OUTPATIENT)
Dept: ONCOLOGY | Age: 74
End: 2024-03-07

## 2024-03-07 ENCOUNTER — OFFICE VISIT (OUTPATIENT)
Dept: ONCOLOGY | Age: 74
End: 2024-03-07
Payer: MEDICARE

## 2024-03-07 VITALS
BODY MASS INDEX: 16.75 KG/M2 | TEMPERATURE: 96 F | HEART RATE: 55 BPM | WEIGHT: 123.5 LBS | SYSTOLIC BLOOD PRESSURE: 101 MMHG | RESPIRATION RATE: 16 BRPM | DIASTOLIC BLOOD PRESSURE: 64 MMHG

## 2024-03-07 DIAGNOSIS — D47.2 MGUS (MONOCLONAL GAMMOPATHY OF UNKNOWN SIGNIFICANCE): Primary | ICD-10-CM

## 2024-03-07 DIAGNOSIS — D89.2 PARAPROTEINEMIA: ICD-10-CM

## 2024-03-07 DIAGNOSIS — D61.818 PANCYTOPENIA (HCC): ICD-10-CM

## 2024-03-07 PROCEDURE — 99214 OFFICE O/P EST MOD 30 MIN: CPT | Performed by: INTERNAL MEDICINE

## 2024-03-07 PROCEDURE — 1123F ACP DISCUSS/DSCN MKR DOCD: CPT | Performed by: INTERNAL MEDICINE

## 2024-03-07 PROCEDURE — 99211 OFF/OP EST MAY X REQ PHY/QHP: CPT | Performed by: INTERNAL MEDICINE

## 2024-03-07 PROCEDURE — G8427 DOCREV CUR MEDS BY ELIG CLIN: HCPCS | Performed by: INTERNAL MEDICINE

## 2024-03-07 PROCEDURE — G8419 CALC BMI OUT NRM PARAM NOF/U: HCPCS | Performed by: INTERNAL MEDICINE

## 2024-03-07 PROCEDURE — 3017F COLORECTAL CA SCREEN DOC REV: CPT | Performed by: INTERNAL MEDICINE

## 2024-03-07 PROCEDURE — G8484 FLU IMMUNIZE NO ADMIN: HCPCS | Performed by: INTERNAL MEDICINE

## 2024-03-07 PROCEDURE — 1111F DSCHRG MED/CURRENT MED MERGE: CPT | Performed by: INTERNAL MEDICINE

## 2024-03-07 PROCEDURE — 1036F TOBACCO NON-USER: CPT | Performed by: INTERNAL MEDICINE

## 2024-03-07 NOTE — TELEPHONE ENCOUNTER
MELECIO HERE FOR FOLLOW UP   RV 6 months with labs before RV  LABS MAILED TO PATIENT    MD VISIT ON 9/12 @ 9AM   AVS PRINTED AND GIVEN TO PATIENT ON EXIT

## 2024-03-07 NOTE — PROGRESS NOTES
_     Chief Complaint   Patient presents with    Follow-up       DIAGNOSIS:    Anemia  Leukopenia  Liver cirrhosis  MGUS     Back pain        CURRENT THERAPY:    S/p hospitalization for further management of above issues.  S/p blood transfusion  S/P IV Iron infusion.          BRIEF CASE HISTORY:      The patient is a 66 y.o. AA male who is admitted to the hospital for severe anemia and leukopenia. He had no previous labs. He was seen by PCP for routine health exam as a new patient. Labs showed severe anemia and leukopenia.   He had chronic back pain for one year.   No fever. No night sweats.   No lymphadenopathy   He has recent weight loss and anorexia.   No GI symptoms.   GI work up was negative. He received blood transfusion and IV Iron infusion.    He had Rt hip surgery on 12/3/14 with no complications.  He had left hip surgery in March 2015. No complications. Prostate surgery December 2015 with no complications.     INTERIM HISTORY:   Patient is seen for follow up above problems.  Patient is clinically stable.  No active bleeding.  No melena or hematochezia.  No hematemesis.  Patient had stool fit test which was positive.  He had evaluation by gastroenterology.  He had EGD and colonoscopy.  Small polyps were resected.  He was noted to have varices.  He had liver ultrasound.  Ultrasound shows possible cirrhosis.  He had multiple other test done by gastroenterology.  MRI of the liver showed questionable liver lesions concerning for malignancy.  MRI also showed questionable problem with hemochromatosis.  Liver biopsy was negative.  Patient's ferritin level has been normal for so many years.  He had previous problem with iron deficiency.  Repeated labs continue to have iron deficiency.  Oral iron will be resumed.  Patient has cytopenia.  No repeated infections.  Clinically patient is doing fine.No weakness or fatigue.  No fever

## 2024-03-08 DIAGNOSIS — E11.65 TYPE 2 DIABETES MELLITUS WITH HYPERGLYCEMIA, WITH LONG-TERM CURRENT USE OF INSULIN (HCC): Primary | ICD-10-CM

## 2024-03-08 DIAGNOSIS — Z79.4 TYPE 2 DIABETES MELLITUS WITH HYPERGLYCEMIA, WITH LONG-TERM CURRENT USE OF INSULIN (HCC): Primary | ICD-10-CM

## 2024-03-08 RX ORDER — BLOOD-GLUCOSE SENSOR
1 EACH MISCELLANEOUS
Qty: 6 EACH | Refills: 2 | Status: SHIPPED | OUTPATIENT
Start: 2024-03-08

## 2024-03-11 LAB
MICROORGANISM SPEC CULT: NORMAL
MICROORGANISM/AGENT SPEC: NORMAL
SPECIMEN DESCRIPTION: NORMAL

## 2024-03-12 ENCOUNTER — HOSPITAL ENCOUNTER (OUTPATIENT)
Age: 74
Setting detail: SPECIMEN
Discharge: HOME | End: 2024-03-12

## 2024-03-12 DIAGNOSIS — D63.1 ANEMIA IN STAGE 2 CHRONIC KIDNEY DISEASE: ICD-10-CM

## 2024-03-12 DIAGNOSIS — E87.1 HYPONATREMIA: ICD-10-CM

## 2024-03-12 DIAGNOSIS — I12.9 BENIGN HYPERTENSION WITH CKD (CHRONIC KIDNEY DISEASE), STAGE II: ICD-10-CM

## 2024-03-12 DIAGNOSIS — D69.6 THROMBOCYTOPENIA (HCC): ICD-10-CM

## 2024-03-12 DIAGNOSIS — D50.8 OTHER IRON DEFICIENCY ANEMIA: ICD-10-CM

## 2024-03-12 DIAGNOSIS — N18.2 SECONDARY DIABETES MELLITUS WITH STAGE 2 CHRONIC KIDNEY DISEASE (HCC): ICD-10-CM

## 2024-03-12 DIAGNOSIS — D50.9 MICROCYTIC HYPOCHROMIC ANEMIA: ICD-10-CM

## 2024-03-12 DIAGNOSIS — D61.818 PANCYTOPENIA (HCC): ICD-10-CM

## 2024-03-12 DIAGNOSIS — D47.2 MGUS (MONOCLONAL GAMMOPATHY OF UNKNOWN SIGNIFICANCE): ICD-10-CM

## 2024-03-12 DIAGNOSIS — D47.2 MGUS (MONOCLONAL GAMMOPATHY OF UNKNOWN SIGNIFICANCE): Primary | ICD-10-CM

## 2024-03-12 DIAGNOSIS — N18.2 ANEMIA IN STAGE 2 CHRONIC KIDNEY DISEASE: ICD-10-CM

## 2024-03-12 DIAGNOSIS — R18.8 CIRRHOSIS OF LIVER WITH ASCITES, UNSPECIFIED HEPATIC CIRRHOSIS TYPE (HCC): ICD-10-CM

## 2024-03-12 DIAGNOSIS — C61 PROSTATE CANCER (HCC): ICD-10-CM

## 2024-03-12 DIAGNOSIS — K74.60 CIRRHOSIS OF LIVER WITH ASCITES, UNSPECIFIED HEPATIC CIRRHOSIS TYPE (HCC): ICD-10-CM

## 2024-03-12 DIAGNOSIS — E13.22 SECONDARY DIABETES MELLITUS WITH STAGE 2 CHRONIC KIDNEY DISEASE (HCC): ICD-10-CM

## 2024-03-12 DIAGNOSIS — N18.2 BENIGN HYPERTENSION WITH CKD (CHRONIC KIDNEY DISEASE), STAGE II: ICD-10-CM

## 2024-03-12 DIAGNOSIS — N18.2 CKD (CHRONIC KIDNEY DISEASE), STAGE II: ICD-10-CM

## 2024-03-12 LAB
ANION GAP SERPL CALCULATED.3IONS-SCNC: 8 MMOL/L (ref 9–16)
BUN SERPL-MCNC: 18 MG/DL (ref 8–23)
CALCIUM SERPL-MCNC: 8.6 MG/DL (ref 8.6–10.4)
CHLORIDE SERPL-SCNC: 99 MMOL/L (ref 98–107)
CO2 SERPL-SCNC: 23 MMOL/L (ref 20–31)
CREAT SERPL-MCNC: 0.7 MG/DL (ref 0.7–1.2)
GFR SERPL CREATININE-BSD FRML MDRD: >60 ML/MIN/1.73M2
GLUCOSE SERPL-MCNC: 159 MG/DL (ref 74–99)
POTASSIUM SERPL-SCNC: 5 MMOL/L (ref 3.7–5.3)
SODIUM SERPL-SCNC: 130 MMOL/L (ref 136–145)

## 2024-03-14 ENCOUNTER — OFFICE VISIT (OUTPATIENT)
Dept: INFECTIOUS DISEASES | Age: 74
End: 2024-03-14
Payer: MEDICARE

## 2024-03-14 VITALS
HEART RATE: 54 BPM | HEIGHT: 72 IN | DIASTOLIC BLOOD PRESSURE: 62 MMHG | TEMPERATURE: 96.4 F | WEIGHT: 121 LBS | SYSTOLIC BLOOD PRESSURE: 96 MMHG | BODY MASS INDEX: 16.39 KG/M2

## 2024-03-14 DIAGNOSIS — A49.2 HEMOPHILUS INFLUENZAE INFECTION, UNSPECIFIED SITE: ICD-10-CM

## 2024-03-14 DIAGNOSIS — K74.60 CIRRHOSIS OF LIVER WITH ASCITES, UNSPECIFIED HEPATIC CIRRHOSIS TYPE (HCC): Primary | ICD-10-CM

## 2024-03-14 DIAGNOSIS — D72.819 LEUKOPENIA, UNSPECIFIED TYPE: ICD-10-CM

## 2024-03-14 DIAGNOSIS — R18.8 CIRRHOSIS OF LIVER WITH ASCITES, UNSPECIFIED HEPATIC CIRRHOSIS TYPE (HCC): Primary | ICD-10-CM

## 2024-03-14 DIAGNOSIS — J90 PLEURAL EFFUSION ON RIGHT: ICD-10-CM

## 2024-03-14 PROCEDURE — 1123F ACP DISCUSS/DSCN MKR DOCD: CPT | Performed by: INTERNAL MEDICINE

## 2024-03-14 PROCEDURE — 99214 OFFICE O/P EST MOD 30 MIN: CPT | Performed by: INTERNAL MEDICINE

## 2024-03-14 PROCEDURE — G8427 DOCREV CUR MEDS BY ELIG CLIN: HCPCS | Performed by: INTERNAL MEDICINE

## 2024-03-14 PROCEDURE — G8419 CALC BMI OUT NRM PARAM NOF/U: HCPCS | Performed by: INTERNAL MEDICINE

## 2024-03-14 PROCEDURE — 3017F COLORECTAL CA SCREEN DOC REV: CPT | Performed by: INTERNAL MEDICINE

## 2024-03-14 PROCEDURE — 1036F TOBACCO NON-USER: CPT | Performed by: INTERNAL MEDICINE

## 2024-03-14 PROCEDURE — G8484 FLU IMMUNIZE NO ADMIN: HCPCS | Performed by: INTERNAL MEDICINE

## 2024-03-14 PROCEDURE — 1111F DSCHRG MED/CURRENT MED MERGE: CPT | Performed by: INTERNAL MEDICINE

## 2024-03-14 RX ORDER — LEVOFLOXACIN 250 MG/1
250 TABLET, FILM COATED ORAL
Qty: 15 TABLET | Refills: 3 | Status: SHIPPED | OUTPATIENT
Start: 2024-03-14 | End: 2024-07-11

## 2024-03-14 ASSESSMENT — ENCOUNTER SYMPTOMS
GASTROINTESTINAL NEGATIVE: 1
RESPIRATORY NEGATIVE: 1

## 2024-03-14 NOTE — PROGRESS NOTES
[facility]  Office Progress Note  Today's Date and Time: 3/14/2024, 11:07 AM    Impression:     1. Cirrhosis of liver with ascites, unspecified hepatic cirrhosis type (HCC)    2. Pleural effusion on right    3. Hemophilus influenzae infection, unspecified site    4. Leukopenia, unspecified type         Recommendations   The patient did have a new infection at this point in time with haemophilus influenza while he previously had Serratia sepsis  He has completed the treatment course for these haemophilus and the question is whether he will continue to require suppressive therapy.  At this point in time we have decided to continue the oral suppressive therapy with levofloxacin 250 mg every other day as he did well on this previously  He does feel like the leukopenia is a major risk factor for him.  There have been some discussions about the procedure and I do not feel at all that this would increase his infection risk.  The wife does report that they are planning on a few week trip to [place] with their daughter lives and I am not opposed to this  The plan will be for him to follow-up with me in 2 months    I have ordered the following medications/ labs:  No orders of the defined types were placed in this encounter.     Orders Placed This Encounter   Medications    levoFLOXacin (LEVAQUIN) 250 MG tablet     Sig: Take 1 tablet by mouth every 48 hours for 60 doses     Dispense:  15 tablet     Refill:  3       Chief complaint/reason for consultation:     Chief Complaint   Patient presents with    Cirrhosis of liver with ascites, unspecified hepatic cirrho     Follow up         History of Present Illness:   Candelario Bliss  is a 74 y.o.-year-old male who I am seeing in follow-up after recent hospital stay    Candelario has cirrhosis with recurrent ascites, moderate portal hypertensive gastropathy with esophageal varices status post banding x3 6/7/2023, BPH status post prostatectomy, pancytopenia felt

## 2024-03-18 LAB
MICROORGANISM SPEC CULT: NORMAL
MICROORGANISM/AGENT SPEC: NORMAL
SPECIMEN DESCRIPTION: NORMAL

## 2024-03-21 RX ORDER — PROPRANOLOL HCL 60 MG
60 CAPSULE, EXTENDED RELEASE 24HR ORAL 2 TIMES DAILY
Qty: 180 CAPSULE | Refills: 0 | Status: SHIPPED | OUTPATIENT
Start: 2024-03-21

## 2024-04-02 ENCOUNTER — HOSPITAL ENCOUNTER (OUTPATIENT)
Age: 74
Setting detail: SPECIMEN
Discharge: HOME | End: 2024-04-02

## 2024-04-02 DIAGNOSIS — I12.9 BENIGN HYPERTENSION WITH CKD (CHRONIC KIDNEY DISEASE) STAGE I: ICD-10-CM

## 2024-04-02 DIAGNOSIS — N18.1 BENIGN HYPERTENSION WITH CKD (CHRONIC KIDNEY DISEASE) STAGE I: ICD-10-CM

## 2024-04-02 LAB
ANION GAP SERPL CALCULATED.3IONS-SCNC: 7 MMOL/L (ref 9–16)
BUN SERPL-MCNC: 25 MG/DL (ref 8–23)
CALCIUM SERPL-MCNC: 8.9 MG/DL (ref 8.6–10.4)
CHLORIDE SERPL-SCNC: 101 MMOL/L (ref 98–107)
CO2 SERPL-SCNC: 23 MMOL/L (ref 20–31)
CREAT SERPL-MCNC: 0.7 MG/DL (ref 0.7–1.2)
GFR SERPL CREATININE-BSD FRML MDRD: >90 ML/MIN/1.73M2
GLUCOSE SERPL-MCNC: 160 MG/DL (ref 74–99)
POTASSIUM SERPL-SCNC: 4.7 MMOL/L (ref 3.7–5.3)
SODIUM SERPL-SCNC: 131 MMOL/L (ref 136–145)

## 2024-04-04 ENCOUNTER — HOSPITAL ENCOUNTER (OUTPATIENT)
Dept: INTERVENTIONAL RADIOLOGY/VASCULAR | Age: 74
Discharge: HOME | End: 2024-04-06

## 2024-04-04 DIAGNOSIS — C22.9 MALIGNANT NEOPLASM OF LIVER, UNSPECIFIED LIVER MALIGNANCY TYPE (HCC): ICD-10-CM

## 2024-04-05 RX ORDER — SPIRONOLACTONE 100 MG/1
100 TABLET, FILM COATED ORAL DAILY
Qty: 90 TABLET | Refills: 3 | Status: SHIPPED | OUTPATIENT
Start: 2024-04-05

## 2024-04-05 RX ORDER — FUROSEMIDE 40 MG/1
40 TABLET ORAL DAILY
Qty: 60 TABLET | Refills: 3 | Status: SHIPPED | OUTPATIENT
Start: 2024-04-05

## 2024-04-24 ENCOUNTER — OFFICE VISIT (OUTPATIENT)
Dept: FAMILY MEDICINE CLINIC | Age: 74
End: 2024-04-24
Payer: MEDICARE

## 2024-04-24 VITALS
HEART RATE: 55 BPM | BODY MASS INDEX: 18.04 KG/M2 | DIASTOLIC BLOOD PRESSURE: 66 MMHG | TEMPERATURE: 97.1 F | OXYGEN SATURATION: 100 % | WEIGHT: 133 LBS | SYSTOLIC BLOOD PRESSURE: 104 MMHG

## 2024-04-24 DIAGNOSIS — Z79.4 CONTROLLED TYPE 2 DIABETES MELLITUS WITH HYPERGLYCEMIA, WITH LONG-TERM CURRENT USE OF INSULIN (HCC): Primary | ICD-10-CM

## 2024-04-24 DIAGNOSIS — E11.65 CONTROLLED TYPE 2 DIABETES MELLITUS WITH HYPERGLYCEMIA, WITH LONG-TERM CURRENT USE OF INSULIN (HCC): Primary | ICD-10-CM

## 2024-04-24 LAB — HBA1C MFR BLD: 6.6 %

## 2024-04-24 PROCEDURE — 1036F TOBACCO NON-USER: CPT

## 2024-04-24 PROCEDURE — 83036 HEMOGLOBIN GLYCOSYLATED A1C: CPT

## 2024-04-24 PROCEDURE — 3044F HG A1C LEVEL LT 7.0%: CPT

## 2024-04-24 PROCEDURE — 3017F COLORECTAL CA SCREEN DOC REV: CPT

## 2024-04-24 PROCEDURE — 1123F ACP DISCUSS/DSCN MKR DOCD: CPT

## 2024-04-24 PROCEDURE — G8419 CALC BMI OUT NRM PARAM NOF/U: HCPCS

## 2024-04-24 PROCEDURE — 2022F DILAT RTA XM EVC RTNOPTHY: CPT

## 2024-04-24 PROCEDURE — 99214 OFFICE O/P EST MOD 30 MIN: CPT

## 2024-04-24 PROCEDURE — G8427 DOCREV CUR MEDS BY ELIG CLIN: HCPCS

## 2024-04-24 RX ORDER — INSULIN GLARGINE 300 U/ML
10 INJECTION, SOLUTION SUBCUTANEOUS
Qty: 1 ADJUSTABLE DOSE PRE-FILLED PEN SYRINGE | Refills: 2 | Status: SHIPPED | OUTPATIENT
Start: 2024-04-24

## 2024-04-24 RX ORDER — ALBUTEROL SULFATE 90 UG/1
2 AEROSOL, METERED RESPIRATORY (INHALATION) EVERY 4 HOURS PRN
COMMUNITY
Start: 2024-04-18

## 2024-04-24 SDOH — ECONOMIC STABILITY: FOOD INSECURITY: WITHIN THE PAST 12 MONTHS, YOU WORRIED THAT YOUR FOOD WOULD RUN OUT BEFORE YOU GOT MONEY TO BUY MORE.: NEVER TRUE

## 2024-04-24 SDOH — ECONOMIC STABILITY: FOOD INSECURITY: WITHIN THE PAST 12 MONTHS, THE FOOD YOU BOUGHT JUST DIDN'T LAST AND YOU DIDN'T HAVE MONEY TO GET MORE.: NEVER TRUE

## 2024-04-24 SDOH — ECONOMIC STABILITY: INCOME INSECURITY: HOW HARD IS IT FOR YOU TO PAY FOR THE VERY BASICS LIKE FOOD, HOUSING, MEDICAL CARE, AND HEATING?: NOT HARD AT ALL

## 2024-04-24 ASSESSMENT — PATIENT HEALTH QUESTIONNAIRE - PHQ9
SUM OF ALL RESPONSES TO PHQ QUESTIONS 1-9: 0
SUM OF ALL RESPONSES TO PHQ QUESTIONS 1-9: 0
SUM OF ALL RESPONSES TO PHQ9 QUESTIONS 1 & 2: 0
SUM OF ALL RESPONSES TO PHQ QUESTIONS 1-9: 0
1. LITTLE INTEREST OR PLEASURE IN DOING THINGS: NOT AT ALL
2. FEELING DOWN, DEPRESSED OR HOPELESS: NOT AT ALL
SUM OF ALL RESPONSES TO PHQ QUESTIONS 1-9: 0

## 2024-04-24 NOTE — PROGRESS NOTES
Candelario Bliss Jr. (:  1950) is a 74 y.o. male,Established patient, here for evaluation of the following chief complaint(s):  Diabetes          Subjective   SUBJECTIVE/OBJECTIVE:  Pt here today for Lenin review, accompanied by wife  VSS    Pt and wife have been monitoring glucose via CGM  Over the last few weeks he has had gradually increasing readings, highest 370s  They have increased his Lantus to 25 units in the AM and 5 at night  Minimal change to glucose w/ the increased dosages  No lows noted     They have made dietary changes based off of hyperglycemic episodes  A1C remains stable, 6.6 today          Review of Systems       Objective   Physical Exam  Vitals and nursing note reviewed.   Constitutional:       General: He is not in acute distress.     Appearance: Normal appearance. He is not ill-appearing, toxic-appearing or diaphoretic.   Neurological:      Mental Status: He is alert.              Assessment & Plan   ASSESSMENT/PLAN:  1. Controlled type 2 diabetes mellitus with hyperglycemia, with long-term current use of insulin (HCC)  -at this point I'd like to switch better coverage w/ Toujeo  -increasing Lantus dosing consistently w/ a controlled A1C has me concerned for hypoglycemic episodes  -start at 10 units q AM, will titrate up if needed    -     Insulin Glargine, 2 Unit Dial, (TOUJEO MAX SOLOSTAR) 300 UNIT/ML SOPN; Inject 10 Units into the skin daily (with breakfast), Disp-1 Adjustable Dose Pre-filled Pen Syringe, R-2Normal  -     POCT glycosylated hemoglobin (Hb A1C)      Return if symptoms worsen or fail to improve, for change  appt to end of July please d/t A1C.               An electronic signature was used to authenticate this note.    --Alesha Zaragoza, APRN - CNP

## 2024-05-01 ENCOUNTER — HOSPITAL ENCOUNTER (OUTPATIENT)
Age: 74
Setting detail: SPECIMEN
Discharge: HOME | End: 2024-05-01

## 2024-05-01 DIAGNOSIS — I12.9 BENIGN HYPERTENSION WITH CKD (CHRONIC KIDNEY DISEASE) STAGE I: ICD-10-CM

## 2024-05-01 DIAGNOSIS — N18.1 BENIGN HYPERTENSION WITH CKD (CHRONIC KIDNEY DISEASE) STAGE I: ICD-10-CM

## 2024-05-01 LAB
ANION GAP SERPL CALCULATED.3IONS-SCNC: 7 MMOL/L (ref 9–16)
BUN SERPL-MCNC: 17 MG/DL (ref 8–23)
CALCIUM SERPL-MCNC: 8.4 MG/DL (ref 8.6–10.4)
CHLORIDE SERPL-SCNC: 102 MMOL/L (ref 98–107)
CO2 SERPL-SCNC: 20 MMOL/L (ref 20–31)
CREAT SERPL-MCNC: 0.7 MG/DL (ref 0.7–1.2)
GFR, ESTIMATED: >90 ML/MIN/1.73M2
GLUCOSE SERPL-MCNC: 160 MG/DL (ref 74–99)
POTASSIUM SERPL-SCNC: 5 MMOL/L (ref 3.7–5.3)
SODIUM SERPL-SCNC: 129 MMOL/L (ref 136–145)

## 2024-05-06 ENCOUNTER — TELEPHONE (OUTPATIENT)
Dept: GASTROENTEROLOGY | Age: 74
End: 2024-05-06

## 2024-05-06 NOTE — TELEPHONE ENCOUNTER
Writer called pt and LVM canc appt on 7/3/24 due to the provider being out of the office, and to call the office to mian appt.

## 2024-06-03 ENCOUNTER — HOSPITAL ENCOUNTER (OUTPATIENT)
Age: 74
Setting detail: SPECIMEN
Discharge: HOME | End: 2024-06-03

## 2024-06-03 LAB
25(OH)D3 SERPL-MCNC: 30.3 NG/ML (ref 30–100)
ALP SERPL-CCNC: 235 U/L (ref 40–129)
BILIRUB UR QL STRIP: NEGATIVE
BUN SERPL-MCNC: 26 MG/DL (ref 8–23)
CALCIUM SERPL-MCNC: 8.7 MG/DL (ref 8.6–10.4)
CLARITY UR: CLEAR
COLOR UR: YELLOW
COMMENT: NORMAL
CREAT SERPL-MCNC: 0.8 MG/DL (ref 0.7–1.2)
CREAT UR-MCNC: 128 MG/DL (ref 39–259)
GFR, ESTIMATED: >90 ML/MIN/1.73M2
GLUCOSE UR STRIP-MCNC: NEGATIVE MG/DL
HCT VFR BLD AUTO: 44.1 % (ref 40.7–50.3)
HGB BLD-MCNC: 13.8 G/DL (ref 13–17)
HGB UR QL STRIP.AUTO: NEGATIVE
KETONES UR STRIP-MCNC: NEGATIVE MG/DL
LEUKOCYTE ESTERASE UR QL STRIP: NEGATIVE
MAGNESIUM SERPL-MCNC: 2 MG/DL (ref 1.6–2.4)
MICROALBUMIN UR-MCNC: <12 MG/L (ref 0–20)
MICROALBUMIN/CREAT UR-RTO: NORMAL MCG/MG CREAT (ref 0–17)
NITRITE UR QL STRIP: NEGATIVE
PH UR STRIP: 6 [PH] (ref 5–8)
PROT UR STRIP-MCNC: NEGATIVE MG/DL
SP GR UR STRIP: 1.02 (ref 1–1.03)
UROBILINOGEN UR STRIP-ACNC: NORMAL EU/DL (ref 0–1)

## 2024-06-05 ENCOUNTER — OFFICE VISIT (OUTPATIENT)
Dept: INFECTIOUS DISEASES | Age: 74
End: 2024-06-05
Payer: MEDICARE

## 2024-06-05 ENCOUNTER — HOSPITAL ENCOUNTER (OUTPATIENT)
Age: 74
Setting detail: SPECIMEN
Discharge: HOME | End: 2024-06-05

## 2024-06-05 VITALS
RESPIRATION RATE: 17 BRPM | OXYGEN SATURATION: 99 % | HEIGHT: 72 IN | TEMPERATURE: 96.4 F | BODY MASS INDEX: 17.15 KG/M2 | WEIGHT: 126.6 LBS | DIASTOLIC BLOOD PRESSURE: 73 MMHG | SYSTOLIC BLOOD PRESSURE: 107 MMHG

## 2024-06-05 DIAGNOSIS — D72.819 LEUKOPENIA, UNSPECIFIED TYPE: ICD-10-CM

## 2024-06-05 DIAGNOSIS — A48.8 SERRATIA MARCESCENS INFECTION: ICD-10-CM

## 2024-06-05 DIAGNOSIS — J90 PLEURAL EFFUSION ON RIGHT: ICD-10-CM

## 2024-06-05 DIAGNOSIS — R18.8 CIRRHOSIS OF LIVER WITH ASCITES, UNSPECIFIED HEPATIC CIRRHOSIS TYPE (HCC): Primary | ICD-10-CM

## 2024-06-05 DIAGNOSIS — I12.9 BENIGN HYPERTENSION WITH CKD (CHRONIC KIDNEY DISEASE) STAGE I: ICD-10-CM

## 2024-06-05 DIAGNOSIS — N18.1 BENIGN HYPERTENSION WITH CKD (CHRONIC KIDNEY DISEASE) STAGE I: ICD-10-CM

## 2024-06-05 DIAGNOSIS — K74.60 CIRRHOSIS OF LIVER WITH ASCITES, UNSPECIFIED HEPATIC CIRRHOSIS TYPE (HCC): Primary | ICD-10-CM

## 2024-06-05 LAB
ANION GAP SERPL CALCULATED.3IONS-SCNC: 8 MMOL/L (ref 9–16)
BUN SERPL-MCNC: 22 MG/DL (ref 8–23)
CALCIUM SERPL-MCNC: 8.7 MG/DL (ref 8.6–10.4)
CHLORIDE SERPL-SCNC: 100 MMOL/L (ref 98–107)
CO2 SERPL-SCNC: 24 MMOL/L (ref 20–31)
CREAT SERPL-MCNC: 0.9 MG/DL (ref 0.7–1.2)
GFR, ESTIMATED: 87 ML/MIN/1.73M2
GLUCOSE SERPL-MCNC: 170 MG/DL (ref 74–99)
POTASSIUM SERPL-SCNC: 4.7 MMOL/L (ref 3.7–5.3)
SODIUM SERPL-SCNC: 132 MMOL/L (ref 136–145)

## 2024-06-05 PROCEDURE — 3017F COLORECTAL CA SCREEN DOC REV: CPT | Performed by: INTERNAL MEDICINE

## 2024-06-05 PROCEDURE — 1123F ACP DISCUSS/DSCN MKR DOCD: CPT | Performed by: INTERNAL MEDICINE

## 2024-06-05 PROCEDURE — 1036F TOBACCO NON-USER: CPT | Performed by: INTERNAL MEDICINE

## 2024-06-05 PROCEDURE — G8419 CALC BMI OUT NRM PARAM NOF/U: HCPCS | Performed by: INTERNAL MEDICINE

## 2024-06-05 PROCEDURE — G8427 DOCREV CUR MEDS BY ELIG CLIN: HCPCS | Performed by: INTERNAL MEDICINE

## 2024-06-05 PROCEDURE — 99213 OFFICE O/P EST LOW 20 MIN: CPT | Performed by: INTERNAL MEDICINE

## 2024-06-05 RX ORDER — LEVOFLOXACIN 250 MG/1
250 TABLET, FILM COATED ORAL EVERY OTHER DAY
Qty: 45 TABLET | Refills: 3 | Status: SHIPPED | OUTPATIENT
Start: 2024-06-05 | End: 2024-06-17 | Stop reason: SDUPTHER

## 2024-06-05 NOTE — PROGRESS NOTES
0.5 02/12/2024 10:47 PM    BILITOT 1.8 03/01/2024 11:03 AM    BILITOT 1.4 02/13/2024 03:00 AM    ALKPHOS 235 06/03/2024 07:52 AM    ALKPHOS 305 03/01/2024 11:03 AM    ALT 68 03/01/2024 11:03 AM    ALT 49 02/13/2024 03:00 AM    AST 64 03/01/2024 11:03 AM    AST 57 02/13/2024 03:00 AM       Lab Results   Component Value Date/Time    CRP 24.4 05/15/2023 09:09 AM    CRP 40.3 05/13/2023 03:15 AM     No results found for: \"SEDRATE\"      Imaging Studies:   No new imaging    Cultures:   Culture and Sensitivities:  No results for input(s): \"SPECDESC\", \"SPECIAL\", \"CULTURE\", \"STATUS\", \"ORG\", \"CDIFFTOXPCR\", \"CAMPYLOBPCR\", \"SALMONELLAPC\", \"SHIGAPCR\", \"SHIGELLAPCR\" in the last 72 hours.      Thank you for allowing us to participate in the care of this patient. Pleasecall with questions.    Sandra Ferreira MD  [employer] messaging: [phone]    This note is created with the assistance of a speech recognition program.  While intending to generate a document that actually reflects the content ofthe visit, the document can still have some errors including those of syntax and sound a like substitutions which may escape proof reading.  It such instances, actual meaning can be extrapolated by contextual diversion.

## 2024-06-15 ENCOUNTER — PATIENT MESSAGE (OUTPATIENT)
Dept: INFECTIOUS DISEASES | Age: 74
End: 2024-06-15

## 2024-06-15 DIAGNOSIS — K74.60 CIRRHOSIS OF LIVER WITH ASCITES, UNSPECIFIED HEPATIC CIRRHOSIS TYPE (HCC): ICD-10-CM

## 2024-06-15 DIAGNOSIS — A48.8 SERRATIA MARCESCENS INFECTION: ICD-10-CM

## 2024-06-15 DIAGNOSIS — R18.8 CIRRHOSIS OF LIVER WITH ASCITES, UNSPECIFIED HEPATIC CIRRHOSIS TYPE (HCC): ICD-10-CM

## 2024-06-15 DIAGNOSIS — I85.00 PORTAL HYPERTENSION WITH ESOPHAGEAL VARICES (HCC): Primary | ICD-10-CM

## 2024-06-15 DIAGNOSIS — D72.819 LEUKOPENIA, UNSPECIFIED TYPE: ICD-10-CM

## 2024-06-15 DIAGNOSIS — K76.6 PORTAL HYPERTENSION WITH ESOPHAGEAL VARICES (HCC): Primary | ICD-10-CM

## 2024-06-17 RX ORDER — LEVOFLOXACIN 250 MG/1
250 TABLET, FILM COATED ORAL EVERY OTHER DAY
Qty: 45 TABLET | Refills: 3 | Status: SHIPPED | OUTPATIENT
Start: 2024-06-17 | End: 2025-06-12

## 2024-06-17 RX ORDER — PROPRANOLOL HCL 60 MG
60 CAPSULE, EXTENDED RELEASE 24HR ORAL 2 TIMES DAILY
Qty: 180 CAPSULE | Refills: 0 | Status: SHIPPED | OUTPATIENT
Start: 2024-06-17

## 2024-06-17 NOTE — TELEPHONE ENCOUNTER
Dr Ferreira, please see my chart message. Script pended for your approval should you agree. Thank you.

## 2024-06-17 NOTE — TELEPHONE ENCOUNTER
From: Candelario Bliss Jr.  To: Dr. Sandra Ferreira  Sent: 6/15/2024 7:36 AM EDT  Subject: Candelario's Levofloxacin    Good morning  [facility] pharmacy could not fill Candelario's new prescription because they said the refill was attached to an  script that was never filled???  Can you please enter a new prescription for him.   Alex & Thank You!  Martha

## 2024-06-26 DIAGNOSIS — E83.42 HYPOMAGNESEMIA: Primary | ICD-10-CM

## 2024-06-26 DIAGNOSIS — D69.6 THROMBOCYTOPENIA (HCC): ICD-10-CM

## 2024-07-10 ENCOUNTER — HOSPITAL ENCOUNTER (OUTPATIENT)
Age: 74
Setting detail: SPECIMEN
Discharge: HOME | End: 2024-07-10

## 2024-07-10 DIAGNOSIS — E83.42 HYPOMAGNESEMIA: ICD-10-CM

## 2024-07-10 DIAGNOSIS — E87.1 HYPONATREMIA: ICD-10-CM

## 2024-07-10 DIAGNOSIS — K90.49 MALABSORPTION DUE TO INTOLERANCE, NOT ELSEWHERE CLASSIFIED: ICD-10-CM

## 2024-07-11 LAB
BASOPHILS # BLD: 0 K/UL (ref 0–0.2)
BASOPHILS NFR BLD: 0 % (ref 0–2)
CELLS COUNTED: 50
EOSINOPHIL # BLD: 0.14 K/UL (ref 0–0.4)
EOSINOPHILS RELATIVE PERCENT: 6 % (ref 1–4)
ERYTHROCYTE [DISTWIDTH] IN BLOOD BY AUTOMATED COUNT: 18.2 % (ref 11.8–14.4)
HCT VFR BLD AUTO: 43.4 % (ref 40.7–50.3)
HGB BLD-MCNC: 14.1 G/DL (ref 13–17)
IMM GRANULOCYTES # BLD AUTO: 0 K/UL (ref 0–0.3)
IMM GRANULOCYTES NFR BLD: 0 %
LYMPHOCYTES NFR BLD: 0.72 K/UL (ref 1–4.8)
LYMPHOCYTES RELATIVE PERCENT: 30 % (ref 24–44)
MCH RBC QN AUTO: 28.1 PG (ref 25.2–33.5)
MCHC RBC AUTO-ENTMCNC: 32.5 G/DL (ref 28.4–34.8)
MCV RBC AUTO: 86.6 FL (ref 82.6–102.9)
MONOCYTES NFR BLD: 0.38 K/UL (ref 0.1–0.8)
MONOCYTES NFR BLD: 16 % (ref 1–7)
MORPHOLOGY: ABNORMAL
MORPHOLOGY: ABNORMAL
NEUTROPHILS NFR BLD: 48 % (ref 36–66)
NEUTS SEG NFR BLD: 1.16 K/UL (ref 1.8–7.7)
NRBC BLD-RTO: 0 PER 100 WBC
PLATELET # BLD AUTO: ABNORMAL K/UL (ref 138–453)
PLATELET, FLUORESCENCE: 51 K/UL (ref 138–453)
PLATELETS.RETICULATED NFR BLD AUTO: 4.8 % (ref 1.1–10.3)
RBC # BLD AUTO: 5.01 M/UL (ref 4.21–5.77)
WBC OTHER # BLD: 2.4 K/UL (ref 3.5–11.3)

## 2024-07-12 ENCOUNTER — HOSPITAL ENCOUNTER (OUTPATIENT)
Age: 74
Discharge: HOME | End: 2024-07-12
Payer: MEDICARE

## 2024-07-12 DIAGNOSIS — E11.65 CONTROLLED TYPE 2 DIABETES MELLITUS WITH HYPERGLYCEMIA, WITH LONG-TERM CURRENT USE OF INSULIN (HCC): ICD-10-CM

## 2024-07-12 DIAGNOSIS — Z79.4 CONTROLLED TYPE 2 DIABETES MELLITUS WITH HYPERGLYCEMIA, WITH LONG-TERM CURRENT USE OF INSULIN (HCC): ICD-10-CM

## 2024-07-12 DIAGNOSIS — E87.1 HYPONATREMIA: ICD-10-CM

## 2024-07-12 DIAGNOSIS — E83.42 HYPOMAGNESEMIA: ICD-10-CM

## 2024-07-12 DIAGNOSIS — R25.2 MUSCLE CRAMPS: ICD-10-CM

## 2024-07-12 DIAGNOSIS — D69.6 THROMBOCYTOPENIA (HCC): ICD-10-CM

## 2024-07-12 LAB
ANION GAP SERPL CALCULATED.3IONS-SCNC: 11 MMOL/L (ref 9–16)
BASOPHILS # BLD: 0.02 K/UL (ref 0–0.2)
BASOPHILS NFR BLD: 1 % (ref 0–2)
BUN SERPL-MCNC: 21 MG/DL (ref 8–23)
CALCIUM SERPL-MCNC: 8.5 MG/DL (ref 8.6–10.4)
CHLORIDE SERPL-SCNC: 101 MMOL/L (ref 98–107)
CO2 SERPL-SCNC: 21 MMOL/L (ref 20–31)
CREAT SERPL-MCNC: 0.8 MG/DL (ref 0.7–1.2)
EOSINOPHIL # BLD: 0.05 K/UL (ref 0–0.4)
EOSINOPHILS RELATIVE PERCENT: 3 % (ref 1–4)
ERYTHROCYTE [DISTWIDTH] IN BLOOD BY AUTOMATED COUNT: 17.5 % (ref 11.8–14.4)
GFR, ESTIMATED: >90 ML/MIN/1.73M2
GLUCOSE SERPL-MCNC: 170 MG/DL (ref 74–99)
HCT VFR BLD AUTO: 40.8 % (ref 40.7–50.3)
HGB BLD-MCNC: 12.9 G/DL (ref 13–17)
IMM GRANULOCYTES # BLD AUTO: 0 K/UL (ref 0–0.3)
IMM GRANULOCYTES NFR BLD: 0 %
LYMPHOCYTES NFR BLD: 0.65 K/UL (ref 1–4.8)
LYMPHOCYTES RELATIVE PERCENT: 36 % (ref 24–44)
MAGNESIUM SERPL-MCNC: 2 MG/DL (ref 1.6–2.4)
MCH RBC QN AUTO: 27.3 PG (ref 25.2–33.5)
MCHC RBC AUTO-ENTMCNC: 31.6 G/DL (ref 28.4–34.8)
MCV RBC AUTO: 86.3 FL (ref 82.6–102.9)
MONOCYTES NFR BLD: 0.67 K/UL (ref 0.1–0.8)
MONOCYTES NFR BLD: 37 % (ref 1–7)
MORPHOLOGY: ABNORMAL
NEUTROPHILS NFR BLD: 23 % (ref 36–66)
NEUTS SEG NFR BLD: 0.41 K/UL (ref 1.8–7.7)
NRBC BLD-RTO: 0 PER 100 WBC
PLATELET # BLD AUTO: ABNORMAL K/UL (ref 138–453)
PLATELET, FLUORESCENCE: 56 K/UL (ref 138–453)
PLATELETS.RETICULATED NFR BLD AUTO: 4.9 % (ref 1.1–10.3)
POTASSIUM SERPL-SCNC: 5 MMOL/L (ref 3.7–5.3)
RBC # BLD AUTO: 4.73 M/UL (ref 4.21–5.77)
SODIUM SERPL-SCNC: 133 MMOL/L (ref 136–145)
WBC OTHER # BLD: 1.8 K/UL (ref 3.5–11.3)

## 2024-07-12 PROCEDURE — 36415 COLL VENOUS BLD VENIPUNCTURE: CPT

## 2024-07-12 PROCEDURE — 85055 RETICULATED PLATELET ASSAY: CPT

## 2024-07-12 PROCEDURE — 83735 ASSAY OF MAGNESIUM: CPT

## 2024-07-12 PROCEDURE — 85025 COMPLETE CBC W/AUTO DIFF WBC: CPT

## 2024-07-12 PROCEDURE — 80048 BASIC METABOLIC PNL TOTAL CA: CPT

## 2024-07-12 RX ORDER — INSULIN GLARGINE 300 U/ML
10 INJECTION, SOLUTION SUBCUTANEOUS
Qty: 1 ADJUSTABLE DOSE PRE-FILLED PEN SYRINGE | Refills: 2 | Status: SHIPPED | OUTPATIENT
Start: 2024-07-12

## 2024-07-12 RX ORDER — CYCLOBENZAPRINE HCL 10 MG
10 TABLET ORAL PRN
Qty: 90 TABLET | Refills: 0 | Status: CANCELLED | OUTPATIENT
Start: 2024-07-12

## 2024-07-12 RX ORDER — CYCLOBENZAPRINE HCL 10 MG
10 TABLET ORAL PRN
Qty: 90 TABLET | Refills: 0 | Status: SHIPPED | OUTPATIENT
Start: 2024-07-12

## 2024-07-12 RX ORDER — INSULIN GLARGINE 300 U/ML
10 INJECTION, SOLUTION SUBCUTANEOUS
Qty: 1 ADJUSTABLE DOSE PRE-FILLED PEN SYRINGE | Refills: 2 | Status: CANCELLED | OUTPATIENT
Start: 2024-07-12

## 2024-07-17 DIAGNOSIS — E11.65 CONTROLLED TYPE 2 DIABETES MELLITUS WITH HYPERGLYCEMIA, WITH LONG-TERM CURRENT USE OF INSULIN (HCC): ICD-10-CM

## 2024-07-17 DIAGNOSIS — Z79.4 CONTROLLED TYPE 2 DIABETES MELLITUS WITH HYPERGLYCEMIA, WITH LONG-TERM CURRENT USE OF INSULIN (HCC): ICD-10-CM

## 2024-07-17 RX ORDER — INSULIN GLARGINE 300 U/ML
24 INJECTION, SOLUTION SUBCUTANEOUS
Qty: 3 ADJUSTABLE DOSE PRE-FILLED PEN SYRINGE | Refills: 2 | Status: SHIPPED | OUTPATIENT
Start: 2024-07-17

## 2024-07-30 ENCOUNTER — OFFICE VISIT (OUTPATIENT)
Dept: FAMILY MEDICINE CLINIC | Age: 74
End: 2024-07-30
Payer: MEDICARE

## 2024-07-30 VITALS
HEIGHT: 72 IN | SYSTOLIC BLOOD PRESSURE: 112 MMHG | OXYGEN SATURATION: 100 % | BODY MASS INDEX: 18.45 KG/M2 | TEMPERATURE: 96.8 F | WEIGHT: 136.2 LBS | DIASTOLIC BLOOD PRESSURE: 62 MMHG | HEART RATE: 62 BPM

## 2024-07-30 DIAGNOSIS — Z00.00 MEDICARE ANNUAL WELLNESS VISIT, SUBSEQUENT: Primary | ICD-10-CM

## 2024-07-30 DIAGNOSIS — E87.6 HYPOKALEMIA: ICD-10-CM

## 2024-07-30 DIAGNOSIS — J90 PLEURAL EFFUSION ON RIGHT: ICD-10-CM

## 2024-07-30 DIAGNOSIS — E11.65 CONTROLLED TYPE 2 DIABETES MELLITUS WITH HYPERGLYCEMIA, WITH LONG-TERM CURRENT USE OF INSULIN (HCC): ICD-10-CM

## 2024-07-30 DIAGNOSIS — T14.8XXA SUPERFICIAL ABRASION: ICD-10-CM

## 2024-07-30 DIAGNOSIS — K74.60 CIRRHOSIS OF LIVER WITH ASCITES, UNSPECIFIED HEPATIC CIRRHOSIS TYPE (HCC): ICD-10-CM

## 2024-07-30 DIAGNOSIS — Z79.4 CONTROLLED TYPE 2 DIABETES MELLITUS WITH HYPERGLYCEMIA, WITH LONG-TERM CURRENT USE OF INSULIN (HCC): ICD-10-CM

## 2024-07-30 DIAGNOSIS — E87.1 HYPONATREMIA: ICD-10-CM

## 2024-07-30 DIAGNOSIS — R18.8 CIRRHOSIS OF LIVER WITH ASCITES, UNSPECIFIED HEPATIC CIRRHOSIS TYPE (HCC): ICD-10-CM

## 2024-07-30 LAB — HBA1C MFR BLD: 7.5 %

## 2024-07-30 PROCEDURE — 83036 HEMOGLOBIN GLYCOSYLATED A1C: CPT

## 2024-07-30 PROCEDURE — 1123F ACP DISCUSS/DSCN MKR DOCD: CPT

## 2024-07-30 PROCEDURE — 1036F TOBACCO NON-USER: CPT

## 2024-07-30 PROCEDURE — 99214 OFFICE O/P EST MOD 30 MIN: CPT

## 2024-07-30 PROCEDURE — G8427 DOCREV CUR MEDS BY ELIG CLIN: HCPCS

## 2024-07-30 PROCEDURE — G0439 PPPS, SUBSEQ VISIT: HCPCS

## 2024-07-30 PROCEDURE — 3017F COLORECTAL CA SCREEN DOC REV: CPT

## 2024-07-30 PROCEDURE — 3051F HG A1C>EQUAL 7.0%<8.0%: CPT

## 2024-07-30 PROCEDURE — 2022F DILAT RTA XM EVC RTNOPTHY: CPT

## 2024-07-30 PROCEDURE — G8419 CALC BMI OUT NRM PARAM NOF/U: HCPCS

## 2024-07-30 RX ORDER — INSULIN ASPART 100 [IU]/ML
2-5 INJECTION, SOLUTION INTRAVENOUS; SUBCUTANEOUS
Qty: 5 ADJUSTABLE DOSE PRE-FILLED PEN SYRINGE | Refills: 3 | Status: SHIPPED | OUTPATIENT
Start: 2024-07-30

## 2024-07-30 RX ORDER — POTASSIUM CHLORIDE 20 MEQ/1
20 TABLET, EXTENDED RELEASE ORAL
Qty: 90 TABLET | Refills: 1 | Status: SHIPPED | OUTPATIENT
Start: 2024-07-31

## 2024-07-30 SDOH — HEALTH STABILITY: PHYSICAL HEALTH: ON AVERAGE, HOW MANY DAYS PER WEEK DO YOU ENGAGE IN MODERATE TO STRENUOUS EXERCISE (LIKE A BRISK WALK)?: 0 DAYS

## 2024-07-30 ASSESSMENT — PATIENT HEALTH QUESTIONNAIRE - PHQ9
2. FEELING DOWN, DEPRESSED OR HOPELESS: NOT AT ALL
SUM OF ALL RESPONSES TO PHQ QUESTIONS 1-9: 0
SUM OF ALL RESPONSES TO PHQ QUESTIONS 1-9: 0
SUM OF ALL RESPONSES TO PHQ9 QUESTIONS 1 & 2: 0
1. LITTLE INTEREST OR PLEASURE IN DOING THINGS: NOT AT ALL
SUM OF ALL RESPONSES TO PHQ QUESTIONS 1-9: 0
SUM OF ALL RESPONSES TO PHQ QUESTIONS 1-9: 0

## 2024-07-30 ASSESSMENT — LIFESTYLE VARIABLES
HOW MANY STANDARD DRINKS CONTAINING ALCOHOL DO YOU HAVE ON A TYPICAL DAY: 0
HOW OFTEN DO YOU HAVE SIX OR MORE DRINKS ON ONE OCCASION: 1
HOW MANY STANDARD DRINKS CONTAINING ALCOHOL DO YOU HAVE ON A TYPICAL DAY: PATIENT DOES NOT DRINK
HOW OFTEN DO YOU HAVE A DRINK CONTAINING ALCOHOL: NEVER
HOW OFTEN DO YOU HAVE A DRINK CONTAINING ALCOHOL: 1

## 2024-07-30 NOTE — PROGRESS NOTES
Medicare Annual Wellness Visit    Candelario Bliss  is here for Medicare AWV    Assessment & Plan   Medicare annual wellness visit, subsequent    Controlled type 2 diabetes mellitus with hyperglycemia, with long-term current use of insulin (HCC)  -A1C 7.5, having lower readings of 80s in PM w/ increased Toujeo  -will add low dose sliding scale for meal time to improve average    -     POCT glycosylated hemoglobin (Hb A1C)  -     insulin aspart (NOVOLOG FLEXPEN) 100 UNIT/ML injection pen; Inject 2-5 Units into the skin 3 times daily (before meals), Disp-5 Adjustable Dose Pre-filled Pen Syringe, R-3Normal    Superficial abrasion  Pleural effusion on right  -tube site irritation around pigtail drain to right lung    -     mupirocin (BACTROBAN) 2 % ointment; Apply topically 3 times daily., Disp-30 g, R-2, Normal    Hypokalemia  -med refill    -     potassium chloride (KLOR-CON M) 20 MEQ extended release tablet; Take 1 tablet by mouth three times a week Ozbynj-Kbotxluke-Myasav, Disp-90 tablet, R-1Normal      Cirrhosis of liver with ascites, unspecified hepatic cirrhosis type (HCC)  -following w/ GI    Hyponatremia  -stable, seen today by new nephro team    Recommendations for Preventive Services Due: see orders and patient instructions/AVS.  Recommended screening schedule for the next 5-10 years is provided to the patient in written form: see Patient Instructions/AVS.     Return in about 3 months (around 10/30/2024), or if symptoms worsen or fail to improve, for NTP w/ Walker.     Subjective   The following acute and/or chronic problems were also addressed today:      See above    Patient's complete Health Risk Assessment and screening values have been reviewed and are found in Flowsheets. The following problems were reviewed today and where indicated follow up appointments were made and/or referrals ordered.    Positive Risk Factor Screenings with Interventions:    Fall Risk:  Do you feel unsteady or are you worried about

## 2024-08-02 ENCOUNTER — TELEPHONE (OUTPATIENT)
Dept: FAMILY MEDICINE CLINIC | Age: 74
End: 2024-08-02

## 2024-08-07 ENCOUNTER — PATIENT MESSAGE (OUTPATIENT)
Dept: FAMILY MEDICINE CLINIC | Age: 74
End: 2024-08-07

## 2024-08-07 NOTE — TELEPHONE ENCOUNTER
From: Candelario Bliss Jr.  To: Alesha Zaragoza  Sent: 8/7/2024 2:40 PM EDT  Subject: FMLA    Hi Katina!  Just checking on the FMLA for our daughter. It's due next wednesday.  Thank you so much!  Martha

## 2024-08-08 ENCOUNTER — TELEPHONE (OUTPATIENT)
Dept: FAMILY MEDICINE CLINIC | Age: 74
End: 2024-08-08

## 2024-08-09 ENCOUNTER — TELEPHONE (OUTPATIENT)
Dept: FAMILY MEDICINE CLINIC | Age: 74
End: 2024-08-09

## 2024-08-09 DIAGNOSIS — E11.65 CONTROLLED TYPE 2 DIABETES MELLITUS WITH HYPERGLYCEMIA, WITH LONG-TERM CURRENT USE OF INSULIN (HCC): Primary | ICD-10-CM

## 2024-08-09 DIAGNOSIS — Z79.4 CONTROLLED TYPE 2 DIABETES MELLITUS WITH HYPERGLYCEMIA, WITH LONG-TERM CURRENT USE OF INSULIN (HCC): Primary | ICD-10-CM

## 2024-08-09 DIAGNOSIS — I95.9 HYPOTENSION, UNSPECIFIED HYPOTENSION TYPE: Primary | ICD-10-CM

## 2024-08-09 RX ORDER — INSULIN LISPRO 100 [IU]/ML
2-5 INJECTION, SOLUTION INTRAVENOUS; SUBCUTANEOUS
Qty: 6 ML | Refills: 3 | Status: SHIPPED | OUTPATIENT
Start: 2024-08-09

## 2024-08-09 RX ORDER — MIDODRINE HYDROCHLORIDE 5 MG/1
5 TABLET ORAL 3 TIMES DAILY PRN
Qty: 90 TABLET | Refills: 3 | Status: SHIPPED | OUTPATIENT
Start: 2024-08-09

## 2024-08-09 NOTE — TELEPHONE ENCOUNTER
If the order can be transferred for the pens that would be preferred. They were listed as not covered when med was selected.

## 2024-08-09 NOTE — TELEPHONE ENCOUNTER
Kait (sp?) from [facility] called wanting to inform Alesha that the Humalog 100Unit/ML injection cartridges are going to be more expensive that the pens for the patient to pay and would like to know if she should proceed with filling the prescription?    Please call the pharmacy back to let them know how to fill this prescription.    Thank you.

## 2024-08-16 DIAGNOSIS — Z79.4 CONTROLLED TYPE 2 DIABETES MELLITUS WITH HYPERGLYCEMIA, WITH LONG-TERM CURRENT USE OF INSULIN (HCC): Primary | ICD-10-CM

## 2024-08-16 DIAGNOSIS — E11.65 CONTROLLED TYPE 2 DIABETES MELLITUS WITH HYPERGLYCEMIA, WITH LONG-TERM CURRENT USE OF INSULIN (HCC): Primary | ICD-10-CM

## 2024-08-16 RX ORDER — INSULIN LISPRO 100 [IU]/ML
2-5 INJECTION, SOLUTION INTRAVENOUS; SUBCUTANEOUS
Qty: 5 ADJUSTABLE DOSE PRE-FILLED PEN SYRINGE | Refills: 1 | Status: SHIPPED | OUTPATIENT
Start: 2024-08-16

## 2024-08-16 NOTE — TELEPHONE ENCOUNTER
Spoke to Ceci at the [facility]. She stated that she's not sure why the pen showed as not covered when the rx was selected, but it does look like the patient got the pen in the past. Ceci checked with the pharmacist and they did say they need a new order for the Humalog pen instead of the cartridges.

## 2024-09-03 ENCOUNTER — PATIENT MESSAGE (OUTPATIENT)
Dept: GASTROENTEROLOGY | Age: 74
End: 2024-09-03

## 2024-09-03 DIAGNOSIS — C61 PROSTATE CANCER (HCC): ICD-10-CM

## 2024-09-03 DIAGNOSIS — I85.00 IDIOPATHIC ESOPHAGEAL VARICES WITHOUT BLEEDING (HCC): ICD-10-CM

## 2024-09-03 DIAGNOSIS — K74.69 OTHER CIRRHOSIS OF LIVER (HCC): ICD-10-CM

## 2024-09-03 DIAGNOSIS — R18.8 OTHER ASCITES: ICD-10-CM

## 2024-09-03 DIAGNOSIS — K21.9 GASTROESOPHAGEAL REFLUX DISEASE, UNSPECIFIED WHETHER ESOPHAGITIS PRESENT: Primary | ICD-10-CM

## 2024-09-03 DIAGNOSIS — D50.8 OTHER IRON DEFICIENCY ANEMIA: ICD-10-CM

## 2024-09-03 DIAGNOSIS — K74.60 CIRRHOSIS OF LIVER WITH ASCITES, UNSPECIFIED HEPATIC CIRRHOSIS TYPE (HCC): ICD-10-CM

## 2024-09-03 DIAGNOSIS — R18.8 CIRRHOSIS OF LIVER WITH ASCITES, UNSPECIFIED HEPATIC CIRRHOSIS TYPE (HCC): ICD-10-CM

## 2024-09-05 ENCOUNTER — HOSPITAL ENCOUNTER (OUTPATIENT)
Age: 74
Setting detail: SPECIMEN
Discharge: HOME | End: 2024-09-05

## 2024-09-05 DIAGNOSIS — C61 PROSTATE CANCER (HCC): ICD-10-CM

## 2024-09-05 DIAGNOSIS — R18.8 OTHER ASCITES: ICD-10-CM

## 2024-09-05 DIAGNOSIS — D61.818 PANCYTOPENIA (HCC): ICD-10-CM

## 2024-09-05 DIAGNOSIS — D69.6 THROMBOCYTOPENIA (HCC): ICD-10-CM

## 2024-09-05 DIAGNOSIS — R18.8 CIRRHOSIS OF LIVER WITH ASCITES, UNSPECIFIED HEPATIC CIRRHOSIS TYPE (HCC): ICD-10-CM

## 2024-09-05 DIAGNOSIS — K74.60 CIRRHOSIS OF LIVER WITH ASCITES, UNSPECIFIED HEPATIC CIRRHOSIS TYPE (HCC): ICD-10-CM

## 2024-09-05 DIAGNOSIS — I85.00 IDIOPATHIC ESOPHAGEAL VARICES WITHOUT BLEEDING (HCC): ICD-10-CM

## 2024-09-05 DIAGNOSIS — D50.8 OTHER IRON DEFICIENCY ANEMIA: ICD-10-CM

## 2024-09-05 DIAGNOSIS — D50.9 MICROCYTIC HYPOCHROMIC ANEMIA: ICD-10-CM

## 2024-09-05 DIAGNOSIS — K74.69 OTHER CIRRHOSIS OF LIVER (HCC): ICD-10-CM

## 2024-09-05 DIAGNOSIS — D47.2 MGUS (MONOCLONAL GAMMOPATHY OF UNKNOWN SIGNIFICANCE): ICD-10-CM

## 2024-09-05 DIAGNOSIS — K21.9 GASTROESOPHAGEAL REFLUX DISEASE, UNSPECIFIED WHETHER ESOPHAGITIS PRESENT: ICD-10-CM

## 2024-09-05 LAB
ALBUMIN SERPL-MCNC: 3 G/DL (ref 3.5–5.2)
ALBUMIN/GLOB SERPL: 1 {RATIO} (ref 1–2.5)
ALP SERPL-CCNC: 246 U/L (ref 40–129)
ALT SERPL-CCNC: 42 U/L (ref 10–50)
ANION GAP SERPL CALCULATED.3IONS-SCNC: 8 MMOL/L (ref 9–16)
AST SERPL-CCNC: 49 U/L (ref 10–50)
BILIRUB DIRECT SERPL-MCNC: 0.5 MG/DL (ref 0–0.2)
BILIRUB INDIRECT SERPL-MCNC: 0.5 MG/DL (ref 0–1)
BILIRUB SERPL-MCNC: 1 MG/DL (ref 0–1.2)
BUN SERPL-MCNC: 25 MG/DL (ref 8–23)
CHLORIDE SERPL-SCNC: 101 MMOL/L (ref 98–107)
CO2 SERPL-SCNC: 23 MMOL/L (ref 20–31)
CREAT SERPL-MCNC: 0.8 MG/DL (ref 0.7–1.2)
GFR, ESTIMATED: >90 ML/MIN/1.73M2
GLOBULIN SER CALC-MCNC: 3.7 G/DL
IGA SERPL-MCNC: 1078 MG/DL (ref 70–400)
IGG SERPL-MCNC: 1824 MG/DL (ref 700–1600)
IGM SERPL-MCNC: 32 MG/DL (ref 40–230)
IRON SATN MFR SERPL: 19 % (ref 20–55)
IRON SERPL-MCNC: 53 UG/DL (ref 61–157)
POTASSIUM SERPL-SCNC: 4.7 MMOL/L (ref 3.7–5.3)
PROT SERPL-MCNC: 6.7 G/DL (ref 6.6–8.7)
SODIUM SERPL-SCNC: 132 MMOL/L (ref 136–145)
TIBC SERPL-MCNC: 279 UG/DL (ref 250–450)
UNSATURATED IRON BINDING CAPACITY: 226 UG/DL (ref 112–347)

## 2024-09-06 LAB
AFP SERPL-MCNC: 8.9 UG/L
ALBUMIN PERCENT: 42 % (ref 45–65)
ALBUMIN SERPL-MCNC: 2.7 G/DL (ref 3.2–5.2)
ALPHA 2 PERCENT: 8 % (ref 6–13)
ALPHA1 GLOB SERPL ELPH-MCNC: 0.3 G/DL (ref 0.1–0.4)
ALPHA1 GLOB SERPL ELPH-MCNC: 5 % (ref 3–6)
ALPHA2 GLOB SERPL ELPH-MCNC: 0.5 G/DL (ref 0.5–0.9)
B-GLOBULIN SERPL ELPH-MCNC: 1.5 G/DL (ref 0.5–1.1)
B-GLOBULIN SERPL ELPH-MCNC: 23 % (ref 11–19)
BASOPHILS # BLD: 0.02 K/UL (ref 0–0.2)
BASOPHILS NFR BLD: 1 % (ref 0–2)
EOSINOPHIL # BLD: 0.13 K/UL (ref 0–0.44)
EOSINOPHILS RELATIVE PERCENT: 8 % (ref 1–4)
ERYTHROCYTE [DISTWIDTH] IN BLOOD BY AUTOMATED COUNT: 16.8 % (ref 11.8–14.4)
FOLATE SERPL-MCNC: 13.9 NG/ML (ref 4.8–24.2)
FREE KAPPA/LAMBDA RATIO: 1.34 (ref 0.22–1.74)
GAMMA GLOB SERPL ELPH-MCNC: 1.4 G/DL (ref 0.5–1.5)
GAMMA GLOBULIN %: 22 % (ref 9–20)
HCT VFR BLD AUTO: 42.7 % (ref 40.7–50.3)
HGB BLD-MCNC: 13.1 G/DL (ref 13–17)
IMM GRANULOCYTES # BLD AUTO: 0.02 K/UL (ref 0–0.3)
IMM GRANULOCYTES NFR BLD: 1 %
ITYP INTERPRETATION: NORMAL
KAPPA LC FREE SER-MCNC: 69.4 MG/L
LAMBDA LC FREE SERPL-MCNC: 51.9 MG/L (ref 4.2–27.7)
LYMPHOCYTES NFR BLD: 0.27 K/UL (ref 1.1–3.7)
LYMPHOCYTES RELATIVE PERCENT: 17 % (ref 24–43)
MCH RBC QN AUTO: 27.3 PG (ref 25.2–33.5)
MCHC RBC AUTO-ENTMCNC: 30.7 G/DL (ref 28.4–34.8)
MCV RBC AUTO: 89.1 FL (ref 82.6–102.9)
MONOCYTES NFR BLD: 0.45 K/UL (ref 0.1–1.2)
MONOCYTES NFR BLD: 28 % (ref 3–12)
MORPHOLOGY: NORMAL
NEUTROPHILS NFR BLD: 45 % (ref 36–65)
NEUTS SEG NFR BLD: 0.71 K/UL (ref 1.5–8.1)
NRBC BLD-RTO: 0 PER 100 WBC
PATH REV: NORMAL
PATHOLOGIST: ABNORMAL
PLATELET # BLD AUTO: ABNORMAL K/UL (ref 138–453)
PLATELET, FLUORESCENCE: 46 K/UL (ref 138–453)
PLATELETS.RETICULATED NFR BLD AUTO: 4.9 % (ref 1.1–10.3)
PROT PATTERN SERPL ELPH-IMP: ABNORMAL
PROT SERPL-MCNC: 6.4 G/DL (ref 6.6–8.7)
RBC # BLD AUTO: 4.79 M/UL (ref 4.21–5.77)
TOTAL PROT. SUM,%: 100 % (ref 98–102)
TOTAL PROT. SUM: 6.4 G/DL (ref 6.3–8.2)
VIT B12 SERPL-MCNC: >2000 PG/ML (ref 232–1245)
WBC OTHER # BLD: 1.6 K/UL (ref 3.5–11.3)

## 2024-09-10 ENCOUNTER — PATIENT MESSAGE (OUTPATIENT)
Dept: GASTROENTEROLOGY | Age: 74
End: 2024-09-10

## 2024-09-10 DIAGNOSIS — I85.00 PORTAL HYPERTENSION WITH ESOPHAGEAL VARICES (HCC): ICD-10-CM

## 2024-09-10 DIAGNOSIS — K76.6 PORTAL HYPERTENSION WITH ESOPHAGEAL VARICES (HCC): ICD-10-CM

## 2024-09-11 RX ORDER — PROPRANOLOL HCL 60 MG
60 CAPSULE, EXTENDED RELEASE 24HR ORAL 2 TIMES DAILY
Qty: 180 CAPSULE | Refills: 0 | Status: ON HOLD | OUTPATIENT
Start: 2024-09-11

## 2024-09-11 RX ORDER — FUROSEMIDE 40 MG
40 TABLET ORAL DAILY
Qty: 90 TABLET | Refills: 1 | Status: ON HOLD | OUTPATIENT
Start: 2024-09-11

## 2024-09-12 ENCOUNTER — HOSPITAL ENCOUNTER (OUTPATIENT)
Dept: INTERVENTIONAL RADIOLOGY/VASCULAR | Age: 74
Discharge: HOME | End: 2024-09-14
Payer: MEDICARE

## 2024-09-12 ENCOUNTER — TELEPHONE (OUTPATIENT)
Dept: ONCOLOGY | Age: 74
End: 2024-09-12

## 2024-09-12 ENCOUNTER — OFFICE VISIT (OUTPATIENT)
Dept: ONCOLOGY | Age: 74
End: 2024-09-12
Payer: MEDICARE

## 2024-09-12 VITALS
DIASTOLIC BLOOD PRESSURE: 64 MMHG | RESPIRATION RATE: 16 BRPM | SYSTOLIC BLOOD PRESSURE: 105 MMHG | TEMPERATURE: 98.7 F | BODY MASS INDEX: 17.72 KG/M2 | WEIGHT: 130.7 LBS | HEART RATE: 64 BPM

## 2024-09-12 DIAGNOSIS — D47.2 MGUS (MONOCLONAL GAMMOPATHY OF UNKNOWN SIGNIFICANCE): Primary | ICD-10-CM

## 2024-09-12 DIAGNOSIS — R18.8 CIRRHOSIS OF LIVER WITH ASCITES, UNSPECIFIED HEPATIC CIRRHOSIS TYPE (HCC): ICD-10-CM

## 2024-09-12 DIAGNOSIS — Z85.05 H/O LIVER CANCER: ICD-10-CM

## 2024-09-12 DIAGNOSIS — D61.818 PANCYTOPENIA (HCC): ICD-10-CM

## 2024-09-12 DIAGNOSIS — D89.2 PARAPROTEINEMIA: ICD-10-CM

## 2024-09-12 DIAGNOSIS — K74.60 CIRRHOSIS OF LIVER WITH ASCITES, UNSPECIFIED HEPATIC CIRRHOSIS TYPE (HCC): ICD-10-CM

## 2024-09-12 PROBLEM — E46 PROTEIN CALORIE MALNUTRITION (HCC): Status: ACTIVE | Noted: 2024-09-12

## 2024-09-12 PROCEDURE — 87071 CULTURE AEROBIC QUANT OTHER: CPT

## 2024-09-12 PROCEDURE — G8427 DOCREV CUR MEDS BY ELIG CLIN: HCPCS | Performed by: INTERNAL MEDICINE

## 2024-09-12 PROCEDURE — 87070 CULTURE OTHR SPECIMN AEROBIC: CPT

## 2024-09-12 PROCEDURE — 99214 OFFICE O/P EST MOD 30 MIN: CPT | Performed by: INTERNAL MEDICINE

## 2024-09-12 PROCEDURE — 1123F ACP DISCUSS/DSCN MKR DOCD: CPT | Performed by: INTERNAL MEDICINE

## 2024-09-12 PROCEDURE — 3017F COLORECTAL CA SCREEN DOC REV: CPT | Performed by: INTERNAL MEDICINE

## 2024-09-12 PROCEDURE — G8419 CALC BMI OUT NRM PARAM NOF/U: HCPCS | Performed by: INTERNAL MEDICINE

## 2024-09-12 PROCEDURE — 87075 CULTR BACTERIA EXCEPT BLOOD: CPT

## 2024-09-12 PROCEDURE — 87205 SMEAR GRAM STAIN: CPT

## 2024-09-12 PROCEDURE — 2709999900 IR REMOVE TUNNELED INTRAPERITONEAL CATH

## 2024-09-12 PROCEDURE — 1036F TOBACCO NON-USER: CPT | Performed by: INTERNAL MEDICINE

## 2024-09-12 PROCEDURE — 32552 REMOVE LUNG CATHETER: CPT

## 2024-09-12 PROCEDURE — 99211 OFF/OP EST MAY X REQ PHY/QHP: CPT | Performed by: INTERNAL MEDICINE

## 2024-09-12 PROCEDURE — 0W9930Z DRAINAGE OF RIGHT PLEURAL CAVITY WITH DRAINAGE DEVICE, PERCUTANEOUS APPROACH: ICD-10-PCS | Performed by: INTERNAL MEDICINE

## 2024-09-13 ENCOUNTER — APPOINTMENT (OUTPATIENT)
Dept: GENERAL RADIOLOGY | Age: 74
DRG: 871 | End: 2024-09-13
Attending: EMERGENCY MEDICINE
Payer: MEDICARE

## 2024-09-13 ENCOUNTER — APPOINTMENT (OUTPATIENT)
Dept: CT IMAGING | Age: 74
DRG: 871 | End: 2024-09-13
Payer: MEDICARE

## 2024-09-13 ENCOUNTER — HOSPITAL ENCOUNTER (INPATIENT)
Age: 74
LOS: 11 days | Discharge: HOME HEALTH | DRG: 871 | End: 2024-09-24
Attending: EMERGENCY MEDICINE | Admitting: INTERNAL MEDICINE
Payer: MEDICARE

## 2024-09-13 DIAGNOSIS — Z79.4 CONTROLLED TYPE 2 DIABETES MELLITUS WITH HYPERGLYCEMIA, WITH LONG-TERM CURRENT USE OF INSULIN (HCC): ICD-10-CM

## 2024-09-13 DIAGNOSIS — E87.5 HYPERKALEMIA: ICD-10-CM

## 2024-09-13 DIAGNOSIS — I50.9 CONGESTIVE HEART FAILURE, UNSPECIFIED HF CHRONICITY, UNSPECIFIED HEART FAILURE TYPE (HCC): ICD-10-CM

## 2024-09-13 DIAGNOSIS — E11.65 CONTROLLED TYPE 2 DIABETES MELLITUS WITH HYPERGLYCEMIA, WITH LONG-TERM CURRENT USE OF INSULIN (HCC): ICD-10-CM

## 2024-09-13 DIAGNOSIS — R53.1 GENERAL WEAKNESS: Primary | ICD-10-CM
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
## 2024-09-13 LAB
25(OH)D3 SERPL-MCNC: 18 NG/ML (ref 30–100)
ALBUMIN SERPL-MCNC: 2.3 G/DL (ref 3.5–5.2)
ALP SERPL-CCNC: 245 U/L (ref 40–129)
ALT SERPL-CCNC: 70 U/L (ref 5–41)
ANION GAP SERPL CALCULATED.3IONS-SCNC: 10 MMOL/L (ref 9–17)
AST SERPL-CCNC: 64 U/L
BASOPHILS # BLD: 0.08 K/UL (ref 0–0.2)
BASOPHILS NFR BLD: 1 % (ref 0–2)
BILIRUB SERPL-MCNC: 1.4 MG/DL (ref 0.3–1.2)
BILIRUB UR QL STRIP: NEGATIVE
BUN SERPL-MCNC: 59 MG/DL (ref 8–23)
BUN/CREAT SERPL: 74 (ref 9–20)
CALCIUM SERPL-MCNC: 8.7 MG/DL (ref 8.6–10.4)
CASE NUMBER:: NORMAL
CHLORIDE SERPL-SCNC: 102 MMOL/L (ref 98–107)
CLARITY UR: CLEAR
CO2 SERPL-SCNC: 21 MMOL/L (ref 20–31)
COLOR UR: YELLOW
COMMENT: ABNORMAL
CREAT SERPL-MCNC: 0.8 MG/DL (ref 0.7–1.2)
EOSINOPHIL # BLD: 0.08 K/UL (ref 0–0.44)
EOSINOPHILS RELATIVE PERCENT: 1 % (ref 1–4)
ERYTHROCYTE [DISTWIDTH] IN BLOOD BY AUTOMATED COUNT: 16.6 % (ref 11.8–14.4)
FERRITIN SERPL-MCNC: 124 NG/ML (ref 30–400)
FOLATE SERPL-MCNC: 17.8 NG/ML (ref 4.8–24.2)
GFR, ESTIMATED: >90 ML/MIN/1.73M2
GLUCOSE BLD-MCNC: 258 MG/DL (ref 75–110)
GLUCOSE BLD-MCNC: 291 MG/DL (ref 75–110)
GLUCOSE BLD-MCNC: 332 MG/DL (ref 75–110)
GLUCOSE BLD-MCNC: 353 MG/DL (ref 75–110)
GLUCOSE SERPL-MCNC: 306 MG/DL (ref 70–99)
GLUCOSE UR STRIP-MCNC: ABNORMAL MG/DL
HCT VFR BLD AUTO: 27.8 % (ref 40.7–50.3)
HCT VFR BLD AUTO: 29.3 % (ref 40.7–50.3)
HGB BLD-MCNC: 8.6 G/DL (ref 13–17)
HGB BLD-MCNC: 9.1 G/DL (ref 13–17)
HGB UR QL STRIP.AUTO: NEGATIVE
IMM GRANULOCYTES # BLD AUTO: 0.08 K/UL (ref 0–0.3)
IMM GRANULOCYTES NFR BLD: 1 %
INR PPP: 1.4
IRON SATN MFR SERPL: 62 % (ref 20–55)
IRON SERPL-MCNC: 131 UG/DL (ref 61–157)
KETONES UR STRIP-MCNC: NEGATIVE MG/DL
LEUKOCYTE ESTERASE UR QL STRIP: NEGATIVE
LYMPHOCYTES NFR BLD: 0.72 K/UL (ref 1.1–3.7)
LYMPHOCYTES RELATIVE PERCENT: 9 % (ref 24–43)
MCH RBC QN AUTO: 28.3 PG (ref 25.2–33.5)
MCHC RBC AUTO-ENTMCNC: 31.1 G/DL (ref 28.4–34.8)
MCV RBC AUTO: 91 FL (ref 82.6–102.9)
MONOCYTES NFR BLD: 1.04 K/UL (ref 0.1–1.2)
MONOCYTES NFR BLD: 13 % (ref 3–12)
NEUTROPHILS NFR BLD: 75 % (ref 36–65)
NEUTS SEG NFR BLD: 6 K/UL (ref 1.5–8.1)
NITRITE UR QL STRIP: NEGATIVE
NRBC BLD-RTO: 0.3 PER 100 WBC
PH UR STRIP: 6.5 [PH] (ref 5–8)
PLATELET # BLD AUTO: 131 K/UL (ref 138–453)
PMV BLD AUTO: 12.4 FL (ref 8.1–13.5)
POTASSIUM SERPL-SCNC: 5.2 MMOL/L (ref 3.7–5.3)
POTASSIUM SERPL-SCNC: 6.5 MMOL/L (ref 3.7–5.3)
PROT SERPL-MCNC: 4.9 G/DL (ref 6.4–8.3)
PROT UR STRIP-MCNC: NEGATIVE MG/DL
PROTHROMBIN TIME: 17.2 SEC (ref 11.5–14.2)
RBC # BLD AUTO: 3.22 M/UL (ref 4.21–5.77)
SODIUM SERPL-SCNC: 133 MMOL/L (ref 135–144)
SP GR UR STRIP: 1.01 (ref 1–1.03)
SPECIMEN DESCRIPTION: NORMAL
TIBC SERPL-MCNC: 211 UG/DL (ref 250–450)
UNSATURATED IRON BINDING CAPACITY: 80 UG/DL (ref 112–347)
UROBILINOGEN UR STRIP-ACNC: NORMAL EU/DL (ref 0–1)
VIT B12 SERPL-MCNC: >2000 PG/ML (ref 232–1245)
WBC OTHER # BLD: 8 K/UL (ref 3.5–11.3)

## 2024-09-13 PROCEDURE — 96375 TX/PRO/DX INJ NEW DRUG ADDON: CPT

## 2024-09-13 PROCEDURE — 6370000000 HC RX 637 (ALT 250 FOR IP): Performed by: EMERGENCY MEDICINE

## 2024-09-13 PROCEDURE — 2580000003 HC RX 258: Performed by: NURSE PRACTITIONER

## 2024-09-13 PROCEDURE — 82947 ASSAY GLUCOSE BLOOD QUANT: CPT

## 2024-09-13 PROCEDURE — 82306 VITAMIN D 25 HYDROXY: CPT

## 2024-09-13 PROCEDURE — 83550 IRON BINDING TEST: CPT

## 2024-09-13 PROCEDURE — 83540 ASSAY OF IRON: CPT

## 2024-09-13 PROCEDURE — 82746 ASSAY OF FOLIC ACID SERUM: CPT

## 2024-09-13 PROCEDURE — 2000000000 HC ICU R&B

## 2024-09-13 PROCEDURE — 2500000003 HC RX 250 WO HCPCS: Performed by: EMERGENCY MEDICINE

## 2024-09-13 PROCEDURE — 88112 CYTOPATH CELL ENHANCE TECH: CPT

## 2024-09-13 PROCEDURE — 87040 BLOOD CULTURE FOR BACTERIA: CPT

## 2024-09-13 PROCEDURE — 6370000000 HC RX 637 (ALT 250 FOR IP): Performed by: NURSE PRACTITIONER

## 2024-09-13 PROCEDURE — 36415 COLL VENOUS BLD VENIPUNCTURE: CPT

## 2024-09-13 PROCEDURE — 96374 THER/PROPH/DIAG INJ IV PUSH: CPT

## 2024-09-13 PROCEDURE — 93005 ELECTROCARDIOGRAM TRACING: CPT | Performed by: EMERGENCY MEDICINE

## 2024-09-13 PROCEDURE — 82607 VITAMIN B-12: CPT

## 2024-09-13 PROCEDURE — 99285 EMERGENCY DEPT VISIT HI MDM: CPT

## 2024-09-13 PROCEDURE — 81003 URINALYSIS AUTO W/O SCOPE: CPT

## 2024-09-13 PROCEDURE — 85610 PROTHROMBIN TIME: CPT

## 2024-09-13 PROCEDURE — 85025 COMPLETE CBC W/AUTO DIFF WBC: CPT

## 2024-09-13 PROCEDURE — 71250 CT THORAX DX C-: CPT

## 2024-09-13 PROCEDURE — 85018 HEMOGLOBIN: CPT

## 2024-09-13 PROCEDURE — 94761 N-INVAS EAR/PLS OXIMETRY MLT: CPT

## 2024-09-13 PROCEDURE — 84132 ASSAY OF SERUM POTASSIUM: CPT

## 2024-09-13 PROCEDURE — 88305 TISSUE EXAM BY PATHOLOGIST: CPT

## 2024-09-13 PROCEDURE — 80053 COMPREHEN METABOLIC PANEL: CPT

## 2024-09-13 PROCEDURE — 85014 HEMATOCRIT: CPT

## 2024-09-13 PROCEDURE — 82728 ASSAY OF FERRITIN: CPT

## 2024-09-13 PROCEDURE — 2580000003 HC RX 258: Performed by: EMERGENCY MEDICINE

## 2024-09-13 PROCEDURE — 71045 X-RAY EXAM CHEST 1 VIEW: CPT

## 2024-09-13 PROCEDURE — 99223 1ST HOSP IP/OBS HIGH 75: CPT | Performed by: NURSE PRACTITIONER

## 2024-09-13 RX ORDER — SPIRONOLACTONE 25 MG/1
100 TABLET ORAL DAILY
Status: DISCONTINUED | OUTPATIENT
Start: 2024-09-13 | End: 2024-09-20

## 2024-09-13 RX ORDER — SODIUM CHLORIDE 0.9 % (FLUSH) 0.9 %
10 SYRINGE (ML) INJECTION PRN
Status: DISCONTINUED | OUTPATIENT
Start: 2024-09-13 | End: 2024-09-24 | Stop reason: HOSPADM

## 2024-09-13 RX ORDER — 0.9 % SODIUM CHLORIDE 0.9 %
250 INTRAVENOUS SOLUTION INTRAVENOUS ONCE
Status: COMPLETED | OUTPATIENT
Start: 2024-09-13 | End: 2024-09-13

## 2024-09-13 RX ORDER — CALCIUM CARBONATE 500 MG/1
500 TABLET, CHEWABLE ORAL DAILY
Status: DISCONTINUED | OUTPATIENT
Start: 2024-09-13 | End: 2024-09-24 | Stop reason: HOSPADM

## 2024-09-13 RX ORDER — DEXTROSE MONOHYDRATE 100 MG/ML
INJECTION, SOLUTION INTRAVENOUS CONTINUOUS PRN
Status: DISCONTINUED | OUTPATIENT
Start: 2024-09-13 | End: 2024-09-24 | Stop reason: HOSPADM

## 2024-09-13 RX ORDER — INSULIN LISPRO 100 [IU]/ML
0-16 INJECTION, SOLUTION INTRAVENOUS; SUBCUTANEOUS
Status: DISCONTINUED | OUTPATIENT
Start: 2024-09-13 | End: 2024-09-16

## 2024-09-13 RX ORDER — LEVOFLOXACIN 500 MG/1
250 TABLET, FILM COATED ORAL EVERY OTHER DAY
Status: DISCONTINUED | OUTPATIENT
Start: 2024-09-13 | End: 2024-09-14 | Stop reason: ALTCHOICE

## 2024-09-13 RX ORDER — MIDODRINE HYDROCHLORIDE 5 MG/1
5 TABLET ORAL 3 TIMES DAILY PRN
Status: DISCONTINUED | OUTPATIENT
Start: 2024-09-13 | End: 2024-09-20

## 2024-09-13 RX ORDER — POTASSIUM CHLORIDE 7.45 MG/ML
10 INJECTION INTRAVENOUS PRN
Status: DISCONTINUED | OUTPATIENT
Start: 2024-09-13 | End: 2024-09-24 | Stop reason: HOSPADM

## 2024-09-13 RX ORDER — SODIUM CHLORIDE 9 MG/ML
INJECTION, SOLUTION INTRAVENOUS CONTINUOUS
Status: DISCONTINUED | OUTPATIENT
Start: 2024-09-13 | End: 2024-09-15

## 2024-09-13 RX ORDER — MAGNESIUM SULFATE 1 G/100ML
1000 INJECTION INTRAVENOUS PRN
Status: DISCONTINUED | OUTPATIENT
Start: 2024-09-13 | End: 2024-09-24 | Stop reason: HOSPADM

## 2024-09-13 RX ORDER — POLYETHYLENE GLYCOL 3350 17 G/17G
17 POWDER, FOR SOLUTION ORAL DAILY PRN
Status: DISCONTINUED | OUTPATIENT
Start: 2024-09-13 | End: 2024-09-24 | Stop reason: HOSPADM

## 2024-09-13 RX ORDER — INSULIN LISPRO 100 [IU]/ML
0-4 INJECTION, SOLUTION INTRAVENOUS; SUBCUTANEOUS NIGHTLY
Status: DISCONTINUED | OUTPATIENT
Start: 2024-09-13 | End: 2024-09-16

## 2024-09-13 RX ORDER — SODIUM CHLORIDE 9 MG/ML
INJECTION, SOLUTION INTRAVENOUS PRN
Status: DISCONTINUED | OUTPATIENT
Start: 2024-09-13 | End: 2024-09-24 | Stop reason: HOSPADM

## 2024-09-13 RX ORDER — PANTOPRAZOLE SODIUM 40 MG/1
40 TABLET, DELAYED RELEASE ORAL
Status: DISCONTINUED | OUTPATIENT
Start: 2024-09-14 | End: 2024-09-14

## 2024-09-13 RX ORDER — INSULIN GLARGINE 100 [IU]/ML
22 INJECTION, SOLUTION SUBCUTANEOUS
Status: DISCONTINUED | OUTPATIENT
Start: 2024-09-13 | End: 2024-09-13

## 2024-09-13 RX ORDER — FERROUS SULFATE 325(65) MG
325 TABLET, DELAYED RELEASE (ENTERIC COATED) ORAL
Status: DISCONTINUED | OUTPATIENT
Start: 2024-09-13 | End: 2024-09-24 | Stop reason: HOSPADM

## 2024-09-13 RX ORDER — CALCIUM CHLORIDE 100 MG/ML
1000 INJECTION INTRAVENOUS; INTRAVENTRICULAR ONCE
Status: COMPLETED | OUTPATIENT
Start: 2024-09-13 | End: 2024-09-13

## 2024-09-13 RX ORDER — DOCUSATE SODIUM 100 MG/1
100 CAPSULE, LIQUID FILLED ORAL DAILY
Status: DISCONTINUED | OUTPATIENT
Start: 2024-09-13 | End: 2024-09-24 | Stop reason: HOSPADM

## 2024-09-13 RX ORDER — ONDANSETRON 4 MG/1
4 TABLET, ORALLY DISINTEGRATING ORAL EVERY 8 HOURS PRN
Status: DISCONTINUED | OUTPATIENT
Start: 2024-09-13 | End: 2024-09-15

## 2024-09-13 RX ORDER — MUPIROCIN 20 MG/G
OINTMENT TOPICAL
COMMUNITY

## 2024-09-13 RX ORDER — PROPRANOLOL HCL 60 MG
60 CAPSULE, EXTENDED RELEASE 24HR ORAL 2 TIMES DAILY
Status: DISCONTINUED | OUTPATIENT
Start: 2024-09-13 | End: 2024-09-20

## 2024-09-13 RX ORDER — FUROSEMIDE 40 MG
40 TABLET ORAL DAILY
Status: DISCONTINUED | OUTPATIENT
Start: 2024-09-13 | End: 2024-09-18

## 2024-09-13 RX ORDER — DEXTROSE MONOHYDRATE 25 G/50ML
25 INJECTION, SOLUTION INTRAVENOUS ONCE
Status: COMPLETED | OUTPATIENT
Start: 2024-09-13 | End: 2024-09-13

## 2024-09-13 RX ORDER — INSULIN GLARGINE 100 [IU]/ML
28 INJECTION, SOLUTION SUBCUTANEOUS
Status: DISCONTINUED | OUTPATIENT
Start: 2024-09-14 | End: 2024-09-16

## 2024-09-13 RX ORDER — POTASSIUM CHLORIDE 1500 MG/1
40 TABLET, EXTENDED RELEASE ORAL PRN
Status: DISCONTINUED | OUTPATIENT
Start: 2024-09-13 | End: 2024-09-24 | Stop reason: HOSPADM

## 2024-09-13 RX ORDER — SODIUM FLUORIDE 6 MG/ML
PASTE, DENTIFRICE DENTAL DAILY
COMMUNITY

## 2024-09-13 RX ORDER — ONDANSETRON 2 MG/ML
4 INJECTION INTRAMUSCULAR; INTRAVENOUS EVERY 6 HOURS PRN
Status: DISCONTINUED | OUTPATIENT
Start: 2024-09-13 | End: 2024-09-15

## 2024-09-13 RX ORDER — LACTOBACILLUS RHAMNOSUS GG 10B CELL
1 CAPSULE ORAL DAILY
Status: DISCONTINUED | OUTPATIENT
Start: 2024-09-13 | End: 2024-09-24 | Stop reason: HOSPADM

## 2024-09-13 RX ORDER — SODIUM CHLORIDE 0.9 % (FLUSH) 0.9 %
5-40 SYRINGE (ML) INJECTION EVERY 12 HOURS SCHEDULED
Status: DISCONTINUED | OUTPATIENT
Start: 2024-09-13 | End: 2024-09-24 | Stop reason: HOSPADM

## 2024-09-13 RX ORDER — VITAMIN B COMPLEX
2 TABLET ORAL DAILY
Status: DISCONTINUED | OUTPATIENT
Start: 2024-09-13 | End: 2024-09-24 | Stop reason: HOSPADM

## 2024-09-13 RX ORDER — MULTIVITAMIN WITH IRON
1 TABLET ORAL DAILY
Status: DISCONTINUED | OUTPATIENT
Start: 2024-09-13 | End: 2024-09-24 | Stop reason: HOSPADM

## 2024-09-13 RX ADMIN — SODIUM CHLORIDE 250 ML: 9 INJECTION, SOLUTION INTRAVENOUS at 08:28

## 2024-09-13 RX ADMIN — CALCIUM CHLORIDE 1000 MG: 100 INJECTION INTRAVENOUS; INTRAVENTRICULAR at 09:59

## 2024-09-13 RX ADMIN — SODIUM CHLORIDE: 9 INJECTION, SOLUTION INTRAVENOUS at 20:47

## 2024-09-13 RX ADMIN — INSULIN LISPRO 12 UNITS: 100 INJECTION, SOLUTION INTRAVENOUS; SUBCUTANEOUS at 12:36

## 2024-09-13 RX ADMIN — MULTIVITAMIN TABLET 1 TABLET: TABLET at 13:11

## 2024-09-13 RX ADMIN — MIDODRINE HYDROCHLORIDE 5 MG: 5 TABLET ORAL at 13:14

## 2024-09-13 RX ADMIN — Medication 2000 UNITS: at 20:51

## 2024-09-13 RX ADMIN — Medication 1 CAPSULE: at 16:40

## 2024-09-13 RX ADMIN — INSULIN HUMAN 10 UNITS: 100 INJECTION, SOLUTION PARENTERAL at 09:58

## 2024-09-13 RX ADMIN — DEXTROSE MONOHYDRATE 25 G: 25 INJECTION, SOLUTION INTRAVENOUS at 09:58

## 2024-09-13 RX ADMIN — SODIUM CHLORIDE, PRESERVATIVE FREE 10 ML: 5 INJECTION INTRAVENOUS at 14:19

## 2024-09-13 RX ADMIN — SODIUM CHLORIDE: 9 INJECTION, SOLUTION INTRAVENOUS at 10:14

## 2024-09-13 RX ADMIN — INSULIN LISPRO 8 UNITS: 100 INJECTION, SOLUTION INTRAVENOUS; SUBCUTANEOUS at 17:44

## 2024-09-13 RX ADMIN — LEVOFLOXACIN 250 MG: 500 TABLET, FILM COATED ORAL at 16:41

## 2024-09-13 RX ADMIN — INSULIN GLARGINE 22 UNITS: 100 INJECTION, SOLUTION SUBCUTANEOUS at 13:10

## 2024-09-13 RX ADMIN — DOCUSATE SODIUM 100 MG: 100 CAPSULE, LIQUID FILLED ORAL at 20:51

## 2024-09-13 RX ADMIN — SODIUM CHLORIDE, PRESERVATIVE FREE 10 ML: 5 INJECTION INTRAVENOUS at 21:05

## 2024-09-13 RX ADMIN — Medication 500 MG: at 14:20

## 2024-09-13 RX ADMIN — SODIUM ZIRCONIUM CYCLOSILICATE 10 G: 10 POWDER, FOR SUSPENSION ORAL at 10:29

## 2024-09-13 ASSESSMENT — PAIN - FUNCTIONAL ASSESSMENT: PAIN_FUNCTIONAL_ASSESSMENT: NONE - DENIES PAIN

## 2024-09-14 PROBLEM — D64.9 ACUTE ANEMIA: Status: ACTIVE | Noted: 2024-09-14

## 2024-09-14 PROBLEM — R65.20 SEPSIS WITH ENCEPHALOPATHY WITHOUT SEPTIC SHOCK (HCC): Status: ACTIVE | Noted: 2023-05-09

## 2024-09-14 PROBLEM — G93.41 METABOLIC ENCEPHALOPATHY: Status: ACTIVE | Noted: 2024-09-14

## 2024-09-14 PROBLEM — G93.41 SEPSIS WITH ENCEPHALOPATHY WITHOUT SEPTIC SHOCK (HCC): Status: ACTIVE | Noted: 2023-05-09

## 2024-09-14 PROBLEM — R53.1 GENERAL WEAKNESS: Status: ACTIVE | Noted: 2023-05-16

## 2024-09-14 LAB
ALBUMIN SERPL-MCNC: 2.3 G/DL (ref 3.5–5.2)
ALP SERPL-CCNC: 202 U/L (ref 40–129)
ALT SERPL-CCNC: 58 U/L (ref 5–41)
AMMONIA PLAS-SCNC: 69 UMOL/L (ref 16–60)
ANION GAP SERPL CALCULATED.3IONS-SCNC: 13 MMOL/L (ref 9–17)
ANION GAP SERPL CALCULATED.3IONS-SCNC: 16 MMOL/L (ref 9–17)
AST SERPL-CCNC: 39 U/L
BASOPHILS # BLD: 0 K/UL (ref 0–0.2)
BASOPHILS NFR BLD: 0 %
BILIRUB SERPL-MCNC: 0.8 MG/DL (ref 0.3–1.2)
BUN SERPL-MCNC: 76 MG/DL (ref 8–23)
BUN/CREAT SERPL: 76 (ref 9–20)
CALCIUM SERPL-MCNC: 9 MG/DL (ref 8.6–10.4)
CHLORIDE SERPL-SCNC: 103 MMOL/L (ref 98–107)
CHLORIDE SERPL-SCNC: 104 MMOL/L (ref 98–107)
CO2 SERPL-SCNC: 13 MMOL/L (ref 20–31)
CO2 SERPL-SCNC: 16 MMOL/L (ref 20–31)
CREAT SERPL-MCNC: 1 MG/DL (ref 0.7–1.2)
EKG ATRIAL RATE: 62 BPM
EKG ATRIAL RATE: 74 BPM
EKG P AXIS: -2 DEGREES
EKG P AXIS: 4 DEGREES
EKG P-R INTERVAL: 136 MS
EKG P-R INTERVAL: 136 MS
EKG Q-T INTERVAL: 382 MS
EKG Q-T INTERVAL: 404 MS
EKG QRS DURATION: 72 MS
EKG QRS DURATION: 74 MS
EKG QTC CALCULATION (BAZETT): 410 MS
EKG QTC CALCULATION (BAZETT): 424 MS
EKG R AXIS: 17 DEGREES
EKG R AXIS: 25 DEGREES
EKG T AXIS: 22 DEGREES
EKG T AXIS: 26 DEGREES
EKG VENTRICULAR RATE: 62 BPM
EKG VENTRICULAR RATE: 74 BPM
EOSINOPHIL # BLD: 0 K/UL (ref 0–0.4)
EOSINOPHILS RELATIVE PERCENT: 0 % (ref 1–4)
ERYTHROCYTE [DISTWIDTH] IN BLOOD BY AUTOMATED COUNT: 17.8 % (ref 11.8–14.4)
GFR, ESTIMATED: 79 ML/MIN/1.73M2
GLUCOSE BLD-MCNC: 234 MG/DL (ref 75–110)
GLUCOSE BLD-MCNC: 239 MG/DL (ref 75–110)
GLUCOSE BLD-MCNC: 258 MG/DL (ref 75–110)
GLUCOSE BLD-MCNC: 271 MG/DL (ref 75–110)
GLUCOSE SERPL-MCNC: 287 MG/DL (ref 70–99)
HAPTOGLOB SERPL-MCNC: 64 MG/DL (ref 30–200)
HCT VFR BLD AUTO: 25.3 % (ref 40.7–50.3)
HCT VFR BLD AUTO: 25.6 % (ref 40.7–50.3)
HCT VFR BLD AUTO: 25.8 % (ref 40.7–50.3)
HCT VFR BLD AUTO: 26.3 % (ref 40.7–50.3)
HCT VFR BLD AUTO: 29.1 % (ref 40.7–50.3)
HGB BLD-MCNC: 7.9 G/DL (ref 13–17)
HGB BLD-MCNC: 8 G/DL (ref 13–17)
HGB BLD-MCNC: 8.1 G/DL (ref 13–17)
HGB BLD-MCNC: 8.1 G/DL (ref 13–17)
HGB BLD-MCNC: 8.5 G/DL (ref 13–17)
IMM GRANULOCYTES # BLD AUTO: 0.16 K/UL (ref 0–0.3)
IMM GRANULOCYTES NFR BLD: 1 %
LACTATE BLDV-SCNC: 4.4 MMOL/L (ref 0.5–1.9)
LACTATE BLDV-SCNC: 5.9 MMOL/L (ref 0.5–1.9)
LACTATE BLDV-SCNC: 6.5 MMOL/L (ref 0.5–1.9)
LDH SERPL-CCNC: 130 U/L (ref 135–225)
LIPASE SERPL-CCNC: 36 U/L (ref 13–60)
LYMPHOCYTES NFR BLD: 0.8 K/UL (ref 1–4.8)
LYMPHOCYTES RELATIVE PERCENT: 5 % (ref 24–44)
MAGNESIUM SERPL-MCNC: 2.1 MG/DL (ref 1.6–2.6)
MCH RBC QN AUTO: 29 PG (ref 25.2–33.5)
MCHC RBC AUTO-ENTMCNC: 31 G/DL (ref 28.4–34.8)
MCV RBC AUTO: 93.5 FL (ref 82.6–102.9)
MICROORGANISM SPEC CULT: NORMAL
MONOCYTES NFR BLD: 13 % (ref 1–7)
MONOCYTES NFR BLD: 2.07 K/UL (ref 0.2–0.8)
MORPHOLOGY: ABNORMAL
NEUTROPHILS NFR BLD: 81 % (ref 36–66)
NEUTS SEG NFR BLD: 12.87 K/UL (ref 1.8–7.7)
NRBC BLD-RTO: 0.3 PER 100 WBC
PHOSPHATE SERPL-MCNC: 4.3 MG/DL (ref 2.5–4.5)
PLATELET # BLD AUTO: 140 K/UL (ref 138–453)
PMV BLD AUTO: 13.1 FL (ref 8.1–13.5)
POTASSIUM SERPL-SCNC: 5.7 MMOL/L (ref 3.7–5.3)
POTASSIUM SERPL-SCNC: 5.7 MMOL/L (ref 3.7–5.3)
PROCALCITONIN SERPL-MCNC: 0.65 NG/ML (ref 0–0.09)
PROT SERPL-MCNC: 5 G/DL (ref 6.4–8.3)
RBC # BLD AUTO: 2.76 M/UL (ref 4.21–5.77)
SERVICE CMNT-IMP: NORMAL
SODIUM SERPL-SCNC: 132 MMOL/L (ref 135–144)
SODIUM SERPL-SCNC: 133 MMOL/L (ref 135–144)
SPECIMEN DESCRIPTION: NORMAL
WBC OTHER # BLD: 15.9 K/UL (ref 3.5–11.3)

## 2024-09-14 PROCEDURE — 93010 ELECTROCARDIOGRAM REPORT: CPT | Performed by: INTERNAL MEDICINE

## 2024-09-14 PROCEDURE — 80051 ELECTROLYTE PANEL: CPT

## 2024-09-14 PROCEDURE — 99233 SBSQ HOSP IP/OBS HIGH 50: CPT | Performed by: INTERNAL MEDICINE

## 2024-09-14 PROCEDURE — 2000000000 HC ICU R&B

## 2024-09-14 PROCEDURE — 6370000000 HC RX 637 (ALT 250 FOR IP): Performed by: INTERNAL MEDICINE

## 2024-09-14 PROCEDURE — 83605 ASSAY OF LACTIC ACID: CPT

## 2024-09-14 PROCEDURE — 83735 ASSAY OF MAGNESIUM: CPT

## 2024-09-14 PROCEDURE — 2580000003 HC RX 258: Performed by: NURSE PRACTITIONER

## 2024-09-14 PROCEDURE — 36415 COLL VENOUS BLD VENIPUNCTURE: CPT

## 2024-09-14 PROCEDURE — 99223 1ST HOSP IP/OBS HIGH 75: CPT | Performed by: INTERNAL MEDICINE

## 2024-09-14 PROCEDURE — 82947 ASSAY GLUCOSE BLOOD QUANT: CPT

## 2024-09-14 PROCEDURE — 85018 HEMOGLOBIN: CPT

## 2024-09-14 PROCEDURE — 83010 ASSAY OF HAPTOGLOBIN QUANT: CPT

## 2024-09-14 PROCEDURE — 2580000003 HC RX 258: Performed by: INTERNAL MEDICINE

## 2024-09-14 PROCEDURE — 85014 HEMATOCRIT: CPT

## 2024-09-14 PROCEDURE — 83690 ASSAY OF LIPASE: CPT

## 2024-09-14 PROCEDURE — 6370000000 HC RX 637 (ALT 250 FOR IP): Performed by: NURSE PRACTITIONER

## 2024-09-14 PROCEDURE — 83615 LACTATE (LD) (LDH) ENZYME: CPT

## 2024-09-14 PROCEDURE — 85025 COMPLETE CBC W/AUTO DIFF WBC: CPT

## 2024-09-14 PROCEDURE — 2500000003 HC RX 250 WO HCPCS

## 2024-09-14 PROCEDURE — 82140 ASSAY OF AMMONIA: CPT

## 2024-09-14 PROCEDURE — 80053 COMPREHEN METABOLIC PANEL: CPT

## 2024-09-14 PROCEDURE — 84145 PROCALCITONIN (PCT): CPT

## 2024-09-14 PROCEDURE — 6360000002 HC RX W HCPCS: Performed by: INTERNAL MEDICINE

## 2024-09-14 PROCEDURE — 82270 OCCULT BLOOD FECES: CPT

## 2024-09-14 PROCEDURE — 84100 ASSAY OF PHOSPHORUS: CPT

## 2024-09-14 PROCEDURE — 6370000000 HC RX 637 (ALT 250 FOR IP)

## 2024-09-14 PROCEDURE — 2580000003 HC RX 258

## 2024-09-14 PROCEDURE — 94761 N-INVAS EAR/PLS OXIMETRY MLT: CPT

## 2024-09-14 PROCEDURE — 6360000002 HC RX W HCPCS

## 2024-09-14 RX ORDER — LIDOCAINE HYDROCHLORIDE 20 MG/ML
JELLY TOPICAL PRN
Status: DISCONTINUED | OUTPATIENT
Start: 2024-09-14 | End: 2024-09-24 | Stop reason: HOSPADM

## 2024-09-14 RX ORDER — FENTANYL CITRATE 0.05 MG/ML
25 INJECTION, SOLUTION INTRAMUSCULAR; INTRAVENOUS
Status: DISCONTINUED | OUTPATIENT
Start: 2024-09-14 | End: 2024-09-15

## 2024-09-14 RX ADMIN — SODIUM CHLORIDE: 9 INJECTION, SOLUTION INTRAVENOUS at 06:42

## 2024-09-14 RX ADMIN — WATER: 1 IRRIGANT IRRIGATION at 21:38

## 2024-09-14 RX ADMIN — INSULIN LISPRO 8 UNITS: 100 INJECTION, SOLUTION INTRAVENOUS; SUBCUTANEOUS at 12:02

## 2024-09-14 RX ADMIN — PANTOPRAZOLE SODIUM 40 MG: 40 TABLET, DELAYED RELEASE ORAL at 06:41

## 2024-09-14 RX ADMIN — PROPRANOLOL HYDROCHLORIDE 60 MG: 60 CAPSULE, EXTENDED RELEASE ORAL at 11:35

## 2024-09-14 RX ADMIN — SODIUM CHLORIDE, PRESERVATIVE FREE 10 ML: 5 INJECTION INTRAVENOUS at 20:37

## 2024-09-14 RX ADMIN — Medication 1 CAPSULE: at 11:38

## 2024-09-14 RX ADMIN — INSULIN LISPRO 4 UNITS: 100 INJECTION, SOLUTION INTRAVENOUS; SUBCUTANEOUS at 10:38

## 2024-09-14 RX ADMIN — SODIUM ZIRCONIUM CYCLOSILICATE 10 G: 10 POWDER, FOR SUSPENSION ORAL at 11:44

## 2024-09-14 RX ADMIN — CEFEPIME 2000 MG: 2 INJECTION, POWDER, FOR SOLUTION INTRAVENOUS at 09:22

## 2024-09-14 RX ADMIN — FENTANYL CITRATE 25 MCG: 0.05 INJECTION, SOLUTION INTRAMUSCULAR; INTRAVENOUS at 22:21

## 2024-09-14 RX ADMIN — SODIUM CHLORIDE: 9 INJECTION, SOLUTION INTRAVENOUS at 20:50

## 2024-09-14 RX ADMIN — CEFEPIME 2000 MG: 2 INJECTION, POWDER, FOR SOLUTION INTRAVENOUS at 20:52

## 2024-09-14 RX ADMIN — SODIUM BICARBONATE 50 MEQ: 84 INJECTION INTRAVENOUS at 21:22

## 2024-09-14 RX ADMIN — LIDOCAINE HYDROCHLORIDE: 20 JELLY TOPICAL at 21:10

## 2024-09-14 RX ADMIN — INSULIN GLARGINE 28 UNITS: 100 INJECTION, SOLUTION SUBCUTANEOUS at 10:39

## 2024-09-14 RX ADMIN — SODIUM CHLORIDE: 9 INJECTION, SOLUTION INTRAVENOUS at 17:48

## 2024-09-14 RX ADMIN — SODIUM CHLORIDE, PRESERVATIVE FREE 10 ML: 5 INJECTION INTRAVENOUS at 08:35

## 2024-09-14 RX ADMIN — WATER: 1 IRRIGANT IRRIGATION at 13:35

## 2024-09-14 RX ADMIN — INSULIN LISPRO 8 UNITS: 100 INJECTION, SOLUTION INTRAVENOUS; SUBCUTANEOUS at 17:38

## 2024-09-14 RX ADMIN — PANTOPRAZOLE SODIUM 40 MG: 40 INJECTION, POWDER, FOR SOLUTION INTRAVENOUS at 20:36

## 2024-09-14 ASSESSMENT — PAIN SCALES - GENERAL: PAINLEVEL_OUTOF10: 0

## 2024-09-15 ENCOUNTER — APPOINTMENT (OUTPATIENT)
Dept: GENERAL RADIOLOGY | Age: 74
DRG: 871 | End: 2024-09-15
Payer: MEDICARE

## 2024-09-15 ENCOUNTER — PATIENT MESSAGE (OUTPATIENT)
Dept: FAMILY MEDICINE CLINIC | Age: 74
End: 2024-09-15

## 2024-09-15 PROBLEM — K76.82 HEPATIC ENCEPHALOPATHY (HCC): Status: ACTIVE | Noted: 2024-09-15

## 2024-09-15 LAB
ALBUMIN SERPL-MCNC: 2.2 G/DL (ref 3.5–5.2)
ALBUMIN SERPL-MCNC: 2.3 G/DL (ref 3.5–5.2)
ALP SERPL-CCNC: 169 U/L (ref 40–129)
ALP SERPL-CCNC: 175 U/L (ref 40–129)
ALT SERPL-CCNC: 49 U/L (ref 5–41)
ALT SERPL-CCNC: 49 U/L (ref 5–41)
AMMONIA PLAS-SCNC: 118 UMOL/L (ref 16–60)
AMMONIA PLAS-SCNC: 67 UMOL/L (ref 16–60)
ANION GAP SERPL CALCULATED.3IONS-SCNC: 14 MMOL/L (ref 9–17)
AST SERPL-CCNC: 30 U/L
AST SERPL-CCNC: 31 U/L
BASOPHILS # BLD: 0 K/UL (ref 0–0.2)
BASOPHILS # BLD: NORMAL K/UL
BASOPHILS NFR BLD: 0 %
BASOPHILS NFR BLD: NORMAL %
BILIRUB SERPL-MCNC: 0.8 MG/DL (ref 0.3–1.2)
BILIRUB SERPL-MCNC: 0.8 MG/DL (ref 0.3–1.2)
BUN SERPL-MCNC: 100 MG/DL (ref 8–23)
BUN SERPL-MCNC: 106 MG/DL (ref 8–23)
BUN SERPL-MCNC: 95 MG/DL (ref 8–23)
BUN/CREAT SERPL: 59 (ref 9–20)
BUN/CREAT SERPL: 63 (ref 9–20)
BUN/CREAT SERPL: 68 (ref 9–20)
C3 SERPL-MCNC: 84 MG/DL (ref 90–180)
C4 SERPL-MCNC: 10 MG/DL (ref 10–40)
CA-I BLD-SCNC: 1.28 MMOL/L (ref 1.13–1.33)
CALCIUM SERPL-MCNC: 8.6 MG/DL (ref 8.6–10.4)
CALCIUM SERPL-MCNC: 8.7 MG/DL (ref 8.6–10.4)
CALCIUM SERPL-MCNC: 8.8 MG/DL (ref 8.6–10.4)
CHLORIDE SERPL-SCNC: 109 MMOL/L (ref 98–107)
CHLORIDE SERPL-SCNC: 110 MMOL/L (ref 98–107)
CHLORIDE SERPL-SCNC: 114 MMOL/L (ref 98–107)
CO2 SERPL-SCNC: 14 MMOL/L (ref 20–31)
CO2 SERPL-SCNC: 16 MMOL/L (ref 20–31)
CO2 SERPL-SCNC: 17 MMOL/L (ref 20–31)
CREAT SERPL-MCNC: 1.4 MG/DL (ref 0.7–1.2)
CREAT SERPL-MCNC: 1.6 MG/DL (ref 0.7–1.2)
CREAT SERPL-MCNC: 1.8 MG/DL (ref 0.7–1.2)
DIFFERENTIAL TYPE: NORMAL
EOSINOPHIL # BLD: 0 K/UL (ref 0–0.4)
EOSINOPHIL # BLD: NORMAL K/UL
EOSINOPHILS RELATIVE PERCENT: 0 % (ref 1–4)
EOSINOPHILS RELATIVE PERCENT: NORMAL %
ERYTHROCYTE [DISTWIDTH] IN BLOOD BY AUTOMATED COUNT: 19.1 % (ref 11.8–14.4)
ERYTHROCYTE [DISTWIDTH] IN BLOOD BY AUTOMATED COUNT: NORMAL %
FIBRINOGEN PPP-MCNC: 64 MG/DL (ref 179–518)
FREE KAPPA/LAMBDA RATIO: 1.3 (ref 0.22–1.74)
GFR, ESTIMATED: 39 ML/MIN/1.73M2
GFR, ESTIMATED: 45 ML/MIN/1.73M2
GFR, ESTIMATED: 53 ML/MIN/1.73M2
GLUCOSE BLD-MCNC: 127 MG/DL (ref 75–110)
GLUCOSE BLD-MCNC: 130 MG/DL (ref 75–110)
GLUCOSE BLD-MCNC: 155 MG/DL (ref 75–110)
GLUCOSE BLD-MCNC: 266 MG/DL (ref 75–110)
GLUCOSE SERPL-MCNC: 147 MG/DL (ref 70–99)
GLUCOSE SERPL-MCNC: 237 MG/DL (ref 70–99)
GLUCOSE SERPL-MCNC: 269 MG/DL (ref 70–99)
HCO3 VENOUS: 15.4 MMOL/L (ref 22–29)
HCO3 VENOUS: 16.3 MMOL/L (ref 22–29)
HCT VFR BLD AUTO: 24.3 % (ref 40.7–50.3)
HCT VFR BLD AUTO: 24.3 % (ref 40.7–50.3)
HCT VFR BLD AUTO: 25 % (ref 40.7–50.3)
HCT VFR BLD AUTO: 25.8 % (ref 40.7–50.3)
HCT VFR BLD AUTO: NORMAL %
HEMOCCULT SP1 STL QL: POSITIVE
HGB BLD-MCNC: 7.4 G/DL (ref 13–17)
HGB BLD-MCNC: 7.9 G/DL (ref 13–17)
HGB BLD-MCNC: NORMAL G/DL
IMM GRANULOCYTES # BLD AUTO: 0.2 K/UL (ref 0–0.3)
IMM GRANULOCYTES # BLD AUTO: NORMAL K/UL
IMM GRANULOCYTES NFR BLD: 1 %
IMM GRANULOCYTES NFR BLD: NORMAL %
KAPPA LC FREE SER-MCNC: 101 MG/L
LACTATE BLDV-SCNC: 3.9 MMOL/L (ref 0.5–1.9)
LACTATE BLDV-SCNC: 4.7 MMOL/L (ref 0.5–1.9)
LAMBDA LC FREE SERPL-MCNC: 77.9 MG/L (ref 4.2–27.7)
LYMPHOCYTES NFR BLD: 0.59 K/UL (ref 1–4.8)
LYMPHOCYTES NFR BLD: NORMAL K/UL
LYMPHOCYTES RELATIVE PERCENT: 3 % (ref 24–44)
LYMPHOCYTES RELATIVE PERCENT: NORMAL %
MAGNESIUM SERPL-MCNC: 2.2 MG/DL (ref 1.6–2.6)
MAGNESIUM SERPL-MCNC: 2.5 MG/DL (ref 1.6–2.6)
MCH RBC QN AUTO: 29.1 PG (ref 25.2–33.5)
MCH RBC QN AUTO: NORMAL PG
MCHC RBC AUTO-ENTMCNC: 30.5 G/DL (ref 28.4–34.8)
MCHC RBC AUTO-ENTMCNC: NORMAL G/DL
MCV RBC AUTO: 95.7 FL (ref 82.6–102.9)
MCV RBC AUTO: NORMAL FL
MICROORGANISM SPEC CULT: NORMAL
MICROORGANISM/AGENT SPEC: NORMAL
MONOCYTES NFR BLD: 14 % (ref 1–7)
MONOCYTES NFR BLD: 2.77 K/UL (ref 0.2–0.8)
MONOCYTES NFR BLD: NORMAL %
MONOCYTES NFR BLD: NORMAL K/UL
MORPHOLOGY: ABNORMAL
MORPHOLOGY: ABNORMAL
NEGATIVE BASE EXCESS, VEN: 5.8 MMOL/L (ref 0–2)
NEGATIVE BASE EXCESS, VEN: 8.8 MMOL/L (ref 0–2)
NEUTROPHILS NFR BLD: 82 % (ref 36–66)
NEUTROPHILS NFR BLD: NORMAL %
NEUTS SEG NFR BLD: 16.24 K/UL (ref 1.8–7.7)
NEUTS SEG NFR BLD: NORMAL K/UL
NRBC BLD-RTO: 1.1 PER 100 WBC
NRBC BLD-RTO: NORMAL PER 100 WBC
O2 SAT, VEN: 96.8 % (ref 60–85)
O2 SAT, VEN: 99.7 % (ref 60–85)
OSMOLALITY SERPL: 348 MOSM/KG (ref 275–295)
PCO2 VENOUS: 19.9 MM HG (ref 41–51)
PCO2 VENOUS: 26.2 MM HG (ref 41–51)
PH VENOUS: 7.38 (ref 7.32–7.43)
PH VENOUS: 7.52 (ref 7.32–7.43)
PHOSPHATE SERPL-MCNC: 4.8 MG/DL (ref 2.5–4.5)
PLATELET # BLD AUTO: 108 K/UL (ref 138–453)
PLATELET # BLD AUTO: NORMAL K/UL
PLATELET ESTIMATE: NORMAL
PLATELET, FLUORESCENCE: NORMAL K/UL
PLATELETS.RETICULATED NFR BLD AUTO: NORMAL %
PMV BLD AUTO: 13.2 FL (ref 8.1–13.5)
PMV BLD AUTO: NORMAL FL
PO2 VENOUS: 176.8 MM HG (ref 30–50)
PO2 VENOUS: 88.6 MM HG (ref 30–50)
POTASSIUM SERPL-SCNC: 4.1 MMOL/L (ref 3.7–5.3)
POTASSIUM SERPL-SCNC: 4.8 MMOL/L (ref 3.7–5.3)
POTASSIUM SERPL-SCNC: 5 MMOL/L (ref 3.7–5.3)
PROCALCITONIN SERPL-MCNC: 0.56 NG/ML (ref 0–0.09)
PROT SERPL-MCNC: 4.8 G/DL (ref 6.4–8.3)
PROT SERPL-MCNC: 4.9 G/DL (ref 6.4–8.3)
RBC # BLD AUTO: 2.54 M/UL (ref 4.21–5.77)
RBC # BLD AUTO: NORMAL M/UL
RBC # BLD: NORMAL 10*6/UL
SERVICE CMNT-IMP: NORMAL
SODIUM SERPL-SCNC: 138 MMOL/L (ref 135–144)
SODIUM SERPL-SCNC: 140 MMOL/L (ref 135–144)
SODIUM SERPL-SCNC: 144 MMOL/L (ref 135–144)
SPECIMEN DESCRIPTION: NORMAL
TROPONIN I SERPL HS-MCNC: 20 NG/L (ref 0–22)
WBC # BLD: NORMAL 10*3/UL
WBC OTHER # BLD: 19.8 K/UL (ref 3.5–11.3)
WBC OTHER # BLD: NORMAL K/UL

## 2024-09-15 PROCEDURE — 86160 COMPLEMENT ANTIGEN: CPT

## 2024-09-15 PROCEDURE — 2580000003 HC RX 258

## 2024-09-15 PROCEDURE — 6360000002 HC RX W HCPCS: Performed by: INTERNAL MEDICINE

## 2024-09-15 PROCEDURE — 83521 IG LIGHT CHAINS FREE EACH: CPT

## 2024-09-15 PROCEDURE — 80053 COMPREHEN METABOLIC PANEL: CPT

## 2024-09-15 PROCEDURE — 86038 ANTINUCLEAR ANTIBODIES: CPT

## 2024-09-15 PROCEDURE — 86334 IMMUNOFIX E-PHORESIS SERUM: CPT

## 2024-09-15 PROCEDURE — 84145 PROCALCITONIN (PCT): CPT

## 2024-09-15 PROCEDURE — 84133 ASSAY OF URINE POTASSIUM: CPT

## 2024-09-15 PROCEDURE — 87205 SMEAR GRAM STAIN: CPT

## 2024-09-15 PROCEDURE — 99233 SBSQ HOSP IP/OBS HIGH 50: CPT | Performed by: INTERNAL MEDICINE

## 2024-09-15 PROCEDURE — 83930 ASSAY OF BLOOD OSMOLALITY: CPT

## 2024-09-15 PROCEDURE — 6370000000 HC RX 637 (ALT 250 FOR IP): Performed by: NURSE PRACTITIONER

## 2024-09-15 PROCEDURE — 83605 ASSAY OF LACTIC ACID: CPT

## 2024-09-15 PROCEDURE — 6370000000 HC RX 637 (ALT 250 FOR IP)

## 2024-09-15 PROCEDURE — 84300 ASSAY OF URINE SODIUM: CPT

## 2024-09-15 PROCEDURE — 2700000000 HC OXYGEN THERAPY PER DAY

## 2024-09-15 PROCEDURE — 86225 DNA ANTIBODY NATIVE: CPT

## 2024-09-15 PROCEDURE — 84484 ASSAY OF TROPONIN QUANT: CPT

## 2024-09-15 PROCEDURE — 93005 ELECTROCARDIOGRAM TRACING: CPT | Performed by: INTERNAL MEDICINE

## 2024-09-15 PROCEDURE — 84155 ASSAY OF PROTEIN SERUM: CPT

## 2024-09-15 PROCEDURE — 82570 ASSAY OF URINE CREATININE: CPT

## 2024-09-15 PROCEDURE — 2500000003 HC RX 250 WO HCPCS: Performed by: NURSE PRACTITIONER

## 2024-09-15 PROCEDURE — 82330 ASSAY OF CALCIUM: CPT

## 2024-09-15 PROCEDURE — 94761 N-INVAS EAR/PLS OXIMETRY MLT: CPT

## 2024-09-15 PROCEDURE — 2000000000 HC ICU R&B

## 2024-09-15 PROCEDURE — 84100 ASSAY OF PHOSPHORUS: CPT

## 2024-09-15 PROCEDURE — 6360000002 HC RX W HCPCS

## 2024-09-15 PROCEDURE — 83935 ASSAY OF URINE OSMOLALITY: CPT

## 2024-09-15 PROCEDURE — 02HV33Z INSERTION OF INFUSION DEVICE INTO SUPERIOR VENA CAVA, PERCUTANEOUS APPROACH: ICD-10-PCS | Performed by: INTERNAL MEDICINE

## 2024-09-15 PROCEDURE — 2580000003 HC RX 258: Performed by: NURSE PRACTITIONER

## 2024-09-15 PROCEDURE — 36415 COLL VENOUS BLD VENIPUNCTURE: CPT

## 2024-09-15 PROCEDURE — 85025 COMPLETE CBC W/AUTO DIFF WBC: CPT

## 2024-09-15 PROCEDURE — 80048 BASIC METABOLIC PNL TOTAL CA: CPT

## 2024-09-15 PROCEDURE — 51702 INSERT TEMP BLADDER CATH: CPT

## 2024-09-15 PROCEDURE — 85014 HEMATOCRIT: CPT

## 2024-09-15 PROCEDURE — 84165 PROTEIN E-PHORESIS SERUM: CPT

## 2024-09-15 PROCEDURE — 2500000003 HC RX 250 WO HCPCS

## 2024-09-15 PROCEDURE — 82140 ASSAY OF AMMONIA: CPT

## 2024-09-15 PROCEDURE — 2580000003 HC RX 258: Performed by: INTERNAL MEDICINE

## 2024-09-15 PROCEDURE — 82803 BLOOD GASES ANY COMBINATION: CPT

## 2024-09-15 PROCEDURE — 71045 X-RAY EXAM CHEST 1 VIEW: CPT

## 2024-09-15 PROCEDURE — 51798 US URINE CAPACITY MEASURE: CPT

## 2024-09-15 PROCEDURE — 83735 ASSAY OF MAGNESIUM: CPT

## 2024-09-15 PROCEDURE — 82947 ASSAY GLUCOSE BLOOD QUANT: CPT

## 2024-09-15 PROCEDURE — 6370000000 HC RX 637 (ALT 250 FOR IP): Performed by: INTERNAL MEDICINE

## 2024-09-15 PROCEDURE — 99291 CRITICAL CARE FIRST HOUR: CPT

## 2024-09-15 PROCEDURE — 99232 SBSQ HOSP IP/OBS MODERATE 35: CPT | Performed by: INTERNAL MEDICINE

## 2024-09-15 PROCEDURE — 85384 FIBRINOGEN ACTIVITY: CPT

## 2024-09-15 PROCEDURE — P9047 ALBUMIN (HUMAN), 25%, 50ML: HCPCS | Performed by: INTERNAL MEDICINE

## 2024-09-15 PROCEDURE — 85018 HEMOGLOBIN: CPT

## 2024-09-15 RX ORDER — FENTANYL CITRATE 0.05 MG/ML
50 INJECTION, SOLUTION INTRAMUSCULAR; INTRAVENOUS
Status: DISCONTINUED | OUTPATIENT
Start: 2024-09-15 | End: 2024-09-16

## 2024-09-15 RX ORDER — FUROSEMIDE 10 MG/ML
40 INJECTION INTRAMUSCULAR; INTRAVENOUS ONCE
Status: COMPLETED | OUTPATIENT
Start: 2024-09-15 | End: 2024-09-15

## 2024-09-15 RX ORDER — ALBUMIN (HUMAN) 12.5 G/50ML
25 SOLUTION INTRAVENOUS ONCE
Status: COMPLETED | OUTPATIENT
Start: 2024-09-15 | End: 2024-09-15

## 2024-09-15 RX ADMIN — FENTANYL CITRATE 50 MCG: 0.05 INJECTION, SOLUTION INTRAMUSCULAR; INTRAVENOUS at 16:29

## 2024-09-15 RX ADMIN — CEFEPIME 2000 MG: 2 INJECTION, POWDER, FOR SOLUTION INTRAVENOUS at 21:00

## 2024-09-15 RX ADMIN — SODIUM CHLORIDE: 9 INJECTION, SOLUTION INTRAVENOUS at 13:42

## 2024-09-15 RX ADMIN — SODIUM BICARBONATE 50 MEQ: 84 INJECTION INTRAVENOUS at 00:57

## 2024-09-15 RX ADMIN — LIDOCAINE HYDROCHLORIDE: 20 JELLY TOPICAL at 16:47

## 2024-09-15 RX ADMIN — WATER: 100 IRRIGANT IRRIGATION at 21:30

## 2024-09-15 RX ADMIN — SODIUM CHLORIDE: 9 INJECTION, SOLUTION INTRAVENOUS at 04:31

## 2024-09-15 RX ADMIN — FUROSEMIDE 40 MG: 10 INJECTION, SOLUTION INTRAMUSCULAR; INTRAVENOUS at 13:17

## 2024-09-15 RX ADMIN — SODIUM BICARBONATE: 84 INJECTION, SOLUTION INTRAVENOUS at 22:02

## 2024-09-15 RX ADMIN — FENTANYL CITRATE 50 MCG: 0.05 INJECTION, SOLUTION INTRAMUSCULAR; INTRAVENOUS at 20:07

## 2024-09-15 RX ADMIN — FENTANYL CITRATE 25 MCG: 0.05 INJECTION, SOLUTION INTRAMUSCULAR; INTRAVENOUS at 07:51

## 2024-09-15 RX ADMIN — FENTANYL CITRATE 50 MCG: 0.05 INJECTION, SOLUTION INTRAMUSCULAR; INTRAVENOUS at 22:00

## 2024-09-15 RX ADMIN — INSULIN LISPRO 8 UNITS: 100 INJECTION, SOLUTION INTRAVENOUS; SUBCUTANEOUS at 08:44

## 2024-09-15 RX ADMIN — OCTREOTIDE ACETATE 50 MCG/HR: 200 INJECTION, SOLUTION INTRAVENOUS; SUBCUTANEOUS at 21:11

## 2024-09-15 RX ADMIN — LIDOCAINE HYDROCHLORIDE: 20 JELLY TOPICAL at 21:04

## 2024-09-15 RX ADMIN — SODIUM CHLORIDE, PRESERVATIVE FREE 10 ML: 5 INJECTION INTRAVENOUS at 21:04

## 2024-09-15 RX ADMIN — FENTANYL CITRATE 50 MCG: 0.05 INJECTION, SOLUTION INTRAMUSCULAR; INTRAVENOUS at 10:16

## 2024-09-15 RX ADMIN — LIDOCAINE HYDROCHLORIDE: 20 JELLY TOPICAL at 10:36

## 2024-09-15 RX ADMIN — SODIUM CHLORIDE, PRESERVATIVE FREE 40 MG: 5 INJECTION INTRAVENOUS at 21:03

## 2024-09-15 RX ADMIN — FENTANYL CITRATE 50 MCG: 0.05 INJECTION, SOLUTION INTRAMUSCULAR; INTRAVENOUS at 13:38

## 2024-09-15 RX ADMIN — SODIUM BICARBONATE: 84 INJECTION, SOLUTION INTRAVENOUS at 16:24

## 2024-09-15 RX ADMIN — SODIUM CHLORIDE, PRESERVATIVE FREE 40 MG: 5 INJECTION INTRAVENOUS at 08:42

## 2024-09-15 RX ADMIN — SODIUM CHLORIDE: 9 INJECTION, SOLUTION INTRAVENOUS at 20:59

## 2024-09-15 RX ADMIN — OCTREOTIDE ACETATE 50 MCG/HR: 200 INJECTION, SOLUTION INTRAVENOUS; SUBCUTANEOUS at 09:13

## 2024-09-15 RX ADMIN — WATER: 1 IRRIGANT IRRIGATION at 10:23

## 2024-09-15 RX ADMIN — SODIUM BICARBONATE: 84 INJECTION, SOLUTION INTRAVENOUS at 20:21

## 2024-09-15 RX ADMIN — ALBUMIN (HUMAN) 25 G: 0.25 INJECTION, SOLUTION INTRAVENOUS at 12:50

## 2024-09-15 RX ADMIN — CEFEPIME 2000 MG: 2 INJECTION, POWDER, FOR SOLUTION INTRAVENOUS at 09:03

## 2024-09-15 RX ADMIN — WATER: 1 IRRIGANT IRRIGATION at 16:40

## 2024-09-15 ASSESSMENT — PAIN - FUNCTIONAL ASSESSMENT: PAIN_FUNCTIONAL_ASSESSMENT: ACTIVITIES ARE NOT PREVENTED

## 2024-09-16 ENCOUNTER — APPOINTMENT (OUTPATIENT)
Dept: GENERAL RADIOLOGY | Age: 74
DRG: 871 | End: 2024-09-16
Payer: MEDICARE

## 2024-09-16 ENCOUNTER — APPOINTMENT (OUTPATIENT)
Dept: ULTRASOUND IMAGING | Age: 74
DRG: 871 | End: 2024-09-16
Payer: MEDICARE

## 2024-09-16 ENCOUNTER — APPOINTMENT (OUTPATIENT)
Dept: CT IMAGING | Age: 74
DRG: 871 | End: 2024-09-16
Payer: MEDICARE

## 2024-09-16 ENCOUNTER — APPOINTMENT (OUTPATIENT)
Age: 74
DRG: 871 | End: 2024-09-16
Attending: INTERNAL MEDICINE
Payer: MEDICARE

## 2024-09-16 ENCOUNTER — APPOINTMENT (OUTPATIENT)
Dept: INTERVENTIONAL RADIOLOGY/VASCULAR | Age: 74
DRG: 871 | End: 2024-09-16
Payer: MEDICARE

## 2024-09-16 ENCOUNTER — ANESTHESIA (OUTPATIENT)
Dept: OPERATING ROOM | Age: 74
End: 2024-09-16
Payer: MEDICARE

## 2024-09-16 ENCOUNTER — ANESTHESIA EVENT (OUTPATIENT)
Dept: OPERATING ROOM | Age: 74
End: 2024-09-16
Payer: MEDICARE

## 2024-09-16 PROBLEM — N17.0 ACUTE RENAL FAILURE WITH TUBULAR NECROSIS (HCC): Status: ACTIVE | Noted: 2024-09-16

## 2024-09-16 PROBLEM — R41.82 ALTERED MENTAL STATUS: Status: ACTIVE | Noted: 2024-09-16

## 2024-09-16 LAB
AMMONIA PLAS-SCNC: 41 UMOL/L (ref 16–60)
ANION GAP SERPL CALCULATED.3IONS-SCNC: 12 MMOL/L (ref 9–17)
ANION GAP SERPL CALCULATED.3IONS-SCNC: 13 MMOL/L (ref 9–17)
BASOPHILS # BLD: 0 K/UL (ref 0–0.2)
BASOPHILS NFR BLD: 0 % (ref 0–2)
BUN SERPL-MCNC: 100 MG/DL (ref 8–23)
BUN SERPL-MCNC: 103 MG/DL (ref 8–23)
BUN/CREAT SERPL: 59 (ref 9–20)
BUN/CREAT SERPL: 61 (ref 9–20)
CALCIUM SERPL-MCNC: 8.4 MG/DL (ref 8.6–10.4)
CALCIUM SERPL-MCNC: 8.6 MG/DL (ref 8.6–10.4)
CHLORIDE SERPL-SCNC: 119 MMOL/L (ref 98–107)
CHLORIDE SERPL-SCNC: 119 MMOL/L (ref 98–107)
CO2 SERPL-SCNC: 18 MMOL/L (ref 20–31)
CO2 SERPL-SCNC: 19 MMOL/L (ref 20–31)
CORTIS SERPL-MCNC: 17 UG/DL (ref 2.5–19.5)
CREAT SERPL-MCNC: 1.7 MG/DL (ref 0.7–1.2)
CREAT SERPL-MCNC: 1.7 MG/DL (ref 0.7–1.2)
CREAT UR-MCNC: 48 MG/DL (ref 39–259)
EKG ATRIAL RATE: 80 BPM
EKG ATRIAL RATE: 88 BPM
EKG P AXIS: -19 DEGREES
EKG P AXIS: 64 DEGREES
EKG P-R INTERVAL: 132 MS
EKG P-R INTERVAL: 138 MS
EKG Q-T INTERVAL: 360 MS
EKG Q-T INTERVAL: 400 MS
EKG QRS DURATION: 76 MS
EKG QRS DURATION: 80 MS
EKG QTC CALCULATION (BAZETT): 435 MS
EKG QTC CALCULATION (BAZETT): 461 MS
EKG R AXIS: 32 DEGREES
EKG R AXIS: 41 DEGREES
EKG T AXIS: -80 DEGREES
EKG T AXIS: -89 DEGREES
EKG VENTRICULAR RATE: 80 BPM
EKG VENTRICULAR RATE: 88 BPM
EOSINOPHIL # BLD: 0.13 K/UL (ref 0–0.44)
EOSINOPHIL,URINE: NORMAL
EOSINOPHILS RELATIVE PERCENT: 1 % (ref 1–4)
ERYTHROCYTE [DISTWIDTH] IN BLOOD BY AUTOMATED COUNT: 19.9 % (ref 11.8–14.4)
GFR, ESTIMATED: 42 ML/MIN/1.73M2
GFR, ESTIMATED: 42 ML/MIN/1.73M2
GLUCOSE BLD-MCNC: 214 MG/DL (ref 75–110)
GLUCOSE BLD-MCNC: 222 MG/DL (ref 75–110)
GLUCOSE BLD-MCNC: 249 MG/DL (ref 75–110)
GLUCOSE BLD-MCNC: 286 MG/DL (ref 75–110)
GLUCOSE FLD-MCNC: 261 MG/DL
GLUCOSE SERPL-MCNC: 248 MG/DL (ref 70–99)
GLUCOSE SERPL-MCNC: 291 MG/DL (ref 70–99)
HCO3 VENOUS: 19.6 MMOL/L (ref 22–29)
HCT VFR BLD AUTO: 20.9 % (ref 40.7–50.3)
HCT VFR BLD AUTO: 25.6 % (ref 40.7–50.3)
HCT VFR BLD AUTO: 25.7 % (ref 40.7–50.3)
HCT VFR BLD AUTO: 27.5 % (ref 40.7–50.3)
HGB BLD-MCNC: 6.5 G/DL (ref 13–17)
HGB BLD-MCNC: 8.1 G/DL (ref 13–17)
HGB BLD-MCNC: 8.2 G/DL (ref 13–17)
HGB BLD-MCNC: 8.6 G/DL (ref 13–17)
IMM GRANULOCYTES # BLD AUTO: 0.13 K/UL (ref 0–0.3)
IMM GRANULOCYTES NFR BLD: 1 %
LACTATE BLDV-SCNC: 3.9 MMOL/L (ref 0.5–1.9)
LDH FLD L TO P-CCNC: 21 U/L
LYMPHOCYTES NFR BLD: 0.38 K/UL (ref 1.1–3.7)
LYMPHOCYTES RELATIVE PERCENT: 3 % (ref 24–43)
MAGNESIUM SERPL-MCNC: 2.4 MG/DL (ref 1.6–2.6)
MCH RBC QN AUTO: 29.4 PG (ref 25.2–33.5)
MCHC RBC AUTO-ENTMCNC: 31.3 G/DL (ref 28.4–34.8)
MCV RBC AUTO: 93.9 FL (ref 82.6–102.9)
MONOCYTES NFR BLD: 2.82 K/UL (ref 0.1–1.2)
MONOCYTES NFR BLD: 22 % (ref 3–12)
MORPHOLOGY: ABNORMAL
NEGATIVE BASE EXCESS, VEN: 5.2 MMOL/L (ref 0–2)
NEUTROPHILS NFR BLD: 73 % (ref 36–65)
NEUTS SEG NFR BLD: 9.34 K/UL (ref 1.5–8.1)
NRBC BLD-RTO: 1.6 PER 100 WBC
O2 SAT, VEN: 99.3 % (ref 60–85)
OSMOLALITY UR: 447 MOSM/KG (ref 80–1300)
PCO2 VENOUS: 34.2 MM HG (ref 41–51)
PH FLUID: 8
PH VENOUS: 7.37 (ref 7.32–7.43)
PLATELET # BLD AUTO: 53 K/UL (ref 138–453)
PMV BLD AUTO: ABNORMAL FL (ref 8.1–13.5)
PO2 VENOUS: 149.1 MM HG (ref 30–50)
POTASSIUM SERPL-SCNC: 3.7 MMOL/L (ref 3.7–5.3)
POTASSIUM SERPL-SCNC: 3.8 MMOL/L (ref 3.7–5.3)
POTASSIUM, UR: 32.1 MMOL/L
PROT FLD-MCNC: 0.2 G/DL
RBC # BLD AUTO: 2.93 M/UL (ref 4.21–5.77)
SODIUM SERPL-SCNC: 150 MMOL/L (ref 135–144)
SODIUM SERPL-SCNC: 150 MMOL/L (ref 135–144)
SODIUM UR-SCNC: 54 MMOL/L
SPECIMEN TYPE: NORMAL
SURGICAL PATHOLOGY REPORT: NORMAL
WBC OTHER # BLD: 12.8 K/UL (ref 3.5–11.3)

## 2024-09-16 PROCEDURE — 83615 LACTATE (LD) (LDH) ENZYME: CPT

## 2024-09-16 PROCEDURE — 82947 ASSAY GLUCOSE BLOOD QUANT: CPT

## 2024-09-16 PROCEDURE — 82533 TOTAL CORTISOL: CPT

## 2024-09-16 PROCEDURE — 89051 BODY FLUID CELL COUNT: CPT

## 2024-09-16 PROCEDURE — 85014 HEMATOCRIT: CPT

## 2024-09-16 PROCEDURE — 36430 TRANSFUSION BLD/BLD COMPNT: CPT

## 2024-09-16 PROCEDURE — 99233 SBSQ HOSP IP/OBS HIGH 50: CPT | Performed by: INTERNAL MEDICINE

## 2024-09-16 PROCEDURE — 86900 BLOOD TYPING SEROLOGIC ABO: CPT

## 2024-09-16 PROCEDURE — 7100000001 HC PACU RECOVERY - ADDTL 15 MIN: Performed by: INTERNAL MEDICINE

## 2024-09-16 PROCEDURE — 2500000003 HC RX 250 WO HCPCS: Performed by: INTERNAL MEDICINE

## 2024-09-16 PROCEDURE — 2720000010 HC SURG SUPPLY STERILE: Performed by: INTERNAL MEDICINE

## 2024-09-16 PROCEDURE — 0W993ZZ DRAINAGE OF RIGHT PLEURAL CAVITY, PERCUTANEOUS APPROACH: ICD-10-PCS | Performed by: INTERNAL MEDICINE

## 2024-09-16 PROCEDURE — 74176 CT ABD & PELVIS W/O CONTRAST: CPT

## 2024-09-16 PROCEDURE — 74018 RADEX ABDOMEN 1 VIEW: CPT

## 2024-09-16 PROCEDURE — 3E043XZ INTRODUCTION OF VASOPRESSOR INTO CENTRAL VEIN, PERCUTANEOUS APPROACH: ICD-10-PCS | Performed by: INTERNAL MEDICINE

## 2024-09-16 PROCEDURE — 85018 HEMOGLOBIN: CPT

## 2024-09-16 PROCEDURE — 83735 ASSAY OF MAGNESIUM: CPT

## 2024-09-16 PROCEDURE — 88305 TISSUE EXAM BY PATHOLOGIST: CPT

## 2024-09-16 PROCEDURE — 3609017100 HC EGD: Performed by: INTERNAL MEDICINE

## 2024-09-16 PROCEDURE — 36415 COLL VENOUS BLD VENIPUNCTURE: CPT

## 2024-09-16 PROCEDURE — 30233R1 TRANSFUSION OF NONAUTOLOGOUS PLATELETS INTO PERIPHERAL VEIN, PERCUTANEOUS APPROACH: ICD-10-PCS | Performed by: INTERNAL MEDICINE

## 2024-09-16 PROCEDURE — 87206 SMEAR FLUORESCENT/ACID STAI: CPT

## 2024-09-16 PROCEDURE — 88112 CYTOPATH CELL ENHANCE TECH: CPT

## 2024-09-16 PROCEDURE — 3E0336Z INTRODUCTION OF NUTRITIONAL SUBSTANCE INTO PERIPHERAL VEIN, PERCUTANEOUS APPROACH: ICD-10-PCS | Performed by: INTERNAL MEDICINE

## 2024-09-16 PROCEDURE — 71045 X-RAY EXAM CHEST 1 VIEW: CPT

## 2024-09-16 PROCEDURE — 83605 ASSAY OF LACTIC ACID: CPT

## 2024-09-16 PROCEDURE — 0DH67UZ INSERTION OF FEEDING DEVICE INTO STOMACH, VIA NATURAL OR ARTIFICIAL OPENING: ICD-10-PCS | Performed by: INTERNAL MEDICINE

## 2024-09-16 PROCEDURE — 6360000002 HC RX W HCPCS: Performed by: INTERNAL MEDICINE

## 2024-09-16 PROCEDURE — 2580000003 HC RX 258: Performed by: INTERNAL MEDICINE

## 2024-09-16 PROCEDURE — 32555 ASPIRATE PLEURA W/ IMAGING: CPT

## 2024-09-16 PROCEDURE — 30233N1 TRANSFUSION OF NONAUTOLOGOUS RED BLOOD CELLS INTO PERIPHERAL VEIN, PERCUTANEOUS APPROACH: ICD-10-PCS | Performed by: INTERNAL MEDICINE

## 2024-09-16 PROCEDURE — 6370000000 HC RX 637 (ALT 250 FOR IP): Performed by: NURSE PRACTITIONER

## 2024-09-16 PROCEDURE — 3700000001 HC ADD 15 MINUTES (ANESTHESIA): Performed by: INTERNAL MEDICINE

## 2024-09-16 PROCEDURE — 99291 CRITICAL CARE FIRST HOUR: CPT

## 2024-09-16 PROCEDURE — 82140 ASSAY OF AMMONIA: CPT

## 2024-09-16 PROCEDURE — 99232 SBSQ HOSP IP/OBS MODERATE 35: CPT | Performed by: INTERNAL MEDICINE

## 2024-09-16 PROCEDURE — 86850 RBC ANTIBODY SCREEN: CPT

## 2024-09-16 PROCEDURE — 6360000002 HC RX W HCPCS: Performed by: ANESTHESIOLOGY

## 2024-09-16 PROCEDURE — 86901 BLOOD TYPING SEROLOGIC RH(D): CPT

## 2024-09-16 PROCEDURE — 86920 COMPATIBILITY TEST SPIN: CPT

## 2024-09-16 PROCEDURE — 76770 US EXAM ABDO BACK WALL COMP: CPT

## 2024-09-16 PROCEDURE — 84311 SPECTROPHOTOMETRY: CPT

## 2024-09-16 PROCEDURE — 82945 GLUCOSE OTHER FLUID: CPT

## 2024-09-16 PROCEDURE — 87116 MYCOBACTERIA CULTURE: CPT

## 2024-09-16 PROCEDURE — 6360000002 HC RX W HCPCS

## 2024-09-16 PROCEDURE — 2709999900 IR GUIDED THORACENTESIS PLEURAL

## 2024-09-16 PROCEDURE — 2500000003 HC RX 250 WO HCPCS: Performed by: NURSE ANESTHETIST, CERTIFIED REGISTERED

## 2024-09-16 PROCEDURE — 87070 CULTURE OTHR SPECIMN AEROBIC: CPT

## 2024-09-16 PROCEDURE — 2500000003 HC RX 250 WO HCPCS

## 2024-09-16 PROCEDURE — 6370000000 HC RX 637 (ALT 250 FOR IP): Performed by: INTERNAL MEDICINE

## 2024-09-16 PROCEDURE — 93005 ELECTROCARDIOGRAM TRACING: CPT

## 2024-09-16 PROCEDURE — 2700000000 HC OXYGEN THERAPY PER DAY

## 2024-09-16 PROCEDURE — 3700000000 HC ANESTHESIA ATTENDED CARE: Performed by: INTERNAL MEDICINE

## 2024-09-16 PROCEDURE — 2580000003 HC RX 258: Performed by: NURSE PRACTITIONER

## 2024-09-16 PROCEDURE — 2580000003 HC RX 258

## 2024-09-16 PROCEDURE — 84157 ASSAY OF PROTEIN OTHER: CPT

## 2024-09-16 PROCEDURE — 0W3P8ZZ CONTROL BLEEDING IN GASTROINTESTINAL TRACT, VIA NATURAL OR ARTIFICIAL OPENING ENDOSCOPIC: ICD-10-PCS | Performed by: INTERNAL MEDICINE

## 2024-09-16 PROCEDURE — 87102 FUNGUS ISOLATION CULTURE: CPT

## 2024-09-16 PROCEDURE — 2709999900 HC NON-CHARGEABLE SUPPLY: Performed by: INTERNAL MEDICINE

## 2024-09-16 PROCEDURE — 87075 CULTR BACTERIA EXCEPT BLOOD: CPT

## 2024-09-16 PROCEDURE — 82803 BLOOD GASES ANY COMBINATION: CPT

## 2024-09-16 PROCEDURE — 80048 BASIC METABOLIC PNL TOTAL CA: CPT

## 2024-09-16 PROCEDURE — 2000000000 HC ICU R&B

## 2024-09-16 PROCEDURE — 83986 ASSAY PH BODY FLUID NOS: CPT

## 2024-09-16 PROCEDURE — 87205 SMEAR GRAM STAIN: CPT

## 2024-09-16 PROCEDURE — 85025 COMPLETE CBC W/AUTO DIFF WBC: CPT

## 2024-09-16 PROCEDURE — 94761 N-INVAS EAR/PLS OXIMETRY MLT: CPT

## 2024-09-16 PROCEDURE — P9016 RBC LEUKOCYTES REDUCED: HCPCS

## 2024-09-16 PROCEDURE — 6360000002 HC RX W HCPCS: Performed by: NURSE ANESTHETIST, CERTIFIED REGISTERED

## 2024-09-16 PROCEDURE — 7100000000 HC PACU RECOVERY - FIRST 15 MIN: Performed by: INTERNAL MEDICINE

## 2024-09-16 RX ORDER — FENTANYL CITRATE 50 UG/ML
25 INJECTION, SOLUTION INTRAMUSCULAR; INTRAVENOUS ONCE
Status: COMPLETED | OUTPATIENT
Start: 2024-09-16 | End: 2024-09-16

## 2024-09-16 RX ORDER — LIDOCAINE HYDROCHLORIDE 20 MG/ML
INJECTION, SOLUTION EPIDURAL; INFILTRATION; INTRACAUDAL; PERINEURAL
Status: DISCONTINUED | OUTPATIENT
Start: 2024-09-16 | End: 2024-09-16 | Stop reason: SDUPTHER

## 2024-09-16 RX ORDER — LORAZEPAM 2 MG/ML
1 INJECTION INTRAMUSCULAR ONCE
Status: COMPLETED | OUTPATIENT
Start: 2024-09-16 | End: 2024-09-16

## 2024-09-16 RX ORDER — INSULIN GLARGINE 100 [IU]/ML
10 INJECTION, SOLUTION SUBCUTANEOUS
Status: DISCONTINUED | OUTPATIENT
Start: 2024-09-17 | End: 2024-09-17

## 2024-09-16 RX ORDER — FENTANYL CITRATE 50 UG/ML
100 INJECTION, SOLUTION INTRAMUSCULAR; INTRAVENOUS
Status: DISCONTINUED | OUTPATIENT
Start: 2024-09-16 | End: 2024-09-17

## 2024-09-16 RX ORDER — INSULIN LISPRO 100 [IU]/ML
0-8 INJECTION, SOLUTION INTRAVENOUS; SUBCUTANEOUS
Status: DISCONTINUED | OUTPATIENT
Start: 2024-09-16 | End: 2024-09-18

## 2024-09-16 RX ORDER — PROPOFOL 10 MG/ML
INJECTION, EMULSION INTRAVENOUS
Status: DISCONTINUED | OUTPATIENT
Start: 2024-09-16 | End: 2024-09-16 | Stop reason: SDUPTHER

## 2024-09-16 RX ORDER — FENTANYL CITRATE 0.05 MG/ML
50 INJECTION, SOLUTION INTRAMUSCULAR; INTRAVENOUS
Status: DISCONTINUED | OUTPATIENT
Start: 2024-09-16 | End: 2024-09-17

## 2024-09-16 RX ORDER — LORAZEPAM 2 MG/ML
0.5 INJECTION INTRAMUSCULAR EVERY 6 HOURS PRN
Status: DISCONTINUED | OUTPATIENT
Start: 2024-09-16 | End: 2024-09-17

## 2024-09-16 RX ORDER — SODIUM CHLORIDE 9 MG/ML
INJECTION, SOLUTION INTRAVENOUS PRN
Status: DISCONTINUED | OUTPATIENT
Start: 2024-09-16 | End: 2024-09-24 | Stop reason: HOSPADM

## 2024-09-16 RX ORDER — NOREPINEPHRINE BITARTRATE 0.06 MG/ML
1-100 INJECTION, SOLUTION INTRAVENOUS CONTINUOUS
Status: DISCONTINUED | OUTPATIENT
Start: 2024-09-16 | End: 2024-09-20

## 2024-09-16 RX ADMIN — PROPOFOL 10 MG: 10 INJECTION, EMULSION INTRAVENOUS at 13:46

## 2024-09-16 RX ADMIN — LORAZEPAM 0.5 MG: 2 INJECTION INTRAMUSCULAR; INTRAVENOUS at 19:19

## 2024-09-16 RX ADMIN — FENTANYL CITRATE 50 MCG: 0.05 INJECTION, SOLUTION INTRAMUSCULAR; INTRAVENOUS at 15:25

## 2024-09-16 RX ADMIN — INSULIN LISPRO 2 UNITS: 100 INJECTION, SOLUTION INTRAVENOUS; SUBCUTANEOUS at 21:49

## 2024-09-16 RX ADMIN — SODIUM CHLORIDE, PRESERVATIVE FREE 40 MG: 5 INJECTION INTRAVENOUS at 12:42

## 2024-09-16 RX ADMIN — VASOPRESSIN: 20 INJECTION, SOLUTION INTRAVENOUS at 12:49

## 2024-09-16 RX ADMIN — FENTANYL CITRATE 50 MCG: 0.05 INJECTION, SOLUTION INTRAMUSCULAR; INTRAVENOUS at 09:23

## 2024-09-16 RX ADMIN — FENTANYL CITRATE 50 MCG: 0.05 INJECTION, SOLUTION INTRAMUSCULAR; INTRAVENOUS at 12:40

## 2024-09-16 RX ADMIN — FENTANYL CITRATE 50 MCG: 0.05 INJECTION, SOLUTION INTRAMUSCULAR; INTRAVENOUS at 07:15

## 2024-09-16 RX ADMIN — SODIUM CHLORIDE, PRESERVATIVE FREE 40 MG: 5 INJECTION INTRAVENOUS at 21:50

## 2024-09-16 RX ADMIN — OCTREOTIDE ACETATE 50 MCG/HR: 200 INJECTION, SOLUTION INTRAVENOUS; SUBCUTANEOUS at 07:02

## 2024-09-16 RX ADMIN — INSULIN LISPRO 8 UNITS: 100 INJECTION, SOLUTION INTRAVENOUS; SUBCUTANEOUS at 12:48

## 2024-09-16 RX ADMIN — FENTANYL CITRATE 50 MCG: 50 INJECTION INTRAMUSCULAR; INTRAVENOUS at 05:18

## 2024-09-16 RX ADMIN — LORAZEPAM 1 MG: 2 INJECTION INTRAMUSCULAR; INTRAVENOUS at 01:58

## 2024-09-16 RX ADMIN — PROPOFOL 10 MG: 10 INJECTION, EMULSION INTRAVENOUS at 13:47

## 2024-09-16 RX ADMIN — FENTANYL CITRATE 50 MCG: 0.05 INJECTION, SOLUTION INTRAMUSCULAR; INTRAVENOUS at 21:40

## 2024-09-16 RX ADMIN — INSULIN LISPRO 2 UNITS: 100 INJECTION, SOLUTION INTRAVENOUS; SUBCUTANEOUS at 17:43

## 2024-09-16 RX ADMIN — SODIUM BICARBONATE: 84 INJECTION, SOLUTION INTRAVENOUS at 03:59

## 2024-09-16 RX ADMIN — CEFEPIME 2000 MG: 2 INJECTION, POWDER, FOR SOLUTION INTRAVENOUS at 08:31

## 2024-09-16 RX ADMIN — SODIUM CHLORIDE, PRESERVATIVE FREE 10 ML: 5 INJECTION INTRAVENOUS at 09:00

## 2024-09-16 RX ADMIN — FENTANYL CITRATE 50 MCG: 0.05 INJECTION, SOLUTION INTRAMUSCULAR; INTRAVENOUS at 00:24

## 2024-09-16 RX ADMIN — CEFEPIME 1000 MG: 1 INJECTION, POWDER, FOR SOLUTION INTRAMUSCULAR; INTRAVENOUS at 21:49

## 2024-09-16 RX ADMIN — INSULIN LISPRO 8 UNITS: 100 INJECTION, SOLUTION INTRAVENOUS; SUBCUTANEOUS at 08:32

## 2024-09-16 RX ADMIN — SODIUM CHLORIDE: 9 INJECTION, SOLUTION INTRAVENOUS at 21:46

## 2024-09-16 RX ADMIN — VASOPRESSIN: 20 INJECTION, SOLUTION INTRAVENOUS at 20:10

## 2024-09-16 RX ADMIN — HYDROCORTISONE SODIUM SUCCINATE 50 MG: 100 INJECTION, POWDER, FOR SOLUTION INTRAMUSCULAR; INTRAVENOUS at 23:04

## 2024-09-16 RX ADMIN — SODIUM CHLORIDE, PRESERVATIVE FREE 10 ML: 5 INJECTION INTRAVENOUS at 20:12

## 2024-09-16 RX ADMIN — LIDOCAINE HYDROCHLORIDE 20 MG: 20 INJECTION, SOLUTION EPIDURAL; INFILTRATION; INTRACAUDAL; PERINEURAL at 13:46

## 2024-09-16 RX ADMIN — FENTANYL CITRATE 50 MCG: 0.05 INJECTION, SOLUTION INTRAMUSCULAR; INTRAVENOUS at 17:42

## 2024-09-16 RX ADMIN — LORAZEPAM 0.5 MG: 2 INJECTION INTRAMUSCULAR; INTRAVENOUS at 09:03

## 2024-09-16 RX ADMIN — OCTREOTIDE ACETATE 50 MCG/HR: 200 INJECTION, SOLUTION INTRAVENOUS; SUBCUTANEOUS at 15:23

## 2024-09-16 RX ADMIN — FENTANYL CITRATE 25 MCG: 50 INJECTION INTRAMUSCULAR; INTRAVENOUS at 14:47

## 2024-09-16 RX ADMIN — Medication 5 MCG/MIN: at 01:57

## 2024-09-16 ASSESSMENT — PAIN SCALES - GENERAL
PAINLEVEL_OUTOF10: 2
PAINLEVEL_OUTOF10: 6
PAINLEVEL_OUTOF10: 2
PAINLEVEL_OUTOF10: 4
PAINLEVEL_OUTOF10: 6
PAINLEVEL_OUTOF10: 7
PAINLEVEL_OUTOF10: 2
PAINLEVEL_OUTOF10: 6
PAINLEVEL_OUTOF10: 4
PAINLEVEL_OUTOF10: 2
PAINLEVEL_OUTOF10: 0

## 2024-09-17 ENCOUNTER — APPOINTMENT (OUTPATIENT)
Age: 74
DRG: 871 | End: 2024-09-17
Attending: INTERNAL MEDICINE
Payer: MEDICARE

## 2024-09-17 ENCOUNTER — APPOINTMENT (OUTPATIENT)
Dept: CT IMAGING | Age: 74
DRG: 871 | End: 2024-09-17
Payer: MEDICARE

## 2024-09-17 ENCOUNTER — APPOINTMENT (OUTPATIENT)
Dept: INTERVENTIONAL RADIOLOGY/VASCULAR | Age: 74
DRG: 871 | End: 2024-09-17
Payer: MEDICARE

## 2024-09-17 ENCOUNTER — APPOINTMENT (OUTPATIENT)
Dept: GENERAL RADIOLOGY | Age: 74
DRG: 871 | End: 2024-09-17
Payer: MEDICARE

## 2024-09-17 LAB
ALBUMIN PERCENT: 53 % (ref 45–65)
ALBUMIN SERPL-MCNC: 2.4 G/DL (ref 3.5–5.2)
ALBUMIN SERPL-MCNC: 2.9 G/DL (ref 3.2–5.2)
ALP SERPL-CCNC: 120 U/L (ref 40–129)
ALPHA 2 PERCENT: 8 % (ref 6–13)
ALPHA1 GLOB SERPL ELPH-MCNC: 0.3 G/DL (ref 0.1–0.4)
ALPHA1 GLOB SERPL ELPH-MCNC: 5 % (ref 3–6)
ALPHA2 GLOB SERPL ELPH-MCNC: 0.4 G/DL (ref 0.5–0.9)
ALT SERPL-CCNC: 46 U/L (ref 5–41)
ANA SER QL IA: NEGATIVE
ANION GAP SERPL CALCULATED.3IONS-SCNC: 11 MMOL/L (ref 9–17)
APPEARANCE FLD: CLEAR
AST SERPL-CCNC: 30 U/L
B-GLOBULIN SERPL ELPH-MCNC: 1 G/DL (ref 0.5–1.1)
B-GLOBULIN SERPL ELPH-MCNC: 18 % (ref 11–19)
BASOPHILS # BLD: 0 K/UL (ref 0–0.2)
BASOPHILS NFR BLD: 0 % (ref 0–2)
BILIRUB DIRECT SERPL-MCNC: 0.3 MG/DL
BILIRUB INDIRECT SERPL-MCNC: 0.6 MG/DL (ref 0–1)
BILIRUB SERPL-MCNC: 0.9 MG/DL (ref 0.3–1.2)
BNP SERPL-MCNC: 824 PG/ML
BODY FLD TYPE: NORMAL
BUN SERPL-MCNC: 99 MG/DL (ref 8–23)
BUN/CREAT SERPL: 45 (ref 9–20)
CALCIUM SERPL-MCNC: 8.3 MG/DL (ref 8.6–10.4)
CASE NUMBER:: NORMAL
CHLORIDE SERPL-SCNC: 112 MMOL/L (ref 98–107)
CLOT CHECK: NORMAL
CO2 SERPL-SCNC: 19 MMOL/L (ref 20–31)
COLOR FLD: COLORLESS
CREAT SERPL-MCNC: 2.2 MG/DL (ref 0.7–1.2)
DSDNA IGG SER QL IA: 0.9 IU/ML
ECHO AO ROOT DIAM: 3.3 CM
ECHO AO ROOT INDEX: 1.93 CM/M2
ECHO AV AREA PEAK VELOCITY: 2.7 CM2
ECHO AV AREA VTI: 2.8 CM2
ECHO AV AREA/BSA PEAK VELOCITY: 1.6 CM2/M2
ECHO AV AREA/BSA VTI: 1.6 CM2/M2
ECHO AV MEAN GRADIENT: 6 MMHG
ECHO AV MEAN VELOCITY: 1.1 M/S
ECHO AV PEAK GRADIENT: 12 MMHG
ECHO AV PEAK VELOCITY: 1.7 M/S
ECHO AV VELOCITY RATIO: 0.94
ECHO AV VTI: 27.6 CM
ECHO EST RA PRESSURE: 3 MMHG
ECHO LA AREA 2C: 14.1 CM2
ECHO LA AREA 4C: 13 CM2
ECHO LA DIAMETER INDEX: 2.34 CM/M2
ECHO LA DIAMETER: 4 CM
ECHO LA MAJOR AXIS: 4.5 CM
ECHO LA MINOR AXIS: 4.2 CM
ECHO LA TO AORTIC ROOT RATIO: 1.21
ECHO LA VOL BP: 29 ML (ref 18–58)
ECHO LA VOL MOD A2C: 40 ML (ref 18–58)
ECHO LA VOL MOD A4C: 30 ML (ref 18–58)
ECHO LA VOL/BSA BIPLANE: 17 ML/M2 (ref 16–34)
ECHO LA VOLUME INDEX MOD A2C: 23 ML/M2 (ref 16–34)
ECHO LA VOLUME INDEX MOD A4C: 18 ML/M2 (ref 16–34)
ECHO LV E' LATERAL VELOCITY: 13 CM/S
ECHO LV E' SEPTAL VELOCITY: 5 CM/S
ECHO LV EF PHYSICIAN: 70 %
ECHO LV FRACTIONAL SHORTENING: 39 % (ref 28–44)
ECHO LV INTERNAL DIMENSION DIASTOLE INDEX: 2.22 CM/M2
ECHO LV INTERNAL DIMENSION DIASTOLIC: 3.8 CM (ref 4.2–5.9)
ECHO LV INTERNAL DIMENSION SYSTOLIC INDEX: 1.35 CM/M2
ECHO LV INTERNAL DIMENSION SYSTOLIC: 2.3 CM
ECHO LV IVSD: 1.2 CM (ref 0.6–1)
ECHO LV MASS 2D: 153.2 G (ref 88–224)
ECHO LV MASS INDEX 2D: 89.6 G/M2 (ref 49–115)
ECHO LV POSTERIOR WALL DIASTOLIC: 1.2 CM (ref 0.6–1)
ECHO LV RELATIVE WALL THICKNESS RATIO: 0.63
ECHO LVOT AREA: 3.1 CM2
ECHO LVOT AV VTI INDEX: 1
ECHO LVOT DIAM: 2 CM
ECHO LVOT MEAN GRADIENT: 5 MMHG
ECHO LVOT PEAK GRADIENT: 10 MMHG
ECHO LVOT PEAK VELOCITY: 1.6 M/S
ECHO LVOT STROKE VOLUME INDEX: 50.7 ML/M2
ECHO LVOT SV: 86.7 ML
ECHO LVOT VTI: 27.6 CM
ECHO MV A VELOCITY: 0.53 M/S
ECHO MV E DECELERATION TIME (DT): 232 MS
ECHO MV E VELOCITY: 0.53 M/S
ECHO MV E/A RATIO: 1
ECHO MV E/E' LATERAL: 4.08
ECHO MV E/E' RATIO (AVERAGED): 7.34
ECHO MV E/E' SEPTAL: 10.6
ECHO PV MAX VELOCITY: 1 M/S
ECHO PV PEAK GRADIENT: 4 MMHG
ECHO RA AREA 4C: 12.4 CM2
ECHO RA END SYSTOLIC VOLUME APICAL 4 CHAMBER INDEX BSA: 16 ML/M2
ECHO RA VOLUME: 28 ML
ECHO RIGHT VENTRICULAR SYSTOLIC PRESSURE (RVSP): 25 MMHG
ECHO RV BASAL DIMENSION: 3.8 CM
ECHO RV FREE WALL PEAK S': 19 CM/S
ECHO RV TAPSE: 2.2 CM (ref 1.7–?)
ECHO TV REGURGITANT MAX VELOCITY: 2.34 M/S
ECHO TV REGURGITANT PEAK GRADIENT: 22 MMHG
EOSINOPHIL # BLD: 0.11 K/UL (ref 0–0.44)
EOSINOPHILS RELATIVE PERCENT: 1 % (ref 1–4)
ERYTHROCYTE [DISTWIDTH] IN BLOOD BY AUTOMATED COUNT: 19.2 % (ref 11.8–14.4)
FIBRINOGEN PPP-MCNC: <60 MG/DL (ref 179–518)
GAMMA GLOB SERPL ELPH-MCNC: 0.9 G/DL (ref 0.5–1.5)
GAMMA GLOBULIN %: 16 % (ref 9–20)
GFR, ESTIMATED: 31 ML/MIN/1.73M2
GLUCOSE BLD-MCNC: 315 MG/DL (ref 75–110)
GLUCOSE BLD-MCNC: 321 MG/DL (ref 75–110)
GLUCOSE BLD-MCNC: 322 MG/DL (ref 75–110)
GLUCOSE BLD-MCNC: 327 MG/DL (ref 75–110)
GLUCOSE BLD-MCNC: 355 MG/DL (ref 75–110)
GLUCOSE BLD-MCNC: 376 MG/DL (ref 75–110)
GLUCOSE BLD-MCNC: 472 MG/DL (ref 75–110)
GLUCOSE SERPL-MCNC: 350 MG/DL (ref 70–99)
HCO3 VENOUS: 19.3 MMOL/L (ref 22–29)
HCT VFR BLD AUTO: 19.1 % (ref 40.7–50.3)
HCT VFR BLD AUTO: 22.5 % (ref 40.7–50.3)
HCT VFR BLD AUTO: 23.8 % (ref 40.7–50.3)
HGB BLD-MCNC: 6 G/DL (ref 13–17)
HGB BLD-MCNC: 7.1 G/DL (ref 13–17)
HGB BLD-MCNC: 7.5 G/DL (ref 13–17)
IMM GRANULOCYTES # BLD AUTO: 0.11 K/UL (ref 0–0.3)
IMM GRANULOCYTES NFR BLD: 1 %
ITYP INTERPRETATION: NORMAL
LACTATE BLDV-SCNC: 2.7 MMOL/L (ref 0.5–1.9)
LYMPHOCYTES NFR BLD: 0.33 K/UL (ref 1.1–3.7)
LYMPHOCYTES NFR FLD: 20 %
LYMPHOCYTES RELATIVE PERCENT: 3 % (ref 24–43)
MCH RBC QN AUTO: 29.6 PG (ref 25.2–33.5)
MCHC RBC AUTO-ENTMCNC: 31.5 G/DL (ref 28.4–34.8)
MCV RBC AUTO: 94.1 FL (ref 82.6–102.9)
MESOTHELIAL CELLS BODY FLUID: 10 %
MICROORGANISM SPEC CULT: NORMAL
MICROORGANISM/AGENT SPEC: NORMAL
MONOCYTES NFR BLD: 1.43 K/UL (ref 0.1–1.2)
MONOCYTES NFR BLD: 13 % (ref 3–12)
MONOCYTES NFR FLD: 26 %
MORPHOLOGY: ABNORMAL
NEGATIVE BASE EXCESS, VEN: 6 MMOL/L (ref 0–2)
NEUTROPHILS NFR BLD: 82 % (ref 36–65)
NEUTROPHILS NFR FLD: 44 %
NEUTS SEG NFR BLD: 9.02 K/UL (ref 1.5–8.1)
NRBC BLD-RTO: 1.5 PER 100 WBC
NUC CELL # FLD: 27 CELLS/UL
NUCLEAR IGG SER IA-RTO: 0.1 U/ML
O2 SAT, VEN: 99.3 % (ref 60–85)
PARTIAL THROMBOPLASTIN TIME: 38.2 SEC (ref 23.9–33.8)
PATH REV: NORMAL
PATHOLOGIST: ABNORMAL
PCO2 VENOUS: 36.3 MM HG (ref 41–51)
PH VENOUS: 7.33 (ref 7.32–7.43)
PLATELET # BLD AUTO: ABNORMAL K/UL (ref 138–453)
PLATELET, FLUORESCENCE: 43 K/UL (ref 138–453)
PLATELETS.RETICULATED NFR BLD AUTO: 8 % (ref 1.1–10.3)
PMV BLD AUTO: ABNORMAL FL (ref 8.1–13.5)
PO2 VENOUS: 160.3 MM HG (ref 30–50)
POTASSIUM SERPL-SCNC: 4.2 MMOL/L (ref 3.7–5.3)
PROT PATTERN SERPL ELPH-IMP: ABNORMAL
PROT SERPL-MCNC: 4.6 G/DL (ref 6.4–8.3)
PROT SERPL-MCNC: 5.4 G/DL (ref 6.6–8.7)
RBC # BLD AUTO: 2.53 M/UL (ref 4.21–5.77)
RBC # FLD: <3000 CELLS/UL
SERVICE CMNT-IMP: NORMAL
SODIUM SERPL-SCNC: 142 MMOL/L (ref 135–144)
SPECIMEN DESCRIPTION: NORMAL
SPECIMEN DESCRIPTION: NORMAL
TOTAL PROT. SUM,%: 100 % (ref 98–102)
TOTAL PROT. SUM: 5.5 G/DL (ref 6.3–8.2)
WBC OTHER # BLD: 11 K/UL (ref 3.5–11.3)

## 2024-09-17 PROCEDURE — 6360000002 HC RX W HCPCS: Performed by: INTERNAL MEDICINE

## 2024-09-17 PROCEDURE — 83605 ASSAY OF LACTIC ACID: CPT

## 2024-09-17 PROCEDURE — 85018 HEMOGLOBIN: CPT

## 2024-09-17 PROCEDURE — C1729 CATH, DRAINAGE: HCPCS

## 2024-09-17 PROCEDURE — 32557 INSERT CATH PLEURA W/ IMAGE: CPT

## 2024-09-17 PROCEDURE — 85055 RETICULATED PLATELET ASSAY: CPT

## 2024-09-17 PROCEDURE — 0W9930Z DRAINAGE OF RIGHT PLEURAL CAVITY WITH DRAINAGE DEVICE, PERCUTANEOUS APPROACH: ICD-10-PCS | Performed by: INTERNAL MEDICINE

## 2024-09-17 PROCEDURE — 99233 SBSQ HOSP IP/OBS HIGH 50: CPT | Performed by: INTERNAL MEDICINE

## 2024-09-17 PROCEDURE — 2500000003 HC RX 250 WO HCPCS: Performed by: INTERNAL MEDICINE

## 2024-09-17 PROCEDURE — 74176 CT ABD & PELVIS W/O CONTRAST: CPT

## 2024-09-17 PROCEDURE — 85025 COMPLETE CBC W/AUTO DIFF WBC: CPT

## 2024-09-17 PROCEDURE — 72125 CT NECK SPINE W/O DYE: CPT

## 2024-09-17 PROCEDURE — 82803 BLOOD GASES ANY COMBINATION: CPT

## 2024-09-17 PROCEDURE — 85014 HEMATOCRIT: CPT

## 2024-09-17 PROCEDURE — 6370000000 HC RX 637 (ALT 250 FOR IP): Performed by: INTERNAL MEDICINE

## 2024-09-17 PROCEDURE — 70450 CT HEAD/BRAIN W/O DYE: CPT

## 2024-09-17 PROCEDURE — 2700000000 HC OXYGEN THERAPY PER DAY

## 2024-09-17 PROCEDURE — P9016 RBC LEUKOCYTES REDUCED: HCPCS

## 2024-09-17 PROCEDURE — 2580000003 HC RX 258: Performed by: NURSE PRACTITIONER

## 2024-09-17 PROCEDURE — 74018 RADEX ABDOMEN 1 VIEW: CPT

## 2024-09-17 PROCEDURE — 6370000000 HC RX 637 (ALT 250 FOR IP): Performed by: NURSE PRACTITIONER

## 2024-09-17 PROCEDURE — 93306 TTE W/DOPPLER COMPLETE: CPT

## 2024-09-17 PROCEDURE — P9047 ALBUMIN (HUMAN), 25%, 50ML: HCPCS | Performed by: INTERNAL MEDICINE

## 2024-09-17 PROCEDURE — 2580000003 HC RX 258: Performed by: INTERNAL MEDICINE

## 2024-09-17 PROCEDURE — 85384 FIBRINOGEN ACTIVITY: CPT

## 2024-09-17 PROCEDURE — 80048 BASIC METABOLIC PNL TOTAL CA: CPT

## 2024-09-17 PROCEDURE — 82947 ASSAY GLUCOSE BLOOD QUANT: CPT

## 2024-09-17 PROCEDURE — 94761 N-INVAS EAR/PLS OXIMETRY MLT: CPT

## 2024-09-17 PROCEDURE — 80076 HEPATIC FUNCTION PANEL: CPT

## 2024-09-17 PROCEDURE — 99223 1ST HOSP IP/OBS HIGH 75: CPT | Performed by: INTERNAL MEDICINE

## 2024-09-17 PROCEDURE — 6360000002 HC RX W HCPCS: Performed by: NURSE PRACTITIONER

## 2024-09-17 PROCEDURE — 85730 THROMBOPLASTIN TIME PARTIAL: CPT

## 2024-09-17 PROCEDURE — 2000000000 HC ICU R&B

## 2024-09-17 PROCEDURE — 36430 TRANSFUSION BLD/BLD COMPNT: CPT

## 2024-09-17 PROCEDURE — 83880 ASSAY OF NATRIURETIC PEPTIDE: CPT

## 2024-09-17 PROCEDURE — 32555 ASPIRATE PLEURA W/ IMAGING: CPT

## 2024-09-17 RX ORDER — INSULIN GLARGINE 100 [IU]/ML
20 INJECTION, SOLUTION SUBCUTANEOUS DAILY
Status: DISCONTINUED | OUTPATIENT
Start: 2024-09-17 | End: 2024-09-18

## 2024-09-17 RX ORDER — MIDODRINE HYDROCHLORIDE 5 MG/1
5 TABLET ORAL
Status: DISCONTINUED | OUTPATIENT
Start: 2024-09-17 | End: 2024-09-17

## 2024-09-17 RX ORDER — LORAZEPAM 2 MG/ML
0.5 INJECTION INTRAMUSCULAR EVERY 4 HOURS PRN
Status: DISCONTINUED | OUTPATIENT
Start: 2024-09-17 | End: 2024-09-17

## 2024-09-17 RX ORDER — FUROSEMIDE 10 MG/ML
20 INJECTION INTRAMUSCULAR; INTRAVENOUS 2 TIMES DAILY
Status: DISCONTINUED | OUTPATIENT
Start: 2024-09-17 | End: 2024-09-20

## 2024-09-17 RX ORDER — INSULIN LISPRO 100 [IU]/ML
6 INJECTION, SOLUTION INTRAVENOUS; SUBCUTANEOUS ONCE
Status: COMPLETED | OUTPATIENT
Start: 2024-09-18 | End: 2024-09-18

## 2024-09-17 RX ORDER — MIDODRINE HYDROCHLORIDE 5 MG/1
5 TABLET ORAL 3 TIMES DAILY
Status: DISCONTINUED | OUTPATIENT
Start: 2024-09-17 | End: 2024-09-20

## 2024-09-17 RX ORDER — ALBUMIN (HUMAN) 12.5 G/50ML
25 SOLUTION INTRAVENOUS 2 TIMES DAILY
Status: COMPLETED | OUTPATIENT
Start: 2024-09-17 | End: 2024-09-18

## 2024-09-17 RX ORDER — LORAZEPAM 2 MG/ML
0.5 INJECTION INTRAMUSCULAR EVERY 12 HOURS PRN
Status: DISCONTINUED | OUTPATIENT
Start: 2024-09-17 | End: 2024-09-24 | Stop reason: HOSPADM

## 2024-09-17 RX ORDER — INSULIN LISPRO 100 [IU]/ML
8 INJECTION, SOLUTION INTRAVENOUS; SUBCUTANEOUS ONCE
Status: COMPLETED | OUTPATIENT
Start: 2024-09-17 | End: 2024-09-17

## 2024-09-17 RX ORDER — SODIUM CHLORIDE 9 MG/ML
INJECTION, SOLUTION INTRAVENOUS PRN
Status: DISCONTINUED | OUTPATIENT
Start: 2024-09-17 | End: 2024-09-24 | Stop reason: HOSPADM

## 2024-09-17 RX ORDER — HALOPERIDOL 5 MG/ML
1 INJECTION INTRAMUSCULAR EVERY 4 HOURS PRN
Status: DISCONTINUED | OUTPATIENT
Start: 2024-09-17 | End: 2024-09-23

## 2024-09-17 RX ORDER — FENTANYL CITRATE 0.05 MG/ML
50 INJECTION, SOLUTION INTRAMUSCULAR; INTRAVENOUS
Status: DISCONTINUED | OUTPATIENT
Start: 2024-09-17 | End: 2024-09-23

## 2024-09-17 RX ADMIN — HYDROMORPHONE HYDROCHLORIDE 0.25 MG: 1 INJECTION, SOLUTION INTRAMUSCULAR; INTRAVENOUS; SUBCUTANEOUS at 11:48

## 2024-09-17 RX ADMIN — INSULIN LISPRO 6 UNITS: 100 INJECTION, SOLUTION INTRAVENOUS; SUBCUTANEOUS at 08:32

## 2024-09-17 RX ADMIN — VASOPRESSIN: 20 INJECTION, SOLUTION INTRAVENOUS at 13:22

## 2024-09-17 RX ADMIN — SODIUM CHLORIDE, PRESERVATIVE FREE 40 MG: 5 INJECTION INTRAVENOUS at 08:30

## 2024-09-17 RX ADMIN — VASOPRESSIN: 20 INJECTION, SOLUTION INTRAVENOUS at 04:50

## 2024-09-17 RX ADMIN — ALBUMIN (HUMAN) 25 G: 0.25 INJECTION, SOLUTION INTRAVENOUS at 22:15

## 2024-09-17 RX ADMIN — FENTANYL CITRATE 50 MCG: 0.05 INJECTION, SOLUTION INTRAMUSCULAR; INTRAVENOUS at 19:29

## 2024-09-17 RX ADMIN — LORAZEPAM 0.5 MG: 2 INJECTION INTRAMUSCULAR; INTRAVENOUS at 00:14

## 2024-09-17 RX ADMIN — INSULIN LISPRO 6 UNITS: 100 INJECTION, SOLUTION INTRAVENOUS; SUBCUTANEOUS at 14:04

## 2024-09-17 RX ADMIN — FUROSEMIDE 20 MG: 10 INJECTION, SOLUTION INTRAMUSCULAR; INTRAVENOUS at 17:42

## 2024-09-17 RX ADMIN — FENTANYL CITRATE 100 MCG: 50 INJECTION INTRAMUSCULAR; INTRAVENOUS at 07:52

## 2024-09-17 RX ADMIN — INSULIN LISPRO 8 UNITS: 100 INJECTION, SOLUTION INTRAVENOUS; SUBCUTANEOUS at 16:30

## 2024-09-17 RX ADMIN — ALBUMIN (HUMAN) 25 G: 0.25 INJECTION, SOLUTION INTRAVENOUS at 13:30

## 2024-09-17 RX ADMIN — FENTANYL CITRATE 50 MCG: 0.05 INJECTION, SOLUTION INTRAMUSCULAR; INTRAVENOUS at 17:55

## 2024-09-17 RX ADMIN — CEFEPIME 1000 MG: 1 INJECTION, POWDER, FOR SOLUTION INTRAMUSCULAR; INTRAVENOUS at 23:30

## 2024-09-17 RX ADMIN — FENTANYL CITRATE 50 MCG: 0.05 INJECTION, SOLUTION INTRAMUSCULAR; INTRAVENOUS at 16:27

## 2024-09-17 RX ADMIN — SODIUM CHLORIDE, PRESERVATIVE FREE 10 ML: 5 INJECTION INTRAVENOUS at 22:49

## 2024-09-17 RX ADMIN — OCTREOTIDE ACETATE 50 MCG/HR: 200 INJECTION, SOLUTION INTRAVENOUS; SUBCUTANEOUS at 01:34

## 2024-09-17 RX ADMIN — FUROSEMIDE 20 MG: 10 INJECTION, SOLUTION INTRAMUSCULAR; INTRAVENOUS at 15:14

## 2024-09-17 RX ADMIN — SODIUM CHLORIDE, PRESERVATIVE FREE 10 ML: 5 INJECTION INTRAVENOUS at 08:32

## 2024-09-17 RX ADMIN — HYDROCORTISONE SODIUM SUCCINATE 50 MG: 100 INJECTION, POWDER, FOR SOLUTION INTRAMUSCULAR; INTRAVENOUS at 14:04

## 2024-09-17 RX ADMIN — INSULIN LISPRO 6 UNITS: 100 INJECTION, SOLUTION INTRAVENOUS; SUBCUTANEOUS at 05:14

## 2024-09-17 RX ADMIN — INSULIN LISPRO 6 UNITS: 100 INJECTION, SOLUTION INTRAVENOUS; SUBCUTANEOUS at 01:19

## 2024-09-17 RX ADMIN — LORAZEPAM 0.5 MG: 2 INJECTION INTRAMUSCULAR; INTRAVENOUS at 09:54

## 2024-09-17 RX ADMIN — FENTANYL CITRATE 100 MCG: 50 INJECTION INTRAMUSCULAR; INTRAVENOUS at 02:40

## 2024-09-17 RX ADMIN — FENTANYL CITRATE 100 MCG: 50 INJECTION INTRAMUSCULAR; INTRAVENOUS at 05:10

## 2024-09-17 RX ADMIN — VASOPRESSIN: 20 INJECTION, SOLUTION INTRAVENOUS at 20:59

## 2024-09-17 RX ADMIN — OCTREOTIDE ACETATE 25 MCG/HR: 200 INJECTION, SOLUTION INTRAVENOUS; SUBCUTANEOUS at 13:23

## 2024-09-17 RX ADMIN — SODIUM CHLORIDE: 9 INJECTION, SOLUTION INTRAVENOUS at 23:27

## 2024-09-17 RX ADMIN — CALCIUM GLUCONATE: 98 INJECTION, SOLUTION INTRAVENOUS at 18:02

## 2024-09-17 RX ADMIN — CEFEPIME 1000 MG: 1 INJECTION, POWDER, FOR SOLUTION INTRAMUSCULAR; INTRAVENOUS at 08:34

## 2024-09-17 RX ADMIN — Medication 7 MCG/MIN: at 05:31

## 2024-09-17 RX ADMIN — FENTANYL CITRATE 50 MCG: 0.05 INJECTION, SOLUTION INTRAMUSCULAR; INTRAVENOUS at 14:00

## 2024-09-17 RX ADMIN — FENTANYL CITRATE 50 MCG: 0.05 INJECTION, SOLUTION INTRAMUSCULAR; INTRAVENOUS at 22:49

## 2024-09-17 RX ADMIN — RIFAXIMIN 550 MG: 550 TABLET ORAL at 23:31

## 2024-09-17 RX ADMIN — HYDROCORTISONE SODIUM SUCCINATE 50 MG: 100 INJECTION, POWDER, FOR SOLUTION INTRAMUSCULAR; INTRAVENOUS at 23:23

## 2024-09-17 RX ADMIN — FENTANYL CITRATE 50 MCG: 0.05 INJECTION, SOLUTION INTRAMUSCULAR; INTRAVENOUS at 20:35

## 2024-09-17 RX ADMIN — MIDODRINE HYDROCHLORIDE 5 MG: 5 TABLET ORAL at 17:45

## 2024-09-17 RX ADMIN — INSULIN GLARGINE 20 UNITS: 100 INJECTION, SOLUTION SUBCUTANEOUS at 09:56

## 2024-09-17 RX ADMIN — HYDROCORTISONE SODIUM SUCCINATE 50 MG: 100 INJECTION, POWDER, FOR SOLUTION INTRAMUSCULAR; INTRAVENOUS at 05:13

## 2024-09-17 RX ADMIN — INSULIN LISPRO 8 UNITS: 100 INJECTION, SOLUTION INTRAVENOUS; SUBCUTANEOUS at 22:43

## 2024-09-17 RX ADMIN — SODIUM CHLORIDE, PRESERVATIVE FREE 40 MG: 5 INJECTION INTRAVENOUS at 22:44

## 2024-09-17 ASSESSMENT — PAIN SCALES - GENERAL
PAINLEVEL_OUTOF10: 2
PAINLEVEL_OUTOF10: 6
PAINLEVEL_OUTOF10: 6
PAINLEVEL_OUTOF10: 7
PAINLEVEL_OUTOF10: 3
PAINLEVEL_OUTOF10: 0
PAINLEVEL_OUTOF10: 10
PAINLEVEL_OUTOF10: 0
PAINLEVEL_OUTOF10: 6
PAINLEVEL_OUTOF10: 10
PAINLEVEL_OUTOF10: 10
PAINLEVEL_OUTOF10: 2
PAINLEVEL_OUTOF10: 3
PAINLEVEL_OUTOF10: 0
PAINLEVEL_OUTOF10: 2
PAINLEVEL_OUTOF10: 10
PAINLEVEL_OUTOF10: 0
PAINLEVEL_OUTOF10: 9
PAINLEVEL_OUTOF10: 8
PAINLEVEL_OUTOF10: 0
PAINLEVEL_OUTOF10: 0
PAINLEVEL_OUTOF10: 7
PAINLEVEL_OUTOF10: 2
PAINLEVEL_OUTOF10: 3
PAINLEVEL_OUTOF10: 0
PAINLEVEL_OUTOF10: 7
PAINLEVEL_OUTOF10: 3

## 2024-09-17 ASSESSMENT — PAIN - FUNCTIONAL ASSESSMENT
PAIN_FUNCTIONAL_ASSESSMENT: ACTIVITIES ARE NOT PREVENTED

## 2024-09-18 ENCOUNTER — APPOINTMENT (OUTPATIENT)
Dept: GENERAL RADIOLOGY | Age: 74
DRG: 871 | End: 2024-09-18
Attending: INTERNAL MEDICINE
Payer: MEDICARE

## 2024-09-18 PROBLEM — E43 SEVERE MALNUTRITION (HCC): Status: ACTIVE | Noted: 2024-09-18

## 2024-09-18 PROBLEM — Z51.5 PALLIATIVE CARE ENCOUNTER: Status: ACTIVE | Noted: 2024-09-18

## 2024-09-18 LAB
ALBUMIN SERPL-MCNC: 3.1 G/DL (ref 3.5–5.2)
ALP SERPL-CCNC: 99 U/L (ref 40–129)
ALT SERPL-CCNC: 35 U/L (ref 5–41)
AMMONIA PLAS-SCNC: 54 UMOL/L (ref 16–60)
ANION GAP SERPL CALCULATED.3IONS-SCNC: 10 MMOL/L (ref 9–17)
ANION GAP SERPL CALCULATED.3IONS-SCNC: 11 MMOL/L (ref 9–17)
AST SERPL-CCNC: 19 U/L
BASOPHILS # BLD: 0 K/UL (ref 0–0.2)
BASOPHILS NFR BLD: 0 %
BASOPHILS NFR BLD: ABNORMAL % (ref 0–2)
BILIRUB DIRECT SERPL-MCNC: 0.4 MG/DL
BILIRUB DIRECT SERPL-MCNC: 0.4 MG/DL
BILIRUB INDIRECT SERPL-MCNC: 0.9 MG/DL (ref 0–1)
BILIRUB SERPL-MCNC: 1.3 MG/DL (ref 0.3–1.2)
BILIRUB SERPL-MCNC: 1.4 MG/DL (ref 0.3–1.2)
BUN SERPL-MCNC: 76 MG/DL (ref 8–23)
BUN/CREAT SERPL: 48 (ref 9–20)
CALCIUM SERPL-MCNC: 8.1 MG/DL (ref 8.6–10.4)
CHLORIDE SERPL-SCNC: 109 MMOL/L (ref 98–107)
CHLORIDE SERPL-SCNC: 113 MMOL/L (ref 98–107)
CHOLEST FLD-MCNC: 4 MG/DL
CO2 SERPL-SCNC: 20 MMOL/L (ref 20–31)
CO2 SERPL-SCNC: 23 MMOL/L (ref 20–31)
CREAT SERPL-MCNC: 1.6 MG/DL (ref 0.7–1.2)
DAT POLY-SP REAG RBC QL: NEGATIVE
EKG ATRIAL RATE: 91 BPM
EKG P AXIS: 27 DEGREES
EKG P-R INTERVAL: 110 MS
EKG Q-T INTERVAL: 388 MS
EKG QRS DURATION: 80 MS
EKG QTC CALCULATION (BAZETT): 477 MS
EKG R AXIS: 25 DEGREES
EKG T AXIS: -103 DEGREES
EKG VENTRICULAR RATE: 91 BPM
EOSINOPHIL # BLD: 0 K/UL (ref 0–0.4)
EOSINOPHILS RELATIVE PERCENT: 0 % (ref 1–4)
EOSINOPHILS RELATIVE PERCENT: ABNORMAL % (ref 1–4)
ERYTHROCYTE [DISTWIDTH] IN BLOOD BY AUTOMATED COUNT: 17.2 % (ref 11.8–14.4)
ERYTHROCYTE [DISTWIDTH] IN BLOOD BY AUTOMATED COUNT: 17.4 % (ref 11.5–14.5)
FIBRINOGEN PPP-MCNC: <60 MG/DL (ref 179–518)
GFR, ESTIMATED: 45 ML/MIN/1.73M2
GLUCOSE BLD-MCNC: 179 MG/DL (ref 75–110)
GLUCOSE BLD-MCNC: 236 MG/DL (ref 75–110)
GLUCOSE BLD-MCNC: 378 MG/DL (ref 75–110)
GLUCOSE BLD-MCNC: 429 MG/DL (ref 75–110)
GLUCOSE BLD-MCNC: 431 MG/DL (ref 75–110)
GLUCOSE BLD-MCNC: 438 MG/DL (ref 75–110)
GLUCOSE SERPL-MCNC: 442 MG/DL (ref 70–99)
HAPTOGLOB SERPL-MCNC: 63 MG/DL (ref 30–200)
HCT VFR BLD AUTO: 20.8 % (ref 40.7–50.3)
HCT VFR BLD AUTO: 21 % (ref 41–53)
HCT VFR BLD AUTO: 21.7 % (ref 40.7–50.3)
HCT VFR BLD AUTO: 21.7 % (ref 40.7–50.3)
HGB BLD-MCNC: 6.4 G/DL (ref 13.5–17.5)
HGB BLD-MCNC: 6.7 G/DL (ref 13–17)
HGB BLD-MCNC: 7 G/DL (ref 13–17)
HGB BLD-MCNC: 7 G/DL (ref 13–17)
IMM GRANULOCYTES # BLD AUTO: 0.05 K/UL (ref 0–0.3)
IMM GRANULOCYTES NFR BLD: 1 %
INR PPP: 2.3
LDH SERPL-CCNC: 116 U/L (ref 135–225)
LYMPHOCYTES NFR BLD: 0.16 K/UL (ref 1–4.8)
LYMPHOCYTES RELATIVE PERCENT: 3 % (ref 24–44)
LYMPHOCYTES RELATIVE PERCENT: ABNORMAL % (ref 24–44)
MAGNESIUM SERPL-MCNC: 2.3 MG/DL (ref 1.6–2.6)
MCH RBC QN AUTO: 29.5 PG (ref 25.2–33.5)
MCH RBC QN AUTO: 29.6 PG (ref 26–34)
MCHC RBC AUTO-ENTMCNC: 30.5 G/DL (ref 31–37)
MCHC RBC AUTO-ENTMCNC: 32.3 G/DL (ref 28.4–34.8)
MCV RBC AUTO: 91.6 FL (ref 82.6–102.9)
MCV RBC AUTO: 97.2 FL (ref 80–100)
MICROORGANISM SPEC CULT: NORMAL
MICROORGANISM SPEC CULT: NORMAL
MONOCYTES NFR BLD: 0.85 K/UL (ref 0.2–0.8)
MONOCYTES NFR BLD: 16 % (ref 1–7)
MONOCYTES NFR BLD: ABNORMAL % (ref 1–7)
NEUTROPHILS NFR BLD: 80 % (ref 36–66)
NEUTROPHILS NFR BLD: ABNORMAL % (ref 36–66)
NEUTS SEG NFR BLD: 4.24 K/UL (ref 1.8–7.7)
NRBC BLD-RTO: 1.7 PER 100 WBC
PHOSPHATE SERPL-MCNC: 2.9 MG/DL (ref 2.5–4.5)
PLATELET # BLD AUTO: ABNORMAL K/UL (ref 130–400)
PLATELET # BLD AUTO: ABNORMAL K/UL (ref 138–453)
PLATELET, FLUORESCENCE: 17 K/UL (ref 138–453)
PLATELET, FLUORESCENCE: 17 K/UL (ref 138–453)
PLATELETS.RETICULATED NFR BLD AUTO: 7.9 % (ref 1.1–10.3)
PLATELETS.RETICULATED NFR BLD AUTO: 9.1 % (ref 1.1–10.3)
PMV BLD AUTO: ABNORMAL FL (ref 8.1–13.5)
POTASSIUM SERPL-SCNC: 3.3 MMOL/L (ref 3.7–5.3)
POTASSIUM SERPL-SCNC: 3.6 MMOL/L (ref 3.7–5.3)
PROT SERPL-MCNC: 4.8 G/DL (ref 6.4–8.3)
PROTHROMBIN TIME: 24.7 SEC (ref 11.5–14.2)
RBC # BLD AUTO: 2.16 M/UL (ref 4.5–5.9)
RBC # BLD AUTO: 2.37 M/UL (ref 4.21–5.77)
SERVICE CMNT-IMP: NORMAL
SERVICE CMNT-IMP: NORMAL
SODIUM SERPL-SCNC: 140 MMOL/L (ref 135–144)
SODIUM SERPL-SCNC: 146 MMOL/L (ref 135–144)
SPECIMEN DESCRIPTION: NORMAL
SPECIMEN DESCRIPTION: NORMAL
SPECIMEN SOURCE: NORMAL
SURGICAL PATHOLOGY REPORT: NORMAL
TRIGL SERPL-MCNC: 68 MG/DL
WBC OTHER # BLD: 5.3 K/UL (ref 3.5–11)
WBC OTHER # BLD: 5.3 K/UL (ref 3.5–11.3)

## 2024-09-18 PROCEDURE — 6370000000 HC RX 637 (ALT 250 FOR IP): Performed by: INTERNAL MEDICINE

## 2024-09-18 PROCEDURE — 80076 HEPATIC FUNCTION PANEL: CPT

## 2024-09-18 PROCEDURE — 99232 SBSQ HOSP IP/OBS MODERATE 35: CPT | Performed by: INTERNAL MEDICINE

## 2024-09-18 PROCEDURE — 85384 FIBRINOGEN ACTIVITY: CPT

## 2024-09-18 PROCEDURE — 2000000000 HC ICU R&B

## 2024-09-18 PROCEDURE — 2580000003 HC RX 258: Performed by: INTERNAL MEDICINE

## 2024-09-18 PROCEDURE — 6360000002 HC RX W HCPCS: Performed by: INTERNAL MEDICINE

## 2024-09-18 PROCEDURE — 86927 PLASMA FRESH FROZEN: CPT

## 2024-09-18 PROCEDURE — 99232 SBSQ HOSP IP/OBS MODERATE 35: CPT | Performed by: NURSE PRACTITIONER

## 2024-09-18 PROCEDURE — P9016 RBC LEUKOCYTES REDUCED: HCPCS

## 2024-09-18 PROCEDURE — 94761 N-INVAS EAR/PLS OXIMETRY MLT: CPT

## 2024-09-18 PROCEDURE — P9012 CRYOPRECIPITATE EACH UNIT: HCPCS

## 2024-09-18 PROCEDURE — 82247 BILIRUBIN TOTAL: CPT

## 2024-09-18 PROCEDURE — 83735 ASSAY OF MAGNESIUM: CPT

## 2024-09-18 PROCEDURE — 6370000000 HC RX 637 (ALT 250 FOR IP): Performed by: NURSE PRACTITIONER

## 2024-09-18 PROCEDURE — 85055 RETICULATED PLATELET ASSAY: CPT

## 2024-09-18 PROCEDURE — P9047 ALBUMIN (HUMAN), 25%, 50ML: HCPCS | Performed by: INTERNAL MEDICINE

## 2024-09-18 PROCEDURE — 93005 ELECTROCARDIOGRAM TRACING: CPT | Performed by: INTERNAL MEDICINE

## 2024-09-18 PROCEDURE — P9073 PLATELETS PHERESIS PATH REDU: HCPCS

## 2024-09-18 PROCEDURE — 85014 HEMATOCRIT: CPT

## 2024-09-18 PROCEDURE — 84478 ASSAY OF TRIGLYCERIDES: CPT

## 2024-09-18 PROCEDURE — 82248 BILIRUBIN DIRECT: CPT

## 2024-09-18 PROCEDURE — 85018 HEMOGLOBIN: CPT

## 2024-09-18 PROCEDURE — 83615 LACTATE (LD) (LDH) ENZYME: CPT

## 2024-09-18 PROCEDURE — 85610 PROTHROMBIN TIME: CPT

## 2024-09-18 PROCEDURE — 2500000003 HC RX 250 WO HCPCS: Performed by: INTERNAL MEDICINE

## 2024-09-18 PROCEDURE — 85025 COMPLETE CBC W/AUTO DIFF WBC: CPT

## 2024-09-18 PROCEDURE — 82947 ASSAY GLUCOSE BLOOD QUANT: CPT

## 2024-09-18 PROCEDURE — 84100 ASSAY OF PHOSPHORUS: CPT

## 2024-09-18 PROCEDURE — 82140 ASSAY OF AMMONIA: CPT

## 2024-09-18 PROCEDURE — 80048 BASIC METABOLIC PNL TOTAL CA: CPT

## 2024-09-18 PROCEDURE — 36430 TRANSFUSION BLD/BLD COMPNT: CPT

## 2024-09-18 PROCEDURE — 71045 X-RAY EXAM CHEST 1 VIEW: CPT

## 2024-09-18 PROCEDURE — 80051 ELECTROLYTE PANEL: CPT

## 2024-09-18 PROCEDURE — 83010 ASSAY OF HAPTOGLOBIN QUANT: CPT

## 2024-09-18 PROCEDURE — 86880 COOMBS TEST DIRECT: CPT

## 2024-09-18 RX ORDER — INSULIN LISPRO 100 [IU]/ML
0-16 INJECTION, SOLUTION INTRAVENOUS; SUBCUTANEOUS EVERY 4 HOURS
Status: DISCONTINUED | OUTPATIENT
Start: 2024-09-18 | End: 2024-09-20

## 2024-09-18 RX ORDER — POTASSIUM CHLORIDE 29.8 MG/ML
20 INJECTION INTRAVENOUS ONCE
Status: COMPLETED | OUTPATIENT
Start: 2024-09-18 | End: 2024-09-18

## 2024-09-18 RX ORDER — POTASSIUM CHLORIDE 7.45 MG/ML
10 INJECTION INTRAVENOUS
Status: DISCONTINUED | OUTPATIENT
Start: 2024-09-18 | End: 2024-09-18

## 2024-09-18 RX ORDER — INSULIN GLARGINE 100 [IU]/ML
40 INJECTION, SOLUTION SUBCUTANEOUS DAILY
Status: DISCONTINUED | OUTPATIENT
Start: 2024-09-19 | End: 2024-09-23

## 2024-09-18 RX ORDER — ALBUMIN (HUMAN) 12.5 G/50ML
25 SOLUTION INTRAVENOUS 2 TIMES DAILY
Status: COMPLETED | OUTPATIENT
Start: 2024-09-18 | End: 2024-09-21

## 2024-09-18 RX ORDER — INSULIN GLARGINE 100 [IU]/ML
30 INJECTION, SOLUTION SUBCUTANEOUS DAILY
Status: DISCONTINUED | OUTPATIENT
Start: 2024-09-18 | End: 2024-09-18

## 2024-09-18 RX ORDER — SODIUM CHLORIDE 9 MG/ML
INJECTION, SOLUTION INTRAVENOUS PRN
Status: DISCONTINUED | OUTPATIENT
Start: 2024-09-18 | End: 2024-09-24 | Stop reason: HOSPADM

## 2024-09-18 RX ORDER — SODIUM CHLORIDE 450 MG/100ML
INJECTION, SOLUTION INTRAVENOUS CONTINUOUS
Status: DISCONTINUED | OUTPATIENT
Start: 2024-09-18 | End: 2024-09-20

## 2024-09-18 RX ADMIN — OCTREOTIDE ACETATE 50 MCG/HR: 200 INJECTION, SOLUTION INTRAVENOUS; SUBCUTANEOUS at 00:34

## 2024-09-18 RX ADMIN — RIFAXIMIN 550 MG: 550 TABLET ORAL at 21:40

## 2024-09-18 RX ADMIN — ALBUMIN (HUMAN) 25 G: 0.25 INJECTION, SOLUTION INTRAVENOUS at 16:19

## 2024-09-18 RX ADMIN — CALCIUM GLUCONATE: 98 INJECTION, SOLUTION INTRAVENOUS at 18:00

## 2024-09-18 RX ADMIN — MIDODRINE HYDROCHLORIDE 5 MG: 5 TABLET ORAL at 21:40

## 2024-09-18 RX ADMIN — INSULIN LISPRO 8 UNITS: 100 INJECTION, SOLUTION INTRAVENOUS; SUBCUTANEOUS at 01:03

## 2024-09-18 RX ADMIN — POTASSIUM CHLORIDE 20 MEQ: 29.8 INJECTION, SOLUTION INTRAVENOUS at 12:31

## 2024-09-18 RX ADMIN — INSULIN LISPRO 12 UNITS: 100 INJECTION, SOLUTION INTRAVENOUS; SUBCUTANEOUS at 20:23

## 2024-09-18 RX ADMIN — HYDROCORTISONE SODIUM SUCCINATE 50 MG: 100 INJECTION, POWDER, FOR SOLUTION INTRAMUSCULAR; INTRAVENOUS at 04:50

## 2024-09-18 RX ADMIN — FUROSEMIDE 20 MG: 10 INJECTION, SOLUTION INTRAMUSCULAR; INTRAVENOUS at 09:15

## 2024-09-18 RX ADMIN — INSULIN LISPRO 8 UNITS: 100 INJECTION, SOLUTION INTRAVENOUS; SUBCUTANEOUS at 04:50

## 2024-09-18 RX ADMIN — RIFAXIMIN 550 MG: 550 TABLET ORAL at 09:20

## 2024-09-18 RX ADMIN — INSULIN GLARGINE 30 UNITS: 100 INJECTION, SOLUTION SUBCUTANEOUS at 09:21

## 2024-09-18 RX ADMIN — POTASSIUM BICARBONATE 40 MEQ: 782 TABLET, EFFERVESCENT ORAL at 21:40

## 2024-09-18 RX ADMIN — VASOPRESSIN: 20 INJECTION, SOLUTION INTRAVENOUS at 15:40

## 2024-09-18 RX ADMIN — MIDODRINE HYDROCHLORIDE 5 MG: 5 TABLET ORAL at 09:19

## 2024-09-18 RX ADMIN — INSULIN LISPRO 16 UNITS: 100 INJECTION, SOLUTION INTRAVENOUS; SUBCUTANEOUS at 12:31

## 2024-09-18 RX ADMIN — ALBUMIN (HUMAN) 25 G: 0.25 INJECTION, SOLUTION INTRAVENOUS at 07:58

## 2024-09-18 RX ADMIN — OCTREOTIDE ACETATE 50 MCG/HR: 200 INJECTION, SOLUTION INTRAVENOUS; SUBCUTANEOUS at 11:39

## 2024-09-18 RX ADMIN — PHYTONADIONE 10 MG: 10 INJECTION, EMULSION INTRAMUSCULAR; INTRAVENOUS; SUBCUTANEOUS at 14:15

## 2024-09-18 RX ADMIN — SODIUM CHLORIDE, PRESERVATIVE FREE 10 ML: 5 INJECTION INTRAVENOUS at 09:20

## 2024-09-18 RX ADMIN — INSULIN LISPRO 6 UNITS: 100 INJECTION, SOLUTION INTRAVENOUS; SUBCUTANEOUS at 01:03

## 2024-09-18 RX ADMIN — OCTREOTIDE ACETATE 50 MCG/HR: 200 INJECTION, SOLUTION INTRAVENOUS; SUBCUTANEOUS at 22:57

## 2024-09-18 RX ADMIN — INSULIN LISPRO 16 UNITS: 100 INJECTION, SOLUTION INTRAVENOUS; SUBCUTANEOUS at 16:24

## 2024-09-18 RX ADMIN — I.V. FAT EMULSION 100 ML: 20 EMULSION INTRAVENOUS at 18:02

## 2024-09-18 RX ADMIN — POTASSIUM CHLORIDE 20 MEQ: 29.8 INJECTION, SOLUTION INTRAVENOUS at 11:23

## 2024-09-18 RX ADMIN — MIDODRINE HYDROCHLORIDE 5 MG: 5 TABLET ORAL at 16:24

## 2024-09-18 RX ADMIN — VASOPRESSIN: 20 INJECTION, SOLUTION INTRAVENOUS at 05:41

## 2024-09-18 RX ADMIN — INSULIN LISPRO 16 UNITS: 100 INJECTION, SOLUTION INTRAVENOUS; SUBCUTANEOUS at 07:57

## 2024-09-18 RX ADMIN — HYDROCORTISONE SODIUM SUCCINATE 50 MG: 100 INJECTION, POWDER, FOR SOLUTION INTRAMUSCULAR; INTRAVENOUS at 17:40

## 2024-09-18 RX ADMIN — FUROSEMIDE 20 MG: 10 INJECTION, SOLUTION INTRAMUSCULAR; INTRAVENOUS at 17:40

## 2024-09-18 RX ADMIN — SODIUM CHLORIDE, PRESERVATIVE FREE 40 MG: 5 INJECTION INTRAVENOUS at 09:20

## 2024-09-18 RX ADMIN — SODIUM CHLORIDE, PRESERVATIVE FREE 40 MG: 5 INJECTION INTRAVENOUS at 21:41

## 2024-09-18 RX ADMIN — SODIUM CHLORIDE, PRESERVATIVE FREE 10 ML: 5 INJECTION INTRAVENOUS at 22:31

## 2024-09-18 RX ADMIN — CEFEPIME 2000 MG: 2 INJECTION, POWDER, FOR SOLUTION INTRAVENOUS at 22:20

## 2024-09-18 RX ADMIN — SODIUM CHLORIDE: 4.5 INJECTION, SOLUTION INTRAVENOUS at 17:25

## 2024-09-18 RX ADMIN — CEFEPIME 1000 MG: 1 INJECTION, POWDER, FOR SOLUTION INTRAMUSCULAR; INTRAVENOUS at 09:30

## 2024-09-18 ASSESSMENT — PAIN SCALES - GENERAL: PAINLEVEL_OUTOF10: 0

## 2024-09-19 ENCOUNTER — PATIENT MESSAGE (OUTPATIENT)
Dept: FAMILY MEDICINE CLINIC | Age: 74
End: 2024-09-19

## 2024-09-19 ENCOUNTER — APPOINTMENT (OUTPATIENT)
Dept: GENERAL RADIOLOGY | Age: 74
DRG: 871 | End: 2024-09-19
Payer: MEDICARE

## 2024-09-19 PROBLEM — K56.7 ILEUS (HCC): Status: ACTIVE | Noted: 2024-09-19

## 2024-09-19 PROBLEM — Z51.5 ENCOUNTER FOR PALLIATIVE CARE: Status: ACTIVE | Noted: 2024-09-19

## 2024-09-19 PROBLEM — Z71.89 ACP (ADVANCE CARE PLANNING): Status: ACTIVE | Noted: 2024-09-19

## 2024-09-19 LAB
ABO + RH BLD: NORMAL
ABO/RH: NORMAL
ALBUMIN SERPL-MCNC: 3.3 G/DL (ref 3.5–5.2)
ALP SERPL-CCNC: 93 U/L (ref 40–129)
ALT SERPL-CCNC: 28 U/L (ref 5–41)
AMMONIA PLAS-SCNC: 71 UMOL/L (ref 16–60)
ANION GAP SERPL CALCULATED.3IONS-SCNC: 8 MMOL/L (ref 9–17)
ANTIBODY SCREEN: NEGATIVE
ARM BAND NUMBER: NORMAL
ARM BAND NUMBER: NORMAL
AST SERPL-CCNC: 19 U/L
BASOPHILS # BLD: 0 K/UL (ref 0–0.2)
BASOPHILS NFR BLD: 0 %
BILIRUB DIRECT SERPL-MCNC: 0.4 MG/DL
BILIRUB INDIRECT SERPL-MCNC: 1 MG/DL (ref 0–1)
BILIRUB SERPL-MCNC: 1.4 MG/DL (ref 0.3–1.2)
BLOOD BANK DISPENSE STATUS: NORMAL
BLOOD BANK SAMPLE EXPIRATION: NORMAL
BLOOD BANK SAMPLE EXPIRATION: NORMAL
BLOOD GROUP ANTIBODIES SERPL: NEGATIVE
BPU ID: NORMAL
BUN SERPL-MCNC: 50 MG/DL (ref 8–23)
BUN/CREAT SERPL: 45 (ref 9–20)
CALCIUM SERPL-MCNC: 8.6 MG/DL (ref 8.6–10.4)
CHLORIDE SERPL-SCNC: 114 MMOL/L (ref 98–107)
CO2 SERPL-SCNC: 25 MMOL/L (ref 20–31)
COMPONENT: NORMAL
CREAT SERPL-MCNC: 1.1 MG/DL (ref 0.7–1.2)
CROSSMATCH RESULT: NORMAL
EOSINOPHIL # BLD: 0 K/UL (ref 0–0.4)
EOSINOPHILS RELATIVE PERCENT: 0 % (ref 1–4)
ERYTHROCYTE [DISTWIDTH] IN BLOOD BY AUTOMATED COUNT: 17 % (ref 11.8–14.4)
FIBRINOGEN PPP-MCNC: 79 MG/DL (ref 179–518)
FIBRINOGEN PPP-MCNC: 89 MG/DL (ref 179–518)
GFR, ESTIMATED: 70 ML/MIN/1.73M2
GLUCOSE BLD-MCNC: 200 MG/DL (ref 75–110)
GLUCOSE BLD-MCNC: 200 MG/DL (ref 75–110)
GLUCOSE BLD-MCNC: 207 MG/DL (ref 75–110)
GLUCOSE BLD-MCNC: 207 MG/DL (ref 75–110)
GLUCOSE BLD-MCNC: 209 MG/DL (ref 75–110)
GLUCOSE BLD-MCNC: 242 MG/DL (ref 75–110)
GLUCOSE BLD-MCNC: 354 MG/DL (ref 75–110)
GLUCOSE BLD-MCNC: 383 MG/DL (ref 75–110)
GLUCOSE SERPL-MCNC: 206 MG/DL (ref 70–99)
HCT VFR BLD AUTO: 22 % (ref 40.7–50.3)
HGB BLD-MCNC: 7.2 G/DL (ref 13–17)
IMM GRANULOCYTES # BLD AUTO: 0 K/UL (ref 0–0.3)
IMM GRANULOCYTES NFR BLD: 0 %
INR PPP: 1.8
LYMPHOCYTES NFR BLD: 0.24 K/UL (ref 1–4.8)
LYMPHOCYTES RELATIVE PERCENT: 5 % (ref 24–44)
MAGNESIUM SERPL-MCNC: 2.2 MG/DL (ref 1.6–2.6)
MCH RBC QN AUTO: 29.5 PG (ref 25.2–33.5)
MCHC RBC AUTO-ENTMCNC: 32.7 G/DL (ref 28.4–34.8)
MCV RBC AUTO: 90.2 FL (ref 82.6–102.9)
MONOCYTES NFR BLD: 0.99 K/UL (ref 0.2–0.8)
MONOCYTES NFR BLD: 21 % (ref 1–7)
NEUTROPHILS NFR BLD: 74 % (ref 36–66)
NEUTS SEG NFR BLD: 3.47 K/UL (ref 1.8–7.7)
NRBC BLD-RTO: 1.9 PER 100 WBC
PHOSPHATE SERPL-MCNC: 1.8 MG/DL (ref 2.5–4.5)
PLATELET # BLD AUTO: ABNORMAL K/UL (ref 138–453)
PLATELET, FLUORESCENCE: 28 K/UL (ref 138–453)
PLATELETS.RETICULATED NFR BLD AUTO: 8.3 % (ref 1.1–10.3)
PMV BLD AUTO: ABNORMAL FL (ref 8.1–13.5)
POTASSIUM SERPL-SCNC: 3.9 MMOL/L (ref 3.7–5.3)
PROT SERPL-MCNC: 5 G/DL (ref 6.4–8.3)
PROTHROMBIN TIME: 20.7 SEC (ref 11.5–14.2)
RBC # BLD AUTO: 2.44 M/UL (ref 4.21–5.77)
SODIUM SERPL-SCNC: 147 MMOL/L (ref 135–144)
TRANSFUSION STATUS: NORMAL
UNIT DIVISION: 0
WBC OTHER # BLD: 4.7 K/UL (ref 3.5–11.3)

## 2024-09-19 PROCEDURE — 2580000003 HC RX 258: Performed by: INTERNAL MEDICINE

## 2024-09-19 PROCEDURE — 6360000002 HC RX W HCPCS: Performed by: INTERNAL MEDICINE

## 2024-09-19 PROCEDURE — 94761 N-INVAS EAR/PLS OXIMETRY MLT: CPT

## 2024-09-19 PROCEDURE — 85025 COMPLETE CBC W/AUTO DIFF WBC: CPT

## 2024-09-19 PROCEDURE — 86927 PLASMA FRESH FROZEN: CPT

## 2024-09-19 PROCEDURE — 82140 ASSAY OF AMMONIA: CPT

## 2024-09-19 PROCEDURE — 71045 X-RAY EXAM CHEST 1 VIEW: CPT

## 2024-09-19 PROCEDURE — 6370000000 HC RX 637 (ALT 250 FOR IP): Performed by: INTERNAL MEDICINE

## 2024-09-19 PROCEDURE — 2000000000 HC ICU R&B

## 2024-09-19 PROCEDURE — 99233 SBSQ HOSP IP/OBS HIGH 50: CPT | Performed by: INTERNAL MEDICINE

## 2024-09-19 PROCEDURE — 84100 ASSAY OF PHOSPHORUS: CPT

## 2024-09-19 PROCEDURE — 86901 BLOOD TYPING SEROLOGIC RH(D): CPT

## 2024-09-19 PROCEDURE — 83735 ASSAY OF MAGNESIUM: CPT

## 2024-09-19 PROCEDURE — 97535 SELF CARE MNGMENT TRAINING: CPT

## 2024-09-19 PROCEDURE — 97530 THERAPEUTIC ACTIVITIES: CPT

## 2024-09-19 PROCEDURE — 97167 OT EVAL HIGH COMPLEX 60 MIN: CPT

## 2024-09-19 PROCEDURE — 6370000000 HC RX 637 (ALT 250 FOR IP): Performed by: NURSE PRACTITIONER

## 2024-09-19 PROCEDURE — 86900 BLOOD TYPING SEROLOGIC ABO: CPT

## 2024-09-19 PROCEDURE — 36430 TRANSFUSION BLD/BLD COMPNT: CPT

## 2024-09-19 PROCEDURE — 99232 SBSQ HOSP IP/OBS MODERATE 35: CPT | Performed by: INTERNAL MEDICINE

## 2024-09-19 PROCEDURE — 85055 RETICULATED PLATELET ASSAY: CPT

## 2024-09-19 PROCEDURE — 85384 FIBRINOGEN ACTIVITY: CPT

## 2024-09-19 PROCEDURE — 85610 PROTHROMBIN TIME: CPT

## 2024-09-19 PROCEDURE — 2500000003 HC RX 250 WO HCPCS: Performed by: INTERNAL MEDICINE

## 2024-09-19 PROCEDURE — 80048 BASIC METABOLIC PNL TOTAL CA: CPT

## 2024-09-19 PROCEDURE — P9012 CRYOPRECIPITATE EACH UNIT: HCPCS

## 2024-09-19 PROCEDURE — P9047 ALBUMIN (HUMAN), 25%, 50ML: HCPCS | Performed by: INTERNAL MEDICINE

## 2024-09-19 PROCEDURE — 86850 RBC ANTIBODY SCREEN: CPT

## 2024-09-19 PROCEDURE — 80076 HEPATIC FUNCTION PANEL: CPT

## 2024-09-19 PROCEDURE — 97116 GAIT TRAINING THERAPY: CPT

## 2024-09-19 PROCEDURE — 82947 ASSAY GLUCOSE BLOOD QUANT: CPT

## 2024-09-19 PROCEDURE — 97163 PT EVAL HIGH COMPLEX 45 MIN: CPT

## 2024-09-19 RX ORDER — INSULIN LISPRO 100 [IU]/ML
6 INJECTION, SOLUTION INTRAVENOUS; SUBCUTANEOUS ONCE
Status: COMPLETED | OUTPATIENT
Start: 2024-09-19 | End: 2024-09-19

## 2024-09-19 RX ORDER — SODIUM CHLORIDE 9 MG/ML
INJECTION, SOLUTION INTRAVENOUS PRN
Status: DISCONTINUED | OUTPATIENT
Start: 2024-09-19 | End: 2024-09-24 | Stop reason: HOSPADM

## 2024-09-19 RX ADMIN — SODIUM CHLORIDE, PRESERVATIVE FREE 10 ML: 5 INJECTION INTRAVENOUS at 21:09

## 2024-09-19 RX ADMIN — SODIUM CHLORIDE, PRESERVATIVE FREE 40 MG: 5 INJECTION INTRAVENOUS at 09:34

## 2024-09-19 RX ADMIN — CEFEPIME 2000 MG: 2 INJECTION, POWDER, FOR SOLUTION INTRAVENOUS at 10:05

## 2024-09-19 RX ADMIN — FUROSEMIDE 20 MG: 10 INJECTION, SOLUTION INTRAMUSCULAR; INTRAVENOUS at 09:35

## 2024-09-19 RX ADMIN — FUROSEMIDE 20 MG: 10 INJECTION, SOLUTION INTRAMUSCULAR; INTRAVENOUS at 18:03

## 2024-09-19 RX ADMIN — MIDODRINE HYDROCHLORIDE 5 MG: 5 TABLET ORAL at 09:34

## 2024-09-19 RX ADMIN — INSULIN GLARGINE 40 UNITS: 100 INJECTION, SOLUTION SUBCUTANEOUS at 09:33

## 2024-09-19 RX ADMIN — INSULIN LISPRO 6 UNITS: 100 INJECTION, SOLUTION INTRAVENOUS; SUBCUTANEOUS at 22:10

## 2024-09-19 RX ADMIN — INSULIN LISPRO 4 UNITS: 100 INJECTION, SOLUTION INTRAVENOUS; SUBCUTANEOUS at 23:45

## 2024-09-19 RX ADMIN — INSULIN LISPRO 16 UNITS: 100 INJECTION, SOLUTION INTRAVENOUS; SUBCUTANEOUS at 20:30

## 2024-09-19 RX ADMIN — HYDROCORTISONE SODIUM SUCCINATE 50 MG: 100 INJECTION, POWDER, FOR SOLUTION INTRAMUSCULAR; INTRAVENOUS at 18:04

## 2024-09-19 RX ADMIN — RIFAXIMIN 550 MG: 550 TABLET ORAL at 21:20

## 2024-09-19 RX ADMIN — INSULIN LISPRO 4 UNITS: 100 INJECTION, SOLUTION INTRAVENOUS; SUBCUTANEOUS at 04:17

## 2024-09-19 RX ADMIN — HYDROCORTISONE SODIUM SUCCINATE 50 MG: 100 INJECTION, POWDER, FOR SOLUTION INTRAMUSCULAR; INTRAVENOUS at 06:30

## 2024-09-19 RX ADMIN — ALBUMIN (HUMAN) 25 G: 0.25 INJECTION, SOLUTION INTRAVENOUS at 17:59

## 2024-09-19 RX ADMIN — INSULIN LISPRO 4 UNITS: 100 INJECTION, SOLUTION INTRAVENOUS; SUBCUTANEOUS at 09:34

## 2024-09-19 RX ADMIN — ALBUMIN (HUMAN) 25 G: 0.25 INJECTION, SOLUTION INTRAVENOUS at 09:33

## 2024-09-19 RX ADMIN — INSULIN LISPRO 4 UNITS: 100 INJECTION, SOLUTION INTRAVENOUS; SUBCUTANEOUS at 12:42

## 2024-09-19 RX ADMIN — SODIUM CHLORIDE, PRESERVATIVE FREE 40 MG: 5 INJECTION INTRAVENOUS at 21:09

## 2024-09-19 RX ADMIN — Medication 12.5 MG: at 12:43

## 2024-09-19 RX ADMIN — POTASSIUM PHOSPHATE, MONOBASIC POTASSIUM PHOSPHATE, DIBASIC: 224; 236 INJECTION, SOLUTION, CONCENTRATE INTRAVENOUS at 17:36

## 2024-09-19 RX ADMIN — I.V. FAT EMULSION 100 ML: 20 EMULSION INTRAVENOUS at 17:42

## 2024-09-19 RX ADMIN — CEFEPIME 2000 MG: 2 INJECTION, POWDER, FOR SOLUTION INTRAVENOUS at 21:08

## 2024-09-19 RX ADMIN — OCTREOTIDE ACETATE 50 MCG/HR: 200 INJECTION, SOLUTION INTRAVENOUS; SUBCUTANEOUS at 09:51

## 2024-09-19 RX ADMIN — INSULIN LISPRO 4 UNITS: 100 INJECTION, SOLUTION INTRAVENOUS; SUBCUTANEOUS at 00:33

## 2024-09-19 RX ADMIN — SODIUM PHOSPHATE, MONOBASIC, MONOHYDRATE AND SODIUM PHOSPHATE, DIBASIC, ANHYDROUS 15 MMOL: 142; 276 INJECTION, SOLUTION INTRAVENOUS at 06:38

## 2024-09-19 RX ADMIN — INSULIN LISPRO 4 UNITS: 100 INJECTION, SOLUTION INTRAVENOUS; SUBCUTANEOUS at 18:04

## 2024-09-19 RX ADMIN — SODIUM CHLORIDE: 4.5 INJECTION, SOLUTION INTRAVENOUS at 06:20

## 2024-09-19 RX ADMIN — SODIUM CHLORIDE, PRESERVATIVE FREE 10 ML: 5 INJECTION INTRAVENOUS at 09:36

## 2024-09-20 ENCOUNTER — APPOINTMENT (OUTPATIENT)
Dept: GENERAL RADIOLOGY | Age: 74
DRG: 871 | End: 2024-09-20
Payer: MEDICARE

## 2024-09-20 LAB
ALBUMIN SERPL-MCNC: 3.2 G/DL (ref 3.5–5.2)
ALP SERPL-CCNC: 100 U/L (ref 40–129)
ALT SERPL-CCNC: 25 U/L (ref 5–41)
AMMONIA PLAS-SCNC: 68 UMOL/L (ref 16–60)
ANION GAP SERPL CALCULATED.3IONS-SCNC: 9 MMOL/L (ref 9–17)
AST SERPL-CCNC: 19 U/L
BASOPHILS # BLD: 0 K/UL (ref 0–0.2)
BASOPHILS NFR BLD: 0 % (ref 0–2)
BILIRUB DIRECT SERPL-MCNC: 0.4 MG/DL
BILIRUB INDIRECT SERPL-MCNC: 1.1 MG/DL (ref 0–1)
BILIRUB SERPL-MCNC: 1.5 MG/DL (ref 0.3–1.2)
BLOOD BANK DISPENSE STATUS: NORMAL
BPU ID: NORMAL
BUN SERPL-MCNC: 40 MG/DL (ref 8–23)
BUN/CREAT SERPL: 40 (ref 9–20)
CALCIUM SERPL-MCNC: 8.5 MG/DL (ref 8.6–10.4)
CHLORIDE SERPL-SCNC: 109 MMOL/L (ref 98–107)
CO2 SERPL-SCNC: 24 MMOL/L (ref 20–31)
COMPONENT: NORMAL
CREAT SERPL-MCNC: 1 MG/DL (ref 0.7–1.2)
EOSINOPHIL # BLD: 0.08 K/UL (ref 0–0.44)
EOSINOPHILS RELATIVE PERCENT: 2 % (ref 1–4)
ERYTHROCYTE [DISTWIDTH] IN BLOOD BY AUTOMATED COUNT: 16.8 % (ref 11.8–14.4)
GFR, ESTIMATED: 79 ML/MIN/1.73M2
GLUCOSE BLD-MCNC: 144 MG/DL (ref 75–110)
GLUCOSE BLD-MCNC: 188 MG/DL (ref 75–110)
GLUCOSE BLD-MCNC: 323 MG/DL (ref 75–110)
GLUCOSE BLD-MCNC: 326 MG/DL (ref 75–110)
GLUCOSE BLD-MCNC: 377 MG/DL (ref 75–110)
GLUCOSE SERPL-MCNC: 181 MG/DL (ref 70–99)
HCT VFR BLD AUTO: 22.2 % (ref 40.7–50.3)
HGB BLD-MCNC: 7.2 G/DL (ref 13–17)
IMM GRANULOCYTES # BLD AUTO: 0.04 K/UL (ref 0–0.3)
IMM GRANULOCYTES NFR BLD: 1 %
IMM RETICS NFR: 26.1 % (ref 2.7–18.3)
INR PPP: 1.8
LYMPHOCYTES NFR BLD: 0.15 K/UL (ref 1.1–3.7)
LYMPHOCYTES RELATIVE PERCENT: 4 % (ref 24–43)
MAGNESIUM SERPL-MCNC: 1.9 MG/DL (ref 1.6–2.6)
MCH RBC QN AUTO: 29.1 PG (ref 25.2–33.5)
MCHC RBC AUTO-ENTMCNC: 32.4 G/DL (ref 28.4–34.8)
MCV RBC AUTO: 89.9 FL (ref 82.6–102.9)
MONOCYTES NFR BLD: 0.8 K/UL (ref 0.1–1.2)
MONOCYTES NFR BLD: 21 % (ref 3–12)
NEUTROPHILS NFR BLD: 72 % (ref 36–65)
NEUTS SEG NFR BLD: 2.73 K/UL (ref 1.5–8.1)
NRBC BLD-RTO: 0.8 PER 100 WBC
PHOSPHATE SERPL-MCNC: 2.1 MG/DL (ref 2.5–4.5)
PLATELET # BLD AUTO: ABNORMAL K/UL (ref 138–453)
PLATELET, FLUORESCENCE: 20 K/UL (ref 138–453)
PLATELETS.RETICULATED NFR BLD AUTO: 8 % (ref 1.1–10.3)
PMV BLD AUTO: ABNORMAL FL (ref 8.1–13.5)
POTASSIUM SERPL-SCNC: 3.6 MMOL/L (ref 3.7–5.3)
PROT SERPL-MCNC: 4.8 G/DL (ref 6.4–8.3)
PROTHROMBIN TIME: 21.1 SEC (ref 11.5–14.2)
RBC # BLD AUTO: 2.47 M/UL (ref 4.21–5.77)
RETIC HEMOGLOBIN: 20 PG (ref 28.2–35.7)
RETICS # AUTO: 0.08 M/UL (ref 0.03–0.08)
RETICS/RBC NFR AUTO: 3.2 % (ref 0.5–1.9)
SODIUM SERPL-SCNC: 142 MMOL/L (ref 135–144)
TRANSFUSION STATUS: NORMAL
UNIT DIVISION: 0
WBC OTHER # BLD: 3.8 K/UL (ref 3.5–11.3)

## 2024-09-20 PROCEDURE — 84100 ASSAY OF PHOSPHORUS: CPT

## 2024-09-20 PROCEDURE — 83735 ASSAY OF MAGNESIUM: CPT

## 2024-09-20 PROCEDURE — 6370000000 HC RX 637 (ALT 250 FOR IP): Performed by: INTERNAL MEDICINE

## 2024-09-20 PROCEDURE — P9047 ALBUMIN (HUMAN), 25%, 50ML: HCPCS | Performed by: INTERNAL MEDICINE

## 2024-09-20 PROCEDURE — 2060000000 HC ICU INTERMEDIATE R&B

## 2024-09-20 PROCEDURE — 2580000003 HC RX 258: Performed by: INTERNAL MEDICINE

## 2024-09-20 PROCEDURE — 6360000002 HC RX W HCPCS: Performed by: INTERNAL MEDICINE

## 2024-09-20 PROCEDURE — 97530 THERAPEUTIC ACTIVITIES: CPT

## 2024-09-20 PROCEDURE — 94761 N-INVAS EAR/PLS OXIMETRY MLT: CPT

## 2024-09-20 PROCEDURE — 85045 AUTOMATED RETICULOCYTE COUNT: CPT

## 2024-09-20 PROCEDURE — 80076 HEPATIC FUNCTION PANEL: CPT

## 2024-09-20 PROCEDURE — 85610 PROTHROMBIN TIME: CPT

## 2024-09-20 PROCEDURE — 82947 ASSAY GLUCOSE BLOOD QUANT: CPT

## 2024-09-20 PROCEDURE — 85055 RETICULATED PLATELET ASSAY: CPT

## 2024-09-20 PROCEDURE — 99232 SBSQ HOSP IP/OBS MODERATE 35: CPT | Performed by: INTERNAL MEDICINE

## 2024-09-20 PROCEDURE — 97535 SELF CARE MNGMENT TRAINING: CPT

## 2024-09-20 PROCEDURE — 71045 X-RAY EXAM CHEST 1 VIEW: CPT

## 2024-09-20 PROCEDURE — 82140 ASSAY OF AMMONIA: CPT

## 2024-09-20 PROCEDURE — 97116 GAIT TRAINING THERAPY: CPT

## 2024-09-20 PROCEDURE — 85025 COMPLETE CBC W/AUTO DIFF WBC: CPT

## 2024-09-20 PROCEDURE — 2500000003 HC RX 250 WO HCPCS: Performed by: INTERNAL MEDICINE

## 2024-09-20 PROCEDURE — 80048 BASIC METABOLIC PNL TOTAL CA: CPT

## 2024-09-20 RX ORDER — LEVOFLOXACIN 250 MG/1
250 TABLET, FILM COATED ORAL EVERY OTHER DAY
Status: DISCONTINUED | OUTPATIENT
Start: 2024-09-21 | End: 2024-09-24 | Stop reason: HOSPADM

## 2024-09-20 RX ORDER — MIDODRINE HYDROCHLORIDE 5 MG/1
5 TABLET ORAL 3 TIMES DAILY PRN
Status: DISCONTINUED | OUTPATIENT
Start: 2024-09-20 | End: 2024-09-24 | Stop reason: HOSPADM

## 2024-09-20 RX ORDER — POTASSIUM CHLORIDE 1500 MG/1
40 TABLET, EXTENDED RELEASE ORAL ONCE
Status: COMPLETED | OUTPATIENT
Start: 2024-09-20 | End: 2024-09-20

## 2024-09-20 RX ORDER — SPIRONOLACTONE 25 MG/1
25 TABLET ORAL DAILY
Status: DISCONTINUED | OUTPATIENT
Start: 2024-09-21 | End: 2024-09-23

## 2024-09-20 RX ORDER — PROPRANOLOL HCL 10 MG
20 TABLET ORAL 2 TIMES DAILY
Status: DISCONTINUED | OUTPATIENT
Start: 2024-09-20 | End: 2024-09-22

## 2024-09-20 RX ORDER — BISACODYL 5 MG/1
5 TABLET, DELAYED RELEASE ORAL DAILY
Status: DISCONTINUED | OUTPATIENT
Start: 2024-09-20 | End: 2024-09-22

## 2024-09-20 RX ORDER — FUROSEMIDE 10 MG/ML
20 INJECTION INTRAMUSCULAR; INTRAVENOUS ONCE
Status: COMPLETED | OUTPATIENT
Start: 2024-09-20 | End: 2024-09-20

## 2024-09-20 RX ORDER — INSULIN LISPRO 100 [IU]/ML
0-16 INJECTION, SOLUTION INTRAVENOUS; SUBCUTANEOUS
Status: DISCONTINUED | OUTPATIENT
Start: 2024-09-20 | End: 2024-09-24 | Stop reason: HOSPADM

## 2024-09-20 RX ORDER — TORSEMIDE 10 MG/1
20 TABLET ORAL DAILY
Status: DISCONTINUED | OUTPATIENT
Start: 2024-09-20 | End: 2024-09-24 | Stop reason: HOSPADM

## 2024-09-20 RX ADMIN — SODIUM CHLORIDE, PRESERVATIVE FREE 10 ML: 5 INJECTION INTRAVENOUS at 18:00

## 2024-09-20 RX ADMIN — POTASSIUM CHLORIDE 40 MEQ: 1500 TABLET, EXTENDED RELEASE ORAL at 13:35

## 2024-09-20 RX ADMIN — INSULIN LISPRO 12 UNITS: 100 INJECTION, SOLUTION INTRAVENOUS; SUBCUTANEOUS at 13:33

## 2024-09-20 RX ADMIN — ALBUMIN (HUMAN) 25 G: 0.25 INJECTION, SOLUTION INTRAVENOUS at 11:00

## 2024-09-20 RX ADMIN — PROPRANOLOL HYDROCHLORIDE 20 MG: 10 TABLET ORAL at 21:33

## 2024-09-20 RX ADMIN — ALBUMIN (HUMAN) 25 G: 0.25 INJECTION, SOLUTION INTRAVENOUS at 17:58

## 2024-09-20 RX ADMIN — SODIUM CHLORIDE, PRESERVATIVE FREE 40 MG: 5 INJECTION INTRAVENOUS at 21:33

## 2024-09-20 RX ADMIN — CEFEPIME 2000 MG: 2 INJECTION, POWDER, FOR SOLUTION INTRAVENOUS at 21:36

## 2024-09-20 RX ADMIN — Medication 12.5 MG: at 08:51

## 2024-09-20 RX ADMIN — INSULIN LISPRO 12 UNITS: 100 INJECTION, SOLUTION INTRAVENOUS; SUBCUTANEOUS at 21:34

## 2024-09-20 RX ADMIN — RIFAXIMIN 550 MG: 550 TABLET ORAL at 21:33

## 2024-09-20 RX ADMIN — RIFAXIMIN 550 MG: 550 TABLET ORAL at 08:51

## 2024-09-20 RX ADMIN — SODIUM PHOSPHATE, MONOBASIC, MONOHYDRATE AND SODIUM PHOSPHATE, DIBASIC, ANHYDROUS 15 MMOL: 142; 276 INJECTION, SOLUTION INTRAVENOUS at 09:16

## 2024-09-20 RX ADMIN — SODIUM CHLORIDE, PRESERVATIVE FREE 10 ML: 5 INJECTION INTRAVENOUS at 18:32

## 2024-09-20 RX ADMIN — HYDROCORTISONE SODIUM SUCCINATE 25 MG: 100 INJECTION, POWDER, FOR SOLUTION INTRAMUSCULAR; INTRAVENOUS at 18:03

## 2024-09-20 RX ADMIN — SODIUM CHLORIDE, PRESERVATIVE FREE 10 ML: 5 INJECTION INTRAVENOUS at 21:35

## 2024-09-20 RX ADMIN — CEFEPIME 2000 MG: 2 INJECTION, POWDER, FOR SOLUTION INTRAVENOUS at 09:11

## 2024-09-20 RX ADMIN — SODIUM CHLORIDE, PRESERVATIVE FREE 10 ML: 5 INJECTION INTRAVENOUS at 08:52

## 2024-09-20 RX ADMIN — FUROSEMIDE 20 MG: 10 INJECTION, SOLUTION INTRAMUSCULAR; INTRAVENOUS at 12:31

## 2024-09-20 RX ADMIN — SODIUM CHLORIDE, PRESERVATIVE FREE 10 ML: 5 INJECTION INTRAVENOUS at 12:32

## 2024-09-20 RX ADMIN — SODIUM CHLORIDE, PRESERVATIVE FREE 40 MG: 5 INJECTION INTRAVENOUS at 08:44

## 2024-09-20 RX ADMIN — BISACODYL 5 MG: 5 TABLET, COATED ORAL at 13:35

## 2024-09-20 RX ADMIN — SODIUM CHLORIDE: 9 INJECTION, SOLUTION INTRAVENOUS at 09:10

## 2024-09-20 RX ADMIN — INSULIN LISPRO 16 UNITS: 100 INJECTION, SOLUTION INTRAVENOUS; SUBCUTANEOUS at 18:01

## 2024-09-20 RX ADMIN — SODIUM CHLORIDE, PRESERVATIVE FREE 10 ML: 5 INJECTION INTRAVENOUS at 18:07

## 2024-09-20 RX ADMIN — HYDROCORTISONE SODIUM SUCCINATE 50 MG: 100 INJECTION, POWDER, FOR SOLUTION INTRAMUSCULAR; INTRAVENOUS at 05:45

## 2024-09-20 RX ADMIN — INSULIN GLARGINE 40 UNITS: 100 INJECTION, SOLUTION SUBCUTANEOUS at 08:52

## 2024-09-20 RX ADMIN — SODIUM CHLORIDE: 4.5 INJECTION, SOLUTION INTRAVENOUS at 02:06

## 2024-09-21 ENCOUNTER — APPOINTMENT (OUTPATIENT)
Dept: GENERAL RADIOLOGY | Age: 74
DRG: 871 | End: 2024-09-21
Payer: MEDICARE

## 2024-09-21 LAB
ANION GAP SERPL CALCULATED.3IONS-SCNC: 9 MMOL/L (ref 9–17)
BASOPHILS # BLD: 0 K/UL (ref 0–0.2)
BASOPHILS NFR BLD: 0 %
BUN SERPL-MCNC: 38 MG/DL (ref 8–23)
BUN/CREAT SERPL: 38 (ref 9–20)
CALCIUM SERPL-MCNC: 8.6 MG/DL (ref 8.6–10.4)
CHLORIDE SERPL-SCNC: 107 MMOL/L (ref 98–107)
CO2 SERPL-SCNC: 23 MMOL/L (ref 20–31)
CREAT SERPL-MCNC: 1 MG/DL (ref 0.7–1.2)
EOSINOPHIL # BLD: 0 K/UL (ref 0–0.4)
EOSINOPHILS RELATIVE PERCENT: 0 % (ref 1–4)
ERYTHROCYTE [DISTWIDTH] IN BLOOD BY AUTOMATED COUNT: 16.7 % (ref 11.8–14.4)
GFR, ESTIMATED: 79 ML/MIN/1.73M2
GLUCOSE BLD-MCNC: 212 MG/DL (ref 75–110)
GLUCOSE BLD-MCNC: 232 MG/DL (ref 75–110)
GLUCOSE BLD-MCNC: 240 MG/DL (ref 75–110)
GLUCOSE BLD-MCNC: 312 MG/DL (ref 75–110)
GLUCOSE SERPL-MCNC: 168 MG/DL (ref 70–99)
HCT VFR BLD AUTO: 24.3 % (ref 40.7–50.3)
HGB BLD-MCNC: 7.6 G/DL (ref 13–17)
IMM GRANULOCYTES # BLD AUTO: 0 K/UL (ref 0–0.3)
IMM GRANULOCYTES NFR BLD: 0 %
INR PPP: 1.9
LYMPHOCYTES NFR BLD: 0.23 K/UL (ref 1–4.8)
LYMPHOCYTES RELATIVE PERCENT: 6 % (ref 24–44)
MAGNESIUM SERPL-MCNC: 2 MG/DL (ref 1.6–2.6)
MCH RBC QN AUTO: 28.7 PG (ref 25.2–33.5)
MCHC RBC AUTO-ENTMCNC: 31.3 G/DL (ref 28.4–34.8)
MCV RBC AUTO: 91.7 FL (ref 82.6–102.9)
MICROORGANISM SPEC CULT: NORMAL
MICROORGANISM/AGENT SPEC: NORMAL
MONOCYTES NFR BLD: 1.01 K/UL (ref 0.2–0.8)
MONOCYTES NFR BLD: 26 % (ref 1–7)
NEUTROPHILS NFR BLD: 68 % (ref 36–66)
NEUTS SEG NFR BLD: 2.66 K/UL (ref 1.8–7.7)
NRBC BLD-RTO: 0.8 PER 100 WBC
PLATELET # BLD AUTO: ABNORMAL K/UL (ref 138–453)
PLATELET, FLUORESCENCE: 22 K/UL (ref 138–453)
PLATELETS.RETICULATED NFR BLD AUTO: 11 % (ref 1.1–10.3)
PMV BLD AUTO: ABNORMAL FL (ref 8.1–13.5)
POTASSIUM SERPL-SCNC: 4 MMOL/L (ref 3.7–5.3)
PROTHROMBIN TIME: 21.2 SEC (ref 11.5–14.2)
RBC # BLD AUTO: 2.65 M/UL (ref 4.21–5.77)
SERVICE CMNT-IMP: NORMAL
SODIUM SERPL-SCNC: 139 MMOL/L (ref 135–144)
SPECIMEN DESCRIPTION: NORMAL
WBC OTHER # BLD: 3.9 K/UL (ref 3.5–11.3)

## 2024-09-21 PROCEDURE — 80048 BASIC METABOLIC PNL TOTAL CA: CPT

## 2024-09-21 PROCEDURE — 6370000000 HC RX 637 (ALT 250 FOR IP): Performed by: INTERNAL MEDICINE

## 2024-09-21 PROCEDURE — 99232 SBSQ HOSP IP/OBS MODERATE 35: CPT | Performed by: INTERNAL MEDICINE

## 2024-09-21 PROCEDURE — 85610 PROTHROMBIN TIME: CPT

## 2024-09-21 PROCEDURE — 6360000002 HC RX W HCPCS: Performed by: INTERNAL MEDICINE

## 2024-09-21 PROCEDURE — 85055 RETICULATED PLATELET ASSAY: CPT

## 2024-09-21 PROCEDURE — 71045 X-RAY EXAM CHEST 1 VIEW: CPT

## 2024-09-21 PROCEDURE — P9047 ALBUMIN (HUMAN), 25%, 50ML: HCPCS | Performed by: INTERNAL MEDICINE

## 2024-09-21 PROCEDURE — 94761 N-INVAS EAR/PLS OXIMETRY MLT: CPT

## 2024-09-21 PROCEDURE — 36415 COLL VENOUS BLD VENIPUNCTURE: CPT

## 2024-09-21 PROCEDURE — 2580000003 HC RX 258: Performed by: INTERNAL MEDICINE

## 2024-09-21 PROCEDURE — 83735 ASSAY OF MAGNESIUM: CPT

## 2024-09-21 PROCEDURE — 85025 COMPLETE CBC W/AUTO DIFF WBC: CPT

## 2024-09-21 PROCEDURE — 2060000000 HC ICU INTERMEDIATE R&B

## 2024-09-21 PROCEDURE — 82947 ASSAY GLUCOSE BLOOD QUANT: CPT

## 2024-09-21 RX ADMIN — INSULIN LISPRO 4 UNITS: 100 INJECTION, SOLUTION INTRAVENOUS; SUBCUTANEOUS at 17:23

## 2024-09-21 RX ADMIN — TORSEMIDE 20 MG: 20 TABLET ORAL at 09:23

## 2024-09-21 RX ADMIN — Medication 1 CAPSULE: at 09:23

## 2024-09-21 RX ADMIN — SODIUM CHLORIDE, PRESERVATIVE FREE 10 ML: 5 INJECTION INTRAVENOUS at 09:30

## 2024-09-21 RX ADMIN — INSULIN LISPRO 4 UNITS: 100 INJECTION, SOLUTION INTRAVENOUS; SUBCUTANEOUS at 21:38

## 2024-09-21 RX ADMIN — ALBUMIN (HUMAN) 25 G: 0.25 INJECTION, SOLUTION INTRAVENOUS at 09:42

## 2024-09-21 RX ADMIN — INSULIN LISPRO 12 UNITS: 100 INJECTION, SOLUTION INTRAVENOUS; SUBCUTANEOUS at 12:25

## 2024-09-21 RX ADMIN — INSULIN GLARGINE 40 UNITS: 100 INJECTION, SOLUTION SUBCUTANEOUS at 09:20

## 2024-09-21 RX ADMIN — RIFAXIMIN 550 MG: 550 TABLET ORAL at 21:29

## 2024-09-21 RX ADMIN — BISACODYL 5 MG: 5 TABLET, COATED ORAL at 09:24

## 2024-09-21 RX ADMIN — INSULIN LISPRO 4 UNITS: 100 INJECTION, SOLUTION INTRAVENOUS; SUBCUTANEOUS at 09:20

## 2024-09-21 RX ADMIN — SODIUM CHLORIDE, PRESERVATIVE FREE 10 ML: 5 INJECTION INTRAVENOUS at 21:40

## 2024-09-21 RX ADMIN — LEVOFLOXACIN 250 MG: 250 TABLET, FILM COATED ORAL at 06:13

## 2024-09-21 RX ADMIN — MULTIVITAMIN TABLET 1 TABLET: TABLET at 09:23

## 2024-09-21 RX ADMIN — RIFAXIMIN 550 MG: 550 TABLET ORAL at 09:23

## 2024-09-21 RX ADMIN — SODIUM CHLORIDE, PRESERVATIVE FREE 40 MG: 5 INJECTION INTRAVENOUS at 09:29

## 2024-09-21 RX ADMIN — HYDROCORTISONE SODIUM SUCCINATE 25 MG: 100 INJECTION, POWDER, FOR SOLUTION INTRAMUSCULAR; INTRAVENOUS at 06:13

## 2024-09-21 RX ADMIN — PROPRANOLOL HYDROCHLORIDE 20 MG: 10 TABLET ORAL at 21:29

## 2024-09-21 RX ADMIN — PROPRANOLOL HYDROCHLORIDE 20 MG: 10 TABLET ORAL at 09:23

## 2024-09-21 RX ADMIN — SPIRONOLACTONE 25 MG: 25 TABLET ORAL at 09:23

## 2024-09-21 RX ADMIN — HYDROCORTISONE SODIUM SUCCINATE 25 MG: 100 INJECTION, POWDER, FOR SOLUTION INTRAMUSCULAR; INTRAVENOUS at 17:23

## 2024-09-21 RX ADMIN — SODIUM CHLORIDE, PRESERVATIVE FREE 40 MG: 5 INJECTION INTRAVENOUS at 21:38

## 2024-09-21 ASSESSMENT — PAIN SCALES - GENERAL
PAINLEVEL_OUTOF10: 0

## 2024-09-22 LAB
ANION GAP SERPL CALCULATED.3IONS-SCNC: 11 MMOL/L (ref 9–17)
BASOPHILS # BLD: 0 K/UL (ref 0–0.2)
BASOPHILS NFR BLD: 0 % (ref 0–2)
BUN SERPL-MCNC: 43 MG/DL (ref 8–23)
BUN/CREAT SERPL: 39 (ref 9–20)
CALCIUM SERPL-MCNC: 8.8 MG/DL (ref 8.6–10.4)
CHLORIDE SERPL-SCNC: 104 MMOL/L (ref 98–107)
CO2 SERPL-SCNC: 24 MMOL/L (ref 20–31)
CREAT SERPL-MCNC: 1.1 MG/DL (ref 0.7–1.2)
EOSINOPHIL # BLD: 0.15 K/UL (ref 0–0.44)
EOSINOPHILS RELATIVE PERCENT: 2 % (ref 1–4)
ERYTHROCYTE [DISTWIDTH] IN BLOOD BY AUTOMATED COUNT: 16.5 % (ref 11.8–14.4)
FIBRINOGEN PPP-MCNC: <60 MG/DL (ref 179–518)
GFR, ESTIMATED: 70 ML/MIN/1.73M2
GLUCOSE BLD-MCNC: 144 MG/DL (ref 75–110)
GLUCOSE BLD-MCNC: 191 MG/DL (ref 75–110)
GLUCOSE BLD-MCNC: 240 MG/DL (ref 75–110)
GLUCOSE SERPL-MCNC: 205 MG/DL (ref 70–99)
HCT VFR BLD AUTO: 26.7 % (ref 40.7–50.3)
HGB BLD-MCNC: 8.3 G/DL (ref 13–17)
IMM GRANULOCYTES # BLD AUTO: 0 K/UL (ref 0–0.3)
IMM GRANULOCYTES NFR BLD: 0 %
INR PPP: 1.8
LYMPHOCYTES NFR BLD: 0.15 K/UL (ref 1.1–3.7)
LYMPHOCYTES RELATIVE PERCENT: 2 % (ref 24–43)
MAGNESIUM SERPL-MCNC: 2.1 MG/DL (ref 1.6–2.6)
MCH RBC QN AUTO: 28.3 PG (ref 25.2–33.5)
MCHC RBC AUTO-ENTMCNC: 31.1 G/DL (ref 28.4–34.8)
MCV RBC AUTO: 91.1 FL (ref 82.6–102.9)
MONOCYTES NFR BLD: 1.31 K/UL (ref 0.1–1.2)
MONOCYTES NFR BLD: 17 % (ref 3–12)
NEUTROPHILS NFR BLD: 79 % (ref 36–65)
NEUTS SEG NFR BLD: 6.09 K/UL (ref 1.5–8.1)
NRBC BLD-RTO: 0 PER 100 WBC
PLATELET # BLD AUTO: ABNORMAL K/UL (ref 138–453)
PLATELET, FLUORESCENCE: 30 K/UL (ref 138–453)
PLATELETS.RETICULATED NFR BLD AUTO: 14.2 % (ref 1.1–10.3)
PMV BLD AUTO: ABNORMAL FL (ref 8.1–13.5)
POTASSIUM SERPL-SCNC: 3.7 MMOL/L (ref 3.7–5.3)
PROTHROMBIN TIME: 21.2 SEC (ref 11.5–14.2)
RBC # BLD AUTO: 2.93 M/UL (ref 4.21–5.77)
SODIUM SERPL-SCNC: 139 MMOL/L (ref 135–144)
WBC OTHER # BLD: 7.7 K/UL (ref 3.5–11.3)

## 2024-09-22 PROCEDURE — 85025 COMPLETE CBC W/AUTO DIFF WBC: CPT

## 2024-09-22 PROCEDURE — 6360000002 HC RX W HCPCS: Performed by: INTERNAL MEDICINE

## 2024-09-22 PROCEDURE — 83735 ASSAY OF MAGNESIUM: CPT

## 2024-09-22 PROCEDURE — 97110 THERAPEUTIC EXERCISES: CPT

## 2024-09-22 PROCEDURE — 99232 SBSQ HOSP IP/OBS MODERATE 35: CPT | Performed by: INTERNAL MEDICINE

## 2024-09-22 PROCEDURE — 2580000003 HC RX 258: Performed by: INTERNAL MEDICINE

## 2024-09-22 PROCEDURE — 6370000000 HC RX 637 (ALT 250 FOR IP): Performed by: INTERNAL MEDICINE

## 2024-09-22 PROCEDURE — 86927 PLASMA FRESH FROZEN: CPT

## 2024-09-22 PROCEDURE — 85055 RETICULATED PLATELET ASSAY: CPT

## 2024-09-22 PROCEDURE — 82947 ASSAY GLUCOSE BLOOD QUANT: CPT

## 2024-09-22 PROCEDURE — 2060000000 HC ICU INTERMEDIATE R&B

## 2024-09-22 PROCEDURE — 85384 FIBRINOGEN ACTIVITY: CPT

## 2024-09-22 PROCEDURE — 80048 BASIC METABOLIC PNL TOTAL CA: CPT

## 2024-09-22 PROCEDURE — 36430 TRANSFUSION BLD/BLD COMPNT: CPT

## 2024-09-22 PROCEDURE — 97530 THERAPEUTIC ACTIVITIES: CPT

## 2024-09-22 PROCEDURE — 36415 COLL VENOUS BLD VENIPUNCTURE: CPT

## 2024-09-22 PROCEDURE — P9012 CRYOPRECIPITATE EACH UNIT: HCPCS

## 2024-09-22 PROCEDURE — 85610 PROTHROMBIN TIME: CPT

## 2024-09-22 RX ORDER — PROPRANOLOL HCL 10 MG
40 TABLET ORAL ONCE
Status: COMPLETED | OUTPATIENT
Start: 2024-09-22 | End: 2024-09-22

## 2024-09-22 RX ORDER — SODIUM CHLORIDE 9 MG/ML
INJECTION, SOLUTION INTRAVENOUS PRN
Status: DISCONTINUED | OUTPATIENT
Start: 2024-09-22 | End: 2024-09-24 | Stop reason: HOSPADM

## 2024-09-22 RX ORDER — PANTOPRAZOLE SODIUM 40 MG/1
40 TABLET, DELAYED RELEASE ORAL
Status: DISCONTINUED | OUTPATIENT
Start: 2024-09-22 | End: 2024-09-24 | Stop reason: HOSPADM

## 2024-09-22 RX ORDER — DEXTROSE MONOHYDRATE, SODIUM CHLORIDE, SODIUM LACTATE, CALCIUM CHLORIDE, MAGNESIUM CHLORIDE 1.5; 538; 448; 18.4; 5.08 G/100ML; MG/100ML; MG/100ML; MG/100ML; MG/100ML
2000 SOLUTION INTRAPERITONEAL EVERY 4 HOURS
Status: DISCONTINUED | OUTPATIENT
Start: 2024-09-22 | End: 2024-09-22

## 2024-09-22 RX ORDER — BISACODYL 5 MG/1
5 TABLET, DELAYED RELEASE ORAL DAILY PRN
Status: DISCONTINUED | OUTPATIENT
Start: 2024-09-22 | End: 2024-09-24 | Stop reason: HOSPADM

## 2024-09-22 RX ADMIN — MIDODRINE HYDROCHLORIDE 5 MG: 5 TABLET ORAL at 23:57

## 2024-09-22 RX ADMIN — PROPRANOLOL HYDROCHLORIDE 60 MG: 40 TABLET ORAL at 20:54

## 2024-09-22 RX ADMIN — SODIUM CHLORIDE, PRESERVATIVE FREE 40 MG: 5 INJECTION INTRAVENOUS at 08:46

## 2024-09-22 RX ADMIN — Medication 1 CAPSULE: at 08:45

## 2024-09-22 RX ADMIN — MIDODRINE HYDROCHLORIDE 5 MG: 5 TABLET ORAL at 08:48

## 2024-09-22 RX ADMIN — PROPRANOLOL HYDROCHLORIDE 20 MG: 10 TABLET ORAL at 08:45

## 2024-09-22 RX ADMIN — SPIRONOLACTONE 25 MG: 25 TABLET ORAL at 08:45

## 2024-09-22 RX ADMIN — INSULIN LISPRO 4 UNITS: 100 INJECTION, SOLUTION INTRAVENOUS; SUBCUTANEOUS at 18:42

## 2024-09-22 RX ADMIN — SODIUM CHLORIDE, PRESERVATIVE FREE 10 ML: 5 INJECTION INTRAVENOUS at 08:46

## 2024-09-22 RX ADMIN — RIFAXIMIN 550 MG: 550 TABLET ORAL at 20:54

## 2024-09-22 RX ADMIN — INSULIN GLARGINE 40 UNITS: 100 INJECTION, SOLUTION SUBCUTANEOUS at 08:49

## 2024-09-22 RX ADMIN — MIDODRINE HYDROCHLORIDE 5 MG: 5 TABLET ORAL at 15:41

## 2024-09-22 RX ADMIN — PROPRANOLOL HYDROCHLORIDE 40 MG: 10 TABLET ORAL at 15:51

## 2024-09-22 RX ADMIN — PANTOPRAZOLE SODIUM 40 MG: 40 TABLET, DELAYED RELEASE ORAL at 15:41

## 2024-09-22 RX ADMIN — MULTIVITAMIN TABLET 1 TABLET: TABLET at 08:45

## 2024-09-22 RX ADMIN — SODIUM CHLORIDE: 9 INJECTION, SOLUTION INTRAVENOUS at 00:25

## 2024-09-22 RX ADMIN — HYDROCORTISONE SODIUM SUCCINATE 25 MG: 100 INJECTION, POWDER, FOR SOLUTION INTRAMUSCULAR; INTRAVENOUS at 06:45

## 2024-09-22 RX ADMIN — RIFAXIMIN 550 MG: 550 TABLET ORAL at 08:45

## 2024-09-22 RX ADMIN — TORSEMIDE 20 MG: 20 TABLET ORAL at 08:45

## 2024-09-23 ENCOUNTER — APPOINTMENT (OUTPATIENT)
Dept: INTERVENTIONAL RADIOLOGY/VASCULAR | Age: 74
DRG: 871 | End: 2024-09-23
Payer: MEDICARE

## 2024-09-23 LAB
ALBUMIN SERPL-MCNC: 3.5 G/DL (ref 3.5–5.2)
ALP SERPL-CCNC: 178 U/L (ref 40–129)
ALT SERPL-CCNC: 39 U/L (ref 5–41)
ANION GAP SERPL CALCULATED.3IONS-SCNC: 12 MMOL/L (ref 9–17)
AST SERPL-CCNC: 37 U/L
BILIRUB DIRECT SERPL-MCNC: 0.4 MG/DL
BILIRUB INDIRECT SERPL-MCNC: 1.7 MG/DL (ref 0–1)
BILIRUB SERPL-MCNC: 2.1 MG/DL (ref 0.3–1.2)
BLOOD BANK DISPENSE STATUS: NORMAL
BPU ID: NORMAL
BUN SERPL-MCNC: 43 MG/DL (ref 8–23)
BUN/CREAT SERPL: 39 (ref 9–20)
CALCIUM SERPL-MCNC: 8.5 MG/DL (ref 8.6–10.4)
CHLORIDE SERPL-SCNC: 106 MMOL/L (ref 98–107)
CO2 SERPL-SCNC: 23 MMOL/L (ref 20–31)
COMPONENT: NORMAL
CREAT SERPL-MCNC: 1.1 MG/DL (ref 0.7–1.2)
ERYTHROCYTE [DISTWIDTH] IN BLOOD BY AUTOMATED COUNT: 16.5 % (ref 11.8–14.4)
GFR, ESTIMATED: 70 ML/MIN/1.73M2
GLUCOSE BLD-MCNC: 109 MG/DL (ref 75–110)
GLUCOSE BLD-MCNC: 226 MG/DL (ref 75–110)
GLUCOSE BLD-MCNC: 270 MG/DL (ref 75–110)
GLUCOSE BLD-MCNC: 310 MG/DL (ref 75–110)
GLUCOSE BLD-MCNC: 64 MG/DL (ref 75–110)
GLUCOSE BLD-MCNC: 98 MG/DL (ref 75–110)
GLUCOSE SERPL-MCNC: 78 MG/DL (ref 70–99)
HCT VFR BLD AUTO: 25.1 % (ref 40.7–50.3)
HGB BLD-MCNC: 8.1 G/DL (ref 13–17)
INR PPP: 1.6
MAGNESIUM SERPL-MCNC: 1.9 MG/DL (ref 1.6–2.6)
MCH RBC QN AUTO: 28.5 PG (ref 25.2–33.5)
MCHC RBC AUTO-ENTMCNC: 32.3 G/DL (ref 28.4–34.8)
MCV RBC AUTO: 88.4 FL (ref 82.6–102.9)
MICROORGANISM SPEC CULT: NORMAL
MICROORGANISM SPEC CULT: NORMAL
MICROORGANISM/AGENT SPEC: NORMAL
MICROORGANISM/AGENT SPEC: NORMAL
NRBC BLD-RTO: 0 PER 100 WBC
PATH REV BLD -IMP: NORMAL
PLATELET # BLD AUTO: ABNORMAL K/UL (ref 138–453)
PLATELET, FLUORESCENCE: 44 K/UL (ref 138–453)
PLATELETS.RETICULATED NFR BLD AUTO: 11.7 % (ref 1.1–10.3)
PMV BLD AUTO: ABNORMAL FL (ref 8.1–13.5)
POTASSIUM SERPL-SCNC: 3 MMOL/L (ref 3.7–5.3)
PROT SERPL-MCNC: 5.5 G/DL (ref 6.4–8.3)
PROTHROMBIN TIME: 18.8 SEC (ref 11.5–14.2)
RBC # BLD AUTO: 2.84 M/UL (ref 4.21–5.77)
SERVICE CMNT-IMP: NORMAL
SERVICE CMNT-IMP: NORMAL
SODIUM SERPL-SCNC: 141 MMOL/L (ref 135–144)
SPECIMEN DESCRIPTION: NORMAL
SPECIMEN DESCRIPTION: NORMAL
SURGICAL PATHOLOGY REPORT: NORMAL
TRANSFUSION STATUS: NORMAL
UNIT DIVISION: 0
WBC OTHER # BLD: 9.2 K/UL (ref 3.5–11.3)

## 2024-09-23 PROCEDURE — 6370000000 HC RX 637 (ALT 250 FOR IP): Performed by: INTERNAL MEDICINE

## 2024-09-23 PROCEDURE — 85027 COMPLETE CBC AUTOMATED: CPT

## 2024-09-23 PROCEDURE — 75989 ABSCESS DRAINAGE UNDER X-RAY: CPT

## 2024-09-23 PROCEDURE — 80048 BASIC METABOLIC PNL TOTAL CA: CPT

## 2024-09-23 PROCEDURE — 0W9930Z DRAINAGE OF RIGHT PLEURAL CAVITY WITH DRAINAGE DEVICE, PERCUTANEOUS APPROACH: ICD-10-PCS | Performed by: INTERNAL MEDICINE

## 2024-09-23 PROCEDURE — 97116 GAIT TRAINING THERAPY: CPT

## 2024-09-23 PROCEDURE — 6360000002 HC RX W HCPCS: Performed by: INTERNAL MEDICINE

## 2024-09-23 PROCEDURE — 82947 ASSAY GLUCOSE BLOOD QUANT: CPT

## 2024-09-23 PROCEDURE — 85610 PROTHROMBIN TIME: CPT

## 2024-09-23 PROCEDURE — 2060000000 HC ICU INTERMEDIATE R&B

## 2024-09-23 PROCEDURE — 97530 THERAPEUTIC ACTIVITIES: CPT

## 2024-09-23 PROCEDURE — 83735 ASSAY OF MAGNESIUM: CPT

## 2024-09-23 PROCEDURE — 99232 SBSQ HOSP IP/OBS MODERATE 35: CPT | Performed by: INTERNAL MEDICINE

## 2024-09-23 PROCEDURE — 2580000003 HC RX 258: Performed by: INTERNAL MEDICINE

## 2024-09-23 PROCEDURE — 32550 INSERT PLEURAL CATH: CPT

## 2024-09-23 PROCEDURE — 97110 THERAPEUTIC EXERCISES: CPT

## 2024-09-23 PROCEDURE — 80076 HEPATIC FUNCTION PANEL: CPT

## 2024-09-23 PROCEDURE — 85055 RETICULATED PLATELET ASSAY: CPT

## 2024-09-23 PROCEDURE — C1729 CATH, DRAINAGE: HCPCS

## 2024-09-23 RX ORDER — TRAMADOL HYDROCHLORIDE 50 MG/1
50 TABLET ORAL EVERY 6 HOURS PRN
Status: DISCONTINUED | OUTPATIENT
Start: 2024-09-23 | End: 2024-09-24 | Stop reason: HOSPADM

## 2024-09-23 RX ORDER — SPIRONOLACTONE 25 MG/1
50 TABLET ORAL DAILY
Status: DISCONTINUED | OUTPATIENT
Start: 2024-09-23 | End: 2024-09-24 | Stop reason: HOSPADM

## 2024-09-23 RX ORDER — INSULIN GLARGINE 100 [IU]/ML
25 INJECTION, SOLUTION SUBCUTANEOUS DAILY
Status: DISCONTINUED | OUTPATIENT
Start: 2024-09-24 | End: 2024-09-24 | Stop reason: HOSPADM

## 2024-09-23 RX ORDER — POTASSIUM CHLORIDE 1500 MG/1
40 TABLET, EXTENDED RELEASE ORAL ONCE
Status: COMPLETED | OUTPATIENT
Start: 2024-09-23 | End: 2024-09-23

## 2024-09-23 RX ADMIN — HYDROCORTISONE SODIUM SUCCINATE 25 MG: 100 INJECTION, POWDER, FOR SOLUTION INTRAMUSCULAR; INTRAVENOUS at 10:38

## 2024-09-23 RX ADMIN — INSULIN LISPRO 12 UNITS: 100 INJECTION, SOLUTION INTRAVENOUS; SUBCUTANEOUS at 21:05

## 2024-09-23 RX ADMIN — POTASSIUM CHLORIDE 10 MEQ: 7.46 INJECTION, SOLUTION INTRAVENOUS at 13:08

## 2024-09-23 RX ADMIN — RIFAXIMIN 550 MG: 550 TABLET ORAL at 21:05

## 2024-09-23 RX ADMIN — PROPRANOLOL HYDROCHLORIDE 60 MG: 40 TABLET ORAL at 21:05

## 2024-09-23 RX ADMIN — DEXTROSE MONOHYDRATE 125 ML: 100 INJECTION, SOLUTION INTRAVENOUS at 08:10

## 2024-09-23 RX ADMIN — PANTOPRAZOLE SODIUM 40 MG: 40 TABLET, DELAYED RELEASE ORAL at 15:04

## 2024-09-23 RX ADMIN — SODIUM CHLORIDE, PRESERVATIVE FREE 10 ML: 5 INJECTION INTRAVENOUS at 21:06

## 2024-09-23 RX ADMIN — POTASSIUM CHLORIDE 40 MEQ: 1500 TABLET, EXTENDED RELEASE ORAL at 15:04

## 2024-09-23 RX ADMIN — POTASSIUM CHLORIDE 10 MEQ: 7.46 INJECTION, SOLUTION INTRAVENOUS at 10:38

## 2024-09-23 RX ADMIN — POTASSIUM CHLORIDE 10 MEQ: 7.46 INJECTION, SOLUTION INTRAVENOUS at 08:16

## 2024-09-23 RX ADMIN — INSULIN LISPRO 4 UNITS: 100 INJECTION, SOLUTION INTRAVENOUS; SUBCUTANEOUS at 18:32

## 2024-09-23 RX ADMIN — SODIUM CHLORIDE, PRESERVATIVE FREE 10 ML: 5 INJECTION INTRAVENOUS at 08:37

## 2024-09-23 RX ADMIN — LEVOFLOXACIN 250 MG: 250 TABLET, FILM COATED ORAL at 15:04

## 2024-09-23 RX ADMIN — MIDODRINE HYDROCHLORIDE 5 MG: 5 TABLET ORAL at 13:05

## 2024-09-23 ASSESSMENT — ENCOUNTER SYMPTOMS
RESPIRATORY NEGATIVE: 1
ABDOMINAL DISTENTION: 1

## 2024-09-24 VITALS
HEART RATE: 70 BPM | DIASTOLIC BLOOD PRESSURE: 65 MMHG | HEIGHT: 72 IN | RESPIRATION RATE: 18 BRPM | WEIGHT: 125.7 LBS | SYSTOLIC BLOOD PRESSURE: 107 MMHG | OXYGEN SATURATION: 100 % | BODY MASS INDEX: 17.03 KG/M2 | TEMPERATURE: 98.2 F

## 2024-09-24 LAB
ALBUMIN SERPL-MCNC: 3.3 G/DL (ref 3.5–5.2)
ALP SERPL-CCNC: 207 U/L (ref 40–129)
ALT SERPL-CCNC: 42 U/L (ref 5–41)
AMMONIA PLAS-SCNC: 84 UMOL/L (ref 16–60)
ANION GAP SERPL CALCULATED.3IONS-SCNC: 8 MMOL/L (ref 9–17)
AST SERPL-CCNC: 44 U/L
BILIRUB SERPL-MCNC: 2.4 MG/DL (ref 0.3–1.2)
BUN SERPL-MCNC: 41 MG/DL (ref 8–23)
BUN/CREAT SERPL: 41 (ref 9–20)
CALCIUM SERPL-MCNC: 8.4 MG/DL (ref 8.6–10.4)
CHLORIDE SERPL-SCNC: 107 MMOL/L (ref 98–107)
CO2 SERPL-SCNC: 24 MMOL/L (ref 20–31)
CREAT SERPL-MCNC: 1 MG/DL (ref 0.7–1.2)
ERYTHROCYTE [DISTWIDTH] IN BLOOD BY AUTOMATED COUNT: 16.6 % (ref 11.8–14.4)
GFR, ESTIMATED: 79 ML/MIN/1.73M2
GLUCOSE BLD-MCNC: 171 MG/DL (ref 75–110)
GLUCOSE BLD-MCNC: 300 MG/DL (ref 75–110)
GLUCOSE SERPL-MCNC: 158 MG/DL (ref 70–99)
HCT VFR BLD AUTO: 25.5 % (ref 40.7–50.3)
HGB BLD-MCNC: 8.2 G/DL (ref 13–17)
INR PPP: 1.6
MCH RBC QN AUTO: 28.3 PG (ref 25.2–33.5)
MCHC RBC AUTO-ENTMCNC: 32.2 G/DL (ref 28.4–34.8)
MCV RBC AUTO: 87.9 FL (ref 82.6–102.9)
NRBC BLD-RTO: 0 PER 100 WBC
PLATELET # BLD AUTO: ABNORMAL K/UL (ref 138–453)
PLATELET, FLUORESCENCE: 44 K/UL (ref 138–453)
PLATELETS.RETICULATED NFR BLD AUTO: 11.1 % (ref 1.1–10.3)
PMV BLD AUTO: ABNORMAL FL (ref 8.1–13.5)
POTASSIUM SERPL-SCNC: 4.3 MMOL/L (ref 3.7–5.3)
PROT SERPL-MCNC: 5.3 G/DL (ref 6.4–8.3)
PROTHROMBIN TIME: 19.1 SEC (ref 11.5–14.2)
RBC # BLD AUTO: 2.9 M/UL (ref 4.21–5.77)
SODIUM SERPL-SCNC: 139 MMOL/L (ref 135–144)
WBC OTHER # BLD: 7.6 K/UL (ref 3.5–11.3)

## 2024-09-24 PROCEDURE — 99232 SBSQ HOSP IP/OBS MODERATE 35: CPT | Performed by: INTERNAL MEDICINE

## 2024-09-24 PROCEDURE — 2580000003 HC RX 258: Performed by: INTERNAL MEDICINE

## 2024-09-24 PROCEDURE — 97530 THERAPEUTIC ACTIVITIES: CPT

## 2024-09-24 PROCEDURE — 82947 ASSAY GLUCOSE BLOOD QUANT: CPT

## 2024-09-24 PROCEDURE — 97110 THERAPEUTIC EXERCISES: CPT

## 2024-09-24 PROCEDURE — 6370000000 HC RX 637 (ALT 250 FOR IP): Performed by: INTERNAL MEDICINE

## 2024-09-24 PROCEDURE — 85610 PROTHROMBIN TIME: CPT

## 2024-09-24 PROCEDURE — 82140 ASSAY OF AMMONIA: CPT

## 2024-09-24 PROCEDURE — 97116 GAIT TRAINING THERAPY: CPT

## 2024-09-24 PROCEDURE — 97535 SELF CARE MNGMENT TRAINING: CPT

## 2024-09-24 PROCEDURE — 85027 COMPLETE CBC AUTOMATED: CPT

## 2024-09-24 PROCEDURE — 80053 COMPREHEN METABOLIC PANEL: CPT

## 2024-09-24 PROCEDURE — 85055 RETICULATED PLATELET ASSAY: CPT

## 2024-09-24 RX ORDER — INSULIN GLARGINE 300 U/ML
28 INJECTION, SOLUTION SUBCUTANEOUS
Qty: 5 ADJUSTABLE DOSE PRE-FILLED PEN SYRINGE | Refills: 1 | Status: SHIPPED | OUTPATIENT
Start: 2024-09-24 | End: 2024-09-24

## 2024-09-24 RX ORDER — SPIRONOLACTONE 100 MG/1
50 TABLET, FILM COATED ORAL DAILY
Qty: 90 TABLET | Refills: 3 | Status: SHIPPED | OUTPATIENT
Start: 2024-09-24

## 2024-09-24 RX ORDER — TORSEMIDE 20 MG/1
20 TABLET ORAL DAILY
Qty: 30 TABLET | Refills: 3 | Status: SHIPPED | OUTPATIENT
Start: 2024-09-24 | End: 2024-09-24

## 2024-09-24 RX ORDER — PANTOPRAZOLE SODIUM 40 MG/1
40 TABLET, DELAYED RELEASE ORAL
Qty: 30 TABLET | Refills: 3 | Status: SHIPPED | OUTPATIENT
Start: 2024-09-24

## 2024-09-24 RX ORDER — PANTOPRAZOLE SODIUM 40 MG/1
40 TABLET, DELAYED RELEASE ORAL
Qty: 30 TABLET | Refills: 3 | Status: SHIPPED | OUTPATIENT
Start: 2024-09-24 | End: 2024-09-24

## 2024-09-24 RX ORDER — INSULIN GLARGINE 300 U/ML
28 INJECTION, SOLUTION SUBCUTANEOUS
Qty: 5 ADJUSTABLE DOSE PRE-FILLED PEN SYRINGE | Refills: 1 | Status: SHIPPED | OUTPATIENT
Start: 2024-09-24

## 2024-09-24 RX ORDER — LACTULOSE 10 G/15ML
10 SOLUTION ORAL DAILY
Qty: 450 ML | Refills: 2 | Status: SHIPPED | OUTPATIENT
Start: 2024-09-24 | End: 2024-09-24

## 2024-09-24 RX ORDER — LACTULOSE 10 G/15ML
10 SOLUTION ORAL DAILY
Qty: 450 ML | Refills: 2 | Status: SHIPPED | OUTPATIENT
Start: 2024-09-24 | End: 2024-12-23

## 2024-09-24 RX ORDER — SPIRONOLACTONE 100 MG/1
50 TABLET, FILM COATED ORAL DAILY
Qty: 90 TABLET | Refills: 3 | Status: SHIPPED | OUTPATIENT
Start: 2024-09-24 | End: 2024-09-24

## 2024-09-24 RX ORDER — TORSEMIDE 20 MG/1
20 TABLET ORAL DAILY
Qty: 30 TABLET | Refills: 3 | Status: SHIPPED | OUTPATIENT
Start: 2024-09-24

## 2024-09-24 RX ADMIN — INSULIN LISPRO 12 UNITS: 100 INJECTION, SOLUTION INTRAVENOUS; SUBCUTANEOUS at 13:35

## 2024-09-24 RX ADMIN — BISACODYL 5 MG: 5 TABLET, COATED ORAL at 04:54

## 2024-09-24 RX ADMIN — MIDODRINE HYDROCHLORIDE 5 MG: 5 TABLET ORAL at 04:53

## 2024-09-24 RX ADMIN — SODIUM CHLORIDE, PRESERVATIVE FREE 10 ML: 5 INJECTION INTRAVENOUS at 09:15

## 2024-09-24 RX ADMIN — INSULIN GLARGINE 25 UNITS: 100 INJECTION, SOLUTION SUBCUTANEOUS at 09:13

## 2024-09-24 RX ADMIN — PANTOPRAZOLE SODIUM 40 MG: 40 TABLET, DELAYED RELEASE ORAL at 05:02

## 2024-09-24 RX ADMIN — Medication 1 CAPSULE: at 09:14

## 2024-09-24 RX ADMIN — MULTIVITAMIN TABLET 1 TABLET: TABLET at 09:14

## 2024-09-24 RX ADMIN — TRAMADOL HYDROCHLORIDE 50 MG: 50 TABLET ORAL at 14:45

## 2024-09-24 RX ADMIN — RIFAXIMIN 550 MG: 550 TABLET ORAL at 09:14

## 2024-09-24 ASSESSMENT — PAIN SCALES - GENERAL: PAINLEVEL_OUTOF10: 6

## 2024-09-24 ASSESSMENT — ENCOUNTER SYMPTOMS: RESPIRATORY NEGATIVE: 1

## 2024-09-25 ENCOUNTER — TELEPHONE (OUTPATIENT)
Dept: FAMILY MEDICINE CLINIC | Age: 74
End: 2024-09-25

## 2024-09-27 ENCOUNTER — HOSPITAL ENCOUNTER (OUTPATIENT)
Age: 74
Setting detail: SPECIMEN
Discharge: HOME | End: 2024-09-27
Payer: MEDICARE

## 2024-09-27 DIAGNOSIS — R53.1 GENERALIZED WEAKNESS: Primary | ICD-10-CM

## 2024-09-27 DIAGNOSIS — I95.9 HYPOTENSION, UNSPECIFIED HYPOTENSION TYPE: ICD-10-CM

## 2024-09-27 DIAGNOSIS — Z91.81 AT HIGH RISK FOR FALLS: ICD-10-CM

## 2024-09-27 LAB
ALBUMIN SERPL-MCNC: 3.2 G/DL (ref 3.5–5.2)
ALP SERPL-CCNC: 247 U/L (ref 40–129)
ALT SERPL-CCNC: 43 U/L (ref 5–41)
ANION GAP SERPL CALCULATED.3IONS-SCNC: 12 MMOL/L (ref 9–17)
AST SERPL-CCNC: 42 U/L
BILIRUB SERPL-MCNC: 2.2 MG/DL (ref 0.3–1.2)
BUN SERPL-MCNC: 26 MG/DL (ref 8–23)
BUN/CREAT SERPL: 33 (ref 9–20)
CALCIUM SERPL-MCNC: 8.5 MG/DL (ref 8.6–10.4)
CHLORIDE SERPL-SCNC: 98 MMOL/L (ref 98–107)
CO2 SERPL-SCNC: 22 MMOL/L (ref 20–31)
CREAT SERPL-MCNC: 0.8 MG/DL (ref 0.7–1.2)
ERYTHROCYTE [DISTWIDTH] IN BLOOD BY AUTOMATED COUNT: 16.6 % (ref 11.8–14.4)
GFR, ESTIMATED: >90 ML/MIN/1.73M2
GLUCOSE SERPL-MCNC: 282 MG/DL (ref 70–99)
HCT VFR BLD AUTO: 28 % (ref 40.7–50.3)
HGB BLD-MCNC: 8.7 G/DL (ref 13–17)
MCH RBC QN AUTO: 27.5 PG (ref 25.2–33.5)
MCHC RBC AUTO-ENTMCNC: 31.1 G/DL (ref 28.4–34.8)
MCV RBC AUTO: 88.6 FL (ref 82.6–102.9)
NRBC BLD-RTO: 0 PER 100 WBC
PLATELET # BLD AUTO: 55 K/UL (ref 138–453)
PMV BLD AUTO: ABNORMAL FL (ref 8.1–13.5)
POTASSIUM SERPL-SCNC: 5 MMOL/L (ref 3.7–5.3)
PROT SERPL-MCNC: 5.8 G/DL (ref 6.4–8.3)
RBC # BLD AUTO: 3.16 M/UL (ref 4.21–5.77)
SODIUM SERPL-SCNC: 132 MMOL/L (ref 135–144)
WBC OTHER # BLD: 5.9 K/UL (ref 3.5–11.3)

## 2024-09-27 PROCEDURE — 85027 COMPLETE CBC AUTOMATED: CPT

## 2024-09-27 PROCEDURE — 80053 COMPREHEN METABOLIC PANEL: CPT

## 2024-09-30 DIAGNOSIS — I95.9 HYPOTENSION, UNSPECIFIED HYPOTENSION TYPE: ICD-10-CM

## 2024-09-30 DIAGNOSIS — Z79.4 CONTROLLED TYPE 2 DIABETES MELLITUS WITH HYPERGLYCEMIA, WITH LONG-TERM CURRENT USE OF INSULIN (HCC): ICD-10-CM

## 2024-09-30 DIAGNOSIS — E11.65 CONTROLLED TYPE 2 DIABETES MELLITUS WITH HYPERGLYCEMIA, WITH LONG-TERM CURRENT USE OF INSULIN (HCC): ICD-10-CM

## 2024-09-30 LAB
MICROORGANISM SPEC CULT: NORMAL
MICROORGANISM SPEC CULT: NORMAL
MICROORGANISM/AGENT SPEC: NORMAL
MICROORGANISM/AGENT SPEC: NORMAL
SERVICE CMNT-IMP: NORMAL
SERVICE CMNT-IMP: NORMAL
SPECIMEN DESCRIPTION: NORMAL
SPECIMEN DESCRIPTION: NORMAL

## 2024-09-30 RX ORDER — MIDODRINE HYDROCHLORIDE 10 MG/1
10 TABLET ORAL 3 TIMES DAILY PRN
Qty: 270 TABLET | Refills: 1 | Status: SHIPPED | OUTPATIENT
Start: 2024-09-30

## 2024-09-30 RX ORDER — INSULIN GLARGINE 300 U/ML
35 INJECTION, SOLUTION SUBCUTANEOUS
Qty: 5 ADJUSTABLE DOSE PRE-FILLED PEN SYRINGE | Refills: 1 | Status: SHIPPED | OUTPATIENT
Start: 2024-09-30 | End: 2024-10-01 | Stop reason: SDUPTHER

## 2024-10-01 ENCOUNTER — OFFICE VISIT (OUTPATIENT)
Dept: FAMILY MEDICINE CLINIC | Age: 74
End: 2024-10-01

## 2024-10-01 VITALS
BODY MASS INDEX: 16.68 KG/M2 | WEIGHT: 123 LBS | TEMPERATURE: 97 F | HEART RATE: 62 BPM | SYSTOLIC BLOOD PRESSURE: 110 MMHG | DIASTOLIC BLOOD PRESSURE: 60 MMHG

## 2024-10-01 DIAGNOSIS — I95.9 HYPOTENSION, UNSPECIFIED HYPOTENSION TYPE: ICD-10-CM

## 2024-10-01 DIAGNOSIS — N17.0 ACUTE RENAL FAILURE WITH TUBULAR NECROSIS (HCC): ICD-10-CM

## 2024-10-01 DIAGNOSIS — K76.82 HEPATIC ENCEPHALOPATHY (HCC): ICD-10-CM

## 2024-10-01 DIAGNOSIS — G93.41 SEPSIS WITH ENCEPHALOPATHY WITHOUT SEPTIC SHOCK, DUE TO UNSPECIFIED ORGANISM (HCC): ICD-10-CM

## 2024-10-01 DIAGNOSIS — E11.65 CONTROLLED TYPE 2 DIABETES MELLITUS WITH HYPERGLYCEMIA, WITH LONG-TERM CURRENT USE OF INSULIN (HCC): ICD-10-CM

## 2024-10-01 DIAGNOSIS — Z79.4 CONTROLLED TYPE 2 DIABETES MELLITUS WITH HYPERGLYCEMIA, WITH LONG-TERM CURRENT USE OF INSULIN (HCC): ICD-10-CM

## 2024-10-01 DIAGNOSIS — A41.9 SEPSIS WITH ENCEPHALOPATHY WITHOUT SEPTIC SHOCK, DUE TO UNSPECIFIED ORGANISM (HCC): ICD-10-CM

## 2024-10-01 DIAGNOSIS — Z09 HOSPITAL DISCHARGE FOLLOW-UP: Primary | ICD-10-CM

## 2024-10-01 DIAGNOSIS — R65.20 SEPSIS WITH ENCEPHALOPATHY WITHOUT SEPTIC SHOCK, DUE TO UNSPECIFIED ORGANISM (HCC): ICD-10-CM

## 2024-10-01 RX ORDER — INSULIN GLARGINE 300 U/ML
40 INJECTION, SOLUTION SUBCUTANEOUS
Qty: 5 ADJUSTABLE DOSE PRE-FILLED PEN SYRINGE | Refills: 1
Start: 2024-10-01 | End: 2024-10-03 | Stop reason: SDUPTHER

## 2024-10-01 RX ORDER — INSULIN LISPRO 100 [IU]/ML
3-10 INJECTION, SOLUTION INTRAVENOUS; SUBCUTANEOUS
Qty: 10 ADJUSTABLE DOSE PRE-FILLED PEN SYRINGE | Refills: 2 | Status: SHIPPED | OUTPATIENT
Start: 2024-10-01

## 2024-10-01 ASSESSMENT — PATIENT HEALTH QUESTIONNAIRE - PHQ9
2. FEELING DOWN, DEPRESSED OR HOPELESS: NOT AT ALL
SUM OF ALL RESPONSES TO PHQ QUESTIONS 1-9: 0
SUM OF ALL RESPONSES TO PHQ9 QUESTIONS 1 & 2: 0
SUM OF ALL RESPONSES TO PHQ QUESTIONS 1-9: 0
1. LITTLE INTEREST OR PLEASURE IN DOING THINGS: NOT AT ALL

## 2024-10-01 NOTE — PROGRESS NOTES
Post-Discharge Transitional Care  Follow Up      Candelario Bliss    YOB: 1950    Date of Office Visit:  10/1/2024  Date of Hospital Admission: 9/13/24  Date of Hospital Discharge: 9/24/24  Risk of hospital readmission (high >=14%. Medium >=10%) :Readmission Risk Score: 18.6      Care management risk score Rising risk (score 2-5) and Complex Care (Scores >=6): No Risk Score On File     Non face to face  following discharge, date last encounter closed (first attempt may have been earlier): 09/25/2024    Call initiated 2 business days of discharge: Yes    ASSESSMENT/PLAN:   Hospital discharge follow-up  -     [place] DISCHARGE MEDS RECONCILED W/ CURRENT OUTPATIENT MED LIST    Sepsis with encephalopathy without septic shock, due to unspecified organism (HCC)  Hypotension, unspecified hypotension type  Controlled type 2 diabetes mellitus with hyperglycemia, with long-term current use of insulin (HCC)  Hepatic encephalopathy (HCC)  Acute renal failure with tubular necrosis (HCC)  -overall doing well  -glucose continuing to trend >200, hitting 400 at times  -improved today w/ increase in Toujeo to 34 units, will increase to reduce Humalog need  -ongoing hypotension, stable w/ Midodrine, wife monitoring vitals closely for med dosing  -update ammonia and CBC, CMP    -     Insulin Glargine, 2 Unit Dial, (TOUJEO MAX SOLOSTAR) 300 UNIT/ML SOPN; Inject 40 Units into the skin daily (with breakfast), Disp-5 Adjustable Dose Pre-filled Pen Syringe, R-1Adjust Sig  -     insulin lispro, 1 Unit Dial, (HUMALOG KWIKPEN) 100 UNIT/ML SOPN; Inject 3-10 Units into the skin 3 times daily (before meals), Disp-10 Adjustable Dose Pre-filled Pen Syringe, R-2Normal  -     Ammonia; Future  -     Comprehensive Metabolic Panel; Future  -     CBC with Auto Differential; Future  -     Comprehensive Metabolic Panel; Future      Medical Decision Making: moderate complexity  Return if symptoms worsen or fail to improve, for keep upcoming appt.

## 2024-10-03 DIAGNOSIS — Z79.4 CONTROLLED TYPE 2 DIABETES MELLITUS WITH HYPERGLYCEMIA, WITH LONG-TERM CURRENT USE OF INSULIN (HCC): ICD-10-CM

## 2024-10-03 DIAGNOSIS — E11.65 CONTROLLED TYPE 2 DIABETES MELLITUS WITH HYPERGLYCEMIA, WITH LONG-TERM CURRENT USE OF INSULIN (HCC): ICD-10-CM

## 2024-10-03 RX ORDER — INSULIN GLARGINE 300 U/ML
40 INJECTION, SOLUTION SUBCUTANEOUS
Qty: 5 ADJUSTABLE DOSE PRE-FILLED PEN SYRINGE | Refills: 1 | Status: SHIPPED | OUTPATIENT
Start: 2024-10-03

## 2024-10-04 ENCOUNTER — HOSPITAL ENCOUNTER (OUTPATIENT)
Age: 74
Discharge: HOME | End: 2024-10-04
Payer: MEDICARE

## 2024-10-04 DIAGNOSIS — K76.82 HEPATIC ENCEPHALOPATHY (HCC): ICD-10-CM

## 2024-10-04 DIAGNOSIS — A41.9 SEPSIS WITH ENCEPHALOPATHY WITHOUT SEPTIC SHOCK, DUE TO UNSPECIFIED ORGANISM (HCC): ICD-10-CM

## 2024-10-04 DIAGNOSIS — G93.41 SEPSIS WITH ENCEPHALOPATHY WITHOUT SEPTIC SHOCK, DUE TO UNSPECIFIED ORGANISM (HCC): ICD-10-CM

## 2024-10-04 DIAGNOSIS — R65.20 SEPSIS WITH ENCEPHALOPATHY WITHOUT SEPTIC SHOCK, DUE TO UNSPECIFIED ORGANISM (HCC): ICD-10-CM

## 2024-10-04 LAB
ALBUMIN SERPL-MCNC: 3.2 G/DL (ref 3.5–5.2)
ALBUMIN/GLOB SERPL: 1 {RATIO} (ref 1–2.5)
ALP SERPL-CCNC: 238 U/L (ref 40–129)
ALT SERPL-CCNC: 44 U/L (ref 10–50)
AMMONIA PLAS-SCNC: 54 UMOL/L (ref 16–60)
ANION GAP SERPL CALCULATED.3IONS-SCNC: 10 MMOL/L (ref 9–16)
AST SERPL-CCNC: 47 U/L (ref 10–50)
BASOPHILS # BLD: 0.03 K/UL (ref 0–0.2)
BASOPHILS NFR BLD: 1 % (ref 0–2)
BILIRUB SERPL-MCNC: 1.5 MG/DL (ref 0–1.2)
BUN SERPL-MCNC: 29 MG/DL (ref 8–23)
CALCIUM SERPL-MCNC: 9.1 MG/DL (ref 8.6–10.4)
CHLORIDE SERPL-SCNC: 105 MMOL/L (ref 98–107)
CO2 SERPL-SCNC: 22 MMOL/L (ref 20–31)
CREAT SERPL-MCNC: 0.9 MG/DL (ref 0.7–1.2)
EOSINOPHIL # BLD: 0.13 K/UL (ref 0–0.44)
EOSINOPHILS RELATIVE PERCENT: 4 % (ref 1–4)
ERYTHROCYTE [DISTWIDTH] IN BLOOD BY AUTOMATED COUNT: 17.7 % (ref 11.8–14.4)
GFR, ESTIMATED: 90 ML/MIN/1.73M2
GLUCOSE SERPL-MCNC: 171 MG/DL (ref 74–99)
HCT VFR BLD AUTO: 33.3 % (ref 40.7–50.3)
HGB BLD-MCNC: 9.9 G/DL (ref 13–17)
IMM GRANULOCYTES # BLD AUTO: 0.03 K/UL (ref 0–0.3)
IMM GRANULOCYTES NFR BLD: 1 %
LYMPHOCYTES NFR BLD: 0.51 K/UL (ref 1.1–3.7)
LYMPHOCYTES RELATIVE PERCENT: 16 % (ref 24–43)
MCH RBC QN AUTO: 26.3 PG (ref 25.2–33.5)
MCHC RBC AUTO-ENTMCNC: 29.7 G/DL (ref 28.4–34.8)
MCV RBC AUTO: 88.6 FL (ref 82.6–102.9)
MONOCYTES NFR BLD: 1.06 K/UL (ref 0.1–1.2)
MONOCYTES NFR BLD: 33 % (ref 3–12)
MORPHOLOGY: ABNORMAL
NEUTROPHILS NFR BLD: 45 % (ref 36–65)
NEUTS SEG NFR BLD: 1.44 K/UL (ref 1.5–8.1)
NRBC BLD-RTO: 0.6 PER 100 WBC
PLATELET # BLD AUTO: ABNORMAL K/UL (ref 138–453)
PLATELET, FLUORESCENCE: 39 K/UL (ref 138–453)
PLATELETS.RETICULATED NFR BLD AUTO: 7.4 % (ref 1.1–10.3)
POTASSIUM SERPL-SCNC: 4.5 MMOL/L (ref 3.7–5.3)
PROT SERPL-MCNC: 6.1 G/DL (ref 6.6–8.7)
RBC # BLD AUTO: 3.76 M/UL (ref 4.21–5.77)
SODIUM SERPL-SCNC: 137 MMOL/L (ref 136–145)
WBC OTHER # BLD: 3.2 K/UL (ref 3.5–11.3)

## 2024-10-04 PROCEDURE — 82140 ASSAY OF AMMONIA: CPT

## 2024-10-04 PROCEDURE — 85055 RETICULATED PLATELET ASSAY: CPT

## 2024-10-04 PROCEDURE — 36415 COLL VENOUS BLD VENIPUNCTURE: CPT

## 2024-10-04 PROCEDURE — 85025 COMPLETE CBC W/AUTO DIFF WBC: CPT

## 2024-10-04 PROCEDURE — 80053 COMPREHEN METABOLIC PANEL: CPT
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
## 2024-10-14 ENCOUNTER — TELEPHONE (OUTPATIENT)
Dept: PALLATIVE CARE | Age: 74
End: 2024-10-14

## 2024-10-14 NOTE — TELEPHONE ENCOUNTER
Called and spoke with [facility] staff member and confirmed with her that they have received the referral for palliative care services for Candelario Bliss.  Request received for social security number and discharge summary.  I let her know I do not have access to social security number but I can send the discharge summary.  She relates she can get number from patient when she calls him.   Faxed discharge summary to [phone]    [facility] Palliative Care Coordinator  Sunita HAWKINS, RN, ONN-CG  [facility] [phone]/ [facility] [phone]/ [facility] [phone]

## 2024-10-21 LAB
MICROORGANISM SPEC CULT: NORMAL
MICROORGANISM/AGENT SPEC: NORMAL
SERVICE CMNT-IMP: NORMAL
SPECIMEN DESCRIPTION: NORMAL

## 2024-10-23 DIAGNOSIS — C61 PROSTATE CANCER (HCC): ICD-10-CM

## 2024-10-23 DIAGNOSIS — J90 PLEURAL EFFUSION ON RIGHT: ICD-10-CM

## 2024-10-23 RX ORDER — TRAMADOL HYDROCHLORIDE 50 MG/1
50 TABLET ORAL EVERY 8 HOURS PRN
Qty: 21 TABLET | Refills: 0 | OUTPATIENT
Start: 2024-10-23 | End: 2024-10-30

## 2024-10-23 NOTE — TELEPHONE ENCOUNTER
Patient has appt scheduled for 10/31     Candelario Bliss Jr. is calling to request a refill on the following medication(s):    Last Visit Date (If Applicable):  10/1/2024    Next Visit Date:    Visit date not found    Medication Request:  Requested Prescriptions      No prescriptions requested or ordered in this encounter

## 2024-10-24 ENCOUNTER — TELEPHONE (OUTPATIENT)
Dept: FAMILY MEDICINE CLINIC | Age: 74
End: 2024-10-24

## 2024-10-24 ENCOUNTER — HOSPITAL ENCOUNTER (OUTPATIENT)
Age: 74
Setting detail: SPECIMEN
Discharge: HOME | End: 2024-10-24
Payer: MEDICARE

## 2024-10-24 DIAGNOSIS — K62.5 RECTAL BLEEDING: Primary | ICD-10-CM

## 2024-10-24 LAB
ALBUMIN SERPL-MCNC: 2.6 G/DL (ref 3.5–5.2)
ALP SERPL-CCNC: 267 U/L (ref 40–129)
ALT SERPL-CCNC: 40 U/L (ref 5–41)
ANION GAP SERPL CALCULATED.3IONS-SCNC: 12 MMOL/L (ref 9–17)
AST SERPL-CCNC: 37 U/L
BILIRUB SERPL-MCNC: 0.8 MG/DL (ref 0.3–1.2)
BUN SERPL-MCNC: 24 MG/DL (ref 8–23)
BUN/CREAT SERPL: 30 (ref 9–20)
CALCIUM SERPL-MCNC: 8.4 MG/DL (ref 8.6–10.4)
CHLORIDE SERPL-SCNC: 99 MMOL/L (ref 98–107)
CO2 SERPL-SCNC: 17 MMOL/L (ref 20–31)
CREAT SERPL-MCNC: 0.8 MG/DL (ref 0.7–1.2)
ERYTHROCYTE [DISTWIDTH] IN BLOOD BY AUTOMATED COUNT: 16.1 % (ref 11.8–14.4)
GFR, ESTIMATED: >90 ML/MIN/1.73M2
GLUCOSE SERPL-MCNC: 310 MG/DL (ref 70–99)
HCT VFR BLD AUTO: 29.1 % (ref 40.7–50.3)
HGB BLD-MCNC: 8.9 G/DL (ref 13–17)
MCH RBC QN AUTO: 25.3 PG (ref 25.2–33.5)
MCHC RBC AUTO-ENTMCNC: 30.6 G/DL (ref 28.4–34.8)
MCV RBC AUTO: 82.7 FL (ref 82.6–102.9)
NRBC BLD-RTO: 0 PER 100 WBC
PLATELET # BLD AUTO: 59 K/UL (ref 138–453)
PMV BLD AUTO: ABNORMAL FL (ref 8.1–13.5)
POTASSIUM SERPL-SCNC: 4.5 MMOL/L (ref 3.7–5.3)
PROT SERPL-MCNC: 6.1 G/DL (ref 6.4–8.3)
RBC # BLD AUTO: 3.52 M/UL (ref 4.21–5.77)
SODIUM SERPL-SCNC: 128 MMOL/L (ref 135–144)
WBC OTHER # BLD: 4.5 K/UL (ref 3.5–11.3)

## 2024-10-24 PROCEDURE — 85027 COMPLETE CBC AUTOMATED: CPT

## 2024-10-24 PROCEDURE — 80053 COMPREHEN METABOLIC PANEL: CPT

## 2024-10-24 RX ORDER — TRAMADOL HYDROCHLORIDE 50 MG/1
50 TABLET ORAL EVERY 8 HOURS PRN
Qty: 21 TABLET | Refills: 0 | Status: SHIPPED | OUTPATIENT
Start: 2024-10-24 | End: 2024-10-31

## 2024-10-24 NOTE — TELEPHONE ENCOUNTER
Patient has been having rectal bleeding from Hemrroids since 10/22. They called Dr. Mascorro office and he suggested having HbG checked. Can we place these orders please

## 2024-10-28 ENCOUNTER — HOSPITAL ENCOUNTER (OUTPATIENT)
Age: 74
Discharge: HOME | End: 2024-10-28
Payer: MEDICARE

## 2024-10-28 DIAGNOSIS — E13.22 SECONDARY DIABETES MELLITUS WITH STAGE 2 CHRONIC KIDNEY DISEASE (HCC): ICD-10-CM

## 2024-10-28 DIAGNOSIS — N18.2 SECONDARY DIABETES MELLITUS WITH STAGE 2 CHRONIC KIDNEY DISEASE (HCC): ICD-10-CM

## 2024-10-28 DIAGNOSIS — N18.2 ANEMIA IN STAGE 2 CHRONIC KIDNEY DISEASE: ICD-10-CM

## 2024-10-28 DIAGNOSIS — N18.2 CKD (CHRONIC KIDNEY DISEASE), STAGE II: ICD-10-CM

## 2024-10-28 DIAGNOSIS — K62.5 RECTAL BLEEDING: ICD-10-CM

## 2024-10-28 DIAGNOSIS — D63.1 ANEMIA IN STAGE 2 CHRONIC KIDNEY DISEASE: ICD-10-CM

## 2024-10-28 LAB
ALBUMIN SERPL-MCNC: 3.1 G/DL (ref 3.5–5.2)
ALBUMIN SERPL-MCNC: 3.1 G/DL (ref 3.5–5.2)
ALBUMIN/GLOB SERPL: 1 {RATIO} (ref 1–2.5)
ALBUMIN/GLOB SERPL: 1 {RATIO} (ref 1–2.5)
ALP SERPL-CCNC: 253 U/L (ref 40–129)
ALP SERPL-CCNC: 253 U/L (ref 40–129)
ALT SERPL-CCNC: 44 U/L (ref 10–50)
ALT SERPL-CCNC: 44 U/L (ref 10–50)
ANION GAP SERPL CALCULATED.3IONS-SCNC: 9 MMOL/L (ref 9–16)
ANION GAP SERPL CALCULATED.3IONS-SCNC: 9 MMOL/L (ref 9–16)
AST SERPL-CCNC: 42 U/L (ref 10–50)
AST SERPL-CCNC: 42 U/L (ref 10–50)
BASOPHILS # BLD: 0 K/UL (ref 0–0.2)
BASOPHILS # BLD: 0 K/UL (ref 0–0.2)
BASOPHILS NFR BLD: 0 % (ref 0–2)
BASOPHILS NFR BLD: 0 % (ref 0–2)
BILIRUB SERPL-MCNC: 1.1 MG/DL (ref 0–1.2)
BILIRUB SERPL-MCNC: 1.1 MG/DL (ref 0–1.2)
BUN SERPL-MCNC: 33 MG/DL (ref 8–23)
BUN SERPL-MCNC: 33 MG/DL (ref 8–23)
CA-I BLD-SCNC: 1.28 MMOL/L (ref 1.13–1.33)
CALCIUM SERPL-MCNC: 9.3 MG/DL (ref 8.6–10.4)
CALCIUM SERPL-MCNC: 9.3 MG/DL (ref 8.6–10.4)
CHLORIDE SERPL-SCNC: 101 MMOL/L (ref 98–107)
CHLORIDE SERPL-SCNC: 101 MMOL/L (ref 98–107)
CO2 SERPL-SCNC: 23 MMOL/L (ref 20–31)
CO2 SERPL-SCNC: 23 MMOL/L (ref 20–31)
CREAT SERPL-MCNC: 1 MG/DL (ref 0.7–1.2)
CREAT SERPL-MCNC: 1 MG/DL (ref 0.7–1.2)
CREAT UR-MCNC: 99.1 MG/DL (ref 39–259)
EOSINOPHIL # BLD: 0.12 K/UL (ref 0–0.4)
EOSINOPHIL # BLD: 0.12 K/UL (ref 0–0.4)
EOSINOPHILS RELATIVE PERCENT: 5 % (ref 1–4)
EOSINOPHILS RELATIVE PERCENT: 5 % (ref 1–4)
ERYTHROCYTE [DISTWIDTH] IN BLOOD BY AUTOMATED COUNT: 16 % (ref 11.8–14.4)
ERYTHROCYTE [DISTWIDTH] IN BLOOD BY AUTOMATED COUNT: 16 % (ref 11.8–14.4)
GFR, ESTIMATED: 83 ML/MIN/1.73M2
GFR, ESTIMATED: 83 ML/MIN/1.73M2
GLUCOSE SERPL-MCNC: 172 MG/DL (ref 74–99)
GLUCOSE SERPL-MCNC: 172 MG/DL (ref 74–99)
HCT VFR BLD AUTO: 35.4 % (ref 40.7–50.3)
HCT VFR BLD AUTO: 35.4 % (ref 40.7–50.3)
HGB BLD-MCNC: 10.6 G/DL (ref 13–17)
HGB BLD-MCNC: 10.6 G/DL (ref 13–17)
IMM GRANULOCYTES # BLD AUTO: 0.09 K/UL (ref 0–0.3)
IMM GRANULOCYTES # BLD AUTO: 0.09 K/UL (ref 0–0.3)
IMM GRANULOCYTES NFR BLD: 4 %
IMM GRANULOCYTES NFR BLD: 4 %
LYMPHOCYTES NFR BLD: 0.58 K/UL (ref 1–4.8)
LYMPHOCYTES NFR BLD: 0.58 K/UL (ref 1–4.8)
LYMPHOCYTES RELATIVE PERCENT: 25 % (ref 24–44)
LYMPHOCYTES RELATIVE PERCENT: 25 % (ref 24–44)
MAGNESIUM SERPL-MCNC: 2.2 MG/DL (ref 1.6–2.4)
MCH RBC QN AUTO: 24.2 PG (ref 25.2–33.5)
MCH RBC QN AUTO: 24.2 PG (ref 25.2–33.5)
MCHC RBC AUTO-ENTMCNC: 29.9 G/DL (ref 28.4–34.8)
MCHC RBC AUTO-ENTMCNC: 29.9 G/DL (ref 28.4–34.8)
MCV RBC AUTO: 80.8 FL (ref 82.6–102.9)
MCV RBC AUTO: 80.8 FL (ref 82.6–102.9)
MICROORGANISM SPEC CULT: NORMAL
MICROORGANISM/AGENT SPEC: NORMAL
MONOCYTES NFR BLD: 0.32 K/UL (ref 0.1–0.8)
MONOCYTES NFR BLD: 0.32 K/UL (ref 0.1–0.8)
MONOCYTES NFR BLD: 14 % (ref 1–7)
MONOCYTES NFR BLD: 14 % (ref 1–7)
MORPHOLOGY: ABNORMAL
NEUTROPHILS NFR BLD: 52 % (ref 36–66)
NEUTROPHILS NFR BLD: 52 % (ref 36–66)
NEUTS SEG NFR BLD: 1.19 K/UL (ref 1.8–7.7)
NEUTS SEG NFR BLD: 1.19 K/UL (ref 1.8–7.7)
NRBC BLD-RTO: 0 PER 100 WBC
NRBC BLD-RTO: 0 PER 100 WBC
NUCLEATED RED BLOOD CELLS: 1 PER 100 WBC
NUCLEATED RED BLOOD CELLS: 1 PER 100 WBC
PHOSPHATE SERPL-MCNC: 3.1 MG/DL (ref 2.5–4.5)
PLATELET # BLD AUTO: ABNORMAL K/UL (ref 138–453)
PLATELET # BLD AUTO: ABNORMAL K/UL (ref 138–453)
PLATELET, FLUORESCENCE: 70 K/UL (ref 138–453)
PLATELET, FLUORESCENCE: 70 K/UL (ref 138–453)
PLATELETS.RETICULATED NFR BLD AUTO: 4.3 % (ref 1.1–10.3)
PLATELETS.RETICULATED NFR BLD AUTO: 4.3 % (ref 1.1–10.3)
POTASSIUM SERPL-SCNC: 4.6 MMOL/L (ref 3.7–5.3)
POTASSIUM SERPL-SCNC: 4.6 MMOL/L (ref 3.7–5.3)
PROT SERPL-MCNC: 7.1 G/DL (ref 6.6–8.7)
PROT SERPL-MCNC: 7.1 G/DL (ref 6.6–8.7)
PTH-INTACT SERPL-MCNC: 30 PG/ML (ref 15–65)
RBC # BLD AUTO: 4.38 M/UL (ref 4.21–5.77)
RBC # BLD AUTO: 4.38 M/UL (ref 4.21–5.77)
SERVICE CMNT-IMP: NORMAL
SODIUM SERPL-SCNC: 133 MMOL/L (ref 136–145)
SODIUM SERPL-SCNC: 133 MMOL/L (ref 136–145)
SPECIMEN DESCRIPTION: NORMAL
TOTAL PROTEIN, URINE: 12 MG/DL
URINE TOTAL PROTEIN CREATININE RATIO: 0.12
WBC OTHER # BLD: 2.3 K/UL (ref 3.5–11.3)
WBC OTHER # BLD: 2.3 K/UL (ref 3.5–11.3)

## 2024-10-28 PROCEDURE — 85025 COMPLETE CBC W/AUTO DIFF WBC: CPT

## 2024-10-28 PROCEDURE — 84156 ASSAY OF PROTEIN URINE: CPT

## 2024-10-28 PROCEDURE — 80053 COMPREHEN METABOLIC PANEL: CPT

## 2024-10-28 PROCEDURE — 83970 ASSAY OF PARATHORMONE: CPT

## 2024-10-28 PROCEDURE — 36415 COLL VENOUS BLD VENIPUNCTURE: CPT

## 2024-10-28 PROCEDURE — 82330 ASSAY OF CALCIUM: CPT

## 2024-10-28 PROCEDURE — 84100 ASSAY OF PHOSPHORUS: CPT

## 2024-10-28 PROCEDURE — 83735 ASSAY OF MAGNESIUM: CPT

## 2024-10-28 PROCEDURE — 85055 RETICULATED PLATELET ASSAY: CPT

## 2024-10-28 PROCEDURE — 82570 ASSAY OF URINE CREATININE: CPT

## 2024-10-29 ENCOUNTER — OFFICE VISIT (OUTPATIENT)
Dept: GASTROENTEROLOGY | Age: 74
End: 2024-10-29
Payer: MEDICARE

## 2024-10-29 VITALS
BODY MASS INDEX: 16.68 KG/M2 | WEIGHT: 123 LBS | DIASTOLIC BLOOD PRESSURE: 62 MMHG | TEMPERATURE: 97.5 F | SYSTOLIC BLOOD PRESSURE: 109 MMHG

## 2024-10-29 DIAGNOSIS — R18.8 OTHER ASCITES: ICD-10-CM

## 2024-10-29 DIAGNOSIS — D47.2 MGUS (MONOCLONAL GAMMOPATHY OF UNKNOWN SIGNIFICANCE): ICD-10-CM

## 2024-10-29 DIAGNOSIS — K62.5 RECTAL BLEEDING: ICD-10-CM

## 2024-10-29 DIAGNOSIS — D61.818 PANCYTOPENIA (HCC): ICD-10-CM

## 2024-10-29 DIAGNOSIS — K74.60 CIRRHOSIS OF LIVER WITH ASCITES, UNSPECIFIED HEPATIC CIRRHOSIS TYPE (HCC): ICD-10-CM

## 2024-10-29 DIAGNOSIS — J90 PLEURAL EFFUSION ON RIGHT: ICD-10-CM

## 2024-10-29 DIAGNOSIS — D69.6 THROMBOCYTOPENIA (HCC): ICD-10-CM

## 2024-10-29 DIAGNOSIS — K21.9 GASTROESOPHAGEAL REFLUX DISEASE, UNSPECIFIED WHETHER ESOPHAGITIS PRESENT: ICD-10-CM

## 2024-10-29 DIAGNOSIS — D50.8 OTHER IRON DEFICIENCY ANEMIA: ICD-10-CM

## 2024-10-29 DIAGNOSIS — Z87.19 HISTORY OF HEMATEMESIS: ICD-10-CM

## 2024-10-29 DIAGNOSIS — K74.69 OTHER CIRRHOSIS OF LIVER (HCC): ICD-10-CM

## 2024-10-29 DIAGNOSIS — K76.6 PORTAL HYPERTENSION WITH ESOPHAGEAL VARICES (HCC): Primary | ICD-10-CM

## 2024-10-29 DIAGNOSIS — I85.00 IDIOPATHIC ESOPHAGEAL VARICES WITHOUT BLEEDING (HCC): ICD-10-CM

## 2024-10-29 DIAGNOSIS — K76.6 PORTAL HYPERTENSION (HCC): ICD-10-CM

## 2024-10-29 DIAGNOSIS — I85.00 PORTAL HYPERTENSION WITH ESOPHAGEAL VARICES (HCC): Primary | ICD-10-CM

## 2024-10-29 DIAGNOSIS — C61 PROSTATE CANCER (HCC): ICD-10-CM

## 2024-10-29 DIAGNOSIS — R18.8 CIRRHOSIS OF LIVER WITH ASCITES, UNSPECIFIED HEPATIC CIRRHOSIS TYPE (HCC): ICD-10-CM

## 2024-10-29 PROCEDURE — 99214 OFFICE O/P EST MOD 30 MIN: CPT | Performed by: INTERNAL MEDICINE

## 2024-10-29 PROCEDURE — 3017F COLORECTAL CA SCREEN DOC REV: CPT | Performed by: INTERNAL MEDICINE

## 2024-10-29 PROCEDURE — G8427 DOCREV CUR MEDS BY ELIG CLIN: HCPCS | Performed by: INTERNAL MEDICINE

## 2024-10-29 PROCEDURE — 1159F MED LIST DOCD IN RCRD: CPT | Performed by: INTERNAL MEDICINE

## 2024-10-29 PROCEDURE — 1123F ACP DISCUSS/DSCN MKR DOCD: CPT | Performed by: INTERNAL MEDICINE

## 2024-10-29 PROCEDURE — 1036F TOBACCO NON-USER: CPT | Performed by: INTERNAL MEDICINE

## 2024-10-29 PROCEDURE — G8484 FLU IMMUNIZE NO ADMIN: HCPCS | Performed by: INTERNAL MEDICINE

## 2024-10-29 PROCEDURE — G8419 CALC BMI OUT NRM PARAM NOF/U: HCPCS | Performed by: INTERNAL MEDICINE

## 2024-10-29 RX ORDER — HYDROCORTISONE 25 MG/G
CREAM TOPICAL
Qty: 1 EACH | Refills: 0 | Status: SHIPPED | OUTPATIENT
Start: 2024-10-29

## 2024-10-29 RX ORDER — HYDROCORTISONE ACETATE 25 MG/1
25 SUPPOSITORY RECTAL EVERY 12 HOURS
Qty: 30 SUPPOSITORY | Refills: 3 | Status: SHIPPED | OUTPATIENT
Start: 2024-10-29

## 2024-10-29 RX ORDER — HYDROCORTISONE ACETATE 25 MG/1
25 SUPPOSITORY RECTAL EVERY 12 HOURS
Qty: 30 SUPPOSITORY | Refills: 3 | Status: SHIPPED | OUTPATIENT
Start: 2024-10-29 | End: 2024-10-29

## 2024-10-29 ASSESSMENT — ENCOUNTER SYMPTOMS
ABDOMINAL PAIN: 0
COLOR CHANGE: 0
ANAL BLEEDING: 0
SHORTNESS OF BREATH: 0
BLOOD IN STOOL: 0
NAUSEA: 0
RECTAL PAIN: 0
CONSTIPATION: 0
TROUBLE SWALLOWING: 0
CHOKING: 0
WHEEZING: 0
VOMITING: 0
COUGH: 0
DIARRHEA: 0
SORE THROAT: 0
ABDOMINAL DISTENTION: 0

## 2024-10-29 NOTE — PROGRESS NOTES
GI CLINIC FOLLOW UP    NTERVAL HISTORY:   No referring provider defined for this encounter.    Chief Complaint   Patient presents with    Hemorrhoids     Patient is here today due to hemorrhoids, pt last seen on 3/6/24 for cirrhosis, iron def anemia, esophageal varices, & prostate CA.       1. Portal hypertension with esophageal varices (HCC)    2. Gastroesophageal reflux disease, unspecified whether esophagitis present    3. Cirrhosis of liver with ascites, unspecified hepatic cirrhosis type (HCC)    4. Other iron deficiency anemia    5. Idiopathic esophageal varices without bleeding (HCC)    6. Prostate cancer (HCC)    7. Other cirrhosis of liver (HCC)    8. Other ascites    9. Pleural effusion on right    10. MGUS (monoclonal gammopathy of unknown significance)    11. Thrombocytopenia (HCC)    12. Pancytopenia (HCC)    13. History of hematemesis    14. Portal hypertension (HCC)    15. Rectal bleeding      This patient seen my office as a follow-up accompanied by his wife who is a nurse    This gentleman has history significant for end-stage liver disease cirrhosis of the liver history for variceal banding in the past history for severe portal hypertensive gastropathy history for anemia    He has history for recurrent hydrothorax    Has been seen and followed by hematology oncology as well as nephrology    [facility] was [facility] was evaluated by gastroenterology group there    Their records were reviewed underwent an upper endoscopy no evidence of active variceal pathology was noted previous banding caused some scarring evidence of portal hypertensive gastropathy was noted he also had large polyps in the stomach which were removed with a hot snare in the past      Patient has some hemorrhoids issues having intermittent rectal bleeding    Apparently also had some rectal varices?    He has been on beta-blockers he has been on diuretic therapy he has been on lactulose    All her

## 2024-10-30 SDOH — HEALTH STABILITY: PHYSICAL HEALTH: ON AVERAGE, HOW MANY DAYS PER WEEK DO YOU ENGAGE IN MODERATE TO STRENUOUS EXERCISE (LIKE A BRISK WALK)?: 0 DAYS

## 2024-10-31 ENCOUNTER — OFFICE VISIT (OUTPATIENT)
Dept: FAMILY MEDICINE CLINIC | Age: 74
End: 2024-10-31

## 2024-10-31 VITALS
WEIGHT: 130 LBS | SYSTOLIC BLOOD PRESSURE: 110 MMHG | OXYGEN SATURATION: 95 % | HEART RATE: 59 BPM | HEIGHT: 72 IN | BODY MASS INDEX: 17.61 KG/M2 | TEMPERATURE: 98.2 F | DIASTOLIC BLOOD PRESSURE: 64 MMHG

## 2024-10-31 DIAGNOSIS — J90 PLEURAL EFFUSION ON RIGHT: ICD-10-CM

## 2024-10-31 DIAGNOSIS — E11.65 CONTROLLED TYPE 2 DIABETES MELLITUS WITH HYPERGLYCEMIA, WITH LONG-TERM CURRENT USE OF INSULIN (HCC): Primary | ICD-10-CM

## 2024-10-31 DIAGNOSIS — Z79.4 CONTROLLED TYPE 2 DIABETES MELLITUS WITH HYPERGLYCEMIA, WITH LONG-TERM CURRENT USE OF INSULIN (HCC): Primary | ICD-10-CM

## 2024-10-31 DIAGNOSIS — I95.9 HYPOTENSION, UNSPECIFIED HYPOTENSION TYPE: ICD-10-CM

## 2024-10-31 DIAGNOSIS — R25.2 MUSCLE CRAMPS: ICD-10-CM

## 2024-10-31 RX ORDER — CYCLOBENZAPRINE HCL 10 MG
10 TABLET ORAL PRN
Qty: 90 TABLET | Refills: 0 | Status: SHIPPED | OUTPATIENT
Start: 2024-10-31

## 2024-10-31 ASSESSMENT — PATIENT HEALTH QUESTIONNAIRE - PHQ9
SUM OF ALL RESPONSES TO PHQ QUESTIONS 1-9: 0
1. LITTLE INTEREST OR PLEASURE IN DOING THINGS: NOT AT ALL
SUM OF ALL RESPONSES TO PHQ QUESTIONS 1-9: 0
2. FEELING DOWN, DEPRESSED OR HOPELESS: NOT AT ALL
SUM OF ALL RESPONSES TO PHQ QUESTIONS 1-9: 0
SUM OF ALL RESPONSES TO PHQ9 QUESTIONS 1 & 2: 0
SUM OF ALL RESPONSES TO PHQ QUESTIONS 1-9: 0

## 2024-10-31 NOTE — PROGRESS NOTES
IR PERC CATH PLEURAL DRAIN W/IMAG 9/23/2024 [facility] SPECIAL PROCEDURES    JOINT REPLACEMENT      PARACENTESIS      PLEURAL CATH INSERTION  06/09/2023    right upper abdomen    [place] OFFICE/OUTPT VISIT,PROCEDURE ONLY Left 11/29/2018    INCARCERATED INGUINAL HERNIA performed by Andre Dobbs MD at [facility] OR    PROSTATE BIOPSY  09/12/2014    PROSTATECTOMY  12/16/2015    robotic. lap    THORACENTESIS  2023    UMBILICAL HERNIA REPAIR      UPPER GASTROINTESTINAL ENDOSCOPY  10/19/2016    no lesions seen    UPPER GASTROINTESTINAL ENDOSCOPY  09/25/2019    UPPER GASTROINTESTINAL ENDOSCOPY N/A 09/25/2019    EGD BIOPSY performed by Yohana Leger MD at [facility] OR    UPPER GASTROINTESTINAL ENDOSCOPY N/A 06/07/2023    EGD BIOPSY, BANDING performed by Yohana Leger MD at [facility] OR    UPPER GASTROINTESTINAL ENDOSCOPY N/A 08/08/2023    EGD POLYP HOT FORCEP/CAUTERY performed by Yohana Leger MD at [facility] OR    UPPER GASTROINTESTINAL ENDOSCOPY N/A 9/16/2024    ESOPHAGOGASTRODUODENOSCOPY with APC of AVMs performed by Les Ramirez DO at [facility] OR        Social History     Socioeconomic History    Marital status:      Spouse name: None    Number of children: 3    Years of education: 15    Highest education level: None   Occupational History    Occupation: retired     Employer: [employer]   Tobacco Use    Smoking status: Never    Smokeless tobacco: Never    Tobacco comments:     never   Vaping Use    Vaping status: Never Used   Substance and Sexual Activity    Alcohol use: Not Currently     Comment: Only drink a very occasional small beer/wine    Drug use: No    Sexual activity: Not Currently     Partners: Female   Social History Narrative    Diet - relatively healthy    Caffeine intake per day - no    Exercise pattern - wlks    House with wife           Social Determinants of Health     Financial Resource Strain: Low Risk  (4/24/2024)    Overall Financial Resource Strain (CARDIA)     Difficulty of Paying Living Expenses: Not hard

## 2024-11-10 RX ORDER — FUROSEMIDE 20 MG/1
20 TABLET ORAL 2 TIMES DAILY
Qty: 180 TABLET | Refills: 3 | OUTPATIENT
Start: 2024-11-10 | End: 2025-11-05

## 2024-11-10 RX ORDER — SPIRONOLACTONE 100 MG/1
100 TABLET, FILM COATED ORAL DAILY
Qty: 90 TABLET | Refills: 3 | OUTPATIENT
Start: 2024-11-10

## 2024-11-18 ENCOUNTER — HOSPITAL ENCOUNTER (OUTPATIENT)
Age: 74
Setting detail: SPECIMEN
Discharge: HOME | End: 2024-11-18

## 2024-11-18 DIAGNOSIS — K74.60 CIRRHOSIS OF LIVER WITH ASCITES, UNSPECIFIED HEPATIC CIRRHOSIS TYPE (HCC): Primary | ICD-10-CM

## 2024-11-18 DIAGNOSIS — R18.8 CIRRHOSIS OF LIVER WITH ASCITES, UNSPECIFIED HEPATIC CIRRHOSIS TYPE (HCC): Primary | ICD-10-CM

## 2024-11-18 DIAGNOSIS — N17.0 ACUTE RENAL FAILURE WITH TUBULAR NECROSIS (HCC): ICD-10-CM

## 2024-11-18 LAB
ALBUMIN SERPL-MCNC: 3 G/DL (ref 3.5–5.2)
ANION GAP SERPL CALCULATED.3IONS-SCNC: 7 MMOL/L (ref 9–16)
BUN SERPL-MCNC: 27 MG/DL (ref 8–23)
CALCIUM SERPL-MCNC: 8.9 MG/DL (ref 8.6–10.4)
CHLORIDE SERPL-SCNC: 106 MMOL/L (ref 98–107)
CO2 SERPL-SCNC: 24 MMOL/L (ref 20–31)
CREAT SERPL-MCNC: 0.9 MG/DL (ref 0.7–1.2)
CREAT UR-MCNC: 120 MG/DL (ref 39–259)
GFR, ESTIMATED: 90 ML/MIN/1.73M2
GLUCOSE SERPL-MCNC: 135 MG/DL (ref 74–99)
POTASSIUM SERPL-SCNC: 4.9 MMOL/L (ref 3.7–5.3)
SODIUM SERPL-SCNC: 137 MMOL/L (ref 136–145)
TOTAL PROTEIN, URINE: 15 MG/DL
URINE TOTAL PROTEIN CREATININE RATIO: 0.13 (ref 0–0.2)

## 2024-11-20 ENCOUNTER — HOSPITAL ENCOUNTER (OUTPATIENT)
Age: 74
Discharge: HOME | End: 2024-11-20
Payer: MEDICARE

## 2024-11-20 DIAGNOSIS — R32 URINARY INCONTINENCE, UNSPECIFIED TYPE: Primary | ICD-10-CM

## 2024-11-20 DIAGNOSIS — R32 URINARY INCONTINENCE, UNSPECIFIED TYPE: ICD-10-CM

## 2024-11-20 DIAGNOSIS — R18.8 CIRRHOSIS OF LIVER WITH ASCITES, UNSPECIFIED HEPATIC CIRRHOSIS TYPE (HCC): ICD-10-CM

## 2024-11-20 DIAGNOSIS — K74.60 CIRRHOSIS OF LIVER WITH ASCITES, UNSPECIFIED HEPATIC CIRRHOSIS TYPE (HCC): ICD-10-CM

## 2024-11-20 LAB
AMMONIA PLAS-SCNC: 103 UMOL/L (ref 16–60)
BASOPHILS # BLD: 0 K/UL (ref 0–0.2)
BASOPHILS NFR BLD: 0 % (ref 0–2)
BILIRUB UR QL STRIP: NEGATIVE
CLARITY UR: CLEAR
COLOR UR: YELLOW
COMMENT: NORMAL
EOSINOPHIL # BLD: 0.16 K/UL (ref 0–0.4)
EOSINOPHILS RELATIVE PERCENT: 7 % (ref 1–4)
ERYTHROCYTE [DISTWIDTH] IN BLOOD BY AUTOMATED COUNT: 17 % (ref 11.8–14.4)
GLUCOSE UR STRIP-MCNC: NEGATIVE MG/DL
HCT VFR BLD AUTO: 37.7 % (ref 40.7–50.3)
HGB BLD-MCNC: 10.7 G/DL (ref 13–17)
HGB UR QL STRIP.AUTO: NEGATIVE
IMM GRANULOCYTES # BLD AUTO: 0.02 K/UL (ref 0–0.3)
IMM GRANULOCYTES NFR BLD: 1 %
KETONES UR STRIP-MCNC: NEGATIVE MG/DL
LEUKOCYTE ESTERASE UR QL STRIP: NEGATIVE
LYMPHOCYTES NFR BLD: 0.44 K/UL (ref 1–4.8)
LYMPHOCYTES RELATIVE PERCENT: 19 % (ref 24–44)
MCH RBC QN AUTO: 23.3 PG (ref 25.2–33.5)
MCHC RBC AUTO-ENTMCNC: 28.4 G/DL (ref 28.4–34.8)
MCV RBC AUTO: 82 FL (ref 82.6–102.9)
MONOCYTES NFR BLD: 0.39 K/UL (ref 0.1–0.8)
MONOCYTES NFR BLD: 17 % (ref 1–7)
MORPHOLOGY: ABNORMAL
NEUTROPHILS NFR BLD: 56 % (ref 36–66)
NEUTS SEG NFR BLD: 1.29 K/UL (ref 1.8–7.7)
NITRITE UR QL STRIP: NEGATIVE
NRBC BLD-RTO: 0 PER 100 WBC
PH UR STRIP: 6 [PH] (ref 5–8)
PLATELET # BLD AUTO: ABNORMAL K/UL (ref 138–453)
PLATELET, FLUORESCENCE: 75 K/UL (ref 138–453)
PLATELETS.RETICULATED NFR BLD AUTO: 6.4 % (ref 1.1–10.3)
PROT UR STRIP-MCNC: NEGATIVE MG/DL
RBC # BLD AUTO: 4.6 M/UL (ref 4.21–5.77)
SP GR UR STRIP: 1.02 (ref 1–1.03)
UROBILINOGEN UR STRIP-ACNC: NORMAL EU/DL (ref 0–1)
WBC OTHER # BLD: 2.3 K/UL (ref 3.5–11.3)

## 2024-11-20 PROCEDURE — 85055 RETICULATED PLATELET ASSAY: CPT

## 2024-11-20 PROCEDURE — 36415 COLL VENOUS BLD VENIPUNCTURE: CPT

## 2024-11-20 PROCEDURE — 81003 URINALYSIS AUTO W/O SCOPE: CPT

## 2024-11-20 PROCEDURE — 82140 ASSAY OF AMMONIA: CPT

## 2024-11-20 PROCEDURE — 85025 COMPLETE CBC W/AUTO DIFF WBC: CPT

## 2024-11-21 ENCOUNTER — TELEPHONE (OUTPATIENT)
Dept: FAMILY MEDICINE CLINIC | Age: 74
End: 2024-11-21

## 2024-11-21 RX ORDER — LACTULOSE 10 G/15ML
30 SOLUTION ORAL EVERY EVENING
Qty: 1350 ML | Refills: 0 | Status: SHIPPED | OUTPATIENT
Start: 2024-11-21 | End: 2024-11-22 | Stop reason: SDUPTHER

## 2024-11-21 NOTE — TELEPHONE ENCOUNTER
Candelario Bliss Jr. is calling to request a refill on the following medication(s):    Medication Request:  Requested Prescriptions     Pending Prescriptions Disp Refills    lactulose (CHRONULAC) 10 GM/15ML solution  1     Sig: Take 45 mLs by mouth every evening       Last Visit Date (If Applicable):  10/31/2024    Next Visit Date:    1/7/2025

## 2024-11-21 NOTE — TELEPHONE ENCOUNTER
Patient nurse calling to  report high ammonina levels (labs in epic)    Patient wife states that he has not been taking the lactolose but will be starting this morning he had 30ml    Also    She states that patient nursing care will be discharge per wife she can kori that he will only be getting PT

## 2024-11-22 DIAGNOSIS — K74.60 CIRRHOSIS OF LIVER WITH ASCITES, UNSPECIFIED HEPATIC CIRRHOSIS TYPE (HCC): Primary | ICD-10-CM

## 2024-11-22 DIAGNOSIS — R18.8 CIRRHOSIS OF LIVER WITH ASCITES, UNSPECIFIED HEPATIC CIRRHOSIS TYPE (HCC): Primary | ICD-10-CM

## 2024-11-22 RX ORDER — LACTULOSE 10 G/15ML
20 SOLUTION ORAL EVERY EVENING
Qty: 900 ML | Refills: 0 | Status: SHIPPED | OUTPATIENT
Start: 2024-11-22 | End: 2024-12-22

## 2024-11-27 ENCOUNTER — HOSPITAL ENCOUNTER (OUTPATIENT)
Age: 74
Discharge: HOME | End: 2024-11-27
Payer: MEDICARE

## 2024-11-27 DIAGNOSIS — K74.60 CIRRHOSIS OF LIVER WITH ASCITES, UNSPECIFIED HEPATIC CIRRHOSIS TYPE (HCC): ICD-10-CM

## 2024-11-27 DIAGNOSIS — R18.8 CIRRHOSIS OF LIVER WITH ASCITES, UNSPECIFIED HEPATIC CIRRHOSIS TYPE (HCC): ICD-10-CM

## 2024-11-27 LAB
AMMONIA PLAS-SCNC: 53 UMOL/L (ref 16–60)
ANION GAP SERPL CALCULATED.3IONS-SCNC: 7 MMOL/L (ref 9–16)
BUN SERPL-MCNC: 31 MG/DL (ref 8–23)
CALCIUM SERPL-MCNC: 9.1 MG/DL (ref 8.6–10.4)
CHLORIDE SERPL-SCNC: 102 MMOL/L (ref 98–107)
CO2 SERPL-SCNC: 22 MMOL/L (ref 20–31)
CREAT SERPL-MCNC: 0.9 MG/DL (ref 0.7–1.2)
GFR, ESTIMATED: 90 ML/MIN/1.73M2
GLUCOSE SERPL-MCNC: 274 MG/DL (ref 74–99)
POTASSIUM SERPL-SCNC: 4.8 MMOL/L (ref 3.7–5.3)
SODIUM SERPL-SCNC: 131 MMOL/L (ref 136–145)

## 2024-11-27 PROCEDURE — 82140 ASSAY OF AMMONIA: CPT

## 2024-11-27 PROCEDURE — 36415 COLL VENOUS BLD VENIPUNCTURE: CPT

## 2024-11-27 PROCEDURE — 80048 BASIC METABOLIC PNL TOTAL CA: CPT

## 2024-12-02 DIAGNOSIS — E11.65 CONTROLLED TYPE 2 DIABETES MELLITUS WITH HYPERGLYCEMIA, WITH LONG-TERM CURRENT USE OF INSULIN (HCC): ICD-10-CM

## 2024-12-02 DIAGNOSIS — R18.8 CIRRHOSIS OF LIVER WITH ASCITES, UNSPECIFIED HEPATIC CIRRHOSIS TYPE (HCC): ICD-10-CM

## 2024-12-02 DIAGNOSIS — K74.60 CIRRHOSIS OF LIVER WITH ASCITES, UNSPECIFIED HEPATIC CIRRHOSIS TYPE (HCC): ICD-10-CM

## 2024-12-02 DIAGNOSIS — Z79.4 CONTROLLED TYPE 2 DIABETES MELLITUS WITH HYPERGLYCEMIA, WITH LONG-TERM CURRENT USE OF INSULIN (HCC): ICD-10-CM

## 2024-12-02 RX ORDER — LACTULOSE 10 G/15ML
20 SOLUTION ORAL 2 TIMES DAILY
Qty: 1200 ML | Refills: 0 | Status: SHIPPED | OUTPATIENT
Start: 2024-12-02 | End: 2025-01-01

## 2024-12-02 RX ORDER — INSULIN LISPRO 100 [IU]/ML
3-12 INJECTION, SOLUTION INTRAVENOUS; SUBCUTANEOUS
Qty: 12 ADJUSTABLE DOSE PRE-FILLED PEN SYRINGE | Refills: 2 | Status: SHIPPED | OUTPATIENT
Start: 2024-12-02 | End: 2025-01-01

## 2024-12-02 RX ORDER — PANTOPRAZOLE SODIUM 40 MG/1
40 TABLET, DELAYED RELEASE ORAL DAILY
Qty: 90 TABLET | Refills: 0 | Status: SHIPPED | OUTPATIENT
Start: 2024-12-02

## 2024-12-12 ENCOUNTER — TELEPHONE (OUTPATIENT)
Dept: PHARMACY | Facility: CLINIC | Age: 74
End: 2024-12-12

## 2024-12-12 NOTE — TELEPHONE ENCOUNTER
Patient is to be scheduled with Pharmacist    Attempt made to contact patient at the home line    Left a message requesting a call back   Lizzie [facility]  [facility]  Ambulatory Pharmacy   Clinical   [phone]

## 2024-12-12 NOTE — TELEPHONE ENCOUNTER
Reason for referral: Diabetes Mellitus W/Hyperglycemia  Referring provider: Malia Walker  Referring provider office: Jose E J[facility]NSON  Referred to: Sherrie Le [facility]  Status of Patient: New Patient  Length of Appt: 60 minutes  Type of Appt: In Person

## (undated) DEVICE — BASIN EMSIS 700ML GRAPHITE PLAS KID SHP GRAD

## (undated) DEVICE — STAZ ENDO KIT: Brand: MEDLINE INDUSTRIES, INC.

## (undated) DEVICE — FORCEPS BX L240CM JAW DIA2.4MM ORNG L CAP W/ NDL DISP RAD

## (undated) DEVICE — 3M™ WARMING BLANKET, UPPER BODY, 10 PER CASE, 42268: Brand: BAIR HUGGER™

## (undated) DEVICE — GLOVE SURG 8 11.7IN BEAD CUF LIGHT BRN SENSICARE LTX FREE

## (undated) DEVICE — E-Z CLEAN, NON-STICK, PTFE COATED, ELECTROSURGICAL BLADE ELECTRODE, 6.5 INCH (16.5 CM): Brand: MEGADYNE

## (undated) DEVICE — MEDICINE CUP, GRADUATED, STER: Brand: MEDLINE

## (undated) DEVICE — ELECTRODE PT RET AD L9FT HI MOIST COND ADH HYDRGEL CORDED

## (undated) DEVICE — TUBING, SUCTION, 1/4" X 12', STRAIGHT: Brand: MEDLINE

## (undated) DEVICE — CLIPVAC PRESURG HAIR REMOVAL

## (undated) DEVICE — CO2 CANNULA,SUPERSOFT, ADLT,7'O2,7'CO2: Brand: MEDLINE

## (undated) DEVICE — GARMENT,MEDLINE,DVT,INT,CALF,MED, GEN2: Brand: MEDLINE

## (undated) DEVICE — YANKAUER,POOLE TIP,STERILE,50/CS: Brand: MEDLINE

## (undated) DEVICE — SINGLE-USE BIOPSY FORCEPS: Brand: RADIAL JAW 4

## (undated) DEVICE — PENROSE DRAIN 18 X .5" SILICONE: Brand: MEDLINE

## (undated) DEVICE — TOTAL TRAY, DB, 100% SILI FOLEY, 16FR 10: Brand: MEDLINE

## (undated) DEVICE — SNARE ENDOSCP M L240CM LOOP W27MM SHTH DIA2.4MM OVL FLX

## (undated) DEVICE — BLADE CLIPPER GEN PURP NS

## (undated) DEVICE — GAUZE,SPONGE,4"X4",16PLY,STRL,LF,10/TRAY: Brand: MEDLINE

## (undated) DEVICE — SURGICAL PROCEDURE KIT BASIN MAJ SET UP

## (undated) DEVICE — JELLY,LUBE,STERILE,FLIP TOP,TUBE,2-OZ: Brand: MEDLINE

## (undated) DEVICE — GOWN,SIRUS,NON REINFRCD,LARGE,SET IN SL: Brand: MEDLINE

## (undated) DEVICE — CLIP LIG L235CM RESOL 360 BX/20

## (undated) DEVICE — SPONGE DRN W4XL4IN RAYON/POLYESTER 6 PLY NONWOVEN PRECUT

## (undated) DEVICE — SKIN PREP TRAY W/CHG: Brand: MEDLINE INDUSTRIES, INC.

## (undated) DEVICE — TRAP SURG QUAD PARABOLA SLOT DSGN SFTY SCRN TRAPEASE

## (undated) DEVICE — ELECTROSURGICAL PENCIL BUTTON SWITCH E-Z CLEAN COATED BLADE ELECTRODE 10 FT (3 M) CORD HOLSTER: Brand: MEGADYNE

## (undated) DEVICE — SKIN AFFIX SURG ADHESIVE 72/CS 0.55ML: Brand: MEDLINE

## (undated) DEVICE — SUTURE VCRL SZ 3-0 L27IN ABSRB UD L26MM SH 1/2 CIR J416H

## (undated) DEVICE — SYRINGE MED 50ML LUERLOCK TIP

## (undated) DEVICE — FIAPC® PROBE W/ FILTER 2200 C OD 2.3MM/6.9FR; L 2.2M/7.2FT: Brand: ERBE

## (undated) DEVICE — BITEBLOCK ENDOSCP 60FR MAXI WHT POLYETH STURDY W/ VELC WVN

## (undated) DEVICE — BLOCK BITE 60FR RUBBER ADLT DENTAL

## (undated) DEVICE — SPONGE LAP W18XL18IN WHT COT 4 PLY FLD STRUNG RADPQ DISP ST

## (undated) DEVICE — Device

## (undated) DEVICE — DRAPE,REIN 53X77,STERILE: Brand: MEDLINE

## (undated) DEVICE — 9165 UNIVERSAL PATIENT PLATE: Brand: 3M™

## (undated) DEVICE — SIX SHOOTER SAEED MULTI-BAND LIGATOR: Brand: SAEED

## (undated) DEVICE — RESERVOIR,SUCTION,100CC,SILICONE: Brand: MEDLINE

## (undated) DEVICE — SNARE ENDOSCP L240CM W15MM SHTH DIA2.4MM CHN 2.8MM STIFF

## (undated) DEVICE — YANKAUER,FLEXIBLE HANDLE,REGLR CAPACITY: Brand: MEDLINE INDUSTRIES, INC.

## (undated) DEVICE — ADAPTER TBNG LUER STUB 15 GA INTMED

## (undated) DEVICE — Z DISCONTINUED USE 2624853 GLOVE SURG SZ 75 L12IN THK91MIL BRN LTX FREE

## (undated) DEVICE — RETRIEVER SPEC L230CM DIA2.5MM POLYPR FOR TISS RETRV ROTH